# Patient Record
Sex: FEMALE | Race: WHITE | NOT HISPANIC OR LATINO | Employment: UNEMPLOYED | URBAN - METROPOLITAN AREA
[De-identification: names, ages, dates, MRNs, and addresses within clinical notes are randomized per-mention and may not be internally consistent; named-entity substitution may affect disease eponyms.]

---

## 2017-01-04 ENCOUNTER — HOSPITAL ENCOUNTER (OUTPATIENT)
Facility: MEDICAL CENTER | Age: 42
End: 2017-01-05
Attending: EMERGENCY MEDICINE | Admitting: EMERGENCY MEDICINE
Payer: MEDICAID

## 2017-01-04 ENCOUNTER — APPOINTMENT (OUTPATIENT)
Dept: RADIOLOGY | Facility: MEDICAL CENTER | Age: 42
End: 2017-01-04
Attending: INTERNAL MEDICINE
Payer: MEDICAID

## 2017-01-04 ENCOUNTER — RESOLUTE PROFESSIONAL BILLING HOSPITAL PROF FEE (OUTPATIENT)
Dept: HOSPITALIST | Facility: MEDICAL CENTER | Age: 42
End: 2017-01-04
Payer: MEDICAID

## 2017-01-04 ENCOUNTER — APPOINTMENT (OUTPATIENT)
Dept: RADIOLOGY | Facility: MEDICAL CENTER | Age: 42
End: 2017-01-04
Attending: EMERGENCY MEDICINE
Payer: MEDICAID

## 2017-01-04 DIAGNOSIS — R10.9 FLANK PAIN: ICD-10-CM

## 2017-01-04 PROBLEM — R11.10 EMESIS: Status: ACTIVE | Noted: 2017-01-04

## 2017-01-04 PROBLEM — R11.2 NAUSEA AND VOMITING: Status: ACTIVE | Noted: 2017-01-04

## 2017-01-04 PROBLEM — M54.9 BACK PAIN: Status: ACTIVE | Noted: 2017-01-04

## 2017-01-04 LAB
ALBUMIN SERPL BCP-MCNC: 5.1 G/DL (ref 3.2–4.9)
ALBUMIN/GLOB SERPL: 1.5 G/DL
ALP SERPL-CCNC: 79 U/L (ref 30–99)
ALT SERPL-CCNC: 11 U/L (ref 2–50)
ANION GAP SERPL CALC-SCNC: 15 MMOL/L (ref 0–11.9)
AST SERPL-CCNC: 17 U/L (ref 12–45)
BASOPHILS # BLD AUTO: 0.7 % (ref 0–1.8)
BASOPHILS # BLD: 0.06 K/UL (ref 0–0.12)
BILIRUB SERPL-MCNC: 1.9 MG/DL (ref 0.1–1.5)
BUN SERPL-MCNC: 16 MG/DL (ref 8–22)
CALCIUM SERPL-MCNC: 10.5 MG/DL (ref 8.5–10.5)
CHLORIDE SERPL-SCNC: 103 MMOL/L (ref 96–112)
CO2 SERPL-SCNC: 18 MMOL/L (ref 20–33)
CREAT SERPL-MCNC: 0.78 MG/DL (ref 0.5–1.4)
EOSINOPHIL # BLD AUTO: 0.03 K/UL (ref 0–0.51)
EOSINOPHIL NFR BLD: 0.3 % (ref 0–6.9)
ERYTHROCYTE [DISTWIDTH] IN BLOOD BY AUTOMATED COUNT: 49.3 FL (ref 35.9–50)
GFR SERPL CREATININE-BSD FRML MDRD: >60 ML/MIN/1.73 M 2
GLOBULIN SER CALC-MCNC: 3.5 G/DL (ref 1.9–3.5)
GLUCOSE SERPL-MCNC: 140 MG/DL (ref 65–99)
HCG SERPL QL: NEGATIVE
HCT VFR BLD AUTO: 46 % (ref 37–47)
HGB BLD-MCNC: 16 G/DL (ref 12–16)
IMM GRANULOCYTES # BLD AUTO: 0.05 K/UL (ref 0–0.11)
IMM GRANULOCYTES NFR BLD AUTO: 0.5 % (ref 0–0.9)
LIPASE SERPL-CCNC: 12 U/L (ref 11–82)
LYMPHOCYTES # BLD AUTO: 0.96 K/UL (ref 1–4.8)
LYMPHOCYTES NFR BLD: 10.5 % (ref 22–41)
MAGNESIUM SERPL-MCNC: 1.6 MG/DL (ref 1.5–2.5)
MCH RBC QN AUTO: 33.1 PG (ref 27–33)
MCHC RBC AUTO-ENTMCNC: 34.8 G/DL (ref 33.6–35)
MCV RBC AUTO: 95.2 FL (ref 81.4–97.8)
MONOCYTES # BLD AUTO: 0.53 K/UL (ref 0–0.85)
MONOCYTES NFR BLD AUTO: 5.8 % (ref 0–13.4)
NEUTROPHILS # BLD AUTO: 7.48 K/UL (ref 2–7.15)
NEUTROPHILS NFR BLD: 82.2 % (ref 44–72)
NRBC # BLD AUTO: 0 K/UL
NRBC BLD AUTO-RTO: 0 /100 WBC
PLATELET # BLD AUTO: 386 K/UL (ref 164–446)
PMV BLD AUTO: 9.9 FL (ref 9–12.9)
POTASSIUM SERPL-SCNC: 3.5 MMOL/L (ref 3.6–5.5)
PROT SERPL-MCNC: 8.6 G/DL (ref 6–8.2)
RBC # BLD AUTO: 4.83 M/UL (ref 4.2–5.4)
SODIUM SERPL-SCNC: 136 MMOL/L (ref 135–145)
WBC # BLD AUTO: 9.1 K/UL (ref 4.8–10.8)

## 2017-01-04 PROCEDURE — 96376 TX/PRO/DX INJ SAME DRUG ADON: CPT

## 2017-01-04 PROCEDURE — G0378 HOSPITAL OBSERVATION PER HR: HCPCS

## 2017-01-04 PROCEDURE — 80053 COMPREHEN METABOLIC PANEL: CPT

## 2017-01-04 PROCEDURE — A9270 NON-COVERED ITEM OR SERVICE: HCPCS | Performed by: HOSPITALIST

## 2017-01-04 PROCEDURE — 83735 ASSAY OF MAGNESIUM: CPT

## 2017-01-04 PROCEDURE — 302242 IV POLE: Performed by: HOSPITALIST

## 2017-01-04 PROCEDURE — 700111 HCHG RX REV CODE 636 W/ 250 OVERRIDE (IP): Performed by: INTERNAL MEDICINE

## 2017-01-04 PROCEDURE — 96375 TX/PRO/DX INJ NEW DRUG ADDON: CPT

## 2017-01-04 PROCEDURE — 700111 HCHG RX REV CODE 636 W/ 250 OVERRIDE (IP): Performed by: HOSPITALIST

## 2017-01-04 PROCEDURE — 700111 HCHG RX REV CODE 636 W/ 250 OVERRIDE (IP): Performed by: EMERGENCY MEDICINE

## 2017-01-04 PROCEDURE — 96374 THER/PROPH/DIAG INJ IV PUSH: CPT

## 2017-01-04 PROCEDURE — 99285 EMERGENCY DEPT VISIT HI MDM: CPT

## 2017-01-04 PROCEDURE — A9270 NON-COVERED ITEM OR SERVICE: HCPCS | Performed by: INTERNAL MEDICINE

## 2017-01-04 PROCEDURE — 96361 HYDRATE IV INFUSION ADD-ON: CPT

## 2017-01-04 PROCEDURE — 700102 HCHG RX REV CODE 250 W/ 637 OVERRIDE(OP): Performed by: HOSPITALIST

## 2017-01-04 PROCEDURE — 84703 CHORIONIC GONADOTROPIN ASSAY: CPT

## 2017-01-04 PROCEDURE — 99220 PR INITIAL OBSERVATION CARE,LEVL III: CPT | Mod: GC | Performed by: HOSPITALIST

## 2017-01-04 PROCEDURE — 700101 HCHG RX REV CODE 250: Performed by: HOSPITALIST

## 2017-01-04 PROCEDURE — 74176 CT ABD & PELVIS W/O CONTRAST: CPT

## 2017-01-04 PROCEDURE — 96372 THER/PROPH/DIAG INJ SC/IM: CPT | Mod: XU

## 2017-01-04 PROCEDURE — 700102 HCHG RX REV CODE 250 W/ 637 OVERRIDE(OP): Performed by: INTERNAL MEDICINE

## 2017-01-04 PROCEDURE — 85025 COMPLETE CBC W/AUTO DIFF WBC: CPT

## 2017-01-04 PROCEDURE — 302128 INFUSION PUMP: Performed by: HOSPITALIST

## 2017-01-04 PROCEDURE — 83690 ASSAY OF LIPASE: CPT

## 2017-01-04 RX ORDER — OXYCODONE HYDROCHLORIDE 5 MG/1
5 TABLET ORAL EVERY 6 HOURS PRN
Status: DISCONTINUED | OUTPATIENT
Start: 2017-01-04 | End: 2017-01-05 | Stop reason: HOSPADM

## 2017-01-04 RX ORDER — ACETAMINOPHEN 325 MG/1
650 TABLET ORAL EVERY 6 HOURS PRN
Status: DISCONTINUED | OUTPATIENT
Start: 2017-01-04 | End: 2017-01-05 | Stop reason: HOSPADM

## 2017-01-04 RX ORDER — ZOLPIDEM TARTRATE 5 MG/1
5 TABLET ORAL NIGHTLY PRN
Status: DISCONTINUED | OUTPATIENT
Start: 2017-01-04 | End: 2017-01-05 | Stop reason: HOSPADM

## 2017-01-04 RX ORDER — LORAZEPAM 2 MG/ML
1 INJECTION INTRAMUSCULAR ONCE
Status: COMPLETED | OUTPATIENT
Start: 2017-01-04 | End: 2017-01-04

## 2017-01-04 RX ORDER — MORPHINE SULFATE 4 MG/ML
2 INJECTION, SOLUTION INTRAMUSCULAR; INTRAVENOUS
Status: DISCONTINUED | OUTPATIENT
Start: 2017-01-04 | End: 2017-01-05

## 2017-01-04 RX ORDER — LIDOCAINE 50 MG/G
1 PATCH TOPICAL EVERY 24 HOURS
Status: DISCONTINUED | OUTPATIENT
Start: 2017-01-04 | End: 2017-01-05 | Stop reason: HOSPADM

## 2017-01-04 RX ORDER — AMOXICILLIN 250 MG
1 CAPSULE ORAL NIGHTLY
Status: DISCONTINUED | OUTPATIENT
Start: 2017-01-04 | End: 2017-01-05 | Stop reason: HOSPADM

## 2017-01-04 RX ORDER — LACTULOSE 20 G/30ML
30 SOLUTION ORAL
Status: DISCONTINUED | OUTPATIENT
Start: 2017-01-04 | End: 2017-01-05 | Stop reason: HOSPADM

## 2017-01-04 RX ORDER — MORPHINE SULFATE 4 MG/ML
4 INJECTION, SOLUTION INTRAMUSCULAR; INTRAVENOUS ONCE
Status: COMPLETED | OUTPATIENT
Start: 2017-01-04 | End: 2017-01-04

## 2017-01-04 RX ORDER — AMOXICILLIN 250 MG
1 CAPSULE ORAL
Status: DISCONTINUED | OUTPATIENT
Start: 2017-01-04 | End: 2017-01-05 | Stop reason: HOSPADM

## 2017-01-04 RX ORDER — DOCUSATE SODIUM 100 MG/1
100 CAPSULE, LIQUID FILLED ORAL EVERY MORNING
Status: DISCONTINUED | OUTPATIENT
Start: 2017-01-04 | End: 2017-01-05 | Stop reason: HOSPADM

## 2017-01-04 RX ORDER — BISACODYL 10 MG
10 SUPPOSITORY, RECTAL RECTAL
Status: DISCONTINUED | OUTPATIENT
Start: 2017-01-04 | End: 2017-01-05 | Stop reason: HOSPADM

## 2017-01-04 RX ORDER — ENEMA 19; 7 G/133ML; G/133ML
1 ENEMA RECTAL
Status: DISCONTINUED | OUTPATIENT
Start: 2017-01-04 | End: 2017-01-05 | Stop reason: HOSPADM

## 2017-01-04 RX ORDER — ONDANSETRON 4 MG/1
4 TABLET, ORALLY DISINTEGRATING ORAL EVERY 4 HOURS PRN
Status: DISCONTINUED | OUTPATIENT
Start: 2017-01-04 | End: 2017-01-05 | Stop reason: HOSPADM

## 2017-01-04 RX ORDER — LORAZEPAM 2 MG/ML
0.5 INJECTION INTRAMUSCULAR ONCE
Status: DISCONTINUED | OUTPATIENT
Start: 2017-01-04 | End: 2017-01-04

## 2017-01-04 RX ORDER — ONDANSETRON 2 MG/ML
4 INJECTION INTRAMUSCULAR; INTRAVENOUS ONCE
Status: COMPLETED | OUTPATIENT
Start: 2017-01-04 | End: 2017-01-04

## 2017-01-04 RX ORDER — SODIUM CHLORIDE AND POTASSIUM CHLORIDE 150; 900 MG/100ML; MG/100ML
INJECTION, SOLUTION INTRAVENOUS CONTINUOUS
Status: DISCONTINUED | OUTPATIENT
Start: 2017-01-04 | End: 2017-01-05 | Stop reason: HOSPADM

## 2017-01-04 RX ORDER — PROMETHAZINE HYDROCHLORIDE 25 MG/1
12.5-25 SUPPOSITORY RECTAL EVERY 4 HOURS PRN
Status: DISCONTINUED | OUTPATIENT
Start: 2017-01-04 | End: 2017-01-05 | Stop reason: HOSPADM

## 2017-01-04 RX ORDER — LORAZEPAM 1 MG/1
1 TABLET ORAL EVERY 8 HOURS PRN
Status: DISCONTINUED | OUTPATIENT
Start: 2017-01-04 | End: 2017-01-05 | Stop reason: HOSPADM

## 2017-01-04 RX ORDER — VITAMIN A ACETATE, BETA CAROTENE, ASCORBIC ACID, CHOLECALCIFEROL, .ALPHA.-TOCOPHEROL ACETATE, DL-, THIAMINE MONONITRATE, RIBOFLAVIN, NIACINAMIDE, PYRIDOXINE HYDROCHLORIDE, FOLIC ACID, CYANOCOBALAMIN, CALCIUM CARBONATE, FERROUS FUMARATE, ZINC OXIDE, CUPRIC OXIDE 3080; 12; 120; 400; 1; 1.84; 3; 20; 22; 920; 25; 200; 27; 10; 2 [IU]/1; UG/1; MG/1; [IU]/1; MG/1; MG/1; MG/1; MG/1; MG/1; [IU]/1; MG/1; MG/1; MG/1; MG/1; MG/1
1 TABLET, FILM COATED ORAL EVERY MORNING
Status: DISCONTINUED | OUTPATIENT
Start: 2017-01-05 | End: 2017-01-05 | Stop reason: HOSPADM

## 2017-01-04 RX ORDER — PROMETHAZINE HYDROCHLORIDE 25 MG/1
12.5-25 TABLET ORAL EVERY 4 HOURS PRN
Status: DISCONTINUED | OUTPATIENT
Start: 2017-01-04 | End: 2017-01-05 | Stop reason: HOSPADM

## 2017-01-04 RX ORDER — ONDANSETRON 2 MG/ML
4 INJECTION INTRAMUSCULAR; INTRAVENOUS EVERY 4 HOURS PRN
Status: DISCONTINUED | OUTPATIENT
Start: 2017-01-04 | End: 2017-01-05 | Stop reason: HOSPADM

## 2017-01-04 RX ADMIN — MORPHINE SULFATE 4 MG: 4 INJECTION INTRAVENOUS at 11:30

## 2017-01-04 RX ADMIN — ONDANSETRON 4 MG: 2 INJECTION, SOLUTION INTRAMUSCULAR; INTRAVENOUS at 10:45

## 2017-01-04 RX ADMIN — MORPHINE SULFATE 2 MG: 4 INJECTION INTRAVENOUS at 21:54

## 2017-01-04 RX ADMIN — MORPHINE SULFATE 4 MG: 4 INJECTION INTRAVENOUS at 10:45

## 2017-01-04 RX ADMIN — LIDOCAINE 1 PATCH: 50 PATCH CUTANEOUS at 21:50

## 2017-01-04 RX ADMIN — PROCHLORPERAZINE EDISYLATE 5 MG: 5 INJECTION INTRAMUSCULAR; INTRAVENOUS at 20:40

## 2017-01-04 RX ADMIN — LORAZEPAM 1 MG: 1 TABLET ORAL at 18:01

## 2017-01-04 RX ADMIN — LORAZEPAM 1 MG: 2 INJECTION INTRAMUSCULAR; INTRAVENOUS at 13:15

## 2017-01-04 RX ADMIN — POTASSIUM CHLORIDE AND SODIUM CHLORIDE 125 ML: 900; 150 INJECTION, SOLUTION INTRAVENOUS at 20:56

## 2017-01-04 RX ADMIN — ENOXAPARIN SODIUM 40 MG: 100 INJECTION SUBCUTANEOUS at 18:01

## 2017-01-04 RX ADMIN — LORAZEPAM 1 MG: 2 INJECTION INTRAMUSCULAR; INTRAVENOUS at 13:00

## 2017-01-04 RX ADMIN — ONDANSETRON 4 MG: 2 INJECTION, SOLUTION INTRAMUSCULAR; INTRAVENOUS at 13:15

## 2017-01-04 RX ADMIN — OXYCODONE HYDROCHLORIDE 5 MG: 5 TABLET ORAL at 18:01

## 2017-01-04 RX ADMIN — ONDANSETRON 4 MG: 4 TABLET, ORALLY DISINTEGRATING ORAL at 18:05

## 2017-01-04 RX ADMIN — ZOLPIDEM TARTRATE 5 MG: 5 TABLET, FILM COATED ORAL at 21:52

## 2017-01-04 ASSESSMENT — ENCOUNTER SYMPTOMS
DEPRESSION: 0
WHEEZING: 0
ORTHOPNEA: 0
FEVER: 0
DIZZINESS: 0
HEADACHES: 0
DIARRHEA: 0
COUGH: 0
WEAKNESS: 1
ABDOMINAL PAIN: 1
SENSORY CHANGE: 0
WEIGHT LOSS: 0
FOCAL WEAKNESS: 0
FALLS: 0
MYALGIAS: 1
CHILLS: 0
EYE PAIN: 0
HEARTBURN: 0
BLURRED VISION: 0
EYE REDNESS: 0
BACK PAIN: 1
VOMITING: 1
NAUSEA: 1
PALPITATIONS: 0
CONSTIPATION: 0
LOSS OF CONSCIOUSNESS: 0
NECK PAIN: 0
TINGLING: 0
FLANK PAIN: 1
SEIZURES: 0
NERVOUS/ANXIOUS: 1
SHORTNESS OF BREATH: 0

## 2017-01-04 ASSESSMENT — PAIN SCALES - GENERAL
PAINLEVEL_OUTOF10: 10
PAINLEVEL_OUTOF10: 9
PAINLEVEL_OUTOF10: 3
PAINLEVEL_OUTOF10: 5

## 2017-01-04 ASSESSMENT — LIFESTYLE VARIABLES
ALCOHOL_USE: NO
SUBSTANCE_ABUSE: 1
DO YOU DRINK ALCOHOL: NO
EVER_SMOKED: NEVER

## 2017-01-04 NOTE — ED NOTES
"PT BIB EMS, pt c/o chronic back pain, n/v, and anxiety.  PT reports that she has a hx of the same.  PT tearful in triage  Chief Complaint   Patient presents with   • Back Pain   • Panic Attack   • Vomiting     Blood pressure 123/77, pulse 81, temperature 36.9 °C (98.4 °F), resp. rate 16, height 1.651 m (5' 5\"), weight 56.7 kg (125 lb).    "

## 2017-01-04 NOTE — ED PROVIDER NOTES
ED Provider Note    CHIEF COMPLAINT  Chief Complaint   Patient presents with   • Back Pain   • Panic Attack   • Vomiting       HPI  Diana Hernandez is a 41 y.o. female who presents with right flank pain, for the last 24 hours. The pain severe, dull, radiates to her right lower quadrant. History of kidney stones with similar pain in the past. Nausea vomiting again 24 hours her last vomiting here. No hematuria and no dysuria and frequency no fever no chills.    REVIEW OF SYSTEMS  See HPI for further details. History of kidney stones, depression, anxiety, kidney stones, drug-seeking behavior, cyclic vomiting, superior mesenteric artery syndrome, Denies other G.I., G.U.. endrocine, cardiovascular, respriatory or neurological problems. All other systems are negative.     PAST MEDICAL HISTORY  Past Medical History   Diagnosis Date   • Snoring    • Dental disorder    • Pain      abd and back   • Tobacco abuse 6/20/2009   • Superior mesenteric artery syndrome (HCC) 7/12/2009   • Anxiety      Followed by [facility]   • CLOSTRIDIUM DIFFICILE INFECTION 4/14/2010   • Dyspepsia 7/12/2009   • Backpain    • Nephrolithiasis 6/20/2009     kidney stones,post lithotrypsy   • Anxiety disorder    • CVS disease    • Drug-seeking behavior    • Cyclic vomiting syndrome    • Superior mesenteric artery syndrome (HCC)        FAMILY HISTORY  Family History   Problem Relation Age of Onset   • Cancer Mother 50     Colon cancer   • Allergies Father    • Hypertension Mother    • Hypertension Father    • Heart Disease Maternal Grandmother        SOCIAL HISTORY  Social History     Social History   • Marital Status: Single     Spouse Name: N/A   • Number of Children: N/A   • Years of Education: N/A     Social History Main Topics   • Smoking status: Former Smoker -- 0.50 packs/day for 13 years     Types: Cigarettes     Quit date: 12/15/2012   • Smokeless tobacco: Not on file      Comment: 1/2ppd   • Alcohol Use: No   • Drug Use: Yes     Special:  "Inhaled, Marijuana      Comment: pot occ   • Sexual Activity: Not on file     Other Topics Concern   • Not on file     Social History Narrative       SURGICAL HISTORY  Past Surgical History   Procedure Laterality Date   • Gastroscopy-endo  7/8/2009     Performed by ROSANNA WRIGHT at SURGERY [facility]   • Lithotripsy     • Pyloroplasty  7/27/2009     Performed by MACIEL QUINTERO at SURGERY [facility]   • Gastroscopy with biopsy  3/9/2010     Performed by AMANDA MEJIA at ENDOSCOPY [facility] ORS   • Exploratory laparotomy  7/27/2009     Performed by MACIEL QUINTERO at SURGERY [facility] ORS   • Appendectomy  7/27/2009     Performed by MACIEL QUINTERO at SURGERY [facility] ORS   • Cholecystectomy  7/27/2009     Performed by MACIEL QUINTERO at [facility]   • Other       superior mesenteric artery correction    • Exploratory laparotomy  1/12/2016     Procedure: EXPLORATORY LAPAROTOMY;  Surgeon: Maciel Quintero M.D.;  Location: SURGERY [facility];  Service:    • Bowel resection  1/12/2016     Procedure: BOWEL RESECTION;  Surgeon: Maciel Quintero M.D.;  Location: SURGERY [facility];  Service:    • Gastroscopy-endo  4/28/2016     Procedure: GASTROSCOPY-ENDO;  Surgeon: Blayne Blackburn M.D.;  Location: ENDOSCOPY [facility];  Service:        CURRENT MEDICATIONS  Home Medications     **Home medications have not yet been reviewed for this encounter**          ALLERGIES  Allergies   Allergen Reactions   • Bactrim Ds    • Reglan [Kdc:Yellow Dye+Ci Pigment Blue 63+Metoclopramide]      Per doctor    • Seroquel [Quetiapine Fumarate] Unspecified     ? Syncope        PHYSICAL EXAM  VITAL SIGNS: /77 mmHg  Pulse 81  Temp(Src) 36.9 °C (98.4 °F)  Resp 16  Ht 1.651 m (5' 5\")  Wt 56.7 kg (125 lb)  BMI 20.80 kg/m2  Constitutional: Well developed, Well nourished, appears to be in acute pain  HENT: Normocephalic, Atraumatic, " Bilateral external ears normal, Oropharynx moist, No oral exudates, Nose normal.   Eyes: PERRL, EOMI, Conjunctiva normal, No discharge.   Neck: Normal range of motion, No tenderness, Supple, No stridor.   Lymphatic: No lymphadenopathy noted.   Cardiovascular: Normal heart rate, Normal rhythm, No murmurs, No rubs, No gallops.   Thorax & Lungs: Normal breath sounds, No respiratory distress, No wheezing, No chest tenderness.   Abdomen:  No tenderness, no guarding no rigidity and the abdomen is soft.  No masses, No pulsatile masses.  Skin: Warm, Dry, No erythema, No rash.   Back: No tenderness, No CVA tenderness.   Extremities: Intact distal pulses, No edema, No tenderness, No cyanosis, No clubbing.   Musculoskeletal: Good range of motion in all major joints. No tenderness to palpation or major deformities noted.   Neurologic: Alert & oriented x 3, Normal motor function, Normal sensory function, No focal deficits noted.   Psychiatric: Anxiety because of her pain      RADIOLOGY/PROCEDURES  CT-RENAL COLIC EVALUATION(A/P W/O)   Final Result         1.  Negative noncontrast CT scan of the abdomen and pelvis.      2.  No hydronephrosis or nephrolithiasis.      3.  Cholecystectomy.      4.  Postoperative changes involving the descending colon and small bowel as described.      5.  No free fluid.            COURSE & MEDICAL DECISION MAKING  Pertinent Labs & Imaging studies reviewed. (See chart for details) electrolytes, unremarkable, liver function tests normal left facial white count hematocrit platelets normal        Physical examination flank pain, radiating to her right lower quadrant, history of kidney stones in the past. He is given morphine, Zofran. She has been here for several hours. She has a normal white count however continues to have vomiting and back pain. No evidence of kidney stone. Urinalysis, still pending. I think she is probably having too much pain to go home. I will talk with her physician about further  disposition.     FINAL IMPRESSION  1.   1. Flank pain        2.   3.     Disposition  [facility].  Electronically signed by: Dinh Ferris, 1/4/2017 10:14 AM

## 2017-01-04 NOTE — ED NOTES
Erp notified pt remains in pain after first dose of morphine, verbal order given for additional dose of morphine 4mg IV and 1 mg Ativan IV

## 2017-01-04 NOTE — ED NOTES
Md Ferris  informed of patient condition, instructed to give additional 4mg zofran IV and 1mg Ativan IV

## 2017-01-04 NOTE — IP AVS SNAPSHOT
" After Visit Summary                                                                                                                  Name:Diana Hernandez  Medical Record Number:[id number]  CSN: [id number]    YOB: 1975   Age: 41 y.o.  Sex: female  HT:1.651 m (5' 5\") WT: 56.7 kg (125 lb)          Admit Date: 1/4/2017     Discharge Date:   Today's Date: 1/5/2017  Attending Doctor:  Bam Bolaños Calliste*                  Allergies:  Metoclopramide; Quetiapine; and Septra            Discharge Instructions       Discharge Instructions    Discharged to home by car with relative. Discharged via wheelchair, hospital escort: Yes.  Special equipment needed: Not Applicable    Be sure to schedule a follow-up appointment with your primary care doctor or any specialists as instructed.     Discharge Plan:   Diet Plan: Discussed  Activity Level: Discussed  Confirmed Follow up Appointment: Patient to Call and Schedule Appointment  Confirmed Symptoms Management: Discussed  Medication Reconciliation Updated: Yes  Influenza Vaccine Indication: Patient Refuses    I understand that a diet low in cholesterol, fat, and sodium is recommended for good health. Unless I have been given specific instructions below for another diet, I accept this instruction as my diet prescription.   Other diet: Full liquid -- Advance as tolerated.    Special Instructions: Follow up with PCP in 1 week.    · Is patient discharged on Warfarin / Coumadin?   No     · Is patient Post Blood Transfusion?  No    Depression / Suicide Risk    As you are discharged from this [facility] facility, it is important to learn how to keep safe from harming yourself.    Recognize the warning signs:  · Abrupt changes in personality, positive or negative- including increase in energy   · Giving away possessions  · Change in eating patterns- significant weight changes-  positive or negative  · Change in sleeping patterns- unable to sleep or sleeping all the " "time   · Unwillingness or inability to communicate  · Depression  · Unusual sadness, discouragement and loneliness  · Talk of wanting to die  · Neglect of personal appearance   · Rebelliousness- reckless behavior  · Withdrawal from people/activities they love  · Confusion- inability to concentrate     If you or a loved one observes any of these behaviors or has concerns about self-harm, here's what you can do:  · Talk about it- your feelings and reasons for harming yourself  · Remove any means that you might use to hurt yourself (examples: pills, rope, extension cords, firearm)  · Get professional help from the community (Mental Health, Substance Abuse, psychological counseling)  · Do not be alone:Call your Safe Contact- someone whom you trust who will be there for you.  · Call your local CRISIS HOTLINE 517-8880 or 824-848-3810  · Call your local Children's Mobile Crisis Response Team [place] (434) 308-6764 or www.[employer]  · Call the toll free National Suicide Prevention Hotlines   · National Suicide Prevention Lifeline 029-202-RFOG (4738)  [place] Hope Line Network 800-SUICIDE (753-6781)    Cannabis Use Disorder  Cannabis use disorder is a mental disorder. It is not one-time or occasional use of cannabis, more commonly known as marijuana. Cannabis use disorder is the continued, nonmedical use of cannabis that interferes with normal life activities or causes health problems. People with cannabis use disorder get a feeling of extreme pleasure and relaxation from cannabis use. This \"high\" is very rewarding and causes people to use over and over.   The mind-altering ingredient in cannabis is know as THC. THC can also interfere with motor coordination, memory, judgment, and accurate sense of space and time. These effects can last for a few days after using cannabis. Regular heavy cannabis use can cause long-lasting problems with thinking and learning. In young people, these problems may be permanent. " "Cannabis sometimes causes severe anxiety, paranoia, or visual hallucinations. Man-made (synthetic) cannabis-like drugs, such as \"spice\" and \"K2,\" cause the same effects as THC but are much stronger. Cannabis-like drugs can cause dangerously high blood pressure and heart rate.   Cannabis use disorder usually starts in the teenage years. It can trigger the development of schizophrenia. It is somewhat more common in men than women. People who have family members with the disorder or existing mental health issues such as depression and posttraumatic stress disorder are more likely to develop cannabis use disorder. People with cannabis use disorder are at higher risk for use of other drugs of abuse.   SIGNS AND SYMPTOMS  Signs and symptoms of cannabis use disorder include:   · Use of cannabis in larger amounts or over a longer period than intended.    · Unsuccessful attempts to cut down or control cannabis use.    · A lot of time spent obtaining, using, or recovering from the effects of cannabis.    · A strong desire or urge to use cannabis (cravings).    · Continued use of cannabis in spite of problems at work, school, or home because of use.    · Continued use of cannabis in spite of relationship problems because of use.  · Giving up or cutting down on important life activities because of cannabis use.  · Use of cannabis over and over even in situations when it is physically hazardous, such as when driving a car.    · Continued use of cannabis in spite of a physical problem that is likely related to use. Physical problems can include:  · Chronic cough.  · Bronchitis.  · Emphysema.  · Throat and lung cancer.  · Continued use of cannabis in spite of a mental problem that is likely related to use. Mental problems can include:  · Psychosis.  · Anxiety.  · Difficulty sleeping.  · Need to use more and more cannabis to get the same effect, or lessened effect over time with use of the same amount (tolerance).  · Having " withdrawal symptoms when cannabis use is stopped, or using cannabis to reduce or avoid withdrawal symptoms. Withdrawal symptoms include:  · Irritability or anger.  · Anxiety or restlessness.  · Difficulty sleeping.  · Loss of appetite or weight.  · Aches and pains.  · Shakiness.  · Sweating.  · Chills.  DIAGNOSIS  Cannabis use disorder is diagnosed by your health care provider. You may be asked questions about your cannabis use and how it affects your life. A physical exam may be done. A drug screen may be done. You may be referred to a mental health professional. The diagnosis of cannabis use disorder requires at least two symptoms within 12 months. The type of cannabis use disorder you have depends on the number of symptoms you have. The type may be:  · Mild. Two or three signs and symptoms.    · Moderate. Four or five signs and symptoms.    · Severe. Six or more signs and symptoms.    TREATMENT  Treatment is usually provided by mental health professionals with training in substance use disorders. The following options are available:  · Counseling or talk therapy. Talk therapy addresses the reasons you use cannabis. It also addresses ways to keep you from using again. The goals of talk therapy include:  · Identifying and avoiding triggers for use.  · Learning how to handle cravings.  · Replacing use with healthy activities.  · Support groups. Support groups provide emotional support, advice, and guidance.  · Medicine. Medicine is used to treat mental health issues that trigger cannabis use or that result from it.  HOME CARE INSTRUCTIONS  · Take medicines only as directed by your health care provider.  · Check with your health care provider before starting any new medicines.  · Keep all follow-up visits as directed by your health care provider.  SEEK MEDICAL CARE IF:  · You are not able to take your medicines as directed.  · Your symptoms get worse.  SEEK IMMEDIATE MEDICAL CARE IF:  You have serious thoughts about  hurting yourself or others.  FOR MORE INFORMATION  · National [place] on Drug Abuse: www.drugabuse.gov  · Substance Abuse and Mental Health Services Administration: www.samhsa.gov     This information is not intended to replace advice given to you by your health care provider. Make sure you discuss any questions you have with your health care provider.     Document Released: 12/15/2001 Document Revised: 01/08/2016 Document Reviewed: 12/31/2014  [employer] Interactive Patient Education ©2016 [employer] Inc.    Marijuana Abuse  Your exam shows you have used marijuana or pot. There are many health problems related to marijuana abuse. These include:  · Bronchitis.  · Chronic cough.  · Emphysema.  · Lung and upper airway cancer.  Abusers also experience impairment in:  · Memory.  · Judgment.  · Ability to learn.  · Coordination.  Students who smoke marijuana:  · Get lower grades.  · Are less likely to graduate than those who do not.  Adults who abuse marijuana:  · Have problems at work.  · May even lose their jobs due to:  · Poor work performance.  · Absenteeism.  Attention, memory, and learning skills have been shown to be diminished for up to 6 months after stopping regular use, and there is evidence that the effects can be cumulative over a lifetime.   Heavier use of marijuana also puts a strain on relationships with friends and loved ones and can lead to moodiness and loss of confidence. Acute intoxication can lead to:  · Increased anxiety.  · A panic episode.  It also increases the risk for having an automobile accident. This is especially true if the pot is combined with alcohol or other intoxicants. Treatment for acute intoxication is rarely needed. However, medicine to reduce anxiety may be helpful in some people.  Millions of people are considered to be dependent on marijuana. It is long-term regular use that leads to addiction and all of its complex problems. Information on the problem of addiction and the health  problems of long-term abuse is posted at the National [place] for Drug Abuse website, www.drugabuse.gov. Consult with your doctor or counselor if you want further information and support in handling this common problem.  Document Released: 2006 Document Revised: 2013 Document Reviewed: 2008  ExitCare® Patient Information © [employer].        Your appointments     Dash 10, 2017 10:15 AM   Established Patient with Lavell Monte M.D.   [facility] - Internal Medicine (--)    [address] [place] [id number]   [phone]           You will be receiving a confirmation call a few days before your appointment from our automated call confirmation system.                 Discharge Medication Instructions:    Below are the medications your physician expects you to take upon discharge:    Review all your home medications and newly ordered medications with your doctor and/or pharmacist. Follow medication instructions as directed by your doctor and/or pharmacist.    Please keep your medication list with you and share with your physician.               Medication List      START taking these medications        Instructions    alprazolam 0.5 MG Tabs   Commonly known as:  XANAX    Take 1 Tab by mouth every 8 hours as needed for Sleep.   Dose:  0.5 mg         CONTINUE taking these medications        Instructions    ondansetron 4 MG Tbdp   Last time this was given:  4 mg on 2017  6:05 PM   Commonly known as:  ZOFRAN ODT    Take 1 Tab by mouth every 6 hours as needed for Nausea/Vomiting (give PO if no IV route available).   Dose:  4 mg       PRE-LAYNE FORMULA Tabs    Take 1 Tab by mouth every day.   Dose:  1 Tab         STOP taking these medications     lorazepam 1 MG Tabs   Commonly known as:  ATIVAN               Instructions           Diet / Nutrition:    Follow any diet instructions given to you by your doctor or the dietician, including how much salt (sodium)  you are allowed each day.    If you are overweight, talk to your doctor about a weight reduction plan.    Activity:    Remain physically active following your doctor's instructions about exercise and activity.    Rest often.     Any time you become even a little tired or short of breath, SIT DOWN and rest.    Worsening Symptoms:    Report any of the following signs and symptoms to the doctor's office immediately:    *Pain of jaw, arm, or neck  *Chest pain not relieved by medication                               *Dizziness or loss of consciousness  *Difficulty breathing even when at rest   *More tired than usual                                       *Bleeding drainage or swelling of surgical site  *Swelling of feet, ankles, legs or stomach                 *Fever (>100ºF)  *Pink or blood tinged sputum  *Weight gain (3lbs/day or 5lbs /week)           *Shock from internal defibrillator (if applicable)  *Palpitations or irregular heartbeats                *Cool and/or numb extremities    Stroke Awareness    Common Risk Factors for Stroke include:    Age  Atrial Fibrillation  Carotid Artery Stenosis  Diabetes Mellitus  Excessive alcohol consumption  High blood pressure  Overweight   Physical inactivity  Smoking    Warning signs and symptoms of a stroke include:    *Sudden numbness or weakness of the face, arm or leg (especially on one side of the body).  *Sudden confusion, trouble speaking or understanding.  *Sudden trouble seeing in one or both eyes.  *Sudden trouble walking, dizziness, loss of balance or coordination.Sudden severe headache with no known cause.    It is very important to get treatment quickly when a stroke occurs. If you experience any of the above warning signs, call 911 immediately.                   Disclaimer         Quit Smoking / Tobacco Use:    I understand the use of any tobacco products increases my chance of suffering from future heart disease or stroke and could cause other illnesses which may  shorten my life. Quitting the use of tobacco products is the single most important thing I can do to improve my health. For further information on smoking / tobacco cessation call a Toll Free Quit Line at 1-787.750.1381 (*National Cancer [place]) or 1-166.452.3034 (American Lung Association) or you can access the web based program at www.lungusa.org.    [place] Tobacco Users Help Line:  (884) 439-5685       Toll Free: 1-749.745.6556  Quit Tobacco Program [facility] (672)234-7829    Crisis Hotline:    [place] Crisis Hotline:  5-504-ZRYUSGS or 1-876.636.2547    [place] Crisis Hotline:    1-586.154.7107 or 580-427-6070    Discharge Survey:   Thank you for choosing [facility]. We hope we did everything we could to make your hospital stay a pleasant one. You may be receiving a phone survey and we would appreciate your time and participation in answering the questions. Your input is very valuable to us in our efforts to improve our service to our patients and their families.        My signature on this form indicates that:    1. I have reviewed and understand the above information.  2. My questions regarding this information have been answered to my satisfaction.  3. I have formulated a plan with my discharge nurse to obtain my prescribed medications for home.                  Disclaimer         __________________________________                     __________       ________                       Patient Signature                                                 Date                    Time

## 2017-01-05 ENCOUNTER — APPOINTMENT (OUTPATIENT)
Dept: RADIOLOGY | Facility: MEDICAL CENTER | Age: 42
End: 2017-01-05
Attending: INTERNAL MEDICINE
Payer: MEDICAID

## 2017-01-05 VITALS
HEIGHT: 65 IN | DIASTOLIC BLOOD PRESSURE: 60 MMHG | SYSTOLIC BLOOD PRESSURE: 102 MMHG | TEMPERATURE: 99.7 F | OXYGEN SATURATION: 94 % | HEART RATE: 88 BPM | WEIGHT: 125 LBS | RESPIRATION RATE: 16 BRPM | BODY MASS INDEX: 20.83 KG/M2

## 2017-01-05 LAB
ALBUMIN SERPL BCP-MCNC: 4 G/DL (ref 3.2–4.9)
ALBUMIN/GLOB SERPL: 1.4 G/DL
ALP SERPL-CCNC: 54 U/L (ref 30–99)
ALT SERPL-CCNC: 9 U/L (ref 2–50)
ANION GAP SERPL CALC-SCNC: 8 MMOL/L (ref 0–11.9)
AST SERPL-CCNC: 10 U/L (ref 12–45)
BASOPHILS # BLD AUTO: 0.3 % (ref 0–1.8)
BASOPHILS # BLD: 0.04 K/UL (ref 0–0.12)
BILIRUB SERPL-MCNC: 1.3 MG/DL (ref 0.1–1.5)
BUN SERPL-MCNC: 12 MG/DL (ref 8–22)
CALCIUM SERPL-MCNC: 9 MG/DL (ref 8.5–10.5)
CHLORIDE SERPL-SCNC: 109 MMOL/L (ref 96–112)
CO2 SERPL-SCNC: 21 MMOL/L (ref 20–33)
CREAT SERPL-MCNC: 0.49 MG/DL (ref 0.5–1.4)
EOSINOPHIL # BLD AUTO: 0 K/UL (ref 0–0.51)
EOSINOPHIL NFR BLD: 0 % (ref 0–6.9)
ERYTHROCYTE [DISTWIDTH] IN BLOOD BY AUTOMATED COUNT: 52.5 FL (ref 35.9–50)
GFR SERPL CREATININE-BSD FRML MDRD: >60 ML/MIN/1.73 M 2
GLOBULIN SER CALC-MCNC: 2.8 G/DL (ref 1.9–3.5)
GLUCOSE SERPL-MCNC: 89 MG/DL (ref 65–99)
HCT VFR BLD AUTO: 39.7 % (ref 37–47)
HGB BLD-MCNC: 13.4 G/DL (ref 12–16)
IMM GRANULOCYTES # BLD AUTO: 0.05 K/UL (ref 0–0.11)
IMM GRANULOCYTES NFR BLD AUTO: 0.4 % (ref 0–0.9)
LYMPHOCYTES # BLD AUTO: 1.52 K/UL (ref 1–4.8)
LYMPHOCYTES NFR BLD: 12.4 % (ref 22–41)
MCH RBC QN AUTO: 33.7 PG (ref 27–33)
MCHC RBC AUTO-ENTMCNC: 33.8 G/DL (ref 33.6–35)
MCV RBC AUTO: 99.7 FL (ref 81.4–97.8)
MONOCYTES # BLD AUTO: 0.96 K/UL (ref 0–0.85)
MONOCYTES NFR BLD AUTO: 7.8 % (ref 0–13.4)
NEUTROPHILS # BLD AUTO: 9.72 K/UL (ref 2–7.15)
NEUTROPHILS NFR BLD: 79.1 % (ref 44–72)
NRBC # BLD AUTO: 0 K/UL
NRBC BLD AUTO-RTO: 0 /100 WBC
PLATELET # BLD AUTO: 297 K/UL (ref 164–446)
PMV BLD AUTO: 9.7 FL (ref 9–12.9)
POTASSIUM SERPL-SCNC: 3.6 MMOL/L (ref 3.6–5.5)
PROT SERPL-MCNC: 6.8 G/DL (ref 6–8.2)
RBC # BLD AUTO: 3.98 M/UL (ref 4.2–5.4)
SODIUM SERPL-SCNC: 138 MMOL/L (ref 135–145)
WBC # BLD AUTO: 12.3 K/UL (ref 4.8–10.8)

## 2017-01-05 PROCEDURE — 700111 HCHG RX REV CODE 636 W/ 250 OVERRIDE (IP): Performed by: INTERNAL MEDICINE

## 2017-01-05 PROCEDURE — 700111 HCHG RX REV CODE 636 W/ 250 OVERRIDE (IP): Performed by: HOSPITALIST

## 2017-01-05 PROCEDURE — 99217 PR OBSERVATION CARE DISCHARGE: CPT | Mod: GC | Performed by: HOSPITALIST

## 2017-01-05 PROCEDURE — A9270 NON-COVERED ITEM OR SERVICE: HCPCS | Performed by: HOSPITALIST

## 2017-01-05 PROCEDURE — G0378 HOSPITAL OBSERVATION PER HR: HCPCS

## 2017-01-05 PROCEDURE — A9270 NON-COVERED ITEM OR SERVICE: HCPCS

## 2017-01-05 PROCEDURE — 700102 HCHG RX REV CODE 250 W/ 637 OVERRIDE(OP): Performed by: HOSPITALIST

## 2017-01-05 PROCEDURE — 700112 HCHG RX REV CODE 229: Performed by: HOSPITALIST

## 2017-01-05 PROCEDURE — 700101 HCHG RX REV CODE 250: Performed by: HOSPITALIST

## 2017-01-05 PROCEDURE — 80053 COMPREHEN METABOLIC PANEL: CPT

## 2017-01-05 PROCEDURE — 85025 COMPLETE CBC W/AUTO DIFF WBC: CPT

## 2017-01-05 PROCEDURE — 96376 TX/PRO/DX INJ SAME DRUG ADON: CPT

## 2017-01-05 PROCEDURE — 700102 HCHG RX REV CODE 250 W/ 637 OVERRIDE(OP)

## 2017-01-05 PROCEDURE — 96361 HYDRATE IV INFUSION ADD-ON: CPT

## 2017-01-05 PROCEDURE — 76700 US EXAM ABDOM COMPLETE: CPT

## 2017-01-05 RX ORDER — ALPRAZOLAM 0.5 MG/1
0.5 TABLET ORAL EVERY 8 HOURS PRN
Qty: 12 TAB | Refills: 0 | Status: SHIPPED | OUTPATIENT
Start: 2017-01-05 | End: 2017-04-10

## 2017-01-05 RX ORDER — ONDANSETRON 4 MG/1
4 TABLET, ORALLY DISINTEGRATING ORAL EVERY 6 HOURS PRN
Qty: 24 TAB | Refills: 0 | Status: SHIPPED | OUTPATIENT
Start: 2017-01-05 | End: 2017-01-07

## 2017-01-05 RX ORDER — MORPHINE SULFATE 4 MG/ML
1 INJECTION, SOLUTION INTRAMUSCULAR; INTRAVENOUS EVERY 6 HOURS PRN
Status: DISCONTINUED | OUTPATIENT
Start: 2017-01-05 | End: 2017-01-05 | Stop reason: HOSPADM

## 2017-01-05 RX ADMIN — POTASSIUM CHLORIDE AND SODIUM CHLORIDE: 900; 150 INJECTION, SOLUTION INTRAVENOUS at 03:53

## 2017-01-05 RX ADMIN — PROCHLORPERAZINE EDISYLATE 10 MG: 5 INJECTION INTRAMUSCULAR; INTRAVENOUS at 11:12

## 2017-01-05 RX ADMIN — MORPHINE SULFATE 1 MG: 4 INJECTION INTRAVENOUS at 12:59

## 2017-01-05 RX ADMIN — ONDANSETRON 4 MG: 2 INJECTION, SOLUTION INTRAMUSCULAR; INTRAVENOUS at 04:05

## 2017-01-05 RX ADMIN — DOCUSATE SODIUM 100 MG: 100 CAPSULE ORAL at 08:31

## 2017-01-05 RX ADMIN — OXYCODONE HYDROCHLORIDE 5 MG: 5 TABLET ORAL at 08:31

## 2017-01-05 RX ADMIN — ONDANSETRON 4 MG: 2 INJECTION, SOLUTION INTRAMUSCULAR; INTRAVENOUS at 08:28

## 2017-01-05 RX ADMIN — ONDANSETRON 4 MG: 2 INJECTION, SOLUTION INTRAMUSCULAR; INTRAVENOUS at 13:38

## 2017-01-05 RX ADMIN — MORPHINE SULFATE 2 MG: 4 INJECTION INTRAVENOUS at 07:08

## 2017-01-05 RX ADMIN — LORAZEPAM 1 MG: 1 TABLET ORAL at 13:01

## 2017-01-05 RX ADMIN — PROCHLORPERAZINE EDISYLATE 5 MG: 5 INJECTION INTRAMUSCULAR; INTRAVENOUS at 05:13

## 2017-01-05 RX ADMIN — LORAZEPAM 1 MG: 1 TABLET ORAL at 05:31

## 2017-01-05 RX ADMIN — MORPHINE SULFATE 2 MG: 4 INJECTION INTRAVENOUS at 04:05

## 2017-01-05 RX ADMIN — POTASSIUM CHLORIDE AND SODIUM CHLORIDE: 900; 150 INJECTION, SOLUTION INTRAVENOUS at 11:55

## 2017-01-05 RX ADMIN — Medication 1 TABLET: at 08:32

## 2017-01-05 ASSESSMENT — LIFESTYLE VARIABLES: EVER_SMOKED: NEVER

## 2017-01-05 ASSESSMENT — PAIN SCALES - GENERAL
PAINLEVEL_OUTOF10: 8
PAINLEVEL_OUTOF10: 9
PAINLEVEL_OUTOF10: 7
PAINLEVEL_OUTOF10: 7

## 2017-01-05 NOTE — PROGRESS NOTES
Report received.  Assumed Care.  Pt in bed. AOx4, responds appropriately.Pt very restless and very anxious. Pt c/o upper back pain  Reporting no relief from oxycodone tab. Rating pain level 8/10.Pt also c/o nausea with 300cc of light greenish emesis . No iv access at this time. Plan of care discussed, pt verbalizes understanding.  Call light and belongings within reach, treaded slipper socks on,bed in lowest position.will monitor.

## 2017-01-05 NOTE — DISCHARGE PLANNING
SW advised RN that pt could be d/cd to the ER lobby where she can call [facility] to arrange transport. SW to remain available for assistance as needed.

## 2017-01-05 NOTE — ASSESSMENT & PLAN NOTE
- Likely cannabinoids induced.  - Admitted with Non-bilious, non bloody emesis  - Associated with Abdominal pain. Denies fever, diarrhea, constipation or Dysuria.  - On examination she looks dehydrated with Dry mucus membranes.  - H/O of vlad-en-y for SMAS, marijuana abuse  - Multiple admissions for similar complaints  - CT abdomen pelvis negative for any acute abnormality  PLAN  - IV fluids and Zofran  - Will get USG abdomen to r/o biliary colic  - PRN pain meds

## 2017-01-05 NOTE — PROGRESS NOTES
Discharge instructions, medications and follow-up reviewed with pt, pt verbalized understanding and denies questions. Discharge paperwork and prescriptions given to pt. PIV removed, armband removed, pt states she has no transportation home.  called. Will continue to monitor.

## 2017-01-05 NOTE — DISCHARGE SUMMARY
"        [facility] Internal Medicine Discharge Summary      Admit Date:  1/4/2017       Discharge Date:  1/5/2017    Service:   R Internal Medicine [place] Team  Attending Physician(s):  Fabiola      Senior Resident(s):  Crow  Amador Resident(s):  Scarlett    Primary Diagnosis:   Cyclical vomiting 2/2 cannabinoid hyperemesis syndrome    Secondary Diagnoses:                Active Hospital Problems    Diagnosis   • Anxiety [F41.9]   • Celiac disease/sprue [K90.0]   • Nausea and vomiting [R11.2]   • Back pain [M54.9]   • Dehydration [E86.0]   • Hypokalemia [E87.6]   • Drug-seeking behavior [Z76.5]       Hospital Summary (Brief Narrative):       Ms Hernandez is a 42yo F with PMH of SMA syndrome s/p vlad-en-Y duodenojejunostomy and bowel resection, nephrolithiasis, cyclic vomiting, marijuana abuse, anxiety and bipolar disorder presents to ER with nausea, vomiting, back pain and abdomial pain.  Pt had been vomiting clear yellow liquid every hour for since waking up 2 days ago. She has associated back and abdominal pain that started at the same time--she stated pain was bad enough to prevent her from sleeping or walking and specifically requested \"Dilaudid, Ativan, and Zofran at the same time,\" indicative of some drug seeking behavior.CT  Of abdomen was unremarkable. Pt has had multiple admissions in the past for similar complaints. Pt was admitted overnight and given Zofran, Phenergan, and Compazine for nausea, Ativan for anxiety, IV fluids for dehydration, and oxycodone and a lidocaine patch for pain. When seen this morning, pt stated pain was more manageable. . Pt stated nausea and vomiting had improved overnight with medication, also stated hot showers help prevent nausea when pt is at home. Pt improved with fluid replacement and did not appear dehydrated, with moist mucous membranes. Pt had hypokalemia upon admission at 3.4 but had resolved to 3.6 today.   Pt symptoms suggest of cannabinoid hyperemesis syndrome, advised to " taper off of marijuana .Patient has hx of marijuana use and accepts to using it and was previously admitted for the same cyclical vomiting.  Pt advised to follow up with PCP regarding management of anxiety.     Upon stable vital signs and  improvement of symptoms , pt is sent home on xanax 0.5mg q8hrs and zofran for nausea.    Patient /Hospital Summary (Details -- Problem Oriented) :          Nausea/Vomiting  - Likely cannabinoids induced (cannabinoid hyperemesis syndrome)  - Admitted with non-bilious, non bloody emesis; emesis remitted with IV zofran, compazine, phenergan  - Dehydrated with dry mucus membranes upon admission; symptoms resolved after IV fluids  - CT abdomen pelvis negative for any acute abnormality  PLAN  -Sent home on PO Zofran 4 mg q6hr PRN  -Advised to continue using hot showers therapeutically when nausea sets in  -Advised to taper off of marijuana use    Anxiety  - Hx of anxiety; possible Bipolar disorder per chart, pt denies  - On Ativan for anxiety.Not on any other psych meds  - Denies any SI/HI.  PLAN  - D/c Ativan. Start Xanax 0.5 q6  - F/u w/ PCP or psychiatry outpatient for adjustment of medication    Drug-seeking behavior  Assessment & Plan  -Pt admitted with back and abdominal pain; physical exam shows feigned tenderness upon palpation  -Pt specifically requests certain cocktails of medication, such as dilaudid, zofran, ativan  PLAN:  -Encourage pt to f/u with PCP for pain management    Hypokalemia  Assessment & Plan  - K was 3.4 on admission; likely due to emesis  -Repleted with 0.9% NaCl w/ KCl 20mEq infusion  PLAN  -resolved, with K of 3.6 today    Dehydration  Assessment & Plan  - Due to excessive emesis.  - On examination she has dry mucus membranes,.  - H/H 16/46.0, BUN/Cr >20:1  PLAN  -Resolved    Back pain  Assessment & Plan  - Patient complains of back pain radiating down spine and throughout lower back  - Likely muscular in nature; pain described as sharp and constant; no  history of trauma  - Pt given oxycodone 5 mg q6h PRN, lidocaine 5% patch, and Tylenol 650 mg; pt described pain as better this morning  PLAN    -D/c pain medication as there is worry about opiate abuse if pt sent home on medication  -F/u w/ PCP for further management    Celiac disease/sprue  Assessment & Plan  - Anti TG abs/ anti- gliadin abs positive in May 2016  - Never followed with GI; denied diarrhea or constipation  PLAN  - Advise pt to follow celiac diet  - Follow up with GI outpatient    Consultants:     None    Procedures:        None    Imaging/ Testing:      US-ABDOMEN COMPLETE SURVEY   Final Result      Status post cholecystectomy.      No biliary ductal dilatation.         CT-RENAL COLIC EVALUATION(A/P W/O)   Final Result         1.  Negative noncontrast CT scan of the abdomen and pelvis.      2.  No hydronephrosis or nephrolithiasis.      3.  Cholecystectomy.      4.  Postoperative changes involving the descending colon and small bowel as described.      5.  No free fluid.            Discharge Medications:         Medication Reconciliation:      Medication List      START taking these medications       Instructions    alprazolam 0.5 MG Tabs   Commonly known as:  XANAX    Take 1 Tab by mouth every 8 hours as needed for Sleep.   Dose:  0.5 mg         CONTINUE taking these medications       Instructions    ondansetron 4 MG Tbdp   Last time this was given:  4 mg on 2017  6:05 PM   Commonly known as:  ZOFRAN ODT    Take 1 Tab by mouth every 6 hours as needed for Nausea/Vomiting (give PO if no IV route available).   Dose:  4 mg       PRE-LAYNE FORMULA Tabs    Take 1 Tab by mouth every day.   Dose:  1 Tab         STOP taking these medications          lorazepam 1 MG Tabs   Commonly known as:  ATIVAN           Disposition:   Home    Diet:  Advice celiac diet (pt diagnosed with celiac sprue as outpatient)    Activity:   As tolerated    Instructions:      Follow up with PCP, Dr. Monte of [facility] on 1/10/17  F/u  with mental health regarding anxiety    The patient was instructed to return to the ER in the event of worsening symptoms. I have counseled the patient on the importance of compliance and the patient has agreed to proceed with all medical recommendations and follow up plan indicated above.   The patient understands that all medications come with benefits and risks. Risks may include permanent injury or death and these risks can be minimized with close reassessment and monitoring.        Primary Care Provider:  Lavell Monte MD  Discharge summary faxed to primary care provider: Deferred  Copy of discharge summary given to the patient: Completed    Follow up appointment details :      Follow up with PCP, Dr. Monte of [facility] on 1/10/17  F/u with mental health regarding anxiety    Pending Studies:        none    Time spent on discharge day patient visit, preparing discharge paperwork and arranging for patient follow up.    Discharge Time (Minutes) :    45    Condition on Discharge  Pt understands diagnosis, is willing to quit using marijuana, is aware of appointments with PCP and will follow.     Physical Exam:   HEENT: NC/AT. PERRL. Mucous membranes moist. Nares patent and intact.    Neck: Supple, normal range of motion.   Cardiovascular: Normal rate and rhythm, No murmurs, rubs, gallops.    Lungs: Respiratory effort is normal. CTA bilaterally.    Abdomen: Bowel sounds normal, soft; no tenderness to palpation;  no distention  Extremities: No clubbing, cyanosis, edema. Distal pulses intact  Skin: Warm, Dry, No erythema, No rash, no induration or crepitus.  Neurologic: AOx3, full sensory and motor fxn, no focal deficits      Filed Vitals:    01/04/17 1723 01/04/17 2346 01/05/17 0400 01/05/17 1223   BP: 133/66 112/68 122/80 102/60   Pulse: 93 63 90 88   Temp: 37.1 °C (98.7 °F) 36.9 °C (98.5 °F) 37.2 °C (98.9 °F) 37.6 °C (99.7 °F)   Resp: 24 20 18 16   Height:       Weight:       SpO2: 94% 96% 93% 94%

## 2017-01-05 NOTE — SENIOR ADMIT NOTE
HPI:  Pt is a 40yo F with PMH of SMA syndrome s/p vlad-en-Y duodenojejunostomy and bowel resection, nephrolithiasis, cyclic vomiting, marijuana abuse, anxiety and bipolar disorder presents to ER with nausea, vomiting, back pain and abdomial pain.  Denies any CP, SOB, diarrhea, constipation  Pt c/o persistent sharp  back pain since she woke up this morning associated with non bilious non bloody vomiting. Pt reports she has tried ativan at home and was given morphine in the ED with  no relief. Pt requesting dilaudid, ativan and zofran at the same time. Pt  has been admitted multiple times in the past for similar complaints.       Exam: dry mucous membranes    VS: stable    Labs: wbc 9.6, hemo concentrated H/H16/46, K 3.5,co2 18 , total bili 1.9  Imaging:CT abdomen pelvis negative for any acute abnormality    Assessment and plan:  Back pain   C/o generalized tenderness over back but mostly rt flank pain  Likely sprain vs pyelonephritis  no tenderness noted on exam  CT abd pelvis negative for stones or any acute abnormality  Plan  IV fluids  zofran  Oxycodone 5mg q6 hrs for pain  Ordered USG abd  Will f/u [facility]    Nausea and vomiting  Likely secondary to pain vs cyclical vomiting secondary to marijuana abuse   H/O of vlad-en-y for SMAS,  marijuana abuse  Multiple admissions for similar complaints  CT abdomen pelvis negative for any acute abnormality  Plan  IV fluids  zofran  Will get USG abdomen to r/o biliary colic  UDS pending  PRN pain meds    Hemoconcentration  Likely 2/2 to dehyrdation  Plan  IV fluids    Hypokalemia  3.5  Plan  repleted    Hx of celiac sprue  anti TG abs/ anti- gliadin abs positive in [place] 2016  Denies any diarrhea or constipation  plan  currently NPO will advance to gluten free diet  Follow up with GI out patient    Anxiety  Bipolar disorder.  Continued home ativan  Not on any psych meds  Needs to follow up psych    Marijuana abuse  UDS pending      For further information please refer to Dr. Pantoja's  H&P

## 2017-01-05 NOTE — H&P
[facility] INTERNAL MEDICINE Medical Student HISTORY/ PHYSICAL:    Eleanor Arboleda  Date & Time note created:    1/4/2017   6:02 PM     Visit Time:  4:30PM    Patient ID:   Name:             Diana Hernandez   YOB: 1975  Age:                 41 y.o.  female   MRN:               [id number]    Admitting Intern (R1):  Dr. Pantoja  Admitting Senior (R2/R3):  Dr. Maria  Admitting Attending/Team Color: Dr. Corbin  First Call - Day Team  Intern (R1) #[phone] :  Dr. Pantoja  Second Call - Day Team Senior Resident (R2/R3) #[phone] :  Dr. Maria  If after 5PM or no immediate response to pages: Crosscover #[phone]    ______________________________________________________________________    Chief Complaint:      Severe back pain and vomiting    History of Present Illness:    Ms Hernandez is a 40yo F with PMH of SMA syndrome s/p vlad-en-Y duodenojejunostomy and bowel resection, nephrolithiasis, cyclic vomiting, marijuana abuse, anxiety and bipolar disorder presents to ER with nausea, vomiting, back pain and abdomial pain.    Pt states that she had nausea and vomiting when she woke up yesterday morning. She has been vomiting every hour or so ever since--it is mostly clear yellow in color and has not contained blood. She states she is extremely dehydrated because of her vomiting.She has associated back pain that started at the same time and  moves down the spine and spreads throughout lower back. She describes the pain as constant and stabbing. She states the pain is so bad that she cannot lie down in bed. Her back pain and vomiting have kept her up at night. Her abdominal pain started at the same time as the nausea and is diffuse, across her entire abdomen. She states that she has tried everything for her pain, including NSAIDs, Haldol, and oxycodone, but the only things that alleviate her pain are Dilaudid and Morphine. The pt has a hx of anxiety, for which she takes Ativan. She also has hx of bipolar disorder.  She has been admitted multiple times in the past for vomiting. Anti TG ab and anti-gliadin ab positive in May.     Review of Systems:      Constitutional: Denies fevers, Denies weight changes  Eyes: Denies changes in vision, no eye pain  Ears/Nose/Throat/Mouth: Denies nasal congestion or sore throat   Cardiovascular: Denies palpitations. Positive for chest pain.   Respiratory: Positive for SOB. Negative for sputum production or wheezing.   Gastrointestinal/Hepatic: Refer to HPI. Denies constipation or diarrhea.   Genitourinary: Denies dysuria or frequency  Skin: Denies rash or itching.  MSK: Positive for back pain and abd pain.   Neurological: Denies headache, confusion, memory loss or focal weakness/parasthesias  Psychiatric: Pt is nervous/anxious. Positive for substance abuse.      All other systems were reviewed and are negative (AMA/CMS criteria)    Past Medical/ Family / Social history (PFSH):        Past Medical History:   Past Medical History   Diagnosis Date   • Snoring    • Dental disorder    • Pain      abd and back   • Tobacco abuse 2009   • Superior mesenteric artery syndrome (HCC) 2009   • Anxiety      Followed by [facility]   • CLOSTRIDIUM DIFFICILE INFECTION 2010   • Dyspepsia 2009   • Backpain    • Nephrolithiasis 2009     kidney stones,post lithotrypsy   • Anxiety disorder    • CVS disease    • Drug-seeking behavior    • Cyclic vomiting syndrome    • Superior mesenteric artery syndrome (HCC)      Current Outpatient Medications:  Home Medications     Reviewed by Anish Williamson (Pharmacy Tech) on 17 at 1618  Med List Status: Complete    Medication Last Dose Status    lorazepam (ATIVAN) 1 MG Tab 2017 Active    ondansetron (ZOFRAN ODT) 4 MG TABLET DISPERSIBLE 2017 Active    Prenatal Multivit-Min-Fe-FA (PRE-LAYNE FORMULA) Tab 2017 Active              Medication Allergy/Sensitivities:  Allergies   Allergen Reactions   • Metoclopramide Hives     Told by MD;  "pt suspects possible hives.   • Quetiapine Unspecified     MD told her she was allergic     • Septra [Sulfamethoxazole W-Trimethoprim] Unspecified     MD told her she was allergic         Past Surgical History:  Past Surgical History   Procedure Laterality Date   • Gastroscopy-endo  7/8/2009     Performed by ROSANNA WRIGHT at SURGERY [facility]   • Lithotripsy     • Pyloroplasty  7/27/2009     Performed by MACIEL QUINTERO at SURGERY [facility]   • Gastroscopy with biopsy  3/9/2010     Performed by AMANDA MEJIA at ENDOSCOPY [facility]   • Exploratory laparotomy  7/27/2009     Performed by MACIEL QUINTERO at SURGERY [facility]   • Appendectomy  7/27/2009     Performed by MACIEL QUINTERO at SURGERY [facility]   • Cholecystectomy  7/27/2009     Performed by MACIEL QUINTERO at SURGERY [facility]   • Other       superior mesenteric artery correction    • Exploratory laparotomy  1/12/2016     Procedure: EXPLORATORY LAPAROTOMY;  Surgeon: Maciel Quintero M.D.;  Location: SURGERY [facility];  Service:    • Bowel resection  1/12/2016     Procedure: BOWEL RESECTION;  Surgeon: Maciel Quintero M.D.;  Location: SURGERY [facility];  Service:    • Gastroscopy-endo  4/28/2016     Procedure: GASTROSCOPY-ENDO;  Surgeon: Blayne Blackburn M.D.;  Location: [facility];  Service:      Family History:  Family History   Problem Relation Age of Onset   • Cancer Mother 50     Colon cancer   • Allergies Father    • Hypertension Mother    • Hypertension Father    • Heart Disease Maternal Grandmother      Social History:  Smoking: denies   Alcohol: denies   Illicit drugs: Has used marijuana, quit 1 wk ago  Living situation: Lives with fiance at a house. Living situation is \"excellent\"  ___________________________________________________________________    Physical Exam:  Vitals/ General Appearance:   Weight/BMI: Body mass index is " "20.8 kg/(m^2).  /66 mmHg  Pulse 93  Temp(Src) 37.1 °C (98.7 °F)  Resp 24  Ht 1.651 m (5' 5\")  Wt 56.7 kg (125 lb)  BMI 20.80 kg/m2  SpO2 94%  LMP 12/26/2016  Breastfeeding? No  Filed Vitals:    01/04/17 0853 01/04/17 1117 01/04/17 1408 01/04/17 1723   BP: 123/77   133/66   Pulse: 81 68 45 93   Temp: 36.9 °C (98.4 °F)   37.1 °C (98.7 °F)   Resp: 16   24   Height:       Weight:       SpO2:  97% 89% 94%     Oxygen Therapy:  Pulse Oximetry: 94 %, O2 (LPM): 0, O2 Delivery: None (Room Air)    Physical Exam  Constitutional: Pt is in acute distress, intermittently wailing and crying and crawling into a ball. Pt not very cooperative to interview.  HEENT: NC/AT. PERRL. Mucous membranes moist. Nares patent and intact.   Neck: Supple, normal range of motion.   Cardiovascular: Normal rate and rhythm, No murmurs, rubs, gallops.   Lungs: Respiratory effort is normal. CTA bilaterally.   Abdomen: Bowel sounds normal, soft; tenderness to palpation noted--jumped and screamed in pain when touched; however, pt does not exhibit tenderness when stethoscope pressed deeply against abdomen; no distention  Extremities: No clubbing, cyanosis, edema. Distal pulses intact  Skin: Warm, Dry, No erythema, No rash, no induration or crepitus.  Neurologic: AOx3, full sensory and motor fxn, no focal deficits    ____________________________________________________________________    Lab Data Review:  Recent Labs      01/04/17   1051   WBC  9.1   RBC  4.83   HEMOGLOBIN  16.0   HEMATOCRIT  46.0   MCV  95.2   MCH  33.1*   RDW  49.3   PLATELETCT  386   MPV  9.9   NEUTSPOLYS  82.20*   LYMPHOCYTES  10.50*   MONOCYTES  5.80   EOSINOPHILS  0.30   BASOPHILS  0.70     Recent Labs      01/04/17   1051   SODIUM  136   POTASSIUM  3.5*   CHLORIDE  103   CO2  18*   GLUCOSE  140*   BUN  16      Recent Labs      01/04/17   1051   ASTSGOT  17   ALTSGPT  11   TBILIRUBIN  1.9*   ALKPHOSPHAT  79   GLOBULIN  3.5     B-hCG negative.     Imaging/Procedures " Review:    CT Abd shows  No renal or ureteral calcifications and no obstruction or inflammation in bowels. No mass or abnormalities noted in pelvis either.     MDM (Assessment and Plan):       1. Nausea/vomiting  Pt has exhibited 2 days of vomiting, mostly clear yellow color  Pt states she is dehydrated. Pt does not have hypotension, BP is 133/66, pulse ranges from 45-93  Pt started on IV fluids, 0.9% NaCl with KCl 20 mEq infusion, 125 mL/hr for dehydration  Cont outpt zofran 4 mg, add prochlorperazine 5-10 mg IV, promethazine 12.5-25mg rectal q 4 hrs  Keep pt NPO until vomiting resolves    2. Chronic lower back/abdominal pain  Pt complains of lower back and abd pain for the past two days, has kept her from sleeping or walking--needs to hunch over. Pt has drug seeking tendencies--specifically asks Dilaudid and morphine.  Prescribe oxycodone 5 mg q6 hrs PRN for pain. Add lidocaine 5% patch and tylenol 650 mg for pain management as well.     3. Hypokalemia  Mild hypokalemia, 3.5, has been previously low at 2.9 on previous admissions.   IV fluids, 0.9% NaCl with KCl 20 mEq infusion to help trend upward--continue to monitor    4. Anxiety/Bipolar Disorder  Pt has hx of anxiety and bipolar d/o. Pt appears anxious throughout interview.   Outpt medication includes ativan 1mg. Continue ativan, 1mg q8h PRN.   Encourage pt to f/u w/ outpt mental health.     5. Marijuana Abuse  Pt states she has not used for past week. Chronic use beforehand.   Advised to stop use in future.

## 2017-01-05 NOTE — ASSESSMENT & PLAN NOTE
"- Patient admitted with Back and abdominal pain aggravated with emesis!  - On exam there is no tenderness.  - Workup was negative for any pathology.  - Multiple admissions in the past for similar problems requiring narcotics for treatment.  - Patient asking for cocktail of Dilaudid, Ativan and Zofran for her pain\" The only compination effective for pain\"  PLAN  - Admitted to treat dehydration due to emesis.  - Will treat pain with low doses of oxycodone.  - Will reassess and .     "

## 2017-01-05 NOTE — H&P
[facility] Admitting History and Physical    Name Diana Hernandez 1975   Age/Sex 41 y.o. female   MRN [id number]   Code Status FULL     After 5PM or if no immediate response to page, please call for cross-coverage  Attending/Team: Dr Hicks/Jus Call [phone] to page   1st Call - Day Intern (R1):   Scarlett 2nd Call - Day Sr. Resident (R2/R3):   Crow       Chief Complaint:  Intractable Emesis, Abdominal pain and Back pain x 1day.    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 She states that she woke up yesterday with nausea and vomiting which was non-bilious and non bloody with hourly frequency, and ever since she got extremely dehydrated and became anxious. She tried ativan and Zofran but they didn't help.At the same time she developed severe abdominal and back pain which she relates to her nausea and emesis. Back pain is sharp, constant, mainly on right side but extends to the left lumbar region as well. She denies any trauma or lower limb weakness.   Abdominal pain started as the same time as well, which is diffuse, generalized, no specific character, constant, non-radiating, aggravated with emesis and no known relieving factors. She states that she has tried everything for her pain, including NSAIDs, Haldol, and oxycodone, but the only things that alleviate her pain is the combination of  Dilaudid, Zofran and Ativan.     Patient has history of multiple admissions with similar complains and exhibits drug seeking behavior.    Vitals At the ED:  Temp 98.4, NM 81, /77, RR 16.  Labs: H/H 16/46, WBCs 9.1, Na 136, K 3.5, BUN/CR 16/0.76, Glucose 140. UDS pos for Opiates and Cannabis.    Review of Systems   Constitutional: Positive for  malaise/fatigue. Negative for fever, chills and weight loss.   HENT: Negative for congestion and ear discharge.    Eyes: Negative for blurred vision, pain and redness.   Respiratory: Negative for cough, shortness of breath and wheezing.    Cardiovascular: Negative for chest pain, palpitations, orthopnea and leg swelling.   Gastrointestinal: Positive for nausea, vomiting and abdominal pain. Negative for heartburn, diarrhea, constipation and melena.   Genitourinary: Positive for flank pain. Negative for dysuria, urgency, frequency and hematuria.   Musculoskeletal: Positive for myalgias, back pain and joint pain. Negative for falls and neck pain.   Skin: Negative for rash.   Neurological: Positive for weakness. Negative for dizziness, tingling, sensory change, focal weakness, seizures, loss of consciousness and headaches.   Psychiatric/Behavioral: Positive for substance abuse. Negative for depression and suicidal ideas. The patient is nervous/anxious.              Past Medical History:   Past Medical History   Diagnosis Date   • Snoring    • Dental disorder    • Pain      abd and back   • Tobacco abuse 6/20/2009   • Superior mesenteric artery syndrome (HCC) 7/12/2009   • Anxiety      Followed by [facility]   • CLOSTRIDIUM DIFFICILE INFECTION 4/14/2010   • Dyspepsia 7/12/2009   • Backpain    • Nephrolithiasis 6/20/2009     kidney stones,post lithotrypsy   • Anxiety disorder    • CVS disease    • Drug-seeking behavior    • Cyclic vomiting syndrome    • Superior mesenteric artery syndrome (HCC)        Past Surgical History:  Past Surgical History   Procedure Laterality Date   • Gastroscopy-endo  7/8/2009     Performed by ROSANNA WRIGHT at SURGERY [facility]S   • Lithotripsy     • Pyloroplasty  7/27/2009     Performed by MACIEL QUINTERO at SURGERY [facility] ORS   • Gastroscopy with biopsy  3/9/2010     Performed by AMANDA MEJIA at ENDOSCOPY [facility] ORS   • Exploratory laparotomy  7/27/2009      Performed by MACIEL QUINTERO at SURGERY [facility]   • Appendectomy  2009     Performed by MACIEL QUINTERO at SURGERY [facility]   • Cholecystectomy  2009     Performed by MACIEL QUINTERO at [facility]   • Other       superior mesenteric artery correction    • Exploratory laparotomy  2016     Procedure: EXPLORATORY LAPAROTOMY;  Surgeon: Maciel Quintero M.D.;  Location: SURGERY [facility];  Service:    • Bowel resection  2016     Procedure: BOWEL RESECTION;  Surgeon: Maciel Quintero M.D.;  Location: SURGERY [facility];  Service:    • Gastroscopy-endo  2016     Procedure: GASTROSCOPY-ENDO;  Surgeon: Blayne Blackburn M.D.;  Location: ENDOSCOPY [facility];  Service:        Current Outpatient Medications:  Home Medications     Reviewed by Anish Williamson (Pharmacy Tech) on 17 at 1618  Med List Status: Complete    Medication Last Dose Status    lorazepam (ATIVAN) 1 MG Tab 2017 Active    ondansetron (ZOFRAN ODT) 4 MG TABLET DISPERSIBLE 2017 Active    Prenatal Multivit-Min-Fe-FA (PRE-LAYNE FORMULA) Tab 2017 Active                Medication Allergy/Sensitivities:  Allergies   Allergen Reactions   • Metoclopramide Hives     Told by MD; pt suspects possible hives.   • Quetiapine Unspecified     MD told her she was allergic     • Septra [Sulfamethoxazole W-Trimethoprim] Unspecified     MD told her she was allergic           Family History:  Family History   Problem Relation Age of Onset   • Cancer Mother 50     Colon cancer   • Allergies Father    • Hypertension Mother    • Hypertension Father    • Heart Disease Maternal Grandmother        Social History:  Social History     Social History   • Marital Status: Single     Spouse Name: N/A   • Number of Children: N/A   • Years of Education: N/A     Occupational History   • Not on file.     Social History Main Topics   • Smoking status: Former  "Smoker -- 0.50 packs/day for 13 years     Types: Cigarettes     Quit date: 12/15/2012   • Smokeless tobacco: Not on file      Comment: 1/2ppd   • Alcohol Use: No   • Drug Use: Yes     Special: Inhaled, Marijuana      Comment: pot occ   • Sexual Activity: Not on file     Other Topics Concern   • Not on file     Social History Narrative         Physical Exam     Filed Vitals:    01/04/17 0853 01/04/17 1117 01/04/17 1408 01/04/17 1723   BP: 123/77   133/66   Pulse: 81 68 45 93   Temp: 36.9 °C (98.4 °F)   37.1 °C (98.7 °F)   Resp: 16   24   Height:       Weight:       SpO2:  97% 89% 94%     Body mass index is 20.8 kg/(m^2).  /66 mmHg  Pulse 93  Temp(Src) 37.1 °C (98.7 °F)  Resp 24  Ht 1.651 m (5' 5\")  Wt 56.7 kg (125 lb)  BMI 20.80 kg/m2  SpO2 94%  LMP 12/26/2016  Breastfeeding? No  O2 therapy: Pulse Oximetry: 94 %, O2 (LPM): 0, O2 Delivery: None (Room Air)    Physical Exam   Constitutional: She is oriented to person, place, and time. She appears distressed.   HENT:   Head: Normocephalic and atraumatic.   Mouth/Throat: Mucous membranes are dry.   Eyes: Conjunctivae are normal. Pupils are equal, round, and reactive to light. No scleral icterus.   Neck: Neck supple. No JVD present. No thyromegaly present.   Cardiovascular: Normal rate, regular rhythm and normal heart sounds.  Exam reveals no gallop and no friction rub.    No murmur heard.  Pulmonary/Chest: Breath sounds normal. No respiratory distress. She has no wheezes. She exhibits no tenderness.   Abdominal: Soft. Bowel sounds are normal. She exhibits no distension and no mass. There is no tenderness.   Musculoskeletal: She exhibits no edema.   Lymphadenopathy:     She has no cervical adenopathy.   Neurological: She is alert and oriented to person, place, and time. GCS score is 15.   Skin: Skin is dry. She is not diaphoretic. No erythema. No pallor.   Psychiatric: Her mood appears anxious. Her affect is inappropriate. She does not exhibit a depressed " mood.       [unfilled]      Data Review       Old Records Request:   Completed  Current Records review and summary: Completed    Lab Data Review:  Recent Results (from the past 24 hour(s))   CBC WITH DIFFERENTIAL    Collection Time: 01/04/17 10:51 AM   Result Value Ref Range    WBC 9.1 4.8 - 10.8 K/uL    RBC 4.83 4.20 - 5.40 M/uL    Hemoglobin 16.0 12.0 - 16.0 g/dL    Hematocrit 46.0 37.0 - 47.0 %    MCV 95.2 81.4 - 97.8 fL    MCH 33.1 (H) 27.0 - 33.0 pg    MCHC 34.8 33.6 - 35.0 g/dL    RDW 49.3 35.9 - 50.0 fL    Platelet Count 386 164 - 446 K/uL    MPV 9.9 9.0 - 12.9 fL    Neutrophils-Polys 82.20 (H) 44.00 - 72.00 %    Lymphocytes 10.50 (L) 22.00 - 41.00 %    Monocytes 5.80 0.00 - 13.40 %    Eosinophils 0.30 0.00 - 6.90 %    Basophils 0.70 0.00 - 1.80 %    Immature Granulocytes 0.50 0.00 - 0.90 %    Nucleated RBC 0.00 /100 WBC    Neutrophils (Absolute) 7.48 (H) 2.00 - 7.15 K/uL    Lymphs (Absolute) 0.96 (L) 1.00 - 4.80 K/uL    Monos (Absolute) 0.53 0.00 - 0.85 K/uL    Eos (Absolute) 0.03 0.00 - 0.51 K/uL    Baso (Absolute) 0.06 0.00 - 0.12 K/uL    Immature Granulocytes (abs) 0.05 0.00 - 0.11 K/uL    NRBC (Absolute) 0.00 K/uL   COMP METABOLIC PANEL    Collection Time: 01/04/17 10:51 AM   Result Value Ref Range    Sodium 136 135 - 145 mmol/L    Potassium 3.5 (L) 3.6 - 5.5 mmol/L    Chloride 103 96 - 112 mmol/L    Co2 18 (L) 20 - 33 mmol/L    Anion Gap 15.0 (H) 0.0 - 11.9    Glucose 140 (H) 65 - 99 mg/dL    Bun 16 8 - 22 mg/dL    Creatinine 0.78 0.50 - 1.40 mg/dL    Calcium 10.5 8.5 - 10.5 mg/dL    AST(SGOT) 17 12 - 45 U/L    ALT(SGPT) 11 2 - 50 U/L    Alkaline Phosphatase 79 30 - 99 U/L    Total Bilirubin 1.9 (H) 0.1 - 1.5 mg/dL    Albumin 5.1 (H) 3.2 - 4.9 g/dL    Total Protein 8.6 (H) 6.0 - 8.2 g/dL    Globulin 3.5 1.9 - 3.5 g/dL    A-G Ratio 1.5 g/dL   LIPASE    Collection Time: 01/04/17 10:51 AM   Result Value Ref Range    Lipase 12 11 - 82 U/L   ESTIMATED GFR    Collection Time: 01/04/17 10:51 AM  "  Result Value Ref Range    GFR If African American >60 >60 mL/min/1.73 m 2    GFR If Non African American >60 >60 mL/min/1.73 m 2   HCG QUAL SERUM    Collection Time: 01/04/17 10:51 AM   Result Value Ref Range    Beta-Hcg Qualitative Serum Negative Negative   MAGNESIUM    Collection Time: 01/04/17 10:51 AM   Result Value Ref Range    Magnesium 1.6 1.5 - 2.5 mg/dL       Imaging/Procedures Review:    ndependant Imaging Review: Completed  CT-RENAL COLIC EVALUATION(A/P W/O)   Final Result         1.  Negative noncontrast CT scan of the abdomen and pelvis.      2.  No hydronephrosis or nephrolithiasis.      3.  Cholecystectomy.      4.  Postoperative changes involving the descending colon and small bowel as described.      5.  No free fluid.      US-ABDOMEN COMPLETE SURVEY    (Results Pending)            Assessment/Plan     Anxiety  Assessment & Plan  - Known Bipolar disorder.  - On Ativan for anxiety.Not on any other Psych meds.  - Denies any SI/HI.  PLAN  - Continue Ativan at home dose.  - Likely needs psych eval post discharge.    Drug-seeking behavior  Assessment & Plan  - Patient admitted with Back and abdominal pain aggravated with emesis!  - On exam there is no tenderness.  - Workup was negative for any pathology.  - Multiple admissions in the past for similar problems requiring narcotics for treatment.  - Patient asking for cocktail of Dilaudid, Ativan and Zofran for her pain\" The only compination effective for pain\"  PLAN  - Admitted to treat dehydration due to emesis.  - Will treat pain with low doses of oxycodone.  - Will reassess and .       Hypokalemia  Assessment & Plan  - K is 3.4 on admission.  - Likely due to emesis.  PLAN  - Will replete.  - Will CTM.    Dehydration  Assessment & Plan  - Likely due to excessive emesis.  - On examination she has dry mucus membranes,.  - H/H 16/46.0, BUN/Cr >20:1  PLAN  - IVF and Zofran.  - Will Reassess.    Nausea and vomiting  Assessment & Plan  - Likely " cannabinoids induced.  - Admitted with Non-bilious, non bloody emesis  - Associated with Abdominal pain. Denies fever, diarrhea, constipation or Dysuria.  - On examination she looks dehydrated with Dry mucus membranes.  - H/O of vlad-en-y for SMAS, marijuana abuse  - Multiple admissions for similar complaints  - CT abdomen pelvis negative for any acute abnormality  PLAN  - IV fluids and Zofran  - Will get USG abdomen to r/o biliary colic  - PRN pain meds      Back pain  Assessment & Plan  - Patient complains of generalized pain over back, mostly Right side.  - Likely Muscular sprain.  - Pain is sharp, constant.  - No history of trauma.  - Past Hx of kidney stones in the past, No hematuria, CT renal negative for stones.   - No evidence of spinal fractures on CT renal.  - No tenderness noted on exam.  - No dysuria, fever, or CVA tenderness.  PLAN   - Oxycodone 5mg q6 hrs for pain  - Lidocaine patch.    Celiac disease/sprue  Assessment & Plan  - Anti TG abs/ anti- gliadin abs positive in [place] 2016  - Never followed with GI.  - Denies any diarrhea or constipation  PLAN  - Currently NPO, will advance to gluten free diet  - Follow up with GI out patient      Anticipated Hospital stay: Observation admit        Quality Measures  EKG reviewed, Labs reviewed, Medications reviewed and Radiology images reviewed  Saldana catheter: No Saldana      DVT Prophylaxis: Enoxaparin (Lovenox)    Ulcer prophylaxis: Yes

## 2017-01-05 NOTE — ASSESSMENT & PLAN NOTE
- Anti TG abs/ anti- gliadin abs positive in [facility] 2016  - Never followed with GI.  - Denies any diarrhea or constipation  PLAN  - Currently NPO, will advance to gluten free diet  - Follow up with GI out patient

## 2017-01-05 NOTE — ASSESSMENT & PLAN NOTE
- Patient complains of generalized pain over back, mostly Right side.  - Likely Muscular sprain.  - Pain is sharp, constant.  - No history of trauma.  - Past Hx of kidney stones in the past, No hematuria, CT renal negative for stones.   - No evidence of spinal fractures on CT renal.  - No tenderness noted on exam.  - No dysuria, fever, or CVA tenderness.  PLAN   - Oxycodone 5mg q6 hrs for pain  - Lidocaine patch.

## 2017-01-05 NOTE — PROGRESS NOTES
IV access lost. 2 attempts to reestablish Iv access unsuccessful. Charge RN to attempt iv placement.

## 2017-01-05 NOTE — ED NOTES
The Medication Reconciliation has been completed per patient  Allergies have been reviewed  Antibiotic use in 30 days - NONE    Pharmacy:  [facility]

## 2017-01-05 NOTE — PROGRESS NOTES
Pt arrived to unit via gurney at 1715. Pt oriented to room, unit, and plan of care. Pt c/o nausea, pain and anxiety, will medicate per MAR. All questions answered at this time. Call light within reach; fall precautions in place.

## 2017-01-05 NOTE — DISCHARGE PLANNING
Care Transition Team Assessment    Information Source  Orientation : Oriented x 4  Who is responsible for making decisions for patient? : Patient  Source of Information: Patient  Primary Caregiver for Others?: No  Why do you believe you were admitted?: back pain and vomiting         Elopement Risk  Legal Hold: No  Ambulatory or Self Mobile in Wheelchair: No-Not an Elopement Risk  Elopement Risk: Not at Risk for Elopement    Interdisciplinary Discharge Planning  Primary Care Physician: Dr. Torre  Lives with - Patient's Self Care Capacity: Significant Other  Support Systems: Family Member(s), Friends / Neighbors  Housing / Facility: [address]  Mobility Issues: No  Prior Services: None    Discharge Preparedness  Difficulity with ADLs: None  Difficulity with IADLs: None  Pharmacy: [facility]  Prescription Coverage: Yes    Functional Assesment  Prior Functional Level: Ambulatory    Finances  Medical Insurance Coverage: Medicaid HPN              Advance Directive  Advance Directive?: DPOA for Health Care    Domestic Abuse  Have you ever been the victim of abuse or violence?: No              Anticipated Discharge Information  Anticipated discharge disposition: Home  Discharge Address: see facesheet  Discharge Contact Phone Number: see facesheet

## 2017-01-05 NOTE — ASSESSMENT & PLAN NOTE
- Known Bipolar disorder.  - On Ativan for anxiety.Not on any other Psych meds.  - Denies any SI/HI.  PLAN  - Continue Ativan at home dose.  - Likely needs psych eval post discharge.

## 2017-01-05 NOTE — PROGRESS NOTES
returned call; patient can sign up for free transportation at ER [facility]. Patient transported to ER [facility] with hospital escort via wheelchair.

## 2017-01-05 NOTE — PROGRESS NOTES
"Pt very anxious at this time, very restless, sitting up in bed shaking, pt stated\" I'm having panic attack  please  Give me ativan.Pt is strict NPO for procedure this am. On call MD paged, awaiting  For callback.Pt updated.  "

## 2017-01-05 NOTE — PROGRESS NOTES
Report received, pt care assumed. Pt assessment complete. Pt aaox4, no signs of distress noted at this time. POC discussed with pt and verbalizes no questions. Pt c/o of 7/10 pain in abdomen and lower back, PRN pain med oxycodone administered (see MAR). Pt denies any additional needs at this time. Bed in lowest position and locked. Pt educated on fall risk and verbalized understanding, call light and personal belongings within reach, will continue to monitor.

## 2017-01-05 NOTE — PROGRESS NOTES
Pt c/o nausea,reporting mild relief from Zofran IV, with 300cc of greenish yellowish emesis at this time.Pt stated , nausea came back when she tried to moisten her mouth . Will medicate.

## 2017-01-05 NOTE — ASSESSMENT & PLAN NOTE
- Likely due to excessive emesis.  - On examination she has dry mucus membranes,.  - H/H 16/46.0, BUN/Cr >20:1  PLAN  - IVF and Zofran.  - Will Reassess.

## 2017-01-06 ENCOUNTER — HOSPITAL ENCOUNTER (EMERGENCY)
Facility: MEDICAL CENTER | Age: 42
End: 2017-01-06
Attending: EMERGENCY MEDICINE
Payer: MEDICAID

## 2017-01-06 VITALS
DIASTOLIC BLOOD PRESSURE: 64 MMHG | WEIGHT: 125 LBS | BODY MASS INDEX: 20.8 KG/M2 | OXYGEN SATURATION: 94 % | SYSTOLIC BLOOD PRESSURE: 126 MMHG | HEART RATE: 79 BPM | TEMPERATURE: 98.5 F | RESPIRATION RATE: 18 BRPM

## 2017-01-06 DIAGNOSIS — R19.5 DARK STOOLS: ICD-10-CM

## 2017-01-06 DIAGNOSIS — R11.2 NON-INTRACTABLE VOMITING WITH NAUSEA, UNSPECIFIED VOMITING TYPE: ICD-10-CM

## 2017-01-06 DIAGNOSIS — G89.29 CHRONIC MIDLINE LOW BACK PAIN WITHOUT SCIATICA: ICD-10-CM

## 2017-01-06 DIAGNOSIS — M54.50 CHRONIC MIDLINE LOW BACK PAIN WITHOUT SCIATICA: ICD-10-CM

## 2017-01-06 DIAGNOSIS — R11.15 NON-INTRACTABLE CYCLICAL VOMITING WITH NAUSEA: ICD-10-CM

## 2017-01-06 DIAGNOSIS — M54.9 PAIN, UPPER BACK: ICD-10-CM

## 2017-01-06 LAB
ALBUMIN SERPL BCP-MCNC: 4.1 G/DL (ref 3.2–4.9)
ALBUMIN/GLOB SERPL: 1.5 G/DL
ALP SERPL-CCNC: 62 U/L (ref 30–99)
ALT SERPL-CCNC: 13 U/L (ref 2–50)
ANION GAP SERPL CALC-SCNC: 12 MMOL/L (ref 0–11.9)
APPEARANCE UR: ABNORMAL
AST SERPL-CCNC: 18 U/L (ref 12–45)
BASOPHILS # BLD AUTO: 0.4 % (ref 0–1.8)
BASOPHILS # BLD: 0.04 K/UL (ref 0–0.12)
BILIRUB SERPL-MCNC: 2 MG/DL (ref 0.1–1.5)
BILIRUB UR QL STRIP.AUTO: NEGATIVE
BUN SERPL-MCNC: 11 MG/DL (ref 8–22)
CALCIUM SERPL-MCNC: 9.2 MG/DL (ref 8.5–10.5)
CHLORIDE SERPL-SCNC: 104 MMOL/L (ref 96–112)
CO2 SERPL-SCNC: 17 MMOL/L (ref 20–33)
COLOR UR: YELLOW
CREAT SERPL-MCNC: 0.64 MG/DL (ref 0.5–1.4)
EOSINOPHIL # BLD AUTO: 0 K/UL (ref 0–0.51)
EOSINOPHIL NFR BLD: 0 % (ref 0–6.9)
EPI CELLS #/AREA URNS HPF: ABNORMAL /HPF
ERYTHROCYTE [DISTWIDTH] IN BLOOD BY AUTOMATED COUNT: 49.6 FL (ref 35.9–50)
GFR SERPL CREATININE-BSD FRML MDRD: >60 ML/MIN/1.73 M 2
GLOBULIN SER CALC-MCNC: 2.7 G/DL (ref 1.9–3.5)
GLUCOSE SERPL-MCNC: 88 MG/DL (ref 65–99)
GLUCOSE UR STRIP.AUTO-MCNC: NEGATIVE MG/DL
HCG UR QL: NEGATIVE
HCT VFR BLD AUTO: 37.1 % (ref 37–47)
HGB BLD-MCNC: 13.1 G/DL (ref 12–16)
IMM GRANULOCYTES # BLD AUTO: 0.06 K/UL (ref 0–0.11)
IMM GRANULOCYTES NFR BLD AUTO: 0.6 % (ref 0–0.9)
KETONES UR STRIP.AUTO-MCNC: >150 MG/DL
LEUKOCYTE ESTERASE UR QL STRIP.AUTO: NEGATIVE
LIPASE SERPL-CCNC: 13 U/L (ref 11–82)
LYMPHOCYTES # BLD AUTO: 0.99 K/UL (ref 1–4.8)
LYMPHOCYTES NFR BLD: 9.3 % (ref 22–41)
MCH RBC QN AUTO: 33.6 PG (ref 27–33)
MCHC RBC AUTO-ENTMCNC: 35.3 G/DL (ref 33.6–35)
MCV RBC AUTO: 95.1 FL (ref 81.4–97.8)
MICRO URNS: ABNORMAL
MONOCYTES # BLD AUTO: 0.68 K/UL (ref 0–0.85)
MONOCYTES NFR BLD AUTO: 6.4 % (ref 0–13.4)
MUCOUS THREADS #/AREA URNS HPF: ABNORMAL /HPF
NEUTROPHILS # BLD AUTO: 8.93 K/UL (ref 2–7.15)
NEUTROPHILS NFR BLD: 83.3 % (ref 44–72)
NITRITE UR QL STRIP.AUTO: NEGATIVE
NRBC # BLD AUTO: 0 K/UL
NRBC BLD AUTO-RTO: 0 /100 WBC
PH UR STRIP.AUTO: 5.5 [PH]
PLATELET # BLD AUTO: 236 K/UL (ref 164–446)
PMV BLD AUTO: 10.1 FL (ref 9–12.9)
POTASSIUM SERPL-SCNC: 3.1 MMOL/L (ref 3.6–5.5)
PROT SERPL-MCNC: 6.8 G/DL (ref 6–8.2)
PROT UR QL STRIP: NEGATIVE MG/DL
RBC # BLD AUTO: 3.9 M/UL (ref 4.2–5.4)
RBC # URNS HPF: ABNORMAL /HPF
RBC UR QL AUTO: NEGATIVE
SODIUM SERPL-SCNC: 133 MMOL/L (ref 135–145)
SP GR UR REFRACTOMETRY: 1.02
SP GR UR STRIP.AUTO: 1.02
WBC # BLD AUTO: 10.7 K/UL (ref 4.8–10.8)
WBC #/AREA URNS HPF: ABNORMAL /HPF

## 2017-01-06 PROCEDURE — 99284 EMERGENCY DEPT VISIT MOD MDM: CPT

## 2017-01-06 PROCEDURE — A9270 NON-COVERED ITEM OR SERVICE: HCPCS | Performed by: EMERGENCY MEDICINE

## 2017-01-06 PROCEDURE — 700102 HCHG RX REV CODE 250 W/ 637 OVERRIDE(OP): Performed by: EMERGENCY MEDICINE

## 2017-01-06 PROCEDURE — 82272 OCCULT BLD FECES 1-3 TESTS: CPT

## 2017-01-06 PROCEDURE — 83690 ASSAY OF LIPASE: CPT

## 2017-01-06 PROCEDURE — 85025 COMPLETE CBC W/AUTO DIFF WBC: CPT

## 2017-01-06 PROCEDURE — 81025 URINE PREGNANCY TEST: CPT

## 2017-01-06 PROCEDURE — 700111 HCHG RX REV CODE 636 W/ 250 OVERRIDE (IP): Performed by: EMERGENCY MEDICINE

## 2017-01-06 PROCEDURE — 81001 URINALYSIS AUTO W/SCOPE: CPT

## 2017-01-06 PROCEDURE — 80053 COMPREHEN METABOLIC PANEL: CPT

## 2017-01-06 PROCEDURE — 96372 THER/PROPH/DIAG INJ SC/IM: CPT

## 2017-01-06 RX ORDER — ONDANSETRON 2 MG/ML
4 INJECTION INTRAMUSCULAR; INTRAVENOUS ONCE
Status: COMPLETED | OUTPATIENT
Start: 2017-01-06 | End: 2017-01-06

## 2017-01-06 RX ORDER — HYDROMORPHONE HYDROCHLORIDE 2 MG/1
2 TABLET ORAL ONCE
Status: COMPLETED | OUTPATIENT
Start: 2017-01-06 | End: 2017-01-06

## 2017-01-06 RX ORDER — ONDANSETRON 4 MG/1
4 TABLET, ORALLY DISINTEGRATING ORAL ONCE
Status: COMPLETED | OUTPATIENT
Start: 2017-01-06 | End: 2017-01-06

## 2017-01-06 RX ORDER — LORAZEPAM 1 MG/1
1 TABLET ORAL ONCE
Status: COMPLETED | OUTPATIENT
Start: 2017-01-06 | End: 2017-01-06

## 2017-01-06 RX ORDER — ONDANSETRON 4 MG/1
4 TABLET, FILM COATED ORAL EVERY 8 HOURS PRN
Qty: 6 EACH | Refills: 2 | Status: ON HOLD | OUTPATIENT
Start: 2017-01-06 | End: 2017-01-08

## 2017-01-06 RX ADMIN — HYDROMORPHONE HYDROCHLORIDE 2 MG: 2 TABLET ORAL at 16:29

## 2017-01-06 RX ADMIN — HYDROMORPHONE HYDROCHLORIDE 1 MG: 1 INJECTION, SOLUTION INTRAMUSCULAR; INTRAVENOUS; SUBCUTANEOUS at 15:03

## 2017-01-06 RX ADMIN — LORAZEPAM 1 MG: 1 TABLET ORAL at 09:54

## 2017-01-06 RX ADMIN — ONDANSETRON 4 MG: 2 INJECTION, SOLUTION INTRAMUSCULAR; INTRAVENOUS at 15:03

## 2017-01-06 RX ADMIN — ONDANSETRON 4 MG: 4 TABLET, ORALLY DISINTEGRATING ORAL at 09:54

## 2017-01-06 ASSESSMENT — LIFESTYLE VARIABLES: DO YOU DRINK ALCOHOL: NO

## 2017-01-06 ASSESSMENT — PAIN SCALES - GENERAL
PAINLEVEL_OUTOF10: 10
PAINLEVEL_OUTOF10: 7
PAINLEVEL_OUTOF10: 10
PAINLEVEL_OUTOF10: 7

## 2017-01-06 NOTE — ED AVS SNAPSHOT
[employer] Access Code: [email or web address]  Expires: 1/28/2017 10:33 AM    Your email address is not on file at "[employer].  Email Addresses are required for you to sign up for [employer], please contact [phone] to verify your personal information and to provide your email address prior to attempting to register for [employer].    Diana Tovapreethi Hernandez  [address] [place] [address]    [employer]  A secure, online tool to manage your health information     "[employer]’s [employer]® is a secure, online tool that connects you to your personalized health information from the privacy of your home -- day or night - making it very easy for you to manage your healthcare. Once the activation process is completed, you can even access your medical information using the [employer] mirna, which is available for free in the Apple Mirna store or Google Play store.     To learn more about [employer], visit [email or web address]    There are two levels of access available (as shown below):   My Chart Features  [facility] Primary Care Doctor [facility]  Specialists [facility]  Urgent  Care Non-[facility] Primary Care Doctor   Email your healthcare team securely and privately 24/7 X X X    Manage appointments: schedule your next appointment; view details of past/upcoming appointments X      Request prescription refills. X      View recent personal medical records, including lab and immunizations X X X X   View health record, including health history, allergies, medications X X X X   Read reports about your outpatient visits, procedures, consult and ER notes X X X X   See your discharge summary, which is a recap of your hospital and/or ER visit that includes your diagnosis, lab results, and care plan X X  X     How to register for [employer]:  Once your e-mail address has been verified, follow the following steps to sign up for [employer].     1. Go to  [email or web address]  2. Click on the Sign Up Now box, which takes you to the New Member Sign Up page. You will  need to provide the following information:  a. Enter your [employer] Access Code exactly as it appears at the top of this page. (You will not need to use this code after you’ve completed the sign-up process. If you do not sign up before the expiration date, you must request a new code.)   b. Enter your date of birth.   c. Enter your home email address.   d. Click Submit, and follow the next screen’s instructions.  3. Create a [email or web address] ID. This will be your [employer] login ID and cannot be changed, so think of one that is secure and easy to remember.  4. Create a [employer] password. You can change your password at any time.  5. Enter your Password Reset Question and Answer. This can be used at a later time if you forget your password.   6. Enter your e-mail address. This allows you to receive e-mail notifications when new information is available in [employer].  7. Click Sign Up. You can now view your health information.    For assistance activating your [employer] account, call [phone]

## 2017-01-06 NOTE — ED PROVIDER NOTES
ED Provider Note    CHIEF COMPLAINT  Chief Complaint   Patient presents with   • Back Pain     upper and lower since yeserday   • Difficulty Urinating       HPI  Diana Hernandez is a 41 y.o. female who presents with upper back pain and lower back pain, for the last 24 hours, pain is moderate in onset no fever no chills no IV drug use. No stool incontinence however does have black stool, for the last month.. Evidently she was seen here recently admitted for cyclic vomiting, discharged yesterday now back in this morning. Nausea and vomiting this morning. 5:30 AM, ×3 no more vomiting. No diarrhea. Does have black stool as above.  Does admit to smoking large amounts of alcohol, taking frequent hot showers  REVIEW OF SYSTEMS  See HPI for further details. History of chronic back pain, back, superior mesentery. Artery syndrome and anxiety, C. difficile, kidney stones drug-seeking behavior frequent hot showers Denies other G.I., G.U.. endrocine, cardiovascular, respriatory or neurological problems. All other systems are negative.     PAST MEDICAL HISTORY  Past Medical History   Diagnosis Date   • Snoring    • Dental disorder    • Pain      abd and back   • Tobacco abuse 6/20/2009   • Superior mesenteric artery syndrome (HCC) 7/12/2009   • Anxiety      Followed by [facility]   • CLOSTRIDIUM DIFFICILE INFECTION 4/14/2010   • Dyspepsia 7/12/2009   • Backpain    • Nephrolithiasis 6/20/2009     kidney stones,post lithotrypsy   • Anxiety disorder    • CVS disease    • Drug-seeking behavior    • Cyclic vomiting syndrome    • Superior mesenteric artery syndrome (HCC)        FAMILY HISTORY  Family History   Problem Relation Age of Onset   • Cancer Mother 50     Colon cancer   • Allergies Father    • Hypertension Mother    • Hypertension Father    • Heart Disease Maternal Grandmother        SOCIAL HISTORY  Social History     Social History   • Marital Status: Single     Spouse Name: N/A   • Number of Children: N/A   • Years of  Education: N/A     Social History Main Topics   • Smoking status: Former Smoker -- 0.50 packs/day for 13 years     Types: Cigarettes     Quit date: 12/15/2012   • Smokeless tobacco: None      Comment: pd   • Alcohol Use: No   • Drug Use: Yes     Special: Inhaled, Marijuana      Comment: pot occ   • Sexual Activity: Not Asked     Other Topics Concern   • None     Social History Narrative       SURGICAL HISTORY  Past Surgical History   Procedure Laterality Date   • Gastroscopy-endo  2009     Performed by ROSANNA WRIGHT at SURGERY [facility]   • Lithotripsy     • Pyloroplasty  2009     Performed by MACIEL QUINTERO at SURGERY [facility]   • Gastroscopy with biopsy  3/9/2010     Performed by AMANDA MEJIA at ENDOSCOPY [facility] [place]   • Exploratory laparotomy  2009     Performed by MACIEL QUINTERO at SURGERY [facility]   • Appendectomy  2009     Performed by MACIEL QUINTERO at SURGERY [facility]   • Cholecystectomy  2009     Performed by MACIEL QUINTERO at SURGERY [facility]   • Other       superior mesenteric artery correction    • Exploratory laparotomy  2016     Procedure: EXPLORATORY LAPAROTOMY;  Surgeon: Maciel Quintero M.D.;  Location: SURGERY [facility];  Service:    • Bowel resection  2016     Procedure: BOWEL RESECTION;  Surgeon: Maciel Quintero M.D.;  Location: SURGERY [facility];  Service:    • Gastroscopy-endo  2016     Procedure: GASTROSCOPY-ENDO;  Surgeon: Blayne Blackburn M.D.;  Location: [facility];  Service:        CURRENT MEDICATIONS  Home Medications     Reviewed by Stella Posey R.N. (Registered Nurse) on 17 at 0820  Med List Status: Complete    Medication Last Dose Status    alprazolam (XANAX) 0.5 MG Tab 2017 Active    ondansetron (ZOFRAN ODT) 4 MG TABLET DISPERSIBLE 2017 Active    Prenatal Multivit-Min-Fe-FA (PRE-  FORMULA) Tab 1/4/2017 Active                ALLERGIES  Allergies   Allergen Reactions   • Metoclopramide Hives     Told by MD; pt suspects possible hives.   • Quetiapine Unspecified     MD told her she was allergic     • Septra [Sulfamethoxazole W-Trimethoprim] Unspecified     MD told her she was allergic         PHYSICAL EXAM  VITAL SIGNS: /64 mmHg  Pulse 58  Temp(Src) 36.9 °C (98.5 °F)  Resp 17  Wt 56.7 kg (125 lb)  LMP 12/26/2016  Constitutional: Well developed, Well nourished, No acute distress, Non-toxic appearance.   HENT: Normocephalic, Atraumatic, Bilateral external ears normal, Oropharynx moist, No oral exudates, Nose normal.   Eyes: PERRL, EOMI, Conjunctiva normal, No discharge.   Neck: Normal range of motion, No tenderness, Supple, No stridor.   Lymphatic: No lymphadenopathy noted.   Cardiovascular: Normal heart rate, Normal rhythm, No murmurs, No rubs, No gallops.   Thorax & Lungs: Normal breath sounds, No respiratory distress, No wheezing, No chest tenderness.   Abdomen:  No tenderness, no guarding no rigidity and the abdomen is soft.  No masses, No pulsatile masses.  Skin: Warm, Dry, No erythema, No rash.   Back: No tenderness, No CVA tenderness.   Extremities: Intact distal pulses, No edema, No tenderness, No cyanosis, No clubbing.   Musculoskeletal: Good range of motion in all major joints. No tenderness to palpation or major deformities noted.   Neurologic: Alert & oriented x 3, Normal motor function, Normal sensory function, No focal deficits noted.   Psychiatric: Affect normal, Judgment normal, Mood normal.   Rectal exam: Hemorrhoids are present, a few hemorrhoids are inflamed. Stool is brown, trace Hemoccult-positive    RADIOLOGY/PROCEDURES  Scan of her abdomen, 2 days ago with the following results:  Impression          1.  Negative noncontrast CT scan of the abdomen and pelvis.    2.  No hydronephrosis or nephrolithiasis.    3.  Cholecystectomy.    4.  Postoperative changes involving  the descending colon and small bowel as described.    5.  No free fluid.         Reading Provider Reading Date     Rey Chu M.D. Jan 4, 2017   To send the abdomen, done yesterday with the following results  Impression        Status post cholecystectomy.    No biliary ductal dilatation.           Reading Provider Reading Date     Sylvia Vallejo M.D. Jan 5, 2017               COURSE & MEDICAL DECISION MAKING  Pertinent Labs & Imaging studies reviewed. (See chart for details)    She was here 2 days ago with cyclic vomiting, taking hot showers, admits to marijuana use. Comes back in today with vomiting. Now admits to having dark stool for a few weeks, rectal exam is heme positive, brown stool  Is been observed here for much of the day. IV infiltrated.. I have talked to her for an extended amount of time about her marijuana use. I think this is most likely causing her cyclic vomiting, at 4 PM, I'm going to discharge her. Zofran to go home with.    FINAL IMPRESSION  1.   1. Pain, upper back    2. Chronic midline low back pain without sciatica    3. Dark stools        2. Acute vomiting, marijuana hyperemesis  3.     Disposition  Discharge instructions are understood. This patient is to return if fever vomiting or no better in 12 hours. Follow up with the [facility] clinic or private physician. Information sheets on cyclic vomiting, marijuana hyperemesis  Electronically signed by: Dinh Ferris, 1/6/2017 9:05 AM

## 2017-01-06 NOTE — ED NOTES
Attempted PIV x2 by me and second RN Reav  into attempt unsuccessful; lab unsuccessful in drawing venous labs, MD notified and arterial draw by RT [place]ford.

## 2017-01-06 NOTE — ED NOTES
BIB [facility] to rm [facility], pt is coming from home  Chief Complaint   Patient presents with   • Back Pain     upper and lower since yeserday   • Difficulty Urinating     Was seen here on 1/4 for same and stayed 1 night, sent home with Rx for xanax and zofran.  Pt states difficulty urinating and having a BM.  VSS, pt was given IM fentanyl and zofran PTA which brought her pain down from 8 to 6/10.  Chart up for ERP.

## 2017-01-06 NOTE — ED AVS SNAPSHOT
1/6/2017          Diana Hernandez  [address] [place] [address]    Dear Diana:    [facility] wants to ensure your discharge home is safe and you or your loved ones have had all your questions answered regarding your care after you leave the hospital.    You may receive a telephone call within two days of your discharge.  This call is to make certain you understand your discharge instructions as well as ensure we provided you with the best care possible during your stay with us.     The call will only last approximately 3-5 minutes and will be done by a nurse.    Once again, we want to ensure your discharge home is safe and that you have a clear understanding of any next steps in your care.  If you have any questions or concerns, please do not hesitate to contact us, we are here for you.  Thank you for choosing [facility] for your healthcare needs.    Sincerely,    Cesar Munoz    [facility]

## 2017-01-06 NOTE — ED NOTES
EJ's unsuccessful, MD aware. Pt on [facility] in room at this time reports nausea and pain ongoing

## 2017-01-06 NOTE — ED AVS SNAPSHOT
After Visit Summary                                                                                                                Diana Hernandez   MRN: [id number]    Department:  [facility], Emergency Dept   Date of Visit:  1/6/2017            [facility], Emergency Dept    [address] [place] [id number]    Phone:  [phone]      You were seen by     Dinh Ferris M.D.      Your Diagnosis Was     Pain, upper back     M54.9       These are the medications you received during your hospitalization from 01/06/2017 0808 to 01/06/2017 1635     Date/Time Order Dose Route Action    01/06/2017 0954 lorazepam (ATIVAN) tablet 1 mg 1 mg Oral Given    01/06/2017 0954 ondansetron (ZOFRAN ODT) dispertab 4 mg 4 mg Oral Given    01/06/2017 1503 HYDROmorphone (DILAUDID) injection 1 mg 1 mg Intramuscular Given    01/06/2017 1503 ondansetron (ZOFRAN) syringe/vial injection 4 mg 4 mg Intramuscular Given    01/06/2017 1629 HYDROmorphone (DILAUDID) tablet 2 mg 2 mg Oral Given      Follow-up Information     1. Follow up with Maryam Torre M.D.. Schedule an appointment as soon as possible for a visit today.    Specialty:  Internal Medicine    Contact information    [address] [place] [id number] [phone]        Medication Information     Review all of your home medications and newly ordered medications with your primary doctor and/or pharmacist as soon as possible. Follow medication instructions as directed by your doctor and/or pharmacist.     Please keep your complete medication list with you and share with your physician. Update the information when medications are discontinued, doses are changed, or new medications (including over-the-counter products) are added; and carry medication information at all times in the event of emergency situations.               Medication List      START taking these medications        Instructions    ondansetron 4 MG Tabs tablet      Commonly known as:  ZOFRAN    Take 1 Tab by mouth every 8 hours as needed (FOR NAUSEA OR VIMITING) for up to 6 doses.   Dose:  4 mg         ASK your doctor about these medications        Instructions    alprazolam 0.5 MG Tabs   Commonly known as:  XANAX    Take 1 Tab by mouth every 8 hours as needed for Sleep.   Dose:  0.5 mg       ondansetron 4 MG Tbdp   Commonly known as:  ZOFRAN ODT    Take 1 Tab by mouth every 6 hours as needed for Nausea/Vomiting (give PO if no IV route available).   Dose:  4 mg       PRE- FORMULA Tabs    Take 1 Tab by mouth every day.   Dose:  1 Tab               Procedures and tests performed during your visit     BETA-HCG QUALITATIVE URINE    CBC WITH DIFFERENTIAL    COMP METABOLIC PANEL    ESTIMATED GFR    KIT CENTRAL VEIN CATH II    LIPASE    NURSING COMMUNICATION    REFRACTOMETER SG    URINALYSIS (UA)    URINE MICROSCOPIC (W/UA)        Discharge Instructions           Back Injury Prevention  Back injuries can be very painful. They can also be difficult to heal. After having one back injury, you are more likely to injure your back again. It is important to learn how to avoid injuring or re-injuring your back. The following tips can help you to prevent a back injury.  WHAT SHOULD I KNOW ABOUT PHYSICAL FITNESS?  · Exercise for 30 minutes per day on most days of the week or as directed by your health care provider. Make sure to:  · Do aerobic exercises, such as walking, jogging, biking, or swimming.  · Do exercises that increase balance and strength, such as major chi and yoga. These can decrease your risk of falling and injuring your back.  · Do stretching exercises to help with flexibility.  · Try to develop strong abdominal muscles. Your abdominal muscles provide a lot of the support that is needed by your back.  · Maintain a healthy weight.  This helps to decrease your risk of a back injury.  WHAT SHOULD I KNOW ABOUT MY DIET?  · Talk with your health care provider about your overall  "diet. Take supplements and vitamins only as directed by your health care provider.  · Talk with your health care provider about how much calcium and vitamin D you need each day. These nutrients help to prevent weakening of the bones (osteoporosis). Osteoporosis can cause broken (fractured) bones, which lead to back pain.  · Include good sources of calcium in your diet, such as dairy products, green leafy vegetables, and products that have had calcium added to them (fortified).  · Include good sources of vitamin D in your diet, such as milk and foods that are fortified with vitamin D.  WHAT SHOULD I KNOW ABOUT MY POSTURE?  · Sit up straight and stand up straight. Avoid leaning forward when you sit or hunching over when you stand.  · Choose chairs that have good low-back (lumbar) support.  · If you work at a desk, sit close to it so you do not need to lean over. Keep your chin tucked in. Keep your neck drawn back, and keep your elbows bent at a right angle. Your arms should look like the letter \"L.\"  · Sit high and close to the steering wheel when you drive. Add a lumbar support to your car seat, if needed.  · Avoid sitting or standing in one position for very long. Take breaks to get up, stretch, and walk around at least one time every hour. Take breaks every hour if you are driving for long periods of time.  · Sleep on your side with your knees slightly bent, or sleep on your back with a pillow under your knees. Do not lie on the front of your body to sleep.  WHAT SHOULD I KNOW ABOUT LIFTING, TWISTING, AND REACHING?  Lifting and Heavy Lifting  · Avoid heavy lifting, especially repetitive heavy lifting. If you must do heavy lifting:  · Stretch before lifting.  · Work slowly.  · Rest between lifts.  · Use a tool such as a cart or a eva to move objects if one is available.  · Make several small trips instead of carrying one heavy load.  · Ask for help when you need it, especially when moving big objects.  · Follow " these steps when lifting:  · Stand with your feet shoulder-width apart.  · Get as close to the object as you can. Do not try to  a heavy object that is far from your body.  · Use handles or lifting straps if they are available.  · Bend at your knees. Squat down, but keep your heels off the floor.  · Keep your shoulders pulled back, your chin tucked in, and your back straight.  · Lift the object slowly while you tighten the muscles in your legs, abdomen, and buttocks. Keep the object as close to the center of your body as possible.  · Follow these steps when putting down a heavy load:  · Stand with your feet shoulder-width apart.  · Lower the object slowly while you tighten the muscles in your legs, abdomen, and buttocks. Keep the object as close to the center of your body as possible.  · Keep your shoulders pulled back, your chin tucked in, and your back straight.  · Bend at your knees. Squat down, but keep your heels off the floor.  · Use handles or lifting straps if they are available.  Twisting and Reaching  · Avoid lifting heavy objects above your waist.  · Do not twist at your waist while you are lifting or carrying a load. If you need to turn, move your feet.  · Do not bend over without bending at your knees.  · Avoid reaching over your head, across a table, or for an object on a high surface.  WHAT ARE SOME OTHER TIPS?  · Avoid wet floors and icy ground. Keep sidewalks clear of ice to prevent falls.  · Do not sleep on a mattress that is too soft or too hard.  · Keep items that are used frequently within easy reach.  · Put heavier objects on shelves at waist level, and put lighter objects on lower or higher shelves.  · Find ways to decrease your stress, such as exercise, massage, or relaxation techniques. Stress can build up in your muscles. Tense muscles are more vulnerable to injury.  · Talk with your health care provider if you feel anxious or depressed. These conditions can make back pain  worse.  · Wear flat heel shoes with cushioned soles.  · Avoid sudden movements.  · Use both shoulder straps when carrying a backpack.  · Do not use any tobacco products, including cigarettes, chewing tobacco, or electronic cigarettes. If you need help quitting, ask your health care provider.     This information is not intended to replace advice given to you by your health care provider. Make sure you discuss any questions you have with your health care provider.     Document Released: 01/25/2006 Document Revised: 05/03/2016 Document Reviewed: 12/22/2015  [employer] Interactive Patient Education ©2016 [employer]    Nausea and Vomiting  Nausea is a sick feeling that often comes before throwing up (vomiting). Vomiting is a reflex where stomach contents come out of your mouth. Vomiting can cause severe loss of body fluids (dehydration). Children and elderly adults can become dehydrated quickly, especially if they also have diarrhea. Nausea and vomiting are symptoms of a condition or disease. It is important to find the cause of your symptoms.  CAUSES   · Direct irritation of the stomach lining. This irritation can result from increased acid production (gastroesophageal reflux disease), infection, food poisoning, taking certain medicines (such as nonsteroidal anti-inflammatory drugs), alcohol use, or tobacco use.  · Signals from the brain. These signals could be caused by a headache, heat exposure, an inner ear disturbance, increased pressure in the brain from injury, infection, a tumor, or a concussion, pain, emotional stimulus, or metabolic problems.  · An obstruction in the gastrointestinal tract (bowel obstruction).  · Illnesses such as diabetes, hepatitis, gallbladder problems, appendicitis, kidney problems, cancer, sepsis, atypical symptoms of a heart attack, or eating disorders.  · Medical treatments such as chemotherapy and radiation.  · Receiving medicine that makes you sleep (general anesthetic) during  surgery.  DIAGNOSIS  Your caregiver may ask for tests to be done if the problems do not improve after a few days. Tests may also be done if symptoms are severe or if the reason for the nausea and vomiting is not clear. Tests may include:  · Urine tests.  · Blood tests.  · Stool tests.  · Cultures (to look for evidence of infection).  · X-rays or other imaging studies.  Test results can help your caregiver make decisions about treatment or the need for additional tests.  TREATMENT  You need to stay well hydrated. Drink frequently but in small amounts. You may wish to drink water, sports drinks, clear broth, or eat frozen ice pops or gelatin dessert to help stay hydrated. When you eat, eating slowly may help prevent nausea. There are also some antinausea medicines that may help prevent nausea.  HOME CARE INSTRUCTIONS   · Take all medicine as directed by your caregiver.  · If you do not have an appetite, do not force yourself to eat. However, you must continue to drink fluids.  · If you have an appetite, eat a normal diet unless your caregiver tells you differently.  ¨ Eat a variety of complex carbohydrates (rice, wheat, potatoes, bread), lean meats, yogurt, fruits, and vegetables.  ¨ Avoid high-fat foods because they are more difficult to digest.  · Drink enough water and fluids to keep your urine clear or pale yellow.  · If you are dehydrated, ask your caregiver for specific rehydration instructions. Signs of dehydration may include:  ¨ Severe thirst.  ¨ Dry lips and mouth.  ¨ Dizziness.  ¨ Dark urine.  ¨ Decreasing urine frequency and amount.  ¨ Confusion.  ¨ Rapid breathing or pulse.  SEEK IMMEDIATE MEDICAL CARE IF:   · You have blood or brown flecks (like coffee grounds) in your vomit.  · You have black or bloody stools.  · You have a severe headache or stiff neck.  · You are confused.  · You have severe abdominal pain.  · You have chest pain or trouble breathing.  · You do not urinate at least once every 8  hours.  · You develop cold or clammy skin.  · You continue to vomit for longer than 24 to 48 hours.  · You have a fever.  MAKE SURE YOU:   · Understand these instructions.  · Will watch your condition.  · Will get help right away if you are not doing well or get worse.     This information is not intended to replace advice given to you by your health care provider. Make sure you discuss any questions you have with your health care provider.     Document Released: 12/18/2006 Document Revised: 03/11/2013 Document Reviewed: 05/16/2012  [employer] Interactive Patient Education ©2016 [employer]  Return if fever, vomiting or if no better in 12 hours.          Patient Information     Patient Information    Following emergency treatment: all patient requiring follow-up care must return either to a private physician or a clinic if your condition worsens before you are able to obtain further medical attention, please return to the emergency room.     Billing Information    At [facility], we work to make the billing process streamlined for our patients.  Our Representatives are here to answer any questions you may have regarding your hospital bill.  If you have insurance coverage and have supplied your insurance information to us, we will submit a claim to your insurer on your behalf.  Should you have any questions regarding your bill, we can be reached online or by phone as follows:  Online: You are able pay your bills online or live chat with our representatives about any billing questions you may have. We are here to help Monday - Friday from 8:00am to 7:30pm and 9:00am - 12:00pm on Saturdays.  Please visit [email or web address]  for more information.   Phone:  [phone] or [phone]    Please note that your emergency physician, surgeon, pathologist, radiologist, anesthesiologist, and other specialists are not employed by [facility] and will therefore bill separately for their  services.  Please contact them directly for any questions concerning their bills at the numbers below:     Emergency Physician Services:  [phone]  [facility]:  [phone]  [facility]:  [phone]  [facility]:  [phone]    1. Your final bill may vary from the amount quoted upon discharge if all procedures are not complete at that time, or if your doctor has additional procedures of which we are not aware. You will receive an additional bill if you return to the Emergency Department at [facility] for suture removal regardless of the facility of which the sutures were placed.     2. Please arrange for settlement of this account at the emergency registration.    3. All self-pay accounts are due in full at the time of treatment.  If you are unable to meet this obligation then payment is expected within 4-5 days.     4. If you have had radiology studies (CT, X-ray, Ultrasound, MRI), you have received a preliminary result during your emergency department visit. Please contact the radiology department [phone] to receive a copy of your final result. Please discuss the Final result with your primary physician or with the follow up physician provided.     Crisis Hotline:  [place] Crisis Hotline:  1-366-QQULMGI or 1-514.815.5304  [place] Crisis Hotline:    1-923.719.1327 or 860-763-1780         ED Discharge Follow Up Questions    1. In order to provide you with very good care, we would like to follow up with a phone call in the next few days.  May we have your permission to contact you?     YES /  NO    2. What is the best phone number to call you? (       )_____-__________    3. What is the best time to call you?      Morning  /  Afternoon  /  Evening                   Patient Signature:  ____________________________________________________________    Date:  ____________________________________________________________      Your appointments     Dash 10,  2017 10:15 AM   Established Patient with Lavell Monte M.D.   [facility] - Internal Medicine (--)    [address] [place] [id number] [phone]           You will be receiving a confirmation call a few days before your appointment from our automated call confirmation system.

## 2017-01-06 NOTE — ED NOTES
Discoloration to LE improving with only mild purplish color to hand present, pt re-educated to keep arm on pillow, extended, and heat pad on. Bilateral radial pulses equal strong 2+ wnl

## 2017-01-07 ENCOUNTER — HOSPITAL ENCOUNTER (INPATIENT)
Facility: MEDICAL CENTER | Age: 42
LOS: 1 days | DRG: 897 | End: 2017-01-08
Attending: EMERGENCY MEDICINE | Admitting: HOSPITALIST
Payer: MEDICAID

## 2017-01-07 ENCOUNTER — APPOINTMENT (OUTPATIENT)
Dept: RADIOLOGY | Facility: MEDICAL CENTER | Age: 42
DRG: 897 | End: 2017-01-07
Attending: EMERGENCY MEDICINE
Payer: MEDICAID

## 2017-01-07 ENCOUNTER — RESOLUTE PROFESSIONAL BILLING HOSPITAL PROF FEE (OUTPATIENT)
Dept: HOSPITALIST | Facility: MEDICAL CENTER | Age: 42
End: 2017-01-07
Payer: MEDICAID

## 2017-01-07 DIAGNOSIS — F12.10 CANNABIS ABUSE: ICD-10-CM

## 2017-01-07 DIAGNOSIS — G43.A1 INTRACTABLE CYCLICAL VOMITING, PRESENCE OF NAUSEA NOT SPECIFIED: ICD-10-CM

## 2017-01-07 LAB
ALBUMIN SERPL BCP-MCNC: 4.6 G/DL (ref 3.2–4.9)
ALBUMIN/GLOB SERPL: 1.5 G/DL
ALP SERPL-CCNC: 61 U/L (ref 30–99)
ALT SERPL-CCNC: 19 U/L (ref 2–50)
AMORPH CRY #/AREA URNS HPF: PRESENT /HPF
ANION GAP SERPL CALC-SCNC: 13 MMOL/L (ref 0–11.9)
APPEARANCE UR: ABNORMAL
AST SERPL-CCNC: 17 U/L (ref 12–45)
BACTERIA #/AREA URNS HPF: ABNORMAL /HPF
BASOPHILS # BLD AUTO: 0.4 % (ref 0–1.8)
BASOPHILS # BLD: 0.04 K/UL (ref 0–0.12)
BILIRUB SERPL-MCNC: 2.1 MG/DL (ref 0.1–1.5)
BILIRUB UR QL STRIP.AUTO: NEGATIVE
BUN SERPL-MCNC: 9 MG/DL (ref 8–22)
CALCIUM SERPL-MCNC: 9.5 MG/DL (ref 8.5–10.5)
CAOX CRY #/AREA URNS HPF: ABNORMAL /HPF
CHLORIDE SERPL-SCNC: 101 MMOL/L (ref 96–112)
CO2 SERPL-SCNC: 21 MMOL/L (ref 20–33)
COLOR UR: ABNORMAL
CREAT SERPL-MCNC: 0.73 MG/DL (ref 0.5–1.4)
CULTURE IF INDICATED INDCX: YES UA CULTURE
EKG IMPRESSION: NORMAL
EOSINOPHIL # BLD AUTO: 0 K/UL (ref 0–0.51)
EOSINOPHIL NFR BLD: 0 % (ref 0–6.9)
EPI CELLS #/AREA URNS HPF: ABNORMAL /HPF
ERYTHROCYTE [DISTWIDTH] IN BLOOD BY AUTOMATED COUNT: 48.7 FL (ref 35.9–50)
GFR SERPL CREATININE-BSD FRML MDRD: >60 ML/MIN/1.73 M 2
GLOBULIN SER CALC-MCNC: 3 G/DL (ref 1.9–3.5)
GLUCOSE SERPL-MCNC: 102 MG/DL (ref 65–99)
GLUCOSE UR STRIP.AUTO-MCNC: NEGATIVE MG/DL
HCG SERPL QL: NEGATIVE
HCG UR QL: NEGATIVE
HCT VFR BLD AUTO: 39.2 % (ref 37–47)
HGB BLD-MCNC: 13.6 G/DL (ref 12–16)
IMM GRANULOCYTES # BLD AUTO: 0.06 K/UL (ref 0–0.11)
IMM GRANULOCYTES NFR BLD AUTO: 0.6 % (ref 0–0.9)
KETONES UR STRIP.AUTO-MCNC: 100 MG/DL
LEUKOCYTE ESTERASE UR QL STRIP.AUTO: NEGATIVE
LIPASE SERPL-CCNC: 14 U/L (ref 11–82)
LYMPHOCYTES # BLD AUTO: 0.9 K/UL (ref 1–4.8)
LYMPHOCYTES NFR BLD: 9.2 % (ref 22–41)
MCH RBC QN AUTO: 33.1 PG (ref 27–33)
MCHC RBC AUTO-ENTMCNC: 34.7 G/DL (ref 33.6–35)
MCV RBC AUTO: 95.4 FL (ref 81.4–97.8)
MICRO URNS: ABNORMAL
MONOCYTES # BLD AUTO: 0.98 K/UL (ref 0–0.85)
MONOCYTES NFR BLD AUTO: 10 % (ref 0–13.4)
MUCOUS THREADS #/AREA URNS HPF: ABNORMAL /HPF
NEUTROPHILS # BLD AUTO: 7.78 K/UL (ref 2–7.15)
NEUTROPHILS NFR BLD: 79.8 % (ref 44–72)
NITRITE UR QL STRIP.AUTO: POSITIVE
NRBC # BLD AUTO: 0 K/UL
NRBC BLD AUTO-RTO: 0 /100 WBC
PH UR STRIP.AUTO: 5.5 [PH]
PLATELET # BLD AUTO: 312 K/UL (ref 164–446)
PMV BLD AUTO: 9.9 FL (ref 9–12.9)
POTASSIUM SERPL-SCNC: 2.6 MMOL/L (ref 3.6–5.5)
PROT SERPL-MCNC: 7.6 G/DL (ref 6–8.2)
PROT UR QL STRIP: NEGATIVE MG/DL
RBC # BLD AUTO: 4.11 M/UL (ref 4.2–5.4)
RBC # URNS HPF: ABNORMAL /HPF
RBC UR QL AUTO: NEGATIVE
SODIUM SERPL-SCNC: 135 MMOL/L (ref 135–145)
SP GR UR REFRACTOMETRY: 1.01
SP GR UR STRIP.AUTO: 1.01
WBC # BLD AUTO: 9.8 K/UL (ref 4.8–10.8)
WBC #/AREA URNS HPF: ABNORMAL /HPF

## 2017-01-07 PROCEDURE — 700111 HCHG RX REV CODE 636 W/ 250 OVERRIDE (IP): Performed by: EMERGENCY MEDICINE

## 2017-01-07 PROCEDURE — 700105 HCHG RX REV CODE 258: Performed by: EMERGENCY MEDICINE

## 2017-01-07 PROCEDURE — 85025 COMPLETE CBC W/AUTO DIFF WBC: CPT

## 2017-01-07 PROCEDURE — 96361 HYDRATE IV INFUSION ADD-ON: CPT

## 2017-01-07 PROCEDURE — 93005 ELECTROCARDIOGRAM TRACING: CPT

## 2017-01-07 PROCEDURE — 87077 CULTURE AEROBIC IDENTIFY: CPT

## 2017-01-07 PROCEDURE — 71020 DX-CHEST-2 VIEWS: CPT

## 2017-01-07 PROCEDURE — 83690 ASSAY OF LIPASE: CPT

## 2017-01-07 PROCEDURE — 96365 THER/PROPH/DIAG IV INF INIT: CPT

## 2017-01-07 PROCEDURE — 81025 URINE PREGNANCY TEST: CPT

## 2017-01-07 PROCEDURE — 96375 TX/PRO/DX INJ NEW DRUG ADDON: CPT

## 2017-01-07 PROCEDURE — 96372 THER/PROPH/DIAG INJ SC/IM: CPT

## 2017-01-07 PROCEDURE — 99222 1ST HOSP IP/OBS MODERATE 55: CPT | Mod: GC | Performed by: HOSPITALIST

## 2017-01-07 PROCEDURE — 87186 SC STD MICRODIL/AGAR DIL: CPT

## 2017-01-07 PROCEDURE — 700102 HCHG RX REV CODE 250 W/ 637 OVERRIDE(OP): Performed by: EMERGENCY MEDICINE

## 2017-01-07 PROCEDURE — 87086 URINE CULTURE/COLONY COUNT: CPT

## 2017-01-07 PROCEDURE — 84703 CHORIONIC GONADOTROPIN ASSAY: CPT

## 2017-01-07 PROCEDURE — A9270 NON-COVERED ITEM OR SERVICE: HCPCS | Performed by: EMERGENCY MEDICINE

## 2017-01-07 PROCEDURE — 80053 COMPREHEN METABOLIC PANEL: CPT

## 2017-01-07 PROCEDURE — 81001 URINALYSIS AUTO W/SCOPE: CPT

## 2017-01-07 PROCEDURE — 99285 EMERGENCY DEPT VISIT HI MDM: CPT

## 2017-01-07 RX ORDER — POTASSIUM CHLORIDE 7.45 MG/ML
10 INJECTION INTRAVENOUS ONCE
Status: COMPLETED | OUTPATIENT
Start: 2017-01-07 | End: 2017-01-07

## 2017-01-07 RX ORDER — HALOPERIDOL 5 MG/ML
2 INJECTION INTRAMUSCULAR ONCE
Status: COMPLETED | OUTPATIENT
Start: 2017-01-07 | End: 2017-01-07

## 2017-01-07 RX ORDER — SODIUM CHLORIDE 9 MG/ML
1000 INJECTION, SOLUTION INTRAVENOUS ONCE
Status: COMPLETED | OUTPATIENT
Start: 2017-01-07 | End: 2017-01-07

## 2017-01-07 RX ORDER — DICYCLOMINE HYDROCHLORIDE 10 MG/ML
20 INJECTION INTRAMUSCULAR ONCE
Status: COMPLETED | OUTPATIENT
Start: 2017-01-07 | End: 2017-01-07

## 2017-01-07 RX ORDER — KETOROLAC TROMETHAMINE 30 MG/ML
30 INJECTION, SOLUTION INTRAMUSCULAR; INTRAVENOUS ONCE
Status: COMPLETED | OUTPATIENT
Start: 2017-01-07 | End: 2017-01-07

## 2017-01-07 RX ORDER — HYDROCODONE BITARTRATE AND ACETAMINOPHEN 5; 325 MG/1; MG/1
1 TABLET ORAL ONCE
Status: COMPLETED | OUTPATIENT
Start: 2017-01-07 | End: 2017-01-07

## 2017-01-07 RX ORDER — ALPRAZOLAM 0.25 MG/1
1 TABLET ORAL ONCE
Status: COMPLETED | OUTPATIENT
Start: 2017-01-07 | End: 2017-01-07

## 2017-01-07 RX ADMIN — KETOROLAC TROMETHAMINE 30 MG: 30 INJECTION, SOLUTION INTRAMUSCULAR; INTRAVENOUS at 19:42

## 2017-01-07 RX ADMIN — HALOPERIDOL LACTATE 2 MG: 5 INJECTION, SOLUTION INTRAMUSCULAR at 18:39

## 2017-01-07 RX ADMIN — ALPRAZOLAM 1 MG: 0.25 TABLET ORAL at 20:30

## 2017-01-07 RX ADMIN — HYDROCODONE BITARTRATE AND ACETAMINOPHEN 1 TABLET: 5; 325 TABLET ORAL at 23:30

## 2017-01-07 RX ADMIN — DICYCLOMINE HYDROCHLORIDE 20 MG: 20 INJECTION, SOLUTION INTRAMUSCULAR at 19:42

## 2017-01-07 RX ADMIN — SODIUM CHLORIDE 1000 ML: 9 INJECTION, SOLUTION INTRAVENOUS at 19:43

## 2017-01-07 RX ADMIN — SODIUM CHLORIDE 1000 ML: 9 INJECTION, SOLUTION INTRAVENOUS at 18:37

## 2017-01-07 RX ADMIN — POTASSIUM CHLORIDE 10 MEQ: 7.46 INJECTION, SOLUTION INTRAVENOUS at 19:43

## 2017-01-07 ASSESSMENT — PAIN SCALES - GENERAL
PAINLEVEL_OUTOF10: 8
PAINLEVEL_OUTOF10: 6
PAINLEVEL_OUTOF10: 8

## 2017-01-07 NOTE — IP AVS SNAPSHOT
" After Visit Summary                                                                                                                  Name:Diana Hernandez  Medical Record Number:[id number]  CSN: [id number]    YOB: 1975   Age: 41 y.o.  Sex: female  HT:1.651 m (5' 5\") WT: 56.7 kg (125 lb)          Admit Date: 1/7/2017     Discharge Date:   Today's Date: 1/8/2017  Attending Doctor:  Bam Bolaños Calliste*                  Allergies:  Metoclopramide; Quetiapine; and Septra            Discharge Instructions       Discharge Instructions    Discharged to home by car with self. Discharged via wheelchair, hospital escort: Yes.  Special equipment needed: Not Applicable    Be sure to schedule a follow-up appointment with your primary care doctor or any specialists as instructed.     Discharge Plan:   Diet Plan: Discussed  Activity Level: Discussed  Confirmed Follow up Appointment: Patient to Call and Schedule Appointment  Confirmed Symptoms Management: Discussed  Medication Reconciliation Updated: Yes  Influenza Vaccine Indication: Patient Refuses    I understand that a diet low in cholesterol, fat, and sodium is recommended for good health. Unless I have been given specific instructions below for another diet, I accept this instruction as my diet prescription.   Other diet: no restrictions    Special Instructions: None    · Is patient discharged on Warfarin / Coumadin?   No     · Is patient Post Blood Transfusion?  No    Depression / Suicide Risk    As you are discharged from this [facility] facility, it is important to learn how to keep safe from harming yourself.    Recognize the warning signs:  · Abrupt changes in personality, positive or negative- including increase in energy   · Giving away possessions  · Change in eating patterns- significant weight changes-  positive or negative  · Change in sleeping patterns- unable to sleep or sleeping all the time   · Unwillingness or inability to " communicate  · Depression  · Unusual sadness, discouragement and loneliness  · Talk of wanting to die  · Neglect of personal appearance   · Rebelliousness- reckless behavior  · Withdrawal from people/activities they love  · Confusion- inability to concentrate     If you or a loved one observes any of these behaviors or has concerns about self-harm, here's what you can do:  · Talk about it- your feelings and reasons for harming yourself  · Remove any means that you might use to hurt yourself (examples: pills, rope, extension cords, firearm)  · Get professional help from the community (Mental Health, Substance Abuse, psychological counseling)  · Do not be alone:Call your Safe Contact- someone whom you trust who will be there for you.  · Call your local CRISIS HOTLINE 467-0881 or 590-971-8587  · Call your local Children's Mobile Crisis Response Team [place] (259) 280-1663 or www.[employer]  · Call the toll free National Suicide Prevention Hotlines   · National Suicide Prevention Lifeline 132-188-VPZZ (9521)  · [employer] Line Network 800-SUICIDE (524-8743)    Hypokalemia  Hypokalemia means that the amount of potassium in the blood is lower than normal. Potassium is a chemical, called an electrolyte, that helps regulate the amount of fluid in the body. It also stimulates muscle contraction and helps nerves function properly. Most of the body's potassium is inside of cells, and only a very small amount is in the blood. Because the amount in the blood is so small, minor changes can be life-threatening.  CAUSES  · Antibiotics.  · Diarrhea or vomiting.  · Using laxatives too much, which can cause diarrhea.  · Chronic kidney disease.  · Water pills (diuretics).  · Eating disorders (bulimia).  · Low magnesium level.  · Sweating a lot.  SIGNS AND SYMPTOMS  · Weakness.  · Constipation.  · Fatigue.  · Muscle cramps.  · Mental confusion.  · Skipped heartbeats or irregular heartbeat (palpitations).  · Tingling or  numbness.  DIAGNOSIS   Your health care provider can diagnose hypokalemia with blood tests. In addition to checking your potassium level, your health care provider may also check other lab tests.  TREATMENT  Hypokalemia can be treated with potassium supplements taken by mouth or adjustments in your current medicines. If your potassium level is very low, you may need to get potassium through a vein (IV) and be monitored in the hospital. A diet high in potassium is also helpful. Foods high in potassium are:  · Nuts, such as peanuts and pistachios.  · Seeds, such as sunflower seeds and pumpkin seeds.  · Peas, lentils, and lima beans.  · Whole grain and bran cereals and breads.  · Fresh fruit and vegetables, such as apricots, avocado, bananas, cantaloupe, kiwi, oranges, tomatoes, asparagus, and potatoes.  · Orange and tomato juices.  · Red meats.  · Fruit yogurt.  HOME CARE INSTRUCTIONS  · Take all medicines as prescribed by your health care provider.  · Maintain a healthy diet by including nutritious food, such as fruits, vegetables, nuts, whole grains, and lean meats.  · If you are taking a laxative, be sure to follow the directions on the label.  SEEK MEDICAL CARE IF:  · Your weakness gets worse.  · You feel your heart pounding or racing.  · You are vomiting or having diarrhea.  · You are diabetic and having trouble keeping your blood glucose in the normal range.  SEEK IMMEDIATE MEDICAL CARE IF:  · You have chest pain, shortness of breath, or dizziness.  · You are vomiting or having diarrhea for more than 2 days.  · You faint.  MAKE SURE YOU:   · Understand these instructions.  · Will watch your condition.  · Will get help right away if you are not doing well or get worse.     This information is not intended to replace advice given to you by your health care provider. Make sure you discuss any questions you have with your health care provider.     Document Released: 12/18/2006 Document Revised: 01/08/2016 Document  Reviewed: 2014  Virtual Computer Interactive Patient Education © [employer]          Your appointments     Dash 10, 2017 10:15 AM   Established Patient with Lavell Monte M.D.   [facility] - Internal Medicine (--)    [address]  [address]  [place] [id number] [phone]           You will be receiving a confirmation call a few days before your appointment from our automated call confirmation system.              Follow-up Information     1. Follow up with Maryam Torre M.D. In 2 weeks.    Specialty:  Internal Medicine    Contact information    [address]  [address]  [place] [id number] [phone]           Discharge Medication Instructions:    Below are the medications your physician expects you to take upon discharge:    Review all your home medications and newly ordered medications with your doctor and/or pharmacist. Follow medication instructions as directed by your doctor and/or pharmacist.    Please keep your medication list with you and share with your physician.               Medication List      START taking these medications        Instructions    ciprofloxacin 500 MG Tabs   Commonly known as:  CIPRO    Take 1 Tab by mouth 2 times a day.   Dose:  500 mg         CHANGE how you take these medications        Instructions    ondansetron 4 MG Tabs tablet   What changed:  when to take this   Commonly known as:  ZOFRAN    Take 1 Tab by mouth every 6 hours as needed (FOR NAUSEA OR VIMITING) for up to 10 days.   Dose:  4 mg         CONTINUE taking these medications        Instructions    alprazolam 0.5 MG Tabs   Last time this was given:  0.5 mg on 2017  9:35 AM   Commonly known as:  XANAX    Take 1 Tab by mouth every 8 hours as needed for Sleep.   Dose:  0.5 mg       PRE-LAYNE FORMULA Tabs    Take 1 Tab by mouth every day.   Dose:  1 Tab               Instructions           Diet / Nutrition:    Follow any diet instructions given to you by your doctor or the dietician, including  how much salt (sodium) you are allowed each day.    If you are overweight, talk to your doctor about a weight reduction plan.    Activity:    Remain physically active following your doctor's instructions about exercise and activity.    Rest often.     Any time you become even a little tired or short of breath, SIT DOWN and rest.    Worsening Symptoms:    Report any of the following signs and symptoms to the doctor's office immediately:    *Pain of jaw, arm, or neck  *Chest pain not relieved by medication                               *Dizziness or loss of consciousness  *Difficulty breathing even when at rest   *More tired than usual                                       *Bleeding drainage or swelling of surgical site  *Swelling of feet, ankles, legs or stomach                 *Fever (>100ºF)  *Pink or blood tinged sputum  *Weight gain (3lbs/day or 5lbs /week)           *Shock from internal defibrillator (if applicable)  *Palpitations or irregular heartbeats                *Cool and/or numb extremities    Stroke Awareness    Common Risk Factors for Stroke include:    Age  Atrial Fibrillation  Carotid Artery Stenosis  Diabetes Mellitus  Excessive alcohol consumption  High blood pressure  Overweight   Physical inactivity  Smoking    Warning signs and symptoms of a stroke include:    *Sudden numbness or weakness of the face, arm or leg (especially on one side of the body).  *Sudden confusion, trouble speaking or understanding.  *Sudden trouble seeing in one or both eyes.  *Sudden trouble walking, dizziness, loss of balance or coordination.Sudden severe headache with no known cause.    It is very important to get treatment quickly when a stroke occurs. If you experience any of the above warning signs, call 911 immediately.                   Disclaimer         Quit Smoking / Tobacco Use:    I understand the use of any tobacco products increases my chance of suffering from future heart disease or stroke and could cause  other illnesses which may shorten my life. Quitting the use of tobacco products is the single most important thing I can do to improve my health. For further information on smoking / tobacco cessation call a Toll Free Quit Line at 1-411.978.6323 (*National Cancer [place]) or 1-204.314.6861 (American Lung Association) or you can access the web based program at www.lungusa.org.    [place] Tobacco Users Help Line:  (950) 640-9749       Toll Free: 1-610.268.8165  Quit Tobacco Program [facility] (122)153-3709    Crisis Hotline:    [place] Crisis Hotline:  7-221-JKCJTFX or 1-418.153.6250    [place] Crisis Hotline:    1-481.491.7121 or 225-159-3068    Discharge Survey:   Thank you for choosing [facility]. We hope we did everything we could to make your hospital stay a pleasant one. You may be receiving a phone survey and we would appreciate your time and participation in answering the questions. Your input is very valuable to us in our efforts to improve our service to our patients and their families.        My signature on this form indicates that:    1. I have reviewed and understand the above information.  2. My questions regarding this information have been answered to my satisfaction.  3. I have formulated a plan with my discharge nurse to obtain my prescribed medications for home.                  Disclaimer         __________________________________                     __________       ________                       Patient Signature                                                 Date                    Time

## 2017-01-07 NOTE — IP AVS SNAPSHOT
Illumagear Access Code: [email or web address]  Expires: 1/28/2017 10:33 AM    Your email address is not on file at [facility].  Email Addresses are required for you to sign up for Illumagear, please contact [phone] to verify your personal information and to provide your email address prior to attempting to register for Illumagear.    Diana Tovapreethi Hernandez  [address] [place] [address]    Illumagear  A secure, online tool to manage your health information     [facility]’s Illumagear® is a secure, online tool that connects you to your personalized health information from the privacy of your home -- day or night - making it very easy for you to manage your healthcare. Once the activation process is completed, you can even access your medical information using the Illumagear mirna, which is available for free in the Apple Mirna store or Google Play store.     To learn more about Illumagear, visit [email or web address]    There are two levels of access available (as shown below):   My Chart Features  [facility] Primary Care Doctor [facility]  Specialists [facility]  Urgent  Care Non-[facility] Primary Care Doctor   Email your healthcare team securely and privately 24/7 X X X    Manage appointments: schedule your next appointment; view details of past/upcoming appointments X      Request prescription refills. X      View recent personal medical records, including lab and immunizations X X X X   View health record, including health history, allergies, medications X X X X   Read reports about your outpatient visits, procedures, consult and ER notes X X X X   See your discharge summary, which is a recap of your hospital and/or ER visit that includes your diagnosis, lab results, and care plan X X  X     How to register for Illumagear:  Once your e-mail address has been verified, follow the following steps to sign up for Illumagear.     1. Go to  [email or web address]  2. Click on the Sign Up Now box, which takes you to the New Member Sign Up page. You will  need to provide the following information:  a. Enter your Fluid Access Code exactly as it appears at the top of this page. (You will not need to use this code after you’ve completed the sign-up process. If you do not sign up before the expiration date, you must request a new code.)   b. Enter your date of birth.   c. Enter your home email address.   d. Click Submit, and follow the next screen’s instructions.  3. Create a Wheelrightt ID. This will be your Fluid login ID and cannot be changed, so think of one that is secure and easy to remember.  4. Create a Fluid password. You can change your password at any time.  5. Enter your Password Reset Question and Answer. This can be used at a later time if you forget your password.   6. Enter your e-mail address. This allows you to receive e-mail notifications when new information is available in Fluid.  7. Click Sign Up. You can now view your health information.    For assistance activating your Fluid account, call [phone]

## 2017-01-07 NOTE — DISCHARGE INSTRUCTIONS
Back Injury Prevention  Back injuries can be very painful. They can also be difficult to heal. After having one back injury, you are more likely to injure your back again. It is important to learn how to avoid injuring or re-injuring your back. The following tips can help you to prevent a back injury.  WHAT SHOULD I KNOW ABOUT PHYSICAL FITNESS?  · Exercise for 30 minutes per day on most days of the week or as directed by your health care provider. Make sure to:  · Do aerobic exercises, such as walking, jogging, biking, or swimming.  · Do exercises that increase balance and strength, such as major chi and yoga. These can decrease your risk of falling and injuring your back.  · Do stretching exercises to help with flexibility.  · Try to develop strong abdominal muscles. Your abdominal muscles provide a lot of the support that is needed by your back.  · Maintain a healthy weight.  This helps to decrease your risk of a back injury.  WHAT SHOULD I KNOW ABOUT MY DIET?  · Talk with your health care provider about your overall diet. Take supplements and vitamins only as directed by your health care provider.  · Talk with your health care provider about how much calcium and vitamin D you need each day. These nutrients help to prevent weakening of the bones (osteoporosis). Osteoporosis can cause broken (fractured) bones, which lead to back pain.  · Include good sources of calcium in your diet, such as dairy products, green leafy vegetables, and products that have had calcium added to them (fortified).  · Include good sources of vitamin D in your diet, such as milk and foods that are fortified with vitamin D.  WHAT SHOULD I KNOW ABOUT MY POSTURE?  · Sit up straight and stand up straight. Avoid leaning forward when you sit or hunching over when you stand.  · Choose chairs that have good low-back (lumbar) support.  · If you work at a desk, sit close to it so you do not need to lean over. Keep your chin tucked in. Keep your  "neck drawn back, and keep your elbows bent at a right angle. Your arms should look like the letter \"L.\"  · Sit high and close to the steering wheel when you drive. Add a lumbar support to your car seat, if needed.  · Avoid sitting or standing in one position for very long. Take breaks to get up, stretch, and walk around at least one time every hour. Take breaks every hour if you are driving for long periods of time.  · Sleep on your side with your knees slightly bent, or sleep on your back with a pillow under your knees. Do not lie on the front of your body to sleep.  WHAT SHOULD I KNOW ABOUT LIFTING, TWISTING, AND REACHING?  Lifting and Heavy Lifting  · Avoid heavy lifting, especially repetitive heavy lifting. If you must do heavy lifting:  · Stretch before lifting.  · Work slowly.  · Rest between lifts.  · Use a tool such as a cart or a eva to move objects if one is available.  · Make several small trips instead of carrying one heavy load.  · Ask for help when you need it, especially when moving big objects.  · Follow these steps when lifting:  · Stand with your feet shoulder-width apart.  · Get as close to the object as you can. Do not try to  a heavy object that is far from your body.  · Use handles or lifting straps if they are available.  · Bend at your knees. Squat down, but keep your heels off the floor.  · Keep your shoulders pulled back, your chin tucked in, and your back straight.  · Lift the object slowly while you tighten the muscles in your legs, abdomen, and buttocks. Keep the object as close to the center of your body as possible.  · Follow these steps when putting down a heavy load:  · Stand with your feet shoulder-width apart.  · Lower the object slowly while you tighten the muscles in your legs, abdomen, and buttocks. Keep the object as close to the center of your body as possible.  · Keep your shoulders pulled back, your chin tucked in, and your back straight.  · Bend at your knees. " Squat down, but keep your heels off the floor.  · Use handles or lifting straps if they are available.  Twisting and Reaching  · Avoid lifting heavy objects above your waist.  · Do not twist at your waist while you are lifting or carrying a load. If you need to turn, move your feet.  · Do not bend over without bending at your knees.  · Avoid reaching over your head, across a table, or for an object on a high surface.  WHAT ARE SOME OTHER TIPS?  · Avoid wet floors and icy ground. Keep sidewalks clear of ice to prevent falls.  · Do not sleep on a mattress that is too soft or too hard.  · Keep items that are used frequently within easy reach.  · Put heavier objects on shelves at waist level, and put lighter objects on lower or higher shelves.  · Find ways to decrease your stress, such as exercise, massage, or relaxation techniques. Stress can build up in your muscles. Tense muscles are more vulnerable to injury.  · Talk with your health care provider if you feel anxious or depressed. These conditions can make back pain worse.  · Wear flat heel shoes with cushioned soles.  · Avoid sudden movements.  · Use both shoulder straps when carrying a backpack.  · Do not use any tobacco products, including cigarettes, chewing tobacco, or electronic cigarettes. If you need help quitting, ask your health care provider.     This information is not intended to replace advice given to you by your health care provider. Make sure you discuss any questions you have with your health care provider.     Document Released: 01/25/2006 Document Revised: 05/03/2016 Document Reviewed: 12/22/2015  [employer] Interactive Patient Education ©2016 [employer]    Nausea and Vomiting  Nausea is a sick feeling that often comes before throwing up (vomiting). Vomiting is a reflex where stomach contents come out of your mouth. Vomiting can cause severe loss of body fluids (dehydration). Children and elderly adults can become dehydrated quickly, especially  if they also have diarrhea. Nausea and vomiting are symptoms of a condition or disease. It is important to find the cause of your symptoms.  CAUSES   · Direct irritation of the stomach lining. This irritation can result from increased acid production (gastroesophageal reflux disease), infection, food poisoning, taking certain medicines (such as nonsteroidal anti-inflammatory drugs), alcohol use, or tobacco use.  · Signals from the brain. These signals could be caused by a headache, heat exposure, an inner ear disturbance, increased pressure in the brain from injury, infection, a tumor, or a concussion, pain, emotional stimulus, or metabolic problems.  · An obstruction in the gastrointestinal tract (bowel obstruction).  · Illnesses such as diabetes, hepatitis, gallbladder problems, appendicitis, kidney problems, cancer, sepsis, atypical symptoms of a heart attack, or eating disorders.  · Medical treatments such as chemotherapy and radiation.  · Receiving medicine that makes you sleep (general anesthetic) during surgery.  DIAGNOSIS  Your caregiver may ask for tests to be done if the problems do not improve after a few days. Tests may also be done if symptoms are severe or if the reason for the nausea and vomiting is not clear. Tests may include:  · Urine tests.  · Blood tests.  · Stool tests.  · Cultures (to look for evidence of infection).  · X-rays or other imaging studies.  Test results can help your caregiver make decisions about treatment or the need for additional tests.  TREATMENT  You need to stay well hydrated. Drink frequently but in small amounts. You may wish to drink water, sports drinks, clear broth, or eat frozen ice pops or gelatin dessert to help stay hydrated. When you eat, eating slowly may help prevent nausea. There are also some antinausea medicines that may help prevent nausea.  HOME CARE INSTRUCTIONS   · Take all medicine as directed by your caregiver.  · If you do not have an appetite, do not  force yourself to eat. However, you must continue to drink fluids.  · If you have an appetite, eat a normal diet unless your caregiver tells you differently.  ¨ Eat a variety of complex carbohydrates (rice, wheat, potatoes, bread), lean meats, yogurt, fruits, and vegetables.  ¨ Avoid high-fat foods because they are more difficult to digest.  · Drink enough water and fluids to keep your urine clear or pale yellow.  · If you are dehydrated, ask your caregiver for specific rehydration instructions. Signs of dehydration may include:  ¨ Severe thirst.  ¨ Dry lips and mouth.  ¨ Dizziness.  ¨ Dark urine.  ¨ Decreasing urine frequency and amount.  ¨ Confusion.  ¨ Rapid breathing or pulse.  SEEK IMMEDIATE MEDICAL CARE IF:   · You have blood or brown flecks (like coffee grounds) in your vomit.  · You have black or bloody stools.  · You have a severe headache or stiff neck.  · You are confused.  · You have severe abdominal pain.  · You have chest pain or trouble breathing.  · You do not urinate at least once every 8 hours.  · You develop cold or clammy skin.  · You continue to vomit for longer than 24 to 48 hours.  · You have a fever.  MAKE SURE YOU:   · Understand these instructions.  · Will watch your condition.  · Will get help right away if you are not doing well or get worse.     This information is not intended to replace advice given to you by your health care provider. Make sure you discuss any questions you have with your health care provider.     Document Released: 12/18/2006 Document Revised: 03/11/2013 Document Reviewed: 05/16/2012  [employer] Interactive Patient Education ©2016 [employer]  Return if fever, vomiting or if no better in 12 hours.

## 2017-01-07 NOTE — ED NOTES
Assist RN: Medicated per MAR for pain, prescription x 1 given to patient, updated on POC, instructed not to drive while taking narcotics, verbalized understanding.

## 2017-01-07 NOTE — IP AVS SNAPSHOT
" <p align=\"LEFT\"><IMG SRC=\"//[email or web address]$/[email or web address]\" alt=\"Image\" WIDTH=\"50%\" HEIGHT=\"200\" BORDER=\"\"></p>                   Name:Diana Hernandez  Medical Record Number:[id number]  CSN: [id number]    YOB: 1975   Age: 41 y.o.  Sex: female  HT:1.651 m (5' 5\") WT: 56.7 kg (125 lb)          Admit Date: 2017     Discharge Date:   Today's Date: 2017  Attending Doctor:  Bam Bolaños Callistpreethi*                  Allergies:  Metoclopramide; Quetiapine; and Septra          Your appointments     Dash 10, 2017 10:15 AM   Established Patient with Lavell Monte M.D.   [facility] - Internal Medicine (--)    [address] [place] [address]  [address]  [facility] [address] [phone]           You will be receiving a confirmation call a few days before your appointment from our automated call confirmation system.              Follow-up Information     1. Follow up with Maryam Torre M.D. In 2 weeks.    Specialty:  Internal Medicine    Contact information    [address]  [address]  [facility] [address] [phone]           Medication List      Take these Medications        Instructions    alprazolam 0.5 MG Tabs   Commonly known as:  XANAX    Take 1 Tab by mouth every 8 hours as needed for Sleep.   Dose:  0.5 mg       ciprofloxacin 500 MG Tabs   Commonly known as:  CIPRO    Take 1 Tab by mouth 2 times a day.   Dose:  500 mg       ondansetron 4 MG Tabs tablet   What changed:  when to take this   Commonly known as:  ZOFRAN    Take 1 Tab by mouth every 6 hours as needed (FOR NAUSEA OR VIMITING) for up to 10 days.   Dose:  4 mg       PRE-LAYNE FORMULA Tabs    Take 1 Tab by mouth every day.   Dose:  1 Tab         "

## 2017-01-07 NOTE — ED NOTES
Pt verbalizes understanding of dc and f/u instructions,  rx x 1 in hand, released amb to [facility], has ride coming.

## 2017-01-07 NOTE — IP AVS SNAPSHOT
1/8/2017          Diana Hernandez  [address] [place] [address]    Dear Diana:    [facility] wants to ensure your discharge home is safe and you or your loved ones have had all your questions answered regarding your care after you leave the hospital.    You may receive a telephone call within two days of your discharge.  This call is to make certain you understand your discharge instructions as well as ensure we provided you with the best care possible during your stay with us.     The call will only last approximately 3-5 minutes and will be done by a nurse.    Once again, we want to ensure your discharge home is safe and that you have a clear understanding of any next steps in your care.  If you have any questions or concerns, please do not hesitate to contact us, we are here for you.  Thank you for choosing [facility] for your healthcare needs.    Sincerely,    Cesar Munoz    [facility]

## 2017-01-08 VITALS
RESPIRATION RATE: 20 BRPM | HEIGHT: 65 IN | DIASTOLIC BLOOD PRESSURE: 79 MMHG | BODY MASS INDEX: 20.83 KG/M2 | SYSTOLIC BLOOD PRESSURE: 136 MMHG | TEMPERATURE: 99.6 F | OXYGEN SATURATION: 100 % | HEART RATE: 62 BPM | WEIGHT: 125 LBS

## 2017-01-08 PROBLEM — F41.9 ANXIETY: Status: ACTIVE | Noted: 2017-01-08

## 2017-01-08 PROBLEM — M54.9 BACK PAIN: Status: ACTIVE | Noted: 2017-01-08

## 2017-01-08 LAB
AMPHET UR QL SCN: NEGATIVE
ANION GAP SERPL CALC-SCNC: 11 MMOL/L (ref 0–11.9)
BARBITURATES UR QL SCN: NEGATIVE
BENZODIAZ UR QL SCN: POSITIVE
BUN SERPL-MCNC: 7 MG/DL (ref 8–22)
BZE UR QL SCN: NEGATIVE
CALCIUM SERPL-MCNC: 8.6 MG/DL (ref 8.5–10.5)
CANNABINOIDS UR QL SCN: POSITIVE
CHLORIDE SERPL-SCNC: 108 MMOL/L (ref 96–112)
CO2 SERPL-SCNC: 15 MMOL/L (ref 20–33)
CREAT SERPL-MCNC: 0.49 MG/DL (ref 0.5–1.4)
GFR SERPL CREATININE-BSD FRML MDRD: >60 ML/MIN/1.73 M 2
GLUCOSE SERPL-MCNC: 90 MG/DL (ref 65–99)
MAGNESIUM SERPL-MCNC: 1.6 MG/DL (ref 1.5–2.5)
MAGNESIUM SERPL-MCNC: 1.6 MG/DL (ref 1.5–2.5)
MDMA UR QL SCN: NEGATIVE
METHADONE UR QL SCN: NEGATIVE
OPIATES UR QL SCN: NEGATIVE
OXYCODONE UR QL SCN: NEGATIVE
PCP UR QL SCN: NEGATIVE
POTASSIUM SERPL-SCNC: 2.6 MMOL/L (ref 3.6–5.5)
POTASSIUM SERPL-SCNC: 2.7 MMOL/L (ref 3.6–5.5)
POTASSIUM SERPL-SCNC: 4.1 MMOL/L (ref 3.6–5.5)
PROPOXYPH UR QL SCN: NEGATIVE
SODIUM SERPL-SCNC: 134 MMOL/L (ref 135–145)
TSH SERPL DL<=0.005 MIU/L-ACNC: 0.82 UIU/ML (ref 0.3–3.7)

## 2017-01-08 PROCEDURE — A9270 NON-COVERED ITEM OR SERVICE: HCPCS | Performed by: INTERNAL MEDICINE

## 2017-01-08 PROCEDURE — 302151 K-PAD 14X20: Performed by: HOSPITALIST

## 2017-01-08 PROCEDURE — 700112 HCHG RX REV CODE 229: Performed by: INTERNAL MEDICINE

## 2017-01-08 PROCEDURE — 700111 HCHG RX REV CODE 636 W/ 250 OVERRIDE (IP): Performed by: INTERNAL MEDICINE

## 2017-01-08 PROCEDURE — 700102 HCHG RX REV CODE 250 W/ 637 OVERRIDE(OP): Performed by: INTERNAL MEDICINE

## 2017-01-08 PROCEDURE — 99238 HOSP IP/OBS DSCHRG MGMT 30/<: CPT | Mod: GC | Performed by: HOSPITALIST

## 2017-01-08 PROCEDURE — 84443 ASSAY THYROID STIM HORMONE: CPT

## 2017-01-08 PROCEDURE — 770020 HCHG ROOM/CARE - TELE (206)

## 2017-01-08 PROCEDURE — 84132 ASSAY OF SERUM POTASSIUM: CPT

## 2017-01-08 PROCEDURE — 302131 K PAD MOTOR: Performed by: PHYSICIAN ASSISTANT

## 2017-01-08 PROCEDURE — 700105 HCHG RX REV CODE 258: Performed by: INTERNAL MEDICINE

## 2017-01-08 PROCEDURE — 36415 COLL VENOUS BLD VENIPUNCTURE: CPT

## 2017-01-08 PROCEDURE — 80307 DRUG TEST PRSMV CHEM ANLYZR: CPT

## 2017-01-08 PROCEDURE — 96366 THER/PROPH/DIAG IV INF ADDON: CPT

## 2017-01-08 PROCEDURE — 80048 BASIC METABOLIC PNL TOTAL CA: CPT

## 2017-01-08 PROCEDURE — 83735 ASSAY OF MAGNESIUM: CPT

## 2017-01-08 RX ORDER — POTASSIUM CHLORIDE 20 MEQ/1
40 TABLET, EXTENDED RELEASE ORAL
Status: DISCONTINUED | OUTPATIENT
Start: 2017-01-08 | End: 2017-01-08

## 2017-01-08 RX ORDER — LABETALOL HYDROCHLORIDE 5 MG/ML
10 INJECTION, SOLUTION INTRAVENOUS EVERY 4 HOURS PRN
Status: DISCONTINUED | OUTPATIENT
Start: 2017-01-08 | End: 2017-01-08 | Stop reason: HOSPADM

## 2017-01-08 RX ORDER — AMOXICILLIN 250 MG
1 CAPSULE ORAL NIGHTLY
Status: DISCONTINUED | OUTPATIENT
Start: 2017-01-08 | End: 2017-01-08 | Stop reason: HOSPADM

## 2017-01-08 RX ORDER — DOCUSATE SODIUM 100 MG/1
100 CAPSULE, LIQUID FILLED ORAL EVERY MORNING
Status: DISCONTINUED | OUTPATIENT
Start: 2017-01-08 | End: 2017-01-08 | Stop reason: HOSPADM

## 2017-01-08 RX ORDER — IBUPROFEN 200 MG
200 TABLET ORAL EVERY 6 HOURS PRN
Status: DISCONTINUED | OUTPATIENT
Start: 2017-01-08 | End: 2017-01-08

## 2017-01-08 RX ORDER — ACETAMINOPHEN 325 MG/1
650 TABLET ORAL EVERY 6 HOURS PRN
Status: DISCONTINUED | OUTPATIENT
Start: 2017-01-08 | End: 2017-01-08 | Stop reason: HOSPADM

## 2017-01-08 RX ORDER — SODIUM CHLORIDE 9 MG/ML
1000 INJECTION, SOLUTION INTRAVENOUS CONTINUOUS
Status: DISPENSED | OUTPATIENT
Start: 2017-01-08 | End: 2017-01-08

## 2017-01-08 RX ORDER — POTASSIUM CHLORIDE 7.45 MG/ML
10 INJECTION INTRAVENOUS
Status: COMPLETED | OUTPATIENT
Start: 2017-01-08 | End: 2017-01-08

## 2017-01-08 RX ORDER — ENEMA 19; 7 G/133ML; G/133ML
1 ENEMA RECTAL
Status: DISCONTINUED | OUTPATIENT
Start: 2017-01-08 | End: 2017-01-08 | Stop reason: HOSPADM

## 2017-01-08 RX ORDER — POTASSIUM CHLORIDE 20 MEQ/1
40 TABLET, EXTENDED RELEASE ORAL
Status: COMPLETED | OUTPATIENT
Start: 2017-01-08 | End: 2017-01-08

## 2017-01-08 RX ORDER — POTASSIUM CHLORIDE 20 MEQ/1
60 TABLET, EXTENDED RELEASE ORAL 2 TIMES DAILY
Status: DISCONTINUED | OUTPATIENT
Start: 2017-01-08 | End: 2017-01-08

## 2017-01-08 RX ORDER — POTASSIUM CHLORIDE 20 MEQ/1
40 TABLET, EXTENDED RELEASE ORAL 2 TIMES DAILY
Status: DISCONTINUED | OUTPATIENT
Start: 2017-01-08 | End: 2017-01-08

## 2017-01-08 RX ORDER — LACTULOSE 20 G/30ML
30 SOLUTION ORAL
Status: DISCONTINUED | OUTPATIENT
Start: 2017-01-08 | End: 2017-01-08 | Stop reason: HOSPADM

## 2017-01-08 RX ORDER — POTASSIUM CHLORIDE 7.45 MG/ML
10 INJECTION INTRAVENOUS
Status: DISPENSED | OUTPATIENT
Start: 2017-01-08 | End: 2017-01-08

## 2017-01-08 RX ORDER — POTASSIUM CHLORIDE 7.45 MG/ML
10 INJECTION INTRAVENOUS
Status: DISCONTINUED | OUTPATIENT
Start: 2017-01-08 | End: 2017-01-08

## 2017-01-08 RX ORDER — MAGNESIUM SULFATE HEPTAHYDRATE 40 MG/ML
2 INJECTION, SOLUTION INTRAVENOUS ONCE
Status: COMPLETED | OUTPATIENT
Start: 2017-01-08 | End: 2017-01-08

## 2017-01-08 RX ORDER — VITAMIN A ACETATE, BETA CAROTENE, ASCORBIC ACID, CHOLECALCIFEROL, .ALPHA.-TOCOPHEROL ACETATE, DL-, THIAMINE MONONITRATE, RIBOFLAVIN, NIACINAMIDE, PYRIDOXINE HYDROCHLORIDE, FOLIC ACID, CYANOCOBALAMIN, CALCIUM CARBONATE, FERROUS FUMARATE, ZINC OXIDE, CUPRIC OXIDE 3080; 12; 120; 400; 1; 1.84; 3; 20; 22; 920; 25; 200; 27; 10; 2 [IU]/1; UG/1; MG/1; [IU]/1; MG/1; MG/1; MG/1; MG/1; MG/1; [IU]/1; MG/1; MG/1; MG/1; MG/1; MG/1
1 TABLET, FILM COATED ORAL DAILY
Status: DISCONTINUED | OUTPATIENT
Start: 2017-01-08 | End: 2017-01-08 | Stop reason: HOSPADM

## 2017-01-08 RX ORDER — ONDANSETRON 4 MG/1
4 TABLET, FILM COATED ORAL EVERY 6 HOURS PRN
Qty: 30 TAB | Refills: 0 | Status: SHIPPED | OUTPATIENT
Start: 2017-01-08 | End: 2017-01-18

## 2017-01-08 RX ORDER — AMOXICILLIN 250 MG
1 CAPSULE ORAL
Status: DISCONTINUED | OUTPATIENT
Start: 2017-01-08 | End: 2017-01-08 | Stop reason: HOSPADM

## 2017-01-08 RX ORDER — ONDANSETRON 2 MG/ML
4 INJECTION INTRAMUSCULAR; INTRAVENOUS EVERY 4 HOURS PRN
Status: DISCONTINUED | OUTPATIENT
Start: 2017-01-08 | End: 2017-01-08 | Stop reason: HOSPADM

## 2017-01-08 RX ORDER — CIPROFLOXACIN 500 MG/1
500 TABLET, FILM COATED ORAL 2 TIMES DAILY
Qty: 6 TAB | Refills: 0 | Status: SHIPPED | OUTPATIENT
Start: 2017-01-08 | End: 2017-04-07

## 2017-01-08 RX ORDER — POTASSIUM CHLORIDE 1.5 G/1.58G
20 POWDER, FOR SOLUTION ORAL
Status: COMPLETED | OUTPATIENT
Start: 2017-01-08 | End: 2017-01-08

## 2017-01-08 RX ORDER — POTASSIUM CHLORIDE 1.5 G/1.58G
20 POWDER, FOR SOLUTION ORAL
Status: DISCONTINUED | OUTPATIENT
Start: 2017-01-08 | End: 2017-01-08

## 2017-01-08 RX ORDER — ALPRAZOLAM 0.5 MG/1
0.5 TABLET ORAL EVERY 8 HOURS PRN
Status: DISCONTINUED | OUTPATIENT
Start: 2017-01-08 | End: 2017-01-08 | Stop reason: HOSPADM

## 2017-01-08 RX ORDER — BISACODYL 10 MG
10 SUPPOSITORY, RECTAL RECTAL
Status: DISCONTINUED | OUTPATIENT
Start: 2017-01-08 | End: 2017-01-08 | Stop reason: HOSPADM

## 2017-01-08 RX ORDER — SODIUM CHLORIDE 9 MG/ML
INJECTION, SOLUTION INTRAVENOUS CONTINUOUS
Status: DISCONTINUED | OUTPATIENT
Start: 2017-01-08 | End: 2017-01-08

## 2017-01-08 RX ADMIN — MAGNESIUM SULFATE IN WATER 2 G: 40 INJECTION, SOLUTION INTRAVENOUS at 15:12

## 2017-01-08 RX ADMIN — POTASSIUM CHLORIDE 40 MEQ: 1500 TABLET, EXTENDED RELEASE ORAL at 07:35

## 2017-01-08 RX ADMIN — POTASSIUM CHLORIDE 10 MEQ: 7.46 INJECTION, SOLUTION INTRAVENOUS at 09:35

## 2017-01-08 RX ADMIN — ONDANSETRON 4 MG: 2 INJECTION, SOLUTION INTRAMUSCULAR; INTRAVENOUS at 08:23

## 2017-01-08 RX ADMIN — SODIUM CHLORIDE 1000 ML: 9 INJECTION, SOLUTION INTRAVENOUS at 06:07

## 2017-01-08 RX ADMIN — POTASSIUM CHLORIDE 10 MEQ: 7.46 INJECTION, SOLUTION INTRAVENOUS at 11:06

## 2017-01-08 RX ADMIN — ALPRAZOLAM 0.5 MG: 0.5 TABLET ORAL at 09:35

## 2017-01-08 RX ADMIN — ALPRAZOLAM 0.5 MG: 0.5 TABLET ORAL at 17:46

## 2017-01-08 RX ADMIN — POTASSIUM CHLORIDE 10 MEQ: 7.46 INJECTION, SOLUTION INTRAVENOUS at 06:12

## 2017-01-08 RX ADMIN — ENOXAPARIN SODIUM 40 MG: 100 INJECTION SUBCUTANEOUS at 07:38

## 2017-01-08 RX ADMIN — SODIUM CHLORIDE 1000 ML: 9 INJECTION, SOLUTION INTRAVENOUS at 03:30

## 2017-01-08 RX ADMIN — POTASSIUM CHLORIDE 10 MEQ: 7.46 INJECTION, SOLUTION INTRAVENOUS at 13:43

## 2017-01-08 RX ADMIN — ONDANSETRON 4 MG: 2 INJECTION, SOLUTION INTRAMUSCULAR; INTRAVENOUS at 12:23

## 2017-01-08 RX ADMIN — ACETAMINOPHEN 650 MG: 325 TABLET, FILM COATED ORAL at 06:09

## 2017-01-08 RX ADMIN — POTASSIUM CHLORIDE 10 MEQ: 7.46 INJECTION, SOLUTION INTRAVENOUS at 08:24

## 2017-01-08 RX ADMIN — POTASSIUM CHLORIDE 10 MEQ: 7.46 INJECTION, SOLUTION INTRAVENOUS at 03:00

## 2017-01-08 RX ADMIN — POTASSIUM CHLORIDE 10 MEQ: 7.46 INJECTION, SOLUTION INTRAVENOUS at 01:55

## 2017-01-08 RX ADMIN — ONDANSETRON 4 MG: 2 INJECTION, SOLUTION INTRAMUSCULAR; INTRAVENOUS at 17:06

## 2017-01-08 ASSESSMENT — ENCOUNTER SYMPTOMS
HEMOPTYSIS: 0
TREMORS: 0
FOCAL WEAKNESS: 0
HEARTBURN: 0
PALPITATIONS: 0
VOMITING: 1
COUGH: 0
FALLS: 0
WEAKNESS: 1
BLOOD IN STOOL: 1
DEPRESSION: 0
SEIZURES: 0
DIARRHEA: 0
SORE THROAT: 0
DIZZINESS: 0
WEIGHT LOSS: 0
CHILLS: 0
BRUISES/BLEEDS EASILY: 0
BLURRED VISION: 0
FEVER: 0
HEADACHES: 0
NAUSEA: 1
SPUTUM PRODUCTION: 0
CONSTIPATION: 1
WHEEZING: 0
DIAPHORESIS: 0
ABDOMINAL PAIN: 1
ORTHOPNEA: 0
MYALGIAS: 0
POLYDIPSIA: 0
BACK PAIN: 1
SHORTNESS OF BREATH: 0

## 2017-01-08 ASSESSMENT — PAIN SCALES - GENERAL
PAINLEVEL_OUTOF10: 8
PAINLEVEL_OUTOF10: 6
PAINLEVEL_OUTOF10: 8
PAINLEVEL_OUTOF10: 6
PAINLEVEL_OUTOF10: 6

## 2017-01-08 ASSESSMENT — LIFESTYLE VARIABLES
EVER_SMOKED: YES
ALCOHOL_USE: NO

## 2017-01-08 NOTE — CARE PLAN
Problem: Safety  Goal: Will remain free from falls  Intervention: Implement fall precautions  Bed locked and low. Call light and belongings in reach. Hourly rounds in place.       Problem: Venous Thromboembolism (VTW)/Deep Vein Thrombosis (DVT) Prevention:  Goal: Patient will participate in Venous Thrombosis (VTE)/Deep Vein Thrombosis (DVT)Prevention Measures  Intervention: Encourage ambulation/mobilization at level directed by Physical Therapy in collaboration with Interdisciplinary Team  Lovenox in use for DVT prevention. Pt frequently ambulates hallways throughout day.

## 2017-01-08 NOTE — PROGRESS NOTES
[facility] Interval Note    Name Diana Hernandez     1975   Age/Sex 41 y.o. female   MRN [id number]   Code Status full     After 5PM or if no immediate response to page, please call for cross-coverage  Attending/Team:  Call [phone] to page   1st Call - Day Intern (R1):    2nd Call - Day Sr. Resident (R2/R3):        ID:3Ms David is a 41 year old female with history of SMA syndrome s/p vlad-en-Y duodenojejunostomy 7 years ago and bowel resection last January, nephrolithiasis, cyclic vomiting, marijuana abuse, anxiety and bipolar disorder, anxiety , Hemorrhoid p/w nausea and vomiting. Pt had multiple admissions in the past for cyclical vomiting.Pt  was discharged 2 days ago after hospitalized for Cannabinoid induced intractable nausea and vomiting.  She states her vomiting has   She also c/o bloody stool and back pain.  In ED, her K was 2.6. Pt admitted for electrolyte abnormalities.      Chief complaint/ reason for interval visit (Primary Diagnosis)   Nausea and Vomiting    Interval Problem Daily Status Update    Active Hospital Problems    Diagnosis   • Back pain [M54.9]   • Anxiety [F41.9]   • Hypokalemia [E87.6]   • Intractable nausea and vomiting [R11.2]     Patient seen and examined, no acute events overnight. Vitals stable, afebrile. Nausea nad vomiting improved, patient hungry asking for food,starrted diet with instructions to advance as tolerated.Low potassium being repleted. Pt asking for opiates, we have denied pain meds. Pt agreed. Urine cx shows lactose fermenting gram negative ebony, started on ceftriaxone. Will check K this afternoon and replete as required.      Nausea and Vomiting:  Improved, on zofran  UTI: on ceftriaxone  Pain: tylenol, avoid opiates    Review of Systems   Constitutional: Negative for fever, weight loss and malaise/fatigue.   Respiratory: Negative for cough.    Cardiovascular: Negative for chest  "pain.   Gastrointestinal: Positive for nausea and vomiting. Negative for heartburn.   Genitourinary: Negative for dysuria.   Musculoskeletal: Negative for myalgias.   Skin: Negative for rash.   Neurological: Negative for headaches.       Consultants/Specialty  none    Disposition  In patient    Quality Measures  Core Measures        Physical Exam   Physical Exam   Constitutional: She is oriented to person, place, and time and well-developed, well-nourished, and in no distress. No distress.   HENT:    Head: Normocephalic and atraumatic.   Eyes: EOM are normal. Pupils are equal, round, and reactive to light. Right eye exhibits no discharge. Left eye exhibits no discharge.   Neck: Normal range of motion. No JVD present. No thyromegaly present.   Cardiovascular: Normal rate.  Exam reveals no gallop and no friction rub.     No murmur heard.  Pulmonary/Chest: Effort normal. No stridor. No respiratory distress. She has no wheezes. She has no rales. She exhibits no tenderness.   Abdominal: Soft. Bowel sounds are normal. She exhibits no distension and no mass. There is no tenderness. There is no rebound and no guarding.   Musculoskeletal: She exhibits no edema.   Lymphadenopathy:     She has no cervical adenopathy.   Neurological: She is alert and oriented to person, place, and time. No cranial nerve deficit.   Skin: Skin is warm. She is not diaphoretic. No erythema    Filed Vitals:    01/08/17 0400 01/08/17 0500 01/08/17 0800 01/08/17 1200   BP: 108/58  142/76 150/89   Pulse: 61  65 61   Temp: 37.3 °C (99.2 °F)  37.4 °C (99.3 °F) 37.6 °C (99.6 °F)   Resp: 16  16 20   Height:  1.651 m (5' 5\")     Weight: 57.4 kg (126 lb 8.7 oz) 56.7 kg (125 lb)     SpO2: 100%  100% 100%     Body mass index is 20.8 kg/(m^2). Weight: 56.7 kg (125 lb)  Oxygen Therapy:  Pulse Oximetry: 100 %, O2 (LPM): 0, O2 Delivery: None (Room Air)    Physical Exam      Lab Data Review:      1/8/2017  3:00 PM    Recent Labs      01/06/17   1013  01/07/17   " 1820  01/08/17   0035  01/08/17   0558   SODIUM  133*  135   --    --    POTASSIUM  3.1*  2.6*  2.7*  2.6*   CHLORIDE  104  101   --    --    CO2  17*  21   --    --    BUN  11  9   --    --    CREATININE  0.64  0.73   --    --    MAGNESIUM   --    --    --   1.6   CALCIUM  9.2  9.5   --    --        Recent Labs      01/06/17   1013  01/07/17   1820   ALTSGPT  13  19   ASTSGOT  18  17   ALKPHOSPHAT  62  61   TBILIRUBIN  2.0*  2.1*   LIPASE  13  14   GLUCOSE  88  102*       Recent Labs      01/06/17   1012  01/07/17   1820   RBC  3.90*  4.11*   HEMOGLOBIN  13.1  13.6   HEMATOCRIT  37.1  39.2   PLATELETCT  236  312       Recent Labs      01/06/17   1012  01/06/17   1013  01/07/17   1820   WBC  10.7   --   9.8   NEUTSPOLYS  83.30*   --   79.80*   LYMPHOCYTES  9.30*   --   9.20*   MONOCYTES  6.40   --   10.00   EOSINOPHILS  0.00   --   0.00   BASOPHILS  0.40   --   0.40   ASTSGOT   --   18  17   ALTSGPT   --   13  19   ALKPHOSPHAT   --   62  61   TBILIRUBIN   --   2.0*  2.1*           Assessment/Plan     Intractable nausea and vomiting  Assessment & Plan  - H/O of vlda-en-y for SMAS, marijuana abuse last dose was 2 days ago, about 1 gram in a day.  - Possible cannabinoids ??. unlikely intestinal obstruction  Still pass gas and bowel movement, no distension on examination  - B HCG negative, not hyperemesis gravidarum   - normal kidney function  - Associated with Abdominal pain. Denies fever, diarrhea, or Dysuria.  - On examination she looks dehydrated with dry mucus membranes.  - CT abdomen pelvis negative for any acute abnormality    PLAN  - IV fluids and Zofran    Hypokalemia  Assessment & Plan  - no weakness, normal bowel sounds  - K was 2.7 on admission due to recurrent emesis.    PLAN  - Will replete.  - serum potassium follow up  - Will CTM.      Back pain  Assessment & Plan  - Patient complains of generalized back pain, abdominal pain, dull,  Most likely secondary muscular origin, Denies any trauma,   - she  reported a stone but  CT renal was negative for stone  - on examination No CVA tenderness, no tenderness at the paraspinal muscle area.     PLAN    - tylenol  PRN  - patient received Ketorolac 30 mg at ED  -will avoid opiates as pt has narcotic seeking behavior.

## 2017-01-08 NOTE — H&P
[facility] [place] [facility] Medicine Admitting History and Physical    Name Diana Hernandez 1975   Age/Sex 41 y.o. female   MRN [id number]   Code Status  full code      After 5PM or if no immediate response to page, please call for cross-coverage  Attending/Team: Dr. Hicks Call [phone] to page   1st Call - Day Intern (R1):   Dr. Pantoja 2nd Call - Day Sr. Resident (R2/R3):   Dr. Maria     Chief Complaint:  Intractable nausea and vomiting    HPI:  Ms Hernandez is a 41 year old female with history of SMA syndrome s/p vlad-en-Y duodenojejunostomy 7 years ago and bowel resection last January, nephrolithiasis, cyclic vomiting, marijuana abuse, anxiety and bipolar disorder, anxiety since her father  last March, discharged on xanax 2017,      she had recurrent nausea and vomiting since last November, last time she smoked weed was 2 days ago, she usually smokes about 1 g of weed every day, patient has multiple hospital admissions over the last 2 months, last discharge was on 2017 for the same nausea and vomiting symproms, yesterday was also discharged with the same symptoms. She also reported blood with the last three vomits, small amount, with mild epigastric pain,She's taken 4 tablets of Ativan 0.5 mg and Zofran, but it only temporarily alleviates her symptoms.  Patient denies any IV drug use or alcohol drink, in the ER patient was found with hypokalemia.    Patient also reported chronic constipation, with fecal impaction, she has a black tarry color stool, she also noticed blood covering the stool and in the wiping tissue from the hemorrhoid, patient was seen by a surgeon for her hemorrhoid, who recommended elective procedure, she denies any pain with defecation, itching. She had a family history of grandmother with colon cancer. She never has a colonoscopy or EGD.    Patient also reported anxiety since she has no job in the past 7 years, planning to get  and have a kids and was  concerned regarding her age.      Patient state that she has a back pain and requested using opiate, she denies any using illegal drugs   Vitals At the ED:  Temp 99.4, NY 70, /84, RR 19.  Labs: HB 13.7 WBCs 9.8, Na 136, K 2.6, BUN/CR 9/0.73, Glucose 102. UDS ?.        Review of Systems   Constitutional: Positive for malaise/fatigue. Negative for fever, chills and diaphoresis.   HENT: Negative for hearing loss and sore throat.    Eyes: Negative for blurred vision.   Respiratory: Negative for cough, hemoptysis, sputum production, shortness of breath and wheezing.    Cardiovascular: Negative for chest pain, palpitations and orthopnea.   Gastrointestinal: Positive for nausea, vomiting, abdominal pain, constipation, blood in stool and melena. Negative for diarrhea.   Genitourinary: Negative for dysuria, urgency and frequency.   Musculoskeletal: Positive for back pain and joint pain. Negative for myalgias and falls.   Skin: Negative for itching and rash.   Neurological: Positive for weakness. Negative for dizziness, tremors, focal weakness, seizures and headaches.   Endo/Heme/Allergies: Negative for polydipsia. Does not bruise/bleed easily.   Psychiatric/Behavioral: Negative for depression.       Past Medical History:   Past Medical History   Diagnosis Date   • Snoring    • Dental disorder    • Pain      abd and back   • Tobacco abuse 6/20/2009   • Superior mesenteric artery syndrome (HCC) 7/12/2009   • Anxiety      Followed by [facility]   • CLOSTRIDIUM DIFFICILE INFECTION 4/14/2010   • Dyspepsia 7/12/2009   • Backpain    • Nephrolithiasis 6/20/2009     kidney stones,post lithotrypsy   • Anxiety disorder    • CVS disease    • Drug-seeking behavior    • Cyclic vomiting syndrome    • Superior mesenteric artery syndrome (HCC)        Past Surgical History:  Past Surgical History   Procedure Laterality Date   • Gastroscopy-endo  7/8/2009     Performed by ROSANNA WRIGHT at [facility]   • Lithotripsy     •  Pyloroplasty  2009     Performed by MACIEL QUINTERO at SURGERY [facility]   • Gastroscopy with biopsy  3/9/2010     Performed by AMANDA MEJIA at ENDOSCOPY [facility]   • Exploratory laparotomy  2009     Performed by MACIEL QUINTERO at SURGERY [facility]   • Appendectomy  2009     Performed by MACIEL QUINTERO at SURGERY [facility]   • Cholecystectomy  2009     Performed by MACIEL QUINTERO at SURGERY [facility]   • Other       superior mesenteric artery correction    • Exploratory laparotomy  2016     Procedure: EXPLORATORY LAPAROTOMY;  Surgeon: Maciel Quintero M.D.;  Location: SURGERY [facility];  Service:    • Bowel resection  2016     Procedure: BOWEL RESECTION;  Surgeon: Maciel Quintero M.D.;  Location: SURGERY [facility];  Service:    • Gastroscopy-endo  2016     Procedure: GASTROSCOPY-ENDO;  Surgeon: Blayne Blackburn M.D.;  Location: ENDOSCOPY [facility];  Service:        Current Outpatient Medications:  Home Medications     Reviewed by Anish Katz (Pharmacy Tech) on 17 at 2148  Med List Status: Complete    Medication Last Dose Status    alprazolam (XANAX) 0.5 MG Tab 2017 Active    ondansetron (ZOFRAN) 4 MG Tab tablet 2017 Active    Prenatal Multivit-Min-Fe-FA (PRE- FORMULA) Tab 2017 Active                Medication Allergy/Sensitivities:  Allergies   Allergen Reactions   • Metoclopramide Hives     Told by MD; pt suspects possible hives.   • Quetiapine Unspecified     MD told her she was allergic     • Septra [Sulfamethoxazole W-Trimethoprim] Unspecified     MD told her she was allergic           Family History:  Family History   Problem Relation Age of Onset   • Cancer Mother 50     Colon cancer   • Allergies Father    • Hypertension Mother    • Hypertension Father    • Heart Disease Maternal Grandmother        Social History:  Social History  "    Social History   • Marital Status: Single     Spouse Name: N/A   • Number of Children: N/A   • Years of Education: N/A     Occupational History   • Not on file.     Social History Main Topics   • Smoking status: Former Smoker -- 0.50 packs/day for 13 years     Types: Cigarettes     Quit date: 12/15/2012   • Smokeless tobacco: Not on file      Comment: 1/2ppd   • Alcohol Use: No   • Drug Use: Yes     Special: Inhaled, Marijuana      Comment: pot occ   • Sexual Activity: Not on file     Other Topics Concern   • Not on file     Social History Narrative     Living situation: BLAKE  PCP : Maryam Torre M.D.      Physical Exam     Filed Vitals:    01/07/17 1800 01/07/17 1930 01/07/17 2101 01/07/17 2328   BP:   112/52 117/67   Pulse: 73 88 94 62   Temp:       Resp:  20 18 18   Height:       Weight:       SpO2: 96% 100%       Body mass index is 20.8 kg/(m^2).  /67 mmHg  Pulse 62  Temp(Src) 37.6 °C (99.7 °F)  Resp 18  Ht 1.651 m (5' 5\")  Wt 56.7 kg (125 lb)  BMI 20.80 kg/m2  SpO2 100%  LMP 12/26/2016  O2 therapy: Pulse Oximetry: 100 %, O2 Delivery: None (Room Air)    Physical Exam   Constitutional: She is oriented to person, place, and time and well-developed, well-nourished, and in no distress. No distress.   HENT:   Head: Normocephalic and atraumatic.   Eyes: EOM are normal. Pupils are equal, round, and reactive to light. Right eye exhibits no discharge. Left eye exhibits no discharge.   Neck: Normal range of motion. No JVD present. No thyromegaly present.   Cardiovascular: Normal rate.  Exam reveals no gallop and no friction rub.    No murmur heard.  Pulmonary/Chest: Effort normal. No stridor. No respiratory distress. She has no wheezes. She has no rales. She exhibits no tenderness.   Abdominal: Soft. Bowel sounds are normal. She exhibits no distension and no mass. There is no tenderness. There is no rebound and no guarding.   External hemorrhoid   No signs of bleeding  Digital rectal exam with no blood, " normal tone  Midline scar   Musculoskeletal: She exhibits no edema.   Lymphadenopathy:     She has no cervical adenopathy.   Neurological: She is alert and oriented to person, place, and time. No cranial nerve deficit.   Skin: Skin is warm. She is not diaphoretic. No erythema.       [unfilled]      Data Review       Old Records Request:   Completed  Current Records review and summary: Completed    Lab Data Review:  Recent Results (from the past 24 hour(s))   EKG (NOW)    Collection Time: 17  5:24 PM   Result Value Ref Range    Report       [facility] Emergency Dept.    Test Date:  2017  Pt Name:    ALIYA MURRAY                 Department: ER  MRN:        [id number]                      Room:  Gender:     F                            Technician: [id number]  :        1975                   Requested By:ER TRIAGE PROTOCOL  Order #:    [id number]                    Reading MD: DARION CONNER MD    Measurements  Intervals                                Axis  Rate:       75                           P:          0  WY:         150                          QRS:        59  QRSD:       96                           T:          -44  QT:         388  QTc:        434    Interpretive Statements  SINUS RHYTHM  NONSPECIFIC T ABNORMALITIES, INFERIOR LEADS  Compared to ECG 2016 01:36:22  T-wave abnormality now present  Atrial fibrillation no longer present    Electronically Signed On 2017 18:15:43 PST by DARION CONNER MD     CBC WITH DIFFERENTIAL    Collection Time: 17  6:20 PM   Result Value Ref Range    WBC 9.8 4.8 - 10.8 K/uL    RBC 4.11 (L) 4.20 - 5.40 M/uL    Hemoglobin 13.6 12.0 - 16.0 g/dL    Hematocrit 39.2 37.0 - 47.0 %    MCV 95.4 81.4 - 97.8 fL    MCH 33.1 (H) 27.0 - 33.0 pg    MCHC 34.7 33.6 - 35.0 g/dL    RDW 48.7 35.9 - 50.0 fL    Platelet Count 312 164 - 446 K/uL    MPV 9.9 9.0 - 12.9 fL    Neutrophils-Polys 79.80 (H) 44.00 - 72.00 %    Lymphocytes 9.20 (L)  22.00 - 41.00 %    Monocytes 10.00 0.00 - 13.40 %    Eosinophils 0.00 0.00 - 6.90 %    Basophils 0.40 0.00 - 1.80 %    Immature Granulocytes 0.60 0.00 - 0.90 %    Nucleated RBC 0.00 /100 WBC    Neutrophils (Absolute) 7.78 (H) 2.00 - 7.15 K/uL    Lymphs (Absolute) 0.90 (L) 1.00 - 4.80 K/uL    Monos (Absolute) 0.98 (H) 0.00 - 0.85 K/uL    Eos (Absolute) 0.00 0.00 - 0.51 K/uL    Baso (Absolute) 0.04 0.00 - 0.12 K/uL    Immature Granulocytes (abs) 0.06 0.00 - 0.11 K/uL    NRBC (Absolute) 0.00 K/uL   COMP METABOLIC PANEL    Collection Time: 01/07/17  6:20 PM   Result Value Ref Range    Sodium 135 135 - 145 mmol/L    Potassium 2.6 (LL) 3.6 - 5.5 mmol/L    Chloride 101 96 - 112 mmol/L    Co2 21 20 - 33 mmol/L    Anion Gap 13.0 (H) 0.0 - 11.9    Glucose 102 (H) 65 - 99 mg/dL    Bun 9 8 - 22 mg/dL    Creatinine 0.73 0.50 - 1.40 mg/dL    Calcium 9.5 8.5 - 10.5 mg/dL    AST(SGOT) 17 12 - 45 U/L    ALT(SGPT) 19 2 - 50 U/L    Alkaline Phosphatase 61 30 - 99 U/L    Total Bilirubin 2.1 (H) 0.1 - 1.5 mg/dL    Albumin 4.6 3.2 - 4.9 g/dL    Total Protein 7.6 6.0 - 8.2 g/dL    Globulin 3.0 1.9 - 3.5 g/dL    A-G Ratio 1.5 g/dL   LIPASE    Collection Time: 01/07/17  6:20 PM   Result Value Ref Range    Lipase 14 11 - 82 U/L   HCG QUAL SERUM    Collection Time: 01/07/17  6:20 PM   Result Value Ref Range    Beta-Hcg Qualitative Serum Negative Negative   ESTIMATED GFR    Collection Time: 01/07/17  6:20 PM   Result Value Ref Range    GFR If African American >60 >60 mL/min/1.73 m 2    GFR If Non African American >60 >60 mL/min/1.73 m 2   URINALYSIS CULTURE, IF INDICATED    Collection Time: 01/07/17  9:00 PM   Result Value Ref Range    Micro Urine Req Microscopic     Color Straw     Character Hazy (A)     Specific Gravity 1.009 <1.035    Ph 5.5 5.0-8.0    Glucose Negative Negative mg/dL    Ketones 100 (A) Negative mg/dL    Protein Negative Negative mg/dL    Bilirubin Negative Negative    Nitrite Positive (A) Negative    Leukocyte Esterase  Negative Negative    Occult Blood Negative Negative    Culture Indicated Yes UA Culture   HCG QUALITATIVE UR (Lab)    Collection Time: 01/07/17  9:00 PM   Result Value Ref Range    Beta-Hcg Urine Negative Negative   REFRACTOMETER SG    Collection Time: 01/07/17  9:00 PM   Result Value Ref Range    Specific Gravity 1.012    URINE MICROSCOPIC (W/UA)    Collection Time: 01/07/17  9:00 PM   Result Value Ref Range    WBC 2-5 /hpf    RBC 0-2 /hpf    Bacteria Many (A) None /hpf    Epithelial Cells Few /hpf    Mucous Threads Few /hpf    Amorphous Crystal Present /hpf    Ca Oxalate Crystal Few /hpf       Imaging/Procedures Review:    ndependant Imaging Review: Completed  DX-CHEST-2 VIEWS   Final Result      No radiographic evidence of acute cardiopulmonary process.             EKG:   EKG Independant Review: Completed  QTc: 434 HR: 75, Normal Sinus Rhythm, no ST/T changes NONSPECIFIC T ABNORMALITIES    (y) Records reviewed and summarized in current documentation           Assessment/Plan     # GI bleeding  - Hx of Celiac disease/sprue  - Anti TG abs/ anti- gliadin abs positive in [facility] 2016  - Patient constipation, tarry black color stool, hematemesis small amount secondary to repetitive vomiting.    PLAN  - FOBT was negative  - Rectal exam was negative for blood,   External hemorrhoid on examination  - morning team to consider EGD and colonscopy      # Back pain/ Drug-seeking behavior  - Patient complains of generalized back pain, abdominal pain, dull,  Most likely secondary muscular origin, Denies any trauma,   - she reported a stone but  CT renal was negative for stone  - on examination No CVA tenderness, no tenderness at the paraspinal muscle area.     PLAN    - tylenol and ibuprofen PRN  - patient received Ketorolac 30 mg at ED  - morning team to consider opiate?      # Hypokalemia  - no weakness, normal bowel sounds  - K was 2.7 on admission due to recurrent emesis.    PLAN  - potassium 40 meq total received  - Will  replete.  - serum potassium follow up  - Will CTM.      # Nausea and vomiting  - H/O of vlad-en-y for SMAS, marijuana abuse last dose was 2 days ago, about 1 gram in a day.  - Possible cannabinoids ??. unlikely intestinal obstruction  Still pass gas and bowel movement, no distension on examination  - B HCG negative, not hyperemesis gravidarum   - normal kidney function  - Associated with Abdominal pain. Denies fever, diarrhea, or Dysuria.  - On examination she looks dehydrated with dry mucus membranes.  - CT abdomen pelvis negative for any acute abnormality    PLAN  - IV fluids and Zofran      # Anxiety  - Bipolar disorder following with her psychiatrist.  - Father  in last april  - received xanax for anxiety in the [facility].   - Denies any SI/HI.  PLAN  - morning team to consider cont. Xanax if needed  - psych eval post discharge.      Anticipated Hospital stay: Observation admit      Anticipated Hospital stay:  >2 midnights        Quality Measures  EKG reviewed, Medications reviewed, Labs reviewed and Radiology images reviewed  Saldana catheter: No Saldana      DVT Prophylaxis: Enoxaparin (Lovenox)

## 2017-01-08 NOTE — ED NOTES
Pt ambulated to bathroom, urine sample obtained & sent to lab.  Dr. Wilburn to bedside for re-evaluation.

## 2017-01-08 NOTE — SENIOR ADMIT NOTE
Senior Admit Note    42 y/o F with a h/o Cyclical vomiting syndrome, Cannabinoid use, SMA syndrome, Anxiety, Bipolar disorder, Hemorrhoid p/w nausea and vomiting.  Mrs. Saenz was discharged 2 days ago after hospitalized for Cannabinoid induced intractable nausea and vomiting.  She states her vomiting has improved.  But started vomiting again today.  She also c/o bloody stool and back pain.  In ED, her K was 2.6.      Gen: NAD  CVS: RRR S1S2 wnl No mrg  Resp: CTAB no wrr  Abd: Soft NT ND  Back: Non tender. Full ROM  Rectal exam: Hemorrhoid.  Guaiac Negative    Recent Labs      01/06/17   1013  01/07/17   1820  01/08/17   0035   SODIUM  133*  135   --    POTASSIUM  3.1*  2.6*  2.7*   CHLORIDE  104  101   --    CO2  17*  21   --    GLUCOSE  88  102*   --    BUN  11  9   --      A/P:     Hypokalemia  -Patient refused PO potassium  -Repleting with IV KCl  -Day team to monitor K level    N,V  -NS bolus  -Zofran  -CTM    Bloody stool  -H/H stable  -Occult blood negative  -CTM    For a detailed history, please refer to intern HPI.

## 2017-01-08 NOTE — ASSESSMENT & PLAN NOTE
- no weakness, normal bowel sounds  - K was 2.7 on admission due to recurrent emesis.    PLAN  - Will replete.  - serum potassium follow up  - Will CTM.

## 2017-01-08 NOTE — PROGRESS NOTES
Alondra from Lab called with critical result of potassium 2.6 at 0645. Critical lab result read back to Alondra.   Dr. Maria notified of critical lab result at 0725.  Critical lab result read back by Dr. Maria.

## 2017-01-08 NOTE — ASSESSMENT & PLAN NOTE
- H/O of vlad-en-y for SMAS, marijuana abuse last dose was 2 days ago, about 1 gram in a day.  - Possible cannabinoids ??. unlikely intestinal obstruction  Still pass gas and bowel movement, no distension on examination  - B HCG negative, not hyperemesis gravidarum   - normal kidney function  - Associated with Abdominal pain. Denies fever, diarrhea, or Dysuria.  - On examination she looks dehydrated with dry mucus membranes.  - CT abdomen pelvis negative for any acute abnormality    PLAN  - IV fluids and Zofran

## 2017-01-08 NOTE — PROGRESS NOTES
Patient has xanax as home med. Medications taken down to pharmacy by RN. Three xanax in bottle when patient gave to RN.

## 2017-01-08 NOTE — ED NOTES
Pt bib ems. Pt seen here 1/4 and 1/6 for same and was admitted on 1/4. Pt c/o back pain and n/v. No emesis noted. Pt appears anxious.

## 2017-01-08 NOTE — ED NOTES
Pt continues to ask for pain meds, Dr. Wilburn notified as [facility] has not yet been in to see patient & orders received.

## 2017-01-08 NOTE — PROGRESS NOTES
Patient up from ER. Patient placed on monitor box. Monitor room can see patient. Patient assessed. Patient A and 0 X 4. Patient states that pain is a 6 out of 10, patient states pain in back.  Patient educated on plan of care for the morning including medications per MAR, monitoring vital signs, and rest. Patient educated to call for assistance. Patient verbalizes understanding.

## 2017-01-08 NOTE — PROGRESS NOTES
"Report received. Assessment complete. Pt A&O x 4. C/O back pain, MD paged for orders, no orders for pain meds received. Heat pack given. Pt c/o nausea & \"I almost threw up the pills you gave me, can you page the doctor?\" Pt medicated for nausea & for anxiety. Pt declined to take some of her a.m. pills but is agreeable to take anxiety med. POC discussed. Call light & belongings in reach. Bed locked and low & fall precautions in place.   "

## 2017-01-08 NOTE — ASSESSMENT & PLAN NOTE
- Patient complains of generalized back pain, abdominal pain, dull,  Most likely secondary muscular origin, Denies any trauma,   - she reported a stone but  CT renal was negative for stone  - on examination No CVA tenderness, no tenderness at the paraspinal muscle area.     PLAN    - tylenol and ibuprofen PRN  - patient received Ketorolac 30 mg at ED  - morning team to consider opiate?

## 2017-01-08 NOTE — ED NOTES
from Lab called with critical result of potassium 2.6 at 1852. Critical lab result read back to .   Dr. Wilburn notified of critical lab result at 1852.  Critical lab result read back by Dr. Wilburn.

## 2017-01-08 NOTE — PROGRESS NOTES
Pt has been unhooking IV when up to bathroom without RN present. K riders running at that time. Pt educated to call nursing staff.

## 2017-01-08 NOTE — ED PROVIDER NOTES
"ED Provider Note    Scribed for Lynda Wilburn M.D. by Kylie Adame. 1/7/2017, 5:56 PM.    Primary care provider: Maryam Torre M.D.  Means of arrival: Ambulance  History obtained from: Patient  History limited by: None    CHIEF COMPLAINT  Chief Complaint   Patient presents with   • Back Pain   • N/V       HPI  Diana Hernandez is a 41 y.o. female who was brought into the ED by ambulance for nausea and vomiting. The patient has been seen in the ED multiple times for nausea and vomiting and was found to be secondary to cyclic vomiting and cannabis hyperemesis. She stopped smoking marijuana recently. However, she's been feeling nauseated and since 0700 she's had several episodes of emesis. Her nausea and vomiting has been worsening and she's noticed blood in her vomit. She's taken 4 tablets of Ativan 0.5 mg and Zofran, but it only temporarily alleviates her symptoms. The patient says she has a history of hemorrhoids and has been following up with a specialist. She's also been constipated and attempts to digitally remove the stool from her rectum. This morning she noticed bright red blood in her stool, but she has not had any melena. She has a \"burning\" sensation around her umbilicus which radiates up to her throat. She experienced chest pain earlier this evening, but says it's improved. She believes it was secondary to her anxiety and \"hyperventilating.\" She complains of back pain, but notes it's been intermittent since she was 25 years old. She had an MRI performed sometimes last year.       REVIEW OF SYSTEMS  Pertinent positives include nausea, vomiting, hematemesis, constipation, hematochezia, abdominal pain, back pain, chest pain. Pertinent negatives include no diarrhea, melena.  All other systems reviewed and negative.      PAST MEDICAL HISTORY   has a past medical history of Snoring; Dental disorder; Pain; Tobacco abuse (6/20/2009); Superior mesenteric artery syndrome (HCC) (7/12/2009); Anxiety; CLOSTRIDIUM " DIFFICILE INFECTION (2010); Dyspepsia (2009); Backpain; Nephrolithiasis (2009); Anxiety disorder; CVS disease; Drug-seeking behavior; Cyclic vomiting syndrome; and Superior mesenteric artery syndrome (HCC).    SURGICAL HISTORY   has past surgical history that includes gastroscopy-endo (2009); lithotripsy; pyloroplasty (2009); gastroscopy with biopsy (3/9/2010); exploratory laparotomy (2009); appendectomy (2009); cholecystectomy (2009); other; exploratory laparotomy (2016); bowel resection (2016); and gastroscopy-endo (2016).    SOCIAL HISTORY  Social History   Substance Use Topics   • Smoking status: Former Smoker -- 0.50 packs/day for 13 years     Types: Cigarettes     Quit date: 12/15/2012   • Smokeless tobacco: None      Comment: pd   • Alcohol Use: No      History   Drug Use   • Yes   • Special: Inhaled, Marijuana     Comment: pot occ     FAMILY HISTORY  Family History   Problem Relation Age of Onset   • Cancer Mother 50     Colon cancer   • Allergies Father    • Hypertension Mother    • Hypertension Father    • Heart Disease Maternal Grandmother      CURRENT MEDICATIONS  No current facility-administered medications on file prior to encounter.     Current Outpatient Prescriptions on File Prior to Encounter   Medication Sig Dispense Refill   • ondansetron (ZOFRAN) 4 MG Tab tablet Take 1 Tab by mouth every 8 hours as needed (FOR NAUSEA OR VIMITING) for up to 6 doses. 6 Each 2   • alprazolam (XANAX) 0.5 MG Tab Take 1 Tab by mouth every 8 hours as needed for Sleep. 12 Tab 0   • Prenatal Multivit-Min-Fe-FA (PRE- FORMULA) Tab Take 1 Tab by mouth every day.       ALLERGIES  Allergies   Allergen Reactions   • Metoclopramide Hives     Told by MD; pt suspects possible hives.   • Quetiapine Unspecified     MD told her she was allergic     • Septra [Sulfamethoxazole W-Trimethoprim] Unspecified     MD told her she was allergic         PHYSICAL EXAM  Vital Signs:  "/84 mmHg  Pulse 70  Temp(Src) 37.6 °C (99.7 °F)  Resp 19  Ht 1.651 m (5' 5\")  Wt 56.7 kg (125 lb)  BMI 20.80 kg/m2  SpO2 100%  LMP 12/26/2016  Constitutional: Alert, no acute distress  HENT: Normocephalic, atraumatic, moist mucus membranes  Eyes: Pupils equal and reactive, normal conjunctiva, non-icteric  Neck: Supple, normal range of motion, no stridor  Cardiovascular: Regular rhythm, Normal peripheral perfusion, no cyanosis, Normal cardiac auscultation  Pulmonary: No respiratory distress, normal work of breathing, no accessory muscle usage, Clear to auscultation bilaterally  Abdomen: Soft, non tender, no epigastric tenderness to palpation, no peritoneal signs, nondistended, bowel sounds are present.   Skin: Warm, dry, no rashes or lesions  Back: No pain with active range of motion. No midline lumbar tenderness to palpation and no thoracic tenderness to palpation  Musculoskeletal: Normal range of motion in all extremities, no swelling or deformity noted  Neurologic: Alert, oriented, normal motor function, no speech deficits  Psychiatric: Mildly anxious affect    DIAGNOSTIC STUDIES/PROCEDURES:    LABS  Labs Reviewed   CBC WITH DIFFERENTIAL - Abnormal; Notable for the following:     RBC 4.11 (*)     MCH 33.1 (*)     Neutrophils-Polys 79.80 (*)     Lymphocytes 9.20 (*)     Neutrophils (Absolute) 7.78 (*)     Lymphs (Absolute) 0.90 (*)     Monos (Absolute) 0.98 (*)     All other components within normal limits   COMP METABOLIC PANEL - Abnormal; Notable for the following:     Potassium 2.6 (*)     Anion Gap 13.0 (*)     Glucose 102 (*)     Total Bilirubin 2.1 (*)     All other components within normal limits   URINALYSIS,CULTURE IF INDICATED - Abnormal; Notable for the following:     Character Hazy (*)     Ketones 100 (*)     Nitrite Positive (*)     All other components within normal limits   URINE MICROSCOPIC (W/UA) - Abnormal; Notable for the following:     Bacteria Many (*)     All other components " within normal limits   POTASSIUM SERUM (K) - Abnormal; Notable for the following:     Potassium 2.7 (*)     All other components within normal limits   LIPASE   HCG QUALITATIVE UR    Narrative:     Please run HCG on whatever specimen is obtained first. May  discontinue alternate order.   HCG QUAL SERUM   REFRACTOMETER SG    Narrative:     Please run HCG on whatever specimen is obtained first. May  discontinue alternate order.   ESTIMATED GFR   URINE CULTURE(NEW)   POTASSIUM SERUM (K)   TSH   MAGNESIUM   All labs reviewed by me.    EKG  12 Lead EKG interpreted by me to show:    Rate 82, sinus rhythm, no ST elevation or depression, as compared to previous EKG, no new T-wave changes, normal QTC at 463 no evidence of QT prolongation.    Radiology results revealed:   DX-CHEST-2 VIEWS   Final Result      No radiographic evidence of acute cardiopulmonary process.           COURSE & MEDICAL DECISION MAKING  Pertinent Labs & Imaging studies reviewed. (See chart for details)    Review of old medical records for continuity of care. Patient has been seen multiple times in this emergency department for similar complaint.  Emergency department note dated 1/6/17, patient seen and evaluated for low back pain, nausea and vomiting. Treated in the emergency department with Zofran and Xanax. Rectal exam demonstrated hemorrhoids present, occult positive brown stool. Discharged with cyclic vomiting diagnosis.  Emergency department note dated 1/4/17, patient presents with right flank pain nausea and vomiting. CT renal colic evaluation is negative, no hydronephrosis, no nephrolithiasis. Treated with morphine and Zofran. Admitted for intractable nausea and pain. Discharge summary reviewed 1/5/17, patient diagnosed with cyclic hyperemesis, ongoing anxiety and marijuana abuse. Drug-seeking behavior noted in hospital summary. Treated with Zofran, Phenergan and Compazine for nausea, Ativan for anxiety, IV fluids, oxycodone and lidocaine patch  for pain. Discharged with hyperemesis secondary to cannabis use. Abdominal ultrasound final result status post cholecystectomy, no biliary ductal dilatation. Discharged on Xanax and Zofran.     Differential diagnoses include but are not limited to: Cannabis-induced hyperemesis, electrolyte abnormality, intractable vomiting, dehydration, pregnancy    5:56 PM - Patient seen and examined at bedside. Patient will be treated with Haldol 2 mg IV and 1000 mL IV fluids. Ordered chest x-ray, CBC, CMP, lipase, urinalysis, HCG qualitative urine, refractometer, and EKG to evaluate her symptoms.     6:52 PM - I was advised by the laboratory of critical laboratory result finding at this time.  Patient is noted as having a critical lab of 2.6 at Potassium at this time.     7:22 PM - I reviewed the patient's further findings and am treating her with Potassium Chloride 10 mEq, Bentyl 20 mg IV, and 1000 mL IV fluids. Upon re-examination, she continues to experience pain so I will treat her with Toradol 30 mg IV.    8:23 PM - The nurse informed me the patient is feeling anxious and requesting medications. She will be given Xanax 1 mg PO.     8:58 PM - Upon re-examination, the patient is ambulating to her room from the restroom. She says all of her symptoms have resolved, except for her back pain, which is chronic. I explained to the patient her above findings and with her low Potassium, I recommend admitting the patient to the hospital to replace it. Patient understands and agrees.    9:04 PM - [facility] paged.    9:32 PM - I discussed the patient's case and the above findings with [facility] who will see and admit the patient. Care is transferred at this time.    Decision Making:  This is a 41 y.o. year old female who presents with hypokalemia likely secondary to continued cyclic vomiting syndrome. She did very recently discontinue all marijuana use, however I explained that marijuana is likely still present in  her system and it may take a while for her symptoms to resolve. She was treated with Haldol and fluids in the emergency department with resolution of vomiting. She continues to complain of chronic low back pain that is unchanged from her baseline back pain. No bony midline tenderness, no lower extremity weakness, no saddle anesthesia to suggest spinal cord/cauda equina pathology. This was treated with Norco. With regard to her hypokalemia, she is unable to tolerate by mouth potassium in the emergency department secondary to nausea. IV potassium initiated in the emergency department. Urinalysis pending at this time. Serum pregnancy test is negative.    At this time is for admission to hospitalist service for potassium replacement, continued antiemetics. On multiple occasions, she does have documented request for IV narcotics. She did request both morphine and Dilaudid in the emergency department. I explained that these are poor choices for chronic back pain. She is having an exacerbation of her chronic back pain, did treat with Norco in the emergency department. Case discussed with [facility] internal medicine service who kindly agree to admit the patient.    DISPOSITION:  Patient will be admitted to [facility] in guarded condition.    FINAL IMPRESSION  1. Intractable cyclical vomiting, presence of nausea not specified    2. Cannabis abuse          Kylie HUGHES (Scribe), am scribing for, and in the presence of, Lynda Wilburn M.D..    Electronically signed by: Kylie Adame (Scribe), 1/7/2017    Lynda HUGHES M.D. personally performed the services described in this documentation, as scribed by Kylie Adame in my presence, and it is both accurate and complete.    The note accurately reflects work and decisions made by me.  Lynda Wilburn  1/8/2017  2:11 AM

## 2017-01-08 NOTE — ED NOTES
Lab called with critical result of K+ 2.7 at 0115. Critical lab result read back to lab.   [facility] notified of critical lab result at 0116.  Critical lab result read back by [facility].

## 2017-01-08 NOTE — ED NOTES
Pt reports she has been having severe abdominal and mid to low back pain, N/V with some recent bright red blood in vomit and bright red blood in stool (pt reports hx of hemorrhoids).  PIV established, labs sent.  Pt medicated according to MAR.

## 2017-01-09 LAB
BACTERIA UR CULT: ABNORMAL
BACTERIA UR CULT: ABNORMAL
SIGNIFICANT IND 70042: ABNORMAL
SITE SITE: ABNORMAL
SOURCE SOURCE: ABNORMAL

## 2017-01-09 NOTE — DISCHARGE SUMMARY
[facility] [place] [facility] [place]      Admit Date:  1/7/2017       Discharge Date:   1/8/2017    Service:   [facility]  Attending Physician(s):     Senior Resident(s):     Amador Resident(s):       Primary Diagnosis:   Hypokalemia  Nausea and Vomiting  Secondary Diagnoses:                Active Hospital Problems    Diagnosis   • Back pain [M54.9]   • Anxiety [F41.9]   • Hypokalemia [E87.6]   • Intractable nausea and vomiting [R11.2]       Hospital Summary (Brief Narrative):       Ms Hernandez is a 40yo F with PMH of SMA syndrome s/p vlad-en-Y duodenojejunostomy and bowel resection, nephrolithiasis, cyclic vomiting, marijuana abuse, anxiety and bipolar disorder presents to ER with nausea, vomiting . Pt also c/o streaks of blood in vomiting and blood coated stools.   Pt was recently discharged on 1/5/2016 for similar complaints, Pt has multiple admissions in the past with c/o anxiety,abd pain with cyclical vomiting  secondary to marijuana use ,also states her emesis is relieved by hot showers when using or withdrawing from marijuana, signs and symptoms consistent with Cannabinoid hyperemesis syndrome    Labs on admission significant for hypokalemia with a potassium of 2.6, Magnesium of 1.6, hgb stable and stool occult was negative.UDS positive for benzos and cannabinoid  UCX showed lactose fermenting gram negative rods started on ciprofloxacin.  Pt was started on IV fluids, given Zofran for nausea and tylenol for pain. Pt's repeated request for opiates was denied secondary to narcotic seeking behavior.  Low potassium was repleted  with IV and oral potassium and the repeat potassium was 4.1. Magnesium replenished orally and through IV.  Pt's nausea improved, no further episodes of vomiting.    Upon stable condition and  resolution of symptoms pt is discharged home on XANAX and ZOFRAN and CIPRO for UTI . Pt counseled on marijuana abuse.   Pt has an  appointment with PCP next week. Pt would benefit from a psych referral, PCP to consider.              Patient /Hospital Summary (Details -- Problem Oriented) :          Intractable nausea and vomiting  Assessment & Plan  - H/O of vlad-en-y for SMAS, marijuana abuse last dose was 2 days ago, about 1 gram in a day.  -Still pass gas and bowel movement, no distension on examination  - B HCG negative, not hyperemesis gravidarum   -treated with IV fluids, Zofran     Hypokalemia  Assessment & Plan  - no weakness, normal bowel sounds  - K was 2.7 , repleted with IV and oral potassium  Repeat 4.1  PCP to follow up a    Back pain  Tylenol for pain  Pt requests for opiates denied      Consultants:     none    Procedures:        none  Imaging/ Testing:      DX-CHEST-2 VIEWS   Final Result      No radiographic evidence of acute cardiopulmonary process.            Discharge Medications:         Medication Reconciliation: Completed      Medication List      START taking these medications       Instructions    ciprofloxacin 500 MG Tabs   Commonly known as:  CIPRO    Take 1 Tab by mouth 2 times a day.   Dose:  500 mg         CHANGE how you take these medications       Instructions    ondansetron 4 MG Tabs tablet   What changed:  when to take this   Commonly known as:  ZOFRAN    Take 1 Tab by mouth every 6 hours as needed (FOR NAUSEA OR VIMITING) for up to 10 days.   Dose:  4 mg         CONTINUE taking these medications       Instructions    alprazolam 0.5 MG Tabs   Last time this was given:  0.5 mg on 2017  9:35 AM   Commonly known as:  XANAX    Take 1 Tab by mouth every 8 hours as needed for Sleep.   Dose:  0.5 mg       PRE-LAYNE FORMULA Tabs    Take 1 Tab by mouth every day.   Dose:  1 Tab             Run .DCMEDLIST  after completing discharge medicine reconciliation and prior to signing note (DCMEDLIST is more directed for the patient)  Can use .DISCHARGEMEDSLIST if going to another facility         Disposition: home  Diet:  "healthy    Activity:   As tolerated    Instructions:      Avoid  Marijuana, follow up with PCP  The patient was instructed to return to the ER in the event of worsening symptoms. I have counseled the patient on the importance of compliance and the patient has agreed to proceed with all medical recommendations and follow up plan indicated above.   The patient understands that all medications come with benefits and risks. Risks may include permanent injury or death and these risks can be minimized with close reassessment and monitoring.        Primary Care Provider:      Discharge summary faxed to primary care provider:  Completed  Copy of discharge summary given to the patient: Completed    Follow up appointment details :      PCP    Pending Studies:        none    Time spent on discharge day patient visit, preparing discharge paperwork and arranging for patient follow up.    Discharge Time (Minutes) : 45    Condition on Discharge    ______________________________________________________________________    Interval history/exam for day of discharge:     Pt seen and examine, no acute events overnight, vitals stable, nausea and vomiting improved. Pt hypokalemia resolved.  Pt ambulating well in the maldonado. Pt able to tolerate diet.    Filed Vitals:    01/08/17 0400 01/08/17 0500 01/08/17 0800 01/08/17 1200   BP: 108/58  142/76 150/89   Pulse: 61  65 61   Temp: 37.3 °C (99.2 °F)  37.4 °C (99.3 °F) 37.6 °C (99.6 °F)   Resp: 16  16 20   Height:  1.651 m (5' 5\")     Weight: 57.4 kg (126 lb 8.7 oz) 56.7 kg (125 lb)     SpO2: 100%  100% 100%     Weight/BMI: Body mass index is 20.8 kg/(m^2).  Pulse Oximetry: 100 %, O2 (LPM): 0, O2 Delivery: None (Room Air)    Gen: NAD  CVS: RRR S1S2 wnl No mrg  Resp: CTAB no wrr  Abd: Soft NT ND  Back: Non tender. Full ROM  Rectal exam: Hemorrhoid.  Guaiac Negative    Most Recent Labs:    Lab Results   Component Value Date/Time    WBC 9.8 01/07/2017 06:20 PM    RBC 4.11* 01/07/2017 " 06:20 PM    HEMOGLOBIN 13.6 01/07/2017 06:20 PM    HEMATOCRIT 39.2 01/07/2017 06:20 PM    MCV 95.4 01/07/2017 06:20 PM    MCH 33.1* 01/07/2017 06:20 PM    MCHC 34.7 01/07/2017 06:20 PM    MPV 9.9 01/07/2017 06:20 PM    NEUTROPHILS-POLYS 79.80* 01/07/2017 06:20 PM    LYMPHOCYTES 9.20* 01/07/2017 06:20 PM    MONOCYTES 10.00 01/07/2017 06:20 PM    EOSINOPHILS 0.00 01/07/2017 06:20 PM    BASOPHILS 0.40 01/07/2017 06:20 PM    HYPOCHROMIA 1+ 04/27/2016 12:49 PM    ANISOCYTOSIS 1+ 09/13/2016 12:29 AM      Lab Results   Component Value Date/Time    SODIUM 134* 01/08/2017 02:25 PM    POTASSIUM 4.1 01/08/2017 02:25 PM    CHLORIDE 108 01/08/2017 02:25 PM    CO2 15* 01/08/2017 02:25 PM    GLUCOSE 90 01/08/2017 02:25 PM    BUN 7* 01/08/2017 02:25 PM    CREATININE 0.49* 01/08/2017 02:25 PM      Lab Results   Component Value Date/Time    ALT(SGPT) 19 01/07/2017 06:20 PM    AST(SGOT) 17 01/07/2017 06:20 PM    ALKALINE PHOSPHATASE 61 01/07/2017 06:20 PM    TOTAL BILIRUBIN 2.1* 01/07/2017 06:20 PM    LIPASE 14 01/07/2017 06:20 PM    ALBUMIN 4.6 01/07/2017 06:20 PM    GLOBULIN 3.0 01/07/2017 06:20 PM    PRE-ALBUMIN 23.0 05/09/2016 12:00 AM    INR 1.16* 05/01/2016 12:30 AM    MACROCYTOSIS 1+ 09/13/2016 12:29 AM     Lab Results   Component Value Date/Time    PT 14.8* 05/01/2016 12:30 AM    INR 1.16* 05/01/2016 12:30 AM          RUN .2016ConditionOnDischarge to include the patients condition on discharge with the DC summary  This is basically an interval history and exam for the day of discharge and includes the most recent labs, can be used as your progress note for the day

## 2017-01-09 NOTE — PROGRESS NOTES
Pt being discharged. Pt educated on discharge instructions. New prescriptions given & pt verbalized understanding of all education. Follow up appt made with PCP. PIV removed. Monitor checked in, monitor room notified. All personal belongings with pt. Pt going home with fiance. Will monitor pt until off unit.

## 2017-01-09 NOTE — DISCHARGE INSTRUCTIONS
Discharge Instructions    Discharged to home by car with self. Discharged via wheelchair, hospital escort: Yes.  Special equipment needed: Not Applicable    Be sure to schedule a follow-up appointment with your primary care doctor or any specialists as instructed.     Discharge Plan:   Diet Plan: Discussed  Activity Level: Discussed  Confirmed Follow up Appointment: Patient to Call and Schedule Appointment  Confirmed Symptoms Management: Discussed  Medication Reconciliation Updated: Yes  Influenza Vaccine Indication: Patient Refuses    I understand that a diet low in cholesterol, fat, and sodium is recommended for good health. Unless I have been given specific instructions below for another diet, I accept this instruction as my diet prescription.   Other diet: no restrictions    Special Instructions: None    · Is patient discharged on Warfarin / Coumadin?   No     · Is patient Post Blood Transfusion?  No    Depression / Suicide Risk    As you are discharged from this [facility] facility, it is important to learn how to keep safe from harming yourself.    Recognize the warning signs:  · Abrupt changes in personality, positive or negative- including increase in energy   · Giving away possessions  · Change in eating patterns- significant weight changes-  positive or negative  · Change in sleeping patterns- unable to sleep or sleeping all the time   · Unwillingness or inability to communicate  · Depression  · Unusual sadness, discouragement and loneliness  · Talk of wanting to die  · Neglect of personal appearance   · Rebelliousness- reckless behavior  · Withdrawal from people/activities they love  · Confusion- inability to concentrate     If you or a loved one observes any of these behaviors or has concerns about self-harm, here's what you can do:  · Talk about it- your feelings and reasons for harming yourself  · Remove any means that you might use to hurt yourself (examples: pills, rope, extension cords,  firearm)  · Get professional help from the community (Mental Health, Substance Abuse, psychological counseling)  · Do not be alone:Call your Safe Contact- someone whom you trust who will be there for you.  · Call your local CRISIS HOTLINE 916-3095 or 179-652-6510  · Call your local Children's Mobile Crisis Response Team [place] (118) 473-1295 or www.Invaluable  · Call the toll free National Suicide Prevention Hotlines   · National Suicide Prevention Lifeline 771-453-FKQV (8862)  · [employer] Line Network 800-SUICIDE (992-2404)    Hypokalemia  Hypokalemia means that the amount of potassium in the blood is lower than normal. Potassium is a chemical, called an electrolyte, that helps regulate the amount of fluid in the body. It also stimulates muscle contraction and helps nerves function properly. Most of the body's potassium is inside of cells, and only a very small amount is in the blood. Because the amount in the blood is so small, minor changes can be life-threatening.  CAUSES  · Antibiotics.  · Diarrhea or vomiting.  · Using laxatives too much, which can cause diarrhea.  · Chronic kidney disease.  · Water pills (diuretics).  · Eating disorders (bulimia).  · Low magnesium level.  · Sweating a lot.  SIGNS AND SYMPTOMS  · Weakness.  · Constipation.  · Fatigue.  · Muscle cramps.  · Mental confusion.  · Skipped heartbeats or irregular heartbeat (palpitations).  · Tingling or numbness.  DIAGNOSIS   Your health care provider can diagnose hypokalemia with blood tests. In addition to checking your potassium level, your health care provider may also check other lab tests.  TREATMENT  Hypokalemia can be treated with potassium supplements taken by mouth or adjustments in your current medicines. If your potassium level is very low, you may need to get potassium through a vein (IV) and be monitored in the hospital. A diet high in potassium is also helpful. Foods high in potassium are:  · Nuts, such as peanuts and  pistachios.  · Seeds, such as sunflower seeds and pumpkin seeds.  · Peas, lentils, and lima beans.  · Whole grain and bran cereals and breads.  · Fresh fruit and vegetables, such as apricots, avocado, bananas, cantaloupe, kiwi, oranges, tomatoes, asparagus, and potatoes.  · Orange and tomato juices.  · Red meats.  · Fruit yogurt.  HOME CARE INSTRUCTIONS  · Take all medicines as prescribed by your health care provider.  · Maintain a healthy diet by including nutritious food, such as fruits, vegetables, nuts, whole grains, and lean meats.  · If you are taking a laxative, be sure to follow the directions on the label.  SEEK MEDICAL CARE IF:  · Your weakness gets worse.  · You feel your heart pounding or racing.  · You are vomiting or having diarrhea.  · You are diabetic and having trouble keeping your blood glucose in the normal range.  SEEK IMMEDIATE MEDICAL CARE IF:  · You have chest pain, shortness of breath, or dizziness.  · You are vomiting or having diarrhea for more than 2 days.  · You faint.  MAKE SURE YOU:   · Understand these instructions.  · Will watch your condition.  · Will get help right away if you are not doing well or get worse.     This information is not intended to replace advice given to you by your health care provider. Make sure you discuss any questions you have with your health care provider.     Document Released: 12/18/2006 Document Revised: 01/08/2016 Document Reviewed: 06/20/2014  appening Interactive Patient Education ©2016 [employer]

## 2017-01-10 ENCOUNTER — OFFICE VISIT (OUTPATIENT)
Dept: INTERNAL MEDICINE | Facility: MEDICAL CENTER | Age: 42
End: 2017-01-10
Payer: MEDICAID

## 2017-01-10 VITALS
TEMPERATURE: 97.6 F | BODY MASS INDEX: 20.76 KG/M2 | HEIGHT: 65 IN | OXYGEN SATURATION: 98 % | RESPIRATION RATE: 16 BRPM | WEIGHT: 124.6 LBS | DIASTOLIC BLOOD PRESSURE: 86 MMHG | SYSTOLIC BLOOD PRESSURE: 130 MMHG | HEART RATE: 78 BPM

## 2017-01-10 DIAGNOSIS — F33.1 MODERATE EPISODE OF RECURRENT MAJOR DEPRESSIVE DISORDER (HCC): ICD-10-CM

## 2017-01-10 DIAGNOSIS — K92.1 MELENA: ICD-10-CM

## 2017-01-10 DIAGNOSIS — F41.9 ANXIETY: ICD-10-CM

## 2017-01-10 DIAGNOSIS — G43.A0 CYCLICAL VOMITING WITH NAUSEA, INTRACTABILITY OF VOMITING NOT SPECIFIED: Chronic | ICD-10-CM

## 2017-01-10 PROCEDURE — 99214 OFFICE O/P EST MOD 30 MIN: CPT | Mod: GC | Performed by: INTERNAL MEDICINE

## 2017-01-10 ASSESSMENT — PATIENT HEALTH QUESTIONNAIRE - PHQ9
SUM OF ALL RESPONSES TO PHQ QUESTIONS 1-9: 14
5. POOR APPETITE OR OVEREATING: 2 - MORE THAN HALF THE DAYS
CLINICAL INTERPRETATION OF PHQ2 SCORE: 4

## 2017-01-10 NOTE — PROGRESS NOTES
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 Pt has multiple ED visits + hospital admission 2/2 Cannabinoid hyperemesis syndrome/ Cyclical Vomiting Syndrome, last admissoin was on 7/7/2017 for nausea/vomiting treated with zofran and UTI treated with cipro. Pt today reported of desire to be pregnant, has an appoitment with Dr. Chaka byrd on 1/23/2017 for pregnancy planning. Pt stated of smoking marijuan Q day 1 gm/day for 10 yrs, stated that quit 2 days ago and want help for anxiey and depressoin. For anxiety, describes as having panic attacks twice a week, associated with SOB, shaking of body, vomiting, and hypokalemia that ending up in ED. Pt also had PHQ-9 depression screening score of 14 today at clinic ( moderate depression). Pt stated that having not job might attribute to these anxiety/depression attacks as she overthinks and worreis of getting job (she was not failing drug screening when applies to job 2/2 marijuana smoking). Denied SI or HI   Pt also reported of having black tarry stool for a long time and st has episodes of bright red streak of blood from anal canal 2/2 long standing hemorrhoid. Denied abdominal pain, N/V, diarrhea or constipation, loss of weight or appetite.       Patient Active Problem List    Diagnosis Date Noted   • Cyclical vomiting 11/07/2016     Priority: High   • Metabolic acidosis, normal anion gap (NAG) 11/07/2016     Priority: Medium   • Back ache 11/07/2016     Priority: Medium   • Anxiety 12/21/2014     Priority: Medium   • Substance abuse 09/15/2011     Priority: Medium   • Celiac disease/sprue 11/07/2016     Priority: Low   •  Cyclic vomiting syndrome 2013     Priority: Low   • Melena 01/10/2017   • Back pain 2017   • Anxiety 2017   • Nausea and vomiting 2017   • Back pain 2017   • Metabolic acidosis, increased anion gap 2016   • Hypokalemia 2016   • Dehydration 2016   • Intractable nausea and vomiting 2016   • Menstrual abnormality 2016   • Plantar wart 2016   • First degree hemorrhoids 2016   • Amenorrhea 2016   • Dizziness of unknown cause 2016   • Fainting spell 2016   • Neuropathic pain of right foot 2016   • SMAS (superior mesenteric artery syndrome) (HCC) 2016   • Anemia 2016   • Depression 2015   • Drug-seeking behavior 2015   • Hyponatremia 2012       Current Outpatient Prescriptions   Medication Sig Dispense Refill   • ciprofloxacin (CIPRO) 500 MG Tab Take 1 Tab by mouth 2 times a day. 6 Tab 0   • ondansetron (ZOFRAN) 4 MG Tab tablet Take 1 Tab by mouth every 6 hours as needed (FOR NAUSEA OR VIMITING) for up to 10 days. 30 Tab 0   • alprazolam (XANAX) 0.5 MG Tab Take 1 Tab by mouth every 8 hours as needed for Sleep. 12 Tab 0   • Prenatal Multivit-Min-Fe-FA (PRE-LAYNE FORMULA) Tab Take 1 Tab by mouth every day.       No current facility-administered medications for this visit.       ROS: As per HPI. Additional pertinent symptoms as noted below.    Constitutional: Denies fevers  Eyes: Denies changes in vision, no eye pain  Ears/Nose/Throat/Mouth: Denies nasal congestion or sore throat   Cardiovascular: Denies chest pain or palpitations   Respiratory: Denies shortness of breath , Denies cough  Gastrointestinal/Hepatic: per HPI. Denies abdominal pain, nausea, vomiting, diarrhea, constipation  Genitourinary: Denies bladder dysfunction, dysuria or frequency  Musculoskeletal/Rheum: Denies  joint pain and swelling   Skin: Denies rash  Neurological: Denies headache, confusion, memory loss or focal  weakness/parasthesias  Psychiatric: per HPI    Endocrine: denies hx of diabetes or thyroid dysfunction        Past Medical History   Diagnosis Date   • Snoring    • Dental disorder    • Pain      abd and back   • Tobacco abuse 6/20/2009   • Superior mesenteric artery syndrome (HCC) 7/12/2009   • Anxiety      Follow[facility] by [facility]   • CLOSTRIDIUM DIFFICILE INFECTION 4/14/2010   • Dyspepsia 7/12/2009   • Backpain    • Nephrolithiasis 6/20/2009     kidney stones,post lithotrypsy   • Anxiety disorder    • CVS disease    • Drug-seeking behavior    • Cyclic vomiting syndrome    • Superior mesenteric artery syndrome (HCC)        Past Surgical History   Procedure Laterality Date   • Gastroscopy-endo  7/8/2009     Performed by ROSANNA WRIGHT at SURGERY [facility]   • Lithotripsy     • Pyloroplasty  7/27/2009     Performed by MACIEL QUINTERO at SURGERY [facility]   • Gastroscopy with biopsy  3/9/2010     Performed by AMANDA MEJIA at ENDOSCOPY [facility]   • Exploratory laparotomy  7/27/2009     Performed by MACIEL QUINTERO at SURGERY [facility]   • Appendectomy  7/27/2009     Performed by MACIEL QUINTERO at SURGERY [facility]   • Cholecystectomy  7/27/2009     Performed by MACIEL QUINTERO at SURGERY [facility]   • Other       superior mesenteric artery correction    • Exploratory laparotomy  1/12/2016     Procedure: EXPLORATORY LAPAROTOMY;  Surgeon: Maciel Quintero M.D.;  Location: SURGERY [facility];  Service:    • Bowel resection  1/12/2016     Procedure: BOWEL RESECTION;  Surgeon: Maciel Quintero M.D.;  Location: SURGERY [facility];  Service:    • Gastroscopy-endo  4/28/2016     Procedure: GASTROSCOPY-ENDO;  Surgeon: Blayne Blackburn M.D.;  Location: ENDOSCOPY [facility];  Service:      Family History   Problem Relation Age of Onset   • Cancer Mother 50     Colon cancer   • Allergies Father    • Hypertension Mother    •  "Hypertension Father    • Heart Disease Maternal Grandmother        Social History     Social History   • Marital Status: Single     Spouse Name: N/A   • Number of Children: N/A   • Years of Education: N/A     Occupational History   • Not on file.     Social History Main Topics   • Smoking status: Former Smoker -- 0.50 packs/day for 13 years     Types: Cigarettes     Quit date: 12/15/2012   • Smokeless tobacco: Never Used      Comment: 1/2ppd   • Alcohol Use: No   • Drug Use: Yes     Special: Inhaled, Marijuana      Comment: pot occ   • Sexual Activity: Not on file     Other Topics Concern   • Not on file     Social History Narrative         /86 mmHg  Pulse 78  Temp(Src) 36.4 °C (97.6 °F)  Resp 16  Ht 1.651 m (5' 5\")  Wt 56.518 kg (124 lb 9.6 oz)  BMI 20.73 kg/m2  SpO2 98%  LMP 12/29/2016  Breastfeeding? No    Physical Exam   Constitutional:   Well developed, Well nourished, No acute distress, Non-toxic appearance.   HENMT:  Normocephalic, Atraumatic, Bilateral external ears normal, Oropharynx moist mucous membranes, No oral exudates, Nose normal.  No thyromegaly.  Eyes:  PERRLA, EOMI, Conjunctiva normal, No discharge.  Neck:  Normal range of motion, No cervical tenderness, Supple, No stridor, no JVD.  Cardiovascular:  Normal heart rate, Normal rhythm, No murmurs, No rubs, No gallops.   Extremitites with intact distal pulses, no cyanosis, clubbing or edema.  Lungs:  Respiratory effort is normal. Normal breath sounds, breath sounds clear to auscultation bilaterally,  no rales, no rhonchi, no wheezing.   Abdomen: Bowel sounds normal, Soft, No tenderness, No guarding, No rebound, No masses, No hepatosplenomegaly.  Skin: Warm, Dry, No erythema, No rash, no induration or crepitus.  Neurologic: Alert & oriented x 3, Normal motor function, Normal sensory function, No focal deficits noted, cranial nerves II through XII are normal,  normal gait.  Psychiatric: Affect normal, Judgment normal, Mood " normal.      Assessment and Plan    Anxiety/Bipolar disorder  -Pt has a long hx of psychiatric issues including ESTELA, bipolar disorder, depression and frequent panic attacks.  -Pt has been self discontinuing her own psych medications including quetiapine for bipolar disorded  -Stop mirtazapine on her own (as she is trying to conceive)  - Denies suicidal/homicidal ideation recently.      -want to discontinue marijuana for now as she is planning to be pregnant   Plan:   -referred to behavioral therapy for better control of her marijuana dependence that might help improve her mood  -CTM       Hemorrhoids/ Melena?   -hx of dark brown stool, hx of constipation    -Intermittent bleeding per rectum and protrusion of rectal mucosa    -External hemorrhoids at 10 o'clock position, no inflammatory signs before   -Has been referred to GI before without any intervention. Stable for now, no changes in her bowel motions recently   -H/H: 13.6/39.2, stable with no dropping in H/H   -sr B12: 373, Folate: > 24 on 9/2016 (normal)  Plan:   -FOBT ordered  -if +ve, might consider to have GI referral for possible upper GI endoscopy.     Cannabinoid hyperemesis syndrome/ Cyclical Vomiting Syndrome:    -multiple ED visits + hospital admission 2/2 Cannabinoid hyperemesis syndrome/ Cyclical Vomiting Syndrome, last admissoin was on 7/7/2017 for nausea/vomiting with streaks of blood in vomiting and blood coated stool,  worse when anxious, better with hot shower  -smoking marijuan Q day 1 gm/day for 10 yrs, stated that quit 2 days ago as she is planning for pregnancy  -hx of superior mesenteric artery syndrome s/p vlad -en-Y duodenojejunostomy (2009) & small bowel resection (1/2016)  Plan:  - currently on Zofran prn ( category B for pregnancy- no evidence of risk in studies),   -educate about harm of marijuana and get support from marijuana addiction center and marijuana Anonymous   -referred to behavioral therapy for better control of her  marijuana dependence that might help improve her mood  -CTM       Substance abuse  -Drug seeking behavior  for percocet and other opioids  -Abuse marijuana  -Educated her regarding effects of drug abuse   -CTM     Preventive care  Flu - denied  DTaP -received the series ( 5 doses)  Tdap- reported will recieve with her obgyn   Pap - reported 6 months ago, result was normal by Dr. Chaka byrd         Signed by: Lavell Monte M.D.

## 2017-01-10 NOTE — PATIENT INSTRUCTIONS
Generalized Anxiety Disorder  Generalized anxiety disorder (ESTELA) is a mental disorder. It interferes with life functions, including relationships, work, and school.  ESTELA is different from normal anxiety, which everyone experiences at some point in their lives in response to specific life events and activities. Normal anxiety actually helps us prepare for and get through these life events and activities. Normal anxiety goes away after the event or activity is over.   ESTELA causes anxiety that is not necessarily related to specific events or activities. It also causes excess anxiety in proportion to specific events or activities. The anxiety associated with ESTELA is also difficult to control. ESTELA can vary from mild to severe. People with severe ESTELA can have intense waves of anxiety with physical symptoms (panic attacks).   SYMPTOMS  The anxiety and worry associated with ESTELA are difficult to control. This anxiety and worry are related to many life events and activities and also occur more days than not for 6 months or longer. People with ESTELA also have three or more of the following symptoms (one or more in children):  · Restlessness.    · Fatigue.  · Difficulty concentrating.    · Irritability.  · Muscle tension.  · Difficulty sleeping or unsatisfying sleep.  DIAGNOSIS  ESTELA is diagnosed through an assessment by your health care provider. Your health care provider will ask you questions about your mood, physical symptoms, and events in your life. Your health care provider may ask you about your medical history and use of alcohol or drugs, including prescription medicines. Your health care provider may also do a physical exam and blood tests. Certain medical conditions and the use of certain substances can cause symptoms similar to those associated with ESTELA. Your health care provider may refer you to a mental health specialist for further evaluation.  TREATMENT  The following therapies are usually used to treat ESTELA:    · Medication. Antidepressant medication usually is prescribed for long-term daily control. Antianxiety medicines may be added in severe cases, especially when panic attacks occur.    · Talk therapy (psychotherapy). Certain types of talk therapy can be helpful in treating ESTELA by providing support, education, and guidance. A form of talk therapy called cognitive behavioral therapy can teach you healthy ways to think about and react to daily life events and activities.  · Stress management techniques. These include yoga, meditation, and exercise and can be very helpful when they are practiced regularly.  A mental health specialist can help determine which treatment is best for you. Some people see improvement with one therapy. However, other people require a combination of therapies.     This information is not intended to replace advice given to you by your health care provider. Make sure you discuss any questions you have with your health care provider.     Document Released: 04/14/2014 Document Revised: 01/08/2016 Document Reviewed: 04/14/2014  [employer] Interactive Patient Education ©2016 [employer]

## 2017-01-10 NOTE — MR AVS SNAPSHOT
"        Diana Hernandez   1/10/2017 10:15 AM   Office Visit   MRN: [id number]    Department:  [facility]   Dept Phone:  [phone]    Description:  Female : 1975   Provider:  Lavell Monte M.D.           Reason for Visit     Anxiety [facility] follow up      Allergies as of 1/10/2017     Allergen Noted Reactions    Metoclopramide 2015   Hives    Told by MD; pt suspects possible hives.    Quetiapine 2017   Unspecified    MD told her she was allergic      Septra [Sulfamethoxazole W-Trimethoprim] 2017   Unspecified    MD told her she was allergic        You were diagnosed with     Cyclical vomiting with nausea, intractability of vomiting not specified   [7335962]       Melena   [740621]       Anxiety   [254018]       Moderate episode of recurrent major depressive disorder (HCC)   [1042292]         Vital Signs     Blood Pressure Pulse Temperature Respirations Height Weight    130/86 mmHg 78 36.4 °C (97.6 °F) 16 1.651 m (5' 5\") 56.518 kg (124 lb 9.6 oz)    Body Mass Index Oxygen Saturation Last Menstrual Period Breastfeeding? Smoking Status       20.73 kg/m2 98% 2016 No Former Smoker       Basic Information     Date Of Birth Sex Race Ethnicity Preferred Language    1975 Female White Non- English      Problem List              ICD-10-CM Priority Class Noted - Resolved    Substance abuse F19.10 Medium  9/15/2011 - Present    Hyponatremia E87.1   2012 - Present    Cyclic vomiting syndrome (Chronic) G43.A0 Low  2013 - Present    Anxiety (Chronic) F41.9 Medium  2014 - Present    Drug-seeking behavior Z76.5   3/19/2015 - Present    Depression F32.9   2015 - Present    Anemia D64.9   2016 - Present    SMAS (superior mesenteric artery syndrome) (HCC) (Chronic) K55.1   2016 - Present    First degree hemorrhoids K64.0   2016 - Present    Amenorrhea N91.2   2016 - Present    Dizziness of unknown cause R42   2016 - Present   " Fainting spell R55   7/6/2016 - Present    Neuropathic pain of right foot G57.91   7/6/2016 - Present    Menstrual abnormality N92.6   9/12/2016 - Present    Plantar wart B07.0   9/12/2016 - Present    Intractable nausea and vomiting R11.2   9/13/2016 - Present    Cyclical vomiting G43.A0 High  11/7/2016 - Present    Metabolic acidosis, increased anion gap E87.2   11/7/2016 - Present    Metabolic acidosis, normal anion gap (NAG) E87.2 Medium  11/7/2016 - Present    Hypokalemia E87.6   11/7/2016 - Present    Celiac disease/sprue K90.0 Low  11/7/2016 - Present    Dehydration E86.0   11/7/2016 - Present    Back ache M54.9 Medium  11/7/2016 - Present    Nausea and vomiting R11.2   1/4/2017 - Present    Back pain M54.9   1/4/2017 - Present    Back pain M54.9   1/8/2017 - Present    Anxiety F41.9   1/8/2017 - Present    Melena K92.1   1/10/2017 - Present      Health Maintenance        Date Due Completion Dates    IMM DTaP/Tdap/Td Vaccine (5 - Tdap) 10/19/1990 10/18/1990, 2/12/1979, 2/28/1978, 1975, 1975    PAP SMEAR 5/24/1998 ---    IMM INFLUENZA (1) 9/1/2016 9/28/2015, 12/20/2014, 9/17/2011            Current Immunizations     Dtap Vaccine 2/12/1979, 2/28/1978, 1977, 1975    Hepatitis B Vaccine Non-Recombivax (Ped/Adol) 4/14/1994, 3/8/1994    Influenza TIV (IM) 9/17/2011 11:45 AM    Influenza Vaccine 11/8/2008    Influenza Vaccine Quad Inj (Pf) 12/20/2014  3:43 PM    Influenza Vaccine Quad Inj (Preserved) 9/28/2015  2:45 PM    MMR Vaccine 10/18/1990, 10/10/1978    OPV - Historical Data 5/12/1982, 4/28/1979, 2/12/1979, 2/28/1978    Pneumococcal polysaccharide vaccine (PPSV-23) 12/20/2014  3:42 PM    TD Vaccine 10/18/1990      Below and/or attached are the medications your provider expects you to take. Review all of your home medications and newly ordered medications with your provider and/or pharmacist. Follow medication instructions as directed by your provider and/or pharmacist. Please keep your  medication list with you and share with your provider. Update the information when medications are discontinued, doses are changed, or new medications (including over-the-counter products) are added; and carry medication information at all times in the event of emergency situations     Allergies:  METOCLOPRAMIDE - Hives     QUETIAPINE - Unspecified     SEPTRA - Unspecified               Medications  Valid as of: January 10, 2017 - 11:34 AM    Generic Name Brand Name Tablet Size Instructions for use    ALPRAZolam (Tab) XANAX 0.5 MG Take 1 Tab by mouth every 8 hours as needed for Sleep.        Ciprofloxacin HCl (Tab) CIPRO 500 MG Take 1 Tab by mouth 2 times a day.        Ondansetron HCl (Tab) ZOFRAN 4 MG Take 1 Tab by mouth every 6 hours as needed (FOR NAUSEA OR VIMITING) for up to 10 days.        Prenatal Multivit-Min-Fe-FA (Tab) PRE-LAYNE FORMULA  Take 1 Tab by mouth every day.        .                 Medicines prescribed today were sent to:     [facility] [address] [place] [address] - [facility] [address] AT [facility] [address] [place] [address] [place] [address]    Phone: [phone] Fax: [phone]    Open 24 Hours?: No      Medication refill instructions:       If your prescription bottle indicates you have medication refills left, it is not necessary to call your provider’s office. Please contact your pharmacy and they will refill your medication.    If your prescription bottle indicates you do not have any refills left, you may request refills at any time through one of the following ways: The online what3words system (except Urgent Care), by calling your provider’s office, or by asking your pharmacy to contact your provider’s office with a refill request. Medication refills are processed only during regular business hours and may not be available until the next business day. Your provider may request additional information or to have a follow-up visit with you prior to refilling your medication.      *Please Note: Medication refills are assigned a new Rx number when refilled electronically. Your pharmacy may indicate that no refills were authorized even though a new prescription for the same medication is available at the pharmacy. Please request the medicine by name with the pharmacy before contacting your provider for a refill.        Your To Do List     Future Labs/Procedures Complete By Expires    OCCULT BLOOD STOOL  As directed 1/10/2018      Referral     A referral request has been sent to our patient care coordination department. Please allow 3-5 business days for us to process this request and contact you either by phone or mail. If you do not hear from us by the 5th business day, please call us at [phone].        Instructions    Generalized Anxiety Disorder  Generalized anxiety disorder (ESTELA) is a mental disorder. It interferes with life functions, including relationships, work, and school.  ESTELA is different from normal anxiety, which everyone experiences at some point in their lives in response to specific life events and activities. Normal anxiety actually helps us prepare for and get through these life events and activities. Normal anxiety goes away after the event or activity is over.   ESTELA causes anxiety that is not necessarily related to specific events or activities. It also causes excess anxiety in proportion to specific events or activities. The anxiety associated with ESTELA is also difficult to control. ESTELA can vary from mild to severe. People with severe ESTELA can have intense waves of anxiety with physical symptoms (panic attacks).   SYMPTOMS  The anxiety and worry associated with ESTELA are difficult to control. This anxiety and worry are related to many life events and activities and also occur more days than not for 6 months or longer. People with ESTELA also have three or more of the following symptoms (one or more in children):  · Restlessness.    · Fatigue.  · Difficulty concentrating.     · Irritability.  · Muscle tension.  · Difficulty sleeping or unsatisfying sleep.  DIAGNOSIS  ESTELA is diagnosed through an assessment by your health care provider. Your health care provider will ask you questions about your mood, physical symptoms, and events in your life. Your health care provider may ask you about your medical history and use of alcohol or drugs, including prescription medicines. Your health care provider may also do a physical exam and blood tests. Certain medical conditions and the use of certain substances can cause symptoms similar to those associated with ESTELA. Your health care provider may refer you to a mental health specialist for further evaluation.  TREATMENT  The following therapies are usually used to treat ESTELA:   · Medication. Antidepressant medication usually is prescribed for long-term daily control. Antianxiety medicines may be added in severe cases, especially when panic attacks occur.    · Talk therapy (psychotherapy). Certain types of talk therapy can be helpful in treating ESTELA by providing support, education, and guidance. A form of talk therapy called cognitive behavioral therapy can teach you healthy ways to think about and react to daily life events and activities.  · Stress management techniques. These include yoga, meditation, and exercise and can be very helpful when they are practiced regularly.  A mental health specialist can help determine which treatment is best for you. Some people see improvement with one therapy. However, other people require a combination of therapies.     This information is not intended to replace advice given to you by your health care provider. Make sure you discuss any questions you have with your health care provider.     Document Released: 04/14/2014 Document Revised: 01/08/2016 Document Reviewed: 04/14/2014  [employer] Interactive Patient Education ©2016 Elsevier Inc.            MyChart Access Code: [email or web address]  Expires: 1/28/2017  10:33 AM    Specle  A secure, online tool to manage your health information     [employer] ([employer]’s Specle® is a secure, online tool that connects you to your personalized health information from the privacy of your home -- day or night - making it very easy for you to manage your healthcare. Once the activation process is completed, you can even access your medical information using the Specle mirna, which is available for free in the Apple Mirna store or Google Play store.     Specle provides the following levels of access (as shown below):   My Chart Features   [facility] Primary Care Doctor [facility]  Specialists [facility]  Urgent  Care [facility]  Primary Care  Doctor   Email your healthcare team securely and privately 24/7 X X X    Manage appointments: schedule your next appointment; view details of past/upcoming appointments X      Request prescription refills. X      View recent personal medical records, including lab and immunizations X X X X   View health record, including health history, allergies, medications X X X X   Read reports about your outpatient visits, procedures, consult and ER notes X X X X   See your discharge summary, which is a recap of your hospital and/or ER visit that includes your diagnosis, lab results, and care plan. X X       How to register for Specle:  1. Go to  [email or web address].  2. Click on the Sign Up Now box, which takes you to the New Member Sign Up page. You will need to provide the following information:  a. Enter your Specle Access Code exactly as it appears at the top of this page. (You will not need to use this code after you’ve completed the sign-up process. If you do not sign up before the expiration date, you must request a new code.)   b. Enter your date of birth.   c. Enter your home email address.   d. Click Submit, and follow the next screen’s instructions.  3. Create a Specle ID. This will be your Specle login ID and cannot be changed, so think of one that is  secure and easy to remember.  4. Create a He[employer]ul[employer] password. You can change your password at any time.  5. Enter your Password Reset Question and Answer. This can be used at a later time if you forget your password.   6. Enter your e-mail address. This allows you to receive e-mail notifications when new information is available in Heidi Shaulis.  7. Click Sign Up. You can now view your health information.    For assistance activating your [employer] account, call [phone]

## 2017-04-07 ENCOUNTER — APPOINTMENT (OUTPATIENT)
Dept: RADIOLOGY | Facility: MEDICAL CENTER | Age: 42
End: 2017-04-07
Attending: EMERGENCY MEDICINE
Payer: MEDICAID

## 2017-04-07 ENCOUNTER — HOSPITAL ENCOUNTER (EMERGENCY)
Facility: MEDICAL CENTER | Age: 42
End: 2017-04-07
Attending: EMERGENCY MEDICINE
Payer: MEDICAID

## 2017-04-07 VITALS
HEIGHT: 65 IN | DIASTOLIC BLOOD PRESSURE: 61 MMHG | HEART RATE: 92 BPM | TEMPERATURE: 97.8 F | SYSTOLIC BLOOD PRESSURE: 107 MMHG | BODY MASS INDEX: 21.66 KG/M2 | OXYGEN SATURATION: 98 % | RESPIRATION RATE: 20 BRPM | WEIGHT: 130 LBS

## 2017-04-07 DIAGNOSIS — R11.15 NON-INTRACTABLE CYCLICAL VOMITING WITH NAUSEA: ICD-10-CM

## 2017-04-07 LAB
ALBUMIN SERPL BCP-MCNC: 4.5 G/DL (ref 3.2–4.9)
ALBUMIN/GLOB SERPL: 1.3 G/DL
ALP SERPL-CCNC: 62 U/L (ref 30–99)
ALT SERPL-CCNC: 11 U/L (ref 2–50)
ANION GAP SERPL CALC-SCNC: 16 MMOL/L (ref 0–11.9)
AST SERPL-CCNC: 18 U/L (ref 12–45)
BASOPHILS # BLD AUTO: 0.9 % (ref 0–1.8)
BASOPHILS # BLD: 0.08 K/UL (ref 0–0.12)
BILIRUB SERPL-MCNC: 1.6 MG/DL (ref 0.1–1.5)
BUN SERPL-MCNC: 12 MG/DL (ref 8–22)
CALCIUM SERPL-MCNC: 10 MG/DL (ref 8.5–10.5)
CHLORIDE SERPL-SCNC: 104 MMOL/L (ref 96–112)
CO2 SERPL-SCNC: 15 MMOL/L (ref 20–33)
CREAT SERPL-MCNC: 0.8 MG/DL (ref 0.5–1.4)
EOSINOPHIL # BLD AUTO: 0.02 K/UL (ref 0–0.51)
EOSINOPHIL NFR BLD: 0.2 % (ref 0–6.9)
ERYTHROCYTE [DISTWIDTH] IN BLOOD BY AUTOMATED COUNT: 47.4 FL (ref 35.9–50)
GFR SERPL CREATININE-BSD FRML MDRD: >60 ML/MIN/1.73 M 2
GLOBULIN SER CALC-MCNC: 3.6 G/DL (ref 1.9–3.5)
GLUCOSE SERPL-MCNC: 167 MG/DL (ref 65–99)
HCG SERPL QL: NEGATIVE
HCT VFR BLD AUTO: 42.5 % (ref 37–47)
HGB BLD-MCNC: 14.9 G/DL (ref 12–16)
IMM GRANULOCYTES # BLD AUTO: 0.04 K/UL (ref 0–0.11)
IMM GRANULOCYTES NFR BLD AUTO: 0.5 % (ref 0–0.9)
LYMPHOCYTES # BLD AUTO: 1.52 K/UL (ref 1–4.8)
LYMPHOCYTES NFR BLD: 17.9 % (ref 22–41)
MCH RBC QN AUTO: 33.9 PG (ref 27–33)
MCHC RBC AUTO-ENTMCNC: 35.1 G/DL (ref 33.6–35)
MCV RBC AUTO: 96.8 FL (ref 81.4–97.8)
MONOCYTES # BLD AUTO: 0.63 K/UL (ref 0–0.85)
MONOCYTES NFR BLD AUTO: 7.4 % (ref 0–13.4)
NEUTROPHILS # BLD AUTO: 6.18 K/UL (ref 2–7.15)
NEUTROPHILS NFR BLD: 73.1 % (ref 44–72)
NRBC # BLD AUTO: 0 K/UL
NRBC BLD AUTO-RTO: 0 /100 WBC
PLATELET # BLD AUTO: 301 K/UL (ref 164–446)
PMV BLD AUTO: 10.1 FL (ref 9–12.9)
POTASSIUM SERPL-SCNC: 3.1 MMOL/L (ref 3.6–5.5)
PROT SERPL-MCNC: 8.1 G/DL (ref 6–8.2)
RBC # BLD AUTO: 4.39 M/UL (ref 4.2–5.4)
SODIUM SERPL-SCNC: 135 MMOL/L (ref 135–145)
WBC # BLD AUTO: 8.5 K/UL (ref 4.8–10.8)

## 2017-04-07 PROCEDURE — 85025 COMPLETE CBC W/AUTO DIFF WBC: CPT

## 2017-04-07 PROCEDURE — 700105 HCHG RX REV CODE 258: Performed by: EMERGENCY MEDICINE

## 2017-04-07 PROCEDURE — 74176 CT ABD & PELVIS W/O CONTRAST: CPT

## 2017-04-07 PROCEDURE — 96374 THER/PROPH/DIAG INJ IV PUSH: CPT

## 2017-04-07 PROCEDURE — 36415 COLL VENOUS BLD VENIPUNCTURE: CPT

## 2017-04-07 PROCEDURE — 96361 HYDRATE IV INFUSION ADD-ON: CPT

## 2017-04-07 PROCEDURE — 84703 CHORIONIC GONADOTROPIN ASSAY: CPT

## 2017-04-07 PROCEDURE — 80053 COMPREHEN METABOLIC PANEL: CPT

## 2017-04-07 PROCEDURE — 700111 HCHG RX REV CODE 636 W/ 250 OVERRIDE (IP): Performed by: EMERGENCY MEDICINE

## 2017-04-07 PROCEDURE — 96375 TX/PRO/DX INJ NEW DRUG ADDON: CPT

## 2017-04-07 PROCEDURE — 99285 EMERGENCY DEPT VISIT HI MDM: CPT

## 2017-04-07 PROCEDURE — 96376 TX/PRO/DX INJ SAME DRUG ADON: CPT

## 2017-04-07 RX ORDER — KETOROLAC TROMETHAMINE 30 MG/ML
30 INJECTION, SOLUTION INTRAMUSCULAR; INTRAVENOUS ONCE
Status: COMPLETED | OUTPATIENT
Start: 2017-04-07 | End: 2017-04-07

## 2017-04-07 RX ORDER — ONDANSETRON 2 MG/ML
4 INJECTION INTRAMUSCULAR; INTRAVENOUS ONCE
Status: COMPLETED | OUTPATIENT
Start: 2017-04-07 | End: 2017-04-07

## 2017-04-07 RX ORDER — HALOPERIDOL 5 MG/ML
5 INJECTION INTRAMUSCULAR ONCE
Status: COMPLETED | OUTPATIENT
Start: 2017-04-07 | End: 2017-04-07

## 2017-04-07 RX ORDER — ACETAMINOPHEN 325 MG/1
1000 TABLET ORAL ONCE
Status: DISCONTINUED | OUTPATIENT
Start: 2017-04-07 | End: 2017-04-07 | Stop reason: HOSPADM

## 2017-04-07 RX ORDER — LORAZEPAM 2 MG/ML
1 INJECTION INTRAMUSCULAR ONCE
Status: COMPLETED | OUTPATIENT
Start: 2017-04-07 | End: 2017-04-07

## 2017-04-07 RX ORDER — IBUPROFEN 600 MG/1
600 TABLET ORAL ONCE
Status: DISCONTINUED | OUTPATIENT
Start: 2017-04-07 | End: 2017-04-07 | Stop reason: HOSPADM

## 2017-04-07 RX ORDER — SODIUM CHLORIDE 9 MG/ML
1000 INJECTION, SOLUTION INTRAVENOUS ONCE
Status: COMPLETED | OUTPATIENT
Start: 2017-04-07 | End: 2017-04-07

## 2017-04-07 RX ORDER — ONDANSETRON 2 MG/ML
4 INJECTION INTRAMUSCULAR; INTRAVENOUS ONCE
Status: DISCONTINUED | OUTPATIENT
Start: 2017-04-07 | End: 2017-04-07

## 2017-04-07 RX ADMIN — KETOROLAC TROMETHAMINE 30 MG: 30 INJECTION, SOLUTION INTRAMUSCULAR; INTRAVENOUS at 16:47

## 2017-04-07 RX ADMIN — SODIUM CHLORIDE 1000 ML: 9 INJECTION, SOLUTION INTRAVENOUS at 17:54

## 2017-04-07 RX ADMIN — SODIUM CHLORIDE 1000 ML: 9 INJECTION, SOLUTION INTRAVENOUS at 16:46

## 2017-04-07 RX ADMIN — LORAZEPAM 1 MG: 2 INJECTION INTRAMUSCULAR; INTRAVENOUS at 17:21

## 2017-04-07 RX ADMIN — ONDANSETRON 4 MG: 2 INJECTION, SOLUTION INTRAMUSCULAR; INTRAVENOUS at 19:42

## 2017-04-07 RX ADMIN — HALOPERIDOL LACTATE 5 MG: 5 INJECTION, SOLUTION INTRAMUSCULAR at 16:47

## 2017-04-07 RX ADMIN — ONDANSETRON 4 MG: 2 INJECTION, SOLUTION INTRAMUSCULAR; INTRAVENOUS at 16:54

## 2017-04-07 ASSESSMENT — PAIN SCALES - GENERAL: PAINLEVEL_OUTOF10: 10

## 2017-04-07 NOTE — ED AVS SNAPSHOT
Home Care Instructions                                                                                                                Diana Hernandez   MRN: [id number]    Department:  [facility], Emergency Dept   Date of Visit:  4/7/2017            [facility], Emergency Dept    [address] [address]    Phone:  [phone]      You were seen by     Neftali Acuña M.D.      Your Diagnosis Was     Non-intractable cyclical vomiting with nausea     G43.A0       These are the medications you received during your hospitalization from 04/07/2017 1604 to 04/07/2017 1954     Date/Time Order Dose Route Action    04/07/2017 1646 NS infusion 1,000 mL 1,000 mL Intravenous New Bag    04/07/2017 1647 haloperidol lactate (HALDOL) injection 5 mg 5 mg Intravenous Given    04/07/2017 1647 ketorolac (TORADOL) injection 30 mg 30 mg Intravenous Given    04/07/2017 1654 ondansetron (ZOFRAN) syringe/vial injection 4 mg 4 mg Intravenous Given    04/07/2017 1730 acetaminophen (TYLENOL) tablet 975 mg 975 mg Oral Refused    04/07/2017 1730 ibuprofen (MOTRIN) tablet 600 mg 600 mg Oral Refused    04/07/2017 1721 lorazepam (ATIVAN) injection 1 mg 1 mg Intravenous Given    04/07/2017 1754 NS infusion 1,000 mL 1,000 mL Intravenous New Bag    04/07/2017 1942 ondansetron (ZOFRAN) syringe/vial injection 4 mg 4 mg Intravenous Given      Follow-up Information     1. Schedule an appointment as soon as possible for a visit with Maryam Torre M.D..    Specialty:  Internal Medicine    Contact information    [address]  [address] [address] [phone]        Medication Information     Review all of your home medications and newly ordered medications with your primary doctor and/or pharmacist as soon as possible. Follow medication instructions as directed by your doctor and/or pharmacist.     Please keep your complete medication list with you and share with your physician. Update the  information when medications are discontinued, doses are changed, or new medications (including over-the-counter products) are added; and carry medication information at all times in the event of emergency situations.               Medication List      ASK your doctor about these medications        Instructions    Morning Afternoon Evening Bedtime    alprazolam 0.5 MG Tabs   Commonly known as:  XANAX        Take 1 Tab by mouth every 8 hours as needed for Sleep.   Dose:  0.5 mg                        PRE- FORMULA Tabs        Take 1 Tab by mouth every day.   Dose:  1 Tab                                Procedures and tests performed during your visit     BETA-HCG QUALITATIVE SERUM    CBC WITH DIFFERENTIAL    COMP METABOLIC PANEL    CT-ABDOMEN-PELVIS W/O    ESTIMATED GFR    INSERT IV        Discharge Instructions       Nausea and Vomiting  Nausea is a sick feeling that often comes before throwing up (vomiting). Vomiting is a reflex where stomach contents come out of your mouth. Vomiting can cause severe loss of body fluids (dehydration). Children and elderly adults can become dehydrated quickly, especially if they also have diarrhea. Nausea and vomiting are symptoms of a condition or disease. It is important to find the cause of your symptoms.  CAUSES   · Direct irritation of the stomach lining. This irritation can result from increased acid production (gastroesophageal reflux disease), infection, food poisoning, taking certain medicines (such as nonsteroidal anti-inflammatory drugs), alcohol use, or tobacco use.  · Signals from the brain. These signals could be caused by a headache, heat exposure, an inner ear disturbance, increased pressure in the brain from injury, infection, a tumor, or a concussion, pain, emotional stimulus, or metabolic problems.  · An obstruction in the gastrointestinal tract (bowel obstruction).  · Illnesses such as diabetes, hepatitis, gallbladder problems, appendicitis, kidney problems,  cancer, sepsis, atypical symptoms of a heart attack, or eating disorders.  · Medical treatments such as chemotherapy and radiation.  · Receiving medicine that makes you sleep (general anesthetic) during surgery.  DIAGNOSIS  Your caregiver may ask for tests to be done if the problems do not improve after a few days. Tests may also be done if symptoms are severe or if the reason for the nausea and vomiting is not clear. Tests may include:  · Urine tests.  · Blood tests.  · Stool tests.  · Cultures (to look for evidence of infection).  · X-rays or other imaging studies.  Test results can help your caregiver make decisions about treatment or the need for additional tests.  TREATMENT  You need to stay well hydrated. Drink frequently but in small amounts. You may wish to drink water, sports drinks, clear broth, or eat frozen ice pops or gelatin dessert to help stay hydrated. When you eat, eating slowly may help prevent nausea. There are also some antinausea medicines that may help prevent nausea.  HOME CARE INSTRUCTIONS   · Take all medicine as directed by your caregiver.  · If you do not have an appetite, do not force yourself to eat. However, you must continue to drink fluids.  · If you have an appetite, eat a normal diet unless your caregiver tells you differently.  ¨ Eat a variety of complex carbohydrates (rice, wheat, potatoes, bread), lean meats, yogurt, fruits, and vegetables.  ¨ Avoid high-fat foods because they are more difficult to digest.  · Drink enough water and fluids to keep your urine clear or pale yellow.  · If you are dehydrated, ask your caregiver for specific rehydration instructions. Signs of dehydration may include:  ¨ Severe thirst.  ¨ Dry lips and mouth.  ¨ Dizziness.  ¨ Dark urine.  ¨ Decreasing urine frequency and amount.  ¨ Confusion.  ¨ Rapid breathing or pulse.  SEEK IMMEDIATE MEDICAL CARE IF:   · You have blood or brown flecks (like coffee grounds) in your vomit.  · You have black or bloody  stools.  · You have a severe headache or stiff neck.  · You are confused.  · You have severe abdominal pain.  · You have chest pain or trouble breathing.  · You do not urinate at least once every 8 hours.  · You develop cold or clammy skin.  · You continue to vomit for longer than 24 to 48 hours.  · You have a fever.  MAKE SURE YOU:   · Understand these instructions.  · Will watch your condition.  · Will get help right away if you are not doing well or get worse.     This information is not intended to replace advice given to you by your health care provider. Make sure you discuss any questions you have with your health care provider.     Document Released: 12/18/2006 Document Revised: 03/11/2013 Document Reviewed: 05/16/2012  [employer] Interactive Patient Education ©2016 Elsevier Inc.            Patient Information     Patient Information    Following emergency treatment: all patient requiring follow-up care must return either to a private physician or a clinic if your condition worsens before you are able to obtain further medical attention, please return to the emergency room.     Billing Information    At [facility], we work to make the billing process streamlined for our patients.  Our Representatives are here to answer any questions you may have regarding your hospital bill.  If you have insurance coverage and have supplied your insurance information to us, we will submit a claim to your insurer on your behalf.  Should you have any questions regarding your bill, we can be reached online or by phone as follows:  Online: You are able pay your bills online or live chat with our representatives about any billing questions you may have. We are here to help Monday - Friday from 8:00am to 7:30pm and 9:00am - 12:00pm on Saturdays.  Please visit [email or web address]  for more information.   Phone:  [phone] or [phone]    Please note that your emergency physician, surgeon,  pathologist, radiologist, anesthesiologist, and other specialists are not employed by [facility] and will therefore bill separately for their services.  Please contact them directly for any questions concerning their bills at the numbers below:     Emergency Physician Services:  [phone]  [facility]:  [phone]  [facility]:  [phone]  [facility]:  [phone]    1. Your final bill may vary from the amount quoted upon discharge if all procedures are not complete at that time, or if your doctor has additional procedures of which we are not aware. You will receive an additional bill if you return to the Emergency Department at [facility] for suture removal regardless of the facility of which the sutures were placed.     2. Please arrange for settlement of this account at the emergency registration.    3. All self-pay accounts are due in full at the time of treatment.  If you are unable to meet this obligation then payment is expected within 4-5 days.     4. If you have had radiology studies (CT, X-ray, Ultrasound, MRI), you have received a preliminary result during your emergency department visit. Please contact the radiology department [phone] to receive a copy of your final result. Please discuss the Final result with your primary physician or with the follow up physician provided.     Crisis Hotline:  [place] Crisis Hotline:  1-726-GKZBKZL or 1-401.766.6036  [place] Crisis Hotline:    1-885.700.7447 or 049-890-5786         ED Discharge Follow Up Questions    1. In order to provide you with very good care, we would like to follow up with a phone call in the next few days.  May we have your permission to contact you?     YES /  NO    2. What is the best phone number to call you? (       )_____-__________    3. What is the best time to call you?      Morning  /  Afternoon  /  Evening                   Patient Signature:   ____________________________________________________________    Date:  ____________________________________________________________

## 2017-04-07 NOTE — ED AVS SNAPSHOT
4/7/2017          Diana Hernandez  [address] [place] [address]    Dear Diana:    [facility] wants to ensure your discharge home is safe and you or your loved ones have had all your questions answered regarding your care after you leave the hospital.    You may receive a telephone call within two days of your discharge.  This call is to make certain you understand your discharge instructions as well as ensure we provided you with the best care possible during your stay with us.     The call will only last approximately 3-5 minutes and will be done by a nurse.    Once again, we want to ensure your discharge home is safe and that you have a clear understanding of any next steps in your care.  If you have any questions or concerns, please do not hesitate to contact us, we are here for you.  Thank you for choosing [facility] for your healthcare needs.    Sincerely,    Cesar Munoz    [facility]

## 2017-04-07 NOTE — ED PROVIDER NOTES
ED Provider Note    CHIEF COMPLAINT  Chief Complaint   Patient presents with   • Flank Pain   • Abdominal Pain       HPI  Diana Hernandez is a 41 y.o. female who presents for evaluation of right-sided flank pain. The patient states that she has a history of nephrolithiasis, states that she began having some right-sided flank pain beginning this morning after waking from sleep. Patient describes her pain as sharp, located in the left flank, radiating to her abdomen diffusely, sharp in characteristics, severe in intensity, and without any exacerbating or alleviating factors. The patient states that she has had a kidney stone before, and feels that her current pain symptoms are similar to pain that she has had with kidney stones in the past. She denies fevers, hematuria, dysuria, urinary frequency, but does endorse nausea with associated bilious emesis.    REVIEW OF SYSTEMS   Patient denies fever, vision change, sore throat, chest pain, shortness of breath, endorses nausea, denies dysuria, focal muscle pain, rashes, or neurologic deficits     PAST MEDICAL HISTORY   has a past medical history of Snoring; Dental disorder; Pain; Tobacco abuse (6/20/2009); Superior mesenteric artery syndrome (CMS-HCC) (7/12/2009); Anxiety; CLOSTRIDIUM DIFFICILE INFECTION (4/14/2010); Dyspepsia (7/12/2009); Backpain; Nephrolithiasis (6/20/2009); Anxiety disorder; CVS disease; Drug-seeking behavior; Cyclic vomiting syndrome; and Superior mesenteric artery syndrome (CMS-HCC).    SOCIAL HISTORY  Social History     Social History Main Topics   • Smoking status: Former Smoker -- 0.50 packs/day for 13 years     Types: Cigarettes     Quit date: 12/15/2012   • Smokeless tobacco: Never Used      Comment: 1/2ppd   • Alcohol Use: No   • Drug Use: Yes     Special: Inhaled, Marijuana      Comment: 1 gm/day for 10 yr   • Sexual Activity: Not on file       SURGICAL HISTORY   has past surgical history that includes gastroscopy-endo (7/8/2009);  "lithotripsy; pyloroplasty (2009); gastroscopy with biopsy (3/9/2010); exploratory laparotomy (2009); appendectomy (2009); cholecystectomy (2009); other; exploratory laparotomy (2016); bowel resection (2016); and gastroscopy-endo (2016).    CURRENT MEDICATIONS  Home Medications     Reviewed by Yeimi Salinas R.N. (Registered Nurse) on 17 at 1617  Med List Status: Not Addressed    Medication Last Dose Status    alprazolam (XANAX) 0.5 MG Tab 2017 Active    Prenatal Multivit-Min-Fe-FA (PRE- FORMULA) Tab 2017 Active                ALLERGIES  Allergies   Allergen Reactions   • Metoclopramide Hives     Told by MD; pt suspects possible hives.   • Quetiapine Unspecified     MD told her she was allergic     • Septra [Sulfamethoxazole W-Trimethoprim] Unspecified     MD told her she was allergic         PHYSICAL EXAM  VITAL SIGNS: /61 mmHg  Pulse 69  Temp(Src) 36.6 °C (97.8 °F)  Resp 20  Ht 1.651 m (5' 5\")  Wt 58.968 kg (130 lb)  BMI 21.63 kg/m2   Pulse ox interpretation: I interpret this pulse ox as normal.  Constitutional: Alert in moderate distress.  HENT: Head normocephalic, atraumatic, Bilateral external ears normal, Nose normal, mucous membranes are dry.  Eyes: Pupils are equal, extraocular movements intact, Conjunctiva normal, Non-icteric.   Neck: Normal range of motion, Supple, No stridor.   Lymphatic: No lymphadenopathy noted.   Cardiovascular: Regular rate and rhythm   Thorax & Lungs: No acute respiratory distress, No wheezing, No increased work of breathing.   Abdomen: Soft, diffusely tender, no rebound, no guarding, nondistended  Skin: Warm, Dry, No erythema, No rash.   Back: No bony tenderness, right-sided CVA tenderness.   Extremities: Intact distal pulses, No edema, No tenderness, No cyanosis   Musculoskeletal: Good range of motion in all major joints. No tenderness to palpation or major deformities noted.   Neurologic: Alert , Normal motor " function, Normal sensory function, No focal deficits noted.   Psychiatric: Affect normal, Judgment normal, Mood dysphoric.       DIAGNOSTIC STUDIES / PROCEDURES    LABS  Labs Reviewed   CBC WITH DIFFERENTIAL - Abnormal; Notable for the following:     MCH 33.9 (*)     MCHC 35.1 (*)     Neutrophils-Polys 73.10 (*)     Lymphocytes 17.90 (*)     All other components within normal limits   COMP METABOLIC PANEL   URINALYSIS,CULTURE IF INDICATED       RADIOLOGY  CT-ABDOMEN-PELVIS W/O   Final Result         1.  No acute inflammatory or obstructive process.      2.  No hydronephrosis or nephrolithiasis.      3.  Cholecystectomy.          COURSE & MEDICAL DECISION MAKING  Pertinent Labs & Imaging studies reviewed. (See chart for details)    Patient presenting here for evaluation of acute onset right-sided flank pain with vomiting. Here initial considerations included pyelonephritis, nephrolithiasis, and cyclical vomiting syndrome. On review of the chart. The patient has a history of medication seeking behavior, as well as cannabis hyperemesis syndrome, and cyclical vomiting syndrome. Given this, the patient was initially given IV hydration given both dry mucous membranes, as well as an elevated anion gap and depressed bicarbonate, consistent with severe dehydration. The patient was given 2 L normal saline for this, and was also given 5 mg of Haldol for her nausea and vomiting as well. The patient also was given 1 mg of Ativan for anxiety, and the patient's symptoms improved significantly overall. Given the patient's CT scan showing no evidence of nephrolithiasis, or other significant intra-abdominal process that might be the cause of the patient's pain, I do have some concern that the patient either had drug-seeking behavior versus cyclical vomiting syndrome with some associated pain. Regardless, the patient had improvement of her symptoms overall wall here, and will be discharged with primary care follow-up.    The patient  will return for worsening symptoms and is stable at the time of discharge. The patient verbalizes understanding.    The patient is referred to a primary physician for blood pressure management, diabetic screening, and for all other preventative health concerns should they be present.     FINAL IMPRESSION  1. Right flank pain  2. Tobacco addiction  3. Cyclical vomiting syndrome  4. Dehydration      Electronically signed by: Neftali Acuña, 4/7/2017 4:39 PM    This record was made with a voice recognition software. I have tried to correct any grammar, spelling or context errors to the best of my ability, but errors may still remain. Interpretation of this chart should be taken in this context.

## 2017-04-07 NOTE — ED AVS SNAPSHOT
Colatris Access Code: [email or web address]  Expires: 5/7/2017  7:54 PM    Your email address is not on file at [facility].  Email Addresses are required for you to sign up for Colatris, please contact [phone] to verify your personal information and to provide your email address prior to attempting to register for Colatris.    Diana Tovapreethi Hernandez  [address] [place] [address]    Colatris  A secure, online tool to manage your health information     [facility]’s Colatris® is a secure, online tool that connects you to your personalized health information from the privacy of your home -- day or night - making it very easy for you to manage your healthcare. Once the activation process is completed, you can even access your medical information using the Colatris mirna, which is available for free in the Apple Mirna store or Google Play store.     To learn more about Colatris, visit [email or web address]    There are two levels of access available (as shown below):   My Chart Features  [facility] Primary Care Doctor [facility]  Specialists [facility]  Urgent  Care Non-[facility] Primary Care Doctor   Email your healthcare team securely and privately 24/7 X X X    Manage appointments: schedule your next appointment; view details of past/upcoming appointments X      Request prescription refills. X      View recent personal medical records, including lab and immunizations X X X X   View health record, including health history, allergies, medications X X X X   Read reports about your outpatient visits, procedures, consult and ER notes X X X X   See your discharge summary, which is a recap of your hospital and/or ER visit that includes your diagnosis, lab results, and care plan X X  X     How to register for Colatris:  Once your e-mail address has been verified, follow the following steps to sign up for Colatris.     1. Go to  [email or web address]  2. Click on the Sign Up Now box, which takes you to the New Member Sign Up page. You will  need to provide the following information:  a. Enter your [employer] Access Code exactly as it appears at the top of this page. (You will not need to use this code after you’ve completed the sign-up process. If you do not sign up before the expiration date, you must request a new code.)   b. Enter your date of birth.   c. Enter your home email address.   d. Click Submit, and follow the next screen’s instructions.  3. Create a [employer] ID. This will be your [employer] doc[employer] login ID and cannot be changed, so think of one that is secure and easy to remember.  4. Create a [employer]ites password. You can change your password at any time.  5. Enter your Password Reset Question and Answer. This can be used at a later time if you forget your password.   6. Enter your e-mail address. This allows you to receive e-mail notifications when new information is available in [employer] doc[employer]ites.  7. Click Sign Up. You can now view your health information.    For assistance activating your [employer] account, call [phone]

## 2017-04-07 NOTE — ED NOTES
Pt BIB [facility] c\o bilateral flank pain and abdominal pain, pt has hx of kidney stones.  Pt diaphoretic and pale, pt vomiting bile, pt given nasal 100 mcg fentanyl and IM 4mg zofran, pt states pain 10/10, [facility] unable to obtain IV.

## 2017-04-08 NOTE — ED NOTES
Report rec'd from ELMO Jalloh.  Pt calling requesting anxiety and nausea medication.  ERP informed.

## 2017-04-08 NOTE — ED NOTES
"Pt refused tylenol and motrin stating \"Oh I can't take that, I have to have something stronger in my IV, I can't swallow pills because I have a really sensitive gag reflex and those will just make me throw up, I thought he was going to order me something stronger\".  "

## 2017-04-08 NOTE — DISCHARGE INSTRUCTIONS
Nausea and Vomiting  Nausea is a sick feeling that often comes before throwing up (vomiting). Vomiting is a reflex where stomach contents come out of your mouth. Vomiting can cause severe loss of body fluids (dehydration). Children and elderly adults can become dehydrated quickly, especially if they also have diarrhea. Nausea and vomiting are symptoms of a condition or disease. It is important to find the cause of your symptoms.  CAUSES   · Direct irritation of the stomach lining. This irritation can result from increased acid production (gastroesophageal reflux disease), infection, food poisoning, taking certain medicines (such as nonsteroidal anti-inflammatory drugs), alcohol use, or tobacco use.  · Signals from the brain. These signals could be caused by a headache, heat exposure, an inner ear disturbance, increased pressure in the brain from injury, infection, a tumor, or a concussion, pain, emotional stimulus, or metabolic problems.  · An obstruction in the gastrointestinal tract (bowel obstruction).  · Illnesses such as diabetes, hepatitis, gallbladder problems, appendicitis, kidney problems, cancer, sepsis, atypical symptoms of a heart attack, or eating disorders.  · Medical treatments such as chemotherapy and radiation.  · Receiving medicine that makes you sleep (general anesthetic) during surgery.  DIAGNOSIS  Your caregiver may ask for tests to be done if the problems do not improve after a few days. Tests may also be done if symptoms are severe or if the reason for the nausea and vomiting is not clear. Tests may include:  · Urine tests.  · Blood tests.  · Stool tests.  · Cultures (to look for evidence of infection).  · X-rays or other imaging studies.  Test results can help your caregiver make decisions about treatment or the need for additional tests.  TREATMENT  You need to stay well hydrated. Drink frequently but in small amounts. You may wish to drink water, sports drinks, clear broth, or eat frozen  ice pops or gelatin dessert to help stay hydrated. When you eat, eating slowly may help prevent nausea. There are also some antinausea medicines that may help prevent nausea.  HOME CARE INSTRUCTIONS   · Take all medicine as directed by your caregiver.  · If you do not have an appetite, do not force yourself to eat. However, you must continue to drink fluids.  · If you have an appetite, eat a normal diet unless your caregiver tells you differently.  ¨ Eat a variety of complex carbohydrates (rice, wheat, potatoes, bread), lean meats, yogurt, fruits, and vegetables.  ¨ Avoid high-fat foods because they are more difficult to digest.  · Drink enough water and fluids to keep your urine clear or pale yellow.  · If you are dehydrated, ask your caregiver for specific rehydration instructions. Signs of dehydration may include:  ¨ Severe thirst.  ¨ Dry lips and mouth.  ¨ Dizziness.  ¨ Dark urine.  ¨ Decreasing urine frequency and amount.  ¨ Confusion.  ¨ Rapid breathing or pulse.  SEEK IMMEDIATE MEDICAL CARE IF:   · You have blood or brown flecks (like coffee grounds) in your vomit.  · You have black or bloody stools.  · You have a severe headache or stiff neck.  · You are confused.  · You have severe abdominal pain.  · You have chest pain or trouble breathing.  · You do not urinate at least once every 8 hours.  · You develop cold or clammy skin.  · You continue to vomit for longer than 24 to 48 hours.  · You have a fever.  MAKE SURE YOU:   · Understand these instructions.  · Will watch your condition.  · Will get help right away if you are not doing well or get worse.     This information is not intended to replace advice given to you by your health care provider. Make sure you discuss any questions you have with your health care provider.     Document Released: 12/18/2006 Document Revised: 03/11/2013 Document Reviewed: 05/16/2012  [employer] Interactive Patient Education ©2016 [employer]

## 2017-04-10 ENCOUNTER — HOSPITAL ENCOUNTER (EMERGENCY)
Facility: MEDICAL CENTER | Age: 42
End: 2017-04-10
Attending: EMERGENCY MEDICINE
Payer: MEDICAID

## 2017-04-10 ENCOUNTER — APPOINTMENT (OUTPATIENT)
Dept: RADIOLOGY | Facility: MEDICAL CENTER | Age: 42
End: 2017-04-10
Attending: EMERGENCY MEDICINE
Payer: MEDICAID

## 2017-04-10 VITALS
TEMPERATURE: 99.2 F | HEART RATE: 90 BPM | RESPIRATION RATE: 16 BRPM | OXYGEN SATURATION: 96 % | HEIGHT: 65 IN | SYSTOLIC BLOOD PRESSURE: 114 MMHG | DIASTOLIC BLOOD PRESSURE: 72 MMHG | WEIGHT: 128 LBS | BODY MASS INDEX: 21.33 KG/M2

## 2017-04-10 DIAGNOSIS — G43.A0 CYCLICAL VOMITING WITH NAUSEA, INTRACTABILITY OF VOMITING NOT SPECIFIED: ICD-10-CM

## 2017-04-10 LAB
ALBUMIN SERPL BCP-MCNC: 4.4 G/DL (ref 3.2–4.9)
ALBUMIN/GLOB SERPL: 1.3 G/DL
ALP SERPL-CCNC: 53 U/L (ref 30–99)
ALT SERPL-CCNC: 17 U/L (ref 2–50)
AMORPH CRY #/AREA URNS HPF: PRESENT /HPF
ANION GAP SERPL CALC-SCNC: 14 MMOL/L (ref 0–11.9)
APPEARANCE UR: ABNORMAL
AST SERPL-CCNC: 22 U/L (ref 12–45)
BACTERIA #/AREA URNS HPF: ABNORMAL /HPF
BASOPHILS # BLD AUTO: 0.3 % (ref 0–1.8)
BASOPHILS # BLD: 0.03 K/UL (ref 0–0.12)
BILIRUB SERPL-MCNC: 1.9 MG/DL (ref 0.1–1.5)
BILIRUB UR QL STRIP.AUTO: NEGATIVE
BUN SERPL-MCNC: 9 MG/DL (ref 8–22)
CALCIUM SERPL-MCNC: 9.9 MG/DL (ref 8.5–10.5)
CHLORIDE SERPL-SCNC: 103 MMOL/L (ref 96–112)
CO2 SERPL-SCNC: 17 MMOL/L (ref 20–33)
COLOR UR: YELLOW
CREAT SERPL-MCNC: 0.7 MG/DL (ref 0.5–1.4)
CULTURE IF INDICATED INDCX: NO UA CULTURE
EOSINOPHIL # BLD AUTO: 0 K/UL (ref 0–0.51)
EOSINOPHIL NFR BLD: 0 % (ref 0–6.9)
EPI CELLS #/AREA URNS HPF: ABNORMAL /HPF
ERYTHROCYTE [DISTWIDTH] IN BLOOD BY AUTOMATED COUNT: 45.5 FL (ref 35.9–50)
GFR SERPL CREATININE-BSD FRML MDRD: >60 ML/MIN/1.73 M 2
GLOBULIN SER CALC-MCNC: 3.4 G/DL (ref 1.9–3.5)
GLUCOSE SERPL-MCNC: 114 MG/DL (ref 65–99)
GLUCOSE UR STRIP.AUTO-MCNC: NEGATIVE MG/DL
HCT VFR BLD AUTO: 42.8 % (ref 37–47)
HGB BLD-MCNC: 15 G/DL (ref 12–16)
HYALINE CASTS #/AREA URNS LPF: ABNORMAL /LPF
IMM GRANULOCYTES # BLD AUTO: 0.06 K/UL (ref 0–0.11)
IMM GRANULOCYTES NFR BLD AUTO: 0.5 % (ref 0–0.9)
KETONES UR STRIP.AUTO-MCNC: >150 MG/DL
LEUKOCYTE ESTERASE UR QL STRIP.AUTO: NEGATIVE
LIPASE SERPL-CCNC: 11 U/L (ref 11–82)
LYMPHOCYTES # BLD AUTO: 1.14 K/UL (ref 1–4.8)
LYMPHOCYTES NFR BLD: 10.4 % (ref 22–41)
MCH RBC QN AUTO: 33.6 PG (ref 27–33)
MCHC RBC AUTO-ENTMCNC: 35 G/DL (ref 33.6–35)
MCV RBC AUTO: 95.7 FL (ref 81.4–97.8)
MICRO URNS: ABNORMAL
MONOCYTES # BLD AUTO: 0.93 K/UL (ref 0–0.85)
MONOCYTES NFR BLD AUTO: 8.4 % (ref 0–13.4)
MUCOUS THREADS #/AREA URNS HPF: ABNORMAL /HPF
NEUTROPHILS # BLD AUTO: 8.85 K/UL (ref 2–7.15)
NEUTROPHILS NFR BLD: 80.4 % (ref 44–72)
NITRITE UR QL STRIP.AUTO: NEGATIVE
NRBC # BLD AUTO: 0 K/UL
NRBC BLD AUTO-RTO: 0 /100 WBC
PH UR STRIP.AUTO: 6 [PH]
PLATELET # BLD AUTO: 261 K/UL (ref 164–446)
PMV BLD AUTO: 10.1 FL (ref 9–12.9)
POTASSIUM SERPL-SCNC: 3.1 MMOL/L (ref 3.6–5.5)
PROT SERPL-MCNC: 7.8 G/DL (ref 6–8.2)
PROT UR QL STRIP: 70 MG/DL
RBC # BLD AUTO: 4.47 M/UL (ref 4.2–5.4)
RBC UR QL AUTO: NEGATIVE
SODIUM SERPL-SCNC: 134 MMOL/L (ref 135–145)
SP GR UR STRIP.AUTO: 1.02
WBC # BLD AUTO: 11 K/UL (ref 4.8–10.8)
WBC #/AREA URNS HPF: ABNORMAL /HPF

## 2017-04-10 PROCEDURE — 96376 TX/PRO/DX INJ SAME DRUG ADON: CPT

## 2017-04-10 PROCEDURE — 83690 ASSAY OF LIPASE: CPT

## 2017-04-10 PROCEDURE — 85025 COMPLETE CBC W/AUTO DIFF WBC: CPT

## 2017-04-10 PROCEDURE — 700111 HCHG RX REV CODE 636 W/ 250 OVERRIDE (IP): Performed by: EMERGENCY MEDICINE

## 2017-04-10 PROCEDURE — 74000 DX-ABDOMEN-1 VIEW: CPT

## 2017-04-10 PROCEDURE — 80053 COMPREHEN METABOLIC PANEL: CPT

## 2017-04-10 PROCEDURE — 81001 URINALYSIS AUTO W/SCOPE: CPT

## 2017-04-10 PROCEDURE — 99285 EMERGENCY DEPT VISIT HI MDM: CPT

## 2017-04-10 PROCEDURE — 96375 TX/PRO/DX INJ NEW DRUG ADDON: CPT

## 2017-04-10 PROCEDURE — 96365 THER/PROPH/DIAG IV INF INIT: CPT

## 2017-04-10 PROCEDURE — 96361 HYDRATE IV INFUSION ADD-ON: CPT

## 2017-04-10 RX ORDER — DIPHENHYDRAMINE HYDROCHLORIDE 50 MG/ML
25 INJECTION INTRAMUSCULAR; INTRAVENOUS ONCE
Status: COMPLETED | OUTPATIENT
Start: 2017-04-10 | End: 2017-04-10

## 2017-04-10 RX ORDER — DEXTROSE AND SODIUM CHLORIDE 5; .9 G/100ML; G/100ML
1000 INJECTION, SOLUTION INTRAVENOUS ONCE
Status: COMPLETED | OUTPATIENT
Start: 2017-04-10 | End: 2017-04-10

## 2017-04-10 RX ORDER — LORAZEPAM 2 MG/ML
1 INJECTION INTRAMUSCULAR ONCE
Status: COMPLETED | OUTPATIENT
Start: 2017-04-10 | End: 2017-04-10

## 2017-04-10 RX ORDER — SODIUM CHLORIDE, SODIUM LACTATE, POTASSIUM CHLORIDE, CALCIUM CHLORIDE 600; 310; 30; 20 MG/100ML; MG/100ML; MG/100ML; MG/100ML
1000 INJECTION, SOLUTION INTRAVENOUS ONCE
Status: COMPLETED | OUTPATIENT
Start: 2017-04-10 | End: 2017-04-10

## 2017-04-10 RX ORDER — ONDANSETRON 4 MG/1
4 TABLET, ORALLY DISINTEGRATING ORAL EVERY 8 HOURS PRN
Qty: 10 TAB | Refills: 0 | Status: SHIPPED | OUTPATIENT
Start: 2017-04-10 | End: 2017-08-26

## 2017-04-10 RX ORDER — PROMETHAZINE HYDROCHLORIDE 25 MG/1
25 SUPPOSITORY RECTAL EVERY 6 HOURS PRN
Qty: 10 SUPPOSITORY | Refills: 0 | Status: SHIPPED | OUTPATIENT
Start: 2017-04-10 | End: 2017-08-26

## 2017-04-10 RX ORDER — POTASSIUM CHLORIDE 7.45 MG/ML
10 INJECTION INTRAVENOUS ONCE
Status: COMPLETED | OUTPATIENT
Start: 2017-04-10 | End: 2017-04-10

## 2017-04-10 RX ORDER — PROMETHAZINE HYDROCHLORIDE 25 MG/1
25 TABLET ORAL EVERY 6 HOURS PRN
Qty: 30 TAB | Refills: 0 | Status: SHIPPED | OUTPATIENT
Start: 2017-04-10 | End: 2017-04-10

## 2017-04-10 RX ORDER — PROMETHAZINE HYDROCHLORIDE 25 MG/1
25 TABLET ORAL EVERY 6 HOURS PRN
Qty: 30 TAB | Refills: 0 | Status: SHIPPED | OUTPATIENT
Start: 2017-04-10 | End: 2017-08-26

## 2017-04-10 RX ORDER — HALOPERIDOL 5 MG/ML
5 INJECTION INTRAMUSCULAR ONCE
Status: COMPLETED | OUTPATIENT
Start: 2017-04-10 | End: 2017-04-10

## 2017-04-10 RX ADMIN — POTASSIUM CHLORIDE 10 MEQ: 7.46 INJECTION, SOLUTION INTRAVENOUS at 05:40

## 2017-04-10 RX ADMIN — LORAZEPAM 1 MG: 2 INJECTION INTRAMUSCULAR at 07:31

## 2017-04-10 RX ADMIN — DEXTROSE AND SODIUM CHLORIDE 1000 ML: 5; 900 INJECTION, SOLUTION INTRAVENOUS at 07:31

## 2017-04-10 RX ADMIN — SODIUM CHLORIDE, POTASSIUM CHLORIDE, SODIUM LACTATE AND CALCIUM CHLORIDE 1000 ML: 600; 310; 30; 20 INJECTION, SOLUTION INTRAVENOUS at 05:40

## 2017-04-10 RX ADMIN — HALOPERIDOL LACTATE 5 MG: 5 INJECTION, SOLUTION INTRAMUSCULAR at 07:32

## 2017-04-10 RX ADMIN — DIPHENHYDRAMINE HYDROCHLORIDE 25 MG: 50 INJECTION, SOLUTION INTRAMUSCULAR; INTRAVENOUS at 07:31

## 2017-04-10 RX ADMIN — LORAZEPAM 1 MG: 2 INJECTION INTRAMUSCULAR at 05:40

## 2017-04-10 ASSESSMENT — LIFESTYLE VARIABLES: DO YOU DRINK ALCOHOL: NO

## 2017-04-10 ASSESSMENT — PAIN SCALES - GENERAL: PAINLEVEL_OUTOF10: 10

## 2017-04-10 NOTE — ED PROVIDER NOTES
ED Provider Note    CHIEF COMPLAINT  Chief Complaint   Patient presents with   • Back Pain     mid back, radiates down.    • Nausea/Vomiting/Diarrhea         HPI  Diana Hernandez is a 41 y.o. female who presents with nausea, vomiting and diarrhea. Patient has a history of cyclical vomiting syndrome. She reports she hasn't been feeling well over the last one week or so. Her last 4 or 5 days she's had nausea and vomiting. She's had liquid stool as well. Nonbloody nonbilious emesis. Watery stool without any blood. She is vague abdominal cramping and has mid back pain. She has not had trauma. No fevers or chills. Denies dysuria, but has had hesitancy. No vaginal bleeding or abnormal discharge. Denies significant pelvic pain. No known ill contacts.    REVIEW OF SYSTEMS  As per HPI  All other systems are negative.     PAST MEDICAL HISTORY  Past Medical History   Diagnosis Date   • Snoring    • Dental disorder    • Pain      abd and back   • Tobacco abuse 6/20/2009   • Superior mesenteric artery syndrome (CMS-HCC) 7/12/2009   • Anxiety      Followed by [facility]   • CLOSTRIDIUM DIFFICILE INFECTION 4/14/2010   • Dyspepsia 7/12/2009   • Backpain    • Nephrolithiasis 6/20/2009     kidney stones,post lithotrypsy   • Anxiety disorder    • CVS disease    • Drug-seeking behavior    • Cyclic vomiting syndrome    • Superior mesenteric artery syndrome (CMS-HCC)        FAMILY HISTORY  Family History   Problem Relation Age of Onset   • Cancer Mother 50     Colon cancer   • Allergies Father    • Hypertension Mother    • Hypertension Father    • Heart Disease Maternal Grandmother        SOCIAL HISTORY  Social History   Substance Use Topics   • Smoking status: Former Smoker -- 0.50 packs/day for 13 years     Types: Cigarettes     Quit date: 12/15/2012   • Smokeless tobacco: Never Used      Comment: 1/2ppd   • Alcohol Use: No       SURGICAL HISTORY  Past Surgical History   Procedure Laterality Date   • Gastroscopy-endo  7/8/2009      "Performed by ROSANNA WRIGHT at SURGERY [facility]   • Lithotripsy     • Pyloroplasty  7/27/2009     Performed by MACIEL QUINTERO at SURGERY [facility]   • Gastroscopy with biopsy  3/9/2010     Performed by AMANDA MEJIA at ENDOSCOPY [facility]   • Exploratory laparotomy  7/27/2009     Performed by MACIEL QUINTERO at SURGERY [facility]   • Appendectomy  7/27/2009     Performed by MACIEL QUINTERO at SURGERY [facility]   • Cholecystectomy  7/27/2009     Performed by MACIEL QUINTERO at SURGERY [facility]   • Other       superior mesenteric artery correction    • Exploratory laparotomy  1/12/2016     Procedure: EXPLORATORY LAPAROTOMY;  Surgeon: Maciel Quintero M.D.;  Location: SURGERY [facility];  Service:    • Bowel resection  1/12/2016     Procedure: BOWEL RESECTION;  Surgeon: Maciel Quintero M.D.;  Location: SURGERY [facility];  Service:    • Gastroscopy-endo  4/28/2016     Procedure: GASTROSCOPY-ENDO;  Surgeon: Blayne Blackburn M.D.;  Location: ENDOSCOPY [facility];  Service:        CURRENT MEDICATIONS  Home Medications     Reviewed by Edith Sheridan R.N. (Registered Nurse) on 04/10/17 at 0403  Med List Status: Complete    Medication Last Dose Status    multivitamin (THERAGRAN) Tab  Active                ALLERGIES  Allergies   Allergen Reactions   • Metoclopramide Hives     Told by MD; pt suspects possible hives.   • Quetiapine Unspecified     MD told her she was allergic     • Septra [Sulfamethoxazole W-Trimethoprim] Unspecified     MD told her she was allergic         PHYSICAL EXAM  VITAL SIGNS: /72 mmHg  Pulse 90  Temp(Src) 37.3 °C (99.2 °F)  Resp 16  Ht 1.651 m (5' 5\")  Wt 58.06 kg (128 lb)  BMI 21.30 kg/m2  SpO2 96%  LMP 03/15/2017  Constitutional: Awake and alert. Anxious  HENT: Normal inspection, dry mucous membranes  Eyes: Sclera white  Neck: Normal range of motion  Cardiovascular: " Tachycardic heart rate, Normal rhythm  Thorax & Lungs: Normal breath sounds, No respiratory distress, No wheezing, No chest tenderness.   Abdomen: Soft, nondistended, no rebound or peritonitis. Normal bowel sounds.  Skin: No rash.   Back: No tenderness, No CVA tenderness.   Extremities: Intact, symmetric distal pulses, no edema.  Neurologic: Grossly normal    RADIOLOGY/PROCEDURES  [id number] VIEW   Final Result      No evidence of bowel obstruction.                       Imaging is interpreted by radiologist    Labs:   Results for orders placed or performed during the hospital encounter of 04/10/17   CBC WITH DIFFERENTIAL   Result Value Ref Range    WBC 11.0 (H) 4.8 - 10.8 K/uL    RBC 4.47 4.20 - 5.40 M/uL    Hemoglobin 15.0 12.0 - 16.0 g/dL    Hematocrit 42.8 37.0 - 47.0 %    MCV 95.7 81.4 - 97.8 fL    MCH 33.6 (H) 27.0 - 33.0 pg    MCHC 35.0 33.6 - 35.0 g/dL    RDW 45.5 35.9 - 50.0 fL    Platelet Count 261 164 - 446 K/uL    MPV 10.1 9.0 - 12.9 fL    Neutrophils-Polys 80.40 (H) 44.00 - 72.00 %    Lymphocytes 10.40 (L) 22.00 - 41.00 %    Monocytes 8.40 0.00 - 13.40 %    Eosinophils 0.00 0.00 - 6.90 %    Basophils 0.30 0.00 - 1.80 %    Immature Granulocytes 0.50 0.00 - 0.90 %    Nucleated RBC 0.00 /100 WBC    Neutrophils (Absolute) 8.85 (H) 2.00 - 7.15 K/uL    Lymphs (Absolute) 1.14 1.00 - 4.80 K/uL    Monos (Absolute) 0.93 (H) 0.00 - 0.85 K/uL    Eos (Absolute) 0.00 0.00 - 0.51 K/uL    Baso (Absolute) 0.03 0.00 - 0.12 K/uL    Immature Granulocytes (abs) 0.06 0.00 - 0.11 K/uL    NRBC (Absolute) 0.00 K/uL   COMP METABOLIC PANEL   Result Value Ref Range    Sodium 134 (L) 135 - 145 mmol/L    Potassium 3.1 (L) 3.6 - 5.5 mmol/L    Chloride 103 96 - 112 mmol/L    Co2 17 (L) 20 - 33 mmol/L    Anion Gap 14.0 (H) 0.0 - 11.9    Glucose 114 (H) 65 - 99 mg/dL    Bun 9 8 - 22 mg/dL    Creatinine 0.70 0.50 - 1.40 mg/dL    Calcium 9.9 8.5 - 10.5 mg/dL    AST(SGOT) 22 12 - 45 U/L    ALT(SGPT) 17 2 - 50 U/L    Alkaline Phosphatase  53 30 - 99 U/L    Total Bilirubin 1.9 (H) 0.1 - 1.5 mg/dL    Albumin 4.4 3.2 - 4.9 g/dL    Total Protein 7.8 6.0 - 8.2 g/dL    Globulin 3.4 1.9 - 3.5 g/dL    A-G Ratio 1.3 g/dL   LIPASE   Result Value Ref Range    Lipase 11 11 - 82 U/L   URINALYSIS CULTURE, IF INDICATED   Result Value Ref Range    Micro Urine Req Microscopic     Color Yellow     Character Hazy (A)     Specific Gravity 1.017 <1.035    Ph 6.0 5.0-8.0    Glucose Negative Negative mg/dL    Ketones >150 (A) Negative mg/dL    Protein 70 (A) Negative mg/dL    Bilirubin Negative Negative    Nitrite Negative Negative    Leukocyte Esterase Negative Negative    Occult Blood Negative Negative    Culture Indicated No UA Culture   ESTIMATED GFR   Result Value Ref Range    GFR If African American >60 >60 mL/min/1.73 m 2    GFR If Non African American >60 >60 mL/min/1.73 m 2   URINE MICROSCOPIC (W/UA)   Result Value Ref Range    WBC 2-5 /hpf    Bacteria Few (A) None /hpf    Epithelial Cells Few /hpf    Mucous Threads Many /hpf    Amorphous Crystal Present /hpf    Hyaline Cast 0-2 /lpf         COURSE & MEDICAL DECISION MAKING  Patient presents with nausea, vomiting, diarrhea. She has a history of this. She was in the hospital 3 days ago with the same symptoms. She felt better at her time of discharge from that visit. She's been admitted to the hospital many times with cyclical vomiting. She is status post appendectomy as well as cholecystectomy. She does not have clinical exam consistent with obstruction. Further I obtained an x-ray that does not demonstrate obstruction. She is afebrile. She did appear mildly dehydrated. She was hydrated with 1 L of lactated Ringer's. She was given 10 mEq of KCl as well. She was given an additional 1 L of D5 normal saline. Laboratory data as noted above. This is consistent with mild dehydration and she has ketonuria. D5 should improve this. Treated in the ER with Ativan as she was extremely anxious on arrival. A later time she was  given Haldol and Benadryl intravenously. She was observed in the ER. She reported she was feeling much better. She was given 2 cups of apple juice which she drank without difficulty and reported she felt well. At this point she is appropriate for outpatient management. I've given her prescription for potassium replenishment. She is also prescribed Phenergan oral and suppositories. She is also given a prescription for Zofran. I've asked her to follow up with her doctor within the week. She needs to have repeat laboratory data to ensure that her potassium is improving and I discussed this with her. I've asked her to return to the ER if she has any change in her symptoms, fevers, severe abdominal pain or concern.    FINAL IMPRESSION  1. Cyclic vomiting with dehydration  2. Hypokalemia  3. Anxiety        This dictation was created using voice recognition software. The accuracy of the dictation is limited to the abilities of the software.  The nursing notes were reviewed and certain aspects of this information were incorporated into this note.    Electronically signed by: Mohan Del Real, 4/10/2017 12:03 PM

## 2017-04-10 NOTE — DISCHARGE INSTRUCTIONS
Nausea and Vomiting  Nausea is a sick feeling that often comes before throwing up (vomiting). Vomiting is a reflex where stomach contents come out of your mouth. Vomiting can cause severe loss of body fluids (dehydration). Children and elderly adults can become dehydrated quickly, especially if they also have diarrhea. Nausea and vomiting are symptoms of a condition or disease. It is important to find the cause of your symptoms.  CAUSES   · Direct irritation of the stomach lining. This irritation can result from increased acid production (gastroesophageal reflux disease), infection, food poisoning, taking certain medicines (such as nonsteroidal anti-inflammatory drugs), alcohol use, or tobacco use.  · Signals from the brain. These signals could be caused by a headache, heat exposure, an inner ear disturbance, increased pressure in the brain from injury, infection, a tumor, or a concussion, pain, emotional stimulus, or metabolic problems.  · An obstruction in the gastrointestinal tract (bowel obstruction).  · Illnesses such as diabetes, hepatitis, gallbladder problems, appendicitis, kidney problems, cancer, sepsis, atypical symptoms of a heart attack, or eating disorders.  · Medical treatments such as chemotherapy and radiation.  · Receiving medicine that makes you sleep (general anesthetic) during surgery.  DIAGNOSIS  Your caregiver may ask for tests to be done if the problems do not improve after a few days. Tests may also be done if symptoms are severe or if the reason for the nausea and vomiting is not clear. Tests may include:  · Urine tests.  · Blood tests.  · Stool tests.  · Cultures (to look for evidence of infection).  · X-rays or other imaging studies.  Test results can help your caregiver make decisions about treatment or the need for additional tests.  TREATMENT  You need to stay well hydrated. Drink frequently but in small amounts. You may wish to drink water, sports drinks, clear broth, or eat frozen  ice pops or gelatin dessert to help stay hydrated. When you eat, eating slowly may help prevent nausea. There are also some antinausea medicines that may help prevent nausea.  HOME CARE INSTRUCTIONS   · Take all medicine as directed by your caregiver.  · If you do not have an appetite, do not force yourself to eat. However, you must continue to drink fluids.  · If you have an appetite, eat a normal diet unless your caregiver tells you differently.  ¨ Eat a variety of complex carbohydrates (rice, wheat, potatoes, bread), lean meats, yogurt, fruits, and vegetables.  ¨ Avoid high-fat foods because they are more difficult to digest.  · Drink enough water and fluids to keep your urine clear or pale yellow.  · If you are dehydrated, ask your caregiver for specific rehydration instructions. Signs of dehydration may include:  ¨ Severe thirst.  ¨ Dry lips and mouth.  ¨ Dizziness.  ¨ Dark urine.  ¨ Decreasing urine frequency and amount.  ¨ Confusion.  ¨ Rapid breathing or pulse.  SEEK IMMEDIATE MEDICAL CARE IF:   · You have blood or brown flecks (like coffee grounds) in your vomit.  · You have black or bloody stools.  · You have a severe headache or stiff neck.  · You are confused.  · You have severe abdominal pain.  · You have chest pain or trouble breathing.  · You do not urinate at least once every 8 hours.  · You develop cold or clammy skin.  · You continue to vomit for longer than 24 to 48 hours.  · You have a fever.  MAKE SURE YOU:   · Understand these instructions.  · Will watch your condition.  · Will get help right away if you are not doing well or get worse.     This information is not intended to replace advice given to you by your health care provider. Make sure you discuss any questions you have with your health care provider.     Document Released: 12/18/2006 Document Revised: 03/11/2013 Document Reviewed: 05/16/2012  [employer] Interactive Patient Education ©2016 [employer].

## 2017-04-10 NOTE — ED NOTES
Pt discharge home. Pt given discharge instructions and prescription. Pt verbalized understanding, all questions answered ,vss upon d/c. Pt steady on feet upon discharge  Fiance will be driving pt home

## 2017-04-10 NOTE — ED AVS SNAPSHOT
Home Care Instructions                                                                                                                Diana Hernandez   MRN: [id number]    Department:  [facility], Emergency Dept   Date of Visit:  4/10/2017            [facility], Emergency Dept    [address]    [place] [address]    Phone:  [phone]      You were seen by     Mohan Del Real M.D.      Your Diagnosis Was     Cyclical vomiting with nausea, intractability of vomiting not specified     G43.A0       These are the medications you received during your hospitalization from 04/10/2017 0337 to 04/10/2017 1004     Date/Time Order Dose Route Action    04/10/2017 0540 lorazepam (ATIVAN) injection 1 mg 1 mg Intravenous Given    04/10/2017 0540 LR infusion (bolus) 1,000 mL Intravenous New Bag    04/10/2017 0540 potassium chloride in water (KCL) ivpb 10 mEq 10 mEq Intravenous New Bag    04/10/2017 0731 D5 NS infusion 1,000 mL 1,000 mL Intravenous New Bag    04/10/2017 0731 lorazepam (ATIVAN) injection 1 mg 1 mg Intravenous Given    04/10/2017 0732 haloperidol lactate (HALDOL) injection 5 mg 5 mg Intravenous Given    04/10/2017 0731 diphenhydrAMINE (BENADRYL) injection 25 mg 25 mg Intravenous Given      Medication Information     Review all of your home medications and newly ordered medications with your primary doctor and/or pharmacist as soon as possible. Follow medication instructions as directed by your doctor and/or pharmacist.     Please keep your complete medication list with you and share with your physician. Update the information when medications are discontinued, doses are changed, or new medications (including over-the-counter products) are added; and carry medication information at all times in the event of emergency situations.               Medication List      START taking these medications        Instructions    Morning Afternoon Evening Bedtime    ondansetron 4 MG  Tbdp   Commonly known as:  ZOFRAN ODT        Take 1 Tab by mouth every 8 hours as needed.   Dose:  4 mg                        potassium bicarbonate 25 MEQ tablet   Commonly known as:  KLYTE        Take 1 Tab by mouth 2 times a day.   Dose:  25 mEq                        * promethazine 25 MG Supp   Commonly known as:  PHENERGAN        Insert 1 Suppository in rectum every 6 hours as needed for Nausea/Vomiting.   Dose:  25 mg                        * promethazine 25 MG Tabs   Commonly known as:  PHENERGAN        Take 1 Tab by mouth every 6 hours as needed for Nausea/Vomiting.   Dose:  25 mg                        * Notice:  This list has 2 medication(s) that are the same as other medications prescribed for you. Read the directions carefully, and ask your doctor or other care provider to review them with you.      ASK your doctor about these medications        Instructions    Morning Afternoon Evening Bedtime    multivitamin Tabs        Take 1 Tab by mouth every day.   Dose:  1 Tab                             Where to Get Your Medications      These medications were sent to [id number] - [place], [place] - [address] AT [facility] [address] [place] [address] [place] [address]     Phone:  [phone]    - potassium bicarbonate 25 MEQ tablet  - promethazine 25 MG Tabs      You can get these medications from any pharmacy     Bring a paper prescription for each of these medications    - ondansetron 4 MG Tbdp  - promethazine 25 MG Supp            Procedures and tests performed during your visit     CBC WITH DIFFERENTIAL    COMP METABOLIC PANEL    [id number] VIEW    ESTIMATED GFR    INSERTION CATH MINI    IV Saline Lock    LIPASE    URINALYSIS CULTURE, IF INDICATED    URINE MICROSCOPIC (W/UA)        Discharge Instructions         Nausea and Vomiting  Nausea is a sick feeling that often comes before throwing up (vomiting). Vomiting is a reflex where stomach contents come out of your mouth. Vomiting can  cause severe loss of body fluids (dehydration). Children and elderly adults can become dehydrated quickly, especially if they also have diarrhea. Nausea and vomiting are symptoms of a condition or disease. It is important to find the cause of your symptoms.  CAUSES   · Direct irritation of the stomach lining. This irritation can result from increased acid production (gastroesophageal reflux disease), infection, food poisoning, taking certain medicines (such as nonsteroidal anti-inflammatory drugs), alcohol use, or tobacco use.  · Signals from the brain. These signals could be caused by a headache, heat exposure, an inner ear disturbance, increased pressure in the brain from injury, infection, a tumor, or a concussion, pain, emotional stimulus, or metabolic problems.  · An obstruction in the gastrointestinal tract (bowel obstruction).  · Illnesses such as diabetes, hepatitis, gallbladder problems, appendicitis, kidney problems, cancer, sepsis, atypical symptoms of a heart attack, or eating disorders.  · Medical treatments such as chemotherapy and radiation.  · Receiving medicine that makes you sleep (general anesthetic) during surgery.  DIAGNOSIS  Your caregiver may ask for tests to be done if the problems do not improve after a few days. Tests may also be done if symptoms are severe or if the reason for the nausea and vomiting is not clear. Tests may include:  · Urine tests.  · Blood tests.  · Stool tests.  · Cultures (to look for evidence of infection).  · X-rays or other imaging studies.  Test results can help your caregiver make decisions about treatment or the need for additional tests.  TREATMENT  You need to stay well hydrated. Drink frequently but in small amounts. You may wish to drink water, sports drinks, clear broth, or eat frozen ice pops or gelatin dessert to help stay hydrated. When you eat, eating slowly may help prevent nausea. There are also some antinausea medicines that may help prevent  nausea.  HOME CARE INSTRUCTIONS   · Take all medicine as directed by your caregiver.  · If you do not have an appetite, do not force yourself to eat. However, you must continue to drink fluids.  · If you have an appetite, eat a normal diet unless your caregiver tells you differently.  ¨ Eat a variety of complex carbohydrates (rice, wheat, potatoes, bread), lean meats, yogurt, fruits, and vegetables.  ¨ Avoid high-fat foods because they are more difficult to digest.  · Drink enough water and fluids to keep your urine clear or pale yellow.  · If you are dehydrated, ask your caregiver for specific rehydration instructions. Signs of dehydration may include:  ¨ Severe thirst.  ¨ Dry lips and mouth.  ¨ Dizziness.  ¨ Dark urine.  ¨ Decreasing urine frequency and amount.  ¨ Confusion.  ¨ Rapid breathing or pulse.  SEEK IMMEDIATE MEDICAL CARE IF:   · You have blood or brown flecks (like coffee grounds) in your vomit.  · You have black or bloody stools.  · You have a severe headache or stiff neck.  · You are confused.  · You have severe abdominal pain.  · You have chest pain or trouble breathing.  · You do not urinate at least once every 8 hours.  · You develop cold or clammy skin.  · You continue to vomit for longer than 24 to 48 hours.  · You have a fever.  MAKE SURE YOU:   · Understand these instructions.  · Will watch your condition.  · Will get help right away if you are not doing well or get worse.     This information is not intended to replace advice given to you by your health care provider. Make sure you discuss any questions you have with your health care provider.     Document Released: 12/18/2006 Document Revised: 03/11/2013 Document Reviewed: 05/16/2012  [employer] Interactive Patient Education ©2016 [employer].            Patient Information     Patient Information    Following emergency treatment: all patient requiring follow-up care must return either to a private physician or a clinic if your condition  worsens before you are able to obtain further medical attention, please return to the emergency room.     Billing Information    At [facility], we work to make the billing process streamlined for our patients.  Our Representatives are here to answer any questions you may have regarding your hospital bill.  If you have insurance coverage and have supplied your insurance information to us, we will submit a claim to your insurer on your behalf.  Should you have any questions regarding your bill, we can be reached online or by phone as follows:  Online: You are able pay your bills online or live chat with our representatives about any billing questions you may have. We are here to help Monday - Friday from 8:00am to 7:30pm and 9:00am - 12:00pm on Saturdays.  Please visit [email or web address]  for more information.   Phone:  [phone] or [phone]    Please note that your emergency physician, surgeon, pathologist, radiologist, anesthesiologist, and other specialists are not employed by [facility] and will therefore bill separately for their services.  Please contact them directly for any questions concerning their bills at the numbers below:     Emergency Physician Services:  [phone]  [facility]:  [phone]  [facility]:  [phone]  [facility]:  [phone]    1. Your final bill may vary from the amount quoted upon discharge if all procedures are not complete at that time, or if your doctor has additional procedures of which we are not aware. You will receive an additional bill if you return to the Emergency Department at [facility] for suture removal regardless of the facility of which the sutures were placed.     2. Please arrange for settlement of this account at the emergency registration.    3. All self-pay accounts are due in full at the time of treatment.  If you are unable to meet this obligation then payment  is expected within 4-5 days.     4. If you have had radiology studies (CT, X-ray, Ultrasound, MRI), you have received a preliminary result during your emergency department visit. Please contact the radiology department [phone] to receive a copy of your final result. Please discuss the Final result with your primary physician or with the follow up physician provided.     Crisis Hotline:  [place] Crisis Hotline:  4-928-NWQHXAG or 1-614.931.7114  [place] Crisis Hotline:    1-142.691.7100 or 117-412-6030         ED Discharge Follow Up Questions    1. In order to provide you with very good care, we would like to follow up with a phone call in the next few days.  May we have your permission to contact you?     YES /  NO    2. What is the best phone number to call you? (       )_____-__________    3. What is the best time to call you?      Morning  /  Afternoon  /  Evening                   Patient Signature:  ____________________________________________________________    Date:  ____________________________________________________________

## 2017-04-10 NOTE — ED NOTES
Patient has called out 4 times for pain and nausea medicine. I alerted to MD and he is aware. Patient also notified of that.

## 2017-04-10 NOTE — ED NOTES
Patient to ED triage via EMS from home for complaints of back pain. States she does have chronic pain in same area. Pain is 10/10 radiates to low back. Does not radiate to legs. No new numbness of tingling.   She has complaints of diarrhea and vomiting x1 day. Urinary hesitancy x2 days.Temp recheck 98.2.     Pt educated on ED process and asked to wait in lobby. Patient educated on importance of alerting staff to new or worsening symptoms or concerns.

## 2017-04-10 NOTE — ED AVS SNAPSHOT
Glide Access Code: [email or web address]  Expires: 5/7/2017  7:54 PM    Your email address is not on file at [facility].  Email Addresses are required for you to sign up for Glide, please contact [phone] to verify your personal information and to provide your email address prior to attempting to register for Glide.    Diana Tovapreethi Hernandez  [address] [place] [address]    Glide  A secure, online tool to manage your health information     [facility]’s Glide® is a secure, online tool that connects you to your personalized health information from the privacy of your home -- day or night - making it very easy for you to manage your healthcare. Once the activation process is completed, you can even access your medical information using the Glide mirna, which is available for free in the Apple Mirna store or Google Play store.     To learn more about Glide, visit [email or web address]    There are two levels of access available (as shown below):   My Chart Features  [facility] Primary Care Doctor [facility]  Specialists [facility]  Urgent  Care Non-[facility] Primary Care Doctor   Email your healthcare team securely and privately 24/7 X X X    Manage appointments: schedule your next appointment; view details of past/upcoming appointments X      Request prescription refills. X      View recent personal medical records, including lab and immunizations X X X X   View health record, including health history, allergies, medications X X X X   Read reports about your outpatient visits, procedures, consult and ER notes X X X X   See your discharge summary, which is a recap of your hospital and/or ER visit that includes your diagnosis, lab results, and care plan X X  X     How to register for Glide:  Once your e-mail address has been verified, follow the following steps to sign up for Glide.     1. Go to  [email or web address]  2. Click on the Sign Up Now box, which takes you to the New Member Sign Up page. You will  need to provide the following information:  a. Enter your [employer] Access Code exactly as it appears at the top of this page. (You will not need to use this code after you’ve completed the sign-up process. If you do not sign up before the expiration date, you must request a new code.)   b. Enter your date of birth.   c. Enter your home email address.   d. Click Submit, and follow the next screen’s instructions.  3. Create a Magiqt ID. This will be your AGRIMAPS login ID and cannot be changed, so think of one that is secure and easy to remember.  4. Create a [employer]APS password. You can change your password at any time.  5. Enter your Password Reset Question and Answer. This can be used at a later time if you forget your password.   6. Enter your e-mail address. This allows you to receive e-mail notifications when new information is available in AGRIMAPS.  7. Click Sign Up. You can now view your health information.    For assistance activating your [employer]GRIMAPS account, call [phone]

## 2017-04-10 NOTE — ED AVS SNAPSHOT
4/10/2017          Diana Hernandez  [address] [place] [address]    Dear Diana:    [facility] wants to ensure your discharge home is safe and you or your loved ones have had all your questions answered regarding your care after you leave the hospital.    You may receive a telephone call within two days of your discharge.  This call is to make certain you understand your discharge instructions as well as ensure we provided you with the best care possible during your stay with us.     The call will only last approximately 3-5 minutes and will be done by a nurse.    Once again, we want to ensure your discharge home is safe and that you have a clear understanding of any next steps in your care.  If you have any questions or concerns, please do not hesitate to contact us, we are here for you.  Thank you for choosing [facility] for your healthcare needs.    Sincerely,    Cesar Munoz    [facility]

## 2017-04-10 NOTE — ED NOTES
Patient straight cathed and very little urine was obtained and pressed on patient's bladder and had patient bear down like she had to urinate and still no urine. Primary RN notified.

## 2017-04-12 ENCOUNTER — HOSPITAL ENCOUNTER (OUTPATIENT)
Facility: MEDICAL CENTER | Age: 42
End: 2017-04-14
Attending: EMERGENCY MEDICINE
Payer: MEDICAID

## 2017-04-12 ENCOUNTER — RESOLUTE PROFESSIONAL BILLING HOSPITAL PROF FEE (OUTPATIENT)
Dept: HOSPITALIST | Facility: MEDICAL CENTER | Age: 42
End: 2017-04-12
Payer: MEDICAID

## 2017-04-12 ENCOUNTER — APPOINTMENT (OUTPATIENT)
Dept: RADIOLOGY | Facility: MEDICAL CENTER | Age: 42
End: 2017-04-12
Attending: EMERGENCY MEDICINE
Payer: MEDICAID

## 2017-04-12 DIAGNOSIS — E87.20 LACTIC ACIDOSIS: ICD-10-CM

## 2017-04-12 DIAGNOSIS — M54.6 ACUTE LEFT-SIDED THORACIC BACK PAIN: ICD-10-CM

## 2017-04-12 DIAGNOSIS — E87.6 HYPOKALEMIA: ICD-10-CM

## 2017-04-12 DIAGNOSIS — R11.15 INTRACTABLE CYCLICAL VOMITING WITH NAUSEA: ICD-10-CM

## 2017-04-12 PROBLEM — Z87.898 HISTORY OF SEIZURES: Status: ACTIVE | Noted: 2017-04-12

## 2017-04-12 LAB
ALBUMIN SERPL BCP-MCNC: 4.7 G/DL (ref 3.2–4.9)
ALBUMIN/GLOB SERPL: 1.6 G/DL
ALP SERPL-CCNC: 60 U/L (ref 30–99)
ALT SERPL-CCNC: 17 U/L (ref 2–50)
ANION GAP SERPL CALC-SCNC: 14 MMOL/L (ref 0–11.9)
APPEARANCE UR: CLEAR
AST SERPL-CCNC: 16 U/L (ref 12–45)
BACTERIA #/AREA URNS HPF: ABNORMAL /HPF
BASOPHILS # BLD AUTO: 0.4 % (ref 0–1.8)
BASOPHILS # BLD: 0.03 K/UL (ref 0–0.12)
BILIRUB SERPL-MCNC: 2 MG/DL (ref 0.1–1.5)
BILIRUB UR QL STRIP.AUTO: NEGATIVE
BUN SERPL-MCNC: 10 MG/DL (ref 8–22)
CALCIUM SERPL-MCNC: 9.6 MG/DL (ref 8.5–10.5)
CHLORIDE SERPL-SCNC: 101 MMOL/L (ref 96–112)
CO2 SERPL-SCNC: 20 MMOL/L (ref 20–33)
COLOR UR: ABNORMAL
CREAT SERPL-MCNC: 0.86 MG/DL (ref 0.5–1.4)
CULTURE IF INDICATED INDCX: NO UA CULTURE
EOSINOPHIL # BLD AUTO: 0 K/UL (ref 0–0.51)
EOSINOPHIL NFR BLD: 0 % (ref 0–6.9)
EPI CELLS #/AREA URNS HPF: ABNORMAL /HPF
ERYTHROCYTE [DISTWIDTH] IN BLOOD BY AUTOMATED COUNT: 43.8 FL (ref 35.9–50)
GFR SERPL CREATININE-BSD FRML MDRD: >60 ML/MIN/1.73 M 2
GLOBULIN SER CALC-MCNC: 2.9 G/DL (ref 1.9–3.5)
GLUCOSE SERPL-MCNC: 109 MG/DL (ref 65–99)
GLUCOSE UR STRIP.AUTO-MCNC: 300 MG/DL
HCG SERPL QL: NEGATIVE
HCT VFR BLD AUTO: 41 % (ref 37–47)
HGB BLD-MCNC: 14.8 G/DL (ref 12–16)
IMM GRANULOCYTES # BLD AUTO: 0.03 K/UL (ref 0–0.11)
IMM GRANULOCYTES NFR BLD AUTO: 0.4 % (ref 0–0.9)
KETONES UR STRIP.AUTO-MCNC: 20 MG/DL
LACTATE BLD-SCNC: 3.7 MMOL/L (ref 0.5–2)
LEUKOCYTE ESTERASE UR QL STRIP.AUTO: NEGATIVE
LIPASE SERPL-CCNC: 10 U/L (ref 11–82)
LYMPHOCYTES # BLD AUTO: 0.83 K/UL (ref 1–4.8)
LYMPHOCYTES NFR BLD: 10.9 % (ref 22–41)
MCH RBC QN AUTO: 33.7 PG (ref 27–33)
MCHC RBC AUTO-ENTMCNC: 36.1 G/DL (ref 33.6–35)
MCV RBC AUTO: 93.4 FL (ref 81.4–97.8)
MICRO URNS: ABNORMAL
MONOCYTES # BLD AUTO: 0.73 K/UL (ref 0–0.85)
MONOCYTES NFR BLD AUTO: 9.6 % (ref 0–13.4)
MUCOUS THREADS #/AREA URNS HPF: ABNORMAL /HPF
NEUTROPHILS # BLD AUTO: 6 K/UL (ref 2–7.15)
NEUTROPHILS NFR BLD: 78.7 % (ref 44–72)
NITRITE UR QL STRIP.AUTO: NEGATIVE
NRBC # BLD AUTO: 0 K/UL
NRBC BLD AUTO-RTO: 0 /100 WBC
PH UR STRIP.AUTO: 6 [PH]
PLATELET # BLD AUTO: 302 K/UL (ref 164–446)
PMV BLD AUTO: 9.8 FL (ref 9–12.9)
POTASSIUM SERPL-SCNC: 2.3 MMOL/L (ref 3.6–5.5)
PROT SERPL-MCNC: 7.6 G/DL (ref 6–8.2)
PROT UR QL STRIP: NEGATIVE MG/DL
RBC # BLD AUTO: 4.39 M/UL (ref 4.2–5.4)
RBC # URNS HPF: ABNORMAL /HPF
RBC UR QL AUTO: ABNORMAL
SODIUM SERPL-SCNC: 135 MMOL/L (ref 135–145)
SP GR UR STRIP.AUTO: 1.01
WBC # BLD AUTO: 7.6 K/UL (ref 4.8–10.8)
WBC #/AREA URNS HPF: ABNORMAL /HPF

## 2017-04-12 PROCEDURE — 81001 URINALYSIS AUTO W/SCOPE: CPT

## 2017-04-12 PROCEDURE — 700111 HCHG RX REV CODE 636 W/ 250 OVERRIDE (IP): Performed by: EMERGENCY MEDICINE

## 2017-04-12 PROCEDURE — 96365 THER/PROPH/DIAG IV INF INIT: CPT

## 2017-04-12 PROCEDURE — A9270 NON-COVERED ITEM OR SERVICE: HCPCS | Performed by: INTERNAL MEDICINE

## 2017-04-12 PROCEDURE — 96361 HYDRATE IV INFUSION ADD-ON: CPT

## 2017-04-12 PROCEDURE — 99220 PR INITIAL OBSERVATION CARE,LEVL III: CPT | Mod: GC | Performed by: INTERNAL MEDICINE

## 2017-04-12 PROCEDURE — 96375 TX/PRO/DX INJ NEW DRUG ADDON: CPT

## 2017-04-12 PROCEDURE — 700102 HCHG RX REV CODE 250 W/ 637 OVERRIDE(OP): Performed by: EMERGENCY MEDICINE

## 2017-04-12 PROCEDURE — 700102 HCHG RX REV CODE 250 W/ 637 OVERRIDE(OP): Performed by: INTERNAL MEDICINE

## 2017-04-12 PROCEDURE — 80053 COMPREHEN METABOLIC PANEL: CPT

## 2017-04-12 PROCEDURE — A9270 NON-COVERED ITEM OR SERVICE: HCPCS | Performed by: EMERGENCY MEDICINE

## 2017-04-12 PROCEDURE — 83605 ASSAY OF LACTIC ACID: CPT

## 2017-04-12 PROCEDURE — 83690 ASSAY OF LIPASE: CPT

## 2017-04-12 PROCEDURE — 85025 COMPLETE CBC W/AUTO DIFF WBC: CPT

## 2017-04-12 PROCEDURE — 99285 EMERGENCY DEPT VISIT HI MDM: CPT

## 2017-04-12 PROCEDURE — 84703 CHORIONIC GONADOTROPIN ASSAY: CPT

## 2017-04-12 PROCEDURE — 74000 DX-ABDOMEN-1 VIEW: CPT

## 2017-04-12 PROCEDURE — 700111 HCHG RX REV CODE 636 W/ 250 OVERRIDE (IP): Performed by: INTERNAL MEDICINE

## 2017-04-12 PROCEDURE — G0378 HOSPITAL OBSERVATION PER HR: HCPCS

## 2017-04-12 RX ORDER — POTASSIUM CHLORIDE 7.45 MG/ML
10 INJECTION INTRAVENOUS
Status: DISPENSED | OUTPATIENT
Start: 2017-04-12 | End: 2017-04-12

## 2017-04-12 RX ORDER — MORPHINE SULFATE 10 MG/ML
5 INJECTION, SOLUTION INTRAMUSCULAR; INTRAVENOUS
Status: COMPLETED | OUTPATIENT
Start: 2017-04-12 | End: 2017-04-12

## 2017-04-12 RX ORDER — BISACODYL 10 MG
10 SUPPOSITORY, RECTAL RECTAL
Status: DISCONTINUED | OUTPATIENT
Start: 2017-04-12 | End: 2017-04-14 | Stop reason: HOSPADM

## 2017-04-12 RX ORDER — ONDANSETRON 4 MG/1
4 TABLET, ORALLY DISINTEGRATING ORAL EVERY 8 HOURS
Status: DISCONTINUED | OUTPATIENT
Start: 2017-04-12 | End: 2017-04-14 | Stop reason: HOSPADM

## 2017-04-12 RX ORDER — DEXTROSE AND SODIUM CHLORIDE 5; .9 G/100ML; G/100ML
INJECTION, SOLUTION INTRAVENOUS CONTINUOUS
Status: DISCONTINUED | OUTPATIENT
Start: 2017-04-12 | End: 2017-04-12

## 2017-04-12 RX ORDER — DEXTROSE AND SODIUM CHLORIDE 5; .45 G/100ML; G/100ML
INJECTION, SOLUTION INTRAVENOUS CONTINUOUS
Status: DISCONTINUED | OUTPATIENT
Start: 2017-04-12 | End: 2017-04-13

## 2017-04-12 RX ORDER — ACETAMINOPHEN 325 MG/1
650 TABLET ORAL EVERY 4 HOURS PRN
Status: DISCONTINUED | OUTPATIENT
Start: 2017-04-12 | End: 2017-04-14 | Stop reason: HOSPADM

## 2017-04-12 RX ORDER — KETOROLAC TROMETHAMINE 30 MG/ML
15 INJECTION, SOLUTION INTRAMUSCULAR; INTRAVENOUS ONCE
Status: COMPLETED | OUTPATIENT
Start: 2017-04-12 | End: 2017-04-12

## 2017-04-12 RX ORDER — HYDROXYZINE 50 MG/1
50 TABLET, FILM COATED ORAL 3 TIMES DAILY PRN
Status: DISCONTINUED | OUTPATIENT
Start: 2017-04-12 | End: 2017-04-14 | Stop reason: HOSPADM

## 2017-04-12 RX ORDER — SODIUM CHLORIDE, SODIUM LACTATE, POTASSIUM CHLORIDE, CALCIUM CHLORIDE 600; 310; 30; 20 MG/100ML; MG/100ML; MG/100ML; MG/100ML
1000 INJECTION, SOLUTION INTRAVENOUS ONCE
Status: COMPLETED | OUTPATIENT
Start: 2017-04-12 | End: 2017-04-12

## 2017-04-12 RX ORDER — AMOXICILLIN 250 MG
2 CAPSULE ORAL 2 TIMES DAILY
Status: DISCONTINUED | OUTPATIENT
Start: 2017-04-12 | End: 2017-04-14 | Stop reason: HOSPADM

## 2017-04-12 RX ORDER — POLYETHYLENE GLYCOL 3350 17 G/17G
1 POWDER, FOR SOLUTION ORAL
Status: DISCONTINUED | OUTPATIENT
Start: 2017-04-12 | End: 2017-04-14 | Stop reason: HOSPADM

## 2017-04-12 RX ORDER — ONDANSETRON 4 MG/1
4 TABLET, ORALLY DISINTEGRATING ORAL EVERY 8 HOURS PRN
Status: DISCONTINUED | OUTPATIENT
Start: 2017-04-12 | End: 2017-04-12

## 2017-04-12 RX ORDER — ZOLPIDEM TARTRATE 5 MG/1
5 TABLET ORAL NIGHTLY PRN
Status: DISCONTINUED | OUTPATIENT
Start: 2017-04-12 | End: 2017-04-14 | Stop reason: HOSPADM

## 2017-04-12 RX ORDER — LORAZEPAM 2 MG/ML
1 INJECTION INTRAMUSCULAR ONCE
Status: COMPLETED | OUTPATIENT
Start: 2017-04-12 | End: 2017-04-12

## 2017-04-12 RX ORDER — ONDANSETRON 2 MG/ML
4 INJECTION INTRAMUSCULAR; INTRAVENOUS EVERY 4 HOURS PRN
Status: DISCONTINUED | OUTPATIENT
Start: 2017-04-12 | End: 2017-04-14 | Stop reason: HOSPADM

## 2017-04-12 RX ORDER — DIPHENHYDRAMINE HYDROCHLORIDE 50 MG/ML
25 INJECTION INTRAMUSCULAR; INTRAVENOUS ONCE
Status: COMPLETED | OUTPATIENT
Start: 2017-04-12 | End: 2017-04-12

## 2017-04-12 RX ORDER — POTASSIUM CHLORIDE 750 MG/1
40 TABLET, FILM COATED, EXTENDED RELEASE ORAL ONCE
Status: COMPLETED | OUTPATIENT
Start: 2017-04-12 | End: 2017-04-12

## 2017-04-12 RX ORDER — HALOPERIDOL 5 MG/ML
5 INJECTION INTRAMUSCULAR ONCE
Status: COMPLETED | OUTPATIENT
Start: 2017-04-12 | End: 2017-04-12

## 2017-04-12 RX ADMIN — DEXTROSE AND SODIUM CHLORIDE: 5; .9 INJECTION, SOLUTION INTRAVENOUS at 11:45

## 2017-04-12 RX ADMIN — HALOPERIDOL LACTATE 5 MG: 5 INJECTION, SOLUTION INTRAMUSCULAR at 11:58

## 2017-04-12 RX ADMIN — SODIUM CHLORIDE, POTASSIUM CHLORIDE, SODIUM LACTATE AND CALCIUM CHLORIDE 1000 ML: 600; 310; 30; 20 INJECTION, SOLUTION INTRAVENOUS at 11:55

## 2017-04-12 RX ADMIN — ZOLPIDEM TARTRATE 5 MG: 5 TABLET, FILM COATED ORAL at 21:23

## 2017-04-12 RX ADMIN — DIPHENHYDRAMINE HYDROCHLORIDE 50 MG: 50 INJECTION, SOLUTION INTRAMUSCULAR; INTRAVENOUS at 11:58

## 2017-04-12 RX ADMIN — ONDANSETRON 4 MG: 4 TABLET, ORALLY DISINTEGRATING ORAL at 21:23

## 2017-04-12 RX ADMIN — DEXTROSE AND SODIUM CHLORIDE: 5; .45 INJECTION, SOLUTION INTRAVENOUS at 18:10

## 2017-04-12 RX ADMIN — POTASSIUM CHLORIDE 10 MEQ: 7.46 INJECTION, SOLUTION INTRAVENOUS at 13:37

## 2017-04-12 RX ADMIN — POTASSIUM CHLORIDE 40 MEQ: 750 TABLET, FILM COATED, EXTENDED RELEASE ORAL at 13:37

## 2017-04-12 RX ADMIN — LORAZEPAM 2 MG: 2 INJECTION INTRAMUSCULAR; INTRAVENOUS at 11:59

## 2017-04-12 RX ADMIN — KETOROLAC TROMETHAMINE 30 MG: 30 INJECTION, SOLUTION INTRAMUSCULAR; INTRAVENOUS at 11:58

## 2017-04-12 RX ADMIN — MORPHINE SULFATE 5 MG: 10 INJECTION INTRAVENOUS at 18:10

## 2017-04-12 ASSESSMENT — ENCOUNTER SYMPTOMS
DIZZINESS: 0
SPUTUM PRODUCTION: 0
NECK PAIN: 0
NERVOUS/ANXIOUS: 1
FOCAL WEAKNESS: 0
BACK PAIN: 1
CHILLS: 0
BLURRED VISION: 0
NAUSEA: 1
VOMITING: 1
HEADACHES: 0
COUGH: 0
FEVER: 0
ABDOMINAL PAIN: 1
PALPITATIONS: 0

## 2017-04-12 ASSESSMENT — PAIN SCALES - GENERAL
PAINLEVEL_OUTOF10: 8
PAINLEVEL_OUTOF10: 10
PAINLEVEL_OUTOF10: 0

## 2017-04-12 ASSESSMENT — LIFESTYLE VARIABLES
DO YOU DRINK ALCOHOL: NO
ALCOHOL_USE: NO
SUBSTANCE_ABUSE: 1

## 2017-04-12 NOTE — IP AVS SNAPSHOT
" Home Care Instructions                                                                                                                  Name:Diana Hernandez  Medical Record Number:[id number]  CSN: [id number]    YOB: 1975   Age: 41 y.o.  Sex: female  HT:1.651 m (5' 5\") WT: 61.9 kg (136 lb 7.4 oz)          Admit Date: 4/12/2017     Discharge Date:   Today's Date: 4/14/2017  Attending Doctor:  BRANDON Pimentel*                  Allergies:  Metoclopramide; Quetiapine; and Septra            Discharge Instructions       Discharge Instructions    Discharged to home by car with relative. Discharged via wheelchair, hospital escort: Yes.  Special equipment needed: Not Applicable    Be sure to schedule a follow-up appointment with your primary care doctor or any specialists as instructed.     Discharge Plan:   Diet Plan: Discussed  Activity Level: Discussed  Confirmed Follow up Appointment: Patient to Call and Schedule Appointment  Confirmed Symptoms Management: Discussed  Medication Reconciliation Updated: Yes  Influenza Vaccine Indication: Patient Refuses    I understand that a diet low in cholesterol, fat, and sodium is recommended for good health. Unless I have been given specific instructions below for another diet, I accept this instruction as my diet prescription.   Other diet: Regular    Special Instructions: None    Food Choices to Help Relieve Diarrhea, Adult  When you have diarrhea, the foods you eat and your eating habits are very important. Choosing the right foods and drinks can help relieve diarrhea. Also, because diarrhea can last up to 7 days, you need to replace lost fluids and electrolytes (such as sodium, potassium, and chloride) in order to help prevent dehydration.   WHAT GENERAL GUIDELINES DO I NEED TO FOLLOW?  Slowly drink 1 cup (8 oz) of fluid for each episode of diarrhea. If you are getting enough fluid, your urine will be clear or pale yellow.  Eat starchy foods. Some good choices " include white rice, white toast, pasta, low-fiber cereal, baked potatoes (without the skin), saltine crackers, and bagels.  Avoid large servings of any cooked vegetables.  Limit fruit to two servings per day. A serving is ½ cup or 1 small piece.  Choose foods with less than 2 g of fiber per serving.  Limit fats to less than 8 tsp (38 g) per day.  Avoid fried foods.  Eat foods that have probiotics in them. Probiotics can be found in certain dairy products.  Avoid foods and beverages that may increase the speed at which food moves through the stomach and intestines (gastrointestinal tract). Things to avoid include:  High-fiber foods, such as dried fruit, raw fruits and vegetables, nuts, seeds, and whole grain foods.  Spicy foods and high-fat foods.  Foods and beverages sweetened with high-fructose corn syrup, honey, or sugar alcohols such as xylitol, sorbitol, and mannitol.  WHAT FOODS ARE RECOMMENDED?  Grains  White rice. White, [place]ish, or javier breads (fresh or toasted), including plain rolls, buns, or bagels. White pasta. Saltine, soda, or ale crackers. Pretzels. Low-fiber cereal. Cooked cereals made with water (such as cornmeal, farina, or cream cereals). Plain muffins. Matzo. Nakia toast. Zwieback.   Vegetables  Potatoes (without the skin). Strained tomato and vegetable juices. Most well-cooked and canned vegetables without seeds. Tender lettuce.  Fruits  Cooked or canned applesauce, apricots, cherries, fruit cocktail, grapefruit, peaches, pears, or plums. Fresh bananas, apples without skin, cherries, grapes, cantaloupe, grapefruit, peaches, oranges, or plums.   Meat and Other Protein Products  Baked or boiled chicken. Eggs. Tofu. Fish. Seafood. Smooth peanut butter. Ground or well-cooked tender beef, ham, veal, lamb, pork, or poultry.   Dairy  Plain yogurt, kefir, and unsweetened liquid yogurt. Lactose-free milk, buttermilk, or soy milk. Plain hard cheese.  Beverages  Sport drinks. Clear broths. Diluted fruit  juices (except prune). Regular, caffeine-free sodas such as ginger ale. Water. Decaffeinated teas. Oral rehydration solutions. Sugar-free beverages not sweetened with sugar alcohols.  Other  Bouillon, broth, or soups made from recommended foods.   The items listed above may not be a complete list of recommended foods or beverages. Contact your dietitian for more options.  WHAT FOODS ARE NOT RECOMMENDED?  Grains  Whole grain, whole wheat, bran, or rye breads, rolls, pastas, crackers, and cereals. Wild or brown rice. Cereals that contain more than 2 g of fiber per serving. Corn tortillas or taco shells. Cooked or dry oatmeal. Granola. Popcorn.  Vegetables  Raw vegetables. Cabbage, broccoli, [place] sprouts, artichokes, baked beans, beet greens, corn, kale, legumes, peas, sweet potatoes, and yams. Potato skins. Cooked spinach and cabbage.  Fruits  Dried fruit, including raisins and dates. Raw fruits. Stewed or dried prunes. Fresh apples with skin, apricots, mangoes, pears, raspberries, and strawberries.   Meat and Other Protein Products  [place] peanut butter. Nuts and seeds. Beans and lentils. [place].   Dairy  High-fat cheeses. Milk, chocolate milk, and beverages made with milk, such as milk shakes. Cream. Ice cream.  Sweets and Desserts  Sweet rolls, doughnuts, and sweet breads. Pancakes and waffles.  Fats and Oils  Butter. Cream sauces. Margarine. Salad oils. Plain salad dressings. Olives. Avocados.   Beverages  Caffeinated beverages (such as coffee, tea, soda, or energy drinks). Alcoholic beverages. Fruit juices with pulp. Prune juice. Soft drinks sweetened with high-fructose corn syrup or sugar alcohols.  Other  Coconut. Hot sauce. Chili powder. Mayonnaise. Gravy. Cream-based or milk-based soups.   The items listed above may not be a complete list of foods and beverages to avoid. Contact your dietitian for more information.  WHAT SHOULD I DO IF I BECOME DEHYDRATED?  Diarrhea can sometimes lead to dehydration. Signs  of dehydration include dark urine and dry mouth and skin. If you think you are dehydrated, you should rehydrate with an oral rehydration solution. These solutions can be purchased at pharmacies, retail stores, or online.   Drink ½-1 cup (120-240 mL) of oral rehydration solution each time you have an episode of diarrhea. If drinking this amount makes your diarrhea worse, try drinking smaller amounts more often. For example, drink 1-3 tsp (5-15 mL) every 5-10 minutes.   A general rule for staying hydrated is to drink 1½-2 L of fluid per day. Talk to your health care provider about the specific amount you should be drinking each day. Drink enough fluids to keep your urine clear or pale yellow.     This information is not intended to replace advice given to you by your health care provider. Make sure you discuss any questions you have with your health care provider.     Document Released: 03/09/2005 Document Revised: 01/08/2016 Document Reviewed: 11/10/2014  [employer] Interactive Patient Education ©2016 [employer]  Hypokalemia  Hypokalemia means that the amount of potassium in the blood is lower than normal. Potassium is a chemical, called an electrolyte, that helps regulate the amount of fluid in the body. It also stimulates muscle contraction and helps nerves function properly. Most of the body's potassium is inside of cells, and only a very small amount is in the blood. Because the amount in the blood is so small, minor changes can be life-threatening.  CAUSES  · Antibiotics.  · Diarrhea or vomiting.  · Using laxatives too much, which can cause diarrhea.  · Chronic kidney disease.  · Water pills (diuretics).  · Eating disorders (bulimia).  · Low magnesium level.  · Sweating a lot.  SIGNS AND SYMPTOMS  · Weakness.  · Constipation.  · Fatigue.  · Muscle cramps.  · Mental confusion.  · Skipped heartbeats or irregular heartbeat (palpitations).  · Tingling or numbness.  DIAGNOSIS   Your health care provider can diagnose  hypokalemia with blood tests. In addition to checking your potassium level, your health care provider may also check other lab tests.  TREATMENT  Hypokalemia can be treated with potassium supplements taken by mouth or adjustments in your current medicines. If your potassium level is very low, you may need to get potassium through a vein (IV) and be monitored in the hospital. A diet high in potassium is also helpful. Foods high in potassium are:  · Nuts, such as peanuts and pistachios.  · Seeds, such as sunflower seeds and pumpkin seeds.  · Peas, lentils, and lima beans.  · Whole grain and bran cereals and breads.  · Fresh fruit and vegetables, such as apricots, avocado, bananas, cantaloupe, kiwi, oranges, tomatoes, asparagus, and potatoes.  · Orange and tomato juices.  · Red meats.  · Fruit yogurt.  HOME CARE INSTRUCTIONS  · Take all medicines as prescribed by your health care provider.  · Maintain a healthy diet by including nutritious food, such as fruits, vegetables, nuts, whole grains, and lean meats.  · If you are taking a laxative, be sure to follow the directions on the label.  SEEK MEDICAL CARE IF:  · Your weakness gets worse.  · You feel your heart pounding or racing.  · You are vomiting or having diarrhea.  · You are diabetic and having trouble keeping your blood glucose in the normal range.  SEEK IMMEDIATE MEDICAL CARE IF:  · You have chest pain, shortness of breath, or dizziness.  · You are vomiting or having diarrhea for more than 2 days.  · You faint.  MAKE SURE YOU:   · Understand these instructions.  · Will watch your condition.  · Will get help right away if you are not doing well or get worse.     This information is not intended to replace advice given to you by your health care provider. Make sure you discuss any questions you have with your health care provider.     Document Released: 12/18/2006 Document Revised: 01/08/2016 Document Reviewed: 06/20/2014  [employer] Patient Education  ©2016 [employer]      · Is patient discharged on Warfarin / Coumadin?   No     · Is patient Post Blood Transfusion?  No    Depression / Suicide Risk    As you are discharged from this [facility] facility, it is important to learn how to keep safe from harming yourself.    Recognize the warning signs:  · Abrupt changes in personality, positive or negative- including increase in energy   · Giving away possessions  · Change in eating patterns- significant weight changes-  positive or negative  · Change in sleeping patterns- unable to sleep or sleeping all the time   · Unwillingness or inability to communicate  · Depression  · Unusual sadness, discouragement and loneliness  · Talk of wanting to die  · Neglect of personal appearance   · Rebelliousness- reckless behavior  · Withdrawal from people/activities they love  · Confusion- inability to concentrate     If you or a loved one observes any of these behaviors or has concerns about self-harm, here's what you can do:  · Talk about it- your feelings and reasons for harming yourself  · Remove any means that you might use to hurt yourself (examples: pills, rope, extension cords, firearm)  · Get professional help from the community (Mental Health, Substance Abuse, psychological counseling)  · Do not be alone:Call your Safe Contact- someone whom you trust who will be there for you.  · Call your local CRISIS HOTLINE 209-2531 or 854-692-5503  · Call your local Children's Mobile Crisis Response Team [place] (107) 777-6100 or www.Kids Note  · Call the toll free National Suicide Prevention Hotlines   · National Suicide Prevention Lifeline 920-586-JLWJ (5260)  · National Hope Line Network 800-SUICIDE (559-4887)        Follow-up Information     1. Follow up with Lavell Monte M.D..    Specialty:  Internal Medicine    Why:  As needed    Contact information    [address]  [address]  [facility] [address] [phone]           Discharge Medication  Instructions:    Below are the medications your physician expects you to take upon discharge:    Review all your home medications and newly ordered medications with your doctor and/or pharmacist. Follow medication instructions as directed by your doctor and/or pharmacist.    Please keep your medication list with you and share with your physician.               Medication List      CONTINUE taking these medications        Instructions    Morning Afternoon Evening Bedtime    multivitamin Tabs   Last time this was given:  1 Tab on 4/14/2017  9:04 AM   Next Dose Due:  4/15        Take 1 Tab by mouth every day.   Dose:  1 Tab                        ondansetron 4 MG Tbdp   Last time this was given:  4 mg on 4/14/2017  1:09 PM   Commonly known as:  ZOFRAN ODT   Next Dose Due:  As needed for nausea        Take 1 Tab by mouth every 8 hours as needed.   Dose:  4 mg                        potassium bicarbonate 25 MEQ tablet   Commonly known as:  KLYTE   Next Dose Due:  4/15         Take 1 Tab by mouth 2 times a day.   Dose:  25 mEq                        * promethazine 25 MG Supp   Commonly known as:  PHENERGAN   Next Dose Due:  As needed         Insert 1 Suppository in rectum every 6 hours as needed for Nausea/Vomiting.   Dose:  25 mg                        * promethazine 25 MG Tabs   Commonly known as:  PHENERGAN   Next Dose Due:  As needed         Take 1 Tab by mouth every 6 hours as needed for Nausea/Vomiting.   Dose:  25 mg                        * Notice:  This list has 2 medication(s) that are the same as other medications prescribed for you. Read the directions carefully, and ask your doctor or other care provider to review them with you.            Orders for after discharge     BASIC METABOLIC PANEL    Complete by:  As directed              Instructions           Diet / Nutrition:    Follow any diet instructions given to you by your doctor or the dietician, including how much salt (sodium) you are allowed each  day.    If you are overweight, talk to your doctor about a weight reduction plan.    Activity:    Remain physically active following your doctor's instructions about exercise and activity.    Rest often.     Any time you become even a little tired or short of breath, SIT DOWN and rest.    Worsening Symptoms:    Report any of the following signs and symptoms to the doctor's office immediately:    *Pain of jaw, arm, or neck  *Chest pain not relieved by medication                               *Dizziness or loss of consciousness  *Difficulty breathing even when at rest   *More tired than usual                                       *Bleeding drainage or swelling of surgical site  *Swelling of feet, ankles, legs or stomach                 *Fever (>100ºF)  *Pink or blood tinged sputum  *Weight gain (3lbs/day or 5lbs /week)           *Shock from internal defibrillator (if applicable)  *Palpitations or irregular heartbeats                *Cool and/or numb extremities    Stroke Awareness    Common Risk Factors for Stroke include:    Age  Atrial Fibrillation  Carotid Artery Stenosis  Diabetes Mellitus  Excessive alcohol consumption  High blood pressure  Overweight   Physical inactivity  Smoking    Warning signs and symptoms of a stroke include:    *Sudden numbness or weakness of the face, arm or leg (especially on one side of the body).  *Sudden confusion, trouble speaking or understanding.  *Sudden trouble seeing in one or both eyes.  *Sudden trouble walking, dizziness, loss of balance or coordination.Sudden severe headache with no known cause.    It is very important to get treatment quickly when a stroke occurs. If you experience any of the above warning signs, call 911 immediately.                   Disclaimer         Quit Smoking / Tobacco Use:    I understand the use of any tobacco products increases my chance of suffering from future heart disease or stroke and could cause other illnesses which may shorten my life.  Quitting the use of tobacco products is the single most important thing I can do to improve my health. For further information on smoking / tobacco cessation call a Toll Free Quit Line at 1-493.914.9847 (*[employer] [place]) or 1-416.786.4463 (American Lung Association) or you can access the web based program at www.lungusa.org.    [place] Tobacco Users Help Line:  (347) 216-5829       Toll Free: 1-379.942.9968  Quit Tobacco Program [facility] (441)521-5091    Crisis Hotline:    [place] Crisis Hotline:  9-360-ZCLGPVN or 1-864.832.1330    [place] Crisis Hotline:    1-220.222.4341 or 451-895-6218    Discharge Survey:   Thank you for choosing [facility]. We hope we did everything we could to make your hospital stay a pleasant one. You may be receiving a phone survey and we would appreciate your time and participation in answering the questions. Your input is very valuable to us in our efforts to improve our service to our patients and their families.        My signature on this form indicates that:    1. I have reviewed and understand the above information.  2. My questions regarding this information have been answered to my satisfaction.  3. I have formulated a plan with my discharge nurse to obtain my prescribed medications for home.                  Disclaimer         __________________________________                     __________       ________                       Patient Signature                                                 Date                    Time

## 2017-04-12 NOTE — ED NOTES
Pt amb to triage.  Chief Complaint   Patient presents with   • Back Pain     mid back since fri   • Difficulty Urinating     Pt sobbing in triage, cannot sit still, in obvious discomfort.

## 2017-04-12 NOTE — ED PROVIDER NOTES
ED Provider Note    Scribed for Kota Cha M.D. by Merayr Salazar. 4/12/2017  11:01 AM    Primary care provider: [facility]   Means of arrival: Walk-in  History obtained from: Patient  History limited by: None    CHIEF COMPLAINT  Chief Complaint   Patient presents with   • Back Pain     mid back since fri   • Difficulty Urinating       HPI  Diana Hernandez is a 41 y.o. female who presents to the Emergency Department for evaluation of severe persistent back pain, nausea and vomiting since five days ago Friday. The patient was evaluated here in the ED Friday and Sunday for the same and had labs and CT done. Her back pain radiates down worse on the left compared to right and around to her lower abdomen. She had continued vomiting since Sunday and has been taking Zofran and Phenergan suppository with no improvement. She tried to make an appointment with her primary care physician at [facility], but has not been seen in follow up since she was in the ED. She recently had an appointment with her gynecologist, and relates they saw a ovarian cyst on an ultrasound. The patient has seen Dr. Potts GI specialist in the past.    REVIEW OF SYSTEMS  Pertinent positives include back pain, nausea, vomiting. Pertinent negatives include no fever.  All other systems reviewed and negative.    PAST MEDICAL HISTORY   has a past medical history of Snoring; Dental disorder; Pain; Tobacco abuse (6/20/2009); Superior mesenteric artery syndrome (CMS-HCC) (7/12/2009); Anxiety; CLOSTRIDIUM DIFFICILE INFECTION (4/14/2010); Dyspepsia (7/12/2009); Backpain; Nephrolithiasis (6/20/2009); Anxiety disorder; CVS disease; Drug-seeking behavior; Cyclic vomiting syndrome; and Superior mesenteric artery syndrome (CMS-HCC).    SURGICAL HISTORY   has past surgical history that includes gastroscopy-endo (7/8/2009); lithotripsy; pyloroplasty (7/27/2009); gastroscopy with biopsy (3/9/2010); exploratory laparotomy  "(7/27/2009); appendectomy (7/27/2009); cholecystectomy (7/27/2009); other; exploratory laparotomy (1/12/2016); bowel resection (1/12/2016); and gastroscopy-endo (4/28/2016).    SOCIAL HISTORY  Social History   Substance Use Topics   • Smoking status: Former Smoker -- 0.50 packs/day for 13 years     Types: Cigarettes     Quit date: 12/15/2012   • Smokeless tobacco: Never Used      Comment: 1/2ppd   • Alcohol Use: No      History   Drug Use No     Comment: 1 gm/day for 10 yr - stopped       FAMILY HISTORY  Family History   Problem Relation Age of Onset   • Cancer Mother 50     Colon cancer   • Allergies Father    • Hypertension Mother    • Hypertension Father    • Heart Disease Maternal Grandmother        CURRENT MEDICATIONS  No current facility-administered medications on file prior to encounter.     Current Outpatient Prescriptions on File Prior to Encounter   Medication Sig Dispense Refill   • multivitamin (THERAGRAN) Tab Take 1 Tab by mouth every day.     • promethazine (PHENERGAN) 25 MG Suppos Insert 1 Suppository in rectum every 6 hours as needed for Nausea/Vomiting. 10 Suppository 0   • ondansetron (ZOFRAN ODT) 4 MG TABLET DISPERSIBLE Take 1 Tab by mouth every 8 hours as needed. 10 Tab 0   • promethazine (PHENERGAN) 25 MG Tab Take 1 Tab by mouth every 6 hours as needed for Nausea/Vomiting. 30 Tab 0   • potassium bicarbonate (KLYTE) 25 MEQ tablet Take 1 Tab by mouth 2 times a day. 60 Tab 0       ALLERGIES  Allergies   Allergen Reactions   • Metoclopramide Hives     Told by MD; pt suspects possible hives.   • Quetiapine Unspecified     MD told her she was allergic     • Septra [Sulfamethoxazole W-Trimethoprim] Unspecified     MD told her she was allergic         PHYSICAL EXAM  VITAL SIGNS: BP 99/72 mmHg  Pulse 71  Temp(Src) 36.9 °C (98.4 °F)  Resp 20  Ht 1.651 m (5' 5\")  Wt 56 kg (123 lb 7.3 oz)  BMI 20.54 kg/m2  SpO2 100%  LMP 03/15/2017    Constitutional: Well developed, Well nourished, Moderate " distress, Non-toxic appearance.   HENT: Normocephalic, Atraumatic, Bilateral external ears normal, Dry mucous membranes, No oral exudates.   Eyes: PERRLA, EOMI, Conjunctiva normal, No discharge.   Neck: No tenderness, Supple, No stridor.   Lymphatic: No lymphadenopathy noted.   Cardiovascular: Normal heart rate, Normal rhythm.   Thorax & Lungs: Clear to auscultation bilaterally, No respiratory distress, No wheezing, No crackles.   Abdomen: Soft, mild diffuse abdominal tenderness, No masses, No pulsatile masses.   Skin: Warm, Dry, No erythema, No rash.   Back:  tenderness throughout left sided paraspinal musculature throughout the spine  Extremities:, No edema No cyanosis.   Musculoskeletal: No tenderness to palpation or major deformities noted.  Intact distal pulses  Neurologic: Awake, alert. Moves all extremities spontaneously.  Psychiatric: Anxious, hyperventilating     LABS  Labs Reviewed   CBC WITH DIFFERENTIAL - Abnormal; Notable for the following:     MCH 33.7 (*)     MCHC 36.1 (*)     Neutrophils-Polys 78.70 (*)     Lymphocytes 10.90 (*)     Lymphs (Absolute) 0.83 (*)     All other components within normal limits   COMP METABOLIC PANEL - Abnormal; Notable for the following:     Potassium 2.3 (*)     Anion Gap 14.0 (*)     Glucose 109 (*)     Total Bilirubin 2.0 (*)     All other components within normal limits   LIPASE - Abnormal; Notable for the following:     Lipase 10 (*)     All other components within normal limits   LACTIC ACID - Abnormal; Notable for the following:     Lactic Acid 3.7 (*)     All other components within normal limits   HCG QUAL SERUM   ESTIMATED GFR   URINALYSIS,CULTURE IF INDICATED     All labs reviewed by me.    RADIOLOGY  PP-IRCETOF-4 VIEW   Final Result      No evidence of bowel obstruction.        The radiologist's interpretation of all radiological studies have been reviewed by me.    COURSE & MEDICAL DECISION MAKING  Pertinent Labs & Imaging studies reviewed. (See chart for  details)    I reviewed the patient's medical records which showed the patient was seen here in the ED for cyclic vomiting two days ago, and again three days before that. An x-ray abdomen was normal, and electrolytes showed mild dehydration. She was treated with Haldol, Ativan and Benadryl. She was seen on 4/7/17 for right sided flank pain, CT of abdomen-pelvis was negative. The patient has a history of SMA syndrome and multiple exploratory laparotomies.    11:01 AM - Patient seen and examined at bedside. Patient will be resuscitated with LR infusion followed by D5 NS infusion. She was treated with Toradol 30 mg IV, Haldol 5 mg, Benadryl 25 mg and Ativan 1 mg. Ordered abdomen x-ray, hCG qual, CBC with differential, CMP, lipase, lactic acid, urinalysis to evaluate her symptoms. The differential diagnoses include but are not limited to: cyclic vomiting syndrome, musculoskeletal back pain    ED results show low potassium 2.3 so she was treated with potassium chloride tablet 40 mEq and KCL ivpb 10 mEq bolus.    12:53 PM Discussed patient's presentation and findings with [facility] and patient admitted to their service for further evaluation and treatment. Patient without any acute distress at time of admission.     12:56 PM Patient was reevaluated at bedside. She is resting much more comfortably after medications. Discussed lab results with the patient and informed them that she would be admitted.     Decision Making:  Patient with history of cyclic vomiting, this is her 3rd visit in the last 4-5 days, this time the patient is profoundly hypokalemic, patient is improved with medicines is able to tolerate oral however the patient's hypokalemia and tendency to continue vomiting she should be admitted to the hospital. I did fluid resuscitate the patient due to dry mucous membranes and active vomiting with improvement of her symptoms. Discussed the case with internal medicine residents for admission  hospital.    DISPOSITION:  Patient will be admitted to [facility] in guarded condition.    FINAL IMPRESSION  1. Intractable cyclical vomiting with nausea    2. Hypokalemia    3. Lactic acidosis    4. Acute left-sided thoracic back pain          Merary HUGHES (Scribe), am scribing for, and in the presence of, Kota Cha M.D..    Electronically signed by: Merary Salazar (Scribe), 4/12/2017    IKota M.D. personally performed the services described in this documentation, as scribed by Merary Salazar in my presence, and it is both accurate and complete.    The note accurately reflects work and decisions made by me.  Kota Cha  4/12/2017  3:46 PM

## 2017-04-12 NOTE — IP AVS SNAPSHOT
4/14/2017    Diana Hernandez  [address] [place] [address]    Dear Diana:    [facility] wants to ensure your discharge home is safe and you or your loved ones have had all of your questions answered regarding your care after you leave the hospital.    Below is a list of resources and contact information should you have any questions regarding your hospital stay, follow-up instructions, or active medical symptoms.    Questions or Concerns Regarding… Contact   Medical Questions Related to Your Discharge  (7 days a week, 8am-5pm) Contact a Nurse Care Coordinator   [phone]   Medical Questions Not Related to Your Discharge  (24 hours a day / 7 days a week)  Contact the Nurse Health Line   [phone]    Medications or Discharge Instructions Refer to your discharge packet   or contact your [facility] Primary Care Provider   [phone]   Follow-up Appointment(s) Schedule your appointment via doo   or contact Scheduling [phone]   Billing Review your statement via doo  or contact Billing [phone]   Medical Records Review your records via doo   or contact Medical Records [phone]     You may receive a telephone call within two days of discharge. This call is to make certain you understand your discharge instructions and have the opportunity to have any questions answered. You can also easily access your medical information, test results and upcoming appointments via the doo free online health management tool. You can learn more and sign up at Basha/doo. For assistance setting up your doo account, please call [phone].    Once again, we want to ensure your discharge home is safe and that you have a clear understanding of any next steps in your care. If you have any questions or concerns, please do not hesitate to contact us, we are here for you. Thank you for choosing [facility] for your healthcare needs.    Sincerely,    Your [facility] Healthcare Team

## 2017-04-12 NOTE — ED NOTES
"Med rec complete per Pt at bedside  Pt states she took \"everything Monday morning\"  Allergies reviewed  No ABX in last 30 days    "

## 2017-04-12 NOTE — IP AVS SNAPSHOT
" <p align=\"LEFT\"><IMG SRC=\"//[email or web address]/blob$/Images/[email or web address]\" alt=\"Image\" WIDTH=\"50%\" HEIGHT=\"200\" BORDER=\"\"></p>                   Name:Diana Hernandez  Medical Record Number:[id number]  CSN: [id number]    YOB: 1975   Age: 41 y.o.  Sex: female  HT:1.651 m (5' 5\") WT: 61.9 kg (136 lb 7.4 oz)          Admit Date: 4/12/2017     Discharge Date:   Today's Date: 4/14/2017  Attending Doctor:  BRANDON Pimentel*                  Allergies:  Metoclopramide; Quetiapine; and Septra          Follow-up Information     1. Follow up with Lavell Monte M.D..    Specialty:  Internal Medicine    Why:  As needed    Contact information    [address] [address] [phone]           Medication List      Take these Medications        Instructions    multivitamin Tabs    Take 1 Tab by mouth every day.   Dose:  1 Tab       ondansetron 4 MG Tbdp   Commonly known as:  ZOFRAN ODT    Take 1 Tab by mouth every 8 hours as needed.   Dose:  4 mg       potassium bicarbonate 25 MEQ tablet   Commonly known as:  KLYTE    Take 1 Tab by mouth 2 times a day.   Dose:  25 mEq       * promethazine 25 MG Supp   Commonly known as:  PHENERGAN    Insert 1 Suppository in rectum every 6 hours as needed for Nausea/Vomiting.   Dose:  25 mg       * promethazine 25 MG Tabs   Commonly known as:  PHENERGAN    Take 1 Tab by mouth every 6 hours as needed for Nausea/Vomiting.   Dose:  25 mg       * Notice:  This list has 2 medication(s) that are the same as other medications prescribed for you. Read the directions carefully, and ask your doctor or other care provider to review them with you.      "

## 2017-04-12 NOTE — IP AVS SNAPSHOT
Roojoom Access Code: [email or web address]  Expires: 5/7/2017  7:54 PM    Your email address is not on file at [facility].  Email Addresses are required for you to sign up for Roojoom, please contact [phone] to verify your personal information and to provide your email address prior to attempting to register for Roojoom.    Diana Tovapreethi Hernandez  [address] [place] [address]    Roojoom  A secure, online tool to manage your health information     [facility]’s Roojoom® is a secure, online tool that connects you to your personalized health information from the privacy of your home -- day or night - making it very easy for you to manage your healthcare. Once the activation process is completed, you can even access your medical information using the Roojoom mirna, which is available for free in the Apple Mirna store or Google Play store.     To learn more about Roojoom, visit [email or web address]    There are two levels of access available (as shown below):   My Chart Features  [facility] Primary Care Doctor [facility]  Specialists [facility]  Urgent  Care Non-[facility] Primary Care Doctor   Email your healthcare team securely and privately 24/7 X X X    Manage appointments: schedule your next appointment; view details of past/upcoming appointments X      Request prescription refills. X      View recent personal medical records, including lab and immunizations X X X X   View health record, including health history, allergies, medications X X X X   Read reports about your outpatient visits, procedures, consult and ER notes X X X X   See your discharge summary, which is a recap of your hospital and/or ER visit that includes your diagnosis, lab results, and care plan X X  X     How to register for Roojoom:  Once your e-mail address has been verified, follow the following steps to sign up for Roojoom.     1. Go to  [email or web address]  2. Click on the Sign Up Now box, which takes you to the New Member Sign Up page. You will  need to provide the following information:  a. Enter your [employer] Access Code exactly as it appears at the top of this page. (You will not need to use this code after you’ve completed the sign-up process. If you do not sign up before the expiration date, you must request a new code.)   b. Enter your date of birth.   c. Enter your home email address.   d. Click Submit, and follow the next screen’s instructions.  3. Create a Emailaget ID. This will be your LotLinx login ID and cannot be changed, so think of one that is secure and easy to remember.  4. Create a LotLinx password. You can change your password at any time.  5. Enter your Password Reset Question and Answer. This can be used at a later time if you forget your password.   6. Enter your e-mail address. This allows you to receive e-mail notifications when new information is available in LotLinx.  7. Click Sign Up. You can now view your health information.    For assistance activating your LotLinx account, call [phone]

## 2017-04-12 NOTE — ED NOTES
Pt has HX of chronic midline back pain and cyclical vomiting syndrome and drug seeking behavior. Patient has been seen here 2 times in April for the same. Patient is currently wretching at this time. Patient walked back to room doubled over and very anxious.  PT moaning in pain and then stops moaning to answer questions. Patient extremely apologetic

## 2017-04-13 LAB
ANION GAP SERPL CALC-SCNC: 15 MMOL/L (ref 0–11.9)
ANION GAP SERPL CALC-SCNC: 6 MMOL/L (ref 0–11.9)
BASOPHILS # BLD AUTO: 0.6 % (ref 0–1.8)
BASOPHILS # BLD: 0.03 K/UL (ref 0–0.12)
BUN SERPL-MCNC: 4 MG/DL (ref 8–22)
BUN SERPL-MCNC: 7 MG/DL (ref 8–22)
CALCIUM SERPL-MCNC: 8.5 MG/DL (ref 8.5–10.5)
CALCIUM SERPL-MCNC: 8.9 MG/DL (ref 8.5–10.5)
CHLORIDE SERPL-SCNC: 109 MMOL/L (ref 96–112)
CHLORIDE SERPL-SCNC: 109 MMOL/L (ref 96–112)
CO2 SERPL-SCNC: 16 MMOL/L (ref 20–33)
CO2 SERPL-SCNC: 25 MMOL/L (ref 20–33)
CREAT SERPL-MCNC: 0.6 MG/DL (ref 0.5–1.4)
CREAT SERPL-MCNC: 0.61 MG/DL (ref 0.5–1.4)
EKG IMPRESSION: NORMAL
EOSINOPHIL # BLD AUTO: 0.12 K/UL (ref 0–0.51)
EOSINOPHIL NFR BLD: 2.2 % (ref 0–6.9)
ERYTHROCYTE [DISTWIDTH] IN BLOOD BY AUTOMATED COUNT: 45.3 FL (ref 35.9–50)
GFR SERPL CREATININE-BSD FRML MDRD: >60 ML/MIN/1.73 M 2
GFR SERPL CREATININE-BSD FRML MDRD: >60 ML/MIN/1.73 M 2
GLUCOSE SERPL-MCNC: 89 MG/DL (ref 65–99)
GLUCOSE SERPL-MCNC: 89 MG/DL (ref 65–99)
HCT VFR BLD AUTO: 35.4 % (ref 37–47)
HGB BLD-MCNC: 12.6 G/DL (ref 12–16)
IMM GRANULOCYTES # BLD AUTO: 0.05 K/UL (ref 0–0.11)
IMM GRANULOCYTES NFR BLD AUTO: 0.9 % (ref 0–0.9)
LACTATE BLD-SCNC: 1 MMOL/L (ref 0.5–2)
LYMPHOCYTES # BLD AUTO: 2.12 K/UL (ref 1–4.8)
LYMPHOCYTES NFR BLD: 39.3 % (ref 22–41)
MAGNESIUM SERPL-MCNC: 2 MG/DL (ref 1.5–2.5)
MCH RBC QN AUTO: 34.1 PG (ref 27–33)
MCHC RBC AUTO-ENTMCNC: 35.6 G/DL (ref 33.6–35)
MCV RBC AUTO: 95.9 FL (ref 81.4–97.8)
MONOCYTES # BLD AUTO: 0.6 K/UL (ref 0–0.85)
MONOCYTES NFR BLD AUTO: 11.1 % (ref 0–13.4)
NEUTROPHILS # BLD AUTO: 2.48 K/UL (ref 2–7.15)
NEUTROPHILS NFR BLD: 45.9 % (ref 44–72)
NRBC # BLD AUTO: 0 K/UL
NRBC BLD AUTO-RTO: 0 /100 WBC
PHOSPHATE SERPL-MCNC: 3.4 MG/DL (ref 2.5–4.5)
PLATELET # BLD AUTO: 187 K/UL (ref 164–446)
PMV BLD AUTO: 10 FL (ref 9–12.9)
POTASSIUM SERPL-SCNC: 2.7 MMOL/L (ref 3.6–5.5)
POTASSIUM SERPL-SCNC: 3 MMOL/L (ref 3.6–5.5)
RBC # BLD AUTO: 3.69 M/UL (ref 4.2–5.4)
SODIUM SERPL-SCNC: 140 MMOL/L (ref 135–145)
SODIUM SERPL-SCNC: 140 MMOL/L (ref 135–145)
WBC # BLD AUTO: 5.4 K/UL (ref 4.8–10.8)

## 2017-04-13 PROCEDURE — 83735 ASSAY OF MAGNESIUM: CPT

## 2017-04-13 PROCEDURE — A9270 NON-COVERED ITEM OR SERVICE: HCPCS | Performed by: INTERNAL MEDICINE

## 2017-04-13 PROCEDURE — 99226 PR SUBSEQUENT OBSERVATION CARE,LEVEL III: CPT | Mod: GC | Performed by: INTERNAL MEDICINE

## 2017-04-13 PROCEDURE — 96376 TX/PRO/DX INJ SAME DRUG ADON: CPT

## 2017-04-13 PROCEDURE — 700111 HCHG RX REV CODE 636 W/ 250 OVERRIDE (IP): Performed by: INTERNAL MEDICINE

## 2017-04-13 PROCEDURE — 93005 ELECTROCARDIOGRAM TRACING: CPT | Performed by: INTERNAL MEDICINE

## 2017-04-13 PROCEDURE — 700102 HCHG RX REV CODE 250 W/ 637 OVERRIDE(OP): Performed by: INTERNAL MEDICINE

## 2017-04-13 PROCEDURE — 93010 ELECTROCARDIOGRAM REPORT: CPT | Performed by: INTERNAL MEDICINE

## 2017-04-13 PROCEDURE — 84100 ASSAY OF PHOSPHORUS: CPT

## 2017-04-13 PROCEDURE — 85025 COMPLETE CBC W/AUTO DIFF WBC: CPT

## 2017-04-13 PROCEDURE — 83605 ASSAY OF LACTIC ACID: CPT

## 2017-04-13 PROCEDURE — 700101 HCHG RX REV CODE 250: Performed by: INTERNAL MEDICINE

## 2017-04-13 PROCEDURE — 36415 COLL VENOUS BLD VENIPUNCTURE: CPT

## 2017-04-13 PROCEDURE — G0378 HOSPITAL OBSERVATION PER HR: HCPCS

## 2017-04-13 PROCEDURE — 80048 BASIC METABOLIC PNL TOTAL CA: CPT

## 2017-04-13 RX ORDER — LORAZEPAM 0.5 MG/1
0.5 TABLET ORAL EVERY 4 HOURS PRN
Status: DISCONTINUED | OUTPATIENT
Start: 2017-04-13 | End: 2017-04-14 | Stop reason: HOSPADM

## 2017-04-13 RX ORDER — MORPHINE SULFATE 10 MG/ML
5 INJECTION, SOLUTION INTRAMUSCULAR; INTRAVENOUS
Status: COMPLETED | OUTPATIENT
Start: 2017-04-13 | End: 2017-04-13

## 2017-04-13 RX ORDER — SODIUM CHLORIDE AND POTASSIUM CHLORIDE 300; 900 MG/100ML; MG/100ML
INJECTION, SOLUTION INTRAVENOUS CONTINUOUS
Status: DISCONTINUED | OUTPATIENT
Start: 2017-04-13 | End: 2017-04-14 | Stop reason: HOSPADM

## 2017-04-13 RX ORDER — MORPHINE SULFATE 4 MG/ML
3 INJECTION, SOLUTION INTRAMUSCULAR; INTRAVENOUS EVERY 4 HOURS PRN
Status: DISCONTINUED | OUTPATIENT
Start: 2017-04-13 | End: 2017-04-14 | Stop reason: HOSPADM

## 2017-04-13 RX ORDER — POTASSIUM CHLORIDE 20 MEQ/1
40 TABLET, EXTENDED RELEASE ORAL 2 TIMES DAILY
Status: DISCONTINUED | OUTPATIENT
Start: 2017-04-13 | End: 2017-04-13

## 2017-04-13 RX ADMIN — POTASSIUM CHLORIDE AND SODIUM CHLORIDE: 900; 300 INJECTION, SOLUTION INTRAVENOUS at 16:11

## 2017-04-13 RX ADMIN — MORPHINE SULFATE 3 MG: 4 INJECTION INTRAVENOUS at 20:04

## 2017-04-13 RX ADMIN — ENOXAPARIN SODIUM 40 MG: 100 INJECTION SUBCUTANEOUS at 08:04

## 2017-04-13 RX ADMIN — MORPHINE SULFATE 3 MG: 4 INJECTION INTRAVENOUS at 12:09

## 2017-04-13 RX ADMIN — ZOLPIDEM TARTRATE 5 MG: 5 TABLET, FILM COATED ORAL at 22:34

## 2017-04-13 RX ADMIN — DEXTROSE AND SODIUM CHLORIDE: 5; .45 INJECTION, SOLUTION INTRAVENOUS at 07:09

## 2017-04-13 RX ADMIN — LORAZEPAM 0.5 MG: 0.5 TABLET ORAL at 20:04

## 2017-04-13 RX ADMIN — MORPHINE SULFATE 3 MG: 4 INJECTION INTRAVENOUS at 16:04

## 2017-04-13 RX ADMIN — MORPHINE SULFATE 5 MG: 10 INJECTION INTRAVENOUS at 08:00

## 2017-04-13 RX ADMIN — POTASSIUM CHLORIDE AND SODIUM CHLORIDE: 900; 300 INJECTION, SOLUTION INTRAVENOUS at 23:20

## 2017-04-13 RX ADMIN — LORAZEPAM 0.5 MG: 0.5 TABLET ORAL at 07:09

## 2017-04-13 RX ADMIN — LORAZEPAM 0.5 MG: 0.5 TABLET ORAL at 16:03

## 2017-04-13 RX ADMIN — ACETAMINOPHEN 650 MG: 325 TABLET, FILM COATED ORAL at 11:38

## 2017-04-13 RX ADMIN — LORAZEPAM 0.5 MG: 0.5 TABLET ORAL at 11:31

## 2017-04-13 RX ADMIN — POTASSIUM CHLORIDE AND SODIUM CHLORIDE: 900; 300 INJECTION, SOLUTION INTRAVENOUS at 08:06

## 2017-04-13 ASSESSMENT — ENCOUNTER SYMPTOMS
FEVER: 0
CHILLS: 0
NERVOUS/ANXIOUS: 1
BACK PAIN: 0
COUGH: 0
NAUSEA: 1
SHORTNESS OF BREATH: 0
ABDOMINAL PAIN: 1
PALPITATIONS: 0
DOUBLE VISION: 0
BLURRED VISION: 0
HEADACHES: 0
FOCAL WEAKNESS: 0
VOMITING: 1
DIZZINESS: 0

## 2017-04-13 ASSESSMENT — PAIN SCALES - GENERAL
PAINLEVEL_OUTOF10: 7
PAINLEVEL_OUTOF10: 4
PAINLEVEL_OUTOF10: 9
PAINLEVEL_OUTOF10: 7
PAINLEVEL_OUTOF10: 2
PAINLEVEL_OUTOF10: 6
PAINLEVEL_OUTOF10: 0
PAINLEVEL_OUTOF10: 0

## 2017-04-13 ASSESSMENT — LIFESTYLE VARIABLES
DO YOU DRINK ALCOHOL: NO
SUBSTANCE_ABUSE: 1

## 2017-04-13 NOTE — CARE PLAN
Problem: Safety  Goal: Will remain free from injury  Intervention: Provide assistance with mobility    04/12/17 2892   OTHER   Assistance / Tolerance Standby Assist;Tolerates Well     RN educated patient regarding the importance of gaining assistance for ambulation.  Patient verbalized understanding of education and denies any questions at this time.  Patient calls appropriately for assistance.        Problem: Pain Management  Goal: Pain level will decrease to patient’s comfort goal  Intervention: Follow pain managment plan developed in collaboration with patient and Interdisciplinary Team  RN educated patient regarding use of the numeric pain scale.  Patient verbalized understanding of education and denies any questions at this time.

## 2017-04-13 NOTE — ASSESSMENT & PLAN NOTE
Chart review indicates history of seizures likely from anoxic brain injury.  Not on any medication right now.  Monitor for now.

## 2017-04-13 NOTE — PROGRESS NOTES
Eric from Lab called with critical result of Potasium of 2.7.   at 042. Critical lab result read back to Eric.   Dr. Conti notified of critical lab result at 0.449.  Critical lab result read back by Dr. Conti.  New orders received.  .

## 2017-04-13 NOTE — PROGRESS NOTES
received pt from ER. Vs stable. States pain is 9/10, back pain. Skin intact tele applied sinus rhythm noted. Will monitor.

## 2017-04-13 NOTE — CARE PLAN
Problem: Nutritional:  Goal: Achieve adequate nutritional intake  Patient will consume 50% of meals and tolerate  Outcome: NOT MET

## 2017-04-13 NOTE — ASSESSMENT & PLAN NOTE
Patient has history of anxiety.  Currently not on medication as an outpatient.  Will need outpatient psychiatry referral.  Hydroxyzine 50mg TID PRN for anxiety in place.  Ativan 0.5mg Q4hrs PRN for anxiety.

## 2017-04-13 NOTE — PROGRESS NOTES
Bedside report completed.  Assumed pt care.  Pt AAO x4, resting in bed comfortably with no signs of labored breathing.  Pt tele monitor in place, cardiac rhythm being monitored.  Pt call light within reach, bed in low position, non skid socks in place.  Pt denies any pain or other distress at this time.  RN discussed plan of care with patient.  Patient denies any questions at this time.
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
## 2017-04-13 NOTE — DIETARY
Nutrition Services:  Per 2002 Nutritional Risk Screening guidelines, patient with score = 2.  Categorized as moderate level of impaired nutritional status, as evidenced by report of poor PO intake via consult to dietitian, Dx: hypokalemia, cyclical vomiting syndrome.  She is currently requesting to sleep; unavailable for dietary interview at this time.  RD noted uneaten breakfast tray at bedside.  Per resident MD, patient with GI motility disorder, chronic N/V, volume depleted, RASHMI, illicit drug use (possible THC?), possible celiac sprue.  GI following.    Labs: potassium 2.7, BUN 7  Meds: Theragran (refused today), scheduled Zofran, bowel care  Fluids: IVF with KCl at 125 ml/hr meeting fluid needs.  Wt: 130 lbs, BMI 21.6  +BM today  No pressure ulcers noted per chart review.    Plan/Recomend: change diet order to GI Soft x6 small meals/day to aid in GI tolerance.  RD to send patient high protein smoothie 8 oz to promote GI tolerance.

## 2017-04-13 NOTE — ASSESSMENT & PLAN NOTE
Patient has extensive history of cyclical vomiting syndrome.  Likely secondary to multiple adhesions from previous surgeries. No symptoms of obstruction.  Admits to smoking marijuana last on Wednesday.  Counseled strongly to quit smoking.  Improving clinically. Has associated back pain controlled with analgesia.  On scheduled antiemetics and PRN antiemetics.  Patient tolerating diet currently.

## 2017-04-13 NOTE — PROGRESS NOTES
Student RN received bedside report at 0700 and assumed care. Pt was anxious and asking for pain med; morphine received at 0815. Pt received 2 new hot packs; used one and other is on tray table. DC'd D5 running and replaced with NS w/ K at 125ml/hr. Provided dark, quiet environment. Tray table, call light w/in reach, bed lowest position, rails down. Pt requested door closed. Will continue to monitor.

## 2017-04-13 NOTE — H&P
[facility] Admitting History and Physical    Name Diana Hernandez 1975   Age/Sex 41 y.o. female   MRN [id number]   Code Status Full code     After 5PM or if no immediate response to page, please call for cross-coverage  Attending/Team: Jj Call [phone] to page   1st Call - Day Intern (R1):   Caitlin 2nd Call - Day Sr. Resident (R2/R3):   Chance       Chief Complaint:  Nausea, vomiting    HPI:  Ms Hernandez is a 41 year old female with history of SMA syndrome s/p vlad-en-Y duodenojejunostomy 7 years ago and bowel resection in 2016, nephrolithiasis, cyclic vomiting, marijuana abuse, anxiety and bipolar disorder, history of seizures presents to the ED with complaints of nausea and vomiting since  5 days.    Patient does note to have similar complaints in the past and was admitted for similar episodes in the past.    Since Friday last week, she reports nausea and vomiting, on an average of 3 vomitings per day. She does report more dry heavings but denies blood, describes the vomitus as greenish-brown in color. She denies fever or chills but reports loose stools 2-3/day. Patient denies blood in stools but reports mucus in stools.      Review of Systems   Constitutional: Negative for fever and chills.   Eyes: Negative for blurred vision.   Respiratory: Negative for cough and sputum production.    Cardiovascular: Negative for chest pain and palpitations.   Gastrointestinal: Positive for nausea, vomiting and abdominal pain.   Musculoskeletal: Positive for back pain. Negative for neck pain.   Skin: Negative for rash.   Neurological: Negative for dizziness, focal weakness and headaches.   Psychiatric/Behavioral: Positive for substance abuse. The patient is nervous/anxious.              Past Medical History:   Past Medical History   Diagnosis Date   • Snoring    • Dental disorder    • Pain      abd and back   • Tobacco abuse 2009   • Superior mesenteric artery syndrome  (CMS-HCC) 7/12/2009   • Anxiety      Followed by [facility]   • CLOSTRIDIUM DIFFICILE INFECTION 4/14/2010   • Dyspepsia 7/12/2009   • Backpain    • Nephrolithiasis 6/20/2009     kidney stones,post lithotrypsy   • Anxiety disorder    • CVS disease    • Drug-seeking behavior    • Cyclic vomiting syndrome    • Superior mesenteric artery syndrome (CMS-[facility])        Past Surgical History:  Past Surgical History   Procedure Laterality Date   • Gastroscopy-endo  7/8/2009     Performed by ROSANNA WRIGHT at SURGERY [facility]   • Lithotripsy     • Pyloroplasty  7/27/2009     Performed by MACIEL QUINTERO at SURGERY [facility]   • Gastroscopy with biopsy  3/9/2010     Performed by AMANDA MEJIA at ENDOSCOPY [facility]   • Exploratory laparotomy  7/27/2009     Performed by MACIEL QUINTERO at SURGERY [facility]   • Appendectomy  7/27/2009     Performed by MACIEL QUINTERO at SURGERY [facility]   • Cholecystectomy  7/27/2009     Performed by MACIEL QUINTERO at SURGERY [facility]   • Other       superior mesenteric artery correction    • Exploratory laparotomy  1/12/2016     Procedure: EXPLORATORY LAPAROTOMY;  Surgeon: Maciel Quintero M.D.;  Location: SURGERY [facility];  Service:    • Bowel resection  1/12/2016     Procedure: BOWEL RESECTION;  Surgeon: Maciel Quintero M.D.;  Location: SURGERY [facility];  Service:    • Gastroscopy-endo  4/28/2016     Procedure: GASTROSCOPY-ENDO;  Surgeon: Blayne Blackburn M.D.;  Location: [facility];  Service:        Current Outpatient Medications:  Home Medications     Reviewed by Anish Reyna (Pharmacy Tech) on 04/12/17 at 1330  Med List Status: Complete    Medication Last Dose Status    multivitamin (THERAGRAN) Tab 4/10/2017 Active    ondansetron (ZOFRAN ODT) 4 MG TABLET DISPERSIBLE 4/10/2017 Active    potassium bicarbonate (KLYTE) 25 MEQ tablet 4/10/2017 Active     "promethazine (PHENERGAN) 25 MG Suppos 4/10/2017 Active    promethazine (PHENERGAN) 25 MG Tab 4/10/2017 Active                Medication Allergy/Sensitivities:  Allergies   Allergen Reactions   • Metoclopramide Hives     Told by MD; pt suspects possible hives.   • Quetiapine Unspecified     MD told her she was allergic     • Septra [Sulfamethoxazole W-Trimethoprim] Unspecified     MD told her she was allergic           Family History:  Family History   Problem Relation Age of Onset   • Cancer Mother 50     Colon cancer   • Allergies Father    • Hypertension Mother    • Hypertension Father    • Heart Disease Maternal Grandmother        Social History:  Social History     Social History   • Marital Status: Single     Spouse Name: N/A   • Number of Children: N/A   • Years of Education: N/A     Occupational History   • Not on file.     Social History Main Topics   • Smoking status: Former Smoker -- 0.50 packs/day for 13 years     Types: Cigarettes     Quit date: 12/15/2012   • Smokeless tobacco: Never Used      Comment: 1/2ppd   • Alcohol Use: No   • Drug Use: No      Comment: 1 gm/day for 10 yr - stopped   • Sexual Activity: Not on file     Other Topics Concern   • Not on file     Social History Narrative     Living situation: Lives with starla  PCP : Lavell Monte M.D.      Physical Exam     Filed Vitals:    04/12/17 1500 04/12/17 1530 04/12/17 1755 04/12/17 1825   BP:    122/66   Pulse: 82 77  87   Temp:    37 °C (98.6 °F)   Resp: 9 20  21   Height:   1.651 m (5' 5\")    Weight:   59.2 kg (130 lb 8.2 oz)    SpO2: 96% 94%  98%     Body mass index is 21.72 kg/(m^2).  /66 mmHg  Pulse 87  Temp(Src) 37 °C (98.6 °F)  Resp 21  Ht 1.651 m (5' 5\")  Wt 59.2 kg (130 lb 8.2 oz)  BMI 21.72 kg/m2  SpO2 98%  LMP 03/15/2017  Breastfeeding? No  O2 therapy: Pulse Oximetry: 98 %, O2 (LPM): 0, O2 Delivery: None (Room Air)    Physical Exam   Constitutional: She is oriented to person, place, and time and well-developed, " well-nourished, and in no distress.   HENT:   Head: Normocephalic and atraumatic.   Eyes: EOM are normal. Pupils are equal, round, and reactive to light.   Cardiovascular: Normal rate, regular rhythm and normal heart sounds.    Pulmonary/Chest: Effort normal and breath sounds normal.   Abdominal: Soft. Bowel sounds are normal. She exhibits no distension. There is tenderness.   Musculoskeletal: She exhibits tenderness.   Neurological: She is alert and oriented to person, place, and time. GCS score is 15.             Data Review       Old Records Request:   Completed  Current Records review and summary: Completed    Lab Data Review:  Recent Results (from the past 24 hour(s))   CBC WITH DIFFERENTIAL    Collection Time: 04/12/17 11:53 AM   Result Value Ref Range    WBC 7.6 4.8 - 10.8 K/uL    RBC 4.39 4.20 - 5.40 M/uL    Hemoglobin 14.8 12.0 - 16.0 g/dL    Hematocrit 41.0 37.0 - 47.0 %    MCV 93.4 81.4 - 97.8 fL    MCH 33.7 (H) 27.0 - 33.0 pg    MCHC 36.1 (H) 33.6 - 35.0 g/dL    RDW 43.8 35.9 - 50.0 fL    Platelet Count 302 164 - 446 K/uL    MPV 9.8 9.0 - 12.9 fL    Neutrophils-Polys 78.70 (H) 44.00 - 72.00 %    Lymphocytes 10.90 (L) 22.00 - 41.00 %    Monocytes 9.60 0.00 - 13.40 %    Eosinophils 0.00 0.00 - 6.90 %    Basophils 0.40 0.00 - 1.80 %    Immature Granulocytes 0.40 0.00 - 0.90 %    Nucleated RBC 0.00 /100 WBC    Neutrophils (Absolute) 6.00 2.00 - 7.15 K/uL    Lymphs (Absolute) 0.83 (L) 1.00 - 4.80 K/uL    Monos (Absolute) 0.73 0.00 - 0.85 K/uL    Eos (Absolute) 0.00 0.00 - 0.51 K/uL    Baso (Absolute) 0.03 0.00 - 0.12 K/uL    Immature Granulocytes (abs) 0.03 0.00 - 0.11 K/uL    NRBC (Absolute) 0.00 K/uL   COMP METABOLIC PANEL    Collection Time: 04/12/17 11:53 AM   Result Value Ref Range    Sodium 135 135 - 145 mmol/L    Potassium 2.3 (LL) 3.6 - 5.5 mmol/L    Chloride 101 96 - 112 mmol/L    Co2 20 20 - 33 mmol/L    Anion Gap 14.0 (H) 0.0 - 11.9    Glucose 109 (H) 65 - 99 mg/dL    Bun 10 8 - 22 mg/dL     Creatinine 0.86 0.50 - 1.40 mg/dL    Calcium 9.6 8.5 - 10.5 mg/dL    AST(SGOT) 16 12 - 45 U/L    ALT(SGPT) 17 2 - 50 U/L    Alkaline Phosphatase 60 30 - 99 U/L    Total Bilirubin 2.0 (H) 0.1 - 1.5 mg/dL    Albumin 4.7 3.2 - 4.9 g/dL    Total Protein 7.6 6.0 - 8.2 g/dL    Globulin 2.9 1.9 - 3.5 g/dL    A-G Ratio 1.6 g/dL   LIPASE    Collection Time: 04/12/17 11:53 AM   Result Value Ref Range    Lipase 10 (L) 11 - 82 U/L   LACTIC ACID    Collection Time: 04/12/17 11:53 AM   Result Value Ref Range    Lactic Acid 3.7 (H) 0.5 - 2.0 mmol/L   HCG Qual Serum    Collection Time: 04/12/17 11:53 AM   Result Value Ref Range    Beta-Hcg Qualitative Serum Negative Negative   ESTIMATED GFR    Collection Time: 04/12/17 11:53 AM   Result Value Ref Range    GFR If African American >60 >60 mL/min/1.73 m 2    GFR If Non African American >60 >60 mL/min/1.73 m 2   URINALYSIS CULTURE, IF INDICATED    Collection Time: 04/12/17  4:00 PM   Result Value Ref Range    Micro Urine Req Microscopic     Color Lt. Yellow     Character Clear     Specific Gravity 1.006 <1.035    Ph 6.0 5.0-8.0    Glucose 300 (A) Negative mg/dL    Ketones 20 (A) Negative mg/dL    Protein Negative Negative mg/dL    Bilirubin Negative Negative    Nitrite Negative Negative    Leukocyte Esterase Negative Negative    Occult Blood Small (A) Negative    Culture Indicated No UA Culture   URINE MICROSCOPIC (W/UA)    Collection Time: 04/12/17  4:00 PM   Result Value Ref Range    WBC 0-2 /hpf    RBC 0-2 /hpf    Bacteria Few (A) None /hpf    Epithelial Cells Few /hpf    Mucous Threads Few /hpf       Imaging/Procedures Review:    ndependant Imaging Review: Completed  [id number] VIEW   Final Result      No evidence of bowel obstruction.               EKG:   EKG not done.     Records reviewed and summarized in current documentation             Assessment/Plan     Anxiety  Assessment & Plan  Patient has history of anxiety.  Currently not on medication as an outpatient.  Will need  outpatient psychiatry referral.  Hydroxyzine 50mg TID PRN for anxiety in place.    Hypokalemia  Assessment & Plan  Potassium severely low at 2.3.  Likely from the vomiting.  Repleted in the ED.  Will recheck again.    Cyclical vomiting syndrome  Assessment & Plan  Patient has extensive history of cyclical vomiting syndrome.  Admits to smoking marijuana last on Wednesday.  Counseled strongly to quit smoking.  Could also be secondary to SMA syndrome that patient has had.  She also reports back pain secondary to the vomiting.  On scheduled antiemetics and PRN antiemetics.    History of seizures  Assessment & Plan  Chart review indicates history of seizures likely from anoxic brain injury.  Not on any medication right now.  Monitor for now.        Anticipated Hospital stay: Observation admit        Quality Measures  Labs reviewed and Medications reviewed  Saldana catheter: No Saldana      DVT Prophylaxis: Enoxaparin (Lovenox)    Ulcer prophylaxis: No and Not indicated

## 2017-04-14 VITALS
HEIGHT: 65 IN | HEART RATE: 75 BPM | TEMPERATURE: 97.8 F | OXYGEN SATURATION: 92 % | RESPIRATION RATE: 16 BRPM | BODY MASS INDEX: 22.74 KG/M2 | WEIGHT: 136.47 LBS | DIASTOLIC BLOOD PRESSURE: 76 MMHG | SYSTOLIC BLOOD PRESSURE: 119 MMHG

## 2017-04-14 LAB
ANION GAP SERPL CALC-SCNC: 10 MMOL/L (ref 0–11.9)
BASOPHILS # BLD AUTO: 0.7 % (ref 0–1.8)
BASOPHILS # BLD: 0.05 K/UL (ref 0–0.12)
BUN SERPL-MCNC: 4 MG/DL (ref 8–22)
CALCIUM SERPL-MCNC: 8.8 MG/DL (ref 8.5–10.5)
CHLORIDE SERPL-SCNC: 109 MMOL/L (ref 96–112)
CO2 SERPL-SCNC: 20 MMOL/L (ref 20–33)
CREAT SERPL-MCNC: 0.64 MG/DL (ref 0.5–1.4)
EOSINOPHIL # BLD AUTO: 0.15 K/UL (ref 0–0.51)
EOSINOPHIL NFR BLD: 2 % (ref 0–6.9)
ERYTHROCYTE [DISTWIDTH] IN BLOOD BY AUTOMATED COUNT: 47.6 FL (ref 35.9–50)
GFR SERPL CREATININE-BSD FRML MDRD: >60 ML/MIN/1.73 M 2
GLUCOSE SERPL-MCNC: 109 MG/DL (ref 65–99)
HCT VFR BLD AUTO: 36.4 % (ref 37–47)
HGB BLD-MCNC: 13 G/DL (ref 12–16)
IMM GRANULOCYTES # BLD AUTO: 0.04 K/UL (ref 0–0.11)
IMM GRANULOCYTES NFR BLD AUTO: 0.5 % (ref 0–0.9)
LYMPHOCYTES # BLD AUTO: 1.68 K/UL (ref 1–4.8)
LYMPHOCYTES NFR BLD: 22.6 % (ref 22–41)
MCH RBC QN AUTO: 34.5 PG (ref 27–33)
MCHC RBC AUTO-ENTMCNC: 35.7 G/DL (ref 33.6–35)
MCV RBC AUTO: 96.6 FL (ref 81.4–97.8)
MONOCYTES # BLD AUTO: 0.69 K/UL (ref 0–0.85)
MONOCYTES NFR BLD AUTO: 9.3 % (ref 0–13.4)
NEUTROPHILS # BLD AUTO: 4.82 K/UL (ref 2–7.15)
NEUTROPHILS NFR BLD: 64.9 % (ref 44–72)
NRBC # BLD AUTO: 0 K/UL
NRBC BLD AUTO-RTO: 0 /100 WBC
PLATELET # BLD AUTO: 262 K/UL (ref 164–446)
PMV BLD AUTO: 10.3 FL (ref 9–12.9)
POTASSIUM SERPL-SCNC: 3.8 MMOL/L (ref 3.6–5.5)
RBC # BLD AUTO: 3.77 M/UL (ref 4.2–5.4)
SODIUM SERPL-SCNC: 139 MMOL/L (ref 135–145)
WBC # BLD AUTO: 7.4 K/UL (ref 4.8–10.8)

## 2017-04-14 PROCEDURE — 96376 TX/PRO/DX INJ SAME DRUG ADON: CPT

## 2017-04-14 PROCEDURE — 700102 HCHG RX REV CODE 250 W/ 637 OVERRIDE(OP): Performed by: STUDENT IN AN ORGANIZED HEALTH CARE EDUCATION/TRAINING PROGRAM

## 2017-04-14 PROCEDURE — 85025 COMPLETE CBC W/AUTO DIFF WBC: CPT

## 2017-04-14 PROCEDURE — 700101 HCHG RX REV CODE 250: Performed by: INTERNAL MEDICINE

## 2017-04-14 PROCEDURE — 700102 HCHG RX REV CODE 250 W/ 637 OVERRIDE(OP): Performed by: INTERNAL MEDICINE

## 2017-04-14 PROCEDURE — A9270 NON-COVERED ITEM OR SERVICE: HCPCS | Performed by: STUDENT IN AN ORGANIZED HEALTH CARE EDUCATION/TRAINING PROGRAM

## 2017-04-14 PROCEDURE — 700111 HCHG RX REV CODE 636 W/ 250 OVERRIDE (IP): Performed by: INTERNAL MEDICINE

## 2017-04-14 PROCEDURE — A9270 NON-COVERED ITEM OR SERVICE: HCPCS | Performed by: INTERNAL MEDICINE

## 2017-04-14 PROCEDURE — G0378 HOSPITAL OBSERVATION PER HR: HCPCS

## 2017-04-14 PROCEDURE — 99217 PR OBSERVATION CARE DISCHARGE: CPT | Mod: GC | Performed by: INTERNAL MEDICINE

## 2017-04-14 PROCEDURE — 36415 COLL VENOUS BLD VENIPUNCTURE: CPT

## 2017-04-14 PROCEDURE — 80048 BASIC METABOLIC PNL TOTAL CA: CPT

## 2017-04-14 RX ORDER — ZOLPIDEM TARTRATE 5 MG/1
5 TABLET ORAL ONCE
Status: COMPLETED | OUTPATIENT
Start: 2017-04-14 | End: 2017-04-14

## 2017-04-14 RX ORDER — POTASSIUM CHLORIDE 1.5 G/1.58G
20 POWDER, FOR SOLUTION ORAL ONCE
Status: DISCONTINUED | OUTPATIENT
Start: 2017-04-14 | End: 2017-04-14

## 2017-04-14 RX ORDER — POTASSIUM CHLORIDE 20 MEQ/1
20 TABLET, EXTENDED RELEASE ORAL ONCE
Status: COMPLETED | OUTPATIENT
Start: 2017-04-14 | End: 2017-04-14

## 2017-04-14 RX ADMIN — ZOLPIDEM TARTRATE 5 MG: 5 TABLET, FILM COATED ORAL at 00:49

## 2017-04-14 RX ADMIN — ENOXAPARIN SODIUM 40 MG: 100 INJECTION SUBCUTANEOUS at 09:07

## 2017-04-14 RX ADMIN — MORPHINE SULFATE 3 MG: 4 INJECTION INTRAVENOUS at 13:09

## 2017-04-14 RX ADMIN — LORAZEPAM 0.5 MG: 0.5 TABLET ORAL at 09:05

## 2017-04-14 RX ADMIN — ONDANSETRON 4 MG: 4 TABLET, ORALLY DISINTEGRATING ORAL at 13:09

## 2017-04-14 RX ADMIN — LORAZEPAM 0.5 MG: 0.5 TABLET ORAL at 00:04

## 2017-04-14 RX ADMIN — POTASSIUM CHLORIDE AND SODIUM CHLORIDE: 900; 300 INJECTION, SOLUTION INTRAVENOUS at 10:01

## 2017-04-14 RX ADMIN — POTASSIUM CHLORIDE 20 MEQ: 1500 TABLET, EXTENDED RELEASE ORAL at 10:01

## 2017-04-14 RX ADMIN — THERA TABS 1 TABLET: TAB at 09:04

## 2017-04-14 RX ADMIN — LORAZEPAM 0.5 MG: 0.5 TABLET ORAL at 04:50

## 2017-04-14 RX ADMIN — MORPHINE SULFATE 3 MG: 4 INJECTION INTRAVENOUS at 00:03

## 2017-04-14 RX ADMIN — HYDROXYZINE HYDROCHLORIDE 50 MG: 50 TABLET, FILM COATED ORAL at 11:11

## 2017-04-14 RX ADMIN — MORPHINE SULFATE 3 MG: 4 INJECTION INTRAVENOUS at 04:50

## 2017-04-14 RX ADMIN — MORPHINE SULFATE 3 MG: 4 INJECTION INTRAVENOUS at 09:00

## 2017-04-14 RX ADMIN — LORAZEPAM 0.5 MG: 0.5 TABLET ORAL at 13:09

## 2017-04-14 ASSESSMENT — LIFESTYLE VARIABLES
EVER_SMOKED: NEVER
EVER_SMOKED: YES

## 2017-04-14 ASSESSMENT — PAIN SCALES - GENERAL
PAINLEVEL_OUTOF10: 3
PAINLEVEL_OUTOF10: 8
PAINLEVEL_OUTOF10: 8
PAINLEVEL_OUTOF10: 3

## 2017-04-14 NOTE — DISCHARGE INSTRUCTIONS
Discharge Instructions    Discharged to home by car with relative. Discharged via wheelchair, hospital escort: Yes.  Special equipment needed: Not Applicable    Be sure to schedule a follow-up appointment with your primary care doctor or any specialists as instructed.     Discharge Plan:   Diet Plan: Discussed  Activity Level: Discussed  Confirmed Follow up Appointment: Patient to Call and Schedule Appointment  Confirmed Symptoms Management: Discussed  Medication Reconciliation Updated: Yes  Influenza Vaccine Indication: Patient Refuses    I understand that a diet low in cholesterol, fat, and sodium is recommended for good health. Unless I have been given specific instructions below for another diet, I accept this instruction as my diet prescription.   Other diet: Regular    Special Instructions: None    Food Choices to Help Relieve Diarrhea, Adult  When you have diarrhea, the foods you eat and your eating habits are very important. Choosing the right foods and drinks can help relieve diarrhea. Also, because diarrhea can last up to 7 days, you need to replace lost fluids and electrolytes (such as sodium, potassium, and chloride) in order to help prevent dehydration.   WHAT GENERAL GUIDELINES DO I NEED TO FOLLOW?  Slowly drink 1 cup (8 oz) of fluid for each episode of diarrhea. If you are getting enough fluid, your urine will be clear or pale yellow.  Eat starchy foods. Some good choices include white rice, white toast, pasta, low-fiber cereal, baked potatoes (without the skin), saltine crackers, and bagels.  Avoid large servings of any cooked vegetables.  Limit fruit to two servings per day. A serving is ½ cup or 1 small piece.  Choose foods with less than 2 g of fiber per serving.  Limit fats to less than 8 tsp (38 g) per day.  Avoid fried foods.  Eat foods that have probiotics in them. Probiotics can be found in certain dairy products.  Avoid foods and beverages that may increase the speed at which food moves  through the stomach and intestines (gastrointestinal tract). Things to avoid include:  High-fiber foods, such as dried fruit, raw fruits and vegetables, nuts, seeds, and whole grain foods.  Spicy foods and high-fat foods.  Foods and beverages sweetened with high-fructose corn syrup, honey, or sugar alcohols such as xylitol, sorbitol, and mannitol.  WHAT FOODS ARE RECOMMENDED?  Grains  White rice. White, French, or javier breads (fresh or toasted), including plain rolls, buns, or bagels. White pasta. Saltine, soda, or ale crackers. Pretzels. Low-fiber cereal. Cooked cereals made with water (such as cornmeal, farina, or cream cereals). Plain muffins. Matzo. [place] toast. Zwieback.   Vegetables  Potatoes (without the skin). Strained tomato and vegetable juices. Most well-cooked and canned vegetables without seeds. Tender lettuce.  Fruits  Cooked or canned applesauce, apricots, cherries, fruit cocktail, grapefruit, peaches, pears, or plums. Fresh bananas, apples without skin, cherries, grapes, cantaloupe, grapefruit, peaches, oranges, or plums.   Meat and Other Protein Products  Baked or boiled chicken. Eggs. Tofu. Fish. Seafood. Smooth peanut butter. Ground or well-cooked tender beef, ham, veal, lamb, pork, or poultry.   Dairy  Plain yogurt, kefir, and unsweetened liquid yogurt. Lactose-free milk, buttermilk, or soy milk. Plain hard cheese.  Beverages  Sport drinks. Clear broths. Diluted fruit juices (except prune). Regular, caffeine-free sodas such as ginger ale. Water. Decaffeinated teas. Oral rehydration solutions. Sugar-free beverages not sweetened with sugar alcohols.  Other  Bouillon, broth, or soups made from recommended foods.   The items listed above may not be a complete list of recommended foods or beverages. Contact your dietitian for more options.  WHAT FOODS ARE NOT RECOMMENDED?  Grains  Whole grain, whole wheat, bran, or rye breads, rolls, pastas, crackers, and cereals. Wild or brown rice. Cereals that  contain more than 2 g of fiber per serving. Corn tortillas or taco shells. Cooked or dry oatmeal. Granola. Popcorn.  Vegetables  Raw vegetables. Cabbage, broccoli, [place] sprouts, artichokes, baked beans, beet greens, corn, kale, legumes, peas, sweet potatoes, and yams. Potato skins. Cooked spinach and cabbage.  Fruits  Dried fruit, including raisins and dates. Raw fruits. Stewed or dried prunes. Fresh apples with skin, apricots, mangoes, pears, raspberries, and strawberries.   Meat and Other Protein Products  [place] peanut butter. Nuts and seeds. Beans and lentils. [place].   Dairy  High-fat cheeses. Milk, chocolate milk, and beverages made with milk, such as milk shakes. Cream. Ice cream.  Sweets and Desserts  Sweet rolls, doughnuts, and sweet breads. Pancakes and waffles.  Fats and Oils  Butter. Cream sauces. Margarine. Salad oils. Plain salad dressings. Olives. Avocados.   Beverages  Caffeinated beverages (such as coffee, tea, soda, or energy drinks). Alcoholic beverages. Fruit juices with pulp. Prune juice. Soft drinks sweetened with high-fructose corn syrup or sugar alcohols.  Other  Coconut. Hot sauce. Chili powder. Mayonnaise. Gravy. Cream-based or milk-based soups.   The items listed above may not be a complete list of foods and beverages to avoid. Contact your dietitian for more information.  WHAT SHOULD I DO IF I BECOME DEHYDRATED?  Diarrhea can sometimes lead to dehydration. Signs of dehydration include dark urine and dry mouth and skin. If you think you are dehydrated, you should rehydrate with an oral rehydration solution. These solutions can be purchased at pharmacies, retail stores, or online.   Drink ½-1 cup (120-240 mL) of oral rehydration solution each time you have an episode of diarrhea. If drinking this amount makes your diarrhea worse, try drinking smaller amounts more often. For example, drink 1-3 tsp (5-15 mL) every 5-10 minutes.   A general rule for staying hydrated is to drink 1½-2 L of  fluid per day. Talk to your health care provider about the specific amount you should be drinking each day. Drink enough fluids to keep your urine clear or pale yellow.     This information is not intended to replace advice given to you by your health care provider. Make sure you discuss any questions you have with your health care provider.     Document Released: 03/09/2005 Document Revised: 01/08/2016 Document Reviewed: 11/10/2014  [employer] Interactive Patient Education ©2016 [employer]  Hypokalemia  Hypokalemia means that the amount of potassium in the blood is lower than normal. Potassium is a chemical, called an electrolyte, that helps regulate the amount of fluid in the body. It also stimulates muscle contraction and helps nerves function properly. Most of the body's potassium is inside of cells, and only a very small amount is in the blood. Because the amount in the blood is so small, minor changes can be life-threatening.  CAUSES  · Antibiotics.  · Diarrhea or vomiting.  · Using laxatives too much, which can cause diarrhea.  · Chronic kidney disease.  · Water pills (diuretics).  · Eating disorders (bulimia).  · Low magnesium level.  · Sweating a lot.  SIGNS AND SYMPTOMS  · Weakness.  · Constipation.  · Fatigue.  · Muscle cramps.  · Mental confusion.  · Skipped heartbeats or irregular heartbeat (palpitations).  · Tingling or numbness.  DIAGNOSIS   Your health care provider can diagnose hypokalemia with blood tests. In addition to checking your potassium level, your health care provider may also check other lab tests.  TREATMENT  Hypokalemia can be treated with potassium supplements taken by mouth or adjustments in your current medicines. If your potassium level is very low, you may need to get potassium through a vein (IV) and be monitored in the hospital. A diet high in potassium is also helpful. Foods high in potassium are:  · Nuts, such as peanuts and pistachios.  · Seeds, such as sunflower seeds and  pumpkin seeds.  · Peas, lentils, and lima beans.  · Whole grain and bran cereals and breads.  · Fresh fruit and vegetables, such as apricots, avocado, bananas, cantaloupe, kiwi, oranges, tomatoes, asparagus, and potatoes.  · Orange and tomato juices.  · Red meats.  · Fruit yogurt.  HOME CARE INSTRUCTIONS  · Take all medicines as prescribed by your health care provider.  · Maintain a healthy diet by including nutritious food, such as fruits, vegetables, nuts, whole grains, and lean meats.  · If you are taking a laxative, be sure to follow the directions on the label.  SEEK MEDICAL CARE IF:  · Your weakness gets worse.  · You feel your heart pounding or racing.  · You are vomiting or having diarrhea.  · You are diabetic and having trouble keeping your blood glucose in the normal range.  SEEK IMMEDIATE MEDICAL CARE IF:  · You have chest pain, shortness of breath, or dizziness.  · You are vomiting or having diarrhea for more than 2 days.  · You faint.  MAKE SURE YOU:   · Understand these instructions.  · Will watch your condition.  · Will get help right away if you are not doing well or get worse.     This information is not intended to replace advice given to you by your health care provider. Make sure you discuss any questions you have with your health care provider.     Document Released: 12/18/2006 Document Revised: 01/08/2016 Document Reviewed: 06/20/2014  [employer] Interactive Patient Education ©2016 [employer]      · Is patient discharged on Warfarin / Coumadin?   No     · Is patient Post Blood Transfusion?  No    Depression / Suicide Risk    As you are discharged from this [facility] facility, it is important to learn how to keep safe from harming yourself.    Recognize the warning signs:  · Abrupt changes in personality, positive or negative- including increase in energy   · Giving away possessions  · Change in eating patterns- significant weight changes-  positive or negative  · Change in sleeping  patterns- unable to sleep or sleeping all the time   · Unwillingness or inability to communicate  · Depression  · Unusual sadness, discouragement and loneliness  · Talk of wanting to die  · Neglect of personal appearance   · Rebelliousness- reckless behavior  · Withdrawal from people/activities they love  · Confusion- inability to concentrate     If you or a loved one observes any of these behaviors or has concerns about self-harm, here's what you can do:  · Talk about it- your feelings and reasons for harming yourself  · Remove any means that you might use to hurt yourself (examples: pills, rope, extension cords, firearm)  · Get professional help from the community (Mental Health, Substance Abuse, psychological counseling)  · Do not be alone:Call your Safe Contact- someone whom you trust who will be there for you.  · Call your local CRISIS HOTLINE 171-5360 or 165-291-5566  · Call your local Children's Mobile Crisis Response Team [place] (062) 307-5946 or www.[employer]  · Call the toll free National Suicide Prevention Hotlines   · National Suicide Prevention Lifeline 388-440-BDDY (3754)  · National Hope Line Network 800-SUICIDE (431-9425)

## 2017-04-14 NOTE — PROGRESS NOTES
[facility] [place]    Name Diana Hernandez 1975   Age/Sex 41 y.o. female   MRN [id number]   Code Status Full code     After 5PM or if no immediate response to page, please call for cross-coverage  Attending/Team: Demetris/Vinh Call [phone] to page   1st Call - Day Intern (R1):   Caitlin 2nd Call - Day Sr. Resident (R2/R3):   Chance         Chief complaint/ reason for interval visit (Primary Diagnosis)   Intractable nausea and vomiting; Anxiety    Interval Problem Daily Status Update    Patient reports to be improving and reports that most relief is obtained by pain control. She does note that she experienced vomiting last at 5:00AM and reports being able to tolerate diet since then. She continues to complain of abdominal pain and back pain which is likely related to the abdominal pain. Patient is improving but will likely need one more day of hospital stay.      Active Hospital Problems    Diagnosis   • Anxiety [F41.9]   • Cyclical vomiting syndrome [G43.A0]   • History of seizures [Z87.898]   • Hypokalemia [E87.6]       Review of Systems   Constitutional: Negative for fever and chills.   Eyes: Negative for blurred vision and double vision.   Respiratory: Negative for cough and shortness of breath.    Cardiovascular: Negative for chest pain and palpitations.   Gastrointestinal: Positive for nausea, vomiting and abdominal pain.   Genitourinary: Negative for dysuria and frequency.   Musculoskeletal: Negative for back pain.   Skin: Negative for rash.   Neurological: Negative for dizziness, focal weakness and headaches.   Psychiatric/Behavioral: Positive for substance abuse. The patient is nervous/anxious.        Consultants/Specialty  None    Disposition  Possible discharge tomorrow if clinically improves    Quality Measures  EKG reviewed, Labs reviewed, Medications reviewed and Radiology images reviewed  Saldana catheter: No Saldana      DVT Prophylaxis:  Enoxaparin (Lovenox)                  Physical Exam       Filed Vitals:    04/13/17 0400 04/13/17 0800 04/13/17 1200 04/13/17 1600   BP: 116/81 126/78 121/80 126/78   Pulse: 62 78 91 81   Temp: 37 °C (98.6 °F) 35.8 °C (96.4 °F) 37.5 °C (99.5 °F) 37.2 °C (99 °F)   Resp: 18 14 18 20   Height:       Weight:       SpO2: 97% 100% 96% 100%     Body mass index is 21.68 kg/(m^2). Weight: 59.1 kg (130 lb 4.7 oz)  Oxygen Therapy:  Pulse Oximetry: 100 %, O2 (LPM): 0, O2 Delivery: None (Room Air)    Physical Exam   Constitutional: She is oriented to person, place, and time and well-developed, well-nourished, and in no distress.   HENT:   Head: Normocephalic and atraumatic.   Eyes: EOM are normal.   Cardiovascular: Normal rate, regular rhythm and normal heart sounds.    Pulmonary/Chest: Effort normal and breath sounds normal.   Abdominal: Soft. Bowel sounds are normal.   Neurological: She is alert and oriented to person, place, and time. GCS score is 15.         Lab Data Review:      4/13/2017  5:29 PM    Recent Labs      04/12/17 1153 04/13/17 0347 04/13/17   1558   SODIUM  135  140  140   POTASSIUM  2.3*  2.7*  3.0*   CHLORIDE  101  109  109   CO2  20  25   --    BUN  10  7*   --    CREATININE  0.86  0.61   --    MAGNESIUM   --   2.0   --    PHOSPHORUS   --   3.4   --    CALCIUM  9.6  8.5   --        Recent Labs      04/12/17 1153 04/13/17 0347   ALTSGPT  17   --    ASTSGOT  16   --    ALKPHOSPHAT  60   --    TBILIRUBIN  2.0*   --    LIPASE  10*   --    GLUCOSE  109*  89       Recent Labs      04/12/17 1153 04/13/17 0347   RBC  4.39  3.69*   HEMOGLOBIN  14.8  12.6   HEMATOCRIT  41.0  35.4*   PLATELETCT  302  187       Recent Labs      04/12/17 1153 04/13/17 0347   WBC  7.6  5.4   NEUTSPOLYS  78.70*  45.90   LYMPHOCYTES  10.90*  39.30   MONOCYTES  9.60  11.10   EOSINOPHILS  0.00  2.20   BASOPHILS  0.40  0.60   ASTSGOT  16   --    ALTSGPT  17   --    ALKPHOSPHAT  60   --    TBILIRUBIN  2.0*   --             Assessment/Plan     Anxiety  Assessment & Plan  Patient has history of anxiety.  Currently not on medication as an outpatient.  Will need outpatient psychiatry referral.  Hydroxyzine 50mg TID PRN for anxiety in place.  Ativan 0.5mg Q4hrs PRN for anxiety.    Hypokalemia  Assessment & Plan  Potassium severely low at 2.7.  Likely from the vomiting.  Repleted with K-rider.  Will recheck tomorrow.    Cyclical vomiting syndrome  Assessment & Plan  Patient has extensive history of cyclical vomiting syndrome.  Likely secondary to multiple adhesions from previous surgeries. No symptoms of obstruction.  Admits to smoking marijuana last on Wednesday.  Counseled strongly to quit smoking.  Improving clinically. Has associated back pain controlled with analgesia.  On scheduled antiemetics and PRN antiemetics.  Patient tolerating diet currently.    History of seizures  Assessment & Plan  Chart review indicates history of seizures likely from anoxic brain injury.  Not on any medication right now.  Monitor for now.

## 2017-04-14 NOTE — PROGRESS NOTES
Discharge instructions reviewed with patient, states understanding, IV discontinued, heart monitor removed.  Patient ready for discharge, waiting on ride.

## 2017-04-14 NOTE — PROGRESS NOTES
Patient refusing any lab draws at this time, will consider letting them later once she has woken up. Lab notified that RN to call when patient agrees.

## 2017-04-14 NOTE — CARE PLAN
Problem: Knowledge Deficit  Goal: Knowledge of disease process/condition, treatment plan, diagnostic tests, and medications will improve  Intervention: Assess knowledge level of disease process/condition, treatment plan, diagnostic tests, and medications  Knowledge assessed regarding plan of care, patient verbalized understanding.      Problem: Pain Management  Goal: Pain level will decrease to patient’s comfort goal  Intervention: Follow pain managment plan developed in collaboration with patient and Interdisciplinary Team  Pain medication administered per MAR.

## 2017-04-14 NOTE — PROGRESS NOTES
"Patient currently crying after her IV pump started beeping because infusion was complete. Patient stated, \"I just fell asleep and this thing is beeping in my ear, now I will never fall asleep again without more ambien! Do I have to have this pump on during the night?\"  Patient educated on needs for the fluids as her potassium in low.  Patient now ambulating hallway, will administer ativan per MAR when due at midnight.  "

## 2017-04-14 NOTE — PROGRESS NOTES
2 lab techs attempted to draw patient's am labs and were unsuccessful. Patient got extremely agitated and asked them to leave and that no one return at this time to try again. Patient crying, anxiety medication administered per MAR.

## 2017-04-14 NOTE — DISCHARGE SUMMARY
[facility] [place]      Admit Date:  4/12/2017       Discharge Date:   04/14/2017    Service:   R Internal Medicine Gray Team  Attending Physician(s):   Demetris       Senior Resident(s):   Chance  Amador Resident(s):   Caitlin      Primary Diagnosis:   Intractable nausea and vomiting    Secondary Diagnoses:                Active Hospital Problems    Diagnosis   • Anxiety [F41.9]   • Cyclical vomiting syndrome [G43.A0]   • History of seizures [Z87.898]   • Hypokalemia [E87.6]       Hospital Summary (Brief Narrative):       This patient is a very pleasant 41 year old female who presented to the ED on 4/12/2017 with complaints of intractable vomiting and abdominal pain after problem did not improve from PCP visit in the morning. Initial labwork on admission showed patient had significant findings for lactic acidosis (3.7) and hypokalemia (2.3) and CO2 of 15. She was admitted to telemetry due to electrolyte disturbance and was started on IV fluids with resolving lactic acidosis as well as zofran and promethazine for nausea and IV pain control. Potassium was repleted along with IV fluids and normalized on hospital day 2. The patient reported feeling much better and was highly motivated for discharge home. She was discharged in alert, ambulatory, stable condition.    Patient /Hospital Summary (Details -- Problem Oriented) :          Anxiety  Patient has history of anxiety  Stable during hospitalization  Outpatient psychiatry referral is pending    Hypokalemia  Potassium severely low at 2.3 on admission likely from the vomiting.  Repleted with K-riders  Now normalized 3.8    Cyclical vomiting syndrome  Patient has extensive history of cyclical vomiting syndrome.  Likely secondary to multiple adhesions from previous surgeries. No symptoms of obstruction.  Continues smoking marijuana   Counseled strongly to quit smoking.  Improved clinically  Patient tolerating diet    History of seizures  Chart  review indicates history of seizures likely from anoxic brain injury.  No seizures during hospitalization        Consultants:     None    Procedures:        None    Imaging/ Testing:      [id number] [id number]   Final Result      No evidence of bowel obstruction.          Discharge Medications:         Medication Reconciliation: Completed     Medication List      CONTINUE taking these medications       Instructions    multivitamin Tabs   Last time this was given:  1 Tab on 4/14/2017  9:04 AM    Take 1 Tab by mouth every day.   Dose:  1 Tab       ondansetron 4 MG Tbdp   Last time this was given:  4 mg on 4/14/2017  1:09 PM   Commonly known as:  ZOFRAN ODT    Take 1 Tab by mouth every 8 hours as needed.   Dose:  4 mg       potassium bicarbonate 25 MEQ tablet   Commonly known as:  KLYTE    Take 1 Tab by mouth 2 times a day.   Dose:  25 mEq       * promethazine 25 MG Supp   Commonly known as:  PHENERGAN    Insert 1 Suppository in rectum every 6 hours as needed for Nausea/Vomiting.   Dose:  25 mg       * promethazine 25 MG Tabs   Commonly known as:  PHENERGAN    Take 1 Tab by mouth every 6 hours as needed for Nausea/Vomiting.   Dose:  25 mg       * Notice:  This list has 2 medication(s) that are the same as other medications prescribed for you. Read the directions carefully, and ask your doctor or other care provider to review them with you.               Disposition:   Discharge home    Diet:   Healthy as tolerated    Activity:   As tolerated    Instructions:         The patient was instructed to return to the ER in the event of worsening symptoms. I have counseled the patient on the importance of compliance and the patient has agreed to proceed with all medical recommendations and follow up plan indicated above.   The patient understands that all medications come with benefits and risks. Risks may include permanent injury or death and these risks can be minimized with close reassessment and monitoring.        Primary Care  Provider:    Lavell Monte M.D.  Discharge summary faxed to primary care provider:  Completed  Copy of discharge summary given to the patient: Completed    Follow up appointment details :      Follow up with primary care in 2-4 weeks with labs    Pending Studies:        None    Time spent on discharge day patient visit, preparing discharge paperwork and arranging for patient follow up.    Summary of follow up issues:   None    Discharge Time (Minutes) :    40      Condition on Discharge    ______________________________________________________________________    Interval history/exam for day of discharge:    Patient sitting comfortably at bedside in no acute distress. Is highly motivated for discharge home.    Filed Vitals:    04/14/17 0000 04/14/17 0400 04/14/17 0800 04/14/17 1200   BP: 127/76 156/89 137/94 119/76   Pulse: 78 60 85 75   Temp: 36.6 °C (97.8 °F) 36.7 °C (98.1 °F) 36.8 °C (98.3 °F) 36.6 °C (97.8 °F)   Resp: 20 18 17 16   Height:       Weight:       SpO2: 98% 100% 99% 92%     Weight/BMI: Body mass index is 22.71 kg/(m^2).  Pulse Oximetry: 92 %, O2 (LPM): 0, O2 Delivery: None (Room Air)    General: Awake and alert, no acute distress  CVS: RRR, no m/g/r  PULM: CTAB  ABD: Soft, non tender, no distension, no guarding, no rigidity    Most Recent Labs:    Lab Results   Component Value Date/Time    WBC 7.4 04/14/2017 10:25 AM    RBC 3.77* 04/14/2017 10:25 AM    HEMOGLOBIN 13.0 04/14/2017 10:25 AM    HEMATOCRIT 36.4* 04/14/2017 10:25 AM    MCV 96.6 04/14/2017 10:25 AM    MCH 34.5* 04/14/2017 10:25 AM    MCHC 35.7* 04/14/2017 10:25 AM    MPV 10.3 04/14/2017 10:25 AM    NEUTROPHILS-POLYS 64.90 04/14/2017 10:25 AM    LYMPHOCYTES 22.60 04/14/2017 10:25 AM    MONOCYTES 9.30 04/14/2017 10:25 AM    EOSINOPHILS 2.00 04/14/2017 10:25 AM    BASOPHILS 0.70 04/14/2017 10:25 AM    HYPOCHROMIA 1+ 04/27/2016 12:49 PM    ANISOCYTOSIS 1+ 09/13/2016 12:29 AM      Lab Results   Component Value Date/Time    SODIUM 139  04/14/2017 10:25 AM    POTASSIUM 3.8 04/14/2017 10:25 AM    CHLORIDE 109 04/14/2017 10:25 AM    CO2 20 04/14/2017 10:25 AM    GLUCOSE 109* 04/14/2017 10:25 AM    BUN 4* 04/14/2017 10:25 AM    CREATININE 0.64 04/14/2017 10:25 AM      Lab Results   Component Value Date/Time    ALT(SGPT) 17 04/12/2017 11:53 AM    AST(SGOT) 16 04/12/2017 11:53 AM    ALKALINE PHOSPHATASE 60 04/12/2017 11:53 AM    TOTAL BILIRUBIN 2.0* 04/12/2017 11:53 AM    LIPASE 10* 04/12/2017 11:53 AM    ALBUMIN 4.7 04/12/2017 11:53 AM    GLOBULIN 2.9 04/12/2017 11:53 AM    PRE-ALBUMIN 23.0 05/09/2016 12:00 AM    INR 1.16* 05/01/2016 12:30 AM    MACROCYTOSIS 1+ 09/13/2016 12:29 AM     Lab Results   Component Value Date/Time    PT 14.8* 05/01/2016 12:30 AM    INR 1.16* 05/01/2016 12:30 AM

## 2017-04-14 NOTE — PROGRESS NOTES
Spoke with patient, she agrees to have phlebotomist come and re-attempt labs this morning; lab called, will come draw now.

## 2017-04-14 NOTE — PROGRESS NOTES
Bedside report received, assumed care of patient. Assessment performed. Discussed plan of care with pt.  Call light within reach, pt educated to call for assistance.

## 2017-04-14 NOTE — PROGRESS NOTES
Patient trying to refuse IV fluids, but after education on why needed, patient agreed to keep them if the rate could be turned down so she could get more rest without toileting as frequently.  Rate decreased to 75 ml/hr.

## 2017-04-17 ENCOUNTER — HOSPITAL ENCOUNTER (OUTPATIENT)
Facility: MEDICAL CENTER | Age: 42
End: 2017-04-19
Attending: EMERGENCY MEDICINE
Payer: MEDICAID

## 2017-04-17 ENCOUNTER — RESOLUTE PROFESSIONAL BILLING HOSPITAL PROF FEE (OUTPATIENT)
Dept: HOSPITALIST | Facility: MEDICAL CENTER | Age: 42
End: 2017-04-17
Payer: MEDICAID

## 2017-04-17 DIAGNOSIS — E87.20 LACTIC ACIDOSIS: ICD-10-CM

## 2017-04-17 DIAGNOSIS — R11.15 INTRACTABLE CYCLICAL VOMITING WITH NAUSEA: ICD-10-CM

## 2017-04-17 LAB
ALBUMIN SERPL BCP-MCNC: 4.7 G/DL (ref 3.2–4.9)
ALBUMIN/GLOB SERPL: 1.3 G/DL
ALP SERPL-CCNC: 71 U/L (ref 30–99)
ALT SERPL-CCNC: 15 U/L (ref 2–50)
AMPHET UR QL SCN: NEGATIVE
ANION GAP SERPL CALC-SCNC: 16 MMOL/L (ref 0–11.9)
AST SERPL-CCNC: 17 U/L (ref 12–45)
BARBITURATES UR QL SCN: NEGATIVE
BASOPHILS # BLD AUTO: 0.6 % (ref 0–1.8)
BASOPHILS # BLD: 0.06 K/UL (ref 0–0.12)
BENZODIAZ UR QL SCN: NEGATIVE
BILIRUB SERPL-MCNC: 1 MG/DL (ref 0.1–1.5)
BUN SERPL-MCNC: 14 MG/DL (ref 8–22)
BZE UR QL SCN: NEGATIVE
CALCIUM SERPL-MCNC: 10.4 MG/DL (ref 8.5–10.5)
CANNABINOIDS UR QL SCN: POSITIVE
CHLORIDE SERPL-SCNC: 102 MMOL/L (ref 96–112)
CO2 SERPL-SCNC: 16 MMOL/L (ref 20–33)
CREAT SERPL-MCNC: 1.02 MG/DL (ref 0.5–1.4)
EOSINOPHIL # BLD AUTO: 0.05 K/UL (ref 0–0.51)
EOSINOPHIL NFR BLD: 0.5 % (ref 0–6.9)
ERYTHROCYTE [DISTWIDTH] IN BLOOD BY AUTOMATED COUNT: 48.7 FL (ref 35.9–50)
ETHANOL BLD-MCNC: 0.01 G/DL
GFR SERPL CREATININE-BSD FRML MDRD: >60 ML/MIN/1.73 M 2
GLOBULIN SER CALC-MCNC: 3.6 G/DL (ref 1.9–3.5)
GLUCOSE SERPL-MCNC: 129 MG/DL (ref 65–99)
HCT VFR BLD AUTO: 45.6 % (ref 37–47)
HGB BLD-MCNC: 16.1 G/DL (ref 12–16)
IMM GRANULOCYTES # BLD AUTO: 0.08 K/UL (ref 0–0.11)
IMM GRANULOCYTES NFR BLD AUTO: 0.8 % (ref 0–0.9)
LACTATE BLD-SCNC: 1.2 MMOL/L (ref 0.5–2)
LACTATE BLD-SCNC: 2.4 MMOL/L (ref 0.5–2)
LACTATE BLD-SCNC: 2.8 MMOL/L (ref 0.5–2)
LACTATE BLD-SCNC: 5.5 MMOL/L (ref 0.5–2)
LIPASE SERPL-CCNC: 23 U/L (ref 11–82)
LYMPHOCYTES # BLD AUTO: 1.04 K/UL (ref 1–4.8)
LYMPHOCYTES NFR BLD: 10.2 % (ref 22–41)
MAGNESIUM SERPL-MCNC: 1.8 MG/DL (ref 1.5–2.5)
MCH RBC QN AUTO: 34.3 PG (ref 27–33)
MCHC RBC AUTO-ENTMCNC: 35.3 G/DL (ref 33.6–35)
MCV RBC AUTO: 97 FL (ref 81.4–97.8)
MDMA UR QL SCN: NEGATIVE
METHADONE UR QL SCN: NEGATIVE
MONOCYTES # BLD AUTO: 0.68 K/UL (ref 0–0.85)
MONOCYTES NFR BLD AUTO: 6.7 % (ref 0–13.4)
NEUTROPHILS # BLD AUTO: 8.26 K/UL (ref 2–7.15)
NEUTROPHILS NFR BLD: 81.2 % (ref 44–72)
NRBC # BLD AUTO: 0 K/UL
NRBC BLD AUTO-RTO: 0 /100 WBC
OPIATES UR QL SCN: POSITIVE
OXYCODONE UR QL SCN: NEGATIVE
PCP UR QL SCN: NEGATIVE
PLATELET # BLD AUTO: 378 K/UL (ref 164–446)
PMV BLD AUTO: 9.7 FL (ref 9–12.9)
POTASSIUM SERPL-SCNC: 3.7 MMOL/L (ref 3.6–5.5)
PROPOXYPH UR QL SCN: NEGATIVE
PROT SERPL-MCNC: 8.3 G/DL (ref 6–8.2)
RBC # BLD AUTO: 4.7 M/UL (ref 4.2–5.4)
SODIUM SERPL-SCNC: 134 MMOL/L (ref 135–145)
WBC # BLD AUTO: 10.2 K/UL (ref 4.8–10.8)

## 2017-04-17 PROCEDURE — 99220 PR INITIAL OBSERVATION CARE,LEVL III: CPT | Mod: GC | Performed by: INTERNAL MEDICINE

## 2017-04-17 PROCEDURE — 85025 COMPLETE CBC W/AUTO DIFF WBC: CPT

## 2017-04-17 PROCEDURE — 700102 HCHG RX REV CODE 250 W/ 637 OVERRIDE(OP): Performed by: INTERNAL MEDICINE

## 2017-04-17 PROCEDURE — 96374 THER/PROPH/DIAG INJ IV PUSH: CPT

## 2017-04-17 PROCEDURE — 80053 COMPREHEN METABOLIC PANEL: CPT

## 2017-04-17 PROCEDURE — 36415 COLL VENOUS BLD VENIPUNCTURE: CPT

## 2017-04-17 PROCEDURE — 700105 HCHG RX REV CODE 258: Performed by: EMERGENCY MEDICINE

## 2017-04-17 PROCEDURE — A9270 NON-COVERED ITEM OR SERVICE: HCPCS | Performed by: STUDENT IN AN ORGANIZED HEALTH CARE EDUCATION/TRAINING PROGRAM

## 2017-04-17 PROCEDURE — A9270 NON-COVERED ITEM OR SERVICE: HCPCS | Performed by: INTERNAL MEDICINE

## 2017-04-17 PROCEDURE — 700101 HCHG RX REV CODE 250: Performed by: INTERNAL MEDICINE

## 2017-04-17 PROCEDURE — G0378 HOSPITAL OBSERVATION PER HR: HCPCS

## 2017-04-17 PROCEDURE — 83605 ASSAY OF LACTIC ACID: CPT

## 2017-04-17 PROCEDURE — 96376 TX/PRO/DX INJ SAME DRUG ADON: CPT

## 2017-04-17 PROCEDURE — A9270 NON-COVERED ITEM OR SERVICE: HCPCS | Performed by: HOSPITALIST

## 2017-04-17 PROCEDURE — 96375 TX/PRO/DX INJ NEW DRUG ADDON: CPT

## 2017-04-17 PROCEDURE — 700105 HCHG RX REV CODE 258: Performed by: INTERNAL MEDICINE

## 2017-04-17 PROCEDURE — 96361 HYDRATE IV INFUSION ADD-ON: CPT

## 2017-04-17 PROCEDURE — 80307 DRUG TEST PRSMV CHEM ANLYZR: CPT

## 2017-04-17 PROCEDURE — 99285 EMERGENCY DEPT VISIT HI MDM: CPT

## 2017-04-17 PROCEDURE — 83690 ASSAY OF LIPASE: CPT

## 2017-04-17 PROCEDURE — 700111 HCHG RX REV CODE 636 W/ 250 OVERRIDE (IP): Performed by: INTERNAL MEDICINE

## 2017-04-17 PROCEDURE — 83735 ASSAY OF MAGNESIUM: CPT

## 2017-04-17 PROCEDURE — 700111 HCHG RX REV CODE 636 W/ 250 OVERRIDE (IP): Performed by: EMERGENCY MEDICINE

## 2017-04-17 PROCEDURE — 700102 HCHG RX REV CODE 250 W/ 637 OVERRIDE(OP): Performed by: HOSPITALIST

## 2017-04-17 PROCEDURE — 700102 HCHG RX REV CODE 250 W/ 637 OVERRIDE(OP): Performed by: STUDENT IN AN ORGANIZED HEALTH CARE EDUCATION/TRAINING PROGRAM

## 2017-04-17 PROCEDURE — 304561 HCHG STAT O2

## 2017-04-17 PROCEDURE — 700111 HCHG RX REV CODE 636 W/ 250 OVERRIDE (IP): Performed by: HOSPITALIST

## 2017-04-17 RX ORDER — KETOROLAC TROMETHAMINE 30 MG/ML
30 INJECTION, SOLUTION INTRAMUSCULAR; INTRAVENOUS ONCE
Status: COMPLETED | OUTPATIENT
Start: 2017-04-17 | End: 2017-04-17

## 2017-04-17 RX ORDER — BISACODYL 10 MG
10 SUPPOSITORY, RECTAL RECTAL
Status: DISCONTINUED | OUTPATIENT
Start: 2017-04-17 | End: 2017-04-19 | Stop reason: HOSPADM

## 2017-04-17 RX ORDER — PROMETHAZINE HYDROCHLORIDE 25 MG/1
12.5-25 SUPPOSITORY RECTAL EVERY 4 HOURS PRN
Status: DISCONTINUED | OUTPATIENT
Start: 2017-04-17 | End: 2017-04-17

## 2017-04-17 RX ORDER — ONDANSETRON 2 MG/ML
4 INJECTION INTRAMUSCULAR; INTRAVENOUS EVERY 4 HOURS PRN
Status: DISCONTINUED | OUTPATIENT
Start: 2017-04-17 | End: 2017-04-19 | Stop reason: HOSPADM

## 2017-04-17 RX ORDER — ONDANSETRON 4 MG/1
4 TABLET, ORALLY DISINTEGRATING ORAL EVERY 8 HOURS PRN
Status: DISCONTINUED | OUTPATIENT
Start: 2017-04-17 | End: 2017-04-17

## 2017-04-17 RX ORDER — POLYETHYLENE GLYCOL 3350 17 G/17G
1 POWDER, FOR SOLUTION ORAL
Status: DISCONTINUED | OUTPATIENT
Start: 2017-04-17 | End: 2017-04-19 | Stop reason: HOSPADM

## 2017-04-17 RX ORDER — LORAZEPAM 1 MG/1
0.5 TABLET ORAL ONCE
Status: COMPLETED | OUTPATIENT
Start: 2017-04-17 | End: 2017-04-17

## 2017-04-17 RX ORDER — SODIUM CHLORIDE 9 MG/ML
1000 INJECTION, SOLUTION INTRAVENOUS ONCE
Status: COMPLETED | OUTPATIENT
Start: 2017-04-17 | End: 2017-04-17

## 2017-04-17 RX ORDER — SODIUM CHLORIDE 9 MG/ML
INJECTION, SOLUTION INTRAVENOUS CONTINUOUS
Status: DISCONTINUED | OUTPATIENT
Start: 2017-04-17 | End: 2017-04-17

## 2017-04-17 RX ORDER — SODIUM CHLORIDE 9 MG/ML
30 INJECTION, SOLUTION INTRAVENOUS ONCE
Status: COMPLETED | OUTPATIENT
Start: 2017-04-17 | End: 2017-04-17

## 2017-04-17 RX ORDER — FLUOXETINE HYDROCHLORIDE 20 MG/1
20 CAPSULE ORAL DAILY
Status: DISCONTINUED | OUTPATIENT
Start: 2017-04-18 | End: 2017-04-19 | Stop reason: HOSPADM

## 2017-04-17 RX ORDER — HYDROCODONE BITARTRATE AND ACETAMINOPHEN 5; 325 MG/1; MG/1
1-2 TABLET ORAL EVERY 6 HOURS PRN
Status: DISCONTINUED | OUTPATIENT
Start: 2017-04-17 | End: 2017-04-19 | Stop reason: HOSPADM

## 2017-04-17 RX ORDER — SODIUM CHLORIDE 9 MG/ML
INJECTION, SOLUTION INTRAVENOUS CONTINUOUS
Status: DISCONTINUED | OUTPATIENT
Start: 2017-04-17 | End: 2017-04-19 | Stop reason: HOSPADM

## 2017-04-17 RX ORDER — PROMETHAZINE HYDROCHLORIDE 25 MG/1
12.5-25 TABLET ORAL EVERY 4 HOURS PRN
Status: DISCONTINUED | OUTPATIENT
Start: 2017-04-17 | End: 2017-04-19 | Stop reason: HOSPADM

## 2017-04-17 RX ORDER — ONDANSETRON 2 MG/ML
4 INJECTION INTRAMUSCULAR; INTRAVENOUS ONCE
Status: COMPLETED | OUTPATIENT
Start: 2017-04-17 | End: 2017-04-17

## 2017-04-17 RX ORDER — DEXTROSE MONOHYDRATE 25 G/50ML
25 INJECTION, SOLUTION INTRAVENOUS
Status: DISCONTINUED | OUTPATIENT
Start: 2017-04-17 | End: 2017-04-19 | Stop reason: HOSPADM

## 2017-04-17 RX ORDER — AMOXICILLIN 250 MG
2 CAPSULE ORAL 2 TIMES DAILY
Status: DISCONTINUED | OUTPATIENT
Start: 2017-04-17 | End: 2017-04-19 | Stop reason: HOSPADM

## 2017-04-17 RX ORDER — LIDOCAINE 50 MG/G
1 PATCH TOPICAL EVERY 24 HOURS
Status: DISCONTINUED | OUTPATIENT
Start: 2017-04-17 | End: 2017-04-19 | Stop reason: HOSPADM

## 2017-04-17 RX ORDER — FLUOXETINE HYDROCHLORIDE 20 MG/1
20 CAPSULE ORAL DAILY
COMMUNITY
Start: 2017-04-13 | End: 2017-08-26

## 2017-04-17 RX ORDER — LORAZEPAM 2 MG/ML
1 INJECTION INTRAMUSCULAR ONCE
Status: COMPLETED | OUTPATIENT
Start: 2017-04-17 | End: 2017-04-17

## 2017-04-17 RX ORDER — ZOLPIDEM TARTRATE 5 MG/1
5 TABLET ORAL NIGHTLY PRN
Status: DISCONTINUED | OUTPATIENT
Start: 2017-04-17 | End: 2017-04-19 | Stop reason: HOSPADM

## 2017-04-17 RX ORDER — ONDANSETRON 2 MG/ML
4 INJECTION INTRAMUSCULAR; INTRAVENOUS EVERY 4 HOURS PRN
Status: DISCONTINUED | OUTPATIENT
Start: 2017-04-17 | End: 2017-04-17

## 2017-04-17 RX ORDER — PROMETHAZINE HYDROCHLORIDE 25 MG/1
25 SUPPOSITORY RECTAL EVERY 6 HOURS PRN
Status: DISCONTINUED | OUTPATIENT
Start: 2017-04-17 | End: 2017-04-19 | Stop reason: HOSPADM

## 2017-04-17 RX ORDER — ONDANSETRON 2 MG/ML
8 INJECTION INTRAMUSCULAR; INTRAVENOUS EVERY 8 HOURS
Status: DISCONTINUED | OUTPATIENT
Start: 2017-04-17 | End: 2017-04-19 | Stop reason: HOSPADM

## 2017-04-17 RX ADMIN — HYDROCODONE BITARTRATE AND ACETAMINOPHEN 2 TABLET: 5; 325 TABLET ORAL at 21:11

## 2017-04-17 RX ADMIN — SODIUM CHLORIDE 1770 ML: 9 INJECTION, SOLUTION INTRAVENOUS at 10:39

## 2017-04-17 RX ADMIN — HYDROMORPHONE HYDROCHLORIDE 1 MG: 1 INJECTION, SOLUTION INTRAMUSCULAR; INTRAVENOUS; SUBCUTANEOUS at 11:05

## 2017-04-17 RX ADMIN — PROCHLORPERAZINE EDISYLATE 10 MG: 5 INJECTION INTRAMUSCULAR; INTRAVENOUS at 15:06

## 2017-04-17 RX ADMIN — ONDANSETRON 8 MG: 2 INJECTION INTRAMUSCULAR; INTRAVENOUS at 14:31

## 2017-04-17 RX ADMIN — SODIUM CHLORIDE 1000 ML: 9 INJECTION, SOLUTION INTRAVENOUS at 10:05

## 2017-04-17 RX ADMIN — LIDOCAINE HYDROCHLORIDE 30 ML: 20 SOLUTION OROPHARYNGEAL at 16:53

## 2017-04-17 RX ADMIN — LORAZEPAM 0.5 MG: 1 TABLET ORAL at 16:53

## 2017-04-17 RX ADMIN — HYDROCODONE BITARTRATE AND ACETAMINOPHEN 2 TABLET: 5; 325 TABLET ORAL at 14:31

## 2017-04-17 RX ADMIN — PROCHLORPERAZINE EDISYLATE 10 MG: 5 INJECTION INTRAMUSCULAR; INTRAVENOUS at 19:43

## 2017-04-17 RX ADMIN — LIDOCAINE 1 PATCH: 50 PATCH CUTANEOUS at 21:51

## 2017-04-17 RX ADMIN — SODIUM CHLORIDE: 9 INJECTION, SOLUTION INTRAVENOUS at 12:10

## 2017-04-17 RX ADMIN — KETOROLAC TROMETHAMINE 30 MG: 30 INJECTION, SOLUTION INTRAMUSCULAR; INTRAVENOUS at 19:43

## 2017-04-17 RX ADMIN — KETOROLAC TROMETHAMINE 30 MG: 30 INJECTION, SOLUTION INTRAMUSCULAR at 10:29

## 2017-04-17 RX ADMIN — ONDANSETRON 4 MG: 2 INJECTION INTRAMUSCULAR; INTRAVENOUS at 10:05

## 2017-04-17 RX ADMIN — HYDROMORPHONE HYDROCHLORIDE 1 MG: 1 INJECTION, SOLUTION INTRAMUSCULAR; INTRAVENOUS; SUBCUTANEOUS at 10:05

## 2017-04-17 RX ADMIN — LORAZEPAM 1 MG: 2 INJECTION INTRAMUSCULAR; INTRAVENOUS at 10:38

## 2017-04-17 RX ADMIN — SODIUM CHLORIDE: 9 INJECTION, SOLUTION INTRAVENOUS at 14:31

## 2017-04-17 RX ADMIN — ONDANSETRON 4 MG: 2 INJECTION INTRAMUSCULAR; INTRAVENOUS at 12:10

## 2017-04-17 RX ADMIN — ZOLPIDEM TARTRATE 5 MG: 5 TABLET, FILM COATED ORAL at 21:11

## 2017-04-17 ASSESSMENT — ENCOUNTER SYMPTOMS
CONSTIPATION: 0
SHORTNESS OF BREATH: 0
SORE THROAT: 0
PALPITATIONS: 0
WHEEZING: 0
HEADACHES: 0
BLURRED VISION: 0
CHILLS: 0
SPUTUM PRODUCTION: 0
VOMITING: 1
DIAPHORESIS: 0
FEVER: 0
WEAKNESS: 1
NAUSEA: 1
DIARRHEA: 0
BLOOD IN STOOL: 0
HEMOPTYSIS: 0
SEIZURES: 0
DEPRESSION: 0
CARDIOVASCULAR NEGATIVE: 1
NERVOUS/ANXIOUS: 1
TINGLING: 0
DEPRESSION: 1
BACK PAIN: 1
RESPIRATORY NEGATIVE: 1
ABDOMINAL PAIN: 1
DIZZINESS: 0
EYES NEGATIVE: 1
COUGH: 0

## 2017-04-17 ASSESSMENT — LIFESTYLE VARIABLES
EVER_SMOKED: YES
ALCOHOL_USE: NO

## 2017-04-17 ASSESSMENT — PAIN SCALES - GENERAL
PAINLEVEL_OUTOF10: 7
PAINLEVEL_OUTOF10: 10
PAINLEVEL_OUTOF10: 10
PAINLEVEL_OUTOF10: 9
PAINLEVEL_OUTOF10: 9

## 2017-04-17 ASSESSMENT — COPD QUESTIONNAIRES
COPD SCREENING SCORE: 3
DO YOU EVER COUGH UP ANY MUCUS OR PHLEGM?: YES, A FEW DAYS A WEEK OR MONTH
HAVE YOU SMOKED AT LEAST 100 CIGARETTES IN YOUR ENTIRE LIFE: YES
DURING THE PAST 4 WEEKS HOW MUCH DID YOU FEEL SHORT OF BREATH: NONE/LITTLE OF THE TIME

## 2017-04-17 NOTE — ED NOTES
"Chief Complaint   Patient presents with   • N/V     Pt to er via EMS, reports n/v since yesterday, multiple epsiodes of emesis. Dry heaving upon arrival-no emesis. 4mg zofran IM given enroute.    • Flank Pain     Reports right flank pain starting enroute to hospital   • Anxiety     Pt was found by EMS hyperventilating sitting in the bathtub stating \"I'm having an anxiety attack!\" Pt moaning very loudly during triage, stating \"help me! Help me!\"        "

## 2017-04-17 NOTE — ASSESSMENT & PLAN NOTE
Patient has extensive history of cyclical vomiting syndrome.  Likely secondary to multiple adhesions from previous surgeries. No symptoms of obstruction.  Patient reported to smoking marijauna after discharge.  Counseled strongly to quit smoking.  Improving clinically. Has associated back pain controlled with analgesia.  On scheduled antiemetics and PRN antiemetics.  Patient tolerating diet currently.

## 2017-04-17 NOTE — IP AVS SNAPSHOT
" Home Care Instructions                                                                                                                  Name:Diana Hernandez  Medical Record Number:[id number]  CSN: [id number]    YOB: 1975   Age: 41 y.o.  Sex: female  HT:1.651 m (5' 5\") WT: 60 kg (132 lb 4.4 oz)          Admit Date: 4/17/2017     Discharge Date:   Today's Date: 4/19/2017  Attending Doctor:  BRANDON Pimentel*                  Allergies:  Metoclopramide; Quetiapine; and Septra            Discharge Instructions       Subjective:    Nausea and Vomiting  Nausea means you feel sick to your stomach. Throwing up (vomiting) is a reflex where stomach contents come out of your mouth.  HOME CARE   · Take medicine as told by your doctor.  · Do not force yourself to eat. However, you do need to drink fluids.  · If you feel like eating, eat a normal diet as told by your doctor.  ¨ Eat rice, wheat, potatoes, bread, lean meats, yogurt, fruits, and vegetables.  ¨ Avoid high-fat foods.  · Drink enough fluids to keep your pee (urine) clear or pale yellow.  · Ask your doctor how to replace body fluid losses (rehydrate). Signs of body fluid loss (dehydration) include:  ¨ Feeling very thirsty.  ¨ Dry lips and mouth.  ¨ Feeling dizzy.  ¨ Dark pee.  ¨ Peeing less than normal.  ¨ Feeling confused.  ¨ Fast breathing or heart rate.  GET HELP RIGHT AWAY IF:   1. You have blood in your throw up.  2. You have black or bloody poop (stool).  3. You have a bad headache or stiff neck.  4. You feel confused.  5. You have bad belly (abdominal) pain.  6. You have chest pain or trouble breathing.  7. You do not pee at least once every 8 hours.  8. You have cold, clammy skin.  9. You keep throwing up after 24 to 48 hours.  10. You have a fever.  MAKE SURE YOU:   1. Understand these instructions.  2. Will watch your condition.  3. Will get help right away if you are not doing well or get worse.     This information is not intended to " replace advice given to you by your health care provider. Make sure you discuss any questions you have with your health care provider.     Document Released: 06/05/2009 Document Revised: 03/11/2013 Document Reviewed: 05/18/2012  [employer] Interactive Patient Education ©2016 [employer]    Discharge Instructions    Discharged to home by car with friend. Discharged via walking, hospital escort: Yes.  Special equipment needed: Not Applicable    Be sure to schedule a follow-up appointment with your primary care doctor or any specialists as instructed.     Discharge Plan:   Diet Plan: Discussed  Activity Level: Discussed  Confirmed Follow up Appointment: Patient to Call and Schedule Appointment  Confirmed Symptoms Management: Discussed  Medication Reconciliation Updated: Yes  Influenza Vaccine Indication: Not indicated: Previously immunized this influenza season and > 8 years of age    I understand that a diet low in cholesterol, fat, and sodium is recommended for good health. Unless I have been given specific instructions below for another diet, I accept this instruction as my diet prescription.   Other diet: Regular    Special Instructions: None    · Is patient discharged on Warfarin / Coumadin?   No     · Is patient Post Blood Transfusion?  No    Depression / Suicide Risk    As you are discharged from this [facility] facility, it is important to learn how to keep safe from harming yourself.    Recognize the warning signs:  · Abrupt changes in personality, positive or negative- including increase in energy   · Giving away possessions  · Change in eating patterns- significant weight changes-  positive or negative  · Change in sleeping patterns- unable to sleep or sleeping all the time   · Unwillingness or inability to communicate  · Depression  · Unusual sadness, discouragement and loneliness  · Talk of wanting to die  · Neglect of personal appearance   · Rebelliousness- reckless behavior  · Withdrawal from  people/activities they love  · Confusion- inability to concentrate     If you or a loved one observes any of these behaviors or has concerns about self-harm, here's what you can do:  · Talk about it- your feelings and reasons for harming yourself  · Remove any means that you might use to hurt yourself (examples: pills, rope, extension cords, firearm)  · Get professional help from the community (Mental Health, Substance Abuse, psychological counseling)  · Do not be alone:Call your Safe Contact- someone whom you trust who will be there for you.  · Call your local CRISIS HOTLINE 822-9957 or 398-436-7120  · Call your local Children's Mobile Crisis Response Team [place] (420) 184-2503 or www.[employer]  · Call the toll free National Suicide Prevention Hotlines   · National Suicide Prevention Lifeline 228-677-ILEX (9075)  · National Hope Line Network 800-SUICIDE (584-4298)           Discharge Medication Instructions:    Below are the medications your physician expects you to take upon discharge:    Review all your home medications and newly ordered medications with your doctor and/or pharmacist. Follow medication instructions as directed by your doctor and/or pharmacist.    Please keep your medication list with you and share with your physician.               Medication List      START taking these medications        Instructions    Morning Afternoon Evening Bedtime    zolpidem 5 MG Tabs   Last time this was given:  5 mg on 4/18/2017  9:58 PM   Commonly known as:  AMBIEN        Take 1 Tab by mouth at bedtime as needed for Sleep.   Dose:  5 mg                          CONTINUE taking these medications        Instructions    Morning Afternoon Evening Bedtime    multivitamin Tabs   Last time this was given:  1 Tab on 4/19/2017  9:28 AM        Take 1 Tab by mouth every day.   Dose:  1 Tab                        ondansetron 4 MG Tbdp   Commonly known as:  ZOFRAN ODT        Take 1 Tab by mouth every 8 hours as  needed.   Dose:  4 mg                        * promethazine 25 MG Supp   Commonly known as:  PHENERGAN        Insert 1 Suppository in rectum every 6 hours as needed for Nausea/Vomiting.   Dose:  25 mg                        * promethazine 25 MG Tabs   Commonly known as:  PHENERGAN        Take 1 Tab by mouth every 6 hours as needed for Nausea/Vomiting.   Dose:  25 mg                        PROZAC 20 MG Caps   Last time this was given:  20 mg on 4/19/2017  9:28 AM   Generic drug:  fluoxetine        Take 20 mg by mouth every day.   Dose:  20 mg                        * Notice:  This list has 2 medication(s) that are the same as other medications prescribed for you. Read the directions carefully, and ask your doctor or other care provider to review them with you.      STOP taking these medications     potassium bicarbonate 25 MEQ tablet   Commonly known as:  KLYTE                    Where to Get Your Medications      Information about where to get these medications is not yet available     ! Ask your nurse or doctor about these medications    - zolpidem 5 MG Tabs            Instructions           Diet / Nutrition:    Follow any diet instructions given to you by your doctor or the dietician, including how much salt (sodium) you are allowed each day.    If you are overweight, talk to your doctor about a weight reduction plan.    Activity:    Remain physically active following your doctor's instructions about exercise and activity.    Rest often.     Any time you become even a little tired or short of breath, SIT DOWN and rest.    Worsening Symptoms:    Report any of the following signs and symptoms to the doctor's office immediately:    *Pain of jaw, arm, or neck  *Chest pain not relieved by medication                               *Dizziness or loss of consciousness  *Difficulty breathing even when at rest   *More tired than usual                                       *Bleeding drainage or swelling of surgical  site  *Swelling of feet, ankles, legs or stomach                 *Fever (>100ºF)  *Pink or blood tinged sputum  *Weight gain (3lbs/day or 5lbs /week)           *Shock from internal defibrillator (if applicable)  *Palpitations or irregular heartbeats                *Cool and/or numb extremities    Stroke Awareness    Common Risk Factors for Stroke include:    Age  Atrial Fibrillation  Carotid Artery Stenosis  Diabetes Mellitus  Excessive alcohol consumption  High blood pressure  Overweight   Physical inactivity  Smoking    Warning signs and symptoms of a stroke include:    *Sudden numbness or weakness of the face, arm or leg (especially on one side of the body).  *Sudden confusion, trouble speaking or understanding.  *Sudden trouble seeing in one or both eyes.  *Sudden trouble walking, dizziness, loss of balance or coordination.Sudden severe headache with no known cause.    It is very important to get treatment quickly when a stroke occurs. If you experience any of the above warning signs, call 911 immediately.                   Disclaimer         Quit Smoking / Tobacco Use:    I understand the use of any tobacco products increases my chance of suffering from future heart disease or stroke and could cause other illnesses which may shorten my life. Quitting the use of tobacco products is the single most important thing I can do to improve my health. For further information on smoking / tobacco cessation call a Toll Free Quit Line at 1-694.844.1554 (*[facility]) or 1-805.814.2217 (American Lung Association) or you can access the web based program at www.lungusa.org.    [place] Tobacco Users Help Line:  (916) 713-4774       Toll Free: 1-360.932.2459  Quit Tobacco Program [facility] ([phone]    Crisis Hotline:    [place] Crisis Hotline:  6-028-EKMWUUE or 1-576.282.7825    [place] Crisis Hotline:    1-791.109.8448 or 788-788-1163    Discharge Survey:   Thank you for choosing  [facility]. We hope we did everything we could to make your hospital stay a pleasant one. You may be receiving a phone survey and we would appreciate your time and participation in answering the questions. Your input is very valuable to us in our efforts to improve our service to our patients and their families.        My signature on this form indicates that:    1. I have reviewed and understand the above information.  2. My questions regarding this information have been answered to my satisfaction.  3. I have formulated a plan with my discharge nurse to obtain my prescribed medications for home.                  Disclaimer         __________________________________                     __________       ________                       Patient Signature                                                 Date                    Time

## 2017-04-17 NOTE — H&P
Internal Medicine  Admitting History and Physical    Name Diana Hernandez 1975   Age/Sex 41 y.o. female   MRN [id number]   Code Status Full     After 5PM or if no immediate response to page, please call for cross-coverage  Attending/Team: Meek Yeung Call [phone] to page   1st Call - Day Intern (R1):   Melissa 2nd Call - Day Sr. Resident (R2/R3):   Eddie       Chief Complaint:  Nausea/vomiting  Back pain    HPI:    ID: Mrs. Hernandez is a 42 yo female with a PMHx significant for anxiety, depression, SMA syndrome, cyclical vomiting syndrome, and nephrolithiasis who was discharged 17 and readmitted under observation 17 for severe back pain.    The patient reported that she started having dry heaves on  and started vomiting this morning. Her pain started a week ago while she was still admitted. The patient reported that the pain got worse this morning. Last night the patient took 'two shots' of marijuana. She reported that the pain is located paraspinally in the mid portion of her back. She reported that her back pain is stabbing. She is also having pain in the upper quadrants of her abdomen and this pain is burning in nature. The patient has a prior history of kidney stones; however, imaging from 10 days ago did not reveal and renal calculi. The patient also has a history of drug seeking behavior and anxiety.    Review of Systems   Constitutional: Negative for fever, chills and diaphoresis.   HENT: Negative for hearing loss.    Eyes: Negative for blurred vision.   Respiratory: Negative for cough, hemoptysis, sputum production, shortness of breath and wheezing.    Cardiovascular: Negative for chest pain, palpitations and leg swelling.   Gastrointestinal: Positive for nausea, vomiting and abdominal pain. Negative for diarrhea, constipation and blood in stool.   Genitourinary: Negative for dysuria and hematuria.   Musculoskeletal: Positive for back pain.   Skin: Negative for rash.    Neurological: Negative for dizziness, seizures and headaches.   Psychiatric/Behavioral: Positive for depression. The patient is nervous/anxious.              Past Medical History:   Past Medical History   Diagnosis Date   • Snoring    • Dental disorder    • Pain      abd and back   • Tobacco abuse 6/20/2009   • Superior mesenteric artery syndrome (CMS-HCC) 7/12/2009   • Anxiety      Follow[facility] by [facility]   • CLOSTRIDIUM DIFFICILE INFECTION 4/14/2010   • Dyspepsia 7/12/2009   • Backpain    • Nephrolithiasis 6/20/2009     kidney stones,post lithotrypsy   • Anxiety disorder    • CVS disease    • Drug-seeking behavior    • Cyclic vomiting syndrome    • Superior mesenteric artery syndrome (CMS-HCC)        Past Surgical History:  Past Surgical History   Procedure Laterality Date   • Gastroscopy-endo  7/8/2009     Performed by ROSANNA WRIGHT at [facility]   • Lithotripsy     • Pyloroplasty  7/27/2009     Performed by MACIEL QUINTERO at SURGERY [facility]   • Gastroscopy with biopsy  3/9/2010     Performed by AMANDA MEJIA at ENDOSCOPY [facility]   • Exploratory laparotomy  7/27/2009     Performed by MACIEL QUINTERO at SURGERY [facility]   • Appendectomy  7/27/2009     Performed by MACIEL QUINTERO at [facility]   • Cholecystectomy  7/27/2009     Performed by MACIEL QUINTERO at [facility]   • Other       superior mesenteric artery correction    • Exploratory laparotomy  1/12/2016     Procedure: EXPLORATORY LAPAROTOMY;  Surgeon: Maciel Quintero M.D.;  Location: SURGERY [facility];  Service:    • Bowel resection  1/12/2016     Procedure: BOWEL RESECTION;  Surgeon: Maciel Quintero M.D.;  Location: SURGERY [facility];  Service:    • Gastroscopy-endo  4/28/2016     Procedure: GASTROSCOPY-ENDO;  Surgeon: Blayne Blackburn M.D.;  Location: [facility];  Service:        Current Outpatient  "Medications:  Home Medications     Reviewed by Anish Chand (Pharmacy Tech) on 04/17/17 at 1225  Med List Status: Complete    Medication Last Dose Status    fluoxetine (PROZAC) 20 MG Cap 4/17/2017 Active    multivitamin (THERAGRAN) Tab 4/10/2017 Active    ondansetron (ZOFRAN ODT) 4 MG TABLET DISPERSIBLE 4/16/2017 Active    potassium bicarbonate (KLYTE) 25 MEQ tablet 4/10/2017 Active    promethazine (PHENERGAN) 25 MG Suppos 4/10/2017 Active    promethazine (PHENERGAN) 25 MG Tab 4/10/2017 Active                Medication Allergy/Sensitivities:  Allergies   Allergen Reactions   • Metoclopramide Hives     Told by MD; pt suspects possible hives.   • Quetiapine Unspecified     MD told her she was allergic     • Septra [Sulfamethoxazole W-Trimethoprim] Unspecified     MD told her she was allergic           Family History:  No family history of DM, CAD  Family History   Problem Relation Age of Onset   • Cancer Mother 50     Colon cancer   • Allergies Father    • Hypertension Mother    • Hypertension Father    • Heart Disease Maternal Grandmother        Social History:  Social History     Social History   • Marital Status: Single     Spouse Name: N/A   • Number of Children: N/A   • Years of Education: N/A     Occupational History   • Not on file.     Social History Main Topics   • Smoking status: Former Smoker -- 0.50 packs/day for 13 years     Types: Cigarettes     Quit date: 12/15/2012   • Smokeless tobacco: Never Used      Comment: 1/2ppd   • Alcohol Use: No   • Drug Use: No      Comment: 1 gm/day for 10 yr - stopped   • Sexual Activity: Not on file     Other Topics Concern   • Not on file     Social History Narrative   Drug: marijuana    Living situation: Lives in [place], not currently employed.             Physical Exam   Filed Vitals:    04/17/17 1046 04/17/17 1230 04/17/17 1330 04/17/17 1453   BP: 117/55   125/80   Pulse: 68 74 89 89   Temp:    37 °C (98.6 °F)   Resp: 16   24   Height:    1.651 m (5' 5\") " "  Weight:    60 kg (132 lb 4.4 oz)   SpO2: 100% 100% 100% 100%     Body mass index is 22.01 kg/(m^2).  /80 mmHg  Pulse 89  Temp(Src) 37 °C (98.6 °F)  Resp 24  Ht 1.651 m (5' 5\")  Wt 60 kg (132 lb 4.4 oz)  BMI 22.01 kg/m2  SpO2 100%  LMP 03/15/2017  O2 therapy: Pulse Oximetry: 100 %, O2 (LPM): 2, O2 Delivery: Nasal Cannula    Physical Exam   Constitutional: She is oriented to person, place, and time.   Pt was rocking back and forth holding stomach in pain, appeared anxious, non toxic appearing   HENT:   Head: Normocephalic and atraumatic.   Mouth/Throat: Oropharynx is clear and moist.   Eyes: Pupils are equal, round, and reactive to light.   Neck: Normal range of motion. Neck supple.   Cardiovascular: Normal rate, regular rhythm, normal heart sounds and intact distal pulses.    No murmur heard.  Pulmonary/Chest: Effort normal and breath sounds normal. No respiratory distress. She has no wheezes. She has no rales. She exhibits no tenderness.   Abdominal: Soft. Bowel sounds are normal. She exhibits no distension. There is tenderness. There is no rebound.   Tenderness to palpation, diffusely   Musculoskeletal: Normal range of motion. She exhibits no edema or tenderness.   Thoracic spine paraspinal tenderness to palpation, no step offs or deformities   Lymphadenopathy:     She has no cervical adenopathy.   Neurological: She is alert and oriented to person, place, and time. GCS score is 15.   Skin: Skin is warm and dry. No rash noted. No erythema. No pallor.       I examined the patient 4/17/2017 3:28 PM  Vital Signs:/80 mmHg  Pulse 89  Temp(Src) 37 °C (98.6 °F)  Resp 24  Ht 1.651 m (5' 5\")  Wt 60 kg (132 lb 4.4 oz)  BMI 22.01 kg/m2  SpO2 100%  LMP 03/15/2017         Data Review       Lab Data Review:  Results for orders placed or performed during the hospital encounter of 04/17/17   CBC WITH DIFFERENTIAL   Result Value Ref Range    WBC 10.2 4.8 - 10.8 K/uL    RBC 4.70 4.20 - 5.40 M/uL    " Hemoglobin 16.1 (H) 12.0 - 16.0 g/dL    Hematocrit 45.6 37.0 - 47.0 %    MCV 97.0 81.4 - 97.8 fL    MCH 34.3 (H) 27.0 - 33.0 pg    MCHC 35.3 (H) 33.6 - 35.0 g/dL    RDW 48.7 35.9 - 50.0 fL    Platelet Count 378 164 - 446 K/uL    MPV 9.7 9.0 - 12.9 fL    Neutrophils-Polys 81.20 (H) 44.00 - 72.00 %    Lymphocytes 10.20 (L) 22.00 - 41.00 %    Monocytes 6.70 0.00 - 13.40 %    Eosinophils 0.50 0.00 - 6.90 %    Basophils 0.60 0.00 - 1.80 %    Immature Granulocytes 0.80 0.00 - 0.90 %    Nucleated RBC 0.00 /100 WBC    Neutrophils (Absolute) 8.26 (H) 2.00 - 7.15 K/uL    Lymphs (Absolute) 1.04 1.00 - 4.80 K/uL    Monos (Absolute) 0.68 0.00 - 0.85 K/uL    Eos (Absolute) 0.05 0.00 - 0.51 K/uL    Baso (Absolute) 0.06 0.00 - 0.12 K/uL    Immature Granulocytes (abs) 0.08 0.00 - 0.11 K/uL    NRBC (Absolute) 0.00 K/uL   COMP METABOLIC PANEL   Result Value Ref Range    Sodium 134 (L) 135 - 145 mmol/L    Potassium 3.7 3.6 - 5.5 mmol/L    Chloride 102 96 - 112 mmol/L    Co2 16 (L) 20 - 33 mmol/L    Anion Gap 16.0 (H) 0.0 - 11.9    Glucose 129 (H) 65 - 99 mg/dL    Bun 14 8 - 22 mg/dL    Creatinine 1.02 0.50 - 1.40 mg/dL    Calcium 10.4 8.5 - 10.5 mg/dL    AST(SGOT) 17 12 - 45 U/L    ALT(SGPT) 15 2 - 50 U/L    Alkaline Phosphatase 71 30 - 99 U/L    Total Bilirubin 1.0 0.1 - 1.5 mg/dL    Albumin 4.7 3.2 - 4.9 g/dL    Total Protein 8.3 (H) 6.0 - 8.2 g/dL    Globulin 3.6 (H) 1.9 - 3.5 g/dL    A-G Ratio 1.3 g/dL   LIPASE   Result Value Ref Range    Lipase 23 11 - 82 U/L   LACTIC ACID   Result Value Ref Range    Lactic Acid 5.5 (HH) 0.5 - 2.0 mmol/L   LACTIC ACID   Result Value Ref Range    Lactic Acid 2.4 (H) 0.5 - 2.0 mmol/L   ESTIMATED GFR   Result Value Ref Range    GFR If African American >60 >60 mL/min/1.73 m 2    GFR If Non African American >60 >60 mL/min/1.73 m 2   MAGNESIUM   Result Value Ref Range    Magnesium 1.8 1.5 - 2.5 mg/dL     Imaging/Procedures Review:      Imaging reviewed.           Assessment/Plan   Active Hospital  Problems    Diagnosis   • Lactic acidosis [E87.2]   • Intractable nausea and vomiting [R11.2]        ID: Mrs. Hernandez is a 40 yo female with a PMHx significant for anxiety, depression, SMA syndrome, cyclical vomiting syndrome, and nephrolithiasis who was discharged 4/14/17 and readmitted under observation 4/17/17 for severe back pain.    #Back pain  #Musculoskeletal vs anxiety related vs pancreatitis vs nephrolithiasis  -physical examination elicited minimal non specific pain without evidence of CVA tenderness, the patient has a WBC of 10.2, which could be due to infectious etiology, or could be reactive 2/2 vomiting  -musculoskeletal pain could be likely 2/2 retching, the patient's vomiting could be causing her upper abdominal pain and her posturing to vomit could be causing soreness paraspinally; the patient could also be experiencing psychosomatic pain 2/2 anxiety -like attack  -pancreatitis is possible, pt had a BAL of .01 on admission; however, she is s/p cholecystectomy an lipase wnl and pt has remained afebrile with stable vitals  -nephrolithiasis is possible; however, recent imaging showed no signs of stones; the patient does have a h/o nephrolithiasis, but she had no CVA tenderness on exam and pt denied dysuria and hematuria  Plan:  -admit to medicine for observation overnight  -pain management 5/325mg q6h prn  -IV fluid hydration NS 150mL/hr  -f/u UA, u tox, B Hcg  -monitor CBC, CMP  -spinal x ray    #Nausea/ vomiting  #Cyclical vomiting syndrome  -pt started heaving yesterday and began vomiting this morning; pt denied hematemesis  -monitor lytes, CMP  -IV hydration as above  -full liquid diet, advance as tolerated  -zofran 4mg iv q4h, compazine 5-10mg iv q6h  -phenergan 25,g rectal q6h prn, 12.5-25mg po q4h prn    #Lactic acidosis  -pt LA trending down from 5.5 on admission to 2.4 2 hours later  -pt has gap metabolic acidosis  -could be 2/2 alcohol use  -f/u U tox  -trend LA    #H/o anxiety,  depression  -monitor for symptoms of anxiety attack  -continue home medications: Prozac 20mg qd      Anticipated Hospital stay: Observation        Quality Measures  Core Measures

## 2017-04-17 NOTE — PROGRESS NOTES
Report received, pt care assumed, tele box on. Pt assessment complete. Pt aaox4, no signs of distress noted at this time. POC discussed with pt and verbalizes no questions. Pt vomiting. Pt c/o of 10/10 pain in abdomen and back, MD paged. Pt denies any additional needs at this time. Bed in lowest position and locked. Pt educated on fall risk and verbalized understanding. Call light and personal belongings within reach, will continue to monitor.

## 2017-04-17 NOTE — SENIOR ADMIT NOTE
CC: Intractable nausea and vomiting    HPI:  This is a 42 yo female with history of multiple admissions related to cyclic vomiting syndrome secondary to marijuana use. She was recently discharged from [facility] about 3 days ago after her symptoms were controlled and she was counseled not to use marijuana.   She reported to ED today with a complain of intractable nausea, vomiting, epigastric pain and inability to keep fluids down. She also complained about pain on the rt side of her back. No focal neurologic deficit. No diarrhea or constipation. No fever or chills.  She was very anxious and tearful most of the encounter. Demanding dialudid in the ED. Given 2 doses.  She was initially slightly hypotensive in ED with BP of 90s/50s along with lactic acidosis of 5.5. She was given IVFs (30ml/kg) and antiemetics after which her BP improved and lactic acid dropped to 2.5.  She was recommended to be re-admitted for intractable nausea and vomiting, lacitic acidosis and correction of dehydration.    On Exam:  Very anxious and tearful. Hyperventilating.  HEENT: Normal  Chest: CTA bilaterally  Heart: RRR, No M/R/G  Abdomen: Mild epigastric tenderness. No rebound or guarding  Back: rt paraspinal tenderness    Labs:  Lab Results   Component Value Date/Time    SODIUM 134* 04/17/2017 10:05 AM    POTASSIUM 3.7 04/17/2017 10:05 AM    CHLORIDE 102 04/17/2017 10:05 AM    CO2 16* 04/17/2017 10:05 AM    GLUCOSE 129* 04/17/2017 10:05 AM    BUN 14 04/17/2017 10:05 AM    CREATININE 1.02 04/17/2017 10:05 AM      Lab Results   Component Value Date/Time    WBC 10.2 04/17/2017 10:05 AM    RBC 4.70 04/17/2017 10:05 AM    HEMOGLOBIN 16.1* 04/17/2017 10:05 AM    HEMATOCRIT 45.6 04/17/2017 10:05 AM    MCV 97.0 04/17/2017 10:05 AM    MCH 34.3* 04/17/2017 10:05 AM    MCHC 35.3* 04/17/2017 10:05 AM    MPV 9.7 04/17/2017 10:05 AM    NEUTROPHILS-POLYS 81.20* 04/17/2017 10:05 AM    LYMPHOCYTES 10.20* 04/17/2017 10:05 AM    MONOCYTES 6.70 04/17/2017 10:05 AM     EOSINOPHILS 0.50 04/17/2017 10:05 AM    BASOPHILS 0.60 04/17/2017 10:05 AM    HYPOCHROMIA 1+ 04/27/2016 12:49 PM    ANISOCYTOSIS 1+ 09/13/2016 12:29 AM    Lactic 5.5 - 2.5  Lipase and liver enzymes normal.      Impression:  - Intractable nausea and vomiting due to cyclic vomiting syndrome secondary to continued marijuana use  - Lactic acidosis secondary to dehydration and fluid loss. Responded to fluids  - No picture of sepsis secondary to infection  - Underlying depression and anxiety disorder  - MSK back pain. No red flags  - Narcotics seeking behavior    Plan:  - Admit to telemetry for observation  - Continue IVFs and trend lactic acid  - Scheduled IV zofran for nausea (8 mg q 8hrs), may use compazine or phenergan in between RPN  - PRN norco for back pain. Will try to hold off IV pain medications unless necessary  - Ativan if extreme anxiety and agitation devoloped  - Check urine tox, alcohol level, pregnancy test  - Continue home prozac  - May discharge tomorrow after fluid and electrolytes repletion    Core Measures:  Full code  Clear liquid  Outpatient Observation

## 2017-04-17 NOTE — PROGRESS NOTES
[facility] [place] [facility] Medicine Admitting History and Physical    Name Diana Hernandez 1975   Age/Sex 41 y.o. female   MRN [id number]   Code Status Full code     After 5PM or if no immediate response to page, please call for cross-coverage  Attending/Team: Dr. Willson/Vinh Call [phone] to page   1st Call - Day Intern (R1):   Dr. Tucker 2nd Call - Day Sr. Resident (R2/R3):   Dr. Fletcher       Chief Complaint:  Back Pain   Nausea and Vomiting     HPI:  Patient is a 41 y/o female with the past medical history significant for SMA syndrome s/p vlad Y duodenojejunostomy, anxiety disorder, Bipolar disorder, substance abuse, Cyclical vomiting syndrome, who was recently discharged from the hospital after being treated for intractable vomiting resulting in lactic acidosis and electrolyte disturbance came back to the ED with similar complaints. Patient was d/c on 17 from the hospital. According to patient she was fine until  when she started having dry heaves and on Monday morning she woke up due to bad back pain and had multiple episodes of vomiting. Vomiting was mostly bilious, non bloody. She reports having more than 5 episodes before coming to the ED. She also reports having back pain in the mid back that has been going on for more than a week on and off and it is at the time of interview around 8/10 in intensity. Patient appears anxious, agitated and restless during the interview and unable to still of lie still.   She denies cp, sob, but she is hyperventilating, denies abdominal pain but has burning type sensation in the abdomen.    Patient reports having used Marijuana twice since she has been discharged from the hospital recently.    In the ED patient received  Dilaudid, Zofran and Ativan. Also received fluid resuscitation @30ml/kg.   Review of Systems   Constitutional: Negative for fever and chills.   HENT: Negative for congestion and sore throat.    Eyes: Negative.    Respiratory: Negative.     Cardiovascular: Negative.    Gastrointestinal: Positive for abdominal pain. Negative for diarrhea and constipation.   Genitourinary: Negative.    Musculoskeletal: Positive for back pain.   Skin: Negative.    Neurological: Positive for weakness. Negative for dizziness, tingling and headaches.   Psychiatric/Behavioral: Negative for depression.             Past Medical History:   Past Medical History   Diagnosis Date   • Snoring    • Dental disorder    • Pain      abd and back   • Tobacco abuse 6/20/2009   • Superior mesenteric artery syndrome (CMS-HCC) 7/12/2009   • Anxiety      Followed by [facility]   • CLOSTRIDIUM DIFFICILE INFECTION 4/14/2010   • Dyspepsia 7/12/2009   • Backpain    • Nephrolithiasis 6/20/2009     kidney stones,post lithotrypsy   • Anxiety disorder    • CVS disease    • Drug-seeking behavior    • Cyclic vomiting syndrome    • Superior mesenteric artery syndrome (CMS-HCC)        Past Surgical History:  Past Surgical History   Procedure Laterality Date   • Gastroscopy-endo  7/8/2009     Performed by ROSANNA WRIGHT at SURGERY [facility]   • Lithotripsy     • Pyloroplasty  7/27/2009     Performed by MACIEL QUINTERO at SURGERY [facility]   • Gastroscopy with biopsy  3/9/2010     Performed by AMANDA MEJIA at ENDOSCOPY [facility]   • Exploratory laparotomy  7/27/2009     Performed by MACIEL QUINTERO at SURGERY [facility]   • Appendectomy  7/27/2009     Performed by MACIEL QUINTERO at [facility]   • Cholecystectomy  7/27/2009     Performed by MACIEL QUINTERO at [facility]   • Other       superior mesenteric artery correction    • Exploratory laparotomy  1/12/2016     Procedure: EXPLORATORY LAPAROTOMY;  Surgeon: Maciel Quintero M.D.;  Location: [facility];  Service:    • Bowel resection  1/12/2016     Procedure: BOWEL RESECTION;  Surgeon: Maciel Quintero M.D.;  Location: [facility]  ORS;  Service:    • Gastroscopy-endo  4/28/2016     Procedure: GASTROSCOPY-ENDO;  Surgeon: Blayne Blackburn M.D.;  Location: ENDOSCOPY [facility] ORS;  Service:        Current Outpatient Medications:  Home Medications     Reviewed by Anish Chand (Pharmacy Tech) on 04/17/17 at 1225  Med List Status: Complete    Medication Last Dose Status    fluoxetine (PROZAC) 20 MG Cap 4/17/2017 Active    multivitamin (THERAGRAN) Tab 4/10/2017 Active    ondansetron (ZOFRAN ODT) 4 MG TABLET DISPERSIBLE 4/16/2017 Active    potassium bicarbonate (KLYTE) 25 MEQ tablet 4/10/2017 Active    promethazine (PHENERGAN) 25 MG Suppos 4/10/2017 Active    promethazine (PHENERGAN) 25 MG Tab 4/10/2017 Active                Medication Allergy/Sensitivities:  Allergies   Allergen Reactions   • Metoclopramide Hives     Told by MD; pt suspects possible hives.   • Quetiapine Unspecified     MD told her she was allergic     • Septra [Sulfamethoxazole W-Trimethoprim] Unspecified     MD told her she was allergic           Family History:  Family History   Problem Relation Age of Onset   • Cancer Mother 50     Colon cancer   • Allergies Father    • Hypertension Mother    • Hypertension Father    • Heart Disease Maternal Grandmother        Social History:  Social History     Social History   • Marital Status: Single     Spouse Name: N/A   • Number of Children: N/A   • Years of Education: N/A     Occupational History   • Not on file.     Social History Main Topics   • Smoking status: Former Smoker -- 0.50 packs/day for 13 years     Types: Cigarettes     Quit date: 12/15/2012   • Smokeless tobacco: Never Used      Comment: 1/2ppd   • Alcohol Use: No   • Drug Use: No      Comment: 1 gm/day for 10 yr - stopped   • Sexual Activity: Not on file     Other Topics Concern   • Not on file     Social History Narrative     Living situation: Lives with fiance  PCP : Dr. Lavell MARTINEZ      Physical Exam     Filed Vitals:    04/17/17 1046 04/17/17 1230  "04/17/17 1330 04/17/17 1453   BP: 117/55   125/80   Pulse: 68 74 89 89   Temp:    37 °C (98.6 °F)   Resp: 16   24   Height:    1.651 m (5' 5\")   Weight:    60 kg (132 lb 4.4 oz)   SpO2: 100% 100% 100% 100%     Body mass index is 22.01 kg/(m^2).  /80 mmHg  Pulse 89  Temp(Src) 37 °C (98.6 °F)  Resp 24  Ht 1.651 m (5' 5\")  Wt 60 kg (132 lb 4.4 oz)  BMI 22.01 kg/m2  SpO2 100%  LMP 03/15/2017  O2 therapy: Pulse Oximetry: 100 %, O2 (LPM): 2, O2 Delivery: Nasal Cannula    Physical Exam   Constitutional: She is oriented to person, place, and time and well-developed, well-nourished, and in no distress.   Appears anxious and agigated. Restless and shaking.    HENT:   Head: Normocephalic and atraumatic.   Mouth/Throat: No oropharyngeal exudate.   Eyes: Conjunctivae are normal. Pupils are equal, round, and reactive to light.   Neck: Normal range of motion. Neck supple.   Cardiovascular: Normal rate, regular rhythm and normal heart sounds.    Pulmonary/Chest: Effort normal and breath sounds normal. No respiratory distress. She has no wheezes.   Abdominal: Soft. Bowel sounds are normal. She exhibits no distension. There is tenderness. There is no rebound and no guarding.   Musculoskeletal:        Thoracic back: She exhibits bony tenderness.        Back:    Neurological: She is alert and oriented to person, place, and time.   Skin: Skin is warm and dry.   Psychiatric:   Anxious  Agitated  Restless and shaking             Data Review       Old Records Request:   Completed  Current Records review and summary: Completed    Lab Data Review:  Recent Results (from the past 24 hour(s))   CBC WITH DIFFERENTIAL    Collection Time: 04/17/17 10:05 AM   Result Value Ref Range    WBC 10.2 4.8 - 10.8 K/uL    RBC 4.70 4.20 - 5.40 M/uL    Hemoglobin 16.1 (H) 12.0 - 16.0 g/dL    Hematocrit 45.6 37.0 - 47.0 %    MCV 97.0 81.4 - 97.8 fL    MCH 34.3 (H) 27.0 - 33.0 pg    MCHC 35.3 (H) 33.6 - 35.0 g/dL    RDW 48.7 35.9 - 50.0 fL    " Platelet Count 378 164 - 446 K/uL    MPV 9.7 9.0 - 12.9 fL    Neutrophils-Polys 81.20 (H) 44.00 - 72.00 %    Lymphocytes 10.20 (L) 22.00 - 41.00 %    Monocytes 6.70 0.00 - 13.40 %    Eosinophils 0.50 0.00 - 6.90 %    Basophils 0.60 0.00 - 1.80 %    Immature Granulocytes 0.80 0.00 - 0.90 %    Nucleated RBC 0.00 /100 WBC    Neutrophils (Absolute) 8.26 (H) 2.00 - 7.15 K/uL    Lymphs (Absolute) 1.04 1.00 - 4.80 K/uL    Monos (Absolute) 0.68 0.00 - 0.85 K/uL    Eos (Absolute) 0.05 0.00 - 0.51 K/uL    Baso (Absolute) 0.06 0.00 - 0.12 K/uL    Immature Granulocytes (abs) 0.08 0.00 - 0.11 K/uL    NRBC (Absolute) 0.00 K/uL   COMP METABOLIC PANEL    Collection Time: 04/17/17 10:05 AM   Result Value Ref Range    Sodium 134 (L) 135 - 145 mmol/L    Potassium 3.7 3.6 - 5.5 mmol/L    Chloride 102 96 - 112 mmol/L    Co2 16 (L) 20 - 33 mmol/L    Anion Gap 16.0 (H) 0.0 - 11.9    Glucose 129 (H) 65 - 99 mg/dL    Bun 14 8 - 22 mg/dL    Creatinine 1.02 0.50 - 1.40 mg/dL    Calcium 10.4 8.5 - 10.5 mg/dL    AST(SGOT) 17 12 - 45 U/L    ALT(SGPT) 15 2 - 50 U/L    Alkaline Phosphatase 71 30 - 99 U/L    Total Bilirubin 1.0 0.1 - 1.5 mg/dL    Albumin 4.7 3.2 - 4.9 g/dL    Total Protein 8.3 (H) 6.0 - 8.2 g/dL    Globulin 3.6 (H) 1.9 - 3.5 g/dL    A-G Ratio 1.3 g/dL   LIPASE    Collection Time: 04/17/17 10:05 AM   Result Value Ref Range    Lipase 23 11 - 82 U/L   LACTIC ACID    Collection Time: 04/17/17 10:05 AM   Result Value Ref Range    Lactic Acid 5.5 (HH) 0.5 - 2.0 mmol/L   ESTIMATED GFR    Collection Time: 04/17/17 10:05 AM   Result Value Ref Range    GFR If African American >60 >60 mL/min/1.73 m 2    GFR If Non African American >60 >60 mL/min/1.73 m 2   LACTIC ACID    Collection Time: 04/17/17 11:55 AM   Result Value Ref Range    Lactic Acid 2.4 (H) 0.5 - 2.0 mmol/L   MAGNESIUM    Collection Time: 04/17/17 11:55 AM   Result Value Ref Range    Magnesium 1.8 1.5 - 2.5 mg/dL       Imaging/Procedures Review:    ndependant Imaging Review:  Completed  No orders to display            EKG:   EKG Independant Review: Completed   No EKG done.     () Records reviewed and summarized in current documentation             Assessment/Plan     Lactic acidosis  - Lactic acid: 5.5 on arrival to the ED  - post fluid resuscitation 2.4  - multiple episodes of vomiting before presenting to the ED   - A, Bicarb 16, patient is hyperventilating, Abl: 4.7, patient is dehydrated from vomiting.     Plan:  - continue IVF  - continue scheduled anti-emetics for vomiting  - trend lactate.     Intractable nausea and vomiting  - Multiple admission in the past for similar complaints  - likely cyclical vomiting syndrome form Marijuana use  - Pregnancy test neg on   - also has hx of multiple abdominal surgeries   - no guarding, distension of abdomen, + bs, mild tenderness in the epigastric area    Plan:   - IV fluids  - compazine q4h scheduled, per primary team, change to prn when mor stable.   - GI cocktail once  - monitor and replete electrolytes.   - advance diet as tolerated.     Anxiety  Hx of ESTELA, bipolar disorder   - appears anxious, restless, hyperventilating  - on Prozac outpatient, has not established with psychiatry    Plan:  - will give one time ativan  - monitor.   - will benefit from psych evaluation     Back pain  Complain of back pain in the mid back and reports some paraspinal tenderness  - per chart review has been complaining of back pain in the past admissions, also had imaging studies done in the past which has been normal  - vitals stable at time of interview, pulses equal on both arms, unlikely PE or dissesction  - also per chart review drug seeking behavior for similar pain    Plan:  - will hold off on any imaging studies for now, given reproducible pain, likely musculoskeletal, will order prn Norco for now.   - if not controlled, will consider topical Diclofenac cream or lidocaine patch.   - will defer IV narcotics  - monitor.   - monitor    Substance  abuse  - hx of marijuana use, reports using it twice since d/c from hospital on 4/14  - also BAL 10mg/dl, however, patient reported not drinking alcohol    Plan:  - will order UDS,   - monitor.               Anticipated Hospital stay: Observation admit        Quality Measures  Labs reviewed, Medications reviewed and Radiology images reviewed  Saldana catheter: No Saldana      DVT Prophylaxis: Enoxaparin (Lovenox)

## 2017-04-17 NOTE — ASSESSMENT & PLAN NOTE
Lactic acid was initially 5.5 on admission.  Resolved with IV fluids.  Continue to monitor patient.

## 2017-04-17 NOTE — ASSESSMENT & PLAN NOTE
Patient has history of generalized anxiety disorder and bipolar disorder.  Currently not on medication as an outpatient.  Will need outpatient psychiatry referral.  Continue prozac which was prescribed on previous discharge.  Ativan 0.5mg Q4hrs PRN for anxiety.

## 2017-04-17 NOTE — ED PROVIDER NOTES
"ED Provider Note    CHIEF COMPLAINT  Chief Complaint   Patient presents with   • N/V     Pt to er via EMS, reports n/v since yesterday, multiple epsiodes of emesis. Dry heaving upon arrival-no emesis. 4mg zofran IM given enroute.    • Flank Pain     Reports right flank pain starting enroute to hospital   • Anxiety     Pt was found by EMS hyperventilating sitting in the bathtub stating \"I'm having an anxiety attack!\" Pt moaning very loudly during triage, stating \"help me! Help me!\"        HPI  Diana Hernandez is a 41 y.o. female who presents for evaluation of back pain and flank pain, vomiting, and anxiety. The patient was discharged from the hospital 3 days ago after an admission for cyclical vomiting. She now states that she started vomiting again yesterday and is complaining of right flank pain. She doesn't think she's had any fevers. She has a history of recurrent cyclical vomiting. She does have a history of kidney stones. She had a CT scan of her abdomen and pelvis 10 days ago that showed no evidence of kidney stones therefore making unlikely at that's what the etiology is today.    REVIEW OF SYSTEMS  See HPI for further details. All other systems are negative.     PAST MEDICAL HISTORY  Past Medical History   Diagnosis Date   • Snoring    • Dental disorder    • Pain      abd and back   • Tobacco abuse 6/20/2009   • Superior mesenteric artery syndrome (CMS-HCC) 7/12/2009   • Anxiety      Followed by [facility]   • CLOSTRIDIUM DIFFICILE INFECTION 4/14/2010   • Dyspepsia 7/12/2009   • Backpain    • Nephrolithiasis 6/20/2009     kidney stones,post lithotrypsy   • Anxiety disorder    • CVS disease    • Drug-seeking behavior    • Cyclic vomiting syndrome    • Superior mesenteric artery syndrome (CMS-HCC)        FAMILY HISTORY  Family History   Problem Relation Age of Onset   • Cancer Mother 50     Colon cancer   • Allergies Father    • Hypertension Mother    • Hypertension Father    • Heart Disease Maternal " Grandmother        SOCIAL HISTORY  Social History     Social History   • Marital Status: Single     Spouse Name: N/A   • Number of Children: N/A   • Years of Education: N/A     Social History Main Topics   • Smoking status: Former Smoker -- 0.50 packs/day for 13 years     Types: Cigarettes     Quit date: 12/15/2012   • Smokeless tobacco: Never Used      Comment: 1/2ppd   • Alcohol Use: No   • Drug Use: No      Comment: 1 gm/day for 10 yr - stopped   • Sexual Activity: Not Asked     Other Topics Concern   • None     Social History Narrative       SURGICAL HISTORY  Past Surgical History   Procedure Laterality Date   • Gastroscopy-endo  7/8/2009     Performed by ROSANNA WRIGHT at SURGERY [facility]   • Lithotripsy     • Pyloroplasty  7/27/2009     Performed by MACIEL QUINTERO at SURGERY [facility]   • Gastroscopy with biopsy  3/9/2010     Performed by AMANDA MEJIA at ENDOSCOPY [facility]   • Exploratory laparotomy  7/27/2009     Performed by MACIEL QUINTERO at SURGERY [facility]   • Appendectomy  7/27/2009     Performed by MACIEL QUINTERO at SURGERY [facility]   • Cholecystectomy  7/27/2009     Performed by MACIEL QUINTERO at SURGERY [facility]   • Other       superior mesenteric artery correction    • Exploratory laparotomy  1/12/2016     Procedure: EXPLORATORY LAPAROTOMY;  Surgeon: Maciel Quintero M.D.;  Location: SURGERY [facility];  Service:    • Bowel resection  1/12/2016     Procedure: BOWEL RESECTION;  Surgeon: Maciel Quintero M.D.;  Location: SURGERY [facility];  Service:    • Gastroscopy-endo  4/28/2016     Procedure: GASTROSCOPY-ENDO;  Surgeon: Blayne Blackburn M.D.;  Location: ENDOSCOPY [facility];  Service:        CURRENT MEDICATIONS  Home Medications     Reviewed by Estelita Seaman R.N. (Registered Nurse) on 04/17/17 at 0921  Med List Status: Partial    Medication Last Dose Status    multivitamin  "(THERAGRAN) Tab 4/10/2017 Active    ondansetron (ZOFRAN ODT) 4 MG TABLET DISPERSIBLE 4/10/2017 Active    potassium bicarbonate (KLYTE) 25 MEQ tablet 4/10/2017 Active    promethazine (PHENERGAN) 25 MG Suppos 4/10/2017 Active    promethazine (PHENERGAN) 25 MG Tab 4/10/2017 Active                ALLERGIES  Allergies   Allergen Reactions   • Metoclopramide Hives     Told by MD; pt suspects possible hives.   • Quetiapine Unspecified     MD told her she was allergic     • Septra [Sulfamethoxazole W-Trimethoprim] Unspecified     MD told her she was allergic         PHYSICAL EXAM  VITAL SIGNS: /83 mmHg  Pulse 90  Temp(Src) 36.3 °C (97.3 °F)  Resp 17  Ht 1.651 m (5' 5\")  Wt 58.968 kg (130 lb)  BMI 21.63 kg/m2  SpO2 100%  LMP 03/15/2017    Constitutional: Well developed, Well nourished, moderate distress rocking on the gurney and crying.  HENT: Normocephalic, Atraumatic.   Eyes:  EOMI, Conjunctiva normal, No discharge.   Cardiovascular: Normal heart rate, Normal rhythm, No murmurs, No rubs, No gallops.   Thorax & Lungs: Lungs clear to auscultation bilaterally without wheezes, rales or rhonchi. No respiratory distress.    Abdomen: Soft, minimal right upper quadrant tenderness without guarding or rebound.  Skin: Warm, Dry.   Musculoskeletal: Good range of motion in all major joints.  Neurologic: Awake alert, No focal deficits noted.         COURSE & MEDICAL DECISION MAKING  Pertinent Labs & Imaging studies reviewed. (See chart for details)  This 41-year-old here for evaluation of flank pain, vomiting, and anxiety. The patient has a history of cyclical vomiting. She was just discharged from the hospital 3 days ago. On arrival she is moaning in pain sitting on the gurney. She is afebrile. She is not tachycardic. An IV is established. She is treated with Dilaudid, Zofran, and Ativan. Laboratories were obtained to include chemistries which included glucose of 129. Bicarb is low at 16. Liver function studies and lipase " are normal. CBC shows a normal white count with a differential of 81 polys and 10 lymphocytes. I received a phone call from the laboratory. They reported a lactic acid of 5.5. They state that they had run the test twice and this is an accurate number. The patient is treated with 30 ML's per kilogram of normal saline. At this point the patient will require hospitalization for her cyclical vomiting and lactic acidosis. I discussed the case with the hospitalist and Dr. Julio will be the primary admitting physician.    FINAL IMPRESSION  1. Flank pain  2. Cyclical vomiting syndrome  3. Lactic acidosis         Electronically signed by: Blaze Flores, 4/17/2017 9:33 AM

## 2017-04-17 NOTE — PROGRESS NOTES
Talk to patient, patient in her baby position on the bed as she is relieved. Complain that she is vomiting and wont able to swallow the Pills. Verbalized she vomit her Norco and only thing that work is a shot. Will call MD for update.

## 2017-04-17 NOTE — ED NOTES
"Med rec updated and complete  Allergies reviewed  Pt states \"No antibiotics in the last 30 days, that she had to take at home\".  Pt states \"I received the Prozac 20MG from my OB doctor\".  Called [facility] @ [phone] to verify when pt picked up her PROZAC 20MG, pt picked it up on 4/13/2017.    "

## 2017-04-18 LAB
ALBUMIN SERPL BCP-MCNC: 3.7 G/DL (ref 3.2–4.9)
ALBUMIN/GLOB SERPL: 1.3 G/DL
ALP SERPL-CCNC: 55 U/L (ref 30–99)
ALT SERPL-CCNC: 9 U/L (ref 2–50)
ANION GAP SERPL CALC-SCNC: 13 MMOL/L (ref 0–11.9)
AST SERPL-CCNC: 16 U/L (ref 12–45)
BASOPHILS # BLD AUTO: 0.8 % (ref 0–1.8)
BASOPHILS # BLD: 0.08 K/UL (ref 0–0.12)
BILIRUB SERPL-MCNC: 1.4 MG/DL (ref 0.1–1.5)
BUN SERPL-MCNC: 7 MG/DL (ref 8–22)
CALCIUM SERPL-MCNC: 8.9 MG/DL (ref 8.5–10.5)
CHLORIDE SERPL-SCNC: 108 MMOL/L (ref 96–112)
CO2 SERPL-SCNC: 18 MMOL/L (ref 20–33)
CREAT SERPL-MCNC: 0.77 MG/DL (ref 0.5–1.4)
EOSINOPHIL # BLD AUTO: 0.06 K/UL (ref 0–0.51)
EOSINOPHIL NFR BLD: 0.6 % (ref 0–6.9)
ERYTHROCYTE [DISTWIDTH] IN BLOOD BY AUTOMATED COUNT: 53.6 FL (ref 35.9–50)
FERRITIN SERPL-MCNC: 18.4 NG/ML (ref 10–291)
GFR SERPL CREATININE-BSD FRML MDRD: >60 ML/MIN/1.73 M 2
GLOBULIN SER CALC-MCNC: 2.8 G/DL (ref 1.9–3.5)
GLUCOSE SERPL-MCNC: 131 MG/DL (ref 65–99)
HCT VFR BLD AUTO: 40.2 % (ref 37–47)
HGB BLD-MCNC: 13.2 G/DL (ref 12–16)
IMM GRANULOCYTES # BLD AUTO: 0.05 K/UL (ref 0–0.11)
IMM GRANULOCYTES NFR BLD AUTO: 0.5 % (ref 0–0.9)
LYMPHOCYTES # BLD AUTO: 2.92 K/UL (ref 1–4.8)
LYMPHOCYTES NFR BLD: 29.7 % (ref 22–41)
MAGNESIUM SERPL-MCNC: 2.1 MG/DL (ref 1.5–2.5)
MCH RBC QN AUTO: 33.7 PG (ref 27–33)
MCHC RBC AUTO-ENTMCNC: 32.8 G/DL (ref 33.6–35)
MCV RBC AUTO: 102.6 FL (ref 81.4–97.8)
MONOCYTES # BLD AUTO: 0.84 K/UL (ref 0–0.85)
MONOCYTES NFR BLD AUTO: 8.6 % (ref 0–13.4)
NEUTROPHILS # BLD AUTO: 5.87 K/UL (ref 2–7.15)
NEUTROPHILS NFR BLD: 59.8 % (ref 44–72)
NRBC # BLD AUTO: 0 K/UL
NRBC BLD AUTO-RTO: 0 /100 WBC
PLATELET # BLD AUTO: 314 K/UL (ref 164–446)
PMV BLD AUTO: 9.7 FL (ref 9–12.9)
POTASSIUM SERPL-SCNC: 3.5 MMOL/L (ref 3.6–5.5)
PROT SERPL-MCNC: 6.5 G/DL (ref 6–8.2)
RBC # BLD AUTO: 3.92 M/UL (ref 4.2–5.4)
SODIUM SERPL-SCNC: 139 MMOL/L (ref 135–145)
WBC # BLD AUTO: 9.8 K/UL (ref 4.8–10.8)

## 2017-04-18 PROCEDURE — 700111 HCHG RX REV CODE 636 W/ 250 OVERRIDE (IP): Performed by: HOSPITALIST

## 2017-04-18 PROCEDURE — 700102 HCHG RX REV CODE 250 W/ 637 OVERRIDE(OP): Performed by: INTERNAL MEDICINE

## 2017-04-18 PROCEDURE — 96366 THER/PROPH/DIAG IV INF ADDON: CPT

## 2017-04-18 PROCEDURE — 96376 TX/PRO/DX INJ SAME DRUG ADON: CPT

## 2017-04-18 PROCEDURE — A9270 NON-COVERED ITEM OR SERVICE: HCPCS | Performed by: INTERNAL MEDICINE

## 2017-04-18 PROCEDURE — 700111 HCHG RX REV CODE 636 W/ 250 OVERRIDE (IP): Performed by: STUDENT IN AN ORGANIZED HEALTH CARE EDUCATION/TRAINING PROGRAM

## 2017-04-18 PROCEDURE — G0378 HOSPITAL OBSERVATION PER HR: HCPCS

## 2017-04-18 PROCEDURE — 96375 TX/PRO/DX INJ NEW DRUG ADDON: CPT

## 2017-04-18 PROCEDURE — 80053 COMPREHEN METABOLIC PANEL: CPT

## 2017-04-18 PROCEDURE — 700111 HCHG RX REV CODE 636 W/ 250 OVERRIDE (IP): Performed by: INTERNAL MEDICINE

## 2017-04-18 PROCEDURE — 96365 THER/PROPH/DIAG IV INF INIT: CPT

## 2017-04-18 PROCEDURE — 700102 HCHG RX REV CODE 250 W/ 637 OVERRIDE(OP): Performed by: STUDENT IN AN ORGANIZED HEALTH CARE EDUCATION/TRAINING PROGRAM

## 2017-04-18 PROCEDURE — 36415 COLL VENOUS BLD VENIPUNCTURE: CPT

## 2017-04-18 PROCEDURE — 99225 PR SUBSEQUENT OBSERVATION CARE,LEVEL II: CPT | Mod: GC | Performed by: INTERNAL MEDICINE

## 2017-04-18 PROCEDURE — 700101 HCHG RX REV CODE 250: Performed by: INTERNAL MEDICINE

## 2017-04-18 PROCEDURE — 83735 ASSAY OF MAGNESIUM: CPT

## 2017-04-18 PROCEDURE — 85025 COMPLETE CBC W/AUTO DIFF WBC: CPT

## 2017-04-18 PROCEDURE — 82728 ASSAY OF FERRITIN: CPT

## 2017-04-18 PROCEDURE — A9270 NON-COVERED ITEM OR SERVICE: HCPCS | Performed by: HOSPITALIST

## 2017-04-18 PROCEDURE — 700102 HCHG RX REV CODE 250 W/ 637 OVERRIDE(OP): Performed by: HOSPITALIST

## 2017-04-18 PROCEDURE — A9270 NON-COVERED ITEM OR SERVICE: HCPCS | Performed by: STUDENT IN AN ORGANIZED HEALTH CARE EDUCATION/TRAINING PROGRAM

## 2017-04-18 RX ORDER — LORAZEPAM 1 MG/1
0.5 TABLET ORAL EVERY 4 HOURS PRN
Status: DISCONTINUED | OUTPATIENT
Start: 2017-04-18 | End: 2017-04-19

## 2017-04-18 RX ORDER — MORPHINE SULFATE 4 MG/ML
2 INJECTION, SOLUTION INTRAMUSCULAR; INTRAVENOUS EVERY 4 HOURS PRN
Status: DISCONTINUED | OUTPATIENT
Start: 2017-04-18 | End: 2017-04-19 | Stop reason: HOSPADM

## 2017-04-18 RX ORDER — SODIUM CHLORIDE AND POTASSIUM CHLORIDE 300; 900 MG/100ML; MG/100ML
INJECTION, SOLUTION INTRAVENOUS ONCE
Status: COMPLETED | OUTPATIENT
Start: 2017-04-18 | End: 2017-04-18

## 2017-04-18 RX ORDER — LORAZEPAM 2 MG/ML
0.5 INJECTION INTRAMUSCULAR ONCE
Status: COMPLETED | OUTPATIENT
Start: 2017-04-18 | End: 2017-04-18

## 2017-04-18 RX ADMIN — PROCHLORPERAZINE EDISYLATE 10 MG: 5 INJECTION INTRAMUSCULAR; INTRAVENOUS at 20:34

## 2017-04-18 RX ADMIN — ONDANSETRON 4 MG: 2 INJECTION, SOLUTION INTRAMUSCULAR; INTRAVENOUS at 03:49

## 2017-04-18 RX ADMIN — FLUOXETINE HYDROCHLORIDE 20 MG: 20 CAPSULE ORAL at 07:41

## 2017-04-18 RX ADMIN — LORAZEPAM 0.5 MG: 1 TABLET ORAL at 11:19

## 2017-04-18 RX ADMIN — HYDROCODONE BITARTRATE AND ACETAMINOPHEN 2 TABLET: 5; 325 TABLET ORAL at 11:19

## 2017-04-18 RX ADMIN — MORPHINE SULFATE 2 MG: 4 INJECTION INTRAVENOUS at 16:25

## 2017-04-18 RX ADMIN — THERA TABS 1 TABLET: TAB at 07:41

## 2017-04-18 RX ADMIN — LORAZEPAM 0.5 MG: 2 INJECTION INTRAMUSCULAR; INTRAVENOUS at 03:49

## 2017-04-18 RX ADMIN — HYDROCODONE BITARTRATE AND ACETAMINOPHEN 2 TABLET: 5; 325 TABLET ORAL at 03:57

## 2017-04-18 RX ADMIN — POTASSIUM CHLORIDE AND SODIUM CHLORIDE: 900; 300 INJECTION, SOLUTION INTRAVENOUS at 07:18

## 2017-04-18 RX ADMIN — ZOLPIDEM TARTRATE 5 MG: 5 TABLET, FILM COATED ORAL at 21:58

## 2017-04-18 RX ADMIN — PROCHLORPERAZINE EDISYLATE 10 MG: 5 INJECTION INTRAMUSCULAR; INTRAVENOUS at 11:20

## 2017-04-18 RX ADMIN — PROCHLORPERAZINE EDISYLATE 10 MG: 5 INJECTION INTRAMUSCULAR; INTRAVENOUS at 16:24

## 2017-04-18 RX ADMIN — LIDOCAINE 1 PATCH: 50 PATCH CUTANEOUS at 21:58

## 2017-04-18 RX ADMIN — PROCHLORPERAZINE EDISYLATE 10 MG: 5 INJECTION INTRAMUSCULAR; INTRAVENOUS at 07:41

## 2017-04-18 RX ADMIN — MORPHINE SULFATE 2 MG: 4 INJECTION INTRAVENOUS at 20:35

## 2017-04-18 RX ADMIN — MORPHINE SULFATE 2 MG: 4 INJECTION INTRAVENOUS at 07:42

## 2017-04-18 RX ADMIN — LORAZEPAM 0.5 MG: 1 TABLET ORAL at 18:54

## 2017-04-18 ASSESSMENT — ENCOUNTER SYMPTOMS
FEVER: 0
NERVOUS/ANXIOUS: 1
VOMITING: 1
COUGH: 0
CHILLS: 0
BLURRED VISION: 0
DOUBLE VISION: 0
HEADACHES: 0
FOCAL WEAKNESS: 0
BACK PAIN: 1
SHORTNESS OF BREATH: 0
ABDOMINAL PAIN: 1
DIZZINESS: 0
NAUSEA: 1

## 2017-04-18 ASSESSMENT — PAIN SCALES - GENERAL
PAINLEVEL_OUTOF10: 7
PAINLEVEL_OUTOF10: 8
PAINLEVEL_OUTOF10: 6
PAINLEVEL_OUTOF10: 7
PAINLEVEL_OUTOF10: 5
PAINLEVEL_OUTOF10: 5

## 2017-04-18 NOTE — DISCHARGE PLANNING
Information Source  Orientation : Oriented x 4  Who is responsible for making decisions for patient? : Patient  Source of Information: Patient  Primary Caregiver for Others?: No  Why do you believe you were admitted?: back pain and vomiting         Elopement Risk  Legal Hold: No  Ambulatory or Self Mobile in Wheelchair: No-Not an Elopement Risk  Elopement Risk: Not at Risk for Elopement    Interdisciplinary Discharge Planning  Primary Care Physician: Dr. Torre  Lives with - Patient's Self Care Capacity: Significant Other  Support Systems: Family Member(s), Friends / Neighbors  Housing / Facility: 1 Story House  Mobility Issues: No  Prior Services: None    Discharge Preparedness  Difficulity with ADLs: None  Difficulity with IADLs: None  Pharmacy: [facility]  Prescription Coverage: Yes    Functional Assesment  Prior Functional Level: Ambulatory    Finances  Medical Insurance Coverage: Medicaid HPN               Advance Directive  Advance Directive?: DPOA for Health Care    Domestic Abuse  Have you ever been the victim of abuse or violence?: No              Anticipated Discharge Information  Anticipated discharge disposition: Home  Discharge Address: see facesheet  Discharge Contact Phone Number: see facesheet                            1      Service: (

## 2017-04-18 NOTE — PROGRESS NOTES
Report received from Amarilys BORREGO. Tele monitoring in place. Assessed pt. A&O x4. C/o nausea, and pain, meds administered . POC discussed with pt. Hourly rounding in place. Bed in low position, call light within reach.

## 2017-04-18 NOTE — PROGRESS NOTES
[facility] [place] Internal Medicine Interval Note    Name Diana Hernandez 1975   Age/Sex 41 y.o. female   MRN [id number]   Code Status Full code     After 5PM or if no immediate response to page, please call for cross-coverage  Attending/Team: Jj Call [phone] to page   1st Call - Day Intern (R1):   Caitlin 2nd Call - Day Sr. Resident (R2/R3):   Chance         Chief complaint/ reason for interval visit (Primary Diagnosis)   Nausea and vomiting    Interval Problem Daily Status Update    Patient reports to feeling slightly better since admission. She currently reports nausea and intermittent back pain but was able to walk around last night without incident.    Patient was discharged previously on  without incident and she was readmitted again for similar complaints overnight. She did note to have had marijuana with a friend in the interim. Advised her to stop using marijuana. She will need outpatient gastroenterology, surgery and psychiatry followup.    Active Hospital Problems    Diagnosis   • Intractable nausea and vomiting [R11.2]   • Anxiety [F41.9]   • Substance abuse [F19.10]   • Lactic acidosis [E87.2]   • Back pain [M54.9]       Review of Systems   Constitutional: Negative for fever and chills.   Eyes: Negative for blurred vision and double vision.   Respiratory: Negative for cough and shortness of breath.    Cardiovascular: Negative for chest pain.   Gastrointestinal: Positive for nausea, vomiting and abdominal pain.   Genitourinary: Negative for dysuria and urgency.   Musculoskeletal: Positive for back pain. Negative for joint pain.   Skin: Negative for rash.   Neurological: Negative for dizziness, focal weakness and headaches.   Psychiatric/Behavioral: The patient is nervous/anxious.        Consultants/Specialty  None    Disposition  Remain inpatient until nausea/vomiting resolves.    Quality Measures  EKG reviewed, Labs reviewed, Medications reviewed and  Radiology images reviewed  Saldana catheter: No Saldana        DVT prophylaxis - mechanical: SCDs  Ulcer prophylaxis: No and Not indicated              Physical Exam       Filed Vitals:    04/18/17 0350 04/18/17 0737 04/18/17 1122 04/18/17 1621   BP: 144/77 126/79 120/90 125/69   Pulse: 84 82 90 88   Temp: 36.5 °C (97.7 °F) 36.6 °C (97.9 °F) 36.6 °C (97.8 °F) 37.2 °C (99 °F)   Resp: 16 18 18 18   Height:       Weight:       SpO2: 100% 99% 99% 95%     Body mass index is 22.01 kg/(m^2).    Oxygen Therapy:  Pulse Oximetry: 95 %, O2 (LPM): 0, O2 Delivery: None (Room Air)    Physical Exam   Constitutional: She is oriented to person, place, and time and well-developed, well-nourished, and in no distress.   HENT:   Head: Normocephalic and atraumatic.   Eyes: EOM are normal. Pupils are equal, round, and reactive to light.   Cardiovascular: Normal rate, regular rhythm and normal heart sounds.    Pulmonary/Chest: Effort normal and breath sounds normal.   Abdominal: Soft. Bowel sounds are normal. There is tenderness.   Neurological: She is alert and oriented to person, place, and time. GCS score is 15.         Lab Data Review:      4/18/2017  4:55 PM    Recent Labs      04/17/17   1005  04/17/17   1155  04/18/17   0350   SODIUM  134*   --   139   POTASSIUM  3.7   --   3.5*   CHLORIDE  102   --   108   CO2  16*   --   18*   BUN  14   --   7*   CREATININE  1.02   --   0.77   MAGNESIUM   --   1.8  2.1   CALCIUM  10.4   --   8.9       Recent Labs      04/17/17   1005  04/18/17   0350   ALTSGPT  15  9   ASTSGOT  17  16   ALKPHOSPHAT  71  55   TBILIRUBIN  1.0  1.4   LIPASE  23   --    GLUCOSE  129*  131*       Recent Labs      04/17/17   1005  04/18/17   0350   RBC  4.70  3.92*   HEMOGLOBIN  16.1*  13.2   HEMATOCRIT  45.6  40.2   PLATELETCT  378  314   FERRITIN   --   18.4       Recent Labs      04/17/17   1005  04/18/17   0350   WBC  10.2  9.8   NEUTSPOLYS  81.20*  59.80   LYMPHOCYTES  10.20*  29.70   MONOCYTES  6.70  8.60    EOSINOPHILS  0.50  0.60   BASOPHILS  0.60  0.80   ASTSGOT  17  16   ALTSGPT  15  9   ALKPHOSPHAT  71  55   TBILIRUBIN  1.0  1.4           Assessment/Plan     Intractable nausea and vomiting  Assessment & Plan  Patient has extensive history of cyclical vomiting syndrome.  Likely secondary to multiple adhesions from previous surgeries. No symptoms of obstruction.  Patient reported to smoking marijauna after discharge.  Counseled strongly to quit smoking.  Improving clinically. Has associated back pain controlled with analgesia.  On scheduled antiemetics and PRN antiemetics.  Patient tolerating diet currently.    Substance abuse  Assessment & Plan  Counseled patient to stop smoking marijuana.    Anxiety  Assessment & Plan  Patient has history of generalized anxiety disorder and bipolar disorder.  Currently not on medication as an outpatient.  Will need outpatient psychiatry referral.  Continue prozac which was prescribed on previous discharge.  Ativan 0.5mg Q4hrs PRN for anxiety.    Back pain  Assessment & Plan  Complain of back pain in the mid back and reports some paraspinal tenderness  Work up negative for acute findings.  Will monitor for acute changes.  But appears to be stable and likely musculoskeletal from repeated vomiting's.  PRN analgesia in place.    Lactic acidosis  Assessment & Plan  Lactic acid was initially 5.5 on admission.  Resolved with IV fluids.  Continue to monitor patient.

## 2017-04-18 NOTE — ASSESSMENT & PLAN NOTE
Complain of back pain in the mid back and reports some paraspinal tenderness  Work up negative for acute findings.  Will monitor for acute changes.  But appears to be stable and likely musculoskeletal from repeated vomiting's.  PRN analgesia in place.

## 2017-04-18 NOTE — PROGRESS NOTES
Pt requesting something for anxiety, states walking is not relieving it. Dr. Conti notified, new order for PRN ativan IV 0.5mg once, administered per MAR. Pt wants something for pain as well, meds per MAR.

## 2017-04-18 NOTE — PROGRESS NOTES
Pt tolerated liquid diet, per pt request would like to try regular food. No c/o nausea. C/o back pain medicated per MAR and given heat packs for discomfort.

## 2017-04-18 NOTE — PROGRESS NOTES
Pt awake, states having anxiety and wants to take a walk, pt shown around unit, walking around unit by self, steady.

## 2017-04-18 NOTE — PROGRESS NOTES
Patient tolerates Ativan and GI cocktail. Called MD for other pain medication as patient request. No call back yet. Held IV line as patient keep pulling IV line and risk for fall and pulling the line.

## 2017-04-18 NOTE — PROGRESS NOTES
Bedside report received [id number]. POC discussed with pt; Pt c/o 8/10 back pain, medicated per MAR; all questions answered at this time.

## 2017-04-19 ENCOUNTER — APPOINTMENT (OUTPATIENT)
Dept: INTERNAL MEDICINE | Facility: MEDICAL CENTER | Age: 42
End: 2017-04-19
Payer: MEDICAID

## 2017-04-19 VITALS
WEIGHT: 132.28 LBS | HEART RATE: 97 BPM | RESPIRATION RATE: 17 BRPM | HEIGHT: 65 IN | OXYGEN SATURATION: 94 % | DIASTOLIC BLOOD PRESSURE: 74 MMHG | TEMPERATURE: 98.3 F | BODY MASS INDEX: 22.04 KG/M2 | SYSTOLIC BLOOD PRESSURE: 129 MMHG

## 2017-04-19 LAB
ANION GAP SERPL CALC-SCNC: 9 MMOL/L (ref 0–11.9)
BASOPHILS # BLD AUTO: 0.5 % (ref 0–1.8)
BASOPHILS # BLD: 0.05 K/UL (ref 0–0.12)
BUN SERPL-MCNC: 5 MG/DL (ref 8–22)
CALCIUM SERPL-MCNC: 9.3 MG/DL (ref 8.5–10.5)
CHLORIDE SERPL-SCNC: 105 MMOL/L (ref 96–112)
CO2 SERPL-SCNC: 23 MMOL/L (ref 20–33)
CREAT SERPL-MCNC: 0.78 MG/DL (ref 0.5–1.4)
EOSINOPHIL # BLD AUTO: 0.03 K/UL (ref 0–0.51)
EOSINOPHIL NFR BLD: 0.3 % (ref 0–6.9)
ERYTHROCYTE [DISTWIDTH] IN BLOOD BY AUTOMATED COUNT: 52.5 FL (ref 35.9–50)
GFR SERPL CREATININE-BSD FRML MDRD: >60 ML/MIN/1.73 M 2
GLUCOSE SERPL-MCNC: 150 MG/DL (ref 65–99)
HCT VFR BLD AUTO: 39 % (ref 37–47)
HGB BLD-MCNC: 13 G/DL (ref 12–16)
IMM GRANULOCYTES # BLD AUTO: 0.05 K/UL (ref 0–0.11)
IMM GRANULOCYTES NFR BLD AUTO: 0.5 % (ref 0–0.9)
LYMPHOCYTES # BLD AUTO: 1.33 K/UL (ref 1–4.8)
LYMPHOCYTES NFR BLD: 12.6 % (ref 22–41)
MAGNESIUM SERPL-MCNC: 1.9 MG/DL (ref 1.5–2.5)
MCH RBC QN AUTO: 34 PG (ref 27–33)
MCHC RBC AUTO-ENTMCNC: 33.3 G/DL (ref 33.6–35)
MCV RBC AUTO: 102.1 FL (ref 81.4–97.8)
MONOCYTES # BLD AUTO: 0.98 K/UL (ref 0–0.85)
MONOCYTES NFR BLD AUTO: 9.3 % (ref 0–13.4)
NEUTROPHILS # BLD AUTO: 8.15 K/UL (ref 2–7.15)
NEUTROPHILS NFR BLD: 76.8 % (ref 44–72)
NRBC # BLD AUTO: 0 K/UL
NRBC BLD AUTO-RTO: 0 /100 WBC
PHOSPHATE SERPL-MCNC: 2.4 MG/DL (ref 2.5–4.5)
PLATELET # BLD AUTO: 277 K/UL (ref 164–446)
PMV BLD AUTO: 9.4 FL (ref 9–12.9)
POTASSIUM SERPL-SCNC: 3.5 MMOL/L (ref 3.6–5.5)
RBC # BLD AUTO: 3.82 M/UL (ref 4.2–5.4)
SODIUM SERPL-SCNC: 137 MMOL/L (ref 135–145)
WBC # BLD AUTO: 10.6 K/UL (ref 4.8–10.8)

## 2017-04-19 PROCEDURE — 700111 HCHG RX REV CODE 636 W/ 250 OVERRIDE (IP): Performed by: HOSPITALIST

## 2017-04-19 PROCEDURE — 99217 PR OBSERVATION CARE DISCHARGE: CPT | Mod: GC | Performed by: INTERNAL MEDICINE

## 2017-04-19 PROCEDURE — 700111 HCHG RX REV CODE 636 W/ 250 OVERRIDE (IP): Performed by: INTERNAL MEDICINE

## 2017-04-19 PROCEDURE — G0378 HOSPITAL OBSERVATION PER HR: HCPCS

## 2017-04-19 PROCEDURE — 83735 ASSAY OF MAGNESIUM: CPT

## 2017-04-19 PROCEDURE — 96376 TX/PRO/DX INJ SAME DRUG ADON: CPT

## 2017-04-19 PROCEDURE — 700102 HCHG RX REV CODE 250 W/ 637 OVERRIDE(OP): Performed by: INTERNAL MEDICINE

## 2017-04-19 PROCEDURE — A9270 NON-COVERED ITEM OR SERVICE: HCPCS | Performed by: INTERNAL MEDICINE

## 2017-04-19 PROCEDURE — 700102 HCHG RX REV CODE 250 W/ 637 OVERRIDE(OP): Performed by: HOSPITALIST

## 2017-04-19 PROCEDURE — 84100 ASSAY OF PHOSPHORUS: CPT

## 2017-04-19 PROCEDURE — 80048 BASIC METABOLIC PNL TOTAL CA: CPT

## 2017-04-19 PROCEDURE — A9270 NON-COVERED ITEM OR SERVICE: HCPCS | Performed by: HOSPITALIST

## 2017-04-19 PROCEDURE — 85025 COMPLETE CBC W/AUTO DIFF WBC: CPT

## 2017-04-19 RX ORDER — ZOLPIDEM TARTRATE 5 MG/1
5 TABLET ORAL NIGHTLY PRN
Qty: 10 TAB | Refills: 0 | Status: SHIPPED | OUTPATIENT
Start: 2017-04-19 | End: 2017-05-30

## 2017-04-19 RX ORDER — LORAZEPAM 1 MG/1
1 TABLET ORAL EVERY 4 HOURS PRN
Status: DISCONTINUED | OUTPATIENT
Start: 2017-04-19 | End: 2017-04-19 | Stop reason: HOSPADM

## 2017-04-19 RX ADMIN — DIBASIC SODIUM PHOSPHATE, MONOBASIC POTASSIUM PHOSPHATE AND MONOBASIC SODIUM PHOSPHATE 1 TABLET: 852; 155; 130 TABLET ORAL at 09:28

## 2017-04-19 RX ADMIN — DIBASIC SODIUM PHOSPHATE, MONOBASIC POTASSIUM PHOSPHATE AND MONOBASIC SODIUM PHOSPHATE 1 TABLET: 852; 155; 130 TABLET ORAL at 16:12

## 2017-04-19 RX ADMIN — MORPHINE SULFATE 2 MG: 4 INJECTION INTRAVENOUS at 02:55

## 2017-04-19 RX ADMIN — LORAZEPAM 1 MG: 1 TABLET ORAL at 16:12

## 2017-04-19 RX ADMIN — HYDROCODONE BITARTRATE AND ACETAMINOPHEN 2 TABLET: 5; 325 TABLET ORAL at 13:35

## 2017-04-19 RX ADMIN — THERA TABS 1 TABLET: TAB at 09:28

## 2017-04-19 RX ADMIN — LORAZEPAM 1 MG: 1 TABLET ORAL at 11:22

## 2017-04-19 RX ADMIN — ONDANSETRON 4 MG: 2 INJECTION, SOLUTION INTRAMUSCULAR; INTRAVENOUS at 02:55

## 2017-04-19 RX ADMIN — FLUOXETINE HYDROCHLORIDE 20 MG: 20 CAPSULE ORAL at 09:28

## 2017-04-19 RX ADMIN — ONDANSETRON 8 MG: 2 INJECTION INTRAMUSCULAR; INTRAVENOUS at 05:08

## 2017-04-19 RX ADMIN — MORPHINE SULFATE 2 MG: 4 INJECTION INTRAVENOUS at 07:45

## 2017-04-19 RX ADMIN — HYDROCODONE BITARTRATE AND ACETAMINOPHEN 2 TABLET: 5; 325 TABLET ORAL at 05:08

## 2017-04-19 RX ADMIN — PROCHLORPERAZINE EDISYLATE 10 MG: 5 INJECTION INTRAMUSCULAR; INTRAVENOUS at 09:29

## 2017-04-19 RX ADMIN — LORAZEPAM 0.5 MG: 1 TABLET ORAL at 05:17

## 2017-04-19 ASSESSMENT — ENCOUNTER SYMPTOMS
NERVOUS/ANXIOUS: 1
DOUBLE VISION: 0
DIZZINESS: 0
VOMITING: 1
COUGH: 0
FOCAL WEAKNESS: 0
HEADACHES: 0
NAUSEA: 1
ABDOMINAL PAIN: 1
SHORTNESS OF BREATH: 0
BLURRED VISION: 0
CHILLS: 0
FEVER: 0
BACK PAIN: 1

## 2017-04-19 ASSESSMENT — PAIN SCALES - GENERAL
PAINLEVEL_OUTOF10: 8
PAINLEVEL_OUTOF10: 7
PAINLEVEL_OUTOF10: 7

## 2017-04-19 NOTE — PROGRESS NOTES
Pt walking around unit; up to RN station for more Sprite.  Pt stated she hasn't been vomiting and feels much better; thinks she is ready to go home.  Waiting for [facility] gray to see Pt for DC assessment.

## 2017-04-19 NOTE — PROGRESS NOTES
Assumed care of Pt at 0700 after report from ELMO Nguyen, assessment complete.  Pt A & O X 4, ambulated to RN station requesting IV pain meds and Sprite for 8/10 nausea; VSS, Pt denies pain, discomfort at this time.  Bed in low position, call light within reach - will continue to monitor.

## 2017-04-19 NOTE — PROGRESS NOTES
Pt ambulated to RN station multiple times for Sprite, juice.  Pt has no c/o nausea, vomiting at this time.

## 2017-04-19 NOTE — PROGRESS NOTES
Assessed pt. A&O x4. C/o mild nausea, and pain, meds per MAR. POC discussed with pt. Hourly rounding in place.

## 2017-04-19 NOTE — PROGRESS NOTES
Report received from Janeen BORREGO. Tele monitoring in place. Bed in low position, call light within reach.

## 2017-04-20 NOTE — PROGRESS NOTES
[facility] [place]    Name Diana Hernandez 1975   Age/Sex 41 y.o. female   MRN [id number]   Code Status Full code     After 5PM or if no immediate response to page, please call for cross-coverage  Attending/Team: Jj Call [phone] to page   1st Call - Day Intern (R1):   Caitlin 2nd Call - Day Sr. Resident (R2/R3):   Chance         Chief complaint/ reason for interval visit (Primary Diagnosis)   Nausea and vomiting    Interval Problem Daily Status Update    Patient reports to feeling better since admission. She currently reports nausea and intermittent back pain but was able to walk around last night without incident. She has had one episode of vomiting in the early morning. Will update patient more later today.    Will need outpatient gastroenterology and psychiatry follow up.    Active Hospital Problems    Diagnosis   • Intractable nausea and vomiting [R11.2]   • Anxiety [F41.9]   • Substance abuse [F19.10]   • Lactic acidosis [E87.2]   • Back pain [M54.9]       Review of Systems   Constitutional: Negative for fever and chills.   Eyes: Negative for blurred vision and double vision.   Respiratory: Negative for cough and shortness of breath.    Cardiovascular: Negative for chest pain.   Gastrointestinal: Positive for nausea, vomiting and abdominal pain.   Genitourinary: Negative for dysuria and urgency.   Musculoskeletal: Positive for back pain. Negative for joint pain.   Skin: Negative for rash.   Neurological: Negative for dizziness, focal weakness and headaches.   Psychiatric/Behavioral: The patient is nervous/anxious.        Consultants/Specialty  None    Disposition  Remain inpatient until nausea/vomiting resolves.    Quality Measures  EKG reviewed, Labs reviewed, Medications reviewed and Radiology images reviewed  Saldana catheter: No Saldana        DVT prophylaxis - mechanical: SCDs  Ulcer prophylaxis: No and Not  indicated              Physical Exam       Filed Vitals:    04/19/17 0332 04/19/17 0811 04/19/17 1145 04/19/17 1623   BP: 104/66 101/58 140/75 129/74   Pulse: 73 73 99 97   Temp: 36.6 °C (97.8 °F) 37.1 °C (98.7 °F) 37.8 °C (100 °F) 36.8 °C (98.3 °F)   Resp: 16 16 16 17   Height:       Weight:       SpO2: 98% 97%  94%     Body mass index is 22.01 kg/(m^2).    Oxygen Therapy:  Pulse Oximetry: 94 %, O2 (LPM): 0, O2 Delivery: None (Room Air)    Physical Exam   Constitutional: She is oriented to person, place, and time and well-developed, well-nourished, and in no distress.   HENT:   Head: Normocephalic and atraumatic.   Eyes: EOM are normal. Pupils are equal, round, and reactive to light.   Cardiovascular: Normal rate, regular rhythm and normal heart sounds.    Pulmonary/Chest: Effort normal and breath sounds normal.   Abdominal: Soft. Bowel sounds are normal. There is tenderness.   Neurological: She is alert and oriented to person, place, and time. GCS score is 15.         Lab Data Review:      4/18/2017  4:55 PM    Recent Labs      04/17/17   1005  04/17/17   1155  04/18/17   0350  04/19/17   0544   SODIUM  134*   --   139  137   POTASSIUM  3.7   --   3.5*  3.5*   CHLORIDE  102   --   108  105   CO2  16*   --   18*  23   BUN  14   --   7*  5*   CREATININE  1.02   --   0.77  0.78   MAGNESIUM   --   1.8  2.1  1.9   PHOSPHORUS   --    --    --   2.4*   CALCIUM  10.4   --   8.9  9.3       Recent Labs      04/17/17   1005  04/18/17   0350  04/19/17   0544   ALTSGPT  15  9   --    ASTSGOT  17  16   --    ALKPHOSPHAT  71  55   --    TBILIRUBIN  1.0  1.4   --    LIPASE  23   --    --    GLUCOSE  129*  131*  150*       Recent Labs      04/17/17   1005  04/18/17   0350  04/19/17   0544   RBC  4.70  3.92*  3.82*   HEMOGLOBIN  16.1*  13.2  13.0   HEMATOCRIT  45.6  40.2  39.0   PLATELETCT  378  314  277   FERRITIN   --   18.4   --        Recent Labs      04/17/17   1005  04/18/17   0350  04/19/17   0544   WBC  10.2  9.8  10.6    NEUTSPOLYS  81.20*  59.80  76.80*   LYMPHOCYTES  10.20*  29.70  12.60*   MONOCYTES  6.70  8.60  9.30   EOSINOPHILS  0.50  0.60  0.30   BASOPHILS  0.60  0.80  0.50   ASTSGOT  17  16   --    ALTSGPT  15  9   --    ALKPHOSPHAT  71  55   --    TBILIRUBIN  1.0  1.4   --            Assessment/Plan     Intractable nausea and vomiting  Assessment & Plan  Patient has extensive history of cyclical vomiting syndrome.  Likely secondary to multiple adhesions from previous surgeries. No symptoms of obstruction.  Patient reported to smoking marijauna after discharge.  Counseled strongly to quit smoking.  Improving clinically. Has associated back pain controlled with analgesia.  On scheduled antiemetics and PRN antiemetics.  Patient tolerating diet currently.    Substance abuse  Assessment & Plan  Counseled patient to stop smoking marijuana.    Anxiety  Assessment & Plan  Patient has history of generalized anxiety disorder and bipolar disorder.  Currently not on medication as an outpatient.  Will need outpatient psychiatry referral.  Continue prozac which was prescribed on previous discharge.  Ativan 0.5mg Q4hrs PRN for anxiety.    Back pain  Assessment & Plan  Complain of back pain in the mid back and reports some paraspinal tenderness  Work up negative for acute findings.  Will monitor for acute changes.  But appears to be stable and likely musculoskeletal from repeated vomiting's.  PRN analgesia in place.    Lactic acidosis  Assessment & Plan  Lactic acid was initially 5.5 on admission.  Resolved with IV fluids.  Continue to monitor patient.

## 2017-04-20 NOTE — DISCHARGE SUMMARY
[facility] [place] Internal Medicine Discharge Summary      Admit Date:  4/17/2017       Discharge Date:   4/19/2017    Service:   [facility] Internal Medicine [facility] Team  Attending Physician(s):   Dr. Willson       Senior Resident(s):   Dr. Fletcher  Amador Resident(s):   Dr. Tucker      Primary Diagnosis:   Cyclic vomiting disorder; Generalized anxiety disorder    Secondary Diagnoses:                Active Hospital Problems    Diagnosis   • Intractable nausea and vomiting [R11.2]   • Anxiety [F41.9]   • Substance abuse [F19.10]   • Lactic acidosis [E87.2]   • Back pain [M54.9]       Hospital Summary (Brief Narrative):       Ms Hernandez is a very pleasant 41 year old female who presented to the ED on 4/17/2017 with complaints of intractable vomiting and abdominal pain. She was discharged a few days previously after having been admitted for similar complaints.     Initial labwork on admission showed patient had significant findings for lactic acidosis and hypokalemia and acidosis. She was admitted to telemetry due to electrolyte disturbance and was started on IV fluids with resolving lactic acidosis as well as zofran and promethazine for nausea and IV pain control. Potassium was repleted along with IV fluids and normalized on hospital day 2.     At time of discharge, patient reported feeling much better and stated that she wanted to go home and sleep in her own bed. At time of discharge, patient was asymptomatic and denied nausea, vomiting, abdominal pain and was ambulatory.    Patient /Hospital Summary (Details -- Problem Oriented) :          Intractable nausea and vomiting  Patient has extensive history of cyclical vomiting syndrome.  Likely secondary to multiple adhesions from previous surgeries. No symptoms of obstruction.  Patient reported to smoking marijauna after discharge.  Counseled strongly to quit smoking.  Improving clinically. Has associated back pain controlled with analgesia.  On scheduled antiemetics and PRN  antiemetics.  Patient tolerating diet currently.    Substance abuse  Counseled patient to stop smoking marijuana.    Anxiety  Patient has history of generalized anxiety disorder and bipolar disorder.  Currently not on medication as an outpatient.  Will need outpatient psychiatry referral.  Continue prozac which was prescribed on previous discharge.  Ativan 0.5mg Q4hrs PRN for anxiety.    Back pain  Complain of back pain in the mid back and reports some paraspinal tenderness  Work up negative for acute findings.  Will monitor for acute changes.  But appears to be stable and likely musculoskeletal from repeated vomiting's.  PRN analgesia in place.    Lactic acidosis  Lactic acid was initially 5.5 on admission.  Resolved with IV fluids.    Insomnia  Discharge with a short prescription of Ambien. Patient will need to follow with PCP for other medications.      Consultants:     None    Procedures:        None    Imaging/ Testing:      No orders to display         Discharge Medications:         Medication Reconciliation: Completed     Medication List      START taking these medications       Instructions    zolpidem 5 MG Tabs   Last time this was given:  5 mg on 4/18/2017  9:58 PM   Commonly known as:  AMBIEN    Take 1 Tab by mouth at bedtime as needed for Sleep.   Dose:  5 mg         CONTINUE taking these medications       Instructions    multivitamin Tabs   Last time this was given:  1 Tab on 4/19/2017  9:28 AM    Take 1 Tab by mouth every day.   Dose:  1 Tab       ondansetron 4 MG Tbdp   Commonly known as:  ZOFRAN ODT    Take 1 Tab by mouth every 8 hours as needed.   Dose:  4 mg       * promethazine 25 MG Supp   Commonly known as:  PHENERGAN    Insert 1 Suppository in rectum every 6 hours as needed for Nausea/Vomiting.   Dose:  25 mg       * promethazine 25 MG Tabs   Commonly known as:  PHENERGAN    Take 1 Tab by mouth every 6 hours as needed for Nausea/Vomiting.   Dose:  25 mg       PROZAC 20 MG Caps   Last time this  was given:  20 mg on 4/19/2017  9:28 AM   Generic drug:  fluoxetine    Take 20 mg by mouth every day.   Dose:  20 mg       * Notice:  This list has 2 medication(s) that are the same as other medications prescribed for you. Read the directions carefully, and ask your doctor or other care provider to review them with you.      STOP taking these medications          potassium bicarbonate 25 MEQ tablet   Commonly known as:  KLYTE               Disposition:   Discharged home    Diet:   GI soft diet. Avoid irritants.    Activity:   As tolerated    Instructions:      1) Stay well hydrated  2) Follow up with PCP  3) Advised to avoid GI irritants  4) Avoid marijuana, alcohol   The patient was instructed to return to the ER in the event of worsening symptoms. I have counseled the patient on the importance of compliance and the patient has agreed to proceed with all medical recommendations and follow up plan indicated above.   The patient understands that all medications come with benefits and risks. Risks may include permanent injury or death and these risks can be minimized with close reassessment and monitoring.        Primary Care Provider:    Lavell Monte M.D.    Discharge summary faxed to primary care provider:  Completed  Copy of discharge summary given to the patient: Deferred    Follow up appointment details :      Follow up PCP as soon as possible    Pending Studies:        None    Time spent on discharge day patient visit, preparing discharge paperwork and arranging for patient follow up.    Summary of follow up issues:   Follow up on basic metabolic panel in a week with PCP    Discharge Time (Minutes) :    31 minutes      Condition on Discharge    ______________________________________________________________________    Interval history/exam for day of discharge:    Patient reports to be feeling better and wants to go home. Denies nausea, vomiting    Filed Vitals:    04/19/17 0332 04/19/17 0811 04/19/17 1145  04/19/17 1623   BP: 104/66 101/58 140/75 129/74   Pulse: 73 73 99 97   Temp: 36.6 °C (97.8 °F) 37.1 °C (98.7 °F) 37.8 °C (100 °F) 36.8 °C (98.3 °F)   Resp: 16 16 16 17   Height:       Weight:       SpO2: 98% 97%  94%     Weight/BMI: Body mass index is 22.01 kg/(m^2).  Pulse Oximetry: 94 %, O2 (LPM): 0, O2 Delivery: None (Room Air)    General: Appears comfortable  CVS: S1 and S2 physiologic, no murmurs, rubs or gallops  PULM: Clear to ausculatation  GI: Soft, non-tender, no organomegaly    Most Recent Labs:    Lab Results   Component Value Date/Time    WBC 10.6 04/19/2017 05:44 AM    RBC 3.82* 04/19/2017 05:44 AM    HEMOGLOBIN 13.0 04/19/2017 05:44 AM    HEMATOCRIT 39.0 04/19/2017 05:44 AM    .1* 04/19/2017 05:44 AM    MCH 34.0* 04/19/2017 05:44 AM    MCHC 33.3* 04/19/2017 05:44 AM    MPV 9.4 04/19/2017 05:44 AM    NEUTROPHILS-POLYS 76.80* 04/19/2017 05:44 AM    LYMPHOCYTES 12.60* 04/19/2017 05:44 AM    MONOCYTES 9.30 04/19/2017 05:44 AM    EOSINOPHILS 0.30 04/19/2017 05:44 AM    BASOPHILS 0.50 04/19/2017 05:44 AM    HYPOCHROMIA 1+ 04/27/2016 12:49 PM    ANISOCYTOSIS 1+ 09/13/2016 12:29 AM      Lab Results   Component Value Date/Time    SODIUM 137 04/19/2017 05:44 AM    POTASSIUM 3.5* 04/19/2017 05:44 AM    CHLORIDE 105 04/19/2017 05:44 AM    CO2 23 04/19/2017 05:44 AM    GLUCOSE 150* 04/19/2017 05:44 AM    BUN 5* 04/19/2017 05:44 AM    CREATININE 0.78 04/19/2017 05:44 AM      Lab Results   Component Value Date/Time    ALT(SGPT) 9 04/18/2017 03:50 AM    AST(SGOT) 16 04/18/2017 03:50 AM    ALKALINE PHOSPHATASE 55 04/18/2017 03:50 AM    TOTAL BILIRUBIN 1.4 04/18/2017 03:50 AM    LIPASE 23 04/17/2017 10:05 AM    ALBUMIN 3.7 04/18/2017 03:50 AM    GLOBULIN 2.8 04/18/2017 03:50 AM    PRE-ALBUMIN 23.0 05/09/2016 12:00 AM    INR 1.16* 05/01/2016 12:30 AM    MACROCYTOSIS 1+ 09/13/2016 12:29 AM     Lab Results   Component Value Date/Time    PT 14.8* 05/01/2016 12:30 AM    INR 1.16* 05/01/2016 12:30 AM

## 2017-04-20 NOTE — PROGRESS NOTES
Received report from ELMO Baez. Pt resting waiting for her ride. All discharge paperwork completed by day shift RN. PIV d/c'ed by day shift RN.

## 2017-04-20 NOTE — PROGRESS NOTES
Call from MILLIE Justice - Pt is not to be DCd tonight; PIV removed per Pt request.  Pt updated and does not want another PIV - will take PO meds.

## 2017-04-20 NOTE — DISCHARGE INSTRUCTIONS
Subjective:    Nausea and Vomiting  Nausea means you feel sick to your stomach. Throwing up (vomiting) is a reflex where stomach contents come out of your mouth.  HOME CARE   · Take medicine as told by your doctor.  · Do not force yourself to eat. However, you do need to drink fluids.  · If you feel like eating, eat a normal diet as told by your doctor.  ¨ Eat rice, wheat, potatoes, bread, lean meats, yogurt, fruits, and vegetables.  ¨ Avoid high-fat foods.  · Drink enough fluids to keep your pee (urine) clear or pale yellow.  · Ask your doctor how to replace body fluid losses (rehydrate). Signs of body fluid loss (dehydration) include:  ¨ Feeling very thirsty.  ¨ Dry lips and mouth.  ¨ Feeling dizzy.  ¨ Dark pee.  ¨ Peeing less than normal.  ¨ Feeling confused.  ¨ Fast breathing or heart rate.  GET HELP RIGHT AWAY IF:   1. You have blood in your throw up.  2. You have black or bloody poop (stool).  3. You have a bad headache or stiff neck.  4. You feel confused.  5. You have bad belly (abdominal) pain.  6. You have chest pain or trouble breathing.  7. You do not pee at least once every 8 hours.  8. You have cold, clammy skin.  9. You keep throwing up after 24 to 48 hours.  10. You have a fever.  MAKE SURE YOU:   1. Understand these instructions.  2. Will watch your condition.  3. Will get help right away if you are not doing well or get worse.     This information is not intended to replace advice given to you by your health care provider. Make sure you discuss any questions you have with your health care provider.     Document Released: 06/05/2009 Document Revised: 03/11/2013 Document Reviewed: 05/18/2012  [employer] Interactive Patient Education ©2016 [employer]    Discharge Instructions    Discharged to home by car with friend. Discharged via walking, hospital escort: Yes.  Special equipment needed: Not Applicable    Be sure to schedule a follow-up appointment with your primary care doctor or any specialists as  instructed.     Discharge Plan:   Diet Plan: Discussed  Activity Level: Discussed  Confirmed Follow up Appointment: Patient to Call and Schedule Appointment  Confirmed Symptoms Management: Discussed  Medication Reconciliation Updated: Yes  Influenza Vaccine Indication: Not indicated: Previously immunized this influenza season and > 8 years of age    I understand that a diet low in cholesterol, fat, and sodium is recommended for good health. Unless I have been given specific instructions below for another diet, I accept this instruction as my diet prescription.   Other diet: Regular    Special Instructions: None    · Is patient discharged on Warfarin / Coumadin?   No     · Is patient Post Blood Transfusion?  No    Depression / Suicide Risk    As you are discharged from this [facility] facility, it is important to learn how to keep safe from harming yourself.    Recognize the warning signs:  · Abrupt changes in personality, positive or negative- including increase in energy   · Giving away possessions  · Change in eating patterns- significant weight changes-  positive or negative  · Change in sleeping patterns- unable to sleep or sleeping all the time   · Unwillingness or inability to communicate  · Depression  · Unusual sadness, discouragement and loneliness  · Talk of wanting to die  · Neglect of personal appearance   · Rebelliousness- reckless behavior  · Withdrawal from people/activities they love  · Confusion- inability to concentrate     If you or a loved one observes any of these behaviors or has concerns about self-harm, here's what you can do:  · Talk about it- your feelings and reasons for harming yourself  · Remove any means that you might use to hurt yourself (examples: pills, rope, extension cords, firearm)  · Get professional help from the community (Mental Health, Substance Abuse, psychological counseling)  · Do not be alone:Call your Safe Contact- someone whom you trust who will be there for  you.  · Call your local CRISIS HOTLINE 013-9703 or 508-804-3965  · Call your local Children's Mobile Crisis Response Team [place] (004) 930-9098 or www."[employer]"  · Call the toll free National Suicide Prevention Hotlines   · National Suicide Prevention Lifeline 175-573-WBXT (8870)  · National [employer] Line Network 800-SUICIDE (831-4755)

## 2017-04-20 NOTE — PROGRESS NOTES
Pt given and understands discharge instructions, (1) Rx; PIV removed, covered with 4x4 gauze, wrapped with coban; ID band removed.  Pt waiting for ride home with family.

## 2017-04-22 ENCOUNTER — HOSPITAL ENCOUNTER (EMERGENCY)
Facility: MEDICAL CENTER | Age: 42
End: 2017-04-22
Attending: EMERGENCY MEDICINE
Payer: MEDICAID

## 2017-04-22 VITALS
HEIGHT: 65 IN | TEMPERATURE: 97.9 F | HEART RATE: 103 BPM | DIASTOLIC BLOOD PRESSURE: 67 MMHG | SYSTOLIC BLOOD PRESSURE: 125 MMHG | WEIGHT: 130 LBS | OXYGEN SATURATION: 97 % | BODY MASS INDEX: 21.66 KG/M2 | RESPIRATION RATE: 20 BRPM

## 2017-04-22 DIAGNOSIS — R11.15 NON-INTRACTABLE CYCLICAL VOMITING WITHOUT NAUSEA: ICD-10-CM

## 2017-04-22 DIAGNOSIS — F12.10 MARIJUANA ABUSE: ICD-10-CM

## 2017-04-22 LAB
ALBUMIN SERPL BCP-MCNC: 4.7 G/DL (ref 3.2–4.9)
ALBUMIN/GLOB SERPL: 1.3 G/DL
ALP SERPL-CCNC: 78 U/L (ref 30–99)
ALT SERPL-CCNC: 8 U/L (ref 2–50)
ANION GAP SERPL CALC-SCNC: 16 MMOL/L (ref 0–11.9)
AST SERPL-CCNC: 14 U/L (ref 12–45)
BASOPHILS # BLD AUTO: 0.5 % (ref 0–1.8)
BASOPHILS # BLD: 0.04 K/UL (ref 0–0.12)
BILIRUB SERPL-MCNC: 1.8 MG/DL (ref 0.1–1.5)
BUN SERPL-MCNC: 12 MG/DL (ref 8–22)
CALCIUM SERPL-MCNC: 10.4 MG/DL (ref 8.5–10.5)
CHLORIDE SERPL-SCNC: 103 MMOL/L (ref 96–112)
CO2 SERPL-SCNC: 18 MMOL/L (ref 20–33)
CREAT SERPL-MCNC: 0.84 MG/DL (ref 0.5–1.4)
EOSINOPHIL # BLD AUTO: 0.03 K/UL (ref 0–0.51)
EOSINOPHIL NFR BLD: 0.4 % (ref 0–6.9)
ERYTHROCYTE [DISTWIDTH] IN BLOOD BY AUTOMATED COUNT: 47.3 FL (ref 35.9–50)
GFR SERPL CREATININE-BSD FRML MDRD: >60 ML/MIN/1.73 M 2
GLOBULIN SER CALC-MCNC: 3.6 G/DL (ref 1.9–3.5)
GLUCOSE SERPL-MCNC: 114 MG/DL (ref 65–99)
HCT VFR BLD AUTO: 44.3 % (ref 37–47)
HGB BLD-MCNC: 15.4 G/DL (ref 12–16)
IMM GRANULOCYTES # BLD AUTO: 0.02 K/UL (ref 0–0.11)
IMM GRANULOCYTES NFR BLD AUTO: 0.3 % (ref 0–0.9)
LIPASE SERPL-CCNC: 10 U/L (ref 11–82)
LYMPHOCYTES # BLD AUTO: 1.24 K/UL (ref 1–4.8)
LYMPHOCYTES NFR BLD: 15.9 % (ref 22–41)
MCH RBC QN AUTO: 33.9 PG (ref 27–33)
MCHC RBC AUTO-ENTMCNC: 34.8 G/DL (ref 33.6–35)
MCV RBC AUTO: 97.6 FL (ref 81.4–97.8)
MONOCYTES # BLD AUTO: 0.7 K/UL (ref 0–0.85)
MONOCYTES NFR BLD AUTO: 9 % (ref 0–13.4)
NEUTROPHILS # BLD AUTO: 5.78 K/UL (ref 2–7.15)
NEUTROPHILS NFR BLD: 73.9 % (ref 44–72)
NRBC # BLD AUTO: 0 K/UL
NRBC BLD AUTO-RTO: 0 /100 WBC
PLATELET # BLD AUTO: 365 K/UL (ref 164–446)
PMV BLD AUTO: 9.6 FL (ref 9–12.9)
POTASSIUM SERPL-SCNC: 3.3 MMOL/L (ref 3.6–5.5)
PROT SERPL-MCNC: 8.3 G/DL (ref 6–8.2)
RBC # BLD AUTO: 4.54 M/UL (ref 4.2–5.4)
SODIUM SERPL-SCNC: 137 MMOL/L (ref 135–145)
WBC # BLD AUTO: 7.8 K/UL (ref 4.8–10.8)

## 2017-04-22 PROCEDURE — 700105 HCHG RX REV CODE 258: Performed by: EMERGENCY MEDICINE

## 2017-04-22 PROCEDURE — 96374 THER/PROPH/DIAG INJ IV PUSH: CPT

## 2017-04-22 PROCEDURE — 83690 ASSAY OF LIPASE: CPT

## 2017-04-22 PROCEDURE — 700111 HCHG RX REV CODE 636 W/ 250 OVERRIDE (IP): Performed by: EMERGENCY MEDICINE

## 2017-04-22 PROCEDURE — 96361 HYDRATE IV INFUSION ADD-ON: CPT

## 2017-04-22 PROCEDURE — 99285 EMERGENCY DEPT VISIT HI MDM: CPT

## 2017-04-22 PROCEDURE — 96372 THER/PROPH/DIAG INJ SC/IM: CPT

## 2017-04-22 PROCEDURE — 36415 COLL VENOUS BLD VENIPUNCTURE: CPT

## 2017-04-22 PROCEDURE — 96376 TX/PRO/DX INJ SAME DRUG ADON: CPT

## 2017-04-22 PROCEDURE — 96375 TX/PRO/DX INJ NEW DRUG ADDON: CPT

## 2017-04-22 PROCEDURE — 85025 COMPLETE CBC W/AUTO DIFF WBC: CPT

## 2017-04-22 PROCEDURE — 80053 COMPREHEN METABOLIC PANEL: CPT

## 2017-04-22 RX ORDER — ONDANSETRON 2 MG/ML
4 INJECTION INTRAMUSCULAR; INTRAVENOUS ONCE
Status: COMPLETED | OUTPATIENT
Start: 2017-04-22 | End: 2017-04-22

## 2017-04-22 RX ORDER — DIPHENHYDRAMINE HYDROCHLORIDE 50 MG/ML
25 INJECTION INTRAMUSCULAR; INTRAVENOUS ONCE
Status: COMPLETED | OUTPATIENT
Start: 2017-04-22 | End: 2017-04-22

## 2017-04-22 RX ORDER — LORAZEPAM 2 MG/ML
1 INJECTION INTRAMUSCULAR ONCE
Status: COMPLETED | OUTPATIENT
Start: 2017-04-22 | End: 2017-04-22

## 2017-04-22 RX ORDER — SODIUM CHLORIDE 9 MG/ML
1000 INJECTION, SOLUTION INTRAVENOUS ONCE
Status: COMPLETED | OUTPATIENT
Start: 2017-04-22 | End: 2017-04-22

## 2017-04-22 RX ORDER — HALOPERIDOL 5 MG/ML
5 INJECTION INTRAMUSCULAR ONCE
Status: COMPLETED | OUTPATIENT
Start: 2017-04-22 | End: 2017-04-22

## 2017-04-22 RX ORDER — SODIUM CHLORIDE 9 MG/ML
1000 INJECTION, SOLUTION INTRAVENOUS ONCE
Status: DISCONTINUED | OUTPATIENT
Start: 2017-04-22 | End: 2017-04-22 | Stop reason: HOSPADM

## 2017-04-22 RX ORDER — HALOPERIDOL 5 MG/ML
2-5 INJECTION INTRAMUSCULAR ONCE
Status: COMPLETED | OUTPATIENT
Start: 2017-04-22 | End: 2017-04-22

## 2017-04-22 RX ADMIN — HALOPERIDOL LACTATE 5 MG: 5 INJECTION, SOLUTION INTRAMUSCULAR at 09:09

## 2017-04-22 RX ADMIN — HALOPERIDOL LACTATE 2.5 MG: 5 INJECTION, SOLUTION INTRAMUSCULAR at 13:07

## 2017-04-22 RX ADMIN — ONDANSETRON 4 MG: 2 INJECTION INTRAMUSCULAR; INTRAVENOUS at 13:03

## 2017-04-22 RX ADMIN — ONDANSETRON 4 MG: 2 INJECTION INTRAMUSCULAR; INTRAVENOUS at 09:00

## 2017-04-22 RX ADMIN — LORAZEPAM 1 MG: 2 INJECTION INTRAMUSCULAR; INTRAVENOUS at 11:09

## 2017-04-22 RX ADMIN — DIPHENHYDRAMINE HYDROCHLORIDE 25 MG: 50 INJECTION, SOLUTION INTRAMUSCULAR; INTRAVENOUS at 13:05

## 2017-04-22 RX ADMIN — DIPHENHYDRAMINE HYDROCHLORIDE 25 MG: 50 INJECTION, SOLUTION INTRAMUSCULAR; INTRAVENOUS at 09:11

## 2017-04-22 RX ADMIN — SODIUM CHLORIDE 1000 ML: 9 INJECTION, SOLUTION INTRAVENOUS at 08:30

## 2017-04-22 ASSESSMENT — LIFESTYLE VARIABLES: DO YOU DRINK ALCOHOL: NO

## 2017-04-22 ASSESSMENT — PAIN SCALES - GENERAL: PAINLEVEL_OUTOF10: 9

## 2017-04-22 NOTE — ED AVS SNAPSHOT
[employer] Access Code: [email or web address]  Expires: 5/7/2017  7:54 PM    [employer]  A secure, online tool to manage your health information     [employer]® is a secure, online tool that connects you to your personalized health information from the privacy of your home -- day or night - making it very easy for you to manage your healthcare. Once the activation process is completed, you can even access your medical information using the [employer] mirna, which is available for free in the [employer] Mirna store or Google Play store.     [employer] provides the following levels of access (as shown below):   My Chart Features   [facility] Primary Care Doctor [facility]  Specialists [facility]  Urgent  Care Non-[facility]  Primary Care  Doctor   Email your healthcare team securely and privately 24/7 X X X X   Manage appointments: schedule your next appointment; view details of past/upcoming appointments X      Request prescription refills. X      View recent personal medical records, including lab and immunizations X X X X   View health record, including health history, allergies, medications X X X X   Read reports about your outpatient visits, procedures, consult and ER notes X X X X   See your discharge summary, which is a recap of your hospital and/or ER visit that includes your diagnosis, lab results, and care plan. X X       How to register for [employer]:  1. Go to  [email or web address].  2. Click on the Sign Up Now box, which takes you to the New Member Sign Up page. You will need to provide the following information:  a. Enter your [employer] Access Code exactly as it appears at the top of this page. (You will not need to use this code after you’ve completed the sign-up process. If you do not sign up before the expiration date, you must request a new code.)   b. Enter your date of birth.   c. Enter your home email address.   d. Click Submit, and follow the next screen’s instructions.  3. Create a [employer] ID. This will be your [employer]  login ID and cannot be changed, so think of one that is secure and easy to remember.  4. Create a [employer] password. You can change your password at any time.  5. Enter your Password Reset Question and Answer. This can be used at a later time if you forget your password.   6. Enter your e-mail address. This allows you to receive e-mail notifications when new information is available in [employer].  7. Click Sign Up. You can now view your health information.    For assistance activating your [employer] account, call [phone]

## 2017-04-22 NOTE — ED NOTES
Pt discharged home as ordered by erp. Pt instructed to follow up with her PCP and return here as needed. Pt verbalized understanding. Pt left ambulating independently and called a cab for a ride home.

## 2017-04-22 NOTE — ED AVS SNAPSHOT
4/22/2017    Diana Hernandez  [address] [place] [address]    Dear Diana:    [facility] wants to ensure your discharge home is safe and you or your loved ones have had all of your questions answered regarding your care after you leave the hospital.    Below is a list of resources and contact information should you have any questions regarding your hospital stay, follow-up instructions, or active medical symptoms.    Questions or Concerns Regarding… Contact   Medical Questions Related to Your Discharge  (7 days a week, 8am-5pm) Contact a Nurse Care Coordinator   [phone]   Medical Questions Not Related to Your Discharge  (24 hours a day / 7 days a week)  Contact the Nurse Health Line   [phone]    Medications or Discharge Instructions Refer to your discharge packet   or contact your [facility] Primary Care Provider   [phone]   Follow-up Appointment(s) Schedule your appointment via [employer]   or contact Scheduling [phone]   Billing Review your statement via [employer]  or contact Billing [phone]   Medical Records Review your records via [employer]   or contact Medical Records [phone]     You may receive a telephone call within two days of discharge. This call is to make certain you understand your discharge instructions and have the opportunity to have any questions answered. You can also easily access your medical information, test results and upcoming appointments via the [employer] free online health management tool. You can learn more and sign up at [employer]. For assistance setting up your [employer] account, please call [phone].    Once again, we want to ensure your discharge home is safe and that you have a clear understanding of any next steps in your care. If you have any questions or concerns, please do not hesitate to contact us, we are here for you. Thank you for choosing [facility] for your healthcare needs.    Sincerely,    Your [facility] Healthcare Team

## 2017-04-22 NOTE — ED NOTES
"Pt reports vomiting X 2 in bathroom. Pt returns to [address], kneeling next to [employer]. Pt states  \" my mid back hurts.\"  "

## 2017-04-22 NOTE — ED AVS SNAPSHOT
Home Care Instructions                                                                                                                Diana Hernandez   MRN: [id number]    Department:  [facility], Emergency Dept   Date of Visit:  4/22/2017            [facility], Emergency Dept    [address] [place] [address]    Phone:  [phone]      You were seen by     1. Marlo Madden M.D.    2. Guy G Gansert, M.D.      Your Diagnosis Was     Non-intractable cyclical vomiting without nausea     G43.A0       These are the medications you received during your hospitalization from 04/22/2017 0756 to 04/22/2017 1424     Date/Time Order Dose Route Action    04/22/2017 0830 NS infusion 1,000 mL 1,000 mL Intravenous New Bag    04/22/2017 0900 ondansetron (ZOFRAN) syringe/vial injection 4 mg 4 mg Intravenous Given    04/22/2017 0909 haloperidol lactate (HALDOL) injection 5 mg 5 mg Intramuscular Given    04/22/2017 0911 diphenhydrAMINE (BENADRYL) injection 25 mg 25 mg Intravenous Given    04/22/2017 1109 lorazepam (ATIVAN) injection 1 mg 1 mg Intravenous Given    04/22/2017 1303 ondansetron (ZOFRAN) syringe/vial injection 4 mg 4 mg Intravenous Given    04/22/2017 1305 diphenhydrAMINE (BENADRYL) injection 25 mg 25 mg Intravenous Given    04/22/2017 1307 haloperidol lactate (HALDOL) injection 2-5 mg 2.5 mg Intramuscular Given      Follow-up Information     1. Follow up with [facility], Emergency Dept.    Specialty:  Emergency Medicine    Why:  If symptoms worsen    Contact information    [address] [place] [id number] [phone]        2. Schedule an appointment as soon as possible for a visit with Lavell Monte M.D..    Specialty:  Internal Medicine    Contact information    [address]  [address] [address] [phone]        Medication Information     Review all of your home medications and newly ordered medications with your primary  doctor and/or pharmacist as soon as possible. Follow medication instructions as directed by your doctor and/or pharmacist.     Please keep your complete medication list with you and share with your physician. Update the information when medications are discontinued, doses are changed, or new medications (including over-the-counter products) are added; and carry medication information at all times in the event of emergency situations.               Medication List      ASK your doctor about these medications        Instructions    Morning Afternoon Evening Bedtime    multivitamin Tabs        Take 1 Tab by mouth every day.   Dose:  1 Tab                        ondansetron 4 MG Tbdp   Commonly known as:  ZOFRAN ODT        Take 1 Tab by mouth every 8 hours as needed.   Dose:  4 mg                        * promethazine 25 MG Supp   Commonly known as:  PHENERGAN        Insert 1 Suppository in rectum every 6 hours as needed for Nausea/Vomiting.   Dose:  25 mg                        * promethazine 25 MG Tabs   Commonly known as:  PHENERGAN        Take 1 Tab by mouth every 6 hours as needed for Nausea/Vomiting.   Dose:  25 mg                        PROZAC 20 MG Caps   Generic drug:  fluoxetine        Take 20 mg by mouth every day.   Dose:  20 mg                        zolpidem 5 MG Tabs   Commonly known as:  AMBIEN        Take 1 Tab by mouth at bedtime as needed for Sleep.   Dose:  5 mg                        * Notice:  This list has 2 medication(s) that are the same as other medications prescribed for you. Read the directions carefully, and ask your doctor or other care provider to review them with you.            Procedures and tests performed during your visit     CBC WITH DIFFERENTIAL    COMP METABOLIC PANEL    ESTIMATED GFR    LIPASE        Discharge Instructions       Cannabis Use Disorder  Cannabis use disorder is a mental disorder. It is not one-time or occasional use of cannabis, more commonly known as marijuana.  "Cannabis use disorder is the continued, nonmedical use of cannabis that interferes with normal life activities or causes health problems. People with cannabis use disorder get a feeling of extreme pleasure and relaxation from cannabis use. This \"high\" is very rewarding and causes people to use over and over.   The mind-altering ingredient in cannabis is know as THC. THC can also interfere with motor coordination, memory, judgment, and accurate sense of space and time. These effects can last for a few days after using cannabis. Regular heavy cannabis use can cause long-lasting problems with thinking and learning. In young people, these problems may be permanent. Cannabis sometimes causes severe anxiety, paranoia, or visual hallucinations. Man-made (synthetic) cannabis-like drugs, such as \"spice\" and \"K2,\" cause the same effects as THC but are much stronger. Cannabis-like drugs can cause dangerously high blood pressure and heart rate.   Cannabis use disorder usually starts in the teenage years. It can trigger the development of schizophrenia. It is somewhat more common in men than women. People who have family members with the disorder or existing mental health issues such as depression and posttraumatic stress disorder are more likely to develop cannabis use disorder. People with cannabis use disorder are at higher risk for use of other drugs of abuse.   SIGNS AND SYMPTOMS  Signs and symptoms of cannabis use disorder include:   · Use of cannabis in larger amounts or over a longer period than intended.    · Unsuccessful attempts to cut down or control cannabis use.    · A lot of time spent obtaining, using, or recovering from the effects of cannabis.    · A strong desire or urge to use cannabis (cravings).    · Continued use of cannabis in spite of problems at work, school, or home because of use.    · Continued use of cannabis in spite of relationship problems because of use.  · Giving up or cutting down on important " life activities because of cannabis use.  · Use of cannabis over and over even in situations when it is physically hazardous, such as when driving a car.    · Continued use of cannabis in spite of a physical problem that is likely related to use. Physical problems can include:  · Chronic cough.  · Bronchitis.  · Emphysema.  · Throat and lung cancer.  · Continued use of cannabis in spite of a mental problem that is likely related to use. Mental problems can include:  · Psychosis.  · Anxiety.  · Difficulty sleeping.  · Need to use more and more cannabis to get the same effect, or lessened effect over time with use of the same amount (tolerance).  · Having withdrawal symptoms when cannabis use is stopped, or using cannabis to reduce or avoid withdrawal symptoms. Withdrawal symptoms include:  · Irritability or anger.  · Anxiety or restlessness.  · Difficulty sleeping.  · Loss of appetite or weight.  · Aches and pains.  · Shakiness.  · Sweating.  · Chills.  DIAGNOSIS  Cannabis use disorder is diagnosed by your health care provider. You may be asked questions about your cannabis use and how it affects your life. A physical exam may be done. A drug screen may be done. You may be referred to a mental health professional. The diagnosis of cannabis use disorder requires at least two symptoms within 12 months. The type of cannabis use disorder you have depends on the number of symptoms you have. The type may be:  · Mild. Two or three signs and symptoms.    · Moderate. Four or five signs and symptoms.    · Severe. Six or more signs and symptoms.    TREATMENT  Treatment is usually provided by mental health professionals with training in substance use disorders. The following options are available:  · Counseling or talk therapy. Talk therapy addresses the reasons you use cannabis. It also addresses ways to keep you from using again. The goals of talk therapy include:  ¨ Identifying and avoiding triggers for use.  ¨ Learning how  to handle cravings.  ¨ Replacing use with healthy activities.  · Support groups. Support groups provide emotional support, advice, and guidance.  · Medicine. Medicine is used to treat mental health issues that trigger cannabis use or that result from it.  HOME CARE INSTRUCTIONS  · Take medicines only as directed by your health care provider.  · Check with your health care provider before starting any new medicines.  · Keep all follow-up visits as directed by your health care provider.  SEEK MEDICAL CARE IF:  · You are not able to take your medicines as directed.  · Your symptoms get worse.  SEEK IMMEDIATE MEDICAL CARE IF:  You have serious thoughts about hurting yourself or others.  FOR MORE INFORMATION  · National [place] on Drug Abuse: www.drugabuse.gov  · Substance Abuse and Mental Health Services Administration: www.samhsa.gov     This information is not intended to replace advice given to you by your health care provider. Make sure you discuss any questions you have with your health care provider.     Document Released: 12/15/2001 Document Revised: 01/08/2016 Document Reviewed: 12/31/2014  [employer] Interactive Patient Education ©2016 [employer].    Nausea and Vomiting  Nausea is a sick feeling that often comes before throwing up (vomiting). Vomiting is a reflex where stomach contents come out of your mouth. Vomiting can cause severe loss of body fluids (dehydration). Children and elderly adults can become dehydrated quickly, especially if they also have diarrhea. Nausea and vomiting are symptoms of a condition or disease. It is important to find the cause of your symptoms.  CAUSES   · Direct irritation of the stomach lining. This irritation can result from increased acid production (gastroesophageal reflux disease), infection, food poisoning, taking certain medicines (such as nonsteroidal anti-inflammatory drugs), alcohol use, or tobacco use.  · Signals from the brain. These signals could be caused by a  headache, heat exposure, an inner ear disturbance, increased pressure in the brain from injury, infection, a tumor, or a concussion, pain, emotional stimulus, or metabolic problems.  · An obstruction in the gastrointestinal tract (bowel obstruction).  · Illnesses such as diabetes, hepatitis, gallbladder problems, appendicitis, kidney problems, cancer, sepsis, atypical symptoms of a heart attack, or eating disorders.  · Medical treatments such as chemotherapy and radiation.  · Receiving medicine that makes you sleep (general anesthetic) during surgery.  DIAGNOSIS  Your caregiver may ask for tests to be done if the problems do not improve after a few days. Tests may also be done if symptoms are severe or if the reason for the nausea and vomiting is not clear. Tests may include:  · Urine tests.  · Blood tests.  · Stool tests.  · Cultures (to look for evidence of infection).  · X-rays or other imaging studies.  Test results can help your caregiver make decisions about treatment or the need for additional tests.  TREATMENT  You need to stay well hydrated. Drink frequently but in small amounts. You may wish to drink water, sports drinks, clear broth, or eat frozen ice pops or gelatin dessert to help stay hydrated. When you eat, eating slowly may help prevent nausea. There are also some antinausea medicines that may help prevent nausea.  HOME CARE INSTRUCTIONS   · Take all medicine as directed by your caregiver.  · If you do not have an appetite, do not force yourself to eat. However, you must continue to drink fluids.  · If you have an appetite, eat a normal diet unless your caregiver tells you differently.  ¨ Eat a variety of complex carbohydrates (rice, wheat, potatoes, bread), lean meats, yogurt, fruits, and vegetables.  ¨ Avoid high-fat foods because they are more difficult to digest.  · Drink enough water and fluids to keep your urine clear or pale yellow.  · If you are dehydrated, ask your caregiver for specific  rehydration instructions. Signs of dehydration may include:  ¨ Severe thirst.  ¨ Dry lips and mouth.  ¨ Dizziness.  ¨ Dark urine.  ¨ Decreasing urine frequency and amount.  ¨ Confusion.  ¨ Rapid breathing or pulse.  SEEK IMMEDIATE MEDICAL CARE IF:   · You have blood or brown flecks (like coffee grounds) in your vomit.  · You have black or bloody stools.  · You have a severe headache or stiff neck.  · You are confused.  · You have severe abdominal pain.  · You have chest pain or trouble breathing.  · You do not urinate at least once every 8 hours.  · You develop cold or clammy skin.  · You continue to vomit for longer than 24 to 48 hours.  · You have a fever.  MAKE SURE YOU:   · Understand these instructions.  · Will watch your condition.  · Will get help right away if you are not doing well or get worse.     This information is not intended to replace advice given to you by your health care provider. Make sure you discuss any questions you have with your health care provider.     Document Released: 12/18/2006 Document Revised: 03/11/2013 Document Reviewed: 05/16/2012  [employer] Interactive Patient Education ©2016 [employer]            Patient Information     Patient Information    Following emergency treatment: all patient requiring follow-up care must return either to a private physician or a clinic if your condition worsens before you are able to obtain further medical attention, please return to the emergency room.     Billing Information    At [facility], we work to make the billing process streamlined for our patients.  Our Representatives are here to answer any questions you may have regarding your hospital bill.  If you have insurance coverage and have supplied your insurance information to us, we will submit a claim to your insurer on your behalf.  Should you have any questions regarding your bill, we can be reached online or by phone as follows:  Online: You are able pay your bills online or live chat  with our representatives about any billing questions you may have. We are here to help Monday - Friday from 8:00am to 7:30pm and 9:00am - 12:00pm on Saturdays.  Please visit [email or web address]  for more information.   Phone:  [phone] or [phone]    Please note that your emergency physician, surgeon, pathologist, radiologist, anesthesiologist, and other specialists are not employed by [facility] and will therefore bill separately for their services.  Please contact them directly for any questions concerning their bills at the numbers below:     Emergency Physician Services:  [phone]  [facility]:  [phone]  [facility]:  [phone]  [facility]:  [phone]    1. Your final bill may vary from the amount quoted upon discharge if all procedures are not complete at that time, or if your doctor has additional procedures of which we are not aware. You will receive an additional bill if you return to the Emergency Department at [facility] for suture removal regardless of the facility of which the sutures were placed.     2. Please arrange for settlement of this account at the emergency registration.    3. All self-pay accounts are due in full at the time of treatment.  If you are unable to meet this obligation then payment is expected within 4-5 days.     4. If you have had radiology studies (CT, X-ray, Ultrasound, MRI), you have received a preliminary result during your emergency department visit. Please contact the radiology department [phone] to receive a copy of your final result. Please discuss the Final result with your primary physician or with the follow up physician provided.     Crisis Hotline:  [place] Crisis Hotline:  7-431-EGCKLUU or 1-562.154.3639  [place] Crisis Hotline:    1-930.150.1650 or 461-022-0059         ED Discharge Follow Up Questions    1. In order to provide you with very good care,  we would like to follow up with a phone call in the next few days.  May we have your permission to contact you?     YES /  NO    2. What is the best phone number to call you? (       )_____-__________    3. What is the best time to call you?      Morning  /  Afternoon  /  Evening                   Patient Signature:  ____________________________________________________________    Date:  ____________________________________________________________

## 2017-04-22 NOTE — ED PROVIDER NOTES
ED Provider Note    Scribed for Marlo Madden M.D. by Ania Perdomo. 4/22/2017  8:55 AM    Primary care provider: Lavell Monte M.D.  Means of arrival: Ambulance   History obtained from: Patient   History limited by: None     CHIEF COMPLAINT  Chief Complaint   Patient presents with   • N/V   • RLQ Pain   • Back Pain     Between shoulder blades       HPI  Diana Hernandez is a 41 y.o. female who presents to the Emergency Department for back pain located between her shoulder blades onset yesterday. She reports associated nausea and vomiting onset this morning. Per patient, she last smoked marijuana 2 days ago. Patient denies fevers, chills, diarrhea.      REVIEW OF SYSTEMS  Pertinent positives include back pain, nausea, vomiting, drug use.   Pertinent negatives include no fevers, chills, diarrhea.      E    PAST MEDICAL HISTORY   has a past medical history of Snoring; Dental disorder; Pain; Tobacco abuse (6/20/2009); Superior mesenteric artery syndrome (CMS-HCC) (7/12/2009); Anxiety; CLOSTRIDIUM DIFFICILE INFECTION (4/14/2010); Dyspepsia (7/12/2009); Backpain; Nephrolithiasis (6/20/2009); Anxiety disorder; CVS disease; Drug-seeking behavior; Cyclic vomiting syndrome; and Superior mesenteric artery syndrome (CMS-HCC).    SURGICAL HISTORY   has past surgical history that includes gastroscopy-endo (7/8/2009); lithotripsy; pyloroplasty (7/27/2009); gastroscopy with biopsy (3/9/2010); exploratory laparotomy (7/27/2009); appendectomy (7/27/2009); cholecystectomy (7/27/2009); other; exploratory laparotomy (1/12/2016); bowel resection (1/12/2016); and gastroscopy-endo (4/28/2016).    SOCIAL HISTORY  Social History   Substance Use Topics   • Smoking status: Former Smoker -- 0.50 packs/day for 13 years     Types: Cigarettes     Quit date: 12/15/2012   • Smokeless tobacco: Never Used      Comment: 1/2ppd   • Alcohol Use: No      History   Drug Use No     Comment: 1 gm/day for 10 yr - stopped       FAMILY  "HISTORY  Family History   Problem Relation Age of Onset   • Cancer Mother 50     Colon cancer   • Allergies Father    • Hypertension Mother    • Hypertension Father    • Heart Disease Maternal Grandmother        CURRENT MEDICATIONS  Home Medications     Reviewed by Blayne Herron R.N. (Registered Nurse) on 04/22/17 at 0805  Med List Status: Partial    Medication Last Dose Status    fluoxetine (PROZAC) 20 MG Cap 4/21/2017 Active    multivitamin (THERAGRAN) Tab 4/10/2017 Active    ondansetron (ZOFRAN ODT) 4 MG TABLET DISPERSIBLE 4/16/2017 Active    promethazine (PHENERGAN) 25 MG Suppos 4/10/2017 Active    promethazine (PHENERGAN) 25 MG Tab 4/10/2017 Active    zolpidem (AMBIEN) 5 MG Tab  Active                ALLERGIES  Allergies   Allergen Reactions   • Metoclopramide Hives     Told by MD; pt suspects possible hives.   • Quetiapine Unspecified     MD told her she was allergic     • Septra [Sulfamethoxazole W-Trimethoprim] Unspecified     MD told her she was allergic         PHYSICAL EXAM  VITAL SIGNS: /78 mmHg  Pulse 83  Temp(Src) 36.6 °C (97.9 °F)  Resp 24  Ht 1.651 m (5' 5\")  Wt 58.968 kg (130 lb)  BMI 21.63 kg/m2  LMP 04/07/2017 (Approximate)    Nursing note and vitals reviewed.  Constitutional: Moaning, wailing, in severe distress. Diaphoretic.   HENT: Head is atraumatic. Oropharynx is moist.   Eyes: Conjunctivae are normal. Pupils are equal, round, and reactive to light.   Cardiovascular: Normal peripheral perfusion  Respiratory: No respiratory distress.   Abdomen: Diffuse abdominal tenderness.   Musculoskeletal: Normal range of motion.   Neurological: Alert. No focal deficits noted.    Skin: No rash.   Psych: Appropriate for clinical situation.    LABS  Results for orders placed or performed during the hospital encounter of 04/22/17   CBC WITH DIFFERENTIAL   Result Value Ref Range    WBC 7.8 4.8 - 10.8 K/uL    RBC 4.54 4.20 - 5.40 M/uL    Hemoglobin 15.4 12.0 - 16.0 g/dL    Hematocrit 44.3 37.0 - " 47.0 %    MCV 97.6 81.4 - 97.8 fL    MCH 33.9 (H) 27.0 - 33.0 pg    MCHC 34.8 33.6 - 35.0 g/dL    RDW 47.3 35.9 - 50.0 fL    Platelet Count 365 164 - 446 K/uL    MPV 9.6 9.0 - 12.9 fL    Neutrophils-Polys 73.90 (H) 44.00 - 72.00 %    Lymphocytes 15.90 (L) 22.00 - 41.00 %    Monocytes 9.00 0.00 - 13.40 %    Eosinophils 0.40 0.00 - 6.90 %    Basophils 0.50 0.00 - 1.80 %    Immature Granulocytes 0.30 0.00 - 0.90 %    Nucleated RBC 0.00 /100 WBC    Neutrophils (Absolute) 5.78 2.00 - 7.15 K/uL    Lymphs (Absolute) 1.24 1.00 - 4.80 K/uL    Monos (Absolute) 0.70 0.00 - 0.85 K/uL    Eos (Absolute) 0.03 0.00 - 0.51 K/uL    Baso (Absolute) 0.04 0.00 - 0.12 K/uL    Immature Granulocytes (abs) 0.02 0.00 - 0.11 K/uL    NRBC (Absolute) 0.00 K/uL   COMP METABOLIC PANEL   Result Value Ref Range    Sodium 137 135 - 145 mmol/L    Potassium 3.3 (L) 3.6 - 5.5 mmol/L    Chloride 103 96 - 112 mmol/L    Co2 18 (L) 20 - 33 mmol/L    Anion Gap 16.0 (H) 0.0 - 11.9    Glucose 114 (H) 65 - 99 mg/dL    Bun 12 8 - 22 mg/dL    Creatinine 0.84 0.50 - 1.40 mg/dL    Calcium 10.4 8.5 - 10.5 mg/dL    AST(SGOT) 14 12 - 45 U/L    ALT(SGPT) 8 2 - 50 U/L    Alkaline Phosphatase 78 30 - 99 U/L    Total Bilirubin 1.8 (H) 0.1 - 1.5 mg/dL    Albumin 4.7 3.2 - 4.9 g/dL    Total Protein 8.3 (H) 6.0 - 8.2 g/dL    Globulin 3.6 (H) 1.9 - 3.5 g/dL    A-G Ratio 1.3 g/dL   LIPASE   Result Value Ref Range    Lipase 10 (L) 11 - 82 U/L   ESTIMATED GFR   Result Value Ref Range    GFR If African American >60 >60 mL/min/1.73 m 2    GFR If Non African American >60 >60 mL/min/1.73 m 2   All labs reviewed by me.     COURSE & MEDICAL DECISION MAKING  Nursing notes, VS, PMSFHx reviewed in chart.     8:55 AM - Patient seen and examined at bedside. Patient will be treated with 5 mg Haldol IM, 25 mg Benadryl IV, 4 mg Zofran IV, and 1L NS IV. Ordered labs to evaluate her symptoms. I discussed that the patient has cannabinoid hyperemesis and she needs to quit smoking marijuana. The  plan of care was discussed with the patient and I answered all of her questions at this time. The patient understands and is agreeable with this plan of care.      10:31 AM Patient reevaluated at bedside. She denies any improvement in her symptoms but is not laying quietly on gurney rather than pacing around the room and moaning. The patient will be treated with 1 mg Ativan IV.     DISPOSITION:   discharge    FINAL IMPRESSION  1. Non-intractable cyclical vomiting without nausea    2. Marijuana abuse        Ania HUGHES (Gaye), am scribing for, and in the presence of, Marlo Madden M.D..    Electronically signed by: Ania Perdomo (Gaye), 4/22/2017    IMarlo M.D. personally performed the services described in this documentation, as scribed by Ania Perdomo in my presence, and it is both accurate and complete.    The note accurately reflects work and decisions made by me.  Marlo Madden  4/22/2017  12:04 PM

## 2017-04-22 NOTE — DISCHARGE INSTRUCTIONS
"Cannabis Use Disorder  Cannabis use disorder is a mental disorder. It is not one-time or occasional use of cannabis, more commonly known as marijuana. Cannabis use disorder is the continued, nonmedical use of cannabis that interferes with normal life activities or causes health problems. People with cannabis use disorder get a feeling of extreme pleasure and relaxation from cannabis use. This \"high\" is very rewarding and causes people to use over and over.   The mind-altering ingredient in cannabis is know as THC. THC can also interfere with motor coordination, memory, judgment, and accurate sense of space and time. These effects can last for a few days after using cannabis. Regular heavy cannabis use can cause long-lasting problems with thinking and learning. In young people, these problems may be permanent. Cannabis sometimes causes severe anxiety, paranoia, or visual hallucinations. Man-made (synthetic) cannabis-like drugs, such as \"spice\" and \"K2,\" cause the same effects as THC but are much stronger. Cannabis-like drugs can cause dangerously high blood pressure and heart rate.   Cannabis use disorder usually starts in the teenage years. It can trigger the development of schizophrenia. It is somewhat more common in men than women. People who have family members with the disorder or existing mental health issues such as depression and posttraumatic stress disorder are more likely to develop cannabis use disorder. People with cannabis use disorder are at higher risk for use of other drugs of abuse.   SIGNS AND SYMPTOMS  Signs and symptoms of cannabis use disorder include:   · Use of cannabis in larger amounts or over a longer period than intended.    · Unsuccessful attempts to cut down or control cannabis use.    · A lot of time spent obtaining, using, or recovering from the effects of cannabis.    · A strong desire or urge to use cannabis (cravings).    · Continued use of cannabis in spite of problems at work, " school, or home because of use.    · Continued use of cannabis in spite of relationship problems because of use.  · Giving up or cutting down on important life activities because of cannabis use.  · Use of cannabis over and over even in situations when it is physically hazardous, such as when driving a car.    · Continued use of cannabis in spite of a physical problem that is likely related to use. Physical problems can include:  · Chronic cough.  · Bronchitis.  · Emphysema.  · Throat and lung cancer.  · Continued use of cannabis in spite of a mental problem that is likely related to use. Mental problems can include:  · Psychosis.  · Anxiety.  · Difficulty sleeping.  · Need to use more and more cannabis to get the same effect, or lessened effect over time with use of the same amount (tolerance).  · Having withdrawal symptoms when cannabis use is stopped, or using cannabis to reduce or avoid withdrawal symptoms. Withdrawal symptoms include:  · Irritability or anger.  · Anxiety or restlessness.  · Difficulty sleeping.  · Loss of appetite or weight.  · Aches and pains.  · Shakiness.  · Sweating.  · Chills.  DIAGNOSIS  Cannabis use disorder is diagnosed by your health care provider. You may be asked questions about your cannabis use and how it affects your life. A physical exam may be done. A drug screen may be done. You may be referred to a mental health professional. The diagnosis of cannabis use disorder requires at least two symptoms within 12 months. The type of cannabis use disorder you have depends on the number of symptoms you have. The type may be:  · Mild. Two or three signs and symptoms.    · Moderate. Four or five signs and symptoms.    · Severe. Six or more signs and symptoms.    TREATMENT  Treatment is usually provided by mental health professionals with training in substance use disorders. The following options are available:  · Counseling or talk therapy. Talk therapy addresses the reasons you use  cannabis. It also addresses ways to keep you from using again. The goals of talk therapy include:  ¨ Identifying and avoiding triggers for use.  ¨ Learning how to handle cravings.  ¨ Replacing use with healthy activities.  · Support groups. Support groups provide emotional support, advice, and guidance.  · Medicine. Medicine is used to treat mental health issues that trigger cannabis use or that result from it.  HOME CARE INSTRUCTIONS  · Take medicines only as directed by your health care provider.  · Check with your health care provider before starting any new medicines.  · Keep all follow-up visits as directed by your health care provider.  SEEK MEDICAL CARE IF:  · You are not able to take your medicines as directed.  · Your symptoms get worse.  SEEK IMMEDIATE MEDICAL CARE IF:  You have serious thoughts about hurting yourself or others.  FOR MORE INFORMATION  · National [place] on Drug Abuse: www.drugabuse.gov  · Substance Abuse and Mental Health Services Administration: www.samhsa.gov     This information is not intended to replace advice given to you by your health care provider. Make sure you discuss any questions you have with your health care provider.     Document Released: 12/15/2001 Document Revised: 01/08/2016 Document Reviewed: 12/31/2014  [employer] Interactive Patient Education ©2016 [employer]    Nausea and Vomiting  Nausea is a sick feeling that often comes before throwing up (vomiting). Vomiting is a reflex where stomach contents come out of your mouth. Vomiting can cause severe loss of body fluids (dehydration). Children and elderly adults can become dehydrated quickly, especially if they also have diarrhea. Nausea and vomiting are symptoms of a condition or disease. It is important to find the cause of your symptoms.  CAUSES   · Direct irritation of the stomach lining. This irritation can result from increased acid production (gastroesophageal reflux disease), infection, food poisoning, taking  certain medicines (such as nonsteroidal anti-inflammatory drugs), alcohol use, or tobacco use.  · Signals from the brain. These signals could be caused by a headache, heat exposure, an inner ear disturbance, increased pressure in the brain from injury, infection, a tumor, or a concussion, pain, emotional stimulus, or metabolic problems.  · An obstruction in the gastrointestinal tract (bowel obstruction).  · Illnesses such as diabetes, hepatitis, gallbladder problems, appendicitis, kidney problems, cancer, sepsis, atypical symptoms of a heart attack, or eating disorders.  · Medical treatments such as chemotherapy and radiation.  · Receiving medicine that makes you sleep (general anesthetic) during surgery.  DIAGNOSIS  Your caregiver may ask for tests to be done if the problems do not improve after a few days. Tests may also be done if symptoms are severe or if the reason for the nausea and vomiting is not clear. Tests may include:  · Urine tests.  · Blood tests.  · Stool tests.  · Cultures (to look for evidence of infection).  · X-rays or other imaging studies.  Test results can help your caregiver make decisions about treatment or the need for additional tests.  TREATMENT  You need to stay well hydrated. Drink frequently but in small amounts. You may wish to drink water, sports drinks, clear broth, or eat frozen ice pops or gelatin dessert to help stay hydrated. When you eat, eating slowly may help prevent nausea. There are also some antinausea medicines that may help prevent nausea.  HOME CARE INSTRUCTIONS   · Take all medicine as directed by your caregiver.  · If you do not have an appetite, do not force yourself to eat. However, you must continue to drink fluids.  · If you have an appetite, eat a normal diet unless your caregiver tells you differently.  ¨ Eat a variety of complex carbohydrates (rice, wheat, potatoes, bread), lean meats, yogurt, fruits, and vegetables.  ¨ Avoid high-fat foods because they are more  difficult to digest.  · Drink enough water and fluids to keep your urine clear or pale yellow.  · If you are dehydrated, ask your caregiver for specific rehydration instructions. Signs of dehydration may include:  ¨ Severe thirst.  ¨ Dry lips and mouth.  ¨ Dizziness.  ¨ Dark urine.  ¨ Decreasing urine frequency and amount.  ¨ Confusion.  ¨ Rapid breathing or pulse.  SEEK IMMEDIATE MEDICAL CARE IF:   · You have blood or brown flecks (like coffee grounds) in your vomit.  · You have black or bloody stools.  · You have a severe headache or stiff neck.  · You are confused.  · You have severe abdominal pain.  · You have chest pain or trouble breathing.  · You do not urinate at least once every 8 hours.  · You develop cold or clammy skin.  · You continue to vomit for longer than 24 to 48 hours.  · You have a fever.  MAKE SURE YOU:   · Understand these instructions.  · Will watch your condition.  · Will get help right away if you are not doing well or get worse.     This information is not intended to replace advice given to you by your health care provider. Make sure you discuss any questions you have with your health care provider.     Document Released: 12/18/2006 Document Revised: 03/11/2013 Document Reviewed: 05/16/2012  [email or web address] Interactive Patient Education ©2016 [email or web address]

## 2017-04-22 NOTE — ED PROVIDER NOTES
ED Provider Note    Addendum: Please see the initial history and physical for details.  In summary, the patient has a known history of cyclic vomiting syndrome.  The patient was treated with IV fluid therapy along with the various assortment of medications  for her symptoms.  Patient was going to be discharged but continued to have some vomiting and additional doses of medications were administered.  The patient was observed.  Repeat examination revealed a benign abdominal exam with no signs of significant dehydration.  At this time, the patient is criteria for discharge.  She has been given appropriate follow-up.

## 2017-04-24 ENCOUNTER — HOSPITAL ENCOUNTER (EMERGENCY)
Facility: MEDICAL CENTER | Age: 42
End: 2017-04-24
Attending: EMERGENCY MEDICINE
Payer: MEDICAID

## 2017-04-24 ENCOUNTER — APPOINTMENT (OUTPATIENT)
Dept: INTERNAL MEDICINE | Facility: MEDICAL CENTER | Age: 42
End: 2017-04-24
Payer: MEDICAID

## 2017-04-24 VITALS
HEIGHT: 65 IN | OXYGEN SATURATION: 99 % | WEIGHT: 124.78 LBS | BODY MASS INDEX: 20.79 KG/M2 | DIASTOLIC BLOOD PRESSURE: 99 MMHG | TEMPERATURE: 98.8 F | HEART RATE: 67 BPM | RESPIRATION RATE: 16 BRPM | SYSTOLIC BLOOD PRESSURE: 126 MMHG

## 2017-04-24 DIAGNOSIS — F31.31 BIPOLAR AFFECTIVE DISORDER, CURRENTLY DEPRESSED, MILD (HCC): ICD-10-CM

## 2017-04-24 DIAGNOSIS — R11.15 NON-INTRACTABLE CYCLICAL VOMITING WITH NAUSEA: ICD-10-CM

## 2017-04-24 DIAGNOSIS — F41.9 ANXIETY: ICD-10-CM

## 2017-04-24 LAB
ALBUMIN SERPL BCP-MCNC: 4.5 G/DL (ref 3.2–4.9)
ALBUMIN/GLOB SERPL: 1.3 G/DL
ALP SERPL-CCNC: 71 U/L (ref 30–99)
ALT SERPL-CCNC: 13 U/L (ref 2–50)
AMPHET UR QL SCN: NEGATIVE
ANION GAP SERPL CALC-SCNC: 11 MMOL/L (ref 0–11.9)
APPEARANCE UR: CLEAR
AST SERPL-CCNC: 24 U/L (ref 12–45)
BACTERIA #/AREA URNS HPF: ABNORMAL /HPF
BARBITURATES UR QL SCN: NEGATIVE
BASOPHILS # BLD AUTO: 0.6 % (ref 0–1.8)
BASOPHILS # BLD: 0.04 K/UL (ref 0–0.12)
BENZODIAZ UR QL SCN: NEGATIVE
BILIRUB SERPL-MCNC: 1.8 MG/DL (ref 0.1–1.5)
BILIRUB UR QL STRIP.AUTO: NEGATIVE
BUN SERPL-MCNC: 10 MG/DL (ref 8–22)
BZE UR QL SCN: NEGATIVE
CALCIUM SERPL-MCNC: 9.9 MG/DL (ref 8.5–10.5)
CANNABINOIDS UR QL SCN: POSITIVE
CAOX CRY #/AREA URNS HPF: ABNORMAL /HPF
CHLORIDE SERPL-SCNC: 103 MMOL/L (ref 96–112)
CO2 SERPL-SCNC: 19 MMOL/L (ref 20–33)
COLOR UR: YELLOW
CREAT SERPL-MCNC: 0.63 MG/DL (ref 0.5–1.4)
EOSINOPHIL # BLD AUTO: 0 K/UL (ref 0–0.51)
EOSINOPHIL NFR BLD: 0 % (ref 0–6.9)
EPI CELLS #/AREA URNS HPF: ABNORMAL /HPF
ERYTHROCYTE [DISTWIDTH] IN BLOOD BY AUTOMATED COUNT: 45.7 FL (ref 35.9–50)
GFR SERPL CREATININE-BSD FRML MDRD: >60 ML/MIN/1.73 M 2
GLOBULIN SER CALC-MCNC: 3.5 G/DL (ref 1.9–3.5)
GLUCOSE SERPL-MCNC: 105 MG/DL (ref 65–99)
GLUCOSE UR STRIP.AUTO-MCNC: NEGATIVE MG/DL
HCG UR QL: NEGATIVE
HCT VFR BLD AUTO: 42.3 % (ref 37–47)
HGB BLD-MCNC: 15.1 G/DL (ref 12–16)
HYALINE CASTS #/AREA URNS LPF: ABNORMAL /LPF
IMM GRANULOCYTES # BLD AUTO: 0.02 K/UL (ref 0–0.11)
IMM GRANULOCYTES NFR BLD AUTO: 0.3 % (ref 0–0.9)
INR PPP: 0.98 (ref 0.87–1.13)
KETONES UR STRIP.AUTO-MCNC: 80 MG/DL
LACTATE BLD-SCNC: 1.7 MMOL/L (ref 0.5–2)
LEUKOCYTE ESTERASE UR QL STRIP.AUTO: NEGATIVE
LYMPHOCYTES # BLD AUTO: 0.98 K/UL (ref 1–4.8)
LYMPHOCYTES NFR BLD: 13.9 % (ref 22–41)
MAGNESIUM SERPL-MCNC: 2.1 MG/DL (ref 1.5–2.5)
MCH RBC QN AUTO: 34.5 PG (ref 27–33)
MCHC RBC AUTO-ENTMCNC: 35.7 G/DL (ref 33.6–35)
MCV RBC AUTO: 96.6 FL (ref 81.4–97.8)
MDMA UR QL SCN: NEGATIVE
METHADONE UR QL SCN: NEGATIVE
MICRO URNS: ABNORMAL
MONOCYTES # BLD AUTO: 0.61 K/UL (ref 0–0.85)
MONOCYTES NFR BLD AUTO: 8.7 % (ref 0–13.4)
MUCOUS THREADS #/AREA URNS HPF: ABNORMAL /HPF
NEUTROPHILS # BLD AUTO: 5.4 K/UL (ref 2–7.15)
NEUTROPHILS NFR BLD: 76.5 % (ref 44–72)
NITRITE UR QL STRIP.AUTO: NEGATIVE
NRBC # BLD AUTO: 0 K/UL
NRBC BLD AUTO-RTO: 0 /100 WBC
OPIATES UR QL SCN: NEGATIVE
OXYCODONE UR QL SCN: NEGATIVE
PCP UR QL SCN: NEGATIVE
PH UR STRIP.AUTO: 5.5 [PH]
PLATELET # BLD AUTO: 303 K/UL (ref 164–446)
PMV BLD AUTO: 9.5 FL (ref 9–12.9)
POTASSIUM SERPL-SCNC: 3.6 MMOL/L (ref 3.6–5.5)
PROPOXYPH UR QL SCN: NEGATIVE
PROT SERPL-MCNC: 8 G/DL (ref 6–8.2)
PROT UR QL STRIP: 30 MG/DL
PROTHROMBIN TIME: 13.3 SEC (ref 12–14.6)
RBC # BLD AUTO: 4.38 M/UL (ref 4.2–5.4)
RBC # URNS HPF: ABNORMAL /HPF
RBC UR QL AUTO: NEGATIVE
SODIUM SERPL-SCNC: 133 MMOL/L (ref 135–145)
SP GR UR STRIP.AUTO: 1.02
WBC # BLD AUTO: 7.1 K/UL (ref 4.8–10.8)
WBC #/AREA URNS HPF: ABNORMAL /HPF

## 2017-04-24 PROCEDURE — 80053 COMPREHEN METABOLIC PANEL: CPT

## 2017-04-24 PROCEDURE — 81025 URINE PREGNANCY TEST: CPT

## 2017-04-24 PROCEDURE — 700102 HCHG RX REV CODE 250 W/ 637 OVERRIDE(OP): Performed by: EMERGENCY MEDICINE

## 2017-04-24 PROCEDURE — 80307 DRUG TEST PRSMV CHEM ANLYZR: CPT

## 2017-04-24 PROCEDURE — 83605 ASSAY OF LACTIC ACID: CPT

## 2017-04-24 PROCEDURE — 99284 EMERGENCY DEPT VISIT MOD MDM: CPT

## 2017-04-24 PROCEDURE — A9270 NON-COVERED ITEM OR SERVICE: HCPCS | Performed by: EMERGENCY MEDICINE

## 2017-04-24 PROCEDURE — 96372 THER/PROPH/DIAG INJ SC/IM: CPT

## 2017-04-24 PROCEDURE — 83735 ASSAY OF MAGNESIUM: CPT

## 2017-04-24 PROCEDURE — 81001 URINALYSIS AUTO W/SCOPE: CPT | Mod: 59

## 2017-04-24 PROCEDURE — 85610 PROTHROMBIN TIME: CPT

## 2017-04-24 PROCEDURE — 700111 HCHG RX REV CODE 636 W/ 250 OVERRIDE (IP): Performed by: EMERGENCY MEDICINE

## 2017-04-24 PROCEDURE — 85025 COMPLETE CBC W/AUTO DIFF WBC: CPT

## 2017-04-24 RX ORDER — PROMETHAZINE HYDROCHLORIDE 25 MG/1
25 TABLET ORAL EVERY 6 HOURS PRN
Qty: 30 TAB | Refills: 0 | Status: SHIPPED | OUTPATIENT
Start: 2017-04-24 | End: 2017-05-01

## 2017-04-24 RX ORDER — ONDANSETRON 2 MG/ML
4 INJECTION INTRAMUSCULAR; INTRAVENOUS ONCE
Status: DISCONTINUED | OUTPATIENT
Start: 2017-04-24 | End: 2017-04-24 | Stop reason: HOSPADM

## 2017-04-24 RX ORDER — LORAZEPAM 1 MG/1
1 TABLET ORAL ONCE
Status: COMPLETED | OUTPATIENT
Start: 2017-04-24 | End: 2017-04-24

## 2017-04-24 RX ORDER — LORAZEPAM 2 MG/ML
1 INJECTION INTRAMUSCULAR ONCE
Status: COMPLETED | OUTPATIENT
Start: 2017-04-24 | End: 2017-04-24

## 2017-04-24 RX ORDER — SODIUM CHLORIDE 9 MG/ML
1000 INJECTION, SOLUTION INTRAVENOUS CONTINUOUS
Status: DISPENSED | OUTPATIENT
Start: 2017-04-24 | End: 2017-04-24

## 2017-04-24 RX ORDER — ONDANSETRON 4 MG/1
4 TABLET, ORALLY DISINTEGRATING ORAL ONCE
Status: COMPLETED | OUTPATIENT
Start: 2017-04-24 | End: 2017-04-24

## 2017-04-24 RX ORDER — HYDROXYZINE 50 MG/1
50 TABLET, FILM COATED ORAL 3 TIMES DAILY PRN
Qty: 30 TAB | Refills: 0 | Status: SHIPPED | OUTPATIENT
Start: 2017-04-24 | End: 2017-05-30

## 2017-04-24 RX ORDER — ONDANSETRON 2 MG/ML
4 INJECTION INTRAMUSCULAR; INTRAVENOUS ONCE
Status: COMPLETED | OUTPATIENT
Start: 2017-04-24 | End: 2017-04-24

## 2017-04-24 RX ORDER — LORAZEPAM 2 MG/ML
1 INJECTION INTRAMUSCULAR ONCE
Status: DISCONTINUED | OUTPATIENT
Start: 2017-04-24 | End: 2017-04-24 | Stop reason: HOSPADM

## 2017-04-24 RX ORDER — KETOROLAC TROMETHAMINE 30 MG/ML
30 INJECTION, SOLUTION INTRAMUSCULAR; INTRAVENOUS ONCE
Status: DISCONTINUED | OUTPATIENT
Start: 2017-04-24 | End: 2017-04-24

## 2017-04-24 RX ORDER — KETOROLAC TROMETHAMINE 30 MG/ML
60 INJECTION, SOLUTION INTRAMUSCULAR; INTRAVENOUS ONCE
Status: COMPLETED | OUTPATIENT
Start: 2017-04-24 | End: 2017-04-24

## 2017-04-24 RX ORDER — KETOROLAC TROMETHAMINE 30 MG/ML
60 INJECTION, SOLUTION INTRAMUSCULAR; INTRAVENOUS ONCE
Status: DISCONTINUED | OUTPATIENT
Start: 2017-04-24 | End: 2017-04-24

## 2017-04-24 RX ADMIN — ONDANSETRON 4 MG: 4 TABLET, ORALLY DISINTEGRATING ORAL at 11:10

## 2017-04-24 RX ADMIN — KETOROLAC TROMETHAMINE 60 MG: 30 INJECTION, SOLUTION INTRAMUSCULAR at 09:33

## 2017-04-24 RX ADMIN — ONDANSETRON 4 MG: 2 INJECTION INTRAMUSCULAR; INTRAVENOUS at 09:30

## 2017-04-24 RX ADMIN — LORAZEPAM 1 MG: 1 TABLET ORAL at 11:23

## 2017-04-24 RX ADMIN — LORAZEPAM 1 MG: 2 INJECTION INTRAMUSCULAR; INTRAVENOUS at 09:27

## 2017-04-24 ASSESSMENT — PAIN SCALES - GENERAL: PAINLEVEL_OUTOF10: 9

## 2017-04-24 NOTE — ED NOTES
Pt requesting pain medications, ERP aware.  Pt again informed that we are waiting for a US machine for IV access.

## 2017-04-24 NOTE — ED NOTES
"Warm blanket provided.  Pt states \"I'm starting to have an anxiety attack.  I'm hyperventilating.\"  Pt is anxious but resp even and unlabored at this time.  Pt c/o n/v since 0300.  Pt c/o neck pain & back pain x 1 wk.  Pt in gown, on monitor, chart up for ERP.   "

## 2017-04-24 NOTE — ED AVS SNAPSHOT
4/24/2017    Diana Hernandez  [address] [place] [address]    Dear Diana:    [facility] wants to ensure your discharge home is safe and you or your loved ones have had all of your questions answered regarding your care after you leave the hospital.    Below is a list of resources and contact information should you have any questions regarding your hospital stay, follow-up instructions, or active medical symptoms.    Questions or Concerns Regarding… Contact   Medical Questions Related to Your Discharge  (7 days a week, 8am-5pm) Contact a Nurse Care Coordinator   [phone]   Medical Questions Not Related to Your Discharge  (24 hours a day / 7 days a week)  Contact the Nurse Health Line   [phone]    Medications or Discharge Instructions Refer to your discharge packet   or contact your [facility] Primary Care Provider   [phone]   Follow-up Appointment(s) Schedule your appointment via [employer]   or contact Scheduling [phone]   Billing Review your statement via [employer]  or contact Billing [phone]   Medical Records Review your records via [employer]   or contact Medical Records [phone]     You may receive a telephone call within two days of discharge. This call is to make certain you understand your discharge instructions and have the opportunity to have any questions answered. You can also easily access your medical information, test results and upcoming appointments via the [employer] free online health management tool. You can learn more and sign up at [employer]In[employer]or. For assistance setting up your [employer] account, please call [phone].    Once again, we want to ensure your discharge home is safe and that you have a clear understanding of any next steps in your care. If you have any questions or concerns, please do not hesitate to contact us, we are here for you. Thank you for choosing [facility] for your healthcare needs.    Sincerely,    Your [facility] Healthcare Team

## 2017-04-24 NOTE — DISCHARGE INSTRUCTIONS
Gastritis, Adult  Gastritis is soreness and puffiness (inflammation) of the lining of the stomach. If you do not get help, gastritis can cause bleeding and sores (ulcers) in the stomach.  HOME CARE   · Only take medicine as told by your doctor.  · If you were given antibiotic medicines, take them as told. Finish the medicines even if you start to feel better.  · Drink enough fluids to keep your pee (urine) clear or pale yellow.  · Avoid foods and drinks that make your problems worse. Foods you may want to avoid include:  ¨ Caffeine or alcohol.  ¨ Chocolate.  ¨ Mint.  ¨ Garlic and onions.  ¨ Spicy foods.  ¨ Citrus fruits, including oranges, taisha, or limes.  ¨ Food containing tomatoes, including sauce, chili, salsa, and pizza.  ¨ Fried and fatty foods.  · Eat small meals throughout the day instead of large meals.  GET HELP RIGHT AWAY IF:   · You have black or dark red poop (stools).  · You throw up (vomit) blood. It may look like coffee grounds.  · You cannot keep fluids down.  · Your belly (abdominal) pain gets worse.  · You have a fever.  · You do not feel better after 1 week.  · You have any other questions or concerns.  MAKE SURE YOU:   · Understand these instructions.  · Will watch your condition.  · Will get help right away if you are not doing well or get worse.     This information is not intended to replace advice given to you by your health care provider. Make sure you discuss any questions you have with your health care provider.     Document Released: 06/05/2009 Document Revised: 03/11/2013 Document Reviewed: 01/30/2013  [employer] Interactive Patient Education ©2016 [employer]

## 2017-04-24 NOTE — ED NOTES
Pt reports that she has limited access and requires a US guided IV.  Awaiting US machine, all in use at this time.  Pt updated.

## 2017-04-24 NOTE — ED NOTES
Diana Hernandez  female  41 y.o.  Chief Complaint   Patient presents with   • Back Pain   • Neck Pain     Present to triage c/o upper back and neck pain x 1 week. Today pain worse. + N/V.

## 2017-04-24 NOTE — ED NOTES
Pt medicated w/ oral meds for nausea & anxiety.  Pt states she is ready for discharge.  Instructions & px provided.  Pt verbalized understanding.  Leaves ER via WC, sig other driving her home.

## 2017-04-25 NOTE — ED PROVIDER NOTES
ED Provider Note    CHIEF COMPLAINT  Chief Complaint   Patient presents with   • Back Pain   • Neck Pain       HPI  Diana Hernandez is a 41 y.o. female who presents to emergency room today with complaints of nausea, vomiting and anxiety. Patient has a history of cyclic vomiting history of marijuana use which she states she last used 3 days ago she verbalizes understanding that this can cause her to have vomiting. She's had this for over a year been seen by GI at multiple endoscopies performed. Patient complains of neck and back pain secondary to her vomiting. Denies chest pain, shortness of breath, fever chills no changes to bowel/bladder. She states that this makes her very anxious. She has been seen multiple times here in the emergency room this is her 6th visit this month. She states she canceled her appointment with her primary care physician last week because of being here in the emergency room for evaluation. Symptoms occurred home the context of her normal activities.    REVIEW OF SYSTEMS  See HPI for further details. All other systems are negative.     PAST MEDICAL HISTORY  Past Medical History   Diagnosis Date   • Snoring    • Dental disorder    • Pain      abd and back   • Tobacco abuse 6/20/2009   • Superior mesenteric artery syndrome (CMS-HCC) 7/12/2009   • Anxiety      Followed by [facility]   • CLOSTRIDIUM DIFFICILE INFECTION 4/14/2010   • Dyspepsia 7/12/2009   • Backpain    • Nephrolithiasis 6/20/2009     kidney stones,post lithotrypsy   • Anxiety disorder    • CVS disease    • Drug-seeking behavior    • Cyclic vomiting syndrome    • Superior mesenteric artery syndrome (CMS-HCC)        FAMILY HISTORY  [unfilled]    SOCIAL HISTORY  Social History     Social History   • Marital Status: Single     Spouse Name: N/A   • Number of Children: N/A   • Years of Education: N/A     Social History Main Topics   • Smoking status: Former Smoker -- 0.50 packs/day for 13 years     Types: Cigarettes     Quit date:  12/15/2012   • Smokeless tobacco: Never Used      Comment: 1/2ppd   • Alcohol Use: No   • Drug Use: No      Comment: 1 gm/day for 10 yr - stopped   • Sexual Activity: Not on file     Other Topics Concern   • Not on file     Social History Narrative       SURGICAL HISTORY  Past Surgical History   Procedure Laterality Date   • Gastroscopy-endo  7/8/2009     Performed by ROSANNA WRIGHT at SURGERY [facility] ER   • Lithotripsy     • Pyloroplasty  7/27/2009     Performed by MACIEL QUINTERO at SURGERY [facility] ORS   • Gastroscopy with biopsy  3/9/2010     Performed by AMANDA MEJIA at ENDOSCOPY [facility] ORS   • Exploratory laparotomy  7/27/2009     Performed by MACIEL QUINTERO at SURGERY [facility] ORS   • Appendectomy  7/27/2009     Performed by MACIEL QUINTERO at SURGERY [facility] ORS   • Cholecystectomy  7/27/2009     Performed by MACIEL QUINTERO at SURGERY [facility] ORS   • Other       superior mesenteric artery correction    • Exploratory laparotomy  1/12/2016     Procedure: EXPLORATORY LAPAROTOMY;  Surgeon: Maciel Quintero M.D.;  Location: SURGERY [facility];  Service:    • Bowel resection  1/12/2016     Procedure: BOWEL RESECTION;  Surgeon: Maciel Quintero M.D.;  Location: SURGERY [facility];  Service:    • Gastroscopy-endo  4/28/2016     Procedure: GASTROSCOPY-ENDO;  Surgeon: Blayne Blackburn M.D.;  Location: ENDOSCOPY [facility];  Service:        CURRENT MEDICATIONS  Home Medications     **Home medications have not yet been reviewed for this encounter**          ALLERGIES  Allergies   Allergen Reactions   • Metoclopramide Hives     Told by MD; pt suspects possible hives.   • Quetiapine Unspecified     MD told her she was allergic     • Septra [Sulfamethoxazole W-Trimethoprim] Unspecified     MD told her she was allergic         PHYSICAL EXAM  VITAL SIGNS: /99 mmHg  Pulse 67  Temp(Src) 37.1 °C (98.8 °F)  Resp 16  Ht  "1.651 m (5' 5\")  Wt 56.6 kg (124 lb 12.5 oz)  BMI 20.76 kg/m2  SpO2 99%  LMP 04/07/2017 (Approximate) Room air O2: 100    Constitutional: Well developed, Well nourished, mild distress, Non-toxic appearance.   HENT: Normocephalic, Atraumatic, Bilateral external ears normal, Oropharynx moist, No oral exudates, Nose normal.   Eyes: PERRLA, EOMI, Conjunctiva normal, No discharge.   Neck: Normal range of motion, No tenderness, Supple, No stridor.   Lymphatic: No lymphadenopathy noted.   Cardiovascular: Normal heart rate, Normal rhythm, No murmurs, No rubs, No gallops.   Thorax & Lungs: Normal breath sounds, No respiratory distress, No wheezing, No chest tenderness.   Abdomen: Bowel sounds normal, Soft, No tenderness, No masses, No pulsatile masses.   Skin: Warm, Dry, No erythema, No rash.   Back: No tenderness, No CVA tenderness.   Extremities: Intact distal pulses, No edema, No tenderness, No cyanosis, No clubbing.   Musculoskeletal: Good range of motion in all major joints. No tenderness to palpation or major deformities noted.   Neurologic: Alert & oriented x 3, Normal motor function, Normal sensory function, No focal deficits noted.   Psychiatric: Affect normal, Judgment normal, Mood anxious      COURSE & MEDICAL DECISION MAKING  Pertinent Labs & Imaging studies reviewed. (See chart for details)  Patient is afebrile, normal white blood cell count I do not feel that this is acute abdominal complaint. Most likely this represents her typical cyclic vomiting I discussed cessation of marijuana which patient states she will do. She also has an anxiety component she was given Ativan here in the emergency room. Placed on Atarax for home and Phenergan for vomiting. I will follow up with her primary care physician which she states made appointment on Wednesday also will follow up with GI given referral back to her physician and has seen her in the past Dr. Blackburn. She verbalizes understanding instructions and need for " follow-up. Discharged in stable improved condition at home she is able take in fluids here without difficulty.    FINAL IMPRESSION  1. Acute cyclic vomiting  2. Anxiety disorder  3. Bipolar disorder         Electronically signed by: Rey Grimes, 4/24/2017 5:00 PM

## 2017-05-01 ENCOUNTER — HOSPITAL ENCOUNTER (EMERGENCY)
Facility: MEDICAL CENTER | Age: 42
End: 2017-05-01
Attending: EMERGENCY MEDICINE
Payer: MEDICAID

## 2017-05-01 ENCOUNTER — APPOINTMENT (OUTPATIENT)
Dept: RADIOLOGY | Facility: MEDICAL CENTER | Age: 42
End: 2017-05-01
Attending: EMERGENCY MEDICINE
Payer: MEDICAID

## 2017-05-01 VITALS
WEIGHT: 125 LBS | HEART RATE: 78 BPM | TEMPERATURE: 98.2 F | OXYGEN SATURATION: 92 % | BODY MASS INDEX: 20.83 KG/M2 | RESPIRATION RATE: 20 BRPM | SYSTOLIC BLOOD PRESSURE: 127 MMHG | DIASTOLIC BLOOD PRESSURE: 83 MMHG | HEIGHT: 65 IN

## 2017-05-01 DIAGNOSIS — E87.6 HYPOKALEMIA: ICD-10-CM

## 2017-05-01 DIAGNOSIS — R10.9 FLANK PAIN: ICD-10-CM

## 2017-05-01 DIAGNOSIS — E86.0 MILD DEHYDRATION: ICD-10-CM

## 2017-05-01 LAB
AMORPH CRY #/AREA URNS HPF: PRESENT /HPF
ANION GAP SERPL CALC-SCNC: 18 MMOL/L (ref 0–11.9)
APPEARANCE UR: ABNORMAL
BASOPHILS # BLD AUTO: 1 % (ref 0–1.8)
BASOPHILS # BLD: 0.08 K/UL (ref 0–0.12)
BILIRUB UR QL CFM: POSITIVE
BILIRUB UR QL STRIP.AUTO: ABNORMAL
BUN SERPL-MCNC: 14 MG/DL (ref 8–22)
CALCIUM SERPL-MCNC: 10.4 MG/DL (ref 8.5–10.5)
CHLORIDE SERPL-SCNC: 103 MMOL/L (ref 96–112)
CO2 SERPL-SCNC: 16 MMOL/L (ref 20–33)
COLOR UR: ABNORMAL
CREAT SERPL-MCNC: 0.96 MG/DL (ref 0.5–1.4)
CULTURE IF INDICATED INDCX: NO UA CULTURE
EOSINOPHIL # BLD AUTO: 0.03 K/UL (ref 0–0.51)
EOSINOPHIL NFR BLD: 0.4 % (ref 0–6.9)
EPI CELLS #/AREA URNS HPF: ABNORMAL /HPF
ERYTHROCYTE [DISTWIDTH] IN BLOOD BY AUTOMATED COUNT: 44.9 FL (ref 35.9–50)
GFR SERPL CREATININE-BSD FRML MDRD: >60 ML/MIN/1.73 M 2
GLUCOSE SERPL-MCNC: 122 MG/DL (ref 65–99)
GLUCOSE UR STRIP.AUTO-MCNC: ABNORMAL MG/DL
HCG SERPL QL: NEGATIVE
HCT VFR BLD AUTO: 47.4 % (ref 37–47)
HGB BLD-MCNC: 17.2 G/DL (ref 12–16)
HYALINE CASTS #/AREA URNS LPF: >10 /LPF
IMM GRANULOCYTES # BLD AUTO: 0.02 K/UL (ref 0–0.11)
IMM GRANULOCYTES NFR BLD AUTO: 0.2 % (ref 0–0.9)
KETONES UR STRIP.AUTO-MCNC: 100 MG/DL
LEUKOCYTE ESTERASE UR QL STRIP.AUTO: NEGATIVE
LYMPHOCYTES # BLD AUTO: 1.83 K/UL (ref 1–4.8)
LYMPHOCYTES NFR BLD: 22.7 % (ref 22–41)
MCH RBC QN AUTO: 34.2 PG (ref 27–33)
MCHC RBC AUTO-ENTMCNC: 36.3 G/DL (ref 33.6–35)
MCV RBC AUTO: 94.2 FL (ref 81.4–97.8)
MICRO URNS: ABNORMAL
MONOCYTES # BLD AUTO: 0.62 K/UL (ref 0–0.85)
MONOCYTES NFR BLD AUTO: 7.7 % (ref 0–13.4)
MUCOUS THREADS #/AREA URNS HPF: ABNORMAL /HPF
NEUTROPHILS # BLD AUTO: 5.48 K/UL (ref 2–7.15)
NEUTROPHILS NFR BLD: 68 % (ref 44–72)
NITRITE UR QL STRIP.AUTO: NEGATIVE
NRBC # BLD AUTO: 0 K/UL
NRBC BLD AUTO-RTO: 0 /100 WBC
PH UR STRIP.AUTO: 6 [PH]
PLATELET # BLD AUTO: 418 K/UL (ref 164–446)
PMV BLD AUTO: 9 FL (ref 9–12.9)
POTASSIUM SERPL-SCNC: 2.8 MMOL/L (ref 3.6–5.5)
PROT UR QL STRIP: 300 MG/DL
RBC # BLD AUTO: 5.03 M/UL (ref 4.2–5.4)
RBC # URNS HPF: ABNORMAL /HPF
RBC UR QL AUTO: NEGATIVE
SODIUM SERPL-SCNC: 137 MMOL/L (ref 135–145)
SP GR UR STRIP.AUTO: 1.03
WBC # BLD AUTO: 8.1 K/UL (ref 4.8–10.8)
WBC #/AREA URNS HPF: ABNORMAL /HPF

## 2017-05-01 PROCEDURE — 96361 HYDRATE IV INFUSION ADD-ON: CPT

## 2017-05-01 PROCEDURE — 96375 TX/PRO/DX INJ NEW DRUG ADDON: CPT

## 2017-05-01 PROCEDURE — 74176 CT ABD & PELVIS W/O CONTRAST: CPT

## 2017-05-01 PROCEDURE — 84703 CHORIONIC GONADOTROPIN ASSAY: CPT

## 2017-05-01 PROCEDURE — 80048 BASIC METABOLIC PNL TOTAL CA: CPT

## 2017-05-01 PROCEDURE — 85025 COMPLETE CBC W/AUTO DIFF WBC: CPT

## 2017-05-01 PROCEDURE — 700105 HCHG RX REV CODE 258: Performed by: EMERGENCY MEDICINE

## 2017-05-01 PROCEDURE — 99285 EMERGENCY DEPT VISIT HI MDM: CPT

## 2017-05-01 PROCEDURE — 96376 TX/PRO/DX INJ SAME DRUG ADON: CPT

## 2017-05-01 PROCEDURE — 96366 THER/PROPH/DIAG IV INF ADDON: CPT

## 2017-05-01 PROCEDURE — 81001 URINALYSIS AUTO W/SCOPE: CPT

## 2017-05-01 PROCEDURE — 700111 HCHG RX REV CODE 636 W/ 250 OVERRIDE (IP): Performed by: EMERGENCY MEDICINE

## 2017-05-01 PROCEDURE — 96365 THER/PROPH/DIAG IV INF INIT: CPT

## 2017-05-01 RX ORDER — SODIUM CHLORIDE 9 MG/ML
1000 INJECTION, SOLUTION INTRAVENOUS ONCE
Status: COMPLETED | OUTPATIENT
Start: 2017-05-01 | End: 2017-05-01

## 2017-05-01 RX ORDER — KETOROLAC TROMETHAMINE 30 MG/ML
30 INJECTION, SOLUTION INTRAMUSCULAR; INTRAVENOUS ONCE
Status: COMPLETED | OUTPATIENT
Start: 2017-05-01 | End: 2017-05-01

## 2017-05-01 RX ORDER — ONDANSETRON 4 MG/1
4 TABLET, ORALLY DISINTEGRATING ORAL EVERY 8 HOURS PRN
Qty: 10 TAB | Refills: 0 | Status: SHIPPED | OUTPATIENT
Start: 2017-05-01 | End: 2017-08-26

## 2017-05-01 RX ORDER — ONDANSETRON 2 MG/ML
4 INJECTION INTRAMUSCULAR; INTRAVENOUS ONCE
Status: COMPLETED | OUTPATIENT
Start: 2017-05-01 | End: 2017-05-01

## 2017-05-01 RX ORDER — POTASSIUM CHLORIDE 750 MG/1
10 TABLET, FILM COATED, EXTENDED RELEASE ORAL 2 TIMES DAILY
Qty: 10 TAB | Refills: 0 | Status: SHIPPED | OUTPATIENT
Start: 2017-05-01 | End: 2017-05-06

## 2017-05-01 RX ORDER — ONDANSETRON 2 MG/ML
8 INJECTION INTRAMUSCULAR; INTRAVENOUS ONCE
Status: COMPLETED | OUTPATIENT
Start: 2017-05-01 | End: 2017-05-01

## 2017-05-01 RX ORDER — POTASSIUM CHLORIDE 7.45 MG/ML
10 INJECTION INTRAVENOUS ONCE
Status: DISCONTINUED | OUTPATIENT
Start: 2017-05-01 | End: 2017-05-01

## 2017-05-01 RX ORDER — POTASSIUM CHLORIDE 7.45 MG/ML
10 INJECTION INTRAVENOUS
Status: COMPLETED | OUTPATIENT
Start: 2017-05-01 | End: 2017-05-01

## 2017-05-01 RX ADMIN — HYDROMORPHONE HYDROCHLORIDE 0.5 MG: 1 INJECTION, SOLUTION INTRAMUSCULAR; INTRAVENOUS; SUBCUTANEOUS at 01:30

## 2017-05-01 RX ADMIN — SODIUM CHLORIDE 1000 ML: 9 INJECTION, SOLUTION INTRAVENOUS at 03:58

## 2017-05-01 RX ADMIN — ONDANSETRON 8 MG: 2 INJECTION INTRAMUSCULAR; INTRAVENOUS at 01:30

## 2017-05-01 RX ADMIN — KETOROLAC TROMETHAMINE 30 MG: 30 INJECTION, SOLUTION INTRAMUSCULAR at 01:30

## 2017-05-01 RX ADMIN — POTASSIUM CHLORIDE 10 MEQ: 7.46 INJECTION, SOLUTION INTRAVENOUS at 05:00

## 2017-05-01 RX ADMIN — POTASSIUM CHLORIDE 10 MEQ: 7.46 INJECTION, SOLUTION INTRAVENOUS at 03:58

## 2017-05-01 RX ADMIN — HYDROMORPHONE HYDROCHLORIDE 1 MG: 1 INJECTION, SOLUTION INTRAMUSCULAR; INTRAVENOUS; SUBCUTANEOUS at 02:32

## 2017-05-01 RX ADMIN — ONDANSETRON 4 MG: 2 INJECTION INTRAMUSCULAR; INTRAVENOUS at 02:32

## 2017-05-01 RX ADMIN — SODIUM CHLORIDE 1000 ML: 9 INJECTION, SOLUTION INTRAVENOUS at 01:30

## 2017-05-01 ASSESSMENT — PAIN SCALES - GENERAL
PAINLEVEL_OUTOF10: 10
PAINLEVEL_OUTOF10: 8

## 2017-05-01 NOTE — ED AVS SNAPSHOT
5/1/2017    Diana Hernandez  [address] [place] [address]    Dear Diana:    [facility] wants to ensure your discharge home is safe and you or your loved ones have had all of your questions answered regarding your care after you leave the hospital.    Below is a list of resources and contact information should you have any questions regarding your hospital stay, follow-up instructions, or active medical symptoms.    Questions or Concerns Regarding… Contact   Medical Questions Related to Your Discharge  (7 days a week, 8am-5pm) Contact a Nurse Care Coordinator   [phone]   Medical Questions Not Related to Your Discharge  (24 hours a day / 7 days a week)  Contact the Nurse Health Line   [phone]    Medications or Discharge Instructions Refer to your discharge packet   or contact your [facility] Primary Care Provider   [phone]   Follow-up Appointment(s) Schedule your appointment via Note   or contact Scheduling [phone]   Billing Review your statement via Note  or contact Billing [phone]   Medical Records Review your records via Note   or contact Medical Records [phone]     You may receive a telephone call within two days of discharge. This call is to make certain you understand your discharge instructions and have the opportunity to have any questions answered. You can also easily access your medical information, test results and upcoming appointments via the Note free online health management tool. You can learn more and sign up at [employer]/Note. For assistance setting up your Note account, please call [phone].    Once again, we want to ensure your discharge home is safe and that you have a clear understanding of any next steps in your care. If you have any questions or concerns, please do not hesitate to contact us, we are here for you. Thank you for choosing [facility] for your healthcare needs.    Sincerely,    Your [facility] Healthcare Team

## 2017-05-01 NOTE — ED AVS SNAPSHOT
Home Care Instructions                                                                                                                Diana Hernandez   MRN: [id number]    Department:  [facility], Emergency Dept   Date of Visit:  5/1/2017            [facility], Emergency Dept    [address] [facility] [address]    Phone:  [phone]      You were seen by     Marybeth Henning M.D.      Your Diagnosis Was     Flank pain     R10.9       These are the medications you received during your hospitalization from 05/01/2017 0048 to 05/01/2017 0601     Date/Time Order Dose Route Action    05/01/2017 0130 ondansetron (ZOFRAN) syringe/vial injection 8 mg 8 mg Intravenous Given    05/01/2017 0130 ketorolac (TORADOL) injection 30 mg Intravenous Given    05/01/2017 0130 HYDROmorphone (DILAUDID) injection 0.5 mg 0.5 mg Intravenous Given    05/01/2017 0130 NS infusion 1,000 mL 1,000 mL Intravenous New Bag    05/01/2017 0232 HYDROmorphone (DILAUDID) injection 1 mg 1 mg Intravenous Given    05/01/2017 0232 ondansetron (ZOFRAN) syringe/vial injection 4 mg 4 mg Intravenous Given    05/01/2017 0358 NS infusion 1,000 mL 1,000 mL Intravenous New Bag    05/01/2017 0500 potassium chloride in water (KCL) ivpb 10 mEq 10 mEq Intravenous New Bag    05/01/2017 0358 potassium chloride in water (KCL) ivpb 10 mEq 10 mEq Intravenous New Bag      Follow-up Information     1. Schedule an appointment as soon as possible for a visit with Lavell Monte M.D..    Specialty:  Internal Medicine    Contact information    [address]  [address] [address] [phone]          2. Follow up with [facility], Emergency Dept.    Specialty:  Emergency Medicine    Why:  If symptoms worsen - blood in emesis - uncontrolled vomiting or diarrhea    Contact information    [address] [facility] [address] [phone]      Medication Information     Review all of your home medications  and newly ordered medications with your primary doctor and/or pharmacist as soon as possible. Follow medication instructions as directed by your doctor and/or pharmacist.     Please keep your complete medication list with you and share with your physician. Update the information when medications are discontinued, doses are changed, or new medications (including over-the-counter products) are added; and carry medication information at all times in the event of emergency situations.               Medication List      START taking these medications        Instructions    Morning Afternoon Evening Bedtime    potassium chloride ER 10 MEQ tablet   Commonly known as:  KLOR-CON        Take 1 Tab by mouth 2 times a day for 5 days.   Dose:  10 mEq                          ASK your doctor about these medications        Instructions    Morning Afternoon Evening Bedtime    hydrOXYzine 50 MG Tabs   Commonly known as:  ATARAX        Take 1 Tab by mouth 3 times a day as needed for Anxiety.   Dose:  50 mg                        multivitamin Tabs        Take 1 Tab by mouth every day.   Dose:  1 Tab                        * ondansetron 4 MG Tbdp   What changed:  Another medication with the same name was added. Make sure you understand how and when to take each.   Commonly known as:  ZOFRAN ODT   Ask about: Which instructions should I use?        Take 1 Tab by mouth every 8 hours as needed.   Dose:  4 mg                        * ondansetron 4 MG Tbdp   What changed:  You were already taking a medication with the same name, and this prescription was added. Make sure you understand how and when to take each.   Commonly known as:  ZOFRAN ODT   Ask about: Which instructions should I use?        Take 1 Tab by mouth every 8 hours as needed for Nausea/Vomiting.   Dose:  4 mg                        * promethazine 25 MG Supp   Commonly known as:  PHENERGAN        Insert 1 Suppository in rectum every 6 hours as needed for Nausea/Vomiting.      Dose:  25 mg                        * promethazine 25 MG Tabs   Commonly known as:  PHENERGAN        Take 1 Tab by mouth every 6 hours as needed for Nausea/Vomiting.   Dose:  25 mg                        PROZAC 20 MG Caps   Generic drug:  fluoxetine        Take 20 mg by mouth every day.   Dose:  20 mg                        zolpidem 5 MG Tabs   Commonly known as:  AMBIEN        Take 1 Tab by mouth at bedtime as needed for Sleep.   Dose:  5 mg                        * Notice:  This list has 4 medication(s) that are the same as other medications prescribed for you. Read the directions carefully, and ask your doctor or other care provider to review them with you.         Where to Get Your Medications      These medications were sent to [id number] [place] - [address] [facility] [employer]  [address] [address] [address]     Phone:  [phone]    - ondansetron 4 MG Tbdp  - potassium chloride ER 10 MEQ tablet            Procedures and tests performed during your visit     BASIC METABOLIC PANEL    BETA-HCG QUALITATIVE SERUM    CARDIAC MONITORING    CBC WITH DIFFERENTIAL    CT-RENAL COLIC EVALUATION(A/P W/O)    ESTIMATED GFR    IV SALINE LOCK    UR BILI ICTOTEST    URINALYSIS CULTURE, IF INDICATED    URINE MICROSCOPIC (W/UA)        Discharge Instructions       Dehydration, Adult  Dehydration is when you lose more fluids from the body than you take in. Vital organs like the kidneys, brain, and heart cannot function without a proper amount of fluids and salt. Any loss of fluids from the body can cause dehydration.   CAUSES   · Vomiting.  · Diarrhea.  · Excessive sweating.  · Excessive urine output.  · Fever.  SYMPTOMS   Mild dehydration  · Thirst.  · Dry lips.  · Slightly dry mouth.  Moderate dehydration  · Very dry mouth.  · Sunken eyes.  · Skin does not bounce back quickly when lightly pinched and released.  · Dark urine and decreased urine production.  · Decreased tear  production.  · Headache.  Severe dehydration  · Very dry mouth.  · Extreme thirst.  · Rapid, weak pulse (more than 100 beats per minute at rest).  · Cold hands and feet.  · Not able to sweat in spite of heat and temperature.  · Rapid breathing.  · Blue lips.  · Confusion and lethargy.  · Difficulty being awakened.  · Minimal urine production.  · No tears.  DIAGNOSIS   Your caregiver will diagnose dehydration based on your symptoms and your exam. Blood and urine tests will help confirm the diagnosis. The diagnostic evaluation should also identify the cause of dehydration.  TREATMENT   Treatment of mild or moderate dehydration can often be done at home by increasing the amount of fluids that you drink. It is best to drink small amounts of fluid more often. Drinking too much at one time can make vomiting worse. Refer to the home care instructions below.  Severe dehydration needs to be treated at the hospital where you will probably be given intravenous (IV) fluids that contain water and electrolytes.  HOME CARE INSTRUCTIONS   · Ask your caregiver about specific rehydration instructions.  · Drink enough fluids to keep your urine clear or pale yellow.  · Drink small amounts frequently if you have nausea and vomiting.  · Eat as you normally do.  · Avoid:  · Foods or drinks high in sugar.  · Carbonated drinks.  · Juice.  · Extremely hot or cold fluids.  · Drinks with caffeine.  · Fatty, greasy foods.  · Alcohol.  · Tobacco.  · Overeating.  · Gelatin desserts.  · Wash your hands well to avoid spreading bacteria and viruses.  · Only take over-the-counter or prescription medicines for pain, discomfort, or fever as directed by your caregiver.  · Ask your caregiver if you should continue all prescribed and over-the-counter medicines.  · Keep all follow-up appointments with your caregiver.  SEEK MEDICAL CARE IF:  · You have abdominal pain and it increases or stays in one area (localizes).  · You have a rash, stiff neck, or  severe headache.  · You are irritable, sleepy, or difficult to awaken.  · You are weak, dizzy, or extremely thirsty.  SEEK IMMEDIATE MEDICAL CARE IF:   · You are unable to keep fluids down or you get worse despite treatment.  · You have frequent episodes of vomiting or diarrhea.  · You have blood or green matter (bile) in your vomit.  · You have blood in your stool or your stool looks black and tarry.  · You have not urinated in 6 to 8 hours, or you have only urinated a small amount of very dark urine.  · You have a fever.  · You faint.  MAKE SURE YOU:   · Understand these instructions.  · Will watch your condition.  · Will get help right away if you are not doing well or get worse.     This information is not intended to replace advice given to you by your health care provider. Make sure you discuss any questions you have with your health care provider.     Document Released: 12/18/2006 Document Revised: 03/11/2013 Document Reviewed: 08/06/2012  [employer] Interactive Patient Education ©2016 [employer]  Nausea and Vomiting  Nausea is a sick feeling that often comes before throwing up (vomiting). Vomiting is a reflex where stomach contents come out of your mouth. Vomiting can cause severe loss of body fluids (dehydration). Children and elderly adults can become dehydrated quickly, especially if they also have diarrhea. Nausea and vomiting are symptoms of a condition or disease. It is important to find the cause of your symptoms.  CAUSES   · Direct irritation of the stomach lining. This irritation can result from increased acid production (gastroesophageal reflux disease), infection, food poisoning, taking certain medicines (such as nonsteroidal anti-inflammatory drugs), alcohol use, or tobacco use.  · Signals from the brain. These signals could be caused by a headache, heat exposure, an inner ear disturbance, increased pressure in the brain from injury, infection, a tumor, or a concussion, pain, emotional stimulus,  or metabolic problems.  · An obstruction in the gastrointestinal tract (bowel obstruction).  · Illnesses such as diabetes, hepatitis, gallbladder problems, appendicitis, kidney problems, cancer, sepsis, atypical symptoms of a heart attack, or eating disorders.  · Medical treatments such as chemotherapy and radiation.  · Receiving medicine that makes you sleep (general anesthetic) during surgery.  DIAGNOSIS  Your caregiver may ask for tests to be done if the problems do not improve after a few days. Tests may also be done if symptoms are severe or if the reason for the nausea and vomiting is not clear. Tests may include:  · Urine tests.  · Blood tests.  · Stool tests.  · Cultures (to look for evidence of infection).  · X-rays or other imaging studies.  Test results can help your caregiver make decisions about treatment or the need for additional tests.  TREATMENT  You need to stay well hydrated. Drink frequently but in small amounts. You may wish to drink water, sports drinks, clear broth, or eat frozen ice pops or gelatin dessert to help stay hydrated. When you eat, eating slowly may help prevent nausea. There are also some antinausea medicines that may help prevent nausea.  HOME CARE INSTRUCTIONS   · Take all medicine as directed by your caregiver.  · If you do not have an appetite, do not force yourself to eat. However, you must continue to drink fluids.  · If you have an appetite, eat a normal diet unless your caregiver tells you differently.  ¨ Eat a variety of complex carbohydrates (rice, wheat, potatoes, bread), lean meats, yogurt, fruits, and vegetables.  ¨ Avoid high-fat foods because they are more difficult to digest.  · Drink enough water and fluids to keep your urine clear or pale yellow.  · If you are dehydrated, ask your caregiver for specific rehydration instructions. Signs of dehydration may include:  ¨ Severe thirst.  ¨ Dry lips and mouth.  ¨ Dizziness.  ¨ Dark urine.  ¨ Decreasing urine frequency  and amount.  ¨ Confusion.  ¨ Rapid breathing or pulse.  SEEK IMMEDIATE MEDICAL CARE IF:   · You have blood or brown flecks (like coffee grounds) in your vomit.  · You have black or bloody stools.  · You have a severe headache or stiff neck.  · You are confused.  · You have severe abdominal pain.  · You have chest pain or trouble breathing.  · You do not urinate at least once every 8 hours.  · You develop cold or clammy skin.  · You continue to vomit for longer than 24 to 48 hours.  · You have a fever.  MAKE SURE YOU:   · Understand these instructions.  · Will watch your condition.  · Will get help right away if you are not doing well or get worse.     This information is not intended to replace advice given to you by your health care provider. Make sure you discuss any questions you have with your health care provider.     Document Released: 12/18/2006 Document Revised: 03/11/2013 Document Reviewed: 05/16/2012  [employer] Interactive Patient Education ©2016 [employer]            Patient Information     Patient Information    Following emergency treatment: all patient requiring follow-up care must return either to a private physician or a clinic if your condition worsens before you are able to obtain further medical attention, please return to the emergency room.     Billing Information    At [facility], we work to make the billing process streamlined for our patients.  Our Representatives are here to answer any questions you may have regarding your hospital bill.  If you have insurance coverage and have supplied your insurance information to us, we will submit a claim to your insurer on your behalf.  Should you have any questions regarding your bill, we can be reached online or by phone as follows:  Online: You are able pay your bills online or live chat with our representatives about any billing questions you may have. We are here to help Monday - Friday from 8:00am to 7:30pm and 9:00am - 12:00pm on Saturdays.   Please visit [email or web address]  for more information.   Phone:  [phone] or [phone]    Please note that your emergency physician, surgeon, pathologist, radiologist, anesthesiologist, and other specialists are not employed by [facility] and will therefore bill separately for their services.  Please contact them directly for any questions concerning their bills at the numbers below:     Emergency Physician Services:  [phone]  [facility]:  [phone]  [facility]:  [phone]  [facility]:  [phone]    1. Your final bill may vary from the amount quoted upon discharge if all procedures are not complete at that time, or if your doctor has additional procedures of which we are not aware. You will receive an additional bill if you return to the Emergency Department at [facility] for suture removal regardless of the facility of which the sutures were placed.     2. Please arrange for settlement of this account at the emergency registration.    3. All self-pay accounts are due in full at the time of treatment.  If you are unable to meet this obligation then payment is expected within 4-5 days.     4. If you have had radiology studies (CT, X-ray, Ultrasound, MRI), you have received a preliminary result during your emergency department visit. Please contact the radiology department [phone] to receive a copy of your final result. Please discuss the Final result with your primary physician or with the follow up physician provided.     Crisis Hotline:  [place] Crisis Hotline:  7-552-EWUMLNC or 1-276.159.7107  [place] Crisis Hotline:    1-935.777.3239 or 670-728-6549         ED Discharge Follow Up Questions    1. In order to provide you with very good care, we would like to follow up with a phone call in the next few days.  May we have your permission to contact you?     YES /  NO    2. What is the best phone number  to call you? (       )_____-__________    3. What is the best time to call you?      Morning  /  Afternoon  /  Evening                   Patient Signature:  ____________________________________________________________    Date:  ____________________________________________________________      Your appointments     May 30, 2017 11:00 AM   [facility] - Internal Medicine (--)    [address] [place] [address]   [phone]           You will be receiving a confirmation call a few days before your appointment from our automated call confirmation system.

## 2017-05-01 NOTE — ED NOTES
Pt to triage via w/c with family. Pt c/o bilat flank pain, R>L since 0700 yesterday. + n/v, decreased urine output per pt. Hx of kidney stones. Pt in obvious discomfort. Charge RN informed, pt to room 21.

## 2017-05-01 NOTE — ED PROVIDER NOTES
ED Provider Note    Scribed for Marybeth Henning M.D. by Chaparrita Young. 5/1/2017, 1:18 AM.    Primary care provider: Lavell Monte M.D.  Means of arrival: walk in   History obtained from: Patient  History limited by: none    CHIEF COMPLAINT  Chief Complaint   Patient presents with   • Flank Pain     R>L   • N/V     HPI  Diana Hernandez is a 41 y.o. female who presents to the Emergency Department for flank pain, nausea and vomiting onset this morning at 7am.  The patient has a history of kidney stones but has not had one in several years.  The patient believes the right side is worse than the left but she states that her flank pain is very sharp, stabbing and severe. She notes that she has been experiencing chills but denies fevers.  She denies any dysuria or hematuria.  The patient notes that it is very difficult to urinate and she has tenderness throughout her abdomen.  The patient has not seen a Urologist in 4-5 years.  She denies any chance of pregnancy.  The patient states that she has been using a hot pack on her back at home but denies any relief.     REVIEW OF SYSTEMS  See HPI for further details. All other systems are negative. C    PAST MEDICAL HISTORY   has a past medical history of Snoring; Dental disorder; Pain; Tobacco abuse (6/20/2009); Superior mesenteric artery syndrome (CMS-HCC) (7/12/2009); Anxiety; CLOSTRIDIUM DIFFICILE INFECTION (4/14/2010); Dyspepsia (7/12/2009); Backpain; Nephrolithiasis (6/20/2009); Anxiety disorder; CVS disease; Drug-seeking behavior; Cyclic vomiting syndrome; and Superior mesenteric artery syndrome (CMS-HCC).    SURGICAL HISTORY   has past surgical history that includes gastroscopy-endo (7/8/2009); lithotripsy; pyloroplasty (7/27/2009); gastroscopy with biopsy (3/9/2010); exploratory laparotomy (7/27/2009); appendectomy (7/27/2009); cholecystectomy (7/27/2009); other; exploratory laparotomy (1/12/2016); bowel resection (1/12/2016); and gastroscopy-endo  (4/28/2016).    SOCIAL HISTORY  Social History   Substance Use Topics   • Smoking status: Former Smoker -- 0.50 packs/day for 13 years     Types: Cigarettes     Quit date: 12/15/2012   • Smokeless tobacco: Never Used      Comment: 1/2ppd   • Alcohol Use: No      History   Drug Use No     Comment: 1 gm/day for 10 yr - stopped     FAMILY HISTORY  Family History   Problem Relation Age of Onset   • Cancer Mother 50     Colon cancer   • Allergies Father    • Hypertension Mother    • Hypertension Father    • Heart Disease Maternal Grandmother      CURRENT MEDICATIONS  No current facility-administered medications on file prior to encounter.     Current Outpatient Prescriptions on File Prior to Encounter   Medication Sig Dispense Refill   • promethazine (PHENERGAN) 25 MG Tab Take 1 Tab by mouth every 6 hours as needed for Nausea/Vomiting. 30 Tab 0   • hydrOXYzine (ATARAX) 50 MG Tab Take 1 Tab by mouth 3 times a day as needed for Anxiety. 30 Tab 0   • promethazine (PHENERGAN) 25 MG Tab Take 1 Tab by mouth every 6 hours as needed for Nausea/Vomiting. 30 Tab 0   • zolpidem (AMBIEN) 5 MG Tab Take 1 Tab by mouth at bedtime as needed for Sleep. 10 Tab 0   • fluoxetine (PROZAC) 20 MG Cap Take 20 mg by mouth every day.     • multivitamin (THERAGRAN) Tab Take 1 Tab by mouth every day.     • promethazine (PHENERGAN) 25 MG Suppos Insert 1 Suppository in rectum every 6 hours as needed for Nausea/Vomiting. 10 Suppository 0   • ondansetron (ZOFRAN ODT) 4 MG TABLET DISPERSIBLE Take 1 Tab by mouth every 8 hours as needed. 10 Tab 0   • promethazine (PHENERGAN) 25 MG Tab Take 1 Tab by mouth every 6 hours as needed for Nausea/Vomiting. 30 Tab 0   Reviewed. See Encounter Summary.     ALLERGIES  Allergies   Allergen Reactions   • Metoclopramide Hives     Told by MD; pt suspects possible hives.   • Quetiapine Unspecified     MD told her she was allergic     • Septra [Sulfamethoxazole W-Trimethoprim] Unspecified     MD told her she was allergic    "    PHYSICAL EXAM  VITAL SIGNS: /91 mmHg  Pulse 94  Temp(Src) 36.8 °C (98.2 °F)  Resp 20  Ht 1.651 m (5' 5\")  Wt 56.7 kg (125 lb)  BMI 20.80 kg/m2  SpO2 100%  LMP 04/07/2017 (Approximate)   Pulse ox interpretation: I interpret this pulse ox as normal.  Constitutional: Alert in mild apparent distress.  HENT: No signs of trauma, Bilateral external ears normal, Nose normal.   Eyes: Pupils are equal and reactive, Conjunctiva normal, Non-icteric.   Neck: Normal range of motion, No tenderness, Supple, No stridor.   Lymphatic: No lymphadenopathy noted.   Cardiovascular: Regular rate and rhythm, no murmurs.   Thorax & Lungs: Normal breath sounds, No respiratory distress, No wheezing, No chest tenderness.   Abdomen: Bowel sounds normal, Soft, generalized tenderness, voluntary guarding, No masses, No pulsatile masses. No peritoneal signs.  Skin: Warm, Dry, No erythema, No rash.   Back: No bony tenderness, Right greater than left CVA tenderness.   Extremities: Intact distal pulses, No edema, No tenderness, No cyanosis,  Negative Reuben's sign.   Musculoskeletal: Good range of motion in all major joints. No tenderness to palpation or major deformities noted.   Neurologic: Alert , Normal motor function, Normal sensory function, No focal deficits noted.   Psychiatric: Affect normal, Judgment normal, Mood normal.     DIFFERENTIAL DIAGNOSIS AND WORK UP PLAN    1:18 AM Patient seen and examined at bedside. The patient presents with flank pain, nausea and vomiting and the differential diagnosis includes but is not limited to UTI, pyelonephritis, nephrolithiasis, obstruction, dehydration, acute kidney injury, electrolyte abnormality, colitis. Ordered for CT renal colic evaluation, estimated GFR, urinalysis and culture if indicated, CBC with differential, BMP, beta-HCG qualitative serum to evaluate. Patient will be treated with Zofran 8mg IV, Toradol injection, Dilaudid 0.5mg injection, NS infusion 1000mL  for her " "symptoms.     DIAGNOSTIC STUDIES / PROCEDURES     LABS    CBC and BMP with evidence of dehydration and hypokalemia with potassium of 2.8 urinalysis without evidence of infection no acute kidney injury  All labs were reviewed by me.    RADIOLOGY  CT-RENAL COLIC EVALUATION(A/P W/O)   Final Result      1.  No evidence of abdominal or pelvic mass, adenopathy, or inflammatory change.  The study is however limited due to nonuse of intravenous contrast.   2.  Prior cholecystectomy   3.  Large and small bowel anastomoses         The radiologist's interpretation of all radiological studies have been reviewed by me.    COURSE & MEDICAL DECISION MAKING  Pertinent Labs & Imaging studies reviewed. (See chart for details)    2:26AM  Patient is experiencing continued nausea and pain.  Patient will be treated with Dilaudid 1mg IV and Zofran 4mg IV.     3:46 AM Patient will be treated with Potassium Chloride in water IVPB 10mEq and NS infusion 1000mL    4:49 AM   - Re-examined; The patient is resting in bed improved. I discussed her above findings and plans for discharge with a prescription for Zofran and potassium oral. She was given a referral to follow up with Urology and her primary care provider and instructed to return to the ED if her symptoms worsen. Patient understands and agrees. Her vitals prior to discharge are: /83 mmHg  Pulse 75  Temp(Src) 36.8 °C (98.2 °F)  Resp 20  Ht 1.651 m (5' 5\")  Wt 56.7 kg (125 lb)  BMI 20.80 kg/m2  SpO2 100%  LMP 04/07/2017 (Approximate)    This is a 41 y.o. year old female who presents with flank pain nausea vomiting no evidence of renal stones and evidence of urinary tract infection. The patient was given 20 IV milliequivalents of potassium prior to discharge at 2 L IV fluids. She is feeling much better and feels comfortable going home I discussed a liquid diet for the next 24-48 hours as well as oral potassium replacement and antiemetics. She feels comfortable going home and " following with her primary physician      The patient is referred to a primary physician for blood pressure management, diabetic screening, and for all other preventative health concerns.    DISPOSITION:  Patient will be discharged home in stable condition.    FOLLOW UP:  Lavell Monte M.D.  [address]  [address]  [place] [address]  [phone]    Schedule an appointment as soon as possible for a visit      [facility], Emergency Dept  [address] [facility] [address]  [phone]    If symptoms worsen - blood in emesis - uncontrolled vomiting or diarrhea      OUTPATIENT MEDICATIONS:  New Prescriptions    ONDANSETRON (ZOFRAN ODT) 4 MG TABLET DISPERSIBLE    Take 1 Tab by mouth every 8 hours as needed for Nausea/Vomiting.    POTASSIUM CHLORIDE ER (KLOR-CON) 10 MEQ TABLET    Take 1 Tab by mouth 2 times a day for 5 days.     FINAL IMPRESSION  1. Flank pain    2. Mild dehydration    3. Hypokalemia          IChaparrita (Scribe), am scribing for, and in the presence of, Marybeth Henning M.D..    Electronically signed by: Chaparrita Young (Scribe), 5/1/2017    IMarybeth M.D. personally performed the services described in this documentation, as scribed by Chaparrita Young in my presence, and it is both accurate and complete.    The note accurately reflects work and decisions made by me.  Marybeth Henning  5/1/2017  4:52 AM    This dictation has been created using voice recognition software and/or scribes. The accuracy of the dictation is limited by the abilities of the software and the expertise of the scribes. I expect there may be some errors of grammar and possibly content. I made every attempt to manually correct the errors within my dictation. However, errors related to voice recognition software and/or scribes may still exist and should be interpreted within the appropriate context.

## 2017-05-01 NOTE — ED AVS SNAPSHOT
Art [employer]ft Access Code: [email or web address]  Expires: 5/7/2017  7:54 PM    Art Loft  A secure, online tool to manage your health information     [employer]’s Art Loft® is a secure, online tool that connects you to your personalized health information from the privacy of your home -- day or night - making it very easy for you to manage your healthcare. Once the activation process is completed, you can even access your medical information using the Art Loft mirna, which is available for free in the Apple Miran store or Google Play store.     Art Loft provides the following levels of access (as shown below):   My Chart Features   [facility] Primary Care Doctor [facility]  Specialists [facility]  Urgent  Care Non-[facility]  Primary Care  Doctor   Email your healthcare team securely and privately 24/7 X X X X   Manage appointments: schedule your next appointment; view details of past/upcoming appointments X      Request prescription refills. X      View recent personal medical records, including lab and immunizations X X X X   View health record, including health history, allergies, medications X X X X   Read reports about your outpatient visits, procedures, consult and ER notes X X X X   See your discharge summary, which is a recap of your hospital and/or ER visit that includes your diagnosis, lab results, and care plan. X X       How to register for Art Loft:  1. Go to  [email or web address].  2. Click on the Sign Up Now box, which takes you to the New Member Sign Up page. You will need to provide the following information:  a. Enter your Art Loft Access Code exactly as it appears at the top of this page. (You will not need to use this code after you’ve completed the sign-up process. If you do not sign up before the expiration date, you must request a new code.)   b. Enter your date of birth.   c. Enter your home email address.   d. Click Submit, and follow the next screen’s instructions.  3. Create a Art Loft ID. This will be your Art Loft  login ID and cannot be changed, so think of one that is secure and easy to remember.  4. Create a [employer] password. You can change your password at any time.  5. Enter your Password Reset Question and Answer. This can be used at a later time if you forget your password.   6. Enter your e-mail address. This allows you to receive e-mail notifications when new information is available in [employer].  7. Click Sign Up. You can now view your health information.    For assistance activating your [employer] account, call [phone]

## 2017-05-01 NOTE — DISCHARGE INSTRUCTIONS
Dehydration, Adult  Dehydration is when you lose more fluids from the body than you take in. Vital organs like the kidneys, brain, and heart cannot function without a proper amount of fluids and salt. Any loss of fluids from the body can cause dehydration.   CAUSES   · Vomiting.  · Diarrhea.  · Excessive sweating.  · Excessive urine output.  · Fever.  SYMPTOMS   Mild dehydration  · Thirst.  · Dry lips.  · Slightly dry mouth.  Moderate dehydration  · Very dry mouth.  · Sunken eyes.  · Skin does not bounce back quickly when lightly pinched and released.  · Dark urine and decreased urine production.  · Decreased tear production.  · Headache.  Severe dehydration  · Very dry mouth.  · Extreme thirst.  · Rapid, weak pulse (more than 100 beats per minute at rest).  · Cold hands and feet.  · Not able to sweat in spite of heat and temperature.  · Rapid breathing.  · Blue lips.  · Confusion and lethargy.  · Difficulty being awakened.  · Minimal urine production.  · No tears.  DIAGNOSIS   Your caregiver will diagnose dehydration based on your symptoms and your exam. Blood and urine tests will help confirm the diagnosis. The diagnostic evaluation should also identify the cause of dehydration.  TREATMENT   Treatment of mild or moderate dehydration can often be done at home by increasing the amount of fluids that you drink. It is best to drink small amounts of fluid more often. Drinking too much at one time can make vomiting worse. Refer to the home care instructions below.  Severe dehydration needs to be treated at the hospital where you will probably be given intravenous (IV) fluids that contain water and electrolytes.  HOME CARE INSTRUCTIONS   · Ask your caregiver about specific rehydration instructions.  · Drink enough fluids to keep your urine clear or pale yellow.  · Drink small amounts frequently if you have nausea and vomiting.  · Eat as you normally do.  · Avoid:  · Foods or drinks high in sugar.  · Carbonated  drinks.  · Juice.  · Extremely hot or cold fluids.  · Drinks with caffeine.  · Fatty, greasy foods.  · Alcohol.  · Tobacco.  · Overeating.  · Gelatin desserts.  · Wash your hands well to avoid spreading bacteria and viruses.  · Only take over-the-counter or prescription medicines for pain, discomfort, or fever as directed by your caregiver.  · Ask your caregiver if you should continue all prescribed and over-the-counter medicines.  · Keep all follow-up appointments with your caregiver.  SEEK MEDICAL CARE IF:  · You have abdominal pain and it increases or stays in one area (localizes).  · You have a rash, stiff neck, or severe headache.  · You are irritable, sleepy, or difficult to awaken.  · You are weak, dizzy, or extremely thirsty.  SEEK IMMEDIATE MEDICAL CARE IF:   · You are unable to keep fluids down or you get worse despite treatment.  · You have frequent episodes of vomiting or diarrhea.  · You have blood or green matter (bile) in your vomit.  · You have blood in your stool or your stool looks black and tarry.  · You have not urinated in 6 to 8 hours, or you have only urinated a small amount of very dark urine.  · You have a fever.  · You faint.  MAKE SURE YOU:   · Understand these instructions.  · Will watch your condition.  · Will get help right away if you are not doing well or get worse.     This information is not intended to replace advice given to you by your health care provider. Make sure you discuss any questions you have with your health care provider.     Document Released: 12/18/2006 Document Revised: 03/11/2013 Document Reviewed: 08/06/2012  [employer] Interactive Patient Education ©2016 [employer]  Nausea and Vomiting  Nausea is a sick feeling that often comes before throwing up (vomiting). Vomiting is a reflex where stomach contents come out of your mouth. Vomiting can cause severe loss of body fluids (dehydration). Children and elderly adults can become dehydrated quickly, especially if they  also have diarrhea. Nausea and vomiting are symptoms of a condition or disease. It is important to find the cause of your symptoms.  CAUSES   · Direct irritation of the stomach lining. This irritation can result from increased acid production (gastroesophageal reflux disease), infection, food poisoning, taking certain medicines (such as nonsteroidal anti-inflammatory drugs), alcohol use, or tobacco use.  · Signals from the brain. These signals could be caused by a headache, heat exposure, an inner ear disturbance, increased pressure in the brain from injury, infection, a tumor, or a concussion, pain, emotional stimulus, or metabolic problems.  · An obstruction in the gastrointestinal tract (bowel obstruction).  · Illnesses such as diabetes, hepatitis, gallbladder problems, appendicitis, kidney problems, cancer, sepsis, atypical symptoms of a heart attack, or eating disorders.  · Medical treatments such as chemotherapy and radiation.  · Receiving medicine that makes you sleep (general anesthetic) during surgery.  DIAGNOSIS  Your caregiver may ask for tests to be done if the problems do not improve after a few days. Tests may also be done if symptoms are severe or if the reason for the nausea and vomiting is not clear. Tests may include:  · Urine tests.  · Blood tests.  · Stool tests.  · Cultures (to look for evidence of infection).  · X-rays or other imaging studies.  Test results can help your caregiver make decisions about treatment or the need for additional tests.  TREATMENT  You need to stay well hydrated. Drink frequently but in small amounts. You may wish to drink water, sports drinks, clear broth, or eat frozen ice pops or gelatin dessert to help stay hydrated. When you eat, eating slowly may help prevent nausea. There are also some antinausea medicines that may help prevent nausea.  HOME CARE INSTRUCTIONS   · Take all medicine as directed by your caregiver.  · If you do not have an appetite, do not force  yourself to eat. However, you must continue to drink fluids.  · If you have an appetite, eat a normal diet unless your caregiver tells you differently.  ¨ Eat a variety of complex carbohydrates (rice, wheat, potatoes, bread), lean meats, yogurt, fruits, and vegetables.  ¨ Avoid high-fat foods because they are more difficult to digest.  · Drink enough water and fluids to keep your urine clear or pale yellow.  · If you are dehydrated, ask your caregiver for specific rehydration instructions. Signs of dehydration may include:  ¨ Severe thirst.  ¨ Dry lips and mouth.  ¨ Dizziness.  ¨ Dark urine.  ¨ Decreasing urine frequency and amount.  ¨ Confusion.  ¨ Rapid breathing or pulse.  SEEK IMMEDIATE MEDICAL CARE IF:   · You have blood or brown flecks (like coffee grounds) in your vomit.  · You have black or bloody stools.  · You have a severe headache or stiff neck.  · You are confused.  · You have severe abdominal pain.  · You have chest pain or trouble breathing.  · You do not urinate at least once every 8 hours.  · You develop cold or clammy skin.  · You continue to vomit for longer than 24 to 48 hours.  · You have a fever.  MAKE SURE YOU:   · Understand these instructions.  · Will watch your condition.  · Will get help right away if you are not doing well or get worse.     This information is not intended to replace advice given to you by your health care provider. Make sure you discuss any questions you have with your health care provider.     Document Released: 12/18/2006 Document Revised: 03/11/2013 Document Reviewed: 05/16/2012  [employer] Interactive Patient Education ©2016 [employer]

## 2017-05-30 ENCOUNTER — OFFICE VISIT (OUTPATIENT)
Dept: INTERNAL MEDICINE | Facility: MEDICAL CENTER | Age: 42
End: 2017-05-30
Payer: MEDICAID

## 2017-05-30 VITALS
HEART RATE: 92 BPM | OXYGEN SATURATION: 97 % | RESPIRATION RATE: 19 BRPM | BODY MASS INDEX: 19.89 KG/M2 | TEMPERATURE: 97.5 F | DIASTOLIC BLOOD PRESSURE: 72 MMHG | HEIGHT: 65 IN | WEIGHT: 119.4 LBS | SYSTOLIC BLOOD PRESSURE: 106 MMHG

## 2017-05-30 DIAGNOSIS — F33.1 MODERATE EPISODE OF RECURRENT MAJOR DEPRESSIVE DISORDER (HCC): ICD-10-CM

## 2017-05-30 DIAGNOSIS — F41.9 ANXIETY: Chronic | ICD-10-CM

## 2017-05-30 DIAGNOSIS — R73.9 HYPERGLYCEMIA: ICD-10-CM

## 2017-05-30 DIAGNOSIS — Z34.90 PLANNED PREGNANCY: ICD-10-CM

## 2017-05-30 PROCEDURE — 99213 OFFICE O/P EST LOW 20 MIN: CPT | Mod: GE | Performed by: INTERNAL MEDICINE

## 2017-05-30 ASSESSMENT — PATIENT HEALTH QUESTIONNAIRE - PHQ9: CLINICAL INTERPRETATION OF PHQ2 SCORE: 0

## 2017-05-30 ASSESSMENT — PAIN SCALES - GENERAL: PAINLEVEL: NO PAIN

## 2017-05-30 NOTE — PATIENT INSTRUCTIONS
Depression, Adult  Depression refers to feeling sad, low, down in the dumps, blue, gloomy, or empty. In general, there are two kinds of depression:  1. Normal sadness or normal grief. This kind of depression is one that we all feel from time to time after upsetting life experiences, such as the loss of a job or the ending of a relationship. This kind of depression is considered normal, is short lived, and resolves within a few days to 2 weeks. Depression experienced after the loss of a loved one (bereavement) often lasts longer than 2 weeks but normally gets better with time.  2. Clinical depression. This kind of depression lasts longer than normal sadness or normal grief or interferes with your ability to function at home, at work, and in school. It also interferes with your personal relationships. It affects almost every aspect of your life. Clinical depression is an illness.  Symptoms of depression can also be caused by conditions other than those mentioned above, such as:  · Physical illness. Some physical illnesses, including underactive thyroid gland (hypothyroidism), severe anemia, specific types of cancer, diabetes, uncontrolled seizures, heart and lung problems, strokes, and chronic pain are commonly associated with symptoms of depression.  · Side effects of some prescription medicine. In some people, certain types of medicine can cause symptoms of depression.  · Substance abuse. Abuse of alcohol and illicit drugs can cause symptoms of depression.  SYMPTOMS  Symptoms of normal sadness and normal grief include the following:  · Feeling sad or crying for short periods of time.  · Not caring about anything (apathy).  · Difficulty sleeping or sleeping too much.  · No longer able to enjoy the things you used to enjoy.  · Desire to be by oneself all the time (social isolation).  · Lack of energy or motivation.  · Difficulty concentrating or remembering.  · Change in appetite or weight.  · Restlessness or  agitation.  Symptoms of clinical depression include the same symptoms of normal sadness or normal grief and also the following symptoms:  · Feeling sad or crying all the time.  · Feelings of guilt or worthlessness.  · Feelings of hopelessness or helplessness.  · Thoughts of suicide or the desire to harm yourself (suicidal ideation).  · Loss of touch with reality (psychotic symptoms). Seeing or hearing things that are not real (hallucinations) or having false beliefs about your life or the people around you (delusions and paranoia).  DIAGNOSIS   The diagnosis of clinical depression is usually based on how bad the symptoms are and how long they have lasted. Your health care provider will also ask you questions about your medical history and substance use to find out if physical illness, use of prescription medicine, or substance abuse is causing your depression. Your health care provider may also order blood tests.  TREATMENT   Often, normal sadness and normal grief do not require treatment. However, sometimes antidepressant medicine is given for bereavement to ease the depressive symptoms until they resolve.  The treatment for clinical depression depends on how bad the symptoms are but often includes antidepressant medicine, counseling with a mental health professional, or both. Your health care provider will help to determine what treatment is best for you.  Depression caused by physical illness usually goes away with appropriate medical treatment of the illness. If prescription medicine is causing depression, talk with your health care provider about stopping the medicine, decreasing the dose, or changing to another medicine.  Depression caused by the abuse of alcohol or illicit drugs goes away when you stop using these substances. Some adults need professional help in order to stop drinking or using drugs.  SEEK IMMEDIATE MEDICAL CARE IF:  · You have thoughts about hurting yourself or others.  · You lose touch  with reality (have psychotic symptoms).  · You are taking medicine for depression and have a serious side effect.  FOR MORE INFORMATION  · National [place] on Mental Illness: www.maricruz.org   · National [place] of Mental Health: www.nimh.nih.gov      This information is not intended to replace advice given to you by your health care provider. Make sure you discuss any questions you have with your health care provider.     Document Released: 12/15/2001 Document Revised: 01/08/2016 Document Reviewed: 03/18/2013  Elsevier Interactive Patient Education ©2016 [employer]

## 2017-05-30 NOTE — PROGRESS NOTES
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 Pt has multiple ED visits + hospital admission 2/2 Cannabinoid hyperemesis syndrome/ Cyclical Vomiting Syndrome, last admissoin was on 4/17/2017.  Pt's still trying to become pregnant for one yr, she is f/u with Dr. Chaka byrd for pregnancy planning, she is on clomiphene for ovulation induction, reported that she tried for 3 months. Pt is on fluoxetine for depression/anxiety, reported that might has side effect on pregnancy if she becomes pregnant. Pt reported of not having fluoxetine for almost 2 months and does not feel depressed currently, no SI/HI.   Also reported of not having marijuana for the last 6 weeks.         Patient Active Problem List    Diagnosis Date Noted   • Cyclical vomiting 11/07/2016     Priority: High   • Intractable nausea and vomiting 09/13/2016     Priority: High   • Metabolic acidosis, normal anion gap (NAG) 11/07/2016     Priority: Medium   • Back ache 11/07/2016     Priority: Medium   • Anxiety 12/21/2014     Priority: Medium   • Substance abuse 09/15/2011     Priority: Medium   • Celiac disease/sprue 11/07/2016     Priority: Low   • Cyclic vomiting syndrome 02/22/2013     Priority: Low   • Planned pregnancy 05/30/2017   • Hyperglycemia 05/30/2017   • Lactic acidosis 04/17/2017   • Cyclical vomiting syndrome 04/12/2017   • History of seizures 04/12/2017   • Melena 01/10/2017   • Back pain 01/08/2017   • Anxiety 01/08/2017   • Nausea and vomiting 01/04/2017   • Back pain 01/04/2017   • Metabolic acidosis, increased anion gap 11/07/2016   • Hypokalemia 11/07/2016   • Dehydration 11/07/2016   • Menstrual abnormality 09/12/2016   • Plantar wart  09/12/2016   • First degree hemorrhoids 07/06/2016   • Amenorrhea 07/06/2016   • Dizziness of unknown cause 07/06/2016   • Fainting spell 07/06/2016   • Neuropathic pain of right foot 07/06/2016   • SMAS (superior mesenteric artery syndrome) (CMS-HCC) 06/20/2016   • Anemia 05/14/2016   • Depression 09/27/2015   • Drug-seeking behavior 03/19/2015   • Hyponatremia 12/09/2012       Current Outpatient Prescriptions   Medication Sig Dispense Refill   • fluoxetine (PROZAC) 20 MG Cap Take 20 mg by mouth every day.     • multivitamin (THERAGRAN) Tab Take 1 Tab by mouth every day.     • clomiPHENE (CLOMID) 50 MG tablet Take 50 mg by mouth.  1   • ondansetron (ZOFRAN ODT) 4 MG TABLET DISPERSIBLE Take 1 Tab by mouth every 8 hours as needed for Nausea/Vomiting. 10 Tab 0   • promethazine (PHENERGAN) 25 MG Suppos Insert 1 Suppository in rectum every 6 hours as needed for Nausea/Vomiting. 10 Suppository 0   • ondansetron (ZOFRAN ODT) 4 MG TABLET DISPERSIBLE Take 1 Tab by mouth every 8 hours as needed. 10 Tab 0   • promethazine (PHENERGAN) 25 MG Tab Take 1 Tab by mouth every 6 hours as needed for Nausea/Vomiting. 30 Tab 0     No current facility-administered medications for this visit.       ROS: As per HPI. Additional pertinent symptoms as noted below.  Constitutional: Denies fevers  Eyes: Denies changes in vision, no eye pain  Ears/Nose/Throat/Mouth: Denies nasal congestion or sore throat    Cardiovascular: Denies chest pain or palpitations    Respiratory: Denies shortness of breath , Denies cough  Gastrointestinal/Hepatic: Denies abdominal pain, nausea, vomiting, diarrhea, constipation  Genitourinary: Denies bladder dysfunction, dysuria or frequency  Musculoskeletal/Rheum: Denies  joint pain and swelling    Skin: Denies rash  Neurological: Denies headache, confusion, memory loss or focal weakness/parasthesias  Psychiatric: per HPI     Endocrine: denies hx of diabetes or thyroid dysfunction    Past Medical History   Diagnosis  Date   • Snoring    • Dental disorder    • Pain      abd and back   • Tobacco abuse 6/20/2009   • Superior mesenteric artery syndrome (CMS-HCC) 7/12/2009   • Anxiety      Follow[facility]   • CLOSTRIDIUM DIFFICILE INFECTION 4/14/2010   • Dyspepsia 7/12/2009   • Backpain    • Nephrolithiasis 6/20/2009     kidney stones,post lithotrypsy   • Anxiety disorder    • CVS disease    • Drug-seeking behavior    • Cyclic vomiting syndrome    • Superior mesenteric artery syndrome (CMS-HCC)      Past Surgical History   Procedure Laterality Date   • Gastroscopy-endo  7/8/2009     Performed by ROSANNA WRIGHT at SURGERY [facility]   • Lithotripsy     • Pyloroplasty  7/27/2009     Performed by MACIEL QUINTERO at SURGERY [facility]   • Gastroscopy with biopsy  3/9/2010     Performed by AMANDA MEJIA at ENDOSCOPY [facility]   • Exploratory laparotomy  7/27/2009     Performed by MACIEL QUINTERO at SURGERY [facility]   • Appendectomy  7/27/2009     Performed by MACIEL QUINTERO at SURGERY [facility]   • Cholecystectomy  7/27/2009     Performed by MACIEL QUINTERO at SURGERY [facility]   • Other       superior mesenteric artery correction    • Exploratory laparotomy  1/12/2016     Procedure: EXPLORATORY LAPAROTOMY;  Surgeon: Maciel Quintero M.D.;  Location: SURGERY [facility];  Service:    • Bowel resection  1/12/2016     Procedure: BOWEL RESECTION;  Surgeon: Maciel Quintero M.D.;  Location: SURGERY [facility];  Service:    • Gastroscopy-endo  4/28/2016     Procedure: GASTROSCOPY-ENDO;  Surgeon: Blayne Blackburn M.D.;  Location: ENDOSCOPY [facility];  Service:      Family History   Problem Relation Age of Onset   • Cancer Mother 50     Colon cancer   • Allergies Father    • Hypertension Mother    • Hypertension Father    • Heart Disease Maternal Grandmother      Social History     Social History   • Marital Status: Single     Spouse  "Name: N/A   • Number of Children: N/A   • Years of Education: N/A     Occupational History   • Not on file.     Social History Main Topics   • Smoking status: Former Smoker -- 0.50 packs/day for 13 years     Types: Cigarettes     Quit date: 12/15/2012   • Smokeless tobacco: Never Used      Comment: 1/2ppd   • Alcohol Use: No   • Drug Use: No      Comment: 1 gm/day for 10 yr - stopped   • Sexual Activity: Not on file     Other Topics Concern   • Not on file     Social History Narrative       /72 mmHg  Pulse 92  Temp(Src) 36.4 °C (97.5 °F)  Resp 19  Ht 1.651 m (5' 5\")  Wt 54.159 kg (119 lb 6.4 oz)  BMI 19.87 kg/m2  SpO2 97%  Breastfeeding? No    Physical Exam   Constitutional:   Well developed, Well nourished, No acute distress, Non-toxic appearance.   HENMT:  Normocephalic, Atraumatic, Bilateral external ears normal, Oropharynx moist mucous membranes, No oral exudates, Nose normal.  No thyromegaly.  Eyes:  PERRLA, EOMI, Conjunctiva normal, No discharge.  Neck:  Normal range of motion, No cervical tenderness, Supple, No stridor, no JVD.  Cardiovascular:  Normal heart rate, Normal rhythm, No murmurs, No rubs, No gallops.   Extremitites with intact distal pulses, no cyanosis, clubbing or edema.  Lungs:  Respiratory effort is normal. Normal breath sounds, breath sounds clear to auscultation bilaterally,  no rales, no rhonchi, no wheezing.   Abdomen: Bowel sounds normal, Soft, No tenderness, No guarding, No rebound, No masses, No hepatosplenomegaly.  Skin: Warm, Dry, No erythema, No rash, no induration or crepitus.  Neurologic: Alert & oriented x 3, Normal motor function, Normal sensory function, No focal deficits noted, cranial nerves II through XII are normal,  normal gait.  Psychiatric: Affect normal, Judgment normal, Mood normal.      Assessment and Plan    #Anxiety/Bipolar disorder  -Pt has a long hx of psychiatric issues including ESTELA, bipolar disorder, depression and frequent panic attacks.  -Pt has " been self discontinuing her own psych medications including quetiapine for bipolar disorded  -Stop mirtazapine on her own (as she is trying to conceive)  - Denies suicidal/homicidal ideation recently.      -want to discontinue marijuana for now as she is planning to be pregnant    -fluoxetine is category C for pregnancy   -reported of not having fluoxetine for almost 2 months and does not feel depressed currently, no SI/HI.   -TSH: 0.82 1/8/2017  -sr B12: 373, Folate: > 24 on 9/2016 (normal)    Plan:   -referred to behavioral therapy for better control of her marijuana dependence that might help improve her mood as well as preparing her for slowly weaning from fluoxetine as she is preparing to become pregnant.  -ordered B12, folate, vit D level    -CTM       #Planned pregnancy  -Pt's still trying to become pregnant for one yr, she is f/u with Dr. Chaka byrd for pregnancy planning, she is on clomiphene for ovulation induction, reported that she tried for 3 months.   -continue to f/u with Gyn      #Hyperglycemia  -CMP showed BS was high  -A1c 9/2016: 5.6  -pt denies symptoms  -ordered A1c, UA, microalbumin/CR ration  -ctm    ----------------------------------------------  Other chronic problems:  ----------------------------------------------    #Cannabinoid hyperemesis syndrome/ Cyclical Vomiting Syndrome:    -multiple ED visits + hospital admission 2/2 Cannabinoid hyperemesis syndrome/ Cyclical Vomiting Syndrome, worse when anxious, better with hot shower, last admissoin was on 4/17/2017.    -smoking marijuan Q day 1 gm/day for 10 yrs, stated that quit 6 weeks ago as she is planning for pregnancy  -hx of superior mesenteric artery syndrome s/p vlad -en-Y duodenojejunostomy (2009) & small bowel resection (1/2016)  Plan:  - currently on Zofran prn ( category B for pregnancy- no evidence of risk in studies),    -educate about harm of marijuana and get support from marijuana addiction center and marijuana Anonymous     -referred to behavioral therapy for better control of her marijuana dependence that might help improve her mood  -CTM     #hx of abdominal surgery   -on 5/11/2016: tissue transgultaminase IgA: 12 high, Gliadin Ab: 111 high , Elevated Celiac sprue antibodies, however, negative Biopsy for sprue ( 1/12/2016), no response to gluten free diet however  -Pt has bowel adhesions (multiple abdominal procedures) , hx SMA syndrome/small bowel ischemia requring small bowel resection (vlad Y duodenojejunostomy), risk for small bowel overgrowth  -asymptomatic, stable currently     Hemorrhoids/ Melena?   -hx of dark brown stool, hx of constipation    -Intermittent bleeding per rectum and protrusion of rectal mucosa    -External hemorrhoids at 10 o'clock position, no inflammatory signs before  -Has been referred to GI before without any intervention. Stable for now, no changes in her bowel motions recently    -last H/H: 17.2/47.4   -ctm     Substance abuse  -Drug seeking behavior  for percocet and other opioids  -Abuse marijuana  -Educated her regarding effects of drug abuse    -CTM     Preventive care  Flu - denied  DTaP -received the series ( 5 doses)  Tdap- reported will recieve with her obgyn when she become pregnant   Pap - reported 10 months ago, result was normal by Dr. Chaka byrd   Mammogram: will discuss next visit    Signed by: Lavell Monte M.D.

## 2017-05-30 NOTE — MR AVS SNAPSHOT
"        Diana Hernandez   2017 11:00 AM   Office Visit   MRN: [id number]    Department:  [facility] - Internal Med   Dept Phone:  [phone]    Description:  Female : 1975   Provider:  Lavell Monte M.D.           Reason for Visit     Hospital Follow-up [facility] vomiting      Allergies as of 2017     Allergen Noted Reactions    Metoclopramide 2015   Hives    Told by MD; pt suspects possible hives.    Quetiapine 2017   Unspecified    MD told her she was allergic      Septra [Sulfamethoxazole W-Trimethoprim] 2017   Unspecified    MD told her she was allergic        You were diagnosed with     Anxiety   [059918]       Moderate episode of recurrent major depressive disorder (CMS-HCC)   [6218835]       Planned pregnancy   [197801]       Hyperglycemia   [579889]         Vital Signs     Blood Pressure Pulse Temperature Respirations Height Weight    106/72 mmHg 92 36.4 °C (97.5 °F) 19 1.651 m (5' 5\") 54.159 kg (119 lb 6.4 oz)    Body Mass Index Oxygen Saturation Breastfeeding? Smoking Status          19.87 kg/m2 97% No Former Smoker        Basic Information     Date Of Birth Sex Race Ethnicity Preferred Language    1975 Female White Non- English      Your appointments     2017  9:00 AM   Established Patient with Lavell Monte M.D.   [facility] - Internal Medicine (--)    [address] [place] [id number] [phone]           You will be receiving a confirmation call a few days before your appointment from our automated call confirmation system.              Problem List              ICD-10-CM Priority Class Noted - Resolved    Substance abuse F19.10 Medium  9/15/2011 - Present    Hyponatremia E87.1   2012 - Present    Cyclic vomiting syndrome (Chronic) G43.A0 Low  2013 - Present    Anxiety (Chronic) F41.9 Medium  2014 - Present    Drug-seeking behavior Z76.5   3/19/2015 - Present    Depression F32.9   2015 " - Present    Anemia D64.9   5/14/2016 - Present    SMAS (superior mesenteric artery syndrome) (CMS-HCC) (Chronic) K55.1   6/20/2016 - Present    First degree hemorrhoids K64.0   7/6/2016 - Present    Amenorrhea N91.2   7/6/2016 - Present    Dizziness of unknown cause R42   7/6/2016 - Present    Fainting spell R55   7/6/2016 - Present    Neuropathic pain of right foot G57.91   7/6/2016 - Present    Menstrual abnormality N92.6   9/12/2016 - Present    Plantar wart B07.0   9/12/2016 - Present    Intractable nausea and vomiting R11.2 High  9/13/2016 - Present    Cyclical vomiting G43.A0 High  11/7/2016 - Present    Metabolic acidosis, increased anion gap E87.2   11/7/2016 - Present    Metabolic acidosis, normal anion gap (NAG) E87.2 Medium  11/7/2016 - Present    Hypokalemia E87.6   11/7/2016 - Present    Celiac disease/sprue K90.0 Low  11/7/2016 - Present    Dehydration E86.0   11/7/2016 - Present    Back ache M54.9 Medium  11/7/2016 - Present    Nausea and vomiting R11.2   1/4/2017 - Present    Back pain M54.9   1/4/2017 - Present    Back pain M54.9   1/8/2017 - Present    Anxiety F41.9   1/8/2017 - Present    Melena K92.1   1/10/2017 - Present    Cyclical vomiting syndrome G43.A0   4/12/2017 - Present    History of seizures Z87.898   4/12/2017 - Present    Lactic acidosis E87.2   4/17/2017 - Present    Planned pregnancy Z33.1   5/30/2017 - Present    Hyperglycemia R73.9   5/30/2017 - Present      Health Maintenance        Date Due Completion Dates    IMM DTaP/Tdap/Td Vaccine (5 - Tdap) 10/19/1990 10/18/1990, 2/12/1979, 2/28/1978, 1975, 1975    PAP SMEAR 5/24/1998 ---    MAMMOGRAM 5/24/2017 ---            Current Immunizations     Dtap Vaccine 2/12/1979, 2/28/1978, 1975, 1975    Hepatitis B Vaccine Non-Recombivax (Ped/Adol) 4/14/1994, 3/8/1994    Influenza TIV (IM) 9/17/2011 11:45 AM    Influenza Vaccine 11/8/2008    Influenza Vaccine Quad Inj (Pf) 12/20/2014  3:43 PM    Influenza Vaccine Quad Inj  (Preserved) 9/28/2015  2:45 PM    MMR Vaccine 10/18/1990, 10/10/1978    OPV - Historical Data 5/12/1982, 4/28/1979, 2/12/1979, 2/28/1978    Pneumococcal polysaccharide vaccine (PPSV-23) 12/20/2014  3:42 PM    TD Vaccine 10/18/1990      Below and/or attached are the medications your provider expects you to take. Review all of your home medications and newly ordered medications with your provider and/or pharmacist. Follow medication instructions as directed by your provider and/or pharmacist. Please keep your medication list with you and share with your provider. Update the information when medications are discontinued, doses are changed, or new medications (including over-the-counter products) are added; and carry medication information at all times in the event of emergency situations     Allergies:  METOCLOPRAMIDE - Hives     QUETIAPINE - Unspecified     SEPTRA - Unspecified               Medications  Valid as of: May 30, 2017 - 12:20 PM    Generic Name Brand Name Tablet Size Instructions for use    ClomiPHENE Citrate (Tab) CLOMID 50 MG Take 50 mg by mouth.        FLUoxetine HCl (Cap) PROZAC 20 MG Take 20 mg by mouth every day.        Multiple Vitamin (Tab) THERAGRAN  Take 1 Tab by mouth every day.        Ondansetron (TABLET DISPERSIBLE) ZOFRAN ODT 4 MG Take 1 Tab by mouth every 8 hours as needed.        Ondansetron (TABLET DISPERSIBLE) ZOFRAN ODT 4 MG Take 1 Tab by mouth every 8 hours as needed for Nausea/Vomiting.        Promethazine HCl (Suppos) PHENERGAN 25 MG Insert 1 Suppository in rectum every 6 hours as needed for Nausea/Vomiting.        Promethazine HCl (Tab) PHENERGAN 25 MG Take 1 Tab by mouth every 6 hours as needed for Nausea/Vomiting.        .                 Medicines prescribed today were sent to:     [id number] - [place] LOZANO - [address] AT [facility] [address]    [address] [address]    Phone: [phone] Fax: [phone]    Open 24 Hours?: No         Medication refill instructions:       If your prescription bottle indicates you have medication refills left, it is not necessary to call your provider’s office. Please contact your pharmacy and they will refill your medication.    If your prescription bottle indicates you do not have any refills left, you may request refills at any time through one of the following ways: The online [facility] system (except Urgent Care), by calling your provider’s office, or by asking your pharmacy to contact your provider’s office with a refill request. Medication refills are processed only during regular business hours and may not be available until the next business day. Your provider may request additional information or to have a follow-up visit with you prior to refilling your medication.   *Please Note: Medication refills are assigned a new Rx number when refilled electronically. Your pharmacy may indicate that no refills were authorized even though a new prescription for the same medication is available at the pharmacy. Please request the medicine by name with the pharmacy before contacting your provider for a refill.        Your To Do List     Future Labs/Procedures Complete By Expires    FOLATE  As directed 5/30/2018    HEMOGLOBIN A1C  As directed 5/30/2018    MICROALBUMIN CREAT RATIO URINE  As directed 5/30/2018    URINALYSIS  As directed 5/30/2018    VITAMIN B12  As directed 5/30/2018    VITAMIN D,25 HYDROXY  As directed 5/30/2018      Referral     A referral request has been sent to our patient care coordination department. Please allow 3-5 business days for us to process this request and contact you either by phone or mail. If you do not hear from us by the 5th business day, please call us at [phone].        Instructions    Depression, Adult  Depression refers to feeling sad, low, down in the dumps, blue, gloomy, or empty. In general, there are two kinds of depression:  1. Normal sadness or normal grief. This  kind of depression is one that we all feel from time to time after upsetting life experiences, such as the loss of a job or the ending of a relationship. This kind of depression is considered normal, is short lived, and resolves within a few days to 2 weeks. Depression experienced after the loss of a loved one (bereavement) often lasts longer than 2 weeks but normally gets better with time.  2. Clinical depression. This kind of depression lasts longer than normal sadness or normal grief or interferes with your ability to function at home, at work, and in school. It also interferes with your personal relationships. It affects almost every aspect of your life. Clinical depression is an illness.  Symptoms of depression can also be caused by conditions other than those mentioned above, such as:  · Physical illness. Some physical illnesses, including underactive thyroid gland (hypothyroidism), severe anemia, specific types of cancer, diabetes, uncontrolled seizures, heart and lung problems, strokes, and chronic pain are commonly associated with symptoms of depression.  · Side effects of some prescription medicine. In some people, certain types of medicine can cause symptoms of depression.  · Substance abuse. Abuse of alcohol and illicit drugs can cause symptoms of depression.  SYMPTOMS  Symptoms of normal sadness and normal grief include the following:  · Feeling sad or crying for short periods of time.  · Not caring about anything (apathy).  · Difficulty sleeping or sleeping too much.  · No longer able to enjoy the things you used to enjoy.  · Desire to be by oneself all the time (social isolation).  · Lack of energy or motivation.  · Difficulty concentrating or remembering.  · Change in appetite or weight.  · Restlessness or agitation.  Symptoms of clinical depression include the same symptoms of normal sadness or normal grief and also the following symptoms:  · Feeling sad or crying all the time.  · Feelings of guilt  or worthlessness.  · Feelings of hopelessness or helplessness.  · Thoughts of suicide or the desire to harm yourself (suicidal ideation).  · Loss of touch with reality (psychotic symptoms). Seeing or hearing things that are not real (hallucinations) or having false beliefs about your life or the people around you (delusions and paranoia).  DIAGNOSIS   The diagnosis of clinical depression is usually based on how bad the symptoms are and how long they have lasted. Your health care provider will also ask you questions about your medical history and substance use to find out if physical illness, use of prescription medicine, or substance abuse is causing your depression. Your health care provider may also order blood tests.  TREATMENT   Often, normal sadness and normal grief do not require treatment. However, sometimes antidepressant medicine is given for bereavement to ease the depressive symptoms until they resolve.  The treatment for clinical depression depends on how bad the symptoms are but often includes antidepressant medicine, counseling with a mental health professional, or both. Your health care provider will help to determine what treatment is best for you.  Depression caused by physical illness usually goes away with appropriate medical treatment of the illness. If prescription medicine is causing depression, talk with your health care provider about stopping the medicine, decreasing the dose, or changing to another medicine.  Depression caused by the abuse of alcohol or illicit drugs goes away when you stop using these substances. Some adults need professional help in order to stop drinking or using drugs.  SEEK IMMEDIATE MEDICAL CARE IF:  · You have thoughts about hurting yourself or others.  · You lose touch with reality (have psychotic symptoms).  · You are taking medicine for depression and have a serious side effect.  FOR MORE INFORMATION  · National [place] on Mental Illness:  www.maricruz.org   · National [place] of Mental Health: www.nimh.nih.gov      This information is not intended to replace advice given to you by your health care provider. Make sure you discuss any questions you have with your health care provider.     Document Released: 12/15/2001 Document Revised: 01/08/2016 Document Reviewed: 03/18/2013  [employer] Interactive Patient Education ©2016 [employer] Inc.            LAN-Power Access Code: [email or web address]  Expires: 6/29/2017 10:45 AM    LAN-Power  A secure, online tool to manage your health information     [employer]® is a secure, online tool that connects you to your personalized health information from the privacy of your home -- day or night - making it very easy for you to manage your healthcare. Once the activation process is completed, you can even access your medical information using the LAN-Power mirna, which is available for free in the Apple Mirna store or Google Play store.     LAN-Power provides the following levels of access (as shown below):   My Chart Features   [facility]  Specialists [facility]  Urgent  Care Non-[facility]  Primary Care  Doctor   Email your healthcare team securely and privately 24/7 X X X    Manage appointments: schedule your next appointment; view details of past/upcoming appointments X      Request prescription refills. X      View recent personal medical records, including lab and immunizations X X X X   View health record, including health history, allergies, medications X X X X   Read reports about your outpatient visits, procedures, consult and ER notes X X X X   See your discharge summary, which is a recap of your hospital and/or ER visit that includes your diagnosis, lab results, and care plan. X X       How to register for LAN-Power:  1. Go to  [email or web address].  2. Click on the Sign Up Now box, which takes you to the New Member Sign Up page. You will need to provide the following information:  a. Enter  your [employer] Access Code exactly as it appears at the top of this page. (You will not need to use this code after you’ve completed the sign-up process. If you do not sign up before the expiration date, you must request a new code.)   b. Enter your date of birth.   c. Enter your home email address.   d. Click Submit, and follow the next screen’s instructions.  3. Create a [employer] ID. This will be your [employer] login ID and cannot be changed, so think of one that is secure and easy to remember.  4. Create a [employer] password. You can change your password at any time.  5. Enter your Password Reset Question and Answer. This can be used at a later time if you forget your password.   6. Enter your e-mail address. This allows you to receive e-mail notifications when new information is available in [employer].  7. Click Sign Up. You can now view your health information.    For assistance activating your [employer] account, call [phone]

## 2017-06-07 NOTE — ADDENDUM NOTE
Encounter addended by: Ciara Quiroga R.N. on: 6/6/2017  5:53 PM<BR>     Documentation filed: Utilization Review, Inpatient Document Flowsheet

## 2017-06-22 NOTE — ADDENDUM NOTE
Encounter addended by: iCara Quiroga R.N. on: 6/21/2017  6:08 PM<BR>     Documentation filed: Inpatient Document Flowsheet

## 2017-08-03 ENCOUNTER — HOSPITAL ENCOUNTER (OUTPATIENT)
Dept: RADIOLOGY | Facility: MEDICAL CENTER | Age: 42
End: 2017-08-03
Attending: OBSTETRICS & GYNECOLOGY
Payer: MEDICAID

## 2017-08-03 DIAGNOSIS — R10.2 PELVIC PAIN: ICD-10-CM

## 2017-08-03 DIAGNOSIS — D25.9 UTERINE LEIOMYOMA, UNSPECIFIED LOCATION: ICD-10-CM

## 2017-08-03 DIAGNOSIS — N92.6 IRREGULAR PERIODS: ICD-10-CM

## 2017-08-03 PROCEDURE — 76830 TRANSVAGINAL US NON-OB: CPT

## 2017-08-09 ENCOUNTER — HOSPITAL ENCOUNTER (OUTPATIENT)
Dept: RADIOLOGY | Facility: MEDICAL CENTER | Age: 42
End: 2017-08-09
Attending: OBSTETRICS & GYNECOLOGY
Payer: MEDICAID

## 2017-08-26 ENCOUNTER — APPOINTMENT (OUTPATIENT)
Dept: RADIOLOGY | Facility: MEDICAL CENTER | Age: 42
End: 2017-08-26
Attending: STUDENT IN AN ORGANIZED HEALTH CARE EDUCATION/TRAINING PROGRAM
Payer: MEDICAID

## 2017-08-26 ENCOUNTER — APPOINTMENT (OUTPATIENT)
Dept: RADIOLOGY | Facility: MEDICAL CENTER | Age: 42
End: 2017-08-26
Attending: EMERGENCY MEDICINE
Payer: MEDICAID

## 2017-08-26 ENCOUNTER — RESOLUTE PROFESSIONAL BILLING HOSPITAL PROF FEE (OUTPATIENT)
Dept: HOSPITALIST | Facility: MEDICAL CENTER | Age: 42
End: 2017-08-26
Payer: MEDICAID

## 2017-08-26 ENCOUNTER — HOSPITAL ENCOUNTER (OUTPATIENT)
Facility: MEDICAL CENTER | Age: 42
End: 2017-08-27
Attending: EMERGENCY MEDICINE | Admitting: HOSPITALIST
Payer: MEDICAID

## 2017-08-26 DIAGNOSIS — R11.15 INTRACTABLE CYCLICAL VOMITING WITHOUT NAUSEA: ICD-10-CM

## 2017-08-26 PROBLEM — R10.9 FLANK PAIN, ACUTE: Status: ACTIVE | Noted: 2017-08-26

## 2017-08-26 LAB
ALBUMIN SERPL BCP-MCNC: 4.9 G/DL (ref 3.2–4.9)
ALBUMIN/GLOB SERPL: 1.2 G/DL
ALP SERPL-CCNC: 90 U/L (ref 30–99)
ALT SERPL-CCNC: 7 U/L (ref 2–50)
AMORPH CRY #/AREA URNS HPF: PRESENT /HPF
AMPHET UR QL SCN: NEGATIVE
ANION GAP SERPL CALC-SCNC: 16 MMOL/L (ref 0–11.9)
APPEARANCE UR: ABNORMAL
AST SERPL-CCNC: 13 U/L (ref 12–45)
B-HCG SERPL-ACNC: 1.1 MIU/ML (ref 0–5)
BACTERIA #/AREA URNS HPF: ABNORMAL /HPF
BARBITURATES UR QL SCN: NEGATIVE
BASOPHILS # BLD AUTO: 0.3 % (ref 0–1.8)
BASOPHILS # BLD: 0.05 K/UL (ref 0–0.12)
BENZODIAZ UR QL SCN: NEGATIVE
BILIRUB SERPL-MCNC: 1.6 MG/DL (ref 0.1–1.5)
BILIRUB UR QL STRIP.AUTO: NEGATIVE
BUN SERPL-MCNC: 21 MG/DL (ref 8–22)
BZE UR QL SCN: NEGATIVE
CALCIUM SERPL-MCNC: 10.4 MG/DL (ref 8.5–10.5)
CANNABINOIDS UR QL SCN: POSITIVE
CHLORIDE SERPL-SCNC: 103 MMOL/L (ref 96–112)
CO2 SERPL-SCNC: 15 MMOL/L (ref 20–33)
COLOR UR: YELLOW
CORTIS SERPL-MCNC: 43.5 UG/DL (ref 0–23)
CREAT SERPL-MCNC: 1.07 MG/DL (ref 0.5–1.4)
CULTURE IF INDICATED INDCX: YES UA CULTURE
EKG IMPRESSION: NORMAL
EOSINOPHIL # BLD AUTO: 0 K/UL (ref 0–0.51)
EOSINOPHIL NFR BLD: 0 % (ref 0–6.9)
EPI CELLS #/AREA URNS HPF: ABNORMAL /HPF
ERYTHROCYTE [DISTWIDTH] IN BLOOD BY AUTOMATED COUNT: 45.9 FL (ref 35.9–50)
EST. AVERAGE GLUCOSE BLD GHB EST-MCNC: 114 MG/DL
GFR SERPL CREATININE-BSD FRML MDRD: 57 ML/MIN/1.73 M 2
GLOBULIN SER CALC-MCNC: 4.2 G/DL (ref 1.9–3.5)
GLUCOSE BLD-MCNC: 104 MG/DL (ref 65–99)
GLUCOSE BLD-MCNC: 90 MG/DL (ref 65–99)
GLUCOSE SERPL-MCNC: 251 MG/DL (ref 65–99)
GLUCOSE UR STRIP.AUTO-MCNC: 150 MG/DL
HBA1C MFR BLD: 5.6 % (ref 0–5.6)
HCT VFR BLD AUTO: 43.4 % (ref 37–47)
HGB BLD-MCNC: 15.4 G/DL (ref 12–16)
IMM GRANULOCYTES # BLD AUTO: 0.09 K/UL (ref 0–0.11)
IMM GRANULOCYTES NFR BLD AUTO: 0.6 % (ref 0–0.9)
KETONES UR STRIP.AUTO-MCNC: 100 MG/DL
LACTATE BLD-SCNC: 1.7 MMOL/L (ref 0.5–2)
LEUKOCYTE ESTERASE UR QL STRIP.AUTO: NEGATIVE
LIPASE SERPL-CCNC: 4 U/L (ref 11–82)
LIPASE SERPL-CCNC: 5 U/L (ref 11–82)
LYMPHOCYTES # BLD AUTO: 0.67 K/UL (ref 1–4.8)
LYMPHOCYTES NFR BLD: 4.6 % (ref 22–41)
MAGNESIUM SERPL-MCNC: 1.8 MG/DL (ref 1.5–2.5)
MCH RBC QN AUTO: 33.8 PG (ref 27–33)
MCHC RBC AUTO-ENTMCNC: 35.5 G/DL (ref 33.6–35)
MCV RBC AUTO: 95.4 FL (ref 81.4–97.8)
METHADONE UR QL SCN: NEGATIVE
MICRO URNS: ABNORMAL
MONOCYTES # BLD AUTO: 0.43 K/UL (ref 0–0.85)
MONOCYTES NFR BLD AUTO: 2.9 % (ref 0–13.4)
MUCOUS THREADS #/AREA URNS HPF: ABNORMAL /HPF
NEUTROPHILS # BLD AUTO: 13.38 K/UL (ref 2–7.15)
NEUTROPHILS NFR BLD: 91.6 % (ref 44–72)
NITRITE UR QL STRIP.AUTO: NEGATIVE
NRBC # BLD AUTO: 0 K/UL
NRBC BLD AUTO-RTO: 0 /100 WBC
OPIATES UR QL SCN: NEGATIVE
OXYCODONE UR QL SCN: NEGATIVE
PCP UR QL SCN: NEGATIVE
PH UR STRIP.AUTO: 5 [PH]
PHOSPHATE SERPL-MCNC: 1.8 MG/DL (ref 2.5–4.5)
PLATELET # BLD AUTO: 344 K/UL (ref 164–446)
PMV BLD AUTO: 10.3 FL (ref 9–12.9)
POTASSIUM SERPL-SCNC: 3.2 MMOL/L (ref 3.6–5.5)
PROPOXYPH UR QL SCN: NEGATIVE
PROT SERPL-MCNC: 9.1 G/DL (ref 6–8.2)
PROT UR QL STRIP: 30 MG/DL
RBC # BLD AUTO: 4.55 M/UL (ref 4.2–5.4)
RBC # URNS HPF: ABNORMAL /HPF
RBC UR QL AUTO: ABNORMAL
SODIUM SERPL-SCNC: 134 MMOL/L (ref 135–145)
SP GR UR STRIP.AUTO: 1.02
TSH SERPL DL<=0.005 MIU/L-ACNC: 2.04 UIU/ML (ref 0.3–3.7)
UROBILINOGEN UR STRIP.AUTO-MCNC: NORMAL MG/DL
WBC # BLD AUTO: 14.6 K/UL (ref 4.8–10.8)
WBC #/AREA URNS HPF: ABNORMAL /HPF

## 2017-08-26 PROCEDURE — A9270 NON-COVERED ITEM OR SERVICE: HCPCS | Performed by: STUDENT IN AN ORGANIZED HEALTH CARE EDUCATION/TRAINING PROGRAM

## 2017-08-26 PROCEDURE — G0378 HOSPITAL OBSERVATION PER HR: HCPCS

## 2017-08-26 PROCEDURE — 700102 HCHG RX REV CODE 250 W/ 637 OVERRIDE(OP): Performed by: STUDENT IN AN ORGANIZED HEALTH CARE EDUCATION/TRAINING PROGRAM

## 2017-08-26 PROCEDURE — 84443 ASSAY THYROID STIM HORMONE: CPT

## 2017-08-26 PROCEDURE — 82962 GLUCOSE BLOOD TEST: CPT

## 2017-08-26 PROCEDURE — 700105 HCHG RX REV CODE 258: Performed by: STUDENT IN AN ORGANIZED HEALTH CARE EDUCATION/TRAINING PROGRAM

## 2017-08-26 PROCEDURE — 71010 DX-CHEST-PORTABLE (1 VIEW): CPT

## 2017-08-26 PROCEDURE — 83605 ASSAY OF LACTIC ACID: CPT

## 2017-08-26 PROCEDURE — 83036 HEMOGLOBIN GLYCOSYLATED A1C: CPT

## 2017-08-26 PROCEDURE — 85025 COMPLETE CBC W/AUTO DIFF WBC: CPT

## 2017-08-26 PROCEDURE — 81001 URINALYSIS AUTO W/SCOPE: CPT | Mod: 59

## 2017-08-26 PROCEDURE — 84702 CHORIONIC GONADOTROPIN TEST: CPT

## 2017-08-26 PROCEDURE — 99285 EMERGENCY DEPT VISIT HI MDM: CPT

## 2017-08-26 PROCEDURE — 96365 THER/PROPH/DIAG IV INF INIT: CPT

## 2017-08-26 PROCEDURE — 87086 URINE CULTURE/COLONY COUNT: CPT

## 2017-08-26 PROCEDURE — 82533 TOTAL CORTISOL: CPT

## 2017-08-26 PROCEDURE — 700101 HCHG RX REV CODE 250: Performed by: STUDENT IN AN ORGANIZED HEALTH CARE EDUCATION/TRAINING PROGRAM

## 2017-08-26 PROCEDURE — 74150 CT ABDOMEN W/O CONTRAST: CPT

## 2017-08-26 PROCEDURE — 84100 ASSAY OF PHOSPHORUS: CPT

## 2017-08-26 PROCEDURE — 96376 TX/PRO/DX INJ SAME DRUG ADON: CPT

## 2017-08-26 PROCEDURE — 93005 ELECTROCARDIOGRAM TRACING: CPT | Performed by: STUDENT IN AN ORGANIZED HEALTH CARE EDUCATION/TRAINING PROGRAM

## 2017-08-26 PROCEDURE — 96361 HYDRATE IV INFUSION ADD-ON: CPT

## 2017-08-26 PROCEDURE — 80307 DRUG TEST PRSMV CHEM ANLYZR: CPT

## 2017-08-26 PROCEDURE — 700111 HCHG RX REV CODE 636 W/ 250 OVERRIDE (IP)

## 2017-08-26 PROCEDURE — 80053 COMPREHEN METABOLIC PANEL: CPT

## 2017-08-26 PROCEDURE — 93010 ELECTROCARDIOGRAM REPORT: CPT | Performed by: INTERNAL MEDICINE

## 2017-08-26 PROCEDURE — 700111 HCHG RX REV CODE 636 W/ 250 OVERRIDE (IP): Performed by: STUDENT IN AN ORGANIZED HEALTH CARE EDUCATION/TRAINING PROGRAM

## 2017-08-26 PROCEDURE — 700105 HCHG RX REV CODE 258: Performed by: EMERGENCY MEDICINE

## 2017-08-26 PROCEDURE — 83735 ASSAY OF MAGNESIUM: CPT

## 2017-08-26 PROCEDURE — 36415 COLL VENOUS BLD VENIPUNCTURE: CPT

## 2017-08-26 PROCEDURE — 83690 ASSAY OF LIPASE: CPT

## 2017-08-26 PROCEDURE — 76775 US EXAM ABDO BACK WALL LIM: CPT

## 2017-08-26 PROCEDURE — 96375 TX/PRO/DX INJ NEW DRUG ADDON: CPT

## 2017-08-26 PROCEDURE — 99220 PR INITIAL OBSERVATION CARE,LEVL III: CPT | Performed by: HOSPITALIST

## 2017-08-26 PROCEDURE — 700111 HCHG RX REV CODE 636 W/ 250 OVERRIDE (IP): Performed by: EMERGENCY MEDICINE

## 2017-08-26 PROCEDURE — 94760 N-INVAS EAR/PLS OXIMETRY 1: CPT

## 2017-08-26 RX ORDER — HEPARIN SODIUM 5000 [USP'U]/ML
5000 INJECTION, SOLUTION INTRAVENOUS; SUBCUTANEOUS EVERY 8 HOURS
Status: DISCONTINUED | OUTPATIENT
Start: 2017-08-26 | End: 2017-08-27 | Stop reason: HOSPADM

## 2017-08-26 RX ORDER — DEXTROSE MONOHYDRATE 25 G/50ML
25 INJECTION, SOLUTION INTRAVENOUS
Status: DISCONTINUED | OUTPATIENT
Start: 2017-08-26 | End: 2017-08-27 | Stop reason: HOSPADM

## 2017-08-26 RX ORDER — SODIUM CHLORIDE 9 MG/ML
1000 INJECTION, SOLUTION INTRAVENOUS ONCE
Status: COMPLETED | OUTPATIENT
Start: 2017-08-26 | End: 2017-08-26

## 2017-08-26 RX ORDER — LORAZEPAM 0.5 MG/1
0.5 TABLET ORAL EVERY 6 HOURS PRN
Status: DISCONTINUED | OUTPATIENT
Start: 2017-08-26 | End: 2017-08-27

## 2017-08-26 RX ORDER — HALOPERIDOL 5 MG/ML
5 INJECTION INTRAMUSCULAR ONCE
Status: COMPLETED | OUTPATIENT
Start: 2017-08-26 | End: 2017-08-26

## 2017-08-26 RX ORDER — BISACODYL 10 MG
10 SUPPOSITORY, RECTAL RECTAL
Status: DISCONTINUED | OUTPATIENT
Start: 2017-08-26 | End: 2017-08-27 | Stop reason: HOSPADM

## 2017-08-26 RX ORDER — MAGNESIUM SULFATE HEPTAHYDRATE 40 MG/ML
2 INJECTION, SOLUTION INTRAVENOUS ONCE
Status: COMPLETED | OUTPATIENT
Start: 2017-08-26 | End: 2017-08-26

## 2017-08-26 RX ORDER — LORAZEPAM 2 MG/ML
INJECTION INTRAMUSCULAR
Status: COMPLETED
Start: 2017-08-26 | End: 2017-08-26

## 2017-08-26 RX ORDER — ONDANSETRON 2 MG/ML
4 INJECTION INTRAMUSCULAR; INTRAVENOUS EVERY 4 HOURS PRN
Status: DISCONTINUED | OUTPATIENT
Start: 2017-08-26 | End: 2017-08-27 | Stop reason: HOSPADM

## 2017-08-26 RX ORDER — ACETAMINOPHEN 500 MG
500 TABLET ORAL EVERY 6 HOURS PRN
Status: DISCONTINUED | OUTPATIENT
Start: 2017-08-26 | End: 2017-08-27 | Stop reason: HOSPADM

## 2017-08-26 RX ORDER — FLUOXETINE HYDROCHLORIDE 20 MG/1
20 CAPSULE ORAL DAILY
Status: DISCONTINUED | OUTPATIENT
Start: 2017-08-27 | End: 2017-08-27 | Stop reason: HOSPADM

## 2017-08-26 RX ORDER — THIAMINE MONONITRATE (VIT B1) 100 MG
100 TABLET ORAL DAILY
Status: DISCONTINUED | OUTPATIENT
Start: 2017-08-26 | End: 2017-08-27 | Stop reason: HOSPADM

## 2017-08-26 RX ORDER — POLYETHYLENE GLYCOL 3350 17 G/17G
1 POWDER, FOR SOLUTION ORAL
Status: DISCONTINUED | OUTPATIENT
Start: 2017-08-26 | End: 2017-08-27 | Stop reason: HOSPADM

## 2017-08-26 RX ORDER — SODIUM CHLORIDE 9 MG/ML
INJECTION, SOLUTION INTRAVENOUS CONTINUOUS
Status: DISCONTINUED | OUTPATIENT
Start: 2017-08-26 | End: 2017-08-27

## 2017-08-26 RX ORDER — LORAZEPAM 0.5 MG/1
0.5 TABLET ORAL EVERY 4 HOURS PRN
Status: DISCONTINUED | OUTPATIENT
Start: 2017-08-26 | End: 2017-08-26

## 2017-08-26 RX ORDER — ZOLPIDEM TARTRATE 5 MG/1
5 TABLET ORAL ONCE
Status: COMPLETED | OUTPATIENT
Start: 2017-08-26 | End: 2017-08-26

## 2017-08-26 RX ORDER — FOLIC ACID 1 MG/1
1 TABLET ORAL DAILY
Status: DISCONTINUED | OUTPATIENT
Start: 2017-08-26 | End: 2017-08-27 | Stop reason: HOSPADM

## 2017-08-26 RX ORDER — LORAZEPAM 2 MG/ML
1 INJECTION INTRAMUSCULAR ONCE
Status: COMPLETED | OUTPATIENT
Start: 2017-08-26 | End: 2017-08-26

## 2017-08-26 RX ORDER — POTASSIUM CHLORIDE 20 MEQ/1
20 TABLET, EXTENDED RELEASE ORAL DAILY
Status: DISCONTINUED | OUTPATIENT
Start: 2017-08-26 | End: 2017-08-27 | Stop reason: HOSPADM

## 2017-08-26 RX ORDER — HALOPERIDOL 5 MG/ML
INJECTION INTRAMUSCULAR
Status: COMPLETED
Start: 2017-08-26 | End: 2017-08-26

## 2017-08-26 RX ORDER — PROMETHAZINE HYDROCHLORIDE 25 MG/1
25 TABLET ORAL EVERY 8 HOURS PRN
Status: DISCONTINUED | OUTPATIENT
Start: 2017-08-26 | End: 2017-08-27 | Stop reason: HOSPADM

## 2017-08-26 RX ORDER — AMOXICILLIN 250 MG
2 CAPSULE ORAL 2 TIMES DAILY
Status: DISCONTINUED | OUTPATIENT
Start: 2017-08-26 | End: 2017-08-27 | Stop reason: HOSPADM

## 2017-08-26 RX ORDER — LORAZEPAM 1 MG/1
1 TABLET ORAL ONCE
Status: COMPLETED | OUTPATIENT
Start: 2017-08-26 | End: 2017-08-26

## 2017-08-26 RX ORDER — DIPHENHYDRAMINE HYDROCHLORIDE 50 MG/ML
25 INJECTION INTRAMUSCULAR; INTRAVENOUS ONCE
Status: COMPLETED | OUTPATIENT
Start: 2017-08-26 | End: 2017-08-26

## 2017-08-26 RX ORDER — POTASSIUM CHLORIDE 7.45 MG/ML
10 INJECTION INTRAVENOUS ONCE
Status: COMPLETED | OUTPATIENT
Start: 2017-08-26 | End: 2017-08-26

## 2017-08-26 RX ORDER — DIPHENHYDRAMINE HYDROCHLORIDE 50 MG/ML
INJECTION INTRAMUSCULAR; INTRAVENOUS
Status: COMPLETED
Start: 2017-08-26 | End: 2017-08-26

## 2017-08-26 RX ORDER — ONDANSETRON 2 MG/ML
4 INJECTION INTRAMUSCULAR; INTRAVENOUS EVERY 4 HOURS PRN
Status: DISCONTINUED | OUTPATIENT
Start: 2017-08-26 | End: 2017-08-26

## 2017-08-26 RX ADMIN — HALOPERIDOL LACTATE 5 MG: 5 INJECTION, SOLUTION INTRAMUSCULAR at 06:51

## 2017-08-26 RX ADMIN — FOLIC ACID 1 MG: 1 TABLET ORAL at 15:17

## 2017-08-26 RX ADMIN — SODIUM CHLORIDE: 9 INJECTION, SOLUTION INTRAVENOUS at 13:40

## 2017-08-26 RX ADMIN — LORAZEPAM 1 MG: 2 INJECTION INTRAMUSCULAR; INTRAVENOUS at 10:56

## 2017-08-26 RX ADMIN — THERA TABS 1 TABLET: TAB at 15:17

## 2017-08-26 RX ADMIN — POTASSIUM PHOSPHATE, MONOBASIC AND POTASSIUM PHOSPHATE, DIBASIC 30 MMOL: 224; 236 INJECTION, SOLUTION INTRAVENOUS at 15:18

## 2017-08-26 RX ADMIN — HALOPERIDOL 5 MG: 5 INJECTION INTRAMUSCULAR at 06:51

## 2017-08-26 RX ADMIN — POTASSIUM CHLORIDE 10 MEQ: 7.46 INJECTION, SOLUTION INTRAVENOUS at 09:00

## 2017-08-26 RX ADMIN — DIPHENHYDRAMINE HYDROCHLORIDE 25 MG: 50 INJECTION, SOLUTION INTRAMUSCULAR; INTRAVENOUS at 06:54

## 2017-08-26 RX ADMIN — HEPARIN SODIUM 5000 UNITS: 5000 INJECTION, SOLUTION INTRAVENOUS; SUBCUTANEOUS at 22:00

## 2017-08-26 RX ADMIN — LORAZEPAM 1 MG: 1 TABLET ORAL at 20:26

## 2017-08-26 RX ADMIN — LORAZEPAM 1 MG: 2 INJECTION INTRAMUSCULAR at 06:49

## 2017-08-26 RX ADMIN — LORAZEPAM 0.5 MG: 0.5 TABLET ORAL at 17:01

## 2017-08-26 RX ADMIN — LORAZEPAM 1 MG: 2 INJECTION INTRAMUSCULAR; INTRAVENOUS at 06:49

## 2017-08-26 RX ADMIN — ACETAMINOPHEN 500 MG: 500 TABLET ORAL at 17:01

## 2017-08-26 RX ADMIN — ONDANSETRON 4 MG: 2 INJECTION INTRAMUSCULAR; INTRAVENOUS at 18:45

## 2017-08-26 RX ADMIN — ZOLPIDEM TARTRATE 5 MG: 5 TABLET ORAL at 22:31

## 2017-08-26 RX ADMIN — HEPARIN SODIUM 5000 UNITS: 5000 INJECTION, SOLUTION INTRAVENOUS; SUBCUTANEOUS at 13:41

## 2017-08-26 RX ADMIN — DIPHENHYDRAMINE HYDROCHLORIDE 25 MG: 50 INJECTION INTRAMUSCULAR; INTRAVENOUS at 06:54

## 2017-08-26 RX ADMIN — SODIUM CHLORIDE 1000 ML: 9 INJECTION, SOLUTION INTRAVENOUS at 08:10

## 2017-08-26 RX ADMIN — SODIUM CHLORIDE 1000 ML: 9 INJECTION, SOLUTION INTRAVENOUS at 06:47

## 2017-08-26 RX ADMIN — Medication 100 MG: at 15:17

## 2017-08-26 RX ADMIN — MAGNESIUM SULFATE IN WATER 2 G: 40 INJECTION, SOLUTION INTRAVENOUS at 17:50

## 2017-08-26 RX ADMIN — POTASSIUM CHLORIDE 20 MEQ: 1500 TABLET, EXTENDED RELEASE ORAL at 13:40

## 2017-08-26 RX ADMIN — ONDANSETRON 4 MG: 2 INJECTION INTRAMUSCULAR; INTRAVENOUS at 13:52

## 2017-08-26 ASSESSMENT — PAIN SCALES - GENERAL
PAINLEVEL_OUTOF10: 6
PAINLEVEL_OUTOF10: 0
PAINLEVEL_OUTOF10: 0
PAINLEVEL_OUTOF10: 9

## 2017-08-26 ASSESSMENT — ENCOUNTER SYMPTOMS
BACK PAIN: 1
SORE THROAT: 0
SPUTUM PRODUCTION: 1
NERVOUS/ANXIOUS: 1
BLURRED VISION: 0
SORE THROAT: 1
COUGH: 0
PALPITATIONS: 0
DOUBLE VISION: 0
CONSTIPATION: 0
FLANK PAIN: 1
BLOOD IN STOOL: 0
VOMITING: 1
MYALGIAS: 0
DIZZINESS: 0
COUGH: 1
NAUSEA: 1
ABDOMINAL PAIN: 1
HEMOPTYSIS: 0
BRUISES/BLEEDS EASILY: 0
FEVER: 1
CHILLS: 1
HEADACHES: 1
DIARRHEA: 0
FALLS: 0

## 2017-08-26 ASSESSMENT — LIFESTYLE VARIABLES
EVER_SMOKED: YES
DO YOU DRINK ALCOHOL: NO

## 2017-08-26 ASSESSMENT — PATIENT HEALTH QUESTIONNAIRE - PHQ9
1. LITTLE INTEREST OR PLEASURE IN DOING THINGS: NOT AT ALL
SUM OF ALL RESPONSES TO PHQ9 QUESTIONS 1 AND 2: 0
SUM OF ALL RESPONSES TO PHQ QUESTIONS 1-9: 0
2. FEELING DOWN, DEPRESSED, IRRITABLE, OR HOPELESS: NOT AT ALL

## 2017-08-26 ASSESSMENT — COPD QUESTIONNAIRES
HAVE YOU SMOKED AT LEAST 100 CIGARETTES IN YOUR ENTIRE LIFE: YES
DO YOU EVER COUGH UP ANY MUCUS OR PHLEGM?: YES, A FEW DAYS A WEEK OR MONTH
COPD SCREENING SCORE: 3
DURING THE PAST 4 WEEKS HOW MUCH DID YOU FEEL SHORT OF BREATH: NONE/LITTLE OF THE TIME

## 2017-08-26 NOTE — ASSESSMENT & PLAN NOTE
Chronic condition. Patient was very anxious in ED, describes panic attack, received lorazepam to good effect.  - Lorazepam PRN

## 2017-08-26 NOTE — H&P
"      Internal Medicine Admitting History and Physical    Name Diana Hernandez     1975   Age/Sex 42 y.o. female   MRN [id number]   Code Status FULL CODE     After 5PM or if no immediate response to page, please call for cross-coverage  Attending/Team: Chapito/Guillermo See Patient List for primary contact information  Call [phone] to page    1st Call - Day Intern (R1):   Darrel Bowling 2nd Call - Day Sr. Resident (R2/R3):   Kenton Norwood       Chief Complaint:  Right flank and upper abdominal pain    HPI:  Ms. Hernandez is a 42 year old female with a history of nephrolithiasis, vlad-en-Y, appendectomy, cholecystectomy, dyspepsia, C. diff., drug seeking behavior, anxiety, bipolar, and marijuana, who is a poor historian after not sleeping all night in the ED and receiving lorazepam immediately before interview, presents to the ED with 1 day of sharp \"12/10\" bilateral flank pain and upper abdominal pain. She states the flank pain started at 13:00 yesterday, and that she was also having upper abdominal pain. She endorses 3 episodes of non-bloody emesis since yesterday, and stated \"I haven't peed much in the last 2 or 3 days.\" Denies dysuria, increased urinary frequency, hematuria, groin pain. LMP 1 week ago, noted to be heavier than normal. Denies diarrhea, constipation, bloody stool, melena. She does endorse fever and chills. Denies trauma, sick contacts, recent travel, unusual food.    Review of Systems   Constitutional: Positive for chills and fever.   HENT: Negative for sore throat.    Eyes: Negative for blurred vision and double vision.   Respiratory: Negative for cough.    Cardiovascular: Negative for chest pain.   Gastrointestinal: Positive for abdominal pain, nausea and vomiting. Negative for constipation and diarrhea.   Genitourinary: Positive for flank pain. Negative for dysuria, frequency, hematuria and urgency.   Musculoskeletal: Negative for myalgias.   Skin: Negative for rash.   Neurological: Positive " for headaches. Negative for dizziness.   Endo/Heme/Allergies: Does not bruise/bleed easily.   Psychiatric/Behavioral: The patient is nervous/anxious.              Past Medical History:   Past Medical History:   Diagnosis Date   • Anxiety     Followed by [facility]   • Anxiety disorder    • Backpain    • CLOSTRIDIUM DIFFICILE INFECTION 4/14/2010   • CVS disease    • Cyclic vomiting syndrome    • Dental disorder    • Drug-seeking behavior    • Dyspepsia 7/12/2009   • Nephrolithiasis 6/20/2009    kidney stones,post lithotrypsy   • Pain     abd and back   • Snoring    • Superior mesenteric artery syndrome (CMS-HCC) 7/12/2009   • Superior mesenteric artery syndrome (CMS-HCC)    • Tobacco abuse 6/20/2009       Past Surgical History:  Past Surgical History:   Procedure Laterality Date   • APPENDECTOMY  7/27/2009    Performed by MACIEL QUINTERO at [facility]   • BOWEL RESECTION  1/12/2016    Procedure: BOWEL RESECTION;  Surgeon: Maciel Quintero M.D.;  Location: SURGERY [facility];  Service:    • CHOLECYSTECTOMY  7/27/2009    Performed by MACIEL QUINTERO at [facility]   • EXPLORATORY LAPAROTOMY  7/27/2009    Performed by MACIEL QUINTERO at [facility]   • EXPLORATORY LAPAROTOMY  1/12/2016    Procedure: EXPLORATORY LAPAROTOMY;  Surgeon: Maciel Quintero M.D.;  Location: SURGERY [facility];  Service:    • GASTROSCOPY WITH BIOPSY  3/9/2010    Performed by AMANDA MEJIA at ENDOSCOPY [facility]   • GASTROSCOPY-ENDO  7/8/2009    Performed by ROSANNA WRIGHT at SURGERY [facility]   • GASTROSCOPY-ENDO  4/28/2016    Procedure: GASTROSCOPY-ENDO;  Surgeon: Blayne Blackburn M.D.;  Location: ENDOSCOPY [facility];  Service:    • LITHOTRIPSY     • OTHER      superior mesenteric artery correction    • PYLOROPLASTY  7/27/2009    Performed by MACIEL QUINTERO at [facility]       Current Outpatient  "Medications:  Home Medications     Reviewed by Shireen Purvis PhT (Pharmacy Tech) on 08/26/17 at 1050  Med List Status: Complete   Medication Last Dose Status        Patient Hayden Taking any Medications                       Medication Allergy/Sensitivities:  Allergies   Allergen Reactions   • Metoclopramide Hives     Told by MD; pt suspects possible hives.   • Quetiapine Unspecified     MD told her she was allergic     • Septra [Sulfamethoxazole W-Trimethoprim] Unspecified     MD told her she was allergic           Family History:  Family History   Problem Relation Age of Onset   • Cancer Mother 50     Colon cancer   • Hypertension Mother    • Allergies Father    • Hypertension Father    • Heart Disease Maternal Grandmother        Social History:  Social History     Social History   • Marital status: Single     Spouse name: N/A   • Number of children: N/A   • Years of education: N/A     Occupational History   • Not on file.     Social History Main Topics   • Smoking status: Former Smoker     Packs/day: 0.50     Years: 13.00     Types: Cigarettes     Quit date: 12/15/2012   • Smokeless tobacco: Never Used      Comment: 1/2ppd   • Alcohol use No   • Drug use:      Types: Marijuana, Inhaled      Comment: marijuana   • Sexual activity: Not on file     Other Topics Concern   • Not on file     Social History Narrative   • No narrative on file     Living situation: Lives with nichole  PCP : Lavell Monte      Physical Exam     Vitals:    08/26/17 0850 08/26/17 0911 08/26/17 1000 08/26/17 1045   BP:       Pulse: (!) 59 67 71 73   Resp: 13 16 20 20   Temp:       SpO2: 100% 100% 98% 99%   Weight:       Height:         Body mass index is 20.8 kg/m².  /73   Pulse 73   Temp 36 °C (96.8 °F)   Resp 20   Ht 1.651 m (5' 5\")   Wt 56.7 kg (125 lb)   LMP 08/19/2017 (Exact Date)   SpO2 99%   BMI 20.80 kg/m²   O2 therapy: Pulse Oximetry: 99 %, O2 Delivery: None (Room Air)    Physical Exam   Constitutional: She is " oriented to person, place, and time. No distress.   HENT:   Head: Normocephalic and atraumatic.   Eyes: EOM are normal. Pupils are equal, round, and reactive to light.   Neck: Normal range of motion. Neck supple.   Cardiovascular: Normal rate, regular rhythm and normal heart sounds.  Exam reveals no gallop and no friction rub.    No murmur heard.  Pulmonary/Chest: Effort normal and breath sounds normal. No respiratory distress. She has no wheezes. She has no rales. She exhibits no tenderness.   Abdominal: Soft. She exhibits no distension. Bowel sounds are hypoactive. There is no hepatosplenomegaly. There is tenderness in the right upper quadrant and left upper quadrant. There is CVA tenderness. There is no rigidity, no rebound and no guarding.   Musculoskeletal: She exhibits no edema.   Neurological: She is oriented to person, place, and time.   Asleep but arousable s/p lorazepam, able to answer questions.   Skin: Skin is warm and dry. She is not diaphoretic.   Psychiatric: Memory normal.             Data Review       Old Records Request:   Completed  Current Records review and summary: Completed    Lab Data Review:  Recent Results (from the past 24 hour(s))   CBC WITH DIFFERENTIAL    Collection Time: 08/26/17  6:21 AM   Result Value Ref Range    WBC 14.6 (H) 4.8 - 10.8 K/uL    RBC 4.55 4.20 - 5.40 M/uL    Hemoglobin 15.4 12.0 - 16.0 g/dL    Hematocrit 43.4 37.0 - 47.0 %    MCV 95.4 81.4 - 97.8 fL    MCH 33.8 (H) 27.0 - 33.0 pg    MCHC 35.5 (H) 33.6 - 35.0 g/dL    RDW 45.9 35.9 - 50.0 fL    Platelet Count 344 164 - 446 K/uL    MPV 10.3 9.0 - 12.9 fL    Neutrophils-Polys 91.60 (H) 44.00 - 72.00 %    Lymphocytes 4.60 (L) 22.00 - 41.00 %    Monocytes 2.90 0.00 - 13.40 %    Eosinophils 0.00 0.00 - 6.90 %    Basophils 0.30 0.00 - 1.80 %    Immature Granulocytes 0.60 0.00 - 0.90 %    Nucleated RBC 0.00 /100 WBC    Neutrophils (Absolute) 13.38 (H) 2.00 - 7.15 K/uL    Lymphs (Absolute) 0.67 (L) 1.00 - 4.80 K/uL    Monos  (Absolute) 0.43 0.00 - 0.85 K/uL    Eos (Absolute) 0.00 0.00 - 0.51 K/uL    Baso (Absolute) 0.05 0.00 - 0.12 K/uL    Immature Granulocytes (abs) 0.09 0.00 - 0.11 K/uL    NRBC (Absolute) 0.00 K/uL   COMP METABOLIC PANEL    Collection Time: 08/26/17  6:21 AM   Result Value Ref Range    Sodium 134 (L) 135 - 145 mmol/L    Potassium 3.2 (L) 3.6 - 5.5 mmol/L    Chloride 103 96 - 112 mmol/L    Co2 15 (L) 20 - 33 mmol/L    Anion Gap 16.0 (H) 0.0 - 11.9    Glucose 251 (H) 65 - 99 mg/dL    Bun 21 8 - 22 mg/dL    Creatinine 1.07 0.50 - 1.40 mg/dL    Calcium 10.4 8.5 - 10.5 mg/dL    AST(SGOT) 13 12 - 45 U/L    ALT(SGPT) 7 2 - 50 U/L    Alkaline Phosphatase 90 30 - 99 U/L    Total Bilirubin 1.6 (H) 0.1 - 1.5 mg/dL    Albumin 4.9 3.2 - 4.9 g/dL    Total Protein 9.1 (H) 6.0 - 8.2 g/dL    Globulin 4.2 (H) 1.9 - 3.5 g/dL    A-G Ratio 1.2 g/dL   LIPASE    Collection Time: 08/26/17  6:21 AM   Result Value Ref Range    Lipase 5 (L) 11 - 82 U/L   ESTIMATED GFR    Collection Time: 08/26/17  6:21 AM   Result Value Ref Range    GFR If African American >60 >60 mL/min/1.73 m 2    GFR If Non  57 (A) >60 mL/min/1.73 m 2   MAGNESIUM    Collection Time: 08/26/17  6:21 AM   Result Value Ref Range    Magnesium 1.8 1.5 - 2.5 mg/dL       Imaging/Procedures Review:    ndependant Imaging Review: Completed  US-RENAL   Final Result         1. No renal calculus or hydronephrosis.   2. Mildly echogenic right kidney could relate to medical renal disease.                   Assessment/Plan     * Flank pain, acute   Assessment & Plan    Etiology unclear at this time, patient not able to give good history. Primary complaint is bilateral flank, notes pain goes around to upper abdomen as well. Musculoskeletal/non-specific pain likely in the setting of significant psychiatric history, atypical pain presentation, and additional vomiting/dehydration. Rule out intra-abdominal pathology with history of multiple abdominal surgeries including vlad-en-Y,  "had BM yesterday. LUQ and RUQ tenderness on exam but no peritoneal signs. Despite CVA tenderness, pyelonephritis unlikely as no fever, no UTI symptoms, no infection on UA, only history of nephrolithiasis and current leukocytosis. No stones on renal US, no hematuria on UA. No acute ischemic changes on EKG. Negative pegnency test. Lipase normal. \"Drug seeking behavior\" listed in record. VItals stable s/p IVF, patient non-toxic.  - Follow up CT abdomen  - Acetaminophen PRN        Nausea and vomiting- (present on admission)   Assessment & Plan    Has history of admission for cannabinoid hyperemesis syndrome. Denies EtOH. Non-bloody emesis per history, no emesis since presentation.  - Ondansetron PRN        Dehydration   Assessment & Plan    Patient notes at least 3 episodes of emesis since yesterday. SBP 80s-90s in ED, tongue cobblestoning on exam. Received two 1L NS boluses in ED.  - Maintenance IVF, encourage PO intake        Metabolic acidosis, increased anion gap- (present on admission)   Assessment & Plan    HCO3 15, gap 16. Lactate 1.7. May be component of ketoacidosis with elevated ketones on UA in the setting of decreased PO 2/2 nausea/vomiting, elevated blood glucose (but no DM and A1c 5.6), and polysubstance abuse (but denies alcohol).  - Trend BMP        Substance abuse- (present on admission)   Assessment & Plan    Endorses marijuana, last used the day before admission at 13:00. Denies other drugs, IV drugs, EtOH.  - Tylenol for pain, NSAIDs second line, avoid opiates        Hyperglycemia- (present on admission)   Assessment & Plan    Today random blood glucose 251, A1c 5.6. No history of DM, insulin, or oral antihyperglycemic.  - ISS, hypoglycemia protocol        Hypokalemia- (present on admission)   Assessment & Plan    3.2 on admission, KCL given in ED, 20 mEq PO also given.  - Trend BMP, replete PRN with goal of K 4 or greater        Hypomagnesemia- (present on admission)   Assessment & Plan    1.8 on " admission, 2gm IV given.  - Trend Mg level, replete PRN with goal of Mg 2 or greater        Anxiety- (present on admission)   Assessment & Plan    Chronic condition. Patient was very anxious in ED, describes panic attack, received lorazepam to good effect.  - Lorazepam PRN            Anticipated Hospital stay:  >2 midnights        Quality Measures  EKG reviewed, Radiology images reviewed, Labs reviewed and Medications reviewed  Saldana catheter: No Saldana        DVT prophylaxis - mechanical: SCDs        The patient was seen by me face to face. I personally had a discussion with the patient. The case was discussed with our resident team, I examined the patient and confirmed the essential components of the history, physical examination, diagnosis and treatment plan as needed. I agree with the patient care as documented by the resident and edited as above by me. See resident's note above for complete details of service. The overall treatment regimen will be carried out as described above.     Thank you:  Blanco Uriarte MD, FACP  [facility]  Pager: Use Tiger Text (use resident info under treatment team for day to day floor issues)  Office: [phone] Ext. [phone]  Fax: [phone] (non PHI only)

## 2017-08-26 NOTE — ASSESSMENT & PLAN NOTE
Etiology unclear at this time, patient not able to give good history. LUQ and RUQ tenderness on exam but no peritoneal signs. Rule out intra-abdominal pathology with history of multiple abdominal surgeries including vlad-en-Y, had BM yesterday. Pyelonephritis less likely with no fever and no UTI symptoms, only history of nephrolithiasis and current leukocytosis. No acute ischemic changes on EKG. Musculoskeletal/non-specific pain possible in the setting of vomiting/dehydration and significant psychiatric history. VItals stable, patient non-toxic.  - Follow up CT abdomen

## 2017-08-26 NOTE — ASSESSMENT & PLAN NOTE
Endorses marijuana, last used the day before admission at 13:00. Denies other drugs, IV drugs, EtOH.  - Tylenol for pain, NSAIDs second line, avoid opiates

## 2017-08-26 NOTE — NON-PROVIDER
Internal Medicine Medical Student Admitting History and Physical    Name Diana Hernandez 1975   Age/Sex 42 y.o. female   MRN [id number]   Code Status Full Code     After 5PM or if no immediate response to page, please call for cross-coverage  Attending/Team: Guillermo Chun See Patient List for primary contact information  Call [phone] to page after hours   1st Call - Day Intern (R1):   Dr. Bowling 2nd Call - Day Sr. Resident (R2/R3):   Dr. Norwood       Chief Complaint:  Flank Pain and Vomiting    HPI:  Ms. Saenz is a 43 yo female with a pmh of nephrolithiasis, cyclic vomiting syndrome, anxiety, and SMA syndrome presenting with sharp flank pain that began at 1400 on 17. History was difficult to obtain due to sedation. She reports that the pain is in her upper back and radiates downward. She rates the pain as a 12/10 and the worst pain of her life. She used marijuana at 1300 yesterday, about 1 hour before the pain began. It is stabbing and has no agrivating factors, but has felt better after receiving pain meds in the ED. She reports a history of nephrolithiasis and states that this pain is similar to that. She has not had a kidney stone in over 5 years. She vomited a clear liquid 3 times since the pain began. She has central abdominal pain and nausea. She reports fevers, chills, headache and cough with clear sputum. She states that it is difficult to produce urine, but not painful upon urination. She reports a cough productive of clear sputum for the past 3 days. She reports increased anxiety the past 3 days that she attributes to planning her wedding and attempting to conceive. She denies groin pain. She denies any hematuria, melena or hematochezia. She denies diarrhea and constipation. She denies trauma or straining her back. She arrived at the ED at 0100 on 17 and is very tired. LMP began on 17 and was heavy due to clomiphine.       Review of Systems   Constitutional:  Positive for chills and fever.   HENT: Positive for sore throat. Negative for hearing loss.    Eyes: Negative for blurred vision and double vision.   Respiratory: Positive for cough and sputum production. Negative for hemoptysis.    Cardiovascular: Negative for chest pain and palpitations.   Gastrointestinal: Positive for abdominal pain, nausea and vomiting. Negative for blood in stool, constipation and diarrhea.   Genitourinary: Positive for flank pain. Negative for dysuria, frequency, hematuria and urgency.   Musculoskeletal: Positive for back pain. Negative for falls and joint pain.   Skin: Negative for itching and rash.   Neurological: Positive for headaches. Negative for dizziness.   Psychiatric/Behavioral: The patient is nervous/anxious.         Reports anxiety for the past 3 days.     Past Medical History  And current medications (link outpatient medications  with diagnosis)      Past Medical History:   Diagnosis Date   • Anxiety     Followed by [facility]   • Anxiety disorder    • Backpain    • CLOSTRIDIUM DIFFICILE INFECTION 4/14/2010   • CVS disease    • Cyclic vomiting syndrome    • Dental disorder    • Drug-seeking behavior    • Dyspepsia 7/12/2009   • Nephrolithiasis 6/20/2009    kidney stones,post lithotrypsy   • Pain     abd and back   • Snoring    • Superior mesenteric artery syndrome (CMS-HCC) 7/12/2009   • Superior mesenteric artery syndrome (CMS-HCC)    • Tobacco abuse 6/20/2009       Current Outpatient Prescriptions   Medication Sig Dispense Refill   • fluoxetine (PROZAC) 20 MG Cap Take 20 mg by mouth every day.       • multivitamin (THERAGRAN) Tab Take 1 Tab by mouth every day.       • clomiPHENE (CLOMID) 50 MG tablet Take 50 mg by mouth.   1   • ondansetron (ZOFRAN ODT) 4 MG TABLET DISPERSIBLE Take 1 Tab by mouth every 8 hours as needed for Nausea/Vomiting. 10 Tab 0   • promethazine (PHENERGAN) 25 MG Suppos Insert 1 Suppository in rectum every 6 hours as needed for Nausea/Vomiting. 10 Suppository 0    • ondansetron (ZOFRAN ODT) 4 MG TABLET DISPERSIBLE Take 1 Tab by mouth every 8 hours as needed. 10 Tab 0   • promethazine (PHENERGAN) 25 MG Tab Take 1 Tab by mouth every 6 hours as needed for Nausea/Vomiting. 30 Tab 0         Past Surgical History:  Past Surgical History:   Procedure Laterality Date   • APPENDECTOMY  7/27/2009    Performed by MACIEL QUINTERO at [facility]   • BOWEL RESECTION  1/12/2016    Procedure: BOWEL RESECTION;  Surgeon: Maciel Quintero M.D.;  Location: [facility]ERY [facility];  Service:    • CHOLECYSTECTOMY  7/27/2009    Performed by MACIEL QUINTERO at [facility]   • EXPLORATORY LAPAROTOMY  7/27/2009    Performed by MACIEL QUINTERO at [facility]   • EXPLORATORY LAPAROTOMY  1/12/2016    Procedure: EXPLORATORY LAPAROTOMY;  Surgeon: Maciel Quintero M.D.;  Location: SUR[facility]ERY [facility];  Service:    • GASTROSCOPY WITH BIOPSY  3/9/2010    Performed by AMANDA MEJIA at ENDOSCOPY [facility]   • GASTROSCOPY-ENDO  7/8/2009    Performed by ROSANNA WRIGHT at SURGERY [facility]   • GASTROSCOPY-ENDO  4/28/2016    Procedure: GASTROSCOPY-ENDO;  Surgeon: Blayne Blackburn M.D.;  Location: [facility];  Service:    • LITHOTRIPSY     • OTHER      superior mesenteric artery correction    • PYLOROPLASTY  7/27/2009    Performed by MACIEL QUINTERO at [facility]     Medication Allergy/Sensitivities:  Allergies   Allergen Reactions   • Metoclopramide Hives     Told by MD; pt suspects possible hives.   • Quetiapine Unspecified     MD told her she was allergic     • Septra [Sulfamethoxazole W-Trimethoprim] Unspecified     MD told her she was allergic       Family History:    Family History   Problem Relation Age of Onset   • Cancer Mother 50     Colon cancer   • Hypertension Mother    • Allergies Father    • Hypertension Father    • Heart Disease Maternal Grandmother   "      Social History:  Smoking: quit 10 years ago  Alcohol: Denies  Illictis: Smoked marijuana at 1300 yesterday  Other: Many stressors: planning a wedding, attempting to get pregnant  Living situation: lives with fijumana      Physical Exam     Vitals:    08/26/17 1000 08/26/17 1045 08/26/17 1100 08/26/17 1121   BP:       Pulse: 71 73 71 78   Resp: 20 20 (!) 36 (!) 36   Temp:       SpO2: 98% 99% 98% 97%   Weight:       Height:         Body mass index is 20.8 kg/m².  /73   Pulse 78   Temp 36 °C (96.8 °F)   Resp (!) 36   Ht 1.651 m (5' 5\")   Wt 56.7 kg (125 lb)   LMP 08/19/2017 (Exact Date)   SpO2 97%   BMI 20.80 kg/m²   O2 therapy: Pulse Oximetry: 97 %, O2 Delivery: None (Room Air)    Physical Exam   Constitutional: She is oriented to person, place, and time and well-developed, well-nourished, and in no distress.   HENT:   Head: Normocephalic and atraumatic.   Eyes: Conjunctivae and EOM are normal. Pupils are equal, round, and reactive to light.   Neck: Normal range of motion.   Cardiovascular: Normal rate and regular rhythm.    Pulmonary/Chest: Effort normal and breath sounds normal. No respiratory distress. She has no wheezes.   Abdominal: Soft. Bowel sounds are normal. There is tenderness in the right upper quadrant and left upper quadrant. There is CVA tenderness.   Musculoskeletal: Normal range of motion.   Neurological: She is alert and oriented to person, place, and time.   Skin: Skin is warm and dry.   Psychiatric: Affect normal.   Vitals reviewed.    Lab Results   Component Value Date/Time    WBC 14.6 (H) 08/26/2017 06:21 AM    RBC 4.55 08/26/2017 06:21 AM    HEMOGLOBIN 15.4 08/26/2017 06:21 AM    HEMATOCRIT 43.4 08/26/2017 06:21 AM    MCV 95.4 08/26/2017 06:21 AM    MCH 33.8 (H) 08/26/2017 06:21 AM    MCHC 35.5 (H) 08/26/2017 06:21 AM    MPV 10.3 08/26/2017 06:21 AM    NEUTSPOLYS 91.60 (H) 08/26/2017 06:21 AM    LYMPHOCYTES 4.60 (L) 08/26/2017 06:21 AM    MONOCYTES 2.90 08/26/2017 06:21 AM    " EOSINOPHILS 0.00 2017 06:21 AM    BASOPHILS 0.30 2017 06:21 AM    HYPOCHROMIA 1+ 2016 12:49 PM    ANISOCYTOSIS 1+ 2016 12:29 AM      Lab Results   Component Value Date/Time    WBC 14.6 (H) 2017 06:21 AM    RBC 4.55 2017 06:21 AM    HEMOGLOBIN 15.4 2017 06:21 AM    HEMATOCRIT 43.4 2017 06:21 AM    MCV 95.4 2017 06:21 AM    MCH 33.8 (H) 2017 06:21 AM    MCHC 35.5 (H) 2017 06:21 AM    MPV 10.3 2017 06:21 AM    NEUTSPOLYS 91.60 (H) 2017 06:21 AM    LYMPHOCYTES 4.60 (L) 2017 06:21 AM    MONOCYTES 2.90 2017 06:21 AM    EOSINOPHILS 0.00 2017 06:21 AM    BASOPHILS 0.30 2017 06:21 AM    HYPOCHROMIA 1+ 2016 12:49 PM    ANISOCYTOSIS 1+ 2016 12:29 AM      Urine drug screen: + cannabinoids  Bhc.1   TSH: 2.040    Renal US: No renal calculus or hydronephrosis. Mildly echogenic right kidney could relate to medical renal disease.    Assessment/Plan   Ms. Saenz is a 43 yo female with a pmh of nephrolithiasis, cyclic vomiting syndrome, anxiety, and SMA syndrome presenting with sharp flank pain that began at 1400 on 17. She reports nausea, vomiting, headache, fever and chills. The pain could be caused by dehydration, musculoskeletal pain, small intestinal bacterial overgrowth, nephrolithiasis, pyelonephritis, or ectopic pregancy. Dehydration is the most likely cause of her symptoms due to her difficult time urinating, pain throughout her back and other muscles and due to her behavioral pmh. Musculoskeletal pain could also be the cause of her symptoms, but this is less likely due to her lack of recent trauma or muscle strains. She did not report pain when switching from laying to sitting. Small intestinal bacterial overgrowth is a possible cause of her symptoms due to her significant intestinal surgical history, but is less likely due to the lack of bloating, flatulence and diarrhea. Nephrolithiasis is possible  due to her flank pain and hx of kidney stones, but is less likely due to pain present bilaterally and no renal calculus found on US. Pyelonephritis is a possible cause of her symptoms, CVA tenderness, and elevated WBC, but is less likely due to lack of fever and urinary symptoms. Pregnancy, including ectopic pregnancy, should be considered, but is less likely due to Bhcg of 1.1 and LMP of 8/19/17. Patient will be admitted to monitor hydration status and vital signs.  Plan:   -CT of abdomen/pelvis  -NS infusion  -ondansetron PRN  -CXR  -promethazine PRN    Acidosis:  Acidosis is most likely due to vomiting. CO2 is 15. Will continue to monitor.  Plan:  -measure Lactic acid level  -continue to monitor CMP  -NS infusion  -encourage oral fluid intake if tolerated    Hypokalemia:  Hypokalemia is most likely due to vomiting. Potassium level is 3.2. Will continue to monitor:  Plan:  -Potassium chloride  -Magnesium sulfate   -CMP  -Continue to monitor    Anxiety:  Patient states she has been very anxious for the past 3 days. She reports being very stressed due to planning her wedding and planning to become pregnant. States she is less anxious after lorazepam. She takes fluoxetine. Will continue home meds.  Plan:  -continue home fluoxitine

## 2017-08-26 NOTE — PROGRESS NOTES
Report received by ER RN. Assumed care of pt. Assessment complete. Personal items at bedside. Pt A&O x 4. Pt c/o nausea-medicated per MAR, no c/o pain, and c/o anxiety-paged [facility] to inquire about antianxiety medications. Pt in no apparent signs of distress. Plan of care discussed. Call light in reach,  bed in lowest position, and pt has no further questions at this time.

## 2017-08-26 NOTE — ASSESSMENT & PLAN NOTE
Primary complaint is bilateral flank, notes pain goes around to upper abdomen as well. Musculoskeletal/non-specific pain likely in the setting of significant psychiatric history, atypical pain presentation, and additional vomiting/dehydration. Renal US and CT abdomen showing no acute changes.  - Acetaminophen PRN

## 2017-08-26 NOTE — PROGRESS NOTES
Chief complaint: Bilateral flank pain and vomiting     Diagnosis    Dehydration  Leukocytosis without any source of infection, afebrile  Anion gap metabolic acidosis  Hypomagnesemia   hypokalemia  Elevated bilirubin    Other chronic medical problems  GI motility disorder/chronic nausea vomiting/cyclical vomiting syndrome/  Bipolar/MDD/frequent panic attacks  Cyclical vomiting disorder/marijuana abuse  History of nephrolithiasis  History of SMA syndrome/small bowel ischemia requiring small bowel resection one year ago [id number] duodeno jejunostomy 7 years ago: Celiac serology positive: Risk of bacterial overgrowth  Iron deficiency anemia    42-year-old female with recurrent hospitalization due to same complaints of nausea or vomiting, was admitted with 2 days' history of nausea and vomiting. She denied any fever or chills. She denied any black color stool. She had bilateral flank pain which was more like musculoskeletal stretching. She endorses some urinary discomfort for one week but no dysuria or increased frequency or suprapubic discomfort. There is no costovertebral angle tenderness. There is no Birch sign. She looks extremely dehydrated and lethargic but she is well oriented. There is some tenderness on deep palpation of left lower abdominal. She had her cycle one week ago. She is not pregnant. She does not drink alcohol. She used marijuana 2 days ago but she is trying to cut back. She denies any fever or sick contacts or unusual food.    On arrival pulse 87, blood pressure 93/51, 99% on room air, respiratory 20    Abdomen soft, bowel sounds audible, left lower abdomen mild tenderness on deep palpation, no guarding or rigidity T  Clear to auscultation bilaterally     Sodium 134, potassium 3.2, CO2 15, anion gap 16, GFR 57, total bilirubin 1.6, magnesium 1.8  Urine drug screen, lactic acid, phosphorous, lipase, pregnancy test pending  Renal ultrasound negative for calculus or hydronephrosis     Assessment  and plan  Give intravenous fluids for dehydration   Monitor on telemetry on observation status because of hypotension and electrolyte abnormalities  Check lactic acid, pregnancy test, phosphorous, lipase  Will get CT abdomen given tenderness in left lower abdomen to rule out diverticulitis/colitis--If evidence of colitis then we will start antibiotic otherwise will watch  Will get EKG  Will get urine cultures  Replete electrolytes  Will watch for bleeding and if panic and will give ativan

## 2017-08-26 NOTE — ASSESSMENT & PLAN NOTE
Patient notes at least 3 episodes of emesis since yesterday. SBP 80s-90s in ED, tongue cobblestoning on exam. Received two 1L NS boluses in ED.  - Maintenance IVF, encourage PO intake

## 2017-08-26 NOTE — CARE PLAN
Problem: Knowledge Deficit  Goal: Knowledge of disease process/condition, treatment plan, diagnostic tests, and medications will improve    Intervention: Explain information regarding disease process/condition, treatment plan, diagnostic tests, and medications and document in education  Pt educated on POC, all questions answered in regards to disease process, treatment and diet. Pt  verbalize understanding and voice no further questions at this time.         Problem: Psychosocial Needs:  Goal: Level of anxiety will decrease    Intervention: Collaborate with Interdisciplinary Team including Psychologist/Behavioral Health Team  Paged Dr to obtain order for ativan b/c pt is anxious

## 2017-08-26 NOTE — ED PROVIDER NOTES
ED Provider Note    CHIEF COMPLAINT  Chief Complaint   Patient presents with   • Flank Pain         HPI  Diana Hernandez is a 42 y.o. female who presents to the emergency room with abdominal pain and vomiting. Patient has a history of cyclic vomiting syndrome as well as SMA syndrome. She describes fairly typical discomfort bilateral flanks going to the abdomen. Diffuse crampy nonradiating severe. Associated with anxiety which she reports is generally the case. No black or bloody stool. Still having bowel movements. Positive passing gas. Multiple episodes of vomiting and dry heaving. Despite cyclic vomiting syndrome and recurrent hospitalizations for the same the patient continues to smoke marijuana. No abnormal foods or known ill exposures.    REVIEW OF SYSTEMS  As above  All other systems are negative.     PAST MEDICAL HISTORY  Past Medical History:   Diagnosis Date   • Anxiety     Followed by [facility]   • Anxiety disorder    • Backpain    • CLOSTRIDIUM DIFFICILE INFECTION 4/14/2010   • CVS disease    • Cyclic vomiting syndrome    • Dental disorder    • Drug-seeking behavior    • Dyspepsia 7/12/2009   • Nephrolithiasis 6/20/2009    kidney stones,post lithotrypsy   • Pain     abd and back   • Snoring    • Superior mesenteric artery syndrome (CMS-HCC) 7/12/2009   • Superior mesenteric artery syndrome (CMS-HCC)    • Tobacco abuse 6/20/2009       FAMILY HISTORY  Family History   Problem Relation Age of Onset   • Cancer Mother 50     Colon cancer   • Hypertension Mother    • Allergies Father    • Hypertension Father    • Heart Disease Maternal Grandmother        SOCIAL HISTORY  Social History   Substance Use Topics   • Smoking status: Former Smoker     Packs/day: 0.50     Years: 13.00     Types: Cigarettes     Quit date: 12/15/2012   • Smokeless tobacco: Never Used      Comment: 1/2ppd   • Alcohol use No       SURGICAL HISTORY  Past Surgical History:   Procedure Laterality Date   • APPENDECTOMY  7/27/2009    Performed  "by MACIEL QUINTERO at SURGERY [facility]   • BOWEL RESECTION  1/12/2016    Procedure: BOWEL RESECTION;  Surgeon: Maciel Quintero M.D.;  Location: SURGERY [facility];  Service:    • CHOLECYSTECTOMY  7/27/2009    Performed by MACIEL QUINTERO at SURGERY [facility]   • EXPLORATORY LAPAROTOMY  7/27/2009    Performed by MACIEL QUINTERO at SURGERY [facility]   • EXPLORATORY LAPAROTOMY  1/12/2016    Procedure: EXPLORATORY LAPAROTOMY;  Surgeon: Maciel Quintero M.D.;  Location: SURGERY [facility];  Service:    • GASTROSCOPY WITH BIOPSY  3/9/2010    Performed by AMANDA MEJIA at ENDOSCOPY [facility]   • GASTROSCOPY-ENDO  7/8/2009    Performed by ROSANNA WRIGHT at SURGERY [facility]   • GASTROSCOPY-ENDO  4/28/2016    Procedure: GASTROSCOPY-ENDO;  Surgeon: Blayne Blackburn M.D.;  Location: ENDOSCOPY [facility];  Service:    • LITHOTRIPSY     • OTHER      superior mesenteric artery correction    • PYLOROPLASTY  7/27/2009    Performed by MACIEL QUINTERO at SURGERY [facility]       CURRENT MEDICATIONS  Home Medications     Reviewed by Shireen Purvis PhT (Pharmacy Tech) on 08/26/17 at 1050  Med List Status: Complete   Medication Last Dose Status        Patient Hayden Taking any Medications                       ALLERGIES  Allergies   Allergen Reactions   • Metoclopramide Hives     Told by MD; pt suspects possible hives.   • Quetiapine Unspecified     MD told her she was allergic     • Septra [Sulfamethoxazole W-Trimethoprim] Unspecified     MD told her she was allergic         PHYSICAL EXAM  VITAL SIGNS: /73   Pulse 86   Temp 36 °C (96.8 °F)   Resp 20   Ht 1.651 m (5' 5\")   Wt 56.7 kg (125 lb)   LMP 08/19/2017 (Exact Date)   SpO2 95%   BMI 20.80 kg/m²   Constitutional: Awake and alert, Anxious female  HENT: Normal inspection  Eyes: Sclera white  Neck: Normal range of motion  Cardiovascular: Normal heart rate, " Normal rhythm  Thorax & Lungs: Normal breath sounds, No respiratory distress, No wheezing, No chest tenderness.   Abdomen: Soft, nondistended, mild diffuse tenderness. No peritonitis  Skin: No rash.   Back: No tenderness, No CVA tenderness.   Extremities: Intact, symmetric distal pulses, no edema.  Neurologic: Grossly normal    Labs:   Results for orders placed or performed during the hospital encounter of 08/26/17   CBC WITH DIFFERENTIAL   Result Value Ref Range    WBC 14.6 (H) 4.8 - 10.8 K/uL    RBC 4.55 4.20 - 5.40 M/uL    Hemoglobin 15.4 12.0 - 16.0 g/dL    Hematocrit 43.4 37.0 - 47.0 %    MCV 95.4 81.4 - 97.8 fL    MCH 33.8 (H) 27.0 - 33.0 pg    MCHC 35.5 (H) 33.6 - 35.0 g/dL    RDW 45.9 35.9 - 50.0 fL    Platelet Count 344 164 - 446 K/uL    MPV 10.3 9.0 - 12.9 fL    Neutrophils-Polys 91.60 (H) 44.00 - 72.00 %    Lymphocytes 4.60 (L) 22.00 - 41.00 %    Monocytes 2.90 0.00 - 13.40 %    Eosinophils 0.00 0.00 - 6.90 %    Basophils 0.30 0.00 - 1.80 %    Immature Granulocytes 0.60 0.00 - 0.90 %    Nucleated RBC 0.00 /100 WBC    Neutrophils (Absolute) 13.38 (H) 2.00 - 7.15 K/uL    Lymphs (Absolute) 0.67 (L) 1.00 - 4.80 K/uL    Monos (Absolute) 0.43 0.00 - 0.85 K/uL    Eos (Absolute) 0.00 0.00 - 0.51 K/uL    Baso (Absolute) 0.05 0.00 - 0.12 K/uL    Immature Granulocytes (abs) 0.09 0.00 - 0.11 K/uL    NRBC (Absolute) 0.00 K/uL   COMP METABOLIC PANEL   Result Value Ref Range    Sodium 134 (L) 135 - 145 mmol/L    Potassium 3.2 (L) 3.6 - 5.5 mmol/L    Chloride 103 96 - 112 mmol/L    Co2 15 (L) 20 - 33 mmol/L    Anion Gap 16.0 (H) 0.0 - 11.9    Glucose 251 (H) 65 - 99 mg/dL    Bun 21 8 - 22 mg/dL    Creatinine 1.07 0.50 - 1.40 mg/dL    Calcium 10.4 8.5 - 10.5 mg/dL    AST(SGOT) 13 12 - 45 U/L    ALT(SGPT) 7 2 - 50 U/L    Alkaline Phosphatase 90 30 - 99 U/L    Total Bilirubin 1.6 (H) 0.1 - 1.5 mg/dL    Albumin 4.9 3.2 - 4.9 g/dL    Total Protein 9.1 (H) 6.0 - 8.2 g/dL    Globulin 4.2 (H) 1.9 - 3.5 g/dL    A-G Ratio 1.2 g/dL    LIPASE   Result Value Ref Range    Lipase 5 (L) 11 - 82 U/L   ESTIMATED GFR   Result Value Ref Range    GFR If African American >60 >60 mL/min/1.73 m 2    GFR If Non  57 (A) >60 mL/min/1.73 m 2   URINALYSIS,CULTURE IF INDICATED   Result Value Ref Range    Micro Urine Req Microscopic     Color Yellow     Character Cloudy (A)     Specific Gravity 1.025 <1.035    Ph 5.0 5.0 - 8.0    Glucose 150 (A) Negative mg/dL    Ketones 100 (A) Negative mg/dL    Protein 30 (A) Negative mg/dL    Bilirubin Negative Negative    Urobilinogen, Urine Normal Negative    Nitrite Negative Negative    Leukocyte Esterase Negative Negative    Occult Blood Moderate (A) Negative    Culture Indicated Yes UA Culture   MAGNESIUM   Result Value Ref Range    Magnesium 1.8 1.5 - 2.5 mg/dL   TSH (Thyroid Stimulating Hormone)   Result Value Ref Range    TSH 2.040 0.300 - 3.700 uIU/mL   Hemoglobin A1c   Result Value Ref Range    Glycohemoglobin 5.6 0.0 - 5.6 %    Est Avg Glucose 114 mg/dL   HCG QUANTITATIVE   Result Value Ref Range    Bhcg 1.1 0.0 - 5.0 mIU/mL   URINE DRUG SCREEN   Result Value Ref Range    Amphetamines Urine Negative Negative    Barbiturates Negative Negative    Benzodiazepines Negative Negative    Cocaine Metabolite Negative Negative    Methadone Negative Negative    Opiates Negative Negative    Oxycodone Negative Negative    Phencyclidine -Pcp Negative Negative    Propoxyphene Negative Negative    Cannabinoid Metab Positive (A) Negative   PHOSPHORUS   Result Value Ref Range    Phosphorus 1.8 (L) 2.5 - 4.5 mg/dL   LIPASE   Result Value Ref Range    Lipase 4 (L) 11 - 82 U/L   URINE MICROSCOPIC (W/UA)   Result Value Ref Range    WBC 0-2 /hpf    RBC 0-2 /hpf    Bacteria Moderate (A) None /hpf    Epithelial Cells Moderate (A) /hpf    Mucous Threads Moderate /hpf    Amorphous Crystal Present /hpf         COURSE & MEDICAL DECISION MAKING  Patient presents to ER with recurrent vomiting. She has an acutely nonsurgical  abdominal exam. Her vital signs are stable. She is treated with Benadryl and Haldol. She was given Ativan intravenously. She was hydrated with normal saline. Laboratory data was obtained showing hyperglycemia with anion gap metabolic acidosis as well as electrolyte disturbances. Patient remained persistently symptomatic. No source of infection identified on exam. Given these findings she will be admitted to the hospital for further treatment and evaluation. I consulted the [place] for admission.    FINAL IMPRESSION  1. Recurrent intractable nausea and vomiting  2. Anion gap metabolic acidosis  3. Electrolyte disturbances  4. Hyperglycemia        This dictation was created using voice recognition software. The accuracy of the dictation is limited to the abilities of the software.  The nursing notes were reviewed and certain aspects of this information were incorporated into this note.    Electronically signed by: Mohan Del Real, 8/26/2017 1:00 PM

## 2017-08-26 NOTE — ASSESSMENT & PLAN NOTE
Today random blood glucose 251, A1c 5.6. No history of DM, insulin, or oral antihyperglycemic. Normal blood glucose today, no insulin needed.  - Hypoglycemia protocol

## 2017-08-26 NOTE — ASSESSMENT & PLAN NOTE
3.2 on admission, KCL given in ED, 20 mEq PO also given.  - Trend BMP, replete PRN with goal of K 4 or greater

## 2017-08-26 NOTE — ASSESSMENT & PLAN NOTE
Has history of admission for cannabinoid hyperemesis syndrome. Denies EtOH. Non-bloody emesis per history, no emesis since presentation.  - Ondansetron PRN

## 2017-08-27 VITALS
SYSTOLIC BLOOD PRESSURE: 119 MMHG | DIASTOLIC BLOOD PRESSURE: 105 MMHG | BODY MASS INDEX: 20.46 KG/M2 | WEIGHT: 122.8 LBS | RESPIRATION RATE: 16 BRPM | HEART RATE: 59 BPM | TEMPERATURE: 98.6 F | HEIGHT: 65 IN | OXYGEN SATURATION: 100 %

## 2017-08-27 LAB
ALBUMIN SERPL BCP-MCNC: 3.5 G/DL (ref 3.2–4.9)
ALBUMIN/GLOB SERPL: 1.3 G/DL
ALP SERPL-CCNC: 65 U/L (ref 30–99)
ALT SERPL-CCNC: 11 U/L (ref 2–50)
ANION GAP SERPL CALC-SCNC: 9 MMOL/L (ref 0–11.9)
AST SERPL-CCNC: 14 U/L (ref 12–45)
BACTERIA UR CULT: NORMAL
BASOPHILS # BLD AUTO: 0.4 % (ref 0–1.8)
BASOPHILS # BLD: 0.04 K/UL (ref 0–0.12)
BILIRUB SERPL-MCNC: 0.6 MG/DL (ref 0.1–1.5)
BUN SERPL-MCNC: 6 MG/DL (ref 8–22)
CALCIUM SERPL-MCNC: 8.8 MG/DL (ref 8.5–10.5)
CHLORIDE SERPL-SCNC: 111 MMOL/L (ref 96–112)
CO2 SERPL-SCNC: 20 MMOL/L (ref 20–33)
CREAT SERPL-MCNC: 0.72 MG/DL (ref 0.5–1.4)
EOSINOPHIL # BLD AUTO: 0.02 K/UL (ref 0–0.51)
EOSINOPHIL NFR BLD: 0.2 % (ref 0–6.9)
ERYTHROCYTE [DISTWIDTH] IN BLOOD BY AUTOMATED COUNT: 48 FL (ref 35.9–50)
GFR SERPL CREATININE-BSD FRML MDRD: >60 ML/MIN/1.73 M 2
GLOBULIN SER CALC-MCNC: 2.8 G/DL (ref 1.9–3.5)
GLUCOSE SERPL-MCNC: 105 MG/DL (ref 65–99)
HCT VFR BLD AUTO: 33.4 % (ref 37–47)
HGB BLD-MCNC: 11.5 G/DL (ref 12–16)
IMM GRANULOCYTES # BLD AUTO: 0.05 K/UL (ref 0–0.11)
IMM GRANULOCYTES NFR BLD AUTO: 0.5 % (ref 0–0.9)
LYMPHOCYTES # BLD AUTO: 1.33 K/UL (ref 1–4.8)
LYMPHOCYTES NFR BLD: 12 % (ref 22–41)
MAGNESIUM SERPL-MCNC: 2.2 MG/DL (ref 1.5–2.5)
MCH RBC QN AUTO: 32.9 PG (ref 27–33)
MCHC RBC AUTO-ENTMCNC: 34.2 G/DL (ref 33.6–35)
MCV RBC AUTO: 96.2 FL (ref 81.4–97.8)
MONOCYTES # BLD AUTO: 0.94 K/UL (ref 0–0.85)
MONOCYTES NFR BLD AUTO: 8.5 % (ref 0–13.4)
NEUTROPHILS # BLD AUTO: 8.66 K/UL (ref 2–7.15)
NEUTROPHILS NFR BLD: 78.4 % (ref 44–72)
NRBC # BLD AUTO: 0 K/UL
NRBC BLD AUTO-RTO: 0 /100 WBC
PLATELET # BLD AUTO: 247 K/UL (ref 164–446)
PMV BLD AUTO: 10.3 FL (ref 9–12.9)
POTASSIUM SERPL-SCNC: 3.4 MMOL/L (ref 3.6–5.5)
PROT SERPL-MCNC: 6.3 G/DL (ref 6–8.2)
RBC # BLD AUTO: 3.46 M/UL (ref 4.2–5.4)
SIGNIFICANT IND 70042: NORMAL
SITE SITE: NORMAL
SODIUM SERPL-SCNC: 140 MMOL/L (ref 135–145)
SOURCE SOURCE: NORMAL
WBC # BLD AUTO: 11 K/UL (ref 4.8–10.8)

## 2017-08-27 PROCEDURE — 85025 COMPLETE CBC W/AUTO DIFF WBC: CPT

## 2017-08-27 PROCEDURE — G0378 HOSPITAL OBSERVATION PER HR: HCPCS

## 2017-08-27 PROCEDURE — 80053 COMPREHEN METABOLIC PANEL: CPT

## 2017-08-27 PROCEDURE — A9270 NON-COVERED ITEM OR SERVICE: HCPCS | Performed by: HOSPITALIST

## 2017-08-27 PROCEDURE — 700102 HCHG RX REV CODE 250 W/ 637 OVERRIDE(OP): Performed by: HOSPITALIST

## 2017-08-27 PROCEDURE — A9270 NON-COVERED ITEM OR SERVICE: HCPCS | Performed by: STUDENT IN AN ORGANIZED HEALTH CARE EDUCATION/TRAINING PROGRAM

## 2017-08-27 PROCEDURE — 700111 HCHG RX REV CODE 636 W/ 250 OVERRIDE (IP): Performed by: STUDENT IN AN ORGANIZED HEALTH CARE EDUCATION/TRAINING PROGRAM

## 2017-08-27 PROCEDURE — 83735 ASSAY OF MAGNESIUM: CPT

## 2017-08-27 PROCEDURE — 700102 HCHG RX REV CODE 250 W/ 637 OVERRIDE(OP): Performed by: STUDENT IN AN ORGANIZED HEALTH CARE EDUCATION/TRAINING PROGRAM

## 2017-08-27 PROCEDURE — 99217 PR OBSERVATION CARE DISCHARGE: CPT | Performed by: HOSPITALIST

## 2017-08-27 PROCEDURE — 36415 COLL VENOUS BLD VENIPUNCTURE: CPT

## 2017-08-27 RX ORDER — LANOLIN ALCOHOL/MO/W.PET/CERES
100 CREAM (GRAM) TOPICAL DAILY
Qty: 30 TAB | Refills: 1 | Status: SHIPPED | OUTPATIENT
Start: 2017-08-27 | End: 2017-08-29

## 2017-08-27 RX ORDER — LORAZEPAM 0.5 MG/1
.5-1 TABLET ORAL EVERY 6 HOURS PRN
Status: DISCONTINUED | OUTPATIENT
Start: 2017-08-27 | End: 2017-08-27

## 2017-08-27 RX ORDER — PROMETHAZINE HYDROCHLORIDE 25 MG/1
12.5 TABLET ORAL EVERY 8 HOURS PRN
Qty: 30 TAB | Refills: 0 | Status: SHIPPED | OUTPATIENT
Start: 2017-08-27 | End: 2017-08-29

## 2017-08-27 RX ORDER — ACETAMINOPHEN 500 MG
500 TABLET ORAL EVERY 6 HOURS PRN
Qty: 30 TAB | Refills: 0 | Status: SHIPPED | OUTPATIENT
Start: 2017-08-27 | End: 2017-08-29

## 2017-08-27 RX ORDER — LORAZEPAM 1 MG/1
1 TABLET ORAL EVERY 6 HOURS PRN
Status: DISCONTINUED | OUTPATIENT
Start: 2017-08-27 | End: 2017-08-27 | Stop reason: HOSPADM

## 2017-08-27 RX ORDER — LORAZEPAM 0.5 MG/1
0.5 TABLET ORAL EVERY 6 HOURS PRN
Status: DISCONTINUED | OUTPATIENT
Start: 2017-08-27 | End: 2017-08-27 | Stop reason: HOSPADM

## 2017-08-27 RX ORDER — FOLIC ACID 1 MG/1
1 TABLET ORAL DAILY
Qty: 30 TAB | Refills: 1 | Status: SHIPPED | OUTPATIENT
Start: 2017-08-27 | End: 2017-08-29

## 2017-08-27 RX ORDER — POTASSIUM CHLORIDE 20 MEQ/1
10 TABLET, EXTENDED RELEASE ORAL DAILY
Qty: 3 TAB | Refills: 0 | Status: SHIPPED | OUTPATIENT
Start: 2017-08-27 | End: 2017-08-29

## 2017-08-27 RX ORDER — LORAZEPAM 0.5 MG/1
0.5 TABLET ORAL ONCE
Status: COMPLETED | OUTPATIENT
Start: 2017-08-27 | End: 2017-08-27

## 2017-08-27 RX ADMIN — THERA TABS 1 TABLET: TAB at 07:58

## 2017-08-27 RX ADMIN — HEPARIN SODIUM 5000 UNITS: 5000 INJECTION, SOLUTION INTRAVENOUS; SUBCUTANEOUS at 05:13

## 2017-08-27 RX ADMIN — FLUOXETINE HYDROCHLORIDE 20 MG: 20 CAPSULE ORAL at 07:58

## 2017-08-27 RX ADMIN — LORAZEPAM 0.5 MG: 0.5 TABLET ORAL at 07:54

## 2017-08-27 RX ADMIN — FOLIC ACID 1 MG: 1 TABLET ORAL at 07:58

## 2017-08-27 RX ADMIN — POTASSIUM CHLORIDE 20 MEQ: 1500 TABLET, EXTENDED RELEASE ORAL at 07:54

## 2017-08-27 RX ADMIN — Medication 100 MG: at 09:00

## 2017-08-27 RX ADMIN — LORAZEPAM 0.5 MG: 0.5 TABLET ORAL at 09:05

## 2017-08-27 RX ADMIN — STANDARDIZED SENNA CONCENTRATE AND DOCUSATE SODIUM 2 TABLET: 8.6; 5 TABLET, FILM COATED ORAL at 07:57

## 2017-08-27 ASSESSMENT — ENCOUNTER SYMPTOMS
HEMOPTYSIS: 0
PALPITATIONS: 0
DOUBLE VISION: 0
NAUSEA: 1
CHILLS: 1
MYALGIAS: 0
SORE THROAT: 0
ABDOMINAL PAIN: 1
DIARRHEA: 0
BLOOD IN STOOL: 0
SPUTUM PRODUCTION: 1
HEADACHES: 1
BACK PAIN: 1
BLURRED VISION: 0
CHILLS: 0
TINGLING: 0
NERVOUS/ANXIOUS: 1
SENSORY CHANGE: 0
CONSTIPATION: 0
COUGH: 1
TREMORS: 0
FEVER: 0
VOMITING: 0
SHORTNESS OF BREATH: 0

## 2017-08-27 ASSESSMENT — PAIN SCALES - GENERAL
PAINLEVEL_OUTOF10: 0
PAINLEVEL_OUTOF10: 8
PAINLEVEL_OUTOF10: 0

## 2017-08-27 ASSESSMENT — LIFESTYLE VARIABLES: DO YOU DRINK ALCOHOL: NO

## 2017-08-27 NOTE — PROGRESS NOTES
"Pt refusing bed alarm. Verified with second RN Renetta.  Full skin assessment complete.  Midline scar from \"abdominal surgery\". All other skin intact.   "

## 2017-08-27 NOTE — DISCHARGE INSTRUCTIONS
Discharge Instructions    Discharged to home by car with relative. Discharged via walking, hospital escort: Refused.  Special equipment needed: Not Applicable    Be sure to schedule a follow-up appointment with your primary care doctor or any specialists as instructed.     Discharge Plan:        I understand that a diet low in cholesterol, fat, and sodium is recommended for good health. Unless I have been given specific instructions below for another diet, I accept this instruction as my diet prescription.   Other diet: regular    Special Instructions: None    · Is patient discharged on Warfarin / Coumadin?   No     · Is patient Post Blood Transfusion?  No    Depression / Suicide Risk    As you are discharged from this [facility] facility, it is important to learn how to keep safe from harming yourself.    Recognize the warning signs:  · Abrupt changes in personality, positive or negative- including increase in energy   · Giving away possessions  · Change in eating patterns- significant weight changes-  positive or negative  · Change in sleeping patterns- unable to sleep or sleeping all the time   · Unwillingness or inability to communicate  · Depression  · Unusual sadness, discouragement and loneliness  · Talk of wanting to die  · Neglect of personal appearance   · Rebelliousness- reckless behavior  · Withdrawal from people/activities they love  · Confusion- inability to concentrate     If you or a loved one observes any of these behaviors or has concerns about self-harm, here's what you can do:  · Talk about it- your feelings and reasons for harming yourself  · Remove any means that you might use to hurt yourself (examples: pills, rope, extension cords, firearm)  · Get professional help from the community (Mental Health, Substance Abuse, psychological counseling)  · Do not be alone:Call your Safe Contact- someone whom you trust who will be there for you.  · Call your local CRISIS HOTLINE 508-0085 or  762.539.4890  · Call your local Children's Mobile Crisis Response Team [place] (149) 997-5269 or www.[employer]  · Call the toll free National Suicide Prevention Hotlines   · National Suicide Prevention Lifeline 026-884-NYDF (5315)  · [employer] [facility] 800-SUICIDE (515-0787)    Foot Contusion  A foot contusion is a deep bruise to the foot. Contusions are the result of an injury that caused bleeding under the skin. The contusion may turn blue, purple, or yellow. Minor injuries will give you a painless contusion, but more severe contusions may stay painful and swollen for a few weeks.  CAUSES   A foot contusion comes from a direct blow to that area, such as a heavy object falling on the foot.  SYMPTOMS   · Swelling of the foot.  · Discoloration of the foot.  · Tenderness or soreness of the foot.  DIAGNOSIS   You will have a physical exam and will be asked about your history. You may need an X-ray of your foot to look for a broken bone (fracture).   TREATMENT   An elastic wrap may be recommended to support your foot. Resting, elevating, and applying cold compresses to your foot are often the best treatments for a foot contusion. Over-the-counter medicines may also be recommended for pain control.  HOME CARE INSTRUCTIONS   · Put ice on the injured area.  ¨ Put ice in a plastic bag.  ¨ Place a towel between your skin and the bag.  ¨ Leave the ice on for 15-20 minutes, 03-04 times a day.  · Only take over-the-counter or prescription medicines for pain, discomfort, or fever as directed by your caregiver.  · If told, use an elastic wrap as directed. This can help reduce swelling. You may remove the wrap for sleeping, showering, and bathing. If your toes become numb, cold, or blue, take the wrap off and reapply it more loosely.  · Elevate your foot with pillows to reduce swelling.  · Try to avoid standing or walking while the foot is painful. Do not resume use until instructed by your caregiver. Then,  begin use gradually. If pain develops, decrease use. Gradually increase activities that do not cause discomfort until you have normal use of your foot.  · See your caregiver as directed. It is very important to keep all follow-up appointments in order to avoid any lasting problems with your foot, including long-term (chronic) pain.  SEEK IMMEDIATE MEDICAL CARE IF:   · You have increased redness, swelling, or pain in your foot.  · Your swelling or pain is not relieved with medicines.  · You have loss of feeling in your foot or are unable to move your toes.  · Your foot turns cold or blue.  · You have pain when you move your toes.  · Your foot becomes warm to the touch.  · Your contusion does not improve in 2 days.  MAKE SURE YOU:   · Understand these instructions.  · Will watch your condition.  · Will get help right away if you are not doing well or get worse.     This information is not intended to replace advice given to you by your health care provider. Make sure you discuss any questions you have with your health care provider.     Document Released: 10/09/2007 Document Revised: 06/18/2013 Document Reviewed: 11/20/2012  [employer] Patient Education ©2016 [employer]

## 2017-08-27 NOTE — NON-PROVIDER
Internal Medicine Medical Student Note    Name Diana Hernandez     1975   Age/Sex 42 y.o. female   MRN [id number]   Code Status Full     After 5PM or if no immediate response to page, please call for cross-coverage  Attending/Team: Dr. Uriarte Red/Yellow See Patient List for primary contact information  Call [phone] to page after hours   1st Call - Day Intern (R1):   Dr. Bowling 2nd Call - Day Sr. Resident (R2/R3):   Dr. Norwood         Reason for interval visit  (Principal Problem)   Flank pain, acute    Interval Problem Daily Status Update  (24 hours)   Patient reports being in less pain than yesterday. Pain is controlled with acetaminophen. She is very anxious, but responding well to PRN lorazepam 0.5-1.0 mg. She no longer has IV access and is taking fluids PO. Her glucose is normal. She is more alert today and a better history was obtained. WBC trending down and afebrile. Nausea and abdominal pain have decreased in severity. Will be discharged today (17).    Review of Systems   Constitutional: Positive for chills. Negative for fever.   HENT: Negative for hearing loss.    Eyes: Negative for blurred vision.   Respiratory: Positive for cough and sputum production. Negative for hemoptysis.    Cardiovascular: Negative for chest pain.   Gastrointestinal: Positive for abdominal pain and nausea. Negative for constipation, diarrhea and vomiting.   Genitourinary: Negative for dysuria and urgency.   Musculoskeletal: Positive for back pain.   Skin: Negative for itching.   Neurological: Positive for headaches.   Psychiatric/Behavioral: The patient is nervous/anxious.      Physical Exam       Vitals:    17 2000 17 0000 17 0400 17 0800   BP: (!) 99/53 102/60 107/75 119/105   Pulse: 68 79 65 (!) 59   Resp: 18 18 18 16   Temp: 37.4 °C (99.3 °F) 36.9 °C (98.5 °F) 37.7 °C (99.8 °F) 37 °C (98.6 °F)   SpO2: 96% 95% 95% 100%   Weight: 55.7 kg (122 lb 12.7 oz)      Height:         Body  mass index is 20.43 kg/m². Weight: 55.7 kg (122 lb 12.7 oz)  Oxygen Therapy:  Pulse Oximetry: 100 %, O2 (LPM): 0, O2 Delivery: None (Room Air)    Physical Exam   Constitutional: She is oriented to person, place, and time and well-developed, well-nourished, and in no distress.   HENT:   Head: Normocephalic and atraumatic.   Eyes: Conjunctivae and EOM are normal. Pupils are equal, round, and reactive to light.   Cardiovascular: Normal rate and regular rhythm.    Pulmonary/Chest: Effort normal and breath sounds normal. No respiratory distress. She has no wheezes.   Abdominal: Soft. Bowel sounds are normal. There is tenderness. There is CVA tenderness.   Neurological: She is alert and oriented to person, place, and time.   Skin: Skin is warm and dry.     Lab Results   Component Value Date/Time    WBC 11.0 (H) 08/27/2017 01:37 AM    RBC 3.46 (L) 08/27/2017 01:37 AM    HEMOGLOBIN 11.5 (L) 08/27/2017 01:37 AM    HEMATOCRIT 33.4 (L) 08/27/2017 01:37 AM    MCV 96.2 08/27/2017 01:37 AM    MCH 32.9 08/27/2017 01:37 AM    MCHC 34.2 08/27/2017 01:37 AM    MPV 10.3 08/27/2017 01:37 AM    NEUTSPOLYS 78.40 (H) 08/27/2017 01:37 AM    LYMPHOCYTES 12.00 (L) 08/27/2017 01:37 AM    MONOCYTES 8.50 08/27/2017 01:37 AM    EOSINOPHILS 0.20 08/27/2017 01:37 AM    BASOPHILS 0.40 08/27/2017 01:37 AM    HYPOCHROMIA 1+ 04/27/2016 12:49 PM    ANISOCYTOSIS 1+ 09/13/2016 12:29 AM      Lab Results   Component Value Date/Time    TRIGLYCERIDE 110 05/09/2016 12:00 AM       Lab Results   Component Value Date/Time    SODIUM 140 08/27/2017 01:37 AM    POTASSIUM 3.4 (L) 08/27/2017 01:37 AM    CHLORIDE 111 08/27/2017 01:37 AM    CO2 20 08/27/2017 01:37 AM    GLUCOSE 105 (H) 08/27/2017 01:37 AM    BUN 6 (L) 08/27/2017 01:37 AM    CREATININE 0.72 08/27/2017 01:37 AM    CREATININE 0.9 05/18/2009 08:53 AM     Lab Results   Component Value Date/Time    ALKPHOSPHAT 65 08/27/2017 01:37 AM    ASTSGOT 14 08/27/2017 01:37 AM    ALTSGPT 11 08/27/2017 01:37 AM     TBILIRUBIN 0.6 2017 01:37 AM      Urine drug screen: Positive for cannabinoid metabolites  Bhc.1  TSH: 2.040  CT Abdomen/Pelvis: No acute abnormality. Hepatomegaly.    Assessment/Plan   Ms. Saenz is a 43 yo female with a pmh of nephrolithiasis, cyclic vomiting syndrome, anxiety, and SMA syndrome presenting with sharp flank pain that began at 1400 on 17.     Flank pain:  Flank pain most likely due to dehydration and musculoskeletal pain. Improving with IV fluids and acetaminophen.  Plan:  -acetaminophen PRN    Nausea/Vomitting:  Patient has no emesis since presentation. She is still feeling nauseous. She was dehydrated due to emesis on admission. No bloody emesis reported  Plan:  -Zofran PRN  -continue encouraging oral fluids    Anxiety:  Patient is very anxious and states that she has a lot going on at home. She is trying to conceive and is getting  next month. She does not take any anxiety medication or antidepressants at home. She responded well to lorazepam 0.5-1.0 mg PRN  Plan:  -lorazepam PRN  -consider following up with a behavioral health specialist    Dehydration:  Patient was dehydrated on admission. Received 2 1L NS bolus in ED. Currently po fluids. Blood pressure 99/53 at presentation. Currently stable at 119/105  -continue encouraging PO fluids    Hypokalemia:  Patient's potassium was 3.2 on admission most likely due to emesis. Currently 3.4  Plan:  -potassium chloride daily  -continue to monitor

## 2017-08-27 NOTE — CARE PLAN
Problem: Safety  Goal: Will remain free from injury    Intervention: Provide assistance with mobility  Patient is up self.  Refuses bed alarm.  Call light in reach at all times.  Hourly rounding in place.      Problem: Venous Thromboembolism (VTW)/Deep Vein Thrombosis (DVT) Prevention:  Goal: Patient will participate in Venous Thrombosis (VTE)/Deep Vein Thrombosis (DVT)Prevention Measures  Heparin for VTE prophylaxis

## 2017-08-27 NOTE — PROGRESS NOTES
Internal Medicine Interval Note    Name Diana Hernandez     1975   Age/Sex 42 y.o. female   MRN [id number]   Code Status FULL CODE     After 5PM or if no immediate response to page, please call for cross-coverage  Attending/Team: Chapito/Guillermo See Patient List for primary contact information  Call [phone] to page    1st Call - Day Intern (R1):   Darrel Bowling 2nd Call - Day Sr. Resident (R2/R3):   Kenton Norwood         Reason for interval visit  (Principal Problem)   Flank pain, acute  No findings on CT abdomen, no signs of infection, stable overall. Pain improved today, controlled on acetaminophen currently.    Interval Problem Daily Status Update  (24 hours)   Principal Problem:    Flank pain, acute POA: Unknown  Active Problems:    Dehydration  Received IVF, now taking PO, blood pressure stable/normal, euvolemic    Nausea and vomiting   Improved, on PRN ondansetron    Substance abuse  Avoiding opiates.    Metabolic acidosis, increased anion gap  Resolved, gap 9    Anxiety (Chronic)  Good response to 0.5-1mg PRN lorazepam q6h    Hypomagnesemia    Hypokalemia  S/p repletion today    Hyperglycemia no hx/o DM  Normal glucose today, no ISS needed      Review of Systems   Constitutional: Negative for chills and fever.   HENT: Negative for ear pain, hearing loss and sore throat.    Eyes: Negative for double vision.   Respiratory: Negative for hemoptysis and shortness of breath.    Cardiovascular: Negative for chest pain and palpitations.   Gastrointestinal: Negative for blood in stool, melena and vomiting.   Genitourinary: Negative for dysuria.   Musculoskeletal: Negative for myalgias.   Skin: Negative for rash.   Neurological: Negative for tingling, tremors and sensory change.   Endo/Heme/Allergies: Negative for environmental allergies.   Psychiatric/Behavioral: Negative for suicidal ideas.       Consultants/Specialty  None    Disposition  Ready for discharge    Quality Measures  Quality-Core  Measures  No [place]  SCDs  Ulcer prophylaxis not indicated      Physical Exam       Vitals:    08/26/17 2000 08/27/17 0000 08/27/17 0400 08/27/17 0800   BP: (!) 99/53 102/60 107/75 119/105   Pulse: 68 79 65 (!) 59   Resp: 18 18 18 16   Temp: 37.4 °C (99.3 °F) 36.9 °C (98.5 °F) 37.7 °C (99.8 °F) 37 °C (98.6 °F)   SpO2: 96% 95% 95% 100%   Weight: 55.7 kg (122 lb 12.7 oz)      Height:         Body mass index is 20.43 kg/m². Weight: 55.7 kg (122 lb 12.7 oz)  Oxygen Therapy:  Pulse Oximetry: 100 %, O2 (LPM): 0, O2 Delivery: None (Room Air)    Physical Exam   Constitutional: She is oriented to person, place, and time. No distress.   HENT:   Head: Normocephalic and atraumatic.   Eyes: EOM are normal. Pupils are equal, round, and reactive to light.   Neck: Normal range of motion. Neck supple.   Cardiovascular: Normal rate and regular rhythm.  Exam reveals no gallop and no friction rub.    No murmur heard.  Pulmonary/Chest: Effort normal and breath sounds normal. No respiratory distress. She has no wheezes. She has no rales.   Abdominal: Soft. Bowel sounds are normal. She exhibits no distension. There is no tenderness. There is no rebound and no guarding.   Musculoskeletal: Normal range of motion.   Neurological: She is alert and oriented to person, place, and time.   Skin: Skin is warm and dry. She is not diaphoretic.   Psychiatric: Memory and affect normal.         Lab Data Review:         8/27/2017  10:38 AM    Recent Labs      08/26/17   0621  08/27/17   0137   SODIUM  134*  140   POTASSIUM  3.2*  3.4*   CHLORIDE  103  111   CO2  15*  20   BUN  21  6*   CREATININE  1.07  0.72   MAGNESIUM  1.8  2.2   PHOSPHORUS  1.8*   --    CALCIUM  10.4  8.8       Recent Labs      08/26/17   0621  08/27/17   0137   ALTSGPT  7  11   ASTSGOT  13  14   ALKPHOSPHAT  90  65   TBILIRUBIN  1.6*  0.6   LIPASE  4*  5*   --    GLUCOSE  251*  105*       Recent Labs      08/26/17   0621  08/27/17   0137   RBC  4.55  3.46*   HEMOGLOBIN  15.4  11.5*    HEMATOCRIT  43.4  33.4*   PLATELETCT  344  247       Recent Labs      08/26/17   0621  08/27/17   0137   WBC  14.6*  11.0*   NEUTSPOLYS  91.60*  78.40*   LYMPHOCYTES  4.60*  12.00*   MONOCYTES  2.90  8.50   EOSINOPHILS  0.00  0.20   BASOPHILS  0.30  0.40   ASTSGOT  13  14   ALTSGPT  7  11   ALKPHOSPHAT  90  65   TBILIRUBIN  1.6*  0.6           Assessment/Plan     * Flank pain, acute   Assessment & Plan    Primary complaint is bilateral flank, notes pain goes around to upper abdomen as well. Musculoskeletal/non-specific pain likely in the setting of significant psychiatric history, atypical pain presentation, and additional vomiting/dehydration. Renal US and CT abdomen showing no acute changes.  - Acetaminophen PRN        Nausea and vomiting- (present on admission)   Assessment & Plan    Has history of admission for cannabinoid hyperemesis syndrome. Denies EtOH. Non-bloody emesis per history, no emesis since presentation.  - Ondansetron PRN        Dehydration   Assessment & Plan    Patient notes at least 3 episodes of emesis since yesterday. SBP 80s-90s in ED, tongue cobblestoning on exam. Received two 1L NS boluses in ED.  - Maintenance IVF, encourage PO intake        Metabolic acidosis, increased anion gap- (present on admission)   Assessment & Plan    On admission HCO3 15, gap 16, lactate 1.7. Now resolved with gap closed.  - Trend BMP        Substance abuse- (present on admission)   Assessment & Plan    Endorses marijuana, last used the day before admission at 13:00. Denies other drugs, IV drugs, EtOH.  - Tylenol for pain, NSAIDs second line, avoid opiates        Hyperglycemia- (present on admission)   Assessment & Plan    Today random blood glucose 251, A1c 5.6. No history of DM, insulin, or oral antihyperglycemic. Normal blood glucose today, no insulin needed.  - Hypoglycemia protocol        Hypokalemia- (present on admission)   Assessment & Plan    3.2 on admission, KCL given in ED, 20 mEq PO also given.  -  Trend BMP, replete PRN with goal of K 4 or greater        Hypomagnesemia- (present on admission)   Assessment & Plan    1.8 on admission, 2gm IV given.  - Trend Mg level, replete PRN with goal of Mg 2 or greater        Anxiety- (present on admission)   Assessment & Plan    Chronic condition. Patient was very anxious in ED, describes panic attack, received lorazepam to good effect.  - Lorazepam PRN        The patient was seen by me face to face. I personally had a discussion with the patient. The case was discussed with our resident team, I examined the patient and confirmed the essential components of the history, physical examination, diagnosis and treatment plan as needed. I agree with the patient care as documented by the resident and edited as above by me. See resident's note above for complete details of service. The overall treatment regimen will be carried out as described above.     Thank you:  Blanco Uriarte MD, FACP  [facility] Internal Medicine  Pager: Use [employer] Text (use resident info under treatment team for day to day floor issues)  Office: [phone] Ext. [phone]  Fax: [phone] (non PHI only)

## 2017-08-27 NOTE — PROGRESS NOTES
Assumed care of patient.  Patient complaing of severe anxiety.  Asked to have ativan increased and ambien for sleep.  Will notify MD.  No other distress noted.  Plan of care discussed.  Bed in low/locked position.  Patient refuses bed alarm.  Call light in reach.  Will monitor per protocol.

## 2017-08-27 NOTE — PROGRESS NOTES
IV access attempted again by 2 RN's without success.  Called for ultrasound RN who stated only vein seen was the one that blew.  Called ICU RN to try and start.  RN will come when able.  MD Mosqueda notified of IV difficulties and attempts to restart.

## 2017-08-27 NOTE — PROGRESS NOTES
Patient has 2 IV's.  Both infiltrated and were removed.  IV access attempted x3.  Unable to establish.  Will attempt to find RN certified for IV placement via ultrasound.

## 2017-08-27 NOTE — PROGRESS NOTES
Patient's US guided IV infiltrated.  At this time, patient is not agreeable to another IV stick.  Patient educated on necessity of IV fluids and continues to refuse.  MD paged.

## 2017-08-27 NOTE — PROGRESS NOTES
Pt will be discharged home this am. Discussed with patient the importance of taking Prozac, she has been instructed to discuss with her PCP regarding her desire to become pregnant in the near future. MDs are aware, she will not be sent home with this new prescription. She states she will follow-up with her doctor.

## 2017-08-28 ENCOUNTER — HOSPITAL ENCOUNTER (OUTPATIENT)
Dept: RADIOLOGY | Facility: MEDICAL CENTER | Age: 42
End: 2017-08-28
Attending: OBSTETRICS & GYNECOLOGY
Payer: MEDICAID

## 2017-08-28 DIAGNOSIS — R10.2 PELVIC PAIN: ICD-10-CM

## 2017-08-28 DIAGNOSIS — D25.9 UTERINE LEIOMYOMA, UNSPECIFIED LOCATION: ICD-10-CM

## 2017-08-28 DIAGNOSIS — N92.6 IRREGULAR PERIODS: ICD-10-CM

## 2017-08-29 ENCOUNTER — HOSPITAL ENCOUNTER (EMERGENCY)
Facility: MEDICAL CENTER | Age: 42
End: 2017-08-29
Payer: MEDICAID

## 2017-08-29 ENCOUNTER — RESOLUTE PROFESSIONAL BILLING HOSPITAL PROF FEE (OUTPATIENT)
Dept: HOSPITALIST | Facility: MEDICAL CENTER | Age: 42
End: 2017-08-29
Payer: MEDICAID

## 2017-08-29 ENCOUNTER — HOSPITAL ENCOUNTER (INPATIENT)
Facility: MEDICAL CENTER | Age: 42
LOS: 3 days | DRG: 103 | End: 2017-09-01
Attending: EMERGENCY MEDICINE | Admitting: INTERNAL MEDICINE
Payer: MEDICAID

## 2017-08-29 VITALS
OXYGEN SATURATION: 99 % | HEART RATE: 70 BPM | DIASTOLIC BLOOD PRESSURE: 72 MMHG | TEMPERATURE: 99.8 F | RESPIRATION RATE: 20 BRPM | SYSTOLIC BLOOD PRESSURE: 129 MMHG

## 2017-08-29 DIAGNOSIS — F12.90 MARIJUANA USE: ICD-10-CM

## 2017-08-29 DIAGNOSIS — E86.0 DEHYDRATION: ICD-10-CM

## 2017-08-29 DIAGNOSIS — R11.15 INTRACTABLE CYCLICAL VOMITING WITH NAUSEA: ICD-10-CM

## 2017-08-29 DIAGNOSIS — E87.6 HYPOKALEMIA: ICD-10-CM

## 2017-08-29 DIAGNOSIS — E87.1 HYPONATREMIA: ICD-10-CM

## 2017-08-29 LAB
ALBUMIN SERPL BCP-MCNC: 4.4 G/DL (ref 3.2–4.9)
ALBUMIN/GLOB SERPL: 1.2 G/DL
ALP SERPL-CCNC: 81 U/L (ref 30–99)
ALT SERPL-CCNC: 23 U/L (ref 2–50)
AMPHETAMINES UR QL: NEGATIVE
ANION GAP SERPL CALC-SCNC: 16 MMOL/L (ref 0–11.9)
APPEARANCE UR: ABNORMAL
AST SERPL-CCNC: 29 U/L (ref 12–45)
BACTERIA #/AREA URNS HPF: ABNORMAL /HPF
BARBITURATES UR QL SCN: NEGATIVE
BASOPHILS # BLD AUTO: 0.5 % (ref 0–1.8)
BASOPHILS # BLD: 0.05 K/UL (ref 0–0.12)
BENZODIAZ UR QL SCN: NEGATIVE
BILIRUB SERPL-MCNC: 2.2 MG/DL (ref 0.1–1.5)
BILIRUB UR QL STRIP.AUTO: ABNORMAL
BUN SERPL-MCNC: 9 MG/DL (ref 8–22)
BZE UR QL SCN: NEGATIVE
CALCIUM SERPL-MCNC: 9.4 MG/DL (ref 8.4–10.2)
CHLORIDE SERPL-SCNC: 97 MMOL/L (ref 96–112)
CO2 SERPL-SCNC: 18 MMOL/L (ref 20–33)
COLOR UR: YELLOW
CREAT SERPL-MCNC: 1.06 MG/DL (ref 0.5–1.4)
EOSINOPHIL # BLD AUTO: 0.01 K/UL (ref 0–0.51)
EOSINOPHIL NFR BLD: 0.1 % (ref 0–6.9)
EPI CELLS #/AREA URNS HPF: ABNORMAL /HPF
ERYTHROCYTE [DISTWIDTH] IN BLOOD BY AUTOMATED COUNT: 44.3 FL (ref 35.9–50)
GFR SERPL CREATININE-BSD FRML MDRD: 57 ML/MIN/1.73 M 2
GLOBULIN SER CALC-MCNC: 3.8 G/DL (ref 1.9–3.5)
GLUCOSE SERPL-MCNC: 120 MG/DL (ref 65–99)
GLUCOSE UR STRIP.AUTO-MCNC: 100 MG/DL
HCT VFR BLD AUTO: 40.7 % (ref 37–47)
HGB BLD-MCNC: 14.8 G/DL (ref 12–16)
IMM GRANULOCYTES # BLD AUTO: 0.07 K/UL (ref 0–0.11)
IMM GRANULOCYTES NFR BLD AUTO: 0.7 % (ref 0–0.9)
KETONES UR STRIP.AUTO-MCNC: 40 MG/DL
LACTATE BLD-SCNC: 0.97 MMOL/L (ref 0.5–2)
LACTATE BLD-SCNC: 3.84 MMOL/L (ref 0.5–2)
LEUKOCYTE ESTERASE UR QL STRIP.AUTO: NEGATIVE
LIPASE SERPL-CCNC: 19 U/L (ref 7–58)
LYMPHOCYTES # BLD AUTO: 1.25 K/UL (ref 1–4.8)
LYMPHOCYTES NFR BLD: 12.6 % (ref 22–41)
MCH RBC QN AUTO: 33.6 PG (ref 27–33)
MCHC RBC AUTO-ENTMCNC: 36.4 G/DL (ref 33.6–35)
MCV RBC AUTO: 92.5 FL (ref 81.4–97.8)
MICRO URNS: ABNORMAL
MONOCYTES # BLD AUTO: 1.12 K/UL (ref 0–0.85)
MONOCYTES NFR BLD AUTO: 11.3 % (ref 0–13.4)
MUCOUS THREADS #/AREA URNS HPF: ABNORMAL /HPF
NEUTROPHILS # BLD AUTO: 7.44 K/UL (ref 2–7.15)
NEUTROPHILS NFR BLD: 74.8 % (ref 44–72)
NITRITE UR QL STRIP.AUTO: POSITIVE
NRBC # BLD AUTO: 0 K/UL
NRBC BLD AUTO-RTO: 0 /100 WBC
PCP UR QL SCN: NEGATIVE
PH UR STRIP.AUTO: 6.5 [PH]
PLATELET # BLD AUTO: 328 K/UL (ref 164–446)
PMV BLD AUTO: 9.7 FL (ref 9–12.9)
POTASSIUM SERPL-SCNC: 2.4 MMOL/L (ref 3.6–5.5)
POTASSIUM SERPL-SCNC: 3.2 MMOL/L (ref 3.6–5.5)
PROT SERPL-MCNC: 8.2 G/DL (ref 6–8.2)
PROT UR QL STRIP: 100 MG/DL
RBC # BLD AUTO: 4.4 M/UL (ref 4.2–5.4)
RBC # URNS HPF: ABNORMAL /HPF
RBC UR QL AUTO: ABNORMAL
SODIUM SERPL-SCNC: 131 MMOL/L (ref 135–145)
SP GR UR REFRACTOMETRY: 1.03
SPECIMEN DRAWN FROM PATIENT: ABNORMAL
SPECIMEN DRAWN FROM PATIENT: NORMAL
UR OPIATES 2659: NEGATIVE
UR THC 2511T: POSITIVE
UR TRICYCLIC 2660: NEGATIVE
WBC # BLD AUTO: 9.9 K/UL (ref 4.8–10.8)
WBC #/AREA URNS HPF: ABNORMAL /HPF

## 2017-08-29 PROCEDURE — 84132 ASSAY OF SERUM POTASSIUM: CPT

## 2017-08-29 PROCEDURE — 85025 COMPLETE CBC W/AUTO DIFF WBC: CPT

## 2017-08-29 PROCEDURE — 770022 HCHG ROOM/CARE - ICU (200)

## 2017-08-29 PROCEDURE — 700102 HCHG RX REV CODE 250 W/ 637 OVERRIDE(OP): Performed by: HOSPITALIST

## 2017-08-29 PROCEDURE — 99223 1ST HOSP IP/OBS HIGH 75: CPT | Performed by: INTERNAL MEDICINE

## 2017-08-29 PROCEDURE — 83605 ASSAY OF LACTIC ACID: CPT

## 2017-08-29 PROCEDURE — 83690 ASSAY OF LIPASE: CPT

## 2017-08-29 PROCEDURE — A9270 NON-COVERED ITEM OR SERVICE: HCPCS | Performed by: HOSPITALIST

## 2017-08-29 PROCEDURE — 700111 HCHG RX REV CODE 636 W/ 250 OVERRIDE (IP): Performed by: EMERGENCY MEDICINE

## 2017-08-29 PROCEDURE — 99291 CRITICAL CARE FIRST HOUR: CPT

## 2017-08-29 PROCEDURE — 80053 COMPREHEN METABOLIC PANEL: CPT

## 2017-08-29 PROCEDURE — 36415 COLL VENOUS BLD VENIPUNCTURE: CPT

## 2017-08-29 PROCEDURE — 96375 TX/PRO/DX INJ NEW DRUG ADDON: CPT

## 2017-08-29 PROCEDURE — 700105 HCHG RX REV CODE 258: Performed by: EMERGENCY MEDICINE

## 2017-08-29 PROCEDURE — 80305 DRUG TEST PRSMV DIR OPT OBS: CPT

## 2017-08-29 PROCEDURE — 700101 HCHG RX REV CODE 250: Performed by: EMERGENCY MEDICINE

## 2017-08-29 PROCEDURE — 700111 HCHG RX REV CODE 636 W/ 250 OVERRIDE (IP): Performed by: INTERNAL MEDICINE

## 2017-08-29 PROCEDURE — 81001 URINALYSIS AUTO W/SCOPE: CPT

## 2017-08-29 PROCEDURE — 700102 HCHG RX REV CODE 250 W/ 637 OVERRIDE(OP): Performed by: INTERNAL MEDICINE

## 2017-08-29 PROCEDURE — 94760 N-INVAS EAR/PLS OXIMETRY 1: CPT

## 2017-08-29 PROCEDURE — 96361 HYDRATE IV INFUSION ADD-ON: CPT

## 2017-08-29 PROCEDURE — 302449 STATCHG TRIAGE ONLY (STATISTIC)

## 2017-08-29 PROCEDURE — A9270 NON-COVERED ITEM OR SERVICE: HCPCS | Performed by: INTERNAL MEDICINE

## 2017-08-29 PROCEDURE — 96374 THER/PROPH/DIAG INJ IV PUSH: CPT

## 2017-08-29 PROCEDURE — 700101 HCHG RX REV CODE 250: Performed by: INTERNAL MEDICINE

## 2017-08-29 RX ORDER — POLYETHYLENE GLYCOL 3350 17 G/17G
1 POWDER, FOR SOLUTION ORAL
Status: DISCONTINUED | OUTPATIENT
Start: 2017-08-29 | End: 2017-09-01 | Stop reason: HOSPADM

## 2017-08-29 RX ORDER — HALOPERIDOL 5 MG/ML
5 INJECTION INTRAMUSCULAR ONCE
Status: COMPLETED | OUTPATIENT
Start: 2017-08-29 | End: 2017-08-29

## 2017-08-29 RX ORDER — PROMETHAZINE HYDROCHLORIDE 25 MG/1
12.5-25 SUPPOSITORY RECTAL EVERY 4 HOURS PRN
Status: DISCONTINUED | OUTPATIENT
Start: 2017-08-29 | End: 2017-09-01 | Stop reason: HOSPADM

## 2017-08-29 RX ORDER — ONDANSETRON 2 MG/ML
4 INJECTION INTRAMUSCULAR; INTRAVENOUS EVERY 4 HOURS PRN
Status: DISCONTINUED | OUTPATIENT
Start: 2017-08-29 | End: 2017-09-01 | Stop reason: HOSPADM

## 2017-08-29 RX ORDER — AMOXICILLIN 250 MG
2 CAPSULE ORAL 2 TIMES DAILY
Status: DISCONTINUED | OUTPATIENT
Start: 2017-08-29 | End: 2017-09-01 | Stop reason: HOSPADM

## 2017-08-29 RX ORDER — SODIUM CHLORIDE 9 MG/ML
1000 INJECTION, SOLUTION INTRAVENOUS ONCE
Status: COMPLETED | OUTPATIENT
Start: 2017-08-29 | End: 2017-08-30

## 2017-08-29 RX ORDER — ONDANSETRON 2 MG/ML
4 INJECTION INTRAMUSCULAR; INTRAVENOUS ONCE
Status: COMPLETED | OUTPATIENT
Start: 2017-08-29 | End: 2017-08-29

## 2017-08-29 RX ORDER — ONDANSETRON 4 MG/1
4 TABLET, ORALLY DISINTEGRATING ORAL EVERY 4 HOURS PRN
Status: DISCONTINUED | OUTPATIENT
Start: 2017-08-29 | End: 2017-09-01 | Stop reason: HOSPADM

## 2017-08-29 RX ORDER — SODIUM CHLORIDE AND POTASSIUM CHLORIDE 300; 900 MG/100ML; MG/100ML
INJECTION, SOLUTION INTRAVENOUS ONCE
Status: COMPLETED | OUTPATIENT
Start: 2017-08-29 | End: 2017-08-29

## 2017-08-29 RX ORDER — LORAZEPAM 2 MG/ML
1 INJECTION INTRAMUSCULAR ONCE
Status: COMPLETED | OUTPATIENT
Start: 2017-08-29 | End: 2017-08-29

## 2017-08-29 RX ORDER — BISACODYL 10 MG
10 SUPPOSITORY, RECTAL RECTAL
Status: DISCONTINUED | OUTPATIENT
Start: 2017-08-29 | End: 2017-09-01 | Stop reason: HOSPADM

## 2017-08-29 RX ORDER — SODIUM CHLORIDE AND POTASSIUM CHLORIDE 150; 900 MG/100ML; MG/100ML
INJECTION, SOLUTION INTRAVENOUS CONTINUOUS
Status: DISCONTINUED | OUTPATIENT
Start: 2017-08-29 | End: 2017-08-30

## 2017-08-29 RX ORDER — DIPHENHYDRAMINE HYDROCHLORIDE 50 MG/ML
25 INJECTION INTRAMUSCULAR; INTRAVENOUS ONCE
Status: COMPLETED | OUTPATIENT
Start: 2017-08-29 | End: 2017-08-29

## 2017-08-29 RX ORDER — ONDANSETRON 4 MG/1
4 TABLET, ORALLY DISINTEGRATING ORAL EVERY 8 HOURS PRN
Status: ON HOLD | COMMUNITY
End: 2017-09-01

## 2017-08-29 RX ORDER — ZOLPIDEM TARTRATE 5 MG/1
10 TABLET ORAL NIGHTLY PRN
Status: DISCONTINUED | OUTPATIENT
Start: 2017-08-29 | End: 2017-09-01 | Stop reason: HOSPADM

## 2017-08-29 RX ORDER — LORAZEPAM 0.5 MG/1
0.5 TABLET ORAL EVERY 6 HOURS PRN
Status: DISCONTINUED | OUTPATIENT
Start: 2017-08-29 | End: 2017-08-30

## 2017-08-29 RX ORDER — LORAZEPAM 2 MG/ML
0.5 INJECTION INTRAMUSCULAR EVERY 6 HOURS PRN
Status: DISCONTINUED | OUTPATIENT
Start: 2017-08-29 | End: 2017-08-30

## 2017-08-29 RX ORDER — POTASSIUM CHLORIDE 20 MEQ/1
40 TABLET, EXTENDED RELEASE ORAL 3 TIMES DAILY
Status: COMPLETED | OUTPATIENT
Start: 2017-08-29 | End: 2017-08-30

## 2017-08-29 RX ORDER — PROMETHAZINE HYDROCHLORIDE 25 MG/1
12.5-25 TABLET ORAL EVERY 4 HOURS PRN
Status: DISCONTINUED | OUTPATIENT
Start: 2017-08-29 | End: 2017-09-01 | Stop reason: HOSPADM

## 2017-08-29 RX ORDER — ACETAMINOPHEN 325 MG/1
650 TABLET ORAL EVERY 6 HOURS PRN
Status: DISCONTINUED | OUTPATIENT
Start: 2017-08-29 | End: 2017-09-01 | Stop reason: HOSPADM

## 2017-08-29 RX ADMIN — POTASSIUM CHLORIDE 40 MEQ: 1500 TABLET, EXTENDED RELEASE ORAL at 10:16

## 2017-08-29 RX ADMIN — POTASSIUM CHLORIDE 40 MEQ: 1500 TABLET, EXTENDED RELEASE ORAL at 21:03

## 2017-08-29 RX ADMIN — POTASSIUM CHLORIDE AND SODIUM CHLORIDE: 900; 150 INJECTION, SOLUTION INTRAVENOUS at 17:55

## 2017-08-29 RX ADMIN — ENOXAPARIN SODIUM 40 MG: 100 INJECTION SUBCUTANEOUS at 10:12

## 2017-08-29 RX ADMIN — DIPHENHYDRAMINE HYDROCHLORIDE 25 MG: 50 INJECTION, SOLUTION INTRAMUSCULAR; INTRAVENOUS at 06:48

## 2017-08-29 RX ADMIN — POTASSIUM CHLORIDE AND SODIUM CHLORIDE: 900; 150 INJECTION, SOLUTION INTRAVENOUS at 10:03

## 2017-08-29 RX ADMIN — ACETAMINOPHEN 650 MG: 325 TABLET, FILM COATED ORAL at 10:12

## 2017-08-29 RX ADMIN — ZOLPIDEM TARTRATE 10 MG: 5 TABLET ORAL at 21:04

## 2017-08-29 RX ADMIN — HALOPERIDOL LACTATE 5 MG: 5 INJECTION, SOLUTION INTRAMUSCULAR at 06:48

## 2017-08-29 RX ADMIN — LORAZEPAM 1 MG: 2 INJECTION INTRAMUSCULAR; INTRAVENOUS at 06:30

## 2017-08-29 RX ADMIN — ONDANSETRON 4 MG: 2 INJECTION INTRAMUSCULAR; INTRAVENOUS at 06:30

## 2017-08-29 RX ADMIN — SODIUM CHLORIDE 1000 ML: 9 INJECTION, SOLUTION INTRAVENOUS at 06:34

## 2017-08-29 RX ADMIN — LORAZEPAM 0.5 MG: 2 INJECTION INTRAMUSCULAR; INTRAVENOUS at 19:51

## 2017-08-29 RX ADMIN — ONDANSETRON 4 MG: 2 INJECTION INTRAMUSCULAR; INTRAVENOUS at 10:13

## 2017-08-29 RX ADMIN — LORAZEPAM 1 MG: 2 INJECTION INTRAMUSCULAR; INTRAVENOUS at 07:46

## 2017-08-29 RX ADMIN — LORAZEPAM 0.5 MG: 2 INJECTION INTRAMUSCULAR; INTRAVENOUS at 13:48

## 2017-08-29 RX ADMIN — ONDANSETRON 4 MG: 2 INJECTION INTRAMUSCULAR; INTRAVENOUS at 18:15

## 2017-08-29 RX ADMIN — POTASSIUM CHLORIDE 40 MEQ: 1500 TABLET, EXTENDED RELEASE ORAL at 15:05

## 2017-08-29 RX ADMIN — POTASSIUM CHLORIDE AND SODIUM CHLORIDE: 900; 300 INJECTION, SOLUTION INTRAVENOUS at 09:24

## 2017-08-29 ASSESSMENT — ENCOUNTER SYMPTOMS
MYALGIAS: 0
PALPITATIONS: 0
ABDOMINAL PAIN: 1
BRUISES/BLEEDS EASILY: 0
SHORTNESS OF BREATH: 0
COUGH: 0
DIZZINESS: 0
BLOOD IN STOOL: 0
TREMORS: 0
NAUSEA: 1
CONSTIPATION: 0
DIARRHEA: 0
HEADACHES: 0
DIAPHORESIS: 0
VOMITING: 1
FEVER: 0
CHILLS: 0

## 2017-08-29 ASSESSMENT — PAIN SCALES - GENERAL
PAINLEVEL_OUTOF10: 7
PAINLEVEL_OUTOF10: 0
PAINLEVEL_OUTOF10: 7
PAINLEVEL_OUTOF10: 0

## 2017-08-29 ASSESSMENT — LIFESTYLE VARIABLES
ALCOHOL_USE: NO
EVER_SMOKED: YES

## 2017-08-29 NOTE — CARE PLAN
Problem: Safety  Goal: Will remain free from falls    Intervention: Implement fall precautions  Treaded slipper socks placed on Pt, call light reviewed, standby assist while OOB for BSC, Bed alarm placed on, personal belongings within reach.      Problem: Psychosocial Needs:  Goal: Level of anxiety will decrease    Intervention: Identify and develop with patient strategies to cope with anxiety triggers  Pt's questions answered, reassurance given, routines explained, warm blankets for comfort, medications reviewed.

## 2017-08-29 NOTE — ED PROVIDER NOTES
ED Provider Note    CHIEF COMPLAINT  Chief Complaint   Patient presents with   • Flank Pain       HPI  Diana Hernandez is a 42 y.o. female who presents nausea and vomiting. Patient states yesterday about 4:00 she started getting nausea and vomiting. Identical to her multiple ×4. No blood in the vomit. Nothing makes it better or worse. Recently discharged from the hospital with vomiting possibly induced by marijuana. Came in by ambulance today. In moderate distress from her epigastric pain and nausea and vomiting. Initially she thought this was like her kidney stone pain but I explained to her that she had a CAT scan 2 days ago that showed no kidney stones.    I reviewed her old records she's had 11 CAT scans and one half years of her abdomen pelvis. Multiple admissions and emergency room visits for cyclical vomiting. She's had superior mesenteric artery abnormality with surgery in the past as well.    REVIEW OF SYSTEMS  CONSTITUTIONAL:  Denies fever, chills, weight gain or weight loss. Positive generalized weakness  EYES:  Denies photophobia or discharge   ENT:  Denies sore throat, nose or ear pain  CARDIOVASCULAR:  Denies chest pain, palpitations or swelling  RESPIRATORY:  Denies cough or shortness of breath or difficulty breathing  GI: Positive nausea and vomiting but no diarrhea. Identical to her previous episodes.  MUSCULOSKELETAL:  Denies weakness joint swelling or back pain  SKIN:  No rash  ALLERGIC: No itchy rashes.  NEUROLOGIC:  Denies headache, focal weakness or numbness  PSYCHIATRIC:  Denies depression    PAST MEDICAL HISTORY  Past Medical History:   Diagnosis Date   • CLOSTRIDIUM DIFFICILE INFECTION 4/14/2010   • Superior mesenteric artery syndrome (CMS-HCC) 7/12/2009   • Dyspepsia 7/12/2009   • Tobacco abuse 6/20/2009   • Nephrolithiasis 6/20/2009    kidney stones,post lithotrypsy   • Anxiety     Followed by [facility]   • Anxiety disorder    • Backpain    • CVS disease    • Cyclic vomiting syndrome    •  Dental disorder    • Drug-seeking behavior    • Pain     abd and back   • Snoring    • Superior mesenteric artery syndrome (CMS-HCC)        FAMILY HISTORY  Family History   Problem Relation Age of Onset   • Cancer Mother 50     Colon cancer   • Hypertension Mother    • Allergies Father    • Hypertension Father    • Heart Disease Maternal Grandmother        SOCIAL HISTORY   reports that she quit smoking about 4 years ago. Her smoking use included Cigarettes. She has a 6.50 pack-year smoking history. She has never used smokeless tobacco. She reports that she uses drugs, including Marijuana and Inhaled. She reports that she does not drink alcohol.    SURGICAL HISTORY  Past Surgical History:   Procedure Laterality Date   • GASTROSCOPY-ENDO  4/28/2016    Procedure: GASTROSCOPY-ENDO;  Surgeon: Blayne Blackburn M.D.;  Location: ENDOSCOPY [facility];  Service:    • EXPLORATORY LAPAROTOMY  1/12/2016    Procedure: EXPLORATORY LAPAROTOMY;  Surgeon: Maciel Quintero M.D.;  Location: SURGERY [facility];  Service:    • BOWEL RESECTION  1/12/2016    Procedure: BOWEL RESECTION;  Surgeon: Maciel Quintero M.D.;  Location: SURGERY [facility];  Service:    • GASTROSCOPY WITH BIOPSY  3/9/2010    Performed by AMANDA MEJIA at ENDOSCOPY [facility]   • PYLOROPLASTY  7/27/2009    Performed by MACIEL QUINTERO at SURGERY [facility]   • EXPLORATORY LAPAROTOMY  7/27/2009    Performed by MACIEL QUINTERO at [facility]   • APPENDECTOMY  7/27/2009    Performed by MACIEL QUINTERO at [facility]   • CHOLECYSTECTOMY  7/27/2009    Performed by MACIEL QUINTERO at SURGERY [facility]   • GASTROSCOPY-ENDO  7/8/2009    Performed by ROSANNA WRIGHT at SURGERY [facility]   • LITHOTRIPSY     • OTHER      superior mesenteric artery correction        CURRENT MEDICATIONS    Current Facility-Administered Medications:   •  NS infusion 1,000 mL,  "1,000 mL, Intravenous, Once, Jeevan Miller M.D.  •  ondansetron (ZOFRAN) syringe/vial injection 4 mg, 4 mg, Intravenous, Once, Jeevan Miller M.D.  •  lorazepam (ATIVAN) injection 1 mg, 1 mg, Intravenous, Once, Jeevan Miller M.D.    Current Outpatient Prescriptions:   •  acetaminophen (TYLENOL) 500 MG Tab, Take 1 Tab by mouth every 6 hours as needed for Mild Pain., Disp: 30 Tab, Rfl: 0  •  promethazine (PHENERGAN) 25 MG Tab, Take 0.5 Tabs by mouth every 8 hours as needed for Nausea/Vomiting., Disp: 30 Tab, Rfl: 0  •  folic acid (FOLVITE) 1 MG Tab, Take 1 Tab by mouth every day., Disp: 30 Tab, Rfl: 1  •  potassium chloride SA (KDUR) 20 MEQ Tab CR, Take 0.5 Tabs by mouth every day for 7 days., Disp: 3 Tab, Rfl: 0  •  multivitamin (THERAGRAN) Tab, Take 1 Tab by mouth every day., Disp: 30 Tab, Rfl: 1  •  thiamine (THIAMINE) 100 MG tablet, Take 1 Tab by mouth every day., Disp: 30 Tab, Rfl: 1      ALLERGIES  Allergies   Allergen Reactions   • Metoclopramide Hives     Told by MD; pt suspects possible hives.   • Quetiapine Unspecified     MD told her she was allergic     • Septra [Sulfamethoxazole W-Trimethoprim] Unspecified     MD told her she was allergic         PHYSICAL EXAM  VITAL SIGNS: /59   Pulse (!) 59   Temp 37.5 °C (99.5 °F)   Resp 16   Ht 1.651 m (5' 5\")   Wt 53.5 kg (117 lb 15.1 oz)   LMP 08/19/2017 (Exact Date)   SpO2 100%   BMI 19.63 kg/m²      Constitutional: Patient is awake alert person place and time. No acute respiratory distress Very thin person complaining of nausea and vomiting and epigastric pain   HENT: Normocephalic, Atraumatic,  Bilateral external ears normal, Oropharynx pink dry with no exudates, Nose patent.   Eyes: PERRLA, EOMI, Sclera and conjunctiva clear, No discharge.   Neck:  Supple no nuchal rigidity, no thyromegaly or mass. Non-tender  Lymphatic: No supraclavicular, axillary or inguinal lymph nodes.   Cardiovascular: Heart is regular rate and rhythm no murmur, rub or " thrill.   Thorax & Lungs: Chest is symmetrical, with good breath sounds. No wheezing or crackles. No respiratory distress, No chest tenderness.   Abdomen:  Soft, positive tenderness epigastric area. Well-healed scars to abdomen. Bowel sounds present. T   Skin: Warm, Dry, no petechia, purpura or rash.   Back: Non tender with palpation, No CVA tenderness.   Extremities: No  edema.  Non tender.   Musculoskeletal: Good range of motion to her upper and lower extremities. Since that light touch arms and legs.   Neurologic: Alert & oriented   Strength is 5 over 5 , bicep triceps, quadriceps, plantar flexion and extension. Sensory is intact to light touch face, arms and legs. DTRs are symmetrical biceps brachioradialis, patella and Achilles.   Psychiatric:  Very anxious.      LABS:  Lab Results   Component Value Date    WBC 9.9 08/29/2017    HEMOGLOBIN 14.8 08/29/2017    HEMATOCRIT 40.7 08/29/2017    PLATELETCT 328 08/29/2017    SODIUM 131 (L) 08/29/2017    POTASSIUM 2.4 (LL) 08/29/2017    CHLORIDE 97 08/29/2017    CO2 18 (L) 08/29/2017    BUN 9 08/29/2017    GLUCOSE 120 (H) 08/29/2017    ALTSGPT 23 08/29/2017    ASTSGOT 29 08/29/2017    INR 0.98 04/24/2017    HBA1C 5.6 08/26/2017     Urine positive for trace nitrates. Very few WBC          COURSE & MEDICAL DECISION MAKING  Pertinent Labs & Imaging studies reviewed. (See chart for details)  Differential diagnosis includes but not limited to ulcers, intussusception, volvulus, perforation, appendicitis, gallbladder disease or retained stones, colitis, drug or alcohol-induced nausea and vomiting, electrolyte imbalances, marijuana-induced cyclical vomiting.    6:45 AM. Patient states she still feels nauseated. I've given her Ativan already. I will not give her Haldol and Benadryl.  Come back and reviewed her old records. Her last 5 urine drug screens all are positive for marijuana.    7:20 AM.The potassium is 2.4. We will replenish this intravenously..    7:40 AM. Patient  states she is feeling better. I've explained to her about her potassium level. She is now laying back resting much more Dennis. I'll long discussion with her about marijuana and cyclical vomiting. She appears to understand this but is continuing to smoke marijuana but trying to cut back.    810. Discussed case with Dr. Linda Cotto and she will consult on the patient.    Medical decision making    Patient with a long history of cyclical vomiting. Here in the emergency department we gave her Ativan, Zofran and Haldol. Her nausea and vomiting seemed to get better along with her abdominal pain. Her labs have shown that she is dehydrated and electrolyte abnormalities including hypo-healing anemia hyponatremia. Also markedly elevated lactic acid probably due to dehydration and vomiting. She is also mildly hyperglycemic as well. Her CO2 is 18. We have given her 2 L of normal saline open. We'll now start her on normal saline with 40 mg potassium IV at 125 mL per hour. Unfortunately because of her hypokalemia feel that she probably needs to be admitted to the hospital for continued electrolyte replenishment. I had a long discussion with her about marijuana use and cyclical vomiting. She seems to already understand this and states she is trying to slow down. I explained to her she needs to completely quit smoking marijuana and certainly this may help her symptoms quite a bit. I also gave her 2 different articles on cyclical vomiting and marijuana use and she graciously accepted these.. She is also not anxious like she was when she 1st came into the hospital.  She'll be admitted to the [facility] for continued electrolyte replenishment and rehydration.       She is admitted in guarded condition    FINAL IMPRESSION  1. Cyclical vomiting  2. Hypokalemia  3. Hyponatremia  4. Abdominal pain  5. Marijuana use  6. Dehydration  7. Anxiety     PLAN  1. Admission [facility]  2. Continue hydration and electrolyte  replenishment    Electronically signed by: Jeevan Miller, 8/29/2017 6:20 AM

## 2017-08-29 NOTE — ED NOTES
Patient not available for triage as she is on the public phone. This is the second attempt to triage patient.

## 2017-08-29 NOTE — H&P
Hospital Medicine History and Physical    Date of Service  8/29/2017    Chief Complaint  Chief Complaint   Patient presents with   • Flank Pain       History of Presenting Illness  42 y.o. female who presented 8/29/2017 with recurrent nausea and vomiting related to marijuana use. She has had problems with this in the past and has been told that it is related to her marijuana use. She has been smoking marijuana for the last two weeks regularly. She has not been able to keep down even fluids so she comes for evaluation. She denies any diarrhea, fever, headache or chest pain. She also denies any blood in her vomit. She did have abdominal pain and soreness from all her vomiting that is improving in the emergency department. A 10 point review of systems is reviewed and otherwise negative.    Primary Care Physician  Lavell Monte M.D.    Code Status  Full    Review of Systems  Review of Systems   Constitutional: Positive for malaise/fatigue. Negative for chills, diaphoresis and fever.   Respiratory: Negative for cough and shortness of breath.    Cardiovascular: Negative for chest pain, palpitations and leg swelling.   Gastrointestinal: Positive for abdominal pain, nausea and vomiting. Negative for blood in stool, constipation and diarrhea.   Genitourinary: Negative for dysuria and urgency.   Musculoskeletal: Negative for myalgias.   Skin: Negative for rash.   Neurological: Negative for dizziness, tremors and headaches.   Endo/Heme/Allergies: Does not bruise/bleed easily.          Past Medical History  Past Medical History:   Diagnosis Date   • CLOSTRIDIUM DIFFICILE INFECTION 4/14/2010   • Superior mesenteric artery syndrome (CMS-HCC) 7/12/2009   • Dyspepsia 7/12/2009   • Tobacco abuse 6/20/2009   • Nephrolithiasis 6/20/2009    kidney stones,post lithotrypsy   • Anxiety     Followed by [facility]   • Anxiety disorder    • Backpain    • CVS disease    • Cyclic vomiting syndrome    • Dental disorder    • Drug-seeking  behavior    • Pain     abd and back   • Snoring    • Superior mesenteric artery syndrome (CMS-HCC)        Surgical History  Past Surgical History:   Procedure Laterality Date   • GASTROSCOPY-ENDO  4/28/2016    Procedure: GASTROSCOPY-ENDO;  Surgeon: Blayne Blackburn M.D.;  Location: ENDOSCOPY [facility];  Service:    • EXPLORATORY LAPAROTOMY  1/12/2016    Procedure: EXPLORATORY LAPAROTOMY;  Surgeon: Maciel Quintero M.D.;  Location: SURGERY [facility];  Service:    • BOWEL RESECTION  1/12/2016    Procedure: BOWEL RESECTION;  Surgeon: Maciel Quintero M.D.;  Location: SURGERY [facility];  Service:    • GASTROSCOPY WITH BIOPSY  3/9/2010    Performed by AMANDA MEJIA at ENDOSCOPY [facility]   • PYLOROPLASTY  7/27/2009    Performed by MACIEL QUINTERO at SURGERY [facility]   • EXPLORATORY LAPAROTOMY  7/27/2009    Performed by MACIEL QUINTERO at SURGERY [facility]   • APPENDECTOMY  7/27/2009    Performed by MACIEL QUINTERO at SURGERY [facility]   • CHOLECYSTECTOMY  7/27/2009    Performed by MACIEL QUINTERO at SURGERY [facility]   • GASTROSCOPY-ENDO  7/8/2009    Performed by ROSANNA WRIGHT at SURGERY [facility]   • LITHOTRIPSY     • OTHER      superior mesenteric artery correction        Medications    Current Facility-Administered Medications:   •  0.9 % NaCl with KCl 40 mEq 1,000 mL, , Intravenous, Once, Jeevan Miller M.D.  •  senna-docusate (PERICOLACE or SENOKOT S) 8.6-50 MG per tablet 2 Tab, 2 Tab, Oral, BID **AND** polyethylene glycol/lytes (MIRALAX) PACKET 1 Packet, 1 Packet, Oral, QDAY PRN **AND** magnesium hydroxide (MILK OF MAGNESIA) suspension 30 mL, 30 mL, Oral, QDAY PRN **AND** bisacodyl (DULCOLAX) suppository 10 mg, 10 mg, Rectal, QDAY PRN, Linda Cotto M.D.  •  0.9 % NaCl with KCl 20 mEq infusion, , Intravenous, Continuous, Linda P Kirti, M.D.  •  enoxaparin (LOVENOX) inj 40 mg, 40 mg, Subcutaneous,  DAILY, Linda Cotto M.D.  •  ondansetron (ZOFRAN) syringe/vial injection 4 mg, 4 mg, Intravenous, Q4HRS PRN, Linda Cotto M.D.  •  ondansetron (ZOFRAN ODT) dispertab 4 mg, 4 mg, Oral, Q4HRS PRN, Linda Cotto M.D.  •  promethazine (PHENERGAN) tablet 12.5-25 mg, 12.5-25 mg, Oral, Q4HRS PRN, Linda Cotto M.D.  •  promethazine (PHENERGAN) suppository 12.5-25 mg, 12.5-25 mg, Rectal, Q4HRS PRN, Linda Cotto M.D.  •  prochlorperazine (COMPAZINE) injection 5-10 mg, 5-10 mg, Intravenous, Q4HRS PRN, Linda Cotto M.D.  •  lorazepam (ATIVAN) tablet 0.5 mg, 0.5 mg, Oral, Q6HRS PRN **OR** lorazepam (ATIVAN) injection 0.5 mg, 0.5 mg, Intravenous, Q6HRS PRN, Linda Cotto M.D.  •  acetaminophen (TYLENOL) tablet 650 mg, 650 mg, Oral, Q6HRS PRN, Linda Cotto M.D.  •  potassium chloride SA (Kdur) tablet 40 mEq, 40 mEq, Oral, TID, Linda Cotto M.D.    Current Outpatient Prescriptions:   •  ondansetron (ZOFRAN ODT) 4 MG TABLET DISPERSIBLE, Take 4 mg by mouth every 8 hours as needed for Nausea/Vomiting., Disp: , Rfl:   •  Prenatal Vit-Fe Fumarate-FA (PRENATAL PO), Take 1 Tab by mouth every day at 6 PM., Disp: , Rfl:     Family History  Family History   Problem Relation Age of Onset   • Cancer Mother 50     Colon cancer   • Hypertension Mother    • Allergies Father    • Hypertension Father    • Heart Disease Maternal Grandmother        Social History  Social History   Substance Use Topics   • Smoking status: Former Smoker     Packs/day: 0.50     Years: 13.00     Types: Cigarettes     Quit date: 12/15/2012   • Smokeless tobacco: Never Used      Comment: 1/2ppd   • Alcohol use No   daily marijuana use, denies other drug use    Allergies  Allergies   Allergen Reactions   • Metoclopramide Hives     Told by MD; pt suspects possible hives.   • Quetiapine Unspecified     MD told her she was allergic     • Septra [Sulfamethoxazole W-Trimethoprim] Unspecified     MD told her she was allergic          Physical Exam   Laboratory   Hemodynamics  Temp (24hrs), Av.5 °C (99.5 °F), Min:37.4 °C (99.4 °F), Max:37.5 °C (99.5 °F)   Temperature: 37.4 °C (99.4 °F)  Pulse  Av.5  Min: 59  Max: 68    Blood Pressure: 130/59, NIBP: 103/66      Respiratory      Respiration: 16, Pulse Oximetry: 99 %             Physical Exam   Constitutional: She is oriented to person, place, and time. No distress.   HENT:   Mouth/Throat: Oropharynx is clear and moist.   Eyes: Conjunctivae are normal. No scleral icterus.   Neck: Neck supple. No JVD present.   Cardiovascular: Normal rate, regular rhythm and intact distal pulses.    No murmur heard.  Abdominal: Soft. Bowel sounds are normal. She exhibits no distension. There is no hepatosplenomegaly.   Musculoskeletal: She exhibits no edema.   Neurological: She is alert and oriented to person, place, and time.   Skin: Skin is warm and dry. No rash noted. She is not diaphoretic.   Psychiatric: Her behavior is normal.   Nursing note and vitals reviewed.      Recent Labs      17   0625   WBC  11.0*  9.9   RBC  3.46*  4.40   HEMOGLOBIN  11.5*  14.8   HEMATOCRIT  33.4*  40.7   MCV  96.2  92.5   MCH  32.9  33.6*   MCHC  34.2  36.4*   RDW  48.0  44.3   PLATELETCT  247  328   MPV  10.3  9.7     Recent Labs      17   0625   SODIUM  140  131*   POTASSIUM  3.4*  2.4*   CHLORIDE  111  97   CO2  20  18*   GLUCOSE  105*  120*   BUN  6*  9   CREATININE  0.72  1.06   CALCIUM  8.8  9.4     Recent Labs      17   0625   ALTSGPT  11  23   ASTSGOT  14  29   ALKPHOSPHAT  65  81   TBILIRUBIN  0.6  2.2*   LIPASE   --   19   GLUCOSE  105*  120*                   Imaging  None   Assessment/Plan     I anticipate this patient will require at least two midnights for appropriate medical management, necessitating inpatient admission.    Hypokalemia due to loss of potassium   Assessment & Plan    Will replace orally and iv  Check magnesium level  Due to GI losses from  vomiting        Cyclical vomiting with nausea   Assessment & Plan    Due to marijuana use  Patient educated about this and given resources regarding education about this syndrome  Patient has improving symptoms  Will treat with zofran, compazine and ativan as needed  Start a clear liquid diet            VTE prophylaxis: lovenox.

## 2017-08-29 NOTE — ED NOTES
"Med rec updated and complete  Allergie reviewed  Pt states \"No antibiotics in the last 30 days\".  Pt states \"I have not picked up any of my recent RX\".    "

## 2017-08-29 NOTE — ED NOTES
Davion BURCH from [facility] parking lot. Pt walked in and walked back out and called 911. Told EMS to bring her here because she states she was not able to wait in the lobby. On arrival, c/o pain to R flank and dysuria.

## 2017-08-29 NOTE — PROGRESS NOTES
0930 Pt transferred to ICU room 323 by joselin, awake and alert able to ambulate from [facility] to bed. Pt states she is feeling weak and has been vomiting since Friday. Pt oriented to ICU, call light and routines, NS c 20 k started infusing per MD order, warm blanket given, VS stable at this time.     1025 Pt able to tolerate PO medications, Zofran given IV, Pt states she has 7/10 pain in her back and abdomen, Tylenol given as ordered, Pt requesting Ativan, order reviewed with Pt and medication schedule. Safety precautions and fall precautions in place. Call light within reach.

## 2017-08-29 NOTE — ED NOTES
Pt. Ambulate to desk to ask about calling an ambulance to take her to another hospital. Pt. Informed she can not be taken by ambulance elsewhere. Pt. Ambulate to phones to call a ride to different hospital.

## 2017-08-29 NOTE — DIETARY
Nutrition Services:  Patient with Nutrition Admit Trigger for poor oral intake.  42 year old female admitted for hypokalemia and cyclic vomiting and nausea.  PMH:  Per MD notes, recurrent nausea and vomiting related to marijuana use, C.diff, superior mesenteric artery syndrome, cyclic vomiting syndrome  Pertinent Labs:  Na 131, K 2.4, Glucose 120  Pertinent Medications/fluids:  NaCl with KCl at 125 ml/hr, Zofran, Kdur  BMI:  20.43  Diet Rx:  Clear liquids  Patient was sleeping at time of visit.  Discussed with RN.  Patient with poor oral intake.  Plan:  Monitor diet advancement and PO intake.

## 2017-08-29 NOTE — ASSESSMENT & PLAN NOTE
Due to marijuana use  Also concerning for self induced vomiting as she was in ICU yesterday and had no vomiting at all but when allowed to use the bathroom independently she is vomiting, but only when not seen in the bathroom  Will ask for strict I/O's, continue antiemetics, no bathroom priviledges without supervision  Will consult GI for evaluation, Dr. Arevalo

## 2017-08-30 LAB
ALBUMIN SERPL BCP-MCNC: 3 G/DL (ref 3.2–4.9)
ALBUMIN/GLOB SERPL: 1.3 G/DL
ALP SERPL-CCNC: 51 U/L (ref 30–99)
ALT SERPL-CCNC: 18 U/L (ref 2–50)
ANION GAP SERPL CALC-SCNC: 4 MMOL/L (ref 0–11.9)
AST SERPL-CCNC: 19 U/L (ref 12–45)
BASOPHILS # BLD AUTO: 0.5 % (ref 0–1.8)
BASOPHILS # BLD: 0.03 K/UL (ref 0–0.12)
BILIRUB SERPL-MCNC: 1.6 MG/DL (ref 0.1–1.5)
BUN SERPL-MCNC: <5 MG/DL (ref 8–22)
CALCIUM SERPL-MCNC: 8.1 MG/DL (ref 8.4–10.2)
CHLORIDE SERPL-SCNC: 109 MMOL/L (ref 96–112)
CO2 SERPL-SCNC: 21 MMOL/L (ref 20–33)
CREAT SERPL-MCNC: 0.68 MG/DL (ref 0.5–1.4)
EOSINOPHIL # BLD AUTO: 0.01 K/UL (ref 0–0.51)
EOSINOPHIL NFR BLD: 0.2 % (ref 0–6.9)
ERYTHROCYTE [DISTWIDTH] IN BLOOD BY AUTOMATED COUNT: 46.3 FL (ref 35.9–50)
GFR SERPL CREATININE-BSD FRML MDRD: >60 ML/MIN/1.73 M 2
GLOBULIN SER CALC-MCNC: 2.4 G/DL (ref 1.9–3.5)
GLUCOSE SERPL-MCNC: 90 MG/DL (ref 65–99)
HCT VFR BLD AUTO: 33 % (ref 37–47)
HGB BLD-MCNC: 11.5 G/DL (ref 12–16)
IMM GRANULOCYTES # BLD AUTO: 0.03 K/UL (ref 0–0.11)
IMM GRANULOCYTES NFR BLD AUTO: 0.5 % (ref 0–0.9)
LYMPHOCYTES # BLD AUTO: 1.57 K/UL (ref 1–4.8)
LYMPHOCYTES NFR BLD: 25.1 % (ref 22–41)
MAGNESIUM SERPL-MCNC: 1.9 MG/DL (ref 1.5–2.5)
MCH RBC QN AUTO: 33.1 PG (ref 27–33)
MCHC RBC AUTO-ENTMCNC: 34.7 G/DL (ref 33.6–35)
MCV RBC AUTO: 95.6 FL (ref 81.4–97.8)
MONOCYTES # BLD AUTO: 0.62 K/UL (ref 0–0.85)
MONOCYTES NFR BLD AUTO: 9.9 % (ref 0–13.4)
NEUTROPHILS # BLD AUTO: 3.99 K/UL (ref 2–7.15)
NEUTROPHILS NFR BLD: 63.8 % (ref 44–72)
NRBC # BLD AUTO: 0 K/UL
NRBC BLD AUTO-RTO: 0 /100 WBC
PLATELET # BLD AUTO: 216 K/UL (ref 164–446)
PMV BLD AUTO: 10 FL (ref 9–12.9)
POTASSIUM SERPL-SCNC: 4 MMOL/L (ref 3.6–5.5)
POTASSIUM SERPL-SCNC: 4.3 MMOL/L (ref 3.6–5.5)
PROT SERPL-MCNC: 5.4 G/DL (ref 6–8.2)
RBC # BLD AUTO: 3.44 M/UL (ref 4.2–5.4)
SODIUM SERPL-SCNC: 134 MMOL/L (ref 135–145)
WBC # BLD AUTO: 6.3 K/UL (ref 4.8–10.8)

## 2017-08-30 PROCEDURE — A9270 NON-COVERED ITEM OR SERVICE: HCPCS | Performed by: NURSE PRACTITIONER

## 2017-08-30 PROCEDURE — 700102 HCHG RX REV CODE 250 W/ 637 OVERRIDE(OP): Performed by: HOSPITALIST

## 2017-08-30 PROCEDURE — 85025 COMPLETE CBC W/AUTO DIFF WBC: CPT

## 2017-08-30 PROCEDURE — 770006 HCHG ROOM/CARE - MED/SURG/GYN SEMI*

## 2017-08-30 PROCEDURE — 80053 COMPREHEN METABOLIC PANEL: CPT

## 2017-08-30 PROCEDURE — 99232 SBSQ HOSP IP/OBS MODERATE 35: CPT | Performed by: INTERNAL MEDICINE

## 2017-08-30 PROCEDURE — 84132 ASSAY OF SERUM POTASSIUM: CPT

## 2017-08-30 PROCEDURE — A9270 NON-COVERED ITEM OR SERVICE: HCPCS | Performed by: INTERNAL MEDICINE

## 2017-08-30 PROCEDURE — 700102 HCHG RX REV CODE 250 W/ 637 OVERRIDE(OP): Performed by: NURSE PRACTITIONER

## 2017-08-30 PROCEDURE — A9270 NON-COVERED ITEM OR SERVICE: HCPCS | Performed by: HOSPITALIST

## 2017-08-30 PROCEDURE — 700102 HCHG RX REV CODE 250 W/ 637 OVERRIDE(OP): Performed by: INTERNAL MEDICINE

## 2017-08-30 PROCEDURE — 700111 HCHG RX REV CODE 636 W/ 250 OVERRIDE (IP): Performed by: INTERNAL MEDICINE

## 2017-08-30 PROCEDURE — 700101 HCHG RX REV CODE 250: Performed by: INTERNAL MEDICINE

## 2017-08-30 PROCEDURE — 83735 ASSAY OF MAGNESIUM: CPT

## 2017-08-30 RX ORDER — LORAZEPAM 0.5 MG/1
0.5 TABLET ORAL EVERY 8 HOURS PRN
Status: DISCONTINUED | OUTPATIENT
Start: 2017-08-30 | End: 2017-08-30

## 2017-08-30 RX ORDER — LORAZEPAM 0.5 MG/1
0.5 TABLET ORAL EVERY 6 HOURS PRN
Status: DISCONTINUED | OUTPATIENT
Start: 2017-08-30 | End: 2017-08-31

## 2017-08-30 RX ORDER — BUSPIRONE HYDROCHLORIDE 5 MG/1
5 TABLET ORAL 2 TIMES DAILY
Status: DISCONTINUED | OUTPATIENT
Start: 2017-08-30 | End: 2017-08-31

## 2017-08-30 RX ORDER — LORAZEPAM 1 MG/1
1 TABLET ORAL EVERY 6 HOURS PRN
Status: DISCONTINUED | OUTPATIENT
Start: 2017-08-30 | End: 2017-08-31

## 2017-08-30 RX ORDER — LORAZEPAM 0.5 MG/1
.5-1 TABLET ORAL EVERY 6 HOURS PRN
Status: DISCONTINUED | OUTPATIENT
Start: 2017-08-30 | End: 2017-08-30

## 2017-08-30 RX ADMIN — ONDANSETRON 4 MG: 2 INJECTION INTRAMUSCULAR; INTRAVENOUS at 05:41

## 2017-08-30 RX ADMIN — POTASSIUM CHLORIDE 40 MEQ: 1500 TABLET, EXTENDED RELEASE ORAL at 14:31

## 2017-08-30 RX ADMIN — ACETAMINOPHEN 650 MG: 325 TABLET, FILM COATED ORAL at 14:12

## 2017-08-30 RX ADMIN — ENOXAPARIN SODIUM 40 MG: 100 INJECTION SUBCUTANEOUS at 08:10

## 2017-08-30 RX ADMIN — ZOLPIDEM TARTRATE 10 MG: 5 TABLET ORAL at 21:29

## 2017-08-30 RX ADMIN — POTASSIUM CHLORIDE AND SODIUM CHLORIDE: 900; 150 INJECTION, SOLUTION INTRAVENOUS at 10:12

## 2017-08-30 RX ADMIN — POTASSIUM CHLORIDE 40 MEQ: 1500 TABLET, EXTENDED RELEASE ORAL at 08:10

## 2017-08-30 RX ADMIN — LORAZEPAM 0.5 MG: 2 INJECTION INTRAMUSCULAR; INTRAVENOUS at 01:55

## 2017-08-30 RX ADMIN — LORAZEPAM 1 MG: 1 TABLET ORAL at 21:02

## 2017-08-30 RX ADMIN — LORAZEPAM 0.5 MG: 0.5 TABLET ORAL at 15:07

## 2017-08-30 RX ADMIN — POTASSIUM CHLORIDE 40 MEQ: 1500 TABLET, EXTENDED RELEASE ORAL at 21:00

## 2017-08-30 RX ADMIN — POTASSIUM CHLORIDE AND SODIUM CHLORIDE: 900; 150 INJECTION, SOLUTION INTRAVENOUS at 01:59

## 2017-08-30 RX ADMIN — BUSPIRONE HYDROCHLORIDE 5 MG: 5 TABLET ORAL at 21:02

## 2017-08-30 RX ADMIN — LORAZEPAM 0.5 MG: 2 INJECTION INTRAMUSCULAR; INTRAVENOUS at 08:10

## 2017-08-30 RX ADMIN — ONDANSETRON 4 MG: 4 TABLET, ORALLY DISINTEGRATING ORAL at 15:55

## 2017-08-30 RX ADMIN — BUSPIRONE HYDROCHLORIDE 5 MG: 5 TABLET ORAL at 12:58

## 2017-08-30 ASSESSMENT — ENCOUNTER SYMPTOMS
NERVOUS/ANXIOUS: 1
PALPITATIONS: 0
DIAPHORESIS: 0
MYALGIAS: 0
FEVER: 0
SHORTNESS OF BREATH: 0
CHILLS: 0
VOMITING: 0
DIZZINESS: 0
HEADACHES: 0
COUGH: 0
NAUSEA: 1
ABDOMINAL PAIN: 0

## 2017-08-30 ASSESSMENT — PAIN SCALES - GENERAL
PAINLEVEL_OUTOF10: 0

## 2017-08-30 ASSESSMENT — COPD QUESTIONNAIRES
HAVE YOU SMOKED AT LEAST 100 CIGARETTES IN YOUR ENTIRE LIFE: YES
COPD SCREENING SCORE: 2
DO YOU EVER COUGH UP ANY MUCUS OR PHLEGM?: NO/ONLY WITH OCCASIONAL COLDS OR INFECTIONS
DURING THE PAST 4 WEEKS HOW MUCH DID YOU FEEL SHORT OF BREATH: NONE/LITTLE OF THE TIME

## 2017-08-30 ASSESSMENT — LIFESTYLE VARIABLES: DO YOU DRINK ALCOHOL: NO

## 2017-08-30 NOTE — PROGRESS NOTES
Pt resting calmly in bed, using call light appropriately standby assist to BSC, voiding well, K+ result back at 3.2, IVF infusing, Bed in low position, call light within reach.

## 2017-08-30 NOTE — PROGRESS NOTES
1145 Report received from ICU RN Darrel, pt transferred to room 304-B, ambulated.    1420 IV infiltrated. Ativan wasted. Tylenol and heat pack offered to for back pain.    1900 Report given to NOC nurse, Sobia, at bedside.

## 2017-08-30 NOTE — PROGRESS NOTES
[facility]ist Progress Note    Date of Service: 2017    Chief Complaint  42 y.o. female admitted 2017 with cyclic nausea and vomiting.    Interval Problem Update  Potassium is replaced, she is no longer vomiting  She is tolerating a clear liquid diet    Consultants/Specialty  None    Disposition  Home        Review of Systems   Constitutional: Negative for chills, diaphoresis and fever.   Respiratory: Negative for cough and shortness of breath.    Cardiovascular: Negative for chest pain and palpitations.   Gastrointestinal: Positive for nausea. Negative for abdominal pain and vomiting.   Genitourinary: Negative for dysuria and urgency.   Musculoskeletal: Negative for myalgias.   Skin: Negative for rash.   Neurological: Negative for dizziness and headaches.   Psychiatric/Behavioral: The patient is nervous/anxious.       Physical Exam  Laboratory/Imaging   Hemodynamics  Temp (24hrs), Av.2 °C (99 °F), Min:36.8 °C (98.2 °F), Max:37.6 °C (99.6 °F)   Temperature: 37.6 °C (99.6 °F)  Pulse  Av.8  Min: 56  Max: 103 Heart Rate (Monitored): (!) 56  Blood Pressure: 146/84, NIBP: 136/73      Respiratory      Respiration: (!) 24, Pulse Oximetry: 96 %             Fluids    Intake/Output Summary (Last 24 hours) at 17 1151  Last data filed at 17 0900   Gross per 24 hour   Intake             3340 ml   Output             2900 ml   Net              440 ml       Nutrition  Orders Placed This Encounter   Procedures   • DIET ORDER     Standing Status:   Standing     Number of Occurrences:   1     Order Specific Question:   Diet:     Answer:   Regular [1]     Physical Exam   Constitutional: No distress.   HENT:   Mouth/Throat: Oropharynx is clear and moist.   Eyes: Conjunctivae are normal. No scleral icterus.   Cardiovascular: Normal rate, regular rhythm and intact distal pulses.    No murmur heard.  Pulmonary/Chest: No respiratory distress. She has no wheezes. She has no rales.   Abdominal: Soft. Bowel  sounds are normal.   Musculoskeletal: She exhibits no edema.   Skin: Skin is warm and dry. She is not diaphoretic.   Psychiatric: Her behavior is normal.   Nursing note and vitals reviewed.      Recent Labs      08/29/17   0625  08/30/17   0640   WBC  9.9  6.3   RBC  4.40  3.44*   HEMOGLOBIN  14.8  11.5*   HEMATOCRIT  40.7  33.0*   MCV  92.5  95.6   MCH  33.6*  33.1*   MCHC  36.4*  34.7   RDW  44.3  46.3   PLATELETCT  328  216   MPV  9.7  10.0     Recent Labs      08/29/17   0625  08/29/17   1700  08/30/17   0005  08/30/17   0640   SODIUM  131*   --    --   134*   POTASSIUM  2.4*  3.2*  4.3  4.0   CHLORIDE  97   --    --   109   CO2  18*   --    --   21   GLUCOSE  120*   --    --   90   BUN  9   --    --   <5*   CREATININE  1.06   --    --   0.68   CALCIUM  9.4   --    --   8.1*                      Assessment/Plan     Hypokalemia due to loss of potassium   Assessment & Plan    resolved        Cyclical vomiting with nausea   Assessment & Plan    Due to marijuana use  Patient educated about this and given resources regarding education about this syndrome  Now resolved  Will advance diet  Monitor symtoms        Anxiety- (present on admission)   Assessment & Plan    Will start buspar if follow up appointment set up with pcp            Reviewed items::  Labs reviewed and Medications reviewed  Saldana catheter::  No Saldana  DVT prophylaxis pharmacological::  Enoxaparin (Lovenox)  Ulcer Prophylaxis::  Not indicated

## 2017-08-30 NOTE — PROGRESS NOTES
Received bedside report from Mary BORREGO with preceptor ELMO Ardon.  Patient sleeping in bed with no apparent signs of discomfort or distress.  Gtts verified, POC reviewed, and safety protocols in place.  Will continue to coordinate care.

## 2017-08-30 NOTE — CARE PLAN
Problem: Communication  Goal: The ability to communicate needs accurately and effectively will improve  Outcome: MET Date Met: 08/29/17  Pt calls for assistance appropriately. Pt oriented to room and routines. White board updated with to keep pt informed of POC. Pt verbalizes understanding of care plan.    Problem: Safety  Goal: Will remain free from injury  Outcome: PROGRESSING AS EXPECTED  Pt continues to call for assistance to commode. Call bell in reach. Pt in treaded socks. Bed alarm on, bed locked, and in low position. Will continue to monitor.

## 2017-08-30 NOTE — PROGRESS NOTES
Bedside report given to Alyx BORREGO.  Patient to be ambulated to med/tele (w/o monitoring) 304-2.

## 2017-08-30 NOTE — PROGRESS NOTES
1900- Bedside report received by ELMO Muhammad. Pt resting comfortably in bed at this time. Pt had and episode of emesis prior to entering room. Pt informed to avoid drinking until nausea subsides. Pt alert and oriented. Pt has not complaints at this time and denies any pain. All lines and pumps checked and verified. All orders, labs and medications reviewed. POC discussed with pt and questions addressed. Pt requesting ambien for sleep. Will call MD on call. Bed lock, in low position with alarm on. Call bell in reach. Will continue to monitor.    1925- Call placed to on call MD. Dr. Hayden called back at 1940 and gave orders for ambien at bedtime as needed for sleep.    0330- Pt provided sleeping mask at this time as lights in ICU making it difficult for pt to remain asleep. Pt offered ear plugs but refused. Ear plugs left at bedside to help promote rest if needed.    7135- Pt c/o nausea. Pulled zofran ODT from [facility]. Pt states she cannot keep medication down. Explained to pt that medication is not swallowed and dissolves under tongue. She states she takes it at home and it does not work. PO pill returned toADS and IV zofran given instead.

## 2017-08-30 NOTE — CARE PLAN
Problem: Safety  Goal: Will remain free from injury  Outcome: PROGRESSING AS EXPECTED    Goal: Will remain free from falls  Outcome: PROGRESSING AS EXPECTED      Problem: Psychosocial Needs:  Goal: Level of anxiety will decrease  Outcome: PROGRESSING AS EXPECTED

## 2017-08-31 LAB
ANION GAP SERPL CALC-SCNC: 8 MMOL/L (ref 0–11.9)
BUN SERPL-MCNC: <5 MG/DL (ref 8–22)
CALCIUM SERPL-MCNC: 9.1 MG/DL (ref 8.4–10.2)
CHLORIDE SERPL-SCNC: 106 MMOL/L (ref 96–112)
CO2 SERPL-SCNC: 19 MMOL/L (ref 20–33)
CREAT SERPL-MCNC: 0.87 MG/DL (ref 0.5–1.4)
GFR SERPL CREATININE-BSD FRML MDRD: >60 ML/MIN/1.73 M 2
GLUCOSE SERPL-MCNC: 94 MG/DL (ref 65–99)
POTASSIUM SERPL-SCNC: 4.6 MMOL/L (ref 3.6–5.5)
SODIUM SERPL-SCNC: 133 MMOL/L (ref 135–145)

## 2017-08-31 PROCEDURE — 80048 BASIC METABOLIC PNL TOTAL CA: CPT

## 2017-08-31 PROCEDURE — 770006 HCHG ROOM/CARE - MED/SURG/GYN SEMI*

## 2017-08-31 PROCEDURE — A9270 NON-COVERED ITEM OR SERVICE: HCPCS | Performed by: INTERNAL MEDICINE

## 2017-08-31 PROCEDURE — 99232 SBSQ HOSP IP/OBS MODERATE 35: CPT | Performed by: INTERNAL MEDICINE

## 2017-08-31 PROCEDURE — 700102 HCHG RX REV CODE 250 W/ 637 OVERRIDE(OP): Performed by: NURSE PRACTITIONER

## 2017-08-31 PROCEDURE — 700102 HCHG RX REV CODE 250 W/ 637 OVERRIDE(OP): Performed by: INTERNAL MEDICINE

## 2017-08-31 PROCEDURE — 700102 HCHG RX REV CODE 250 W/ 637 OVERRIDE(OP): Performed by: HOSPITALIST

## 2017-08-31 PROCEDURE — 302131 K PAD MOTOR: Performed by: INTERNAL MEDICINE

## 2017-08-31 PROCEDURE — 700111 HCHG RX REV CODE 636 W/ 250 OVERRIDE (IP): Performed by: INTERNAL MEDICINE

## 2017-08-31 PROCEDURE — A9270 NON-COVERED ITEM OR SERVICE: HCPCS | Performed by: HOSPITALIST

## 2017-08-31 PROCEDURE — A9270 NON-COVERED ITEM OR SERVICE: HCPCS | Performed by: NURSE PRACTITIONER

## 2017-08-31 PROCEDURE — 302151 K-PAD 14X20: Performed by: INTERNAL MEDICINE

## 2017-08-31 RX ORDER — LORAZEPAM 1 MG/1
1 TABLET ORAL EVERY 8 HOURS PRN
Status: DISCONTINUED | OUTPATIENT
Start: 2017-08-31 | End: 2017-09-01 | Stop reason: HOSPADM

## 2017-08-31 RX ORDER — LORAZEPAM 0.5 MG/1
0.5 TABLET ORAL EVERY 8 HOURS PRN
Status: DISCONTINUED | OUTPATIENT
Start: 2017-08-31 | End: 2017-09-01 | Stop reason: HOSPADM

## 2017-08-31 RX ORDER — OMEPRAZOLE 20 MG/1
20 CAPSULE, DELAYED RELEASE ORAL
Status: DISCONTINUED | OUTPATIENT
Start: 2017-08-31 | End: 2017-09-01 | Stop reason: HOSPADM

## 2017-08-31 RX ADMIN — LORAZEPAM 1 MG: 1 TABLET ORAL at 13:07

## 2017-08-31 RX ADMIN — ONDANSETRON 4 MG: 4 TABLET, ORALLY DISINTEGRATING ORAL at 13:13

## 2017-08-31 RX ADMIN — LORAZEPAM 1 MG: 1 TABLET ORAL at 21:05

## 2017-08-31 RX ADMIN — ENOXAPARIN SODIUM 40 MG: 100 INJECTION SUBCUTANEOUS at 09:12

## 2017-08-31 RX ADMIN — ZOLPIDEM TARTRATE 10 MG: 5 TABLET ORAL at 21:33

## 2017-08-31 RX ADMIN — OMEPRAZOLE 20 MG: 20 CAPSULE, DELAYED RELEASE ORAL at 15:45

## 2017-08-31 RX ADMIN — ONDANSETRON 4 MG: 4 TABLET, ORALLY DISINTEGRATING ORAL at 07:45

## 2017-08-31 RX ADMIN — ONDANSETRON 4 MG: 4 TABLET, ORALLY DISINTEGRATING ORAL at 02:48

## 2017-08-31 RX ADMIN — LORAZEPAM 0.5 MG: 0.5 TABLET ORAL at 02:48

## 2017-08-31 ASSESSMENT — PAIN SCALES - GENERAL: PAINLEVEL_OUTOF10: 7

## 2017-08-31 ASSESSMENT — ENCOUNTER SYMPTOMS
BRUISES/BLEEDS EASILY: 0
BLOOD IN STOOL: 0
MYALGIAS: 0
VOMITING: 1
ABDOMINAL PAIN: 1
FEVER: 0
NERVOUS/ANXIOUS: 1
DIAPHORESIS: 0
HEADACHES: 0
SHORTNESS OF BREATH: 0
WHEEZING: 0
DIZZINESS: 0
NAUSEA: 1
CONSTIPATION: 0
DIARRHEA: 0
COUGH: 0
PALPITATIONS: 0

## 2017-08-31 NOTE — DIETARY
"Nutrition services update.  Pt with poor po intake trigger.  42 yr old female with cyclic vomiting with nausea due to marijuana use. Pt had been on clear liquids, diet now advanced to Regular 8/30.    Labs and medications reviewed.   Ht:  5'5\"  Wt:  8/29 stand up scale 53.5 kg (118 lbs)  BMI:  19.63  Pt states she has lost 5 lbs recently.  Current appetite is poor. Pt refused breakfast this morning.  During my visit she was unable to talk but was crying.  RN aware.    Plan:  RD to monitor po intake progress, review menu choice options.   "

## 2017-08-31 NOTE — PROGRESS NOTES
Assumed care of patient. Bedside report from ELMO Wisdom. POC discussed. Mulitple complaints - per pt cannot eat and is having pain. Encouraged oral fluids and has warm pack. No IV lines. CLIP.

## 2017-08-31 NOTE — PROGRESS NOTES
[facility]    Date of Service: 2017    Chief Complaint  42 y.o. female admitted 2017 with cyclic nausea and vomiting.    Interval Problem Update  Potassium is replaced  She is tolerating a clear liquid diet  She had no vomiting in ICU  Overnight she was transferred to the medical floor and since she now has bathroom access independently she reports vomiting once again    Consultants/Specialty  GI    Disposition  Home        Review of Systems   Constitutional: Negative for diaphoresis and fever.   Respiratory: Negative for cough, shortness of breath and wheezing.    Cardiovascular: Negative for chest pain and palpitations.   Gastrointestinal: Positive for abdominal pain, nausea and vomiting. Negative for blood in stool, constipation and diarrhea.   Genitourinary: Negative for dysuria and urgency.   Musculoskeletal: Negative for myalgias.   Skin: Negative for rash.   Neurological: Negative for dizziness and headaches.   Endo/Heme/Allergies: Does not bruise/bleed easily.   Psychiatric/Behavioral: The patient is nervous/anxious.       Physical Exam  Laboratory/Imaging   Hemodynamics  Temp (24hrs), Av.9 °C (98.4 °F), Min:36.7 °C (98 °F), Max:37 °C (98.6 °F)   Temperature: 36.7 °C (98 °F)  Pulse  Av.9  Min: 56  Max: 105   Blood Pressure: 111/67      Respiratory      Respiration: 18, Pulse Oximetry: 96 %             Fluids    Intake/Output Summary (Last 24 hours) at 17 1137  Last data filed at 17 1000   Gross per 24 hour   Intake              240 ml   Output               50 ml   Net              190 ml       Nutrition  Orders Placed This Encounter   Procedures   • DIET ORDER     Standing Status:   Standing     Number of Occurrences:   1     Order Specific Question:   Diet:     Answer:   Regular [1]     Physical Exam   Constitutional: She is oriented to person, place, and time. No distress.   Eyes: No scleral icterus.   Neck: Neck supple.   Cardiovascular: Normal rate,  regular rhythm and intact distal pulses.    No murmur heard.  Pulmonary/Chest: No stridor. No respiratory distress. She has no wheezes. She has no rales.   Abdominal: Soft. Bowel sounds are normal. She exhibits no distension. There is no tenderness.   Musculoskeletal: She exhibits no edema.   Neurological: She is alert and oriented to person, place, and time.   Skin: Skin is warm. No rash noted.   Psychiatric: Her mood appears anxious. She is agitated.   Patient is tearful and trying to bargain for more ativan today although her dose was just increased last night   Nursing note and vitals reviewed.      Recent Labs      08/29/17   0625  08/30/17   0640   WBC  9.9  6.3   RBC  4.40  3.44*   HEMOGLOBIN  14.8  11.5*   HEMATOCRIT  40.7  33.0*   MCV  92.5  95.6   MCH  33.6*  33.1*   MCHC  36.4*  34.7   RDW  44.3  46.3   PLATELETCT  328  216   MPV  9.7  10.0     Recent Labs      08/29/17   0625   08/30/17   0005  08/30/17   0640  08/31/17   0232   SODIUM  131*   --    --   134*  133*   POTASSIUM  2.4*   < >  4.3  4.0  4.6   CHLORIDE  97   --    --   109  106   CO2  18*   --    --   21  19*   GLUCOSE  120*   --    --   90  94   BUN  9   --    --   <5*  <5*   CREATININE  1.06   --    --   0.68  0.87   CALCIUM  9.4   --    --   8.1*  9.1    < > = values in this interval not displayed.                      Assessment/Plan     Hypokalemia due to loss of potassium   Assessment & Plan    resolved        Cyclical vomiting with nausea   Assessment & Plan    Due to marijuana use  Also concerning for self induced vomiting as she was in ICU yesterday and had no vomiting at all but when allowed to use the bathroom independently she is vomiting, but only when not seen in the bathroom  Will ask for strict I/O's, continue antiemetics, no bathroom priviledges without supervision  Will consult GI for evaluation, Dr. Arevalo        Anxiety- (present on admission)   Assessment & Plan    Patient with worse anxiety overnight  She is vomiting  when unsupervised in the bathroom and states it is due to anxiety  Will stop buspar  Ativan increased overnight due to patient request, will decrease dose again today  Discussed with patient that these medications are not appropriate as she is trying to get pregnant currently            Reviewed items::  Labs reviewed and Medications reviewed  Saldana catheter::  No Saldana  DVT prophylaxis pharmacological::  Enoxaparin (Lovenox)  Ulcer Prophylaxis::  Not indicated

## 2017-08-31 NOTE — CARE PLAN
Problem: Safety  Goal: Will remain free from injury  Outcome: PROGRESSING AS EXPECTED  Up ad francisco with steady gait. CLIP. Pt calls for assist appropriately. Hourly rounding. Personal belongings within reach. Non-skid socks. Bed locked & in low position.          Problem: Knowledge Deficit  Goal: Knowledge of disease process/condition, treatment plan, diagnostic tests, and medications will improve  Outcome: PROGRESSING AS EXPECTED  POC discussed w/ pt. Understands disease process and treatment plan. Educated on new meds (ativan, ambien). Questions answered.      Problem: Discharge Barriers/Planning  Goal: Patient's continuum of care needs will be met  Outcome: PROGRESSING AS EXPECTED  Plan to d/c home when medically cleared. Nursing to continue coordinating with pt, MD, and case management.

## 2017-08-31 NOTE — PROGRESS NOTES
Patient in tears and feeling anxiety; active listening, reassurance and shower provided. Patient on bed feeling better.

## 2017-08-31 NOTE — PROGRESS NOTES
"2007 -- Pt crying and c/o of increased anxiety. States she was unaware that oral ativan frequency was changed. This was gone over in bedside report, but pt saying she had \"no idea\". Spoke to MATTHEW Lacey and received telephone orders to change Ativan 0.5-1mg PO q6hr PRN anxiety. RBV.    2104 -- Pt in much better mood. Ambulated hallway several times. Took shower twice.    2130 -- PRN ambien given (see MAR). No further needs. Monitoring.    0058 -- No status change. Pt sleeping comfortably. No signs of distress. Continue monitoring.    0251 -- Episode of vomiting, approx 200mL. PRN zofran & ativan given (see MAR). No other needs.    0621 -- Pt resting in bed comfortably. No changes.     0715 -- Bedside report given to ELMO Guthrie and ELMO Lange. POC discussed.Pt asleep, no distress noted.     "

## 2017-08-31 NOTE — PROGRESS NOTES
Patient complaining of nausea, vomiting and back pain.  Medication given for nausea and vomiting, she is showering to relieve back pain.

## 2017-08-31 NOTE — CARE PLAN
Problem: Pain Management  Goal: Pain level will decrease to patient's comfort goal    Intervention: Follow pain managment plan developed in collaboration with patient and Interdisciplinary Team  Pain management provided with ambulation, reposition in bed and hot showers.

## 2017-09-01 VITALS
RESPIRATION RATE: 18 BRPM | WEIGHT: 122.8 LBS | DIASTOLIC BLOOD PRESSURE: 81 MMHG | SYSTOLIC BLOOD PRESSURE: 149 MMHG | BODY MASS INDEX: 20.46 KG/M2 | HEIGHT: 65 IN | TEMPERATURE: 98.2 F | OXYGEN SATURATION: 98 % | HEART RATE: 85 BPM

## 2017-09-01 PROBLEM — E87.6 HYPOKALEMIA DUE TO LOSS OF POTASSIUM: Status: RESOLVED | Noted: 2017-08-29 | Resolved: 2017-09-01

## 2017-09-01 PROCEDURE — 700102 HCHG RX REV CODE 250 W/ 637 OVERRIDE(OP): Performed by: INTERNAL MEDICINE

## 2017-09-01 PROCEDURE — A9270 NON-COVERED ITEM OR SERVICE: HCPCS | Performed by: INTERNAL MEDICINE

## 2017-09-01 PROCEDURE — 700111 HCHG RX REV CODE 636 W/ 250 OVERRIDE (IP): Performed by: INTERNAL MEDICINE

## 2017-09-01 PROCEDURE — 99239 HOSP IP/OBS DSCHRG MGMT >30: CPT | Performed by: INTERNAL MEDICINE

## 2017-09-01 RX ORDER — PROMETHAZINE HYDROCHLORIDE 12.5 MG/1
12.5 SUPPOSITORY RECTAL EVERY 4 HOURS PRN
Qty: 10 SUPPOSITORY | Refills: 0 | Status: SHIPPED | OUTPATIENT
Start: 2017-09-01 | End: 2017-09-13

## 2017-09-01 RX ORDER — OMEPRAZOLE 20 MG/1
20 CAPSULE, DELAYED RELEASE ORAL
Qty: 60 CAP | Refills: 1 | Status: SHIPPED | OUTPATIENT
Start: 2017-09-01 | End: 2017-09-13

## 2017-09-01 RX ORDER — ONDANSETRON 4 MG/1
4 TABLET, ORALLY DISINTEGRATING ORAL EVERY 8 HOURS PRN
Qty: 20 TAB | Refills: 0 | Status: SHIPPED | OUTPATIENT
Start: 2017-09-01 | End: 2018-02-02

## 2017-09-01 RX ADMIN — PROMETHAZINE HYDROCHLORIDE 25 MG: 25 SUPPOSITORY RECTAL at 15:51

## 2017-09-01 RX ADMIN — PROMETHAZINE HYDROCHLORIDE 25 MG: 25 SUPPOSITORY RECTAL at 10:42

## 2017-09-01 RX ADMIN — OMEPRAZOLE 20 MG: 20 CAPSULE, DELAYED RELEASE ORAL at 09:06

## 2017-09-01 RX ADMIN — ENOXAPARIN SODIUM 40 MG: 100 INJECTION SUBCUTANEOUS at 09:06

## 2017-09-01 RX ADMIN — LORAZEPAM 1 MG: 1 TABLET ORAL at 05:33

## 2017-09-01 RX ADMIN — ONDANSETRON 4 MG: 4 TABLET, ORALLY DISINTEGRATING ORAL at 02:52

## 2017-09-01 RX ADMIN — ONDANSETRON 4 MG: 4 TABLET, ORALLY DISINTEGRATING ORAL at 02:35

## 2017-09-01 RX ADMIN — DOCUSATE SODIUM AND SENNOSIDES 2 TABLET: 8.6; 5 TABLET, FILM COATED ORAL at 09:06

## 2017-09-01 RX ADMIN — LORAZEPAM 1 MG: 1 TABLET ORAL at 13:48

## 2017-09-01 ASSESSMENT — ENCOUNTER SYMPTOMS
NERVOUS/ANXIOUS: 1
ABDOMINAL PAIN: 0
CARDIOVASCULAR NEGATIVE: 1
DIARRHEA: 0
BLOOD IN STOOL: 0
RESPIRATORY NEGATIVE: 1
CONSTIPATION: 0
DIZZINESS: 1
HEARTBURN: 1
ORTHOPNEA: 0
CONSTITUTIONAL NEGATIVE: 1
BACK PAIN: 1
VOMITING: 1
NAUSEA: 1

## 2017-09-01 ASSESSMENT — PAIN SCALES - GENERAL: PAINLEVEL_OUTOF10: 0

## 2017-09-01 NOTE — PROGRESS NOTES
Gastroenterology Progress Note     Author: Wayne Arevalo   Date & Time Created: 9/1/2017 6:58 AM    CC: Cyclic nausea and vomiting    Interval History:  42-year-old woman who has a long history of cyclic nausea and vomiting syndrome.  She is a cannabis user and it is unclear whether or not  this is cannabis hyperemesis versus true cyclic nausea and vomiting.  She has  a history of recurrent episodes of the same and continues to use cannabis despite repeated advice to stop.  She has a history of Shruti-en-Y surgery due to a suspected superior mesenteric artery syndrome.  On this occasion, she presented to the hospital with the same nausea, vomiting which was intractable  and associated with hypokalemia.  When she wakes in the morning is when her symptoms tend to be worse.  She was admitted to the ICU and electrolytes corrected, treated with anxiolytics, antiemetics and IV fluids, and she has   been doing quite well.  She was moved to the floor and early this morning, she  had severe nausea and vomiting upon waking up.  She states that she gets a sour taste in her mouth and a burning sensation in her epigastrium.  This then  leads to the nausea and vomiting.  She denies fevers, chills, or sweats.  No  hematemesis or coffee-ground emesis.  No dysphagia or odynophagia.  9/1/2017 - Still feeling nauseous, but less emesis.  Less burning epigastric pain.  Complained of back pain overnight.  Hot shower helped both nausea and pain    Review of Systems:  Review of Systems   Constitutional: Negative.    Respiratory: Negative.    Cardiovascular: Negative.  Negative for chest pain and orthopnea.   Gastrointestinal: Positive for heartburn, nausea and vomiting. Negative for abdominal pain, blood in stool, constipation and diarrhea.   Genitourinary: Negative for dysuria.   Musculoskeletal: Positive for back pain.   Skin: Negative.    Neurological: Positive for dizziness.   Psychiatric/Behavioral: The patient is  nervous/anxious.        Physical Exam:  Physical Exam   Constitutional: She is oriented to person, place, and time. She appears well-developed and well-nourished.   HENT:   Head: Normocephalic and atraumatic.   Eyes: Conjunctivae are normal.   Neck: Neck supple.   Cardiovascular: Normal rate and regular rhythm.    Pulmonary/Chest: Effort normal and breath sounds normal.   Abdominal: Soft. Bowel sounds are normal. There is no tenderness.   Neurological: She is alert and oriented to person, place, and time.   Skin: Skin is warm and dry.   Psychiatric: She has a normal mood and affect.   Nursing note and vitals reviewed.      Labs:  Recent Labs      17   1200   ISTATSPEC  Venous         Recent Labs      17   0005  17   0640  17   0232   SODIUM   --   134*  133*   POTASSIUM  4.3  4.0  4.6   CHLORIDE   --   109  106   CO2   --   21  19*   BUN   --   <5*  <5*   CREATININE   --   0.68  0.87   MAGNESIUM   --   1.9   --    CALCIUM   --   8.1*  9.1     Recent Labs      17   0640  17   0232   ALTSGPT  18   --    ASTSGOT  19   --    ALKPHOSPHAT  51   --    TBILIRUBIN  1.6*   --    GLUCOSE  90  94     Recent Labs      17   0640   RBC  3.44*   HEMOGLOBIN  11.5*   HEMATOCRIT  33.0*   PLATELETCT  216     Recent Labs      17   0640   WBC  6.3   NEUTSPOLYS  63.80   LYMPHOCYTES  25.10   MONOCYTES  9.90   EOSINOPHILS  0.20   BASOPHILS  0.50   ASTSGOT  19   ALTSGPT  18   ALKPHOSPHAT  51   TBILIRUBIN  1.6*     Hemodynamics:  Temp (24hrs), Av.7 °C (98.1 °F), Min:36.6 °C (97.8 °F), Max:36.8 °C (98.3 °F)  Temperature: 36.8 °C (98.3 °F)  Pulse  Av.1  Min: 56  Max: 105  Blood Pressure: 121/76     Respiratory:    Respiration: 16, Pulse Oximetry: 95 %           Fluids:    Intake/Output Summary (Last 24 hours) at 17 0658  Last data filed at 17 0526   Gross per 24 hour   Intake              820 ml   Output              490 ml   Net              330 ml         GI/Nutrition:  Orders Placed This Encounter   Procedures   • DIET ORDER     Standing Status:   Standing     Number of Occurrences:   1     Order Specific Question:   Diet:     Answer:   Regular [1]     Medical Decision Making, by Problem:  Active Hospital Problems    Diagnosis   • Anxiety [F41.9]   • Cyclical vomiting with nausea [G43.A0]   • Hypokalemia due to loss of potassium [E87.6]       Impression / Plan:  1.  Nausea and vomiting, unclear if cyclic vomiting versus cannabis hyperemesis (favor canabis)  2.  Ongoing cannabis abuse  3.  Hypokalemia  4.  Anxiety and bipolar disease      1.  Continue b.i.d. PPI therapy.  2.  I will have her elevate the head of her bed physically with a brick or piece of 4 x 4 to at least 10-15 degrees without using extra pillows  3.  Continue antiemetics  4.  Cannabis cessation  5.  She is trying to get pregnant and she now realizes that cannabis is likely contributing to her symptoms; therefore, she is motivated to stop  6.  Diet as tolerated      Reviewed items::  Labs reviewed and Medications reviewed

## 2017-09-01 NOTE — CONSULTS
DATE OF SERVICE:  08/31/2017    REASON FOR CONSULTATION:  Cyclic nausea and vomiting.    REQUESTING PHYSICIAN:  Linda Cotto MD    HISTORY OF PRESENT ILLNESS:  Thank you very much for asking me to see this   very pleasant 42-year-old woman who has a long history of cyclic nausea and   vomiting syndrome.  She is a cannabis user and it is unclear whether or not   this is cannabis hyperemesis versus true cyclic nausea and vomiting.  She has   a history of recurrent episodes of the same and continues to use cannabis   despite repeated advice to stop.  She has a history of Shruti-en-Y surgery due   to a suspected superior mesenteric artery syndrome.  On this occasion, she   presented to the hospital with the same nausea, vomiting which was intractable   and associated with hypokalemia.  When she wakes in the morning is when her   symptoms tend to be worse.  She was admitted to the ICU and electrolytes   corrected, treated with anxiolytics, antiemetics and IV fluids, and she has   been doing quite well.  She was moved to the floor and early this morning, she   had severe nausea and vomiting upon waking up.  She states that she gets a   sour taste in her mouth and a burning sensation in her epigastrium.  This then   leads to the nausea and vomiting.  She denies fevers, chills, or sweats.  No   hematemesis or coffee-ground emesis.  No dysphagia or odynophagia.    REVIEW OF SYSTEMS:  A 10-system review of systems performed by myself,   pertinent positives and negatives as stated otherwise noncontributory.    PAST MEDICAL HISTORY:  Cyclic nausea and vomiting, history of superior   mesenteric artery syndrome, bipolar disease, anxiety.    PAST SURGICAL HISTORY:  Shruti-en-Y surgery, cholecystectomy, appendectomy,   pyloroplasty.    ADVERSE DRUG REACTIONS:  METOCLOPRAMIDE and SULFA.    MEDICATIONS:  Reviewed by myself.  Please see the chart for detail.    SOCIAL HISTORY:  Positive marijuana use.  Denies alcohol abuse.    FAMILY  HISTORY:  Negative for gastrointestinal, hepatobiliary, or pancreatic   disease.    LABORATORY DATA:  White count 6.3, hemoglobin 11.5, hematocrit 33.0, platelets   216.  Sodium 133, potassium 4.6, chloride 106, bicarbonate 19, BUN less than   5, creatinine 0.87, AST 19, ALT 18, alkaline phosphatase 51, total bilirubin   1.6, lipase 19.    PHYSICAL EXAMINATION:  GENERAL:  She is in no distress, alert, oriented x3, very pleasant.  VITAL SIGNS:  Temperature 98.0, pulse of 68, blood pressure 111/67,   respirations 18.  HEAD AND NECK:  Sclerae nonicteric.  Mucous membranes are pink and moist.  LUNGS:  Clear bilaterally.  HEART:  Sounds regular.  ABDOMEN:  Soft, nontender, nondistended.  Bowel sounds normal.    ASSESSMENT:  1.  Nausea and vomiting, unclear cyclic vomiting versus cannabis hyperemesis.  2.  Ongoing cannabis abuse.  3.  Hypokalemia.  4.  Anxiety and bipolar disease.    DISCUSSION:  This indeed is likely cannabis hyperemesis plus or minus cyclic   nausea and vomiting.  She also has an element of reflux and her symptoms tend   to be worse in the morning.  I find that early morning nausea very frequently   is due to uncontrolled acid related disease.    PLAN:  1.  I will start her on b.i.d. PPI therapy.  2.  I will have her elevate the head of her bed physically with a brick or   piece of 4 x 4 to at least 10-15 degrees without using extra pillows.  3.  Continue antiemetics.  4.  Cannabis cessation.  5.  She is trying to get pregnant and she now realizes that cannabis is likely   contributing to her symptoms; therefore, she is motivated to stop.    Thank you very much for allowing me to participate in the care of this nice   lady.  If you have any questions, please do not hesitate to call.       ____________________________________     MD JUANA VILLALPANDO / TRAVIS    DD:  08/31/2017 14:17:02  DT:  08/31/2017 17:04:34    D#:  [id number]  Job#:  [id number]

## 2017-09-01 NOTE — PROGRESS NOTES
Patient vomited 100 mL of brownish emesis. Patient had watermelon and soda. Provided medication see MAR.

## 2017-09-01 NOTE — PROGRESS NOTES
"1900 -- Assumed care of patient. Bedside report from ELMO Guthrie. POC discussed w/ pt. Pt is very anxious & crying, asking why nothing is being done about her back pain. Asking for lidocaine. Explained that per MD, we are not adding any new medications. Heating pad in place. Lines patent. CLIP. Fall precautions in place.    1934 -- Pt more calm, but states back is really bothering her. Per pt, '\"heat is the only thing that helps\". Up to shower with supervision, then back to bed. Pt is more calm now.     1945 -- Visitor at bedside.    2108 -- PRN ativan given, states she's starting to get worked up again. Up to shower again with supervision. Offered electric heat pad, but pt refused saying \"it's not hot enough\". Prefers the heated wet towel.     0013 -- Sound asleep. No distress noted.     0221 -- Up to bathroom to void, then back to bed. Pt is being very cooperative about calling for staff to go to bathroom supervised. No episodes of vomiting. Pt is calm and making jokes. No anxiety at this time. Warm blankets given and made comfortable. Continue to monitor.    0236 -- Called back into  by pt d/t c/o nausea. PRN zofran and ice water given.     0254 -- Pt vomited into trash can, small amount of yellow emesis w/ watermelon chunks and zofran that was just given. Requested shower again to \"calm her\".     0534 -- Pt woke up nausea and vomited 100mL of yellow emesis. Up to bathroom to void. PRN ativan given (see MAR). Pt now ambulating hallway.    0715 -- Bedside report given to ELMO Babb and ELMO Sexton. POC discussed w/ pt. Lines patent. Fall precautions in place.     "

## 2017-09-01 NOTE — DISCHARGE SUMMARY
CHIEF COMPLAINT ON ADMISSION  Chief Complaint   Patient presents with   • Flank Pain       CODE STATUS  Full Code    HPI & HOSPITAL COURSE  This is a 42 y.o. female here with nausea and vomiting with regular marijuana use. She was noted to have low potassium and was admitted for aggressive hydration and potassium replacement. Her nausea and vomiting improved. Gastroenterology recommended prilosec twice daily and precautions for acid reflux with eating. her symptoms did improve. She was educated about discontinuing marijuana use and coping mechanisms for her anxiety.     Therefore, she is discharged in good and stable condition with close outpatient follow-up.    SPECIFIC OUTPATIENT FOLLOW-UP  Primary care provider in 2 weeks  GI as needed    DISCHARGE PROBLEM LIST  Active Problems:    Anxiety (Chronic) POA: Yes    Cyclical vomiting with nausea POA: Unknown  Resolved Problems:    Hypokalemia due to loss of potassium POA: Unknown      FOLLOW UP  Future Appointments  Date Time Provider Department Center   9/12/2017 8:45 AM Lavell Monte M.D. [facility] None     Lavell Monte M.D.  [address]  [facility] [address]  [place] [id number]  [phone]    Schedule an appointment as soon as possible for a visit in 2 weeks  Follow Up    Wayne Arevalo M.D.  [id number]  [place] [id number]  [phone]      As needed      MEDICATIONS ON DISCHARGE   Diana Hernandez   Home Medication Instructions JOSUÉ:[id number]    Printed on:09/01/17 1150   Medication Information                      omeprazole (PRILOSEC) 20 MG delayed-release capsule  Take 1 Cap by mouth 2 times daily, before breakfast and dinner.             ondansetron (ZOFRAN ODT) 4 MG TABLET DISPERSIBLE  Take 1 Tab by mouth every 8 hours as needed for Nausea/Vomiting.             Prenatal Vit-Fe Fumarate-FA (PRENATAL PO)  Take 1 Tab by mouth every day at 6 PM.             promethazine (PHENERGAN) 12.5 MG Suppos  Insert 1 Suppository in rectum every four hours as  needed for Nausea/Vomiting (if no relief from ondansetron or if unable to tolerate PO).                 DIET  Orders Placed This Encounter   Procedures   • DIET ORDER     Standing Status:   Standing     Number of Occurrences:   1     Order Specific Question:   Diet:     Answer:   Regular [1]       ACTIVITY  As tolerated.  Weight bearing as tolerated      CONSULTATIONS  GI Dr. Arevalo    PROCEDURES  CT abdomen showed hepatomegaly and no acute pathology    LABORATORY  Lab Results   Component Value Date/Time    SODIUM 133 (L) 08/31/2017 02:32 AM    POTASSIUM 4.6 08/31/2017 02:32 AM    CHLORIDE 106 08/31/2017 02:32 AM    CO2 19 (L) 08/31/2017 02:32 AM    GLUCOSE 94 08/31/2017 02:32 AM    BUN <5 (L) 08/31/2017 02:32 AM    CREATININE 0.87 08/31/2017 02:32 AM    CREATININE 0.9 05/18/2009 08:53 AM        Lab Results   Component Value Date/Time    WBC 6.3 08/30/2017 06:40 AM    HEMOGLOBIN 11.5 (L) 08/30/2017 06:40 AM    HEMATOCRIT 33.0 (L) 08/30/2017 06:40 AM    PLATELETCT 216 08/30/2017 06:40 AM        Total time of the discharge process exceeds 38 minutes

## 2017-09-01 NOTE — DISCHARGE INSTRUCTIONS
Discharge Instructions    Discharged to home by car with relative. Discharged via wheelchair, hospital escort: Yes.  Special equipment needed: Not Applicable    Be sure to schedule a follow-up appointment with your primary care doctor or any specialists as instructed.     Discharge Plan:   Diet Plan: Discussed  Activity Level: Discussed  Confirmed Follow up Appointment: Patient to Call and Schedule Appointment  Confirmed Symptoms Management: Discussed  Medication Reconciliation Updated: Yes  Influenza Vaccine Indication: Patient Refuses    I understand that a diet low in cholesterol, fat, and sodium is recommended for good health. Unless I have been given specific instructions below for another diet, I accept this instruction as my diet prescription.   Other diet: As tolerated    Special Instructions: None    · Is patient discharged on Warfarin / Coumadin?   No     · Is patient Post Blood Transfusion?  No    Hypokalemia  Hypokalemia means that the amount of potassium in the blood is lower than normal. Potassium is a chemical, called an electrolyte, that helps regulate the amount of fluid in the body. It also stimulates muscle contraction and helps nerves function properly. Most of the body's potassium is inside of cells, and only a very small amount is in the blood. Because the amount in the blood is so small, minor changes can be life-threatening.  CAUSES  · Antibiotics.  · Diarrhea or vomiting.  · Using laxatives too much, which can cause diarrhea.  · Chronic kidney disease.  · Water pills (diuretics).  · Eating disorders (bulimia).  · Low magnesium level.  · Sweating a lot.  SIGNS AND SYMPTOMS  · Weakness.  · Constipation.  · Fatigue.  · Muscle cramps.  · Mental confusion.  · Skipped heartbeats or irregular heartbeat (palpitations).  · Tingling or numbness.  DIAGNOSIS   Your health care provider can diagnose hypokalemia with blood tests. In addition to checking your potassium level, your health care provider may  also check other lab tests.  TREATMENT  Hypokalemia can be treated with potassium supplements taken by mouth or adjustments in your current medicines. If your potassium level is very low, you may need to get potassium through a vein (IV) and be monitored in the hospital. A diet high in potassium is also helpful. Foods high in potassium are:  · Nuts, such as peanuts and pistachios.  · Seeds, such as sunflower seeds and pumpkin seeds.  · Peas, lentils, and lima beans.  · Whole grain and bran cereals and breads.  · Fresh fruit and vegetables, such as apricots, avocado, bananas, cantaloupe, kiwi, oranges, tomatoes, asparagus, and potatoes.  · Orange and tomato juices.  · Red meats.  · Fruit yogurt.  HOME CARE INSTRUCTIONS  · Take all medicines as prescribed by your health care provider.  · Maintain a healthy diet by including nutritious food, such as fruits, vegetables, nuts, whole grains, and lean meats.  · If you are taking a laxative, be sure to follow the directions on the label.  SEEK MEDICAL CARE IF:  · Your weakness gets worse.  · You feel your heart pounding or racing.  · You are vomiting or having diarrhea.  · You are diabetic and having trouble keeping your blood glucose in the normal range.  SEEK IMMEDIATE MEDICAL CARE IF:  · You have chest pain, shortness of breath, or dizziness.  · You are vomiting or having diarrhea for more than 2 days.  · You faint.  MAKE SURE YOU:   · Understand these instructions.  · Will watch your condition.  · Will get help right away if you are not doing well or get worse.     This information is not intended to replace advice given to you by your health care provider. Make sure you discuss any questions you have with your health care provider.     Document Released: 12/18/2006 Document Revised: 01/08/2016 Document Reviewed: 06/20/2014  [employer] Interactive Patient Education ©2016 [employer]    Nausea and Vomiting  Nausea is a sick feeling that often comes before throwing up  (vomiting). Vomiting is a reflex where stomach contents come out of your mouth. Vomiting can cause severe loss of body fluids (dehydration). Children and elderly adults can become dehydrated quickly, especially if they also have diarrhea. Nausea and vomiting are symptoms of a condition or disease. It is important to find the cause of your symptoms.  CAUSES   · Direct irritation of the stomach lining. This irritation can result from increased acid production (gastroesophageal reflux disease), infection, food poisoning, taking certain medicines (such as nonsteroidal anti-inflammatory drugs), alcohol use, or tobacco use.  · Signals from the brain. These signals could be caused by a headache, heat exposure, an inner ear disturbance, increased pressure in the brain from injury, infection, a tumor, or a concussion, pain, emotional stimulus, or metabolic problems.  · An obstruction in the gastrointestinal tract (bowel obstruction).  · Illnesses such as diabetes, hepatitis, gallbladder problems, appendicitis, kidney problems, cancer, sepsis, atypical symptoms of a heart attack, or eating disorders.  · Medical treatments such as chemotherapy and radiation.  · Receiving medicine that makes you sleep (general anesthetic) during surgery.  DIAGNOSIS  Your caregiver may ask for tests to be done if the problems do not improve after a few days. Tests may also be done if symptoms are severe or if the reason for the nausea and vomiting is not clear. Tests may include:  · Urine tests.  · Blood tests.  · Stool tests.  · Cultures (to look for evidence of infection).  · X-rays or other imaging studies.  Test results can help your caregiver make decisions about treatment or the need for additional tests.  TREATMENT  You need to stay well hydrated. Drink frequently but in small amounts. You may wish to drink water, sports drinks, clear broth, or eat frozen ice pops or gelatin dessert to help stay hydrated. When you eat, eating slowly may  help prevent nausea. There are also some antinausea medicines that may help prevent nausea.  HOME CARE INSTRUCTIONS   · Take all medicine as directed by your caregiver.  · If you do not have an appetite, do not force yourself to eat. However, you must continue to drink fluids.  · If you have an appetite, eat a normal diet unless your caregiver tells you differently.  ¨ Eat a variety of complex carbohydrates (rice, wheat, potatoes, bread), lean meats, yogurt, fruits, and vegetables.  ¨ Avoid high-fat foods because they are more difficult to digest.  · Drink enough water and fluids to keep your urine clear or pale yellow.  · If you are dehydrated, ask your caregiver for specific rehydration instructions. Signs of dehydration may include:  ¨ Severe thirst.  ¨ Dry lips and mouth.  ¨ Dizziness.  ¨ Dark urine.  ¨ Decreasing urine frequency and amount.  ¨ Confusion.  ¨ Rapid breathing or pulse.  SEEK IMMEDIATE MEDICAL CARE IF:   · You have blood or brown flecks (like coffee grounds) in your vomit.  · You have black or bloody stools.  · You have a severe headache or stiff neck.  · You are confused.  · You have severe abdominal pain.  · You have chest pain or trouble breathing.  · You do not urinate at least once every 8 hours.  · You develop cold or clammy skin.  · You continue to vomit for longer than 24 to 48 hours.  · You have a fever.  MAKE SURE YOU:   · Understand these instructions.  · Will watch your condition.  · Will get help right away if you are not doing well or get worse.     This information is not intended to replace advice given to you by your health care provider. Make sure you discuss any questions you have with your health care provider.     Document Released: 12/18/2006 Document Revised: 03/11/2013 Document Reviewed: 05/16/2012  [employer] Interactive Patient Education ©2016 [employer]    Generalized Anxiety Disorder  Generalized anxiety disorder (ESTELA) is a mental disorder. It interferes with life  functions, including relationships, work, and school.  ESTELA is different from normal anxiety, which everyone experiences at some point in their lives in response to specific life events and activities. Normal anxiety actually helps us prepare for and get through these life events and activities. Normal anxiety goes away after the event or activity is over.   ESTELA causes anxiety that is not necessarily related to specific events or activities. It also causes excess anxiety in proportion to specific events or activities. The anxiety associated with ESTELA is also difficult to control. ESTELA can vary from mild to severe. People with severe ESTELA can have intense waves of anxiety with physical symptoms (panic attacks).   SYMPTOMS  The anxiety and worry associated with ESTELA are difficult to control. This anxiety and worry are related to many life events and activities and also occur more days than not for 6 months or longer. People with ESTELA also have three or more of the following symptoms (one or more in children):  · Restlessness.    · Fatigue.  · Difficulty concentrating.    · Irritability.  · Muscle tension.  · Difficulty sleeping or unsatisfying sleep.  DIAGNOSIS  ESTELA is diagnosed through an assessment by your health care provider. Your health care provider will ask you questions about your mood, physical symptoms, and events in your life. Your health care provider may ask you about your medical history and use of alcohol or drugs, including prescription medicines. Your health care provider may also do a physical exam and blood tests. Certain medical conditions and the use of certain substances can cause symptoms similar to those associated with ESTELA. Your health care provider may refer you to a mental health specialist for further evaluation.  TREATMENT  The following therapies are usually used to treat ESTELA:   · Medication. Antidepressant medication usually is prescribed for long-term daily control. Antianxiety medicines may  be added in severe cases, especially when panic attacks occur.    · Talk therapy (psychotherapy). Certain types of talk therapy can be helpful in treating ESTELA by providing support, education, and guidance. A form of talk therapy called cognitive behavioral therapy can teach you healthy ways to think about and react to daily life events and activities.  · Stress management techniques. These include yoga, meditation, and exercise and can be very helpful when they are practiced regularly.  A mental health specialist can help determine which treatment is best for you. Some people see improvement with one therapy. However, other people require a combination of therapies.     This information is not intended to replace advice given to you by your health care provider. Make sure you discuss any questions you have with your health care provider.     Document Released: 04/14/2014 Document Revised: 01/08/2016 Document Reviewed: 04/14/2014  [employer] Interactive Patient Education ©2016 [employer]    Depression / Suicide Risk    As you are discharged from this [facility] facility, it is important to learn how to keep safe from harming yourself.    Recognize the warning signs:  · Abrupt changes in personality, positive or negative- including increase in energy   · Giving away possessions  · Change in eating patterns- significant weight changes-  positive or negative  · Change in sleeping patterns- unable to sleep or sleeping all the time   · Unwillingness or inability to communicate  · Depression  · Unusual sadness, discouragement and loneliness  · Talk of wanting to die  · Neglect of personal appearance   · Rebelliousness- reckless behavior  · Withdrawal from people/activities they love  · Confusion- inability to concentrate     If you or a loved one observes any of these behaviors or has concerns about self-harm, here's what you can do:  · Talk about it- your feelings and reasons for harming yourself  · Remove any means  that you might use to hurt yourself (examples: pills, rope, extension cords, firearm)  · Get professional help from the community (Mental Health, Substance Abuse, psychological counseling)  · Do not be alone:Call your Safe Contact- someone whom you trust who will be there for you.  · Call your local CRISIS HOTLINE 424-4545 or 420-086-2182  · Call your local Children's Mobile Crisis Response Team [place] (719) 007-4977 or www.VoteIt  · Call the toll free National Suicide Prevention Hotlines   · National Suicide Prevention Lifeline 314-613-DNXR (1481)  · National Hope Line Network 800-SUICIDE (243-9473)

## 2017-09-01 NOTE — CARE PLAN
Problem: Bowel/Gastric:  Goal: Normal bowel function is maintained or improved  Educate patient regarding diet and fluid intake and slowly increase as tolerated. Reduce nausea and vomiting using pharmacologic and nonpharmacologic therapies to reduce nausea and vomiting.    Problem: Psychosocial Needs:  Goal: Level of anxiety will decrease  Outcome: PROGRESSING AS EXPECTED  Patient will identify stressors and coping mechanisms that reduce stress/anxiety. Patient will express feelings in an effort to reduce stress/anxiety.

## 2017-09-02 NOTE — PROGRESS NOTES
Discharge instructions and prescriptions explained & provided to patient. Verbalized understanding. IV previously removed, tip intact. Pt discharged to home with all personal belongings.

## 2017-09-12 ENCOUNTER — APPOINTMENT (OUTPATIENT)
Dept: INTERNAL MEDICINE | Facility: MEDICAL CENTER | Age: 42
End: 2017-09-12
Payer: MEDICAID

## 2017-09-13 ENCOUNTER — OFFICE VISIT (OUTPATIENT)
Dept: INTERNAL MEDICINE | Facility: MEDICAL CENTER | Age: 42
End: 2017-09-13
Payer: MEDICAID

## 2017-09-13 VITALS
SYSTOLIC BLOOD PRESSURE: 121 MMHG | HEART RATE: 88 BPM | OXYGEN SATURATION: 99 % | TEMPERATURE: 98 F | DIASTOLIC BLOOD PRESSURE: 89 MMHG | BODY MASS INDEX: 20.03 KG/M2 | WEIGHT: 120.2 LBS | HEIGHT: 65 IN

## 2017-09-13 DIAGNOSIS — F41.9 ANXIETY: Chronic | ICD-10-CM

## 2017-09-13 PROCEDURE — 99213 OFFICE O/P EST LOW 20 MIN: CPT | Mod: GE | Performed by: HOSPITALIST

## 2017-09-13 RX ORDER — BUSPIRONE HYDROCHLORIDE 10 MG/1
10 TABLET ORAL 2 TIMES DAILY
Qty: 60 TAB | Refills: 0 | Status: SHIPPED | OUTPATIENT
Start: 2017-09-13 | End: 2017-09-13 | Stop reason: SDUPTHER

## 2017-09-13 RX ORDER — BUSPIRONE HYDROCHLORIDE 10 MG/1
10 TABLET ORAL 2 TIMES DAILY
Qty: 60 TAB | Refills: 0 | Status: SHIPPED | OUTPATIENT
Start: 2017-09-13 | End: 2017-11-13 | Stop reason: SDUPTHER

## 2017-09-13 RX ORDER — BUSPIRONE HYDROCHLORIDE 10 MG/1
5 TABLET ORAL 2 TIMES DAILY
Qty: 3 TAB | Refills: 0 | Status: SHIPPED | OUTPATIENT
Start: 2017-09-13 | End: 2017-09-16

## 2017-09-13 NOTE — PATIENT INSTRUCTIONS
Please follow up in 5 weeks.     Please contract with gym : work out 2x per week.      Please contract with fiance/: eat chicken 3x per week.      Please go to grocery store/convenience store and obtain Boost protein drinks.  Have one each morning.

## 2017-09-14 NOTE — PROGRESS NOTES
Established Patient    Iris presents today with the following:    CC: hospital follow up    HPI: Ms. Hernandez is a 42 year old lady with PMHx significant for anxiety and superior mesenteric artery (SMA) syndrome who presents for hospital follow up.  She was recently admitted to the hospital for nausea and vomiting secondary to regular marijuana use.  She was also found to be hypokalemic.  She was discharged after a three days s/p potassium repletion.  Today she is very tearful, stating she is very anxious.      Anxiety: Patient reports history of anxiety for past 8 years, worsening over the past 2-3 years.  States she is frequently afraid of going outdoors.  She believes the anxiety began when she was diagnosed with SMA syndrome.  No alleviating factors except marijuana use.  Last use was 5 days ago.  Reports a poor appetite, which she attributes to the anxiety.  She states this makes her feel depressed because she is too afraid to obtain a job.        Patient Active Problem List    Diagnosis Date Noted   • AP (abdominal pain) 01/19/2016     Priority: High   • Substance abuse 09/15/2011     Priority: Medium   • Anxiety 01/08/2017     Priority: Low   • Anxiety 12/21/2014     Priority: Low   • Cyclic vomiting syndrome 02/22/2013     Priority: Low   • Planned pregnancy 05/30/2017   • History of seizures 04/12/2017   • SMAS (superior mesenteric artery syndrome) (CMS-[facility]) 06/20/2016   • Drug-seeking behavior 03/19/2015       Current Outpatient Prescriptions   Medication Sig Dispense Refill   • busPIRone (BUSPAR) 10 MG Tab Take 0.5 Tabs by mouth 2 times a day for 3 days. 3 Tab 0   • busPIRone (BUSPAR) 10 MG Tab Take 1 Tab by mouth 2 times a day. 60 Tab 0   • ondansetron (ZOFRAN ODT) 4 MG TABLET DISPERSIBLE Take 1 Tab by mouth every 8 hours as needed for Nausea/Vomiting. 20 Tab 0   • Prenatal Vit-Fe Fumarate-FA (PRENATAL PO) Take 1 Tab by mouth every day at 6 PM.       No current facility-administered medications for  "this visit.        ROS: 12 point review of systems negative except as reported in HPI and below:    Psych: Positive for anxiety and depression (which she attributes to her anxiety).          /89   Pulse 88   Temp 36.7 °C (98 °F)   Ht 1.651 m (5' 5\")   Wt 54.5 kg (120 lb 3.2 oz)   LMP 08/19/2017   SpO2 99%   BMI 20.00 kg/m²     Physical Exam   Constitutional:  Sitting in exam room, tearful.  Oriented to person, place, time, and situation.  In moderate emotional distress.   Eyes: Pupils are equal, round, and reactive to light. No scleral icterus.  Neck: Neck supple. No thyromegaly present.   Cardiovascular: Normal rate, regular rhythm and normal heart sounds.  Exam reveals no gallop and no friction rub.  No murmur heard.  Pulmonary/Chest: Breath sounds normal. Chest wall is not dull to percussion.   Musculoskeletal:   no edema.   Lymphadenopathy: no cervical adenopathy  Skin: No cyanosis. Nails show no clubbing.  Psych: Affect - sad, anxious.      Assessment and Plan    1. Anxiety  - Patient reports history of generalized anxiety for 8 years, worsening over the past 2-3 years.  Began when she was diagnosed with SMA syndrome.    - Was previously using marijuana to cope, but has not used it for 5 days.   - Patient trying to become pregnant, so will prescribe buspirone.  To start with 5 mg twice daily x3 days.  Then increase to 10 mg twice daily.    - Referral to outpatient Psychiatry placed.  - Patient to contract with gym  to work out 2x per week, thus assisting her leave her house.    - Will reassess on follow up.      2. Lack of appetite  - Patient to contract with [employer] to eat chicken 3x per week.  - Patient to go to grocery store/convenience store and obtain Boost protein drinks.  Have one each morning.      3. Health maintenance  - Last Pap smear/HPV: 2017.  Negative.    - Flu vaccine: States she will consider obtaining on follow up visit.       Followup: Return in about 5 weeks (around " 10/18/2017).      Signed by: Isa Rodriguez M.D.

## 2017-10-10 DIAGNOSIS — F41.9 ANXIETY: Chronic | ICD-10-CM

## 2017-10-10 NOTE — TELEPHONE ENCOUNTER
Last seen: 09/13/17 by Dr. Rodriguez  Next appt: 10/18/17 with Dr. Rodriguez    Was the patient seen in the last year in this department? Yes   Does patient have an active prescription for medications requested? No   Received Request Via: Pharmacy

## 2017-10-18 ENCOUNTER — OFFICE VISIT (OUTPATIENT)
Dept: INTERNAL MEDICINE | Facility: MEDICAL CENTER | Age: 42
End: 2017-10-18
Payer: MEDICAID

## 2017-10-18 VITALS
DIASTOLIC BLOOD PRESSURE: 67 MMHG | HEIGHT: 65 IN | SYSTOLIC BLOOD PRESSURE: 97 MMHG | TEMPERATURE: 98.3 F | WEIGHT: 126.8 LBS | OXYGEN SATURATION: 94 % | HEART RATE: 79 BPM | BODY MASS INDEX: 21.13 KG/M2

## 2017-10-18 DIAGNOSIS — F41.9 ANXIETY: ICD-10-CM

## 2017-10-18 PROCEDURE — 99213 OFFICE O/P EST LOW 20 MIN: CPT | Mod: GE | Performed by: INTERNAL MEDICINE

## 2017-10-18 NOTE — PROGRESS NOTES
"      Established Patient    Iris presents today with the following:    CC: 5-week follow up after starting buspirone.    HPI: Ms. Hernandez is a 42 year old lady with PMHx significant for anxiety and superior mesenteric artery (SMA) syndrome who presents for 5 week follow up after starting buspirone for her anxiety.  She reports very mild occasional anxiety but overall is feeling \"so much better.  It's like night and day.\"  She states her diet has improved.  She has decreased her marijuana use by half (\"from a bowl every [1.5 days] to every 3 days\").  She has been going to group meetings for anxiety twice a week.  She is happy with the gains she is making, including going to a job fair to try to start working again.  She has not seen a psychiatrist or an individual therapist/counselor.  Denies nausea/vomiting, abdominal pain.       Patient Active Problem List    Diagnosis Date Noted   • AP (abdominal pain) 01/19/2016     Priority: High   • Substance abuse 09/15/2011     Priority: Medium   • Anxiety 01/08/2017     Priority: Low   • Anxiety 12/21/2014     Priority: Low   • Cyclic vomiting syndrome 02/22/2013     Priority: Low   • Planned pregnancy 05/30/2017   • History of seizures 04/12/2017   • SMAS (superior mesenteric artery syndrome) (CMS-[facility]) 06/20/2016   • Drug-seeking behavior 03/19/2015       Current Outpatient Prescriptions   Medication Sig Dispense Refill   • busPIRone (BUSPAR) 10 MG Tab Take 1 Tab by mouth 2 times a day. 60 Tab 0   • Prenatal Vit-Fe Fumarate-FA (PRENATAL PO) Take 1 Tab by mouth every day at 6 PM.     • ondansetron (ZOFRAN ODT) 4 MG TABLET DISPERSIBLE Take 1 Tab by mouth every 8 hours as needed for Nausea/Vomiting. 20 Tab 0     No current facility-administered medications for this visit.        ROS: 12 point review of systems negative except as reported in HPI and below:    Psych: Positive for occasional mild anxiety.          BP (!) 97/67   Pulse 79   Temp 36.8 °C (98.3 °F)   Ht 1.651 m " "(5' 5\")   Wt 57.5 kg (126 lb 12.8 oz)   SpO2 94%   BMI 21.10 kg/m²     Physical Exam   Constitutional:  Sitting on exam room chair, in no apparent distress.   Eyes: Pupils are equal, round, and reactive to light. No scleral icterus.  Neck: Neck supple. No thyromegaly present.   Cardiovascular: Normal rate, regular rhythm and normal heart sounds.  Exam reveals no gallop and no friction rub.  No murmur heard.  Pulmonary/Chest: Breath sounds normal. Chest wall is not dull to percussion.   Abdominal: Soft, non-tender, non-distended.  Bowel sounds present.    Musculoskeletal:   no edema.   Lymphadenopathy: no cervical adenopathy  Neurological: alert and oriented to person, place, and time.   Skin: No cyanosis. Nails show no clubbing.      Note: I have reviewed all pertinent labs and diagnostic tests associated with this visit with specific comments listed under the assessment and plan below    Assessment and Plan    1. Anxiety  - Has a h/o generalized anxiety x8 years.    - Started buspirone 5 weeks ago.  Chose buspirone because she is attempting to get pregnant.  Now on 10 mg twice daily.   - Has been going to group meetings for anxiety twice a week.  - Has decreased marijuana use by half (\"from a bowl every [1.5 days] to every 3 days\").    - Anxiety much improved with only mild occasional anxiety present when she leaves home.  Appetite also much improved.    - Continue buspirone for now.    - Patient to call counselor for an appointment and to call our office with name of counselor.  - Will continue to monitor patient/reassess on follow up.  Goal is to develop coping skills via counselor and continue reducing use of marijuana and reduce use of buspirone.  Patient agrees with this plan (\"the less medication/marijuana, the better\").       2. Health maintenance  - Last Pap smear/HPV: 2017.  Negative.    - Flu vaccine: Continues to decline.        Followup: Return in about 6 months (around 4/18/2018).      Signed by: " Isa Rodriguez M.D.

## 2017-11-13 DIAGNOSIS — F41.9 ANXIETY: Chronic | ICD-10-CM

## 2017-11-13 NOTE — TELEPHONE ENCOUNTER
Last seen: 10/18/17 by Dr. Rodriguez  Next appt: 04/10/18 with Dr. Rodriguez    Was the patient seen in the last year in this department? Yes   Does patient have an active prescription for medications requested? No   Received Request Via: Pharmacy

## 2017-11-21 RX ORDER — BUSPIRONE HYDROCHLORIDE 10 MG/1
TABLET ORAL
Qty: 60 TAB | Refills: 0 | Status: SHIPPED | OUTPATIENT
Start: 2017-11-21 | End: 2017-12-22 | Stop reason: SDUPTHER

## 2017-12-22 DIAGNOSIS — F41.9 ANXIETY: Chronic | ICD-10-CM

## 2017-12-22 RX ORDER — BUSPIRONE HYDROCHLORIDE 10 MG/1
TABLET ORAL
Qty: 60 TAB | Refills: 0 | Status: SHIPPED | OUTPATIENT
Start: 2017-12-22 | End: 2018-01-24 | Stop reason: SDUPTHER

## 2017-12-22 NOTE — TELEPHONE ENCOUNTER
Refill provided.  As discussed during last office visit with patient, goal is to ultimately reduce use of both marijuana and buspirone.  Patient previously stated she would obtain a counselor to assist her with coping mechanisms.

## 2017-12-28 RX ORDER — BUSPIRONE HYDROCHLORIDE 10 MG/1
TABLET ORAL
Qty: 60 TAB | Refills: 1 | OUTPATIENT
Start: 2017-12-28

## 2018-02-02 ENCOUNTER — TELEPHONE (OUTPATIENT)
Dept: INTERNAL MEDICINE | Facility: MEDICAL CENTER | Age: 43
End: 2018-02-02

## 2018-02-02 ENCOUNTER — HOSPITAL ENCOUNTER (EMERGENCY)
Facility: MEDICAL CENTER | Age: 43
End: 2018-02-02
Attending: EMERGENCY MEDICINE
Payer: COMMERCIAL

## 2018-02-02 ENCOUNTER — APPOINTMENT (OUTPATIENT)
Dept: RADIOLOGY | Facility: MEDICAL CENTER | Age: 43
End: 2018-02-02
Attending: EMERGENCY MEDICINE
Payer: COMMERCIAL

## 2018-02-02 ENCOUNTER — HOSPITAL ENCOUNTER (EMERGENCY)
Facility: MEDICAL CENTER | Age: 43
End: 2018-02-02
Payer: MEDICAID

## 2018-02-02 VITALS
OXYGEN SATURATION: 97 % | BODY MASS INDEX: 22.08 KG/M2 | DIASTOLIC BLOOD PRESSURE: 67 MMHG | SYSTOLIC BLOOD PRESSURE: 97 MMHG | TEMPERATURE: 97.1 F | HEIGHT: 65 IN | RESPIRATION RATE: 30 BRPM | WEIGHT: 132.5 LBS | HEART RATE: 95 BPM

## 2018-02-02 VITALS
OXYGEN SATURATION: 100 % | RESPIRATION RATE: 20 BRPM | DIASTOLIC BLOOD PRESSURE: 53 MMHG | HEART RATE: 63 BPM | TEMPERATURE: 97.2 F | SYSTOLIC BLOOD PRESSURE: 105 MMHG

## 2018-02-02 DIAGNOSIS — F12.10 MILD TETRAHYDROCANNABINOL (THC) ABUSE: ICD-10-CM

## 2018-02-02 DIAGNOSIS — R11.2 NAUSEA AND VOMITING, INTRACTABILITY OF VOMITING NOT SPECIFIED, UNSPECIFIED VOMITING TYPE: ICD-10-CM

## 2018-02-02 DIAGNOSIS — M54.50 ACUTE BILATERAL LOW BACK PAIN WITHOUT SCIATICA: ICD-10-CM

## 2018-02-02 LAB
ANION GAP SERPL CALC-SCNC: 16 MMOL/L (ref 0–11.9)
APPEARANCE UR: ABNORMAL
BASOPHILS # BLD AUTO: 0.6 % (ref 0–1.8)
BASOPHILS # BLD: 0.09 K/UL (ref 0–0.12)
BUN SERPL-MCNC: 14 MG/DL (ref 8–22)
CALCIUM SERPL-MCNC: 9.8 MG/DL (ref 8.4–10.2)
CHLORIDE SERPL-SCNC: 106 MMOL/L (ref 96–112)
CO2 SERPL-SCNC: 16 MMOL/L (ref 20–33)
COLOR UR: ABNORMAL
COMMENT 1642: NORMAL
CREAT SERPL-MCNC: 1.02 MG/DL (ref 0.5–1.4)
EOSINOPHIL # BLD AUTO: 0.01 K/UL (ref 0–0.51)
EOSINOPHIL NFR BLD: 0.1 % (ref 0–6.9)
ERYTHROCYTE [DISTWIDTH] IN BLOOD BY AUTOMATED COUNT: 47.6 FL (ref 35.9–50)
GLUCOSE SERPL-MCNC: 177 MG/DL (ref 65–99)
GLUCOSE UR STRIP.AUTO-MCNC: 250 MG/DL
HCG UR QL: NEGATIVE
HCT VFR BLD AUTO: 45.4 % (ref 37–47)
HGB BLD-MCNC: 15.6 G/DL (ref 12–16)
IMM GRANULOCYTES # BLD AUTO: 0.07 K/UL (ref 0–0.11)
IMM GRANULOCYTES NFR BLD AUTO: 0.5 % (ref 0–0.9)
KETONES UR STRIP.AUTO-MCNC: >=160 MG/DL
LEUKOCYTE ESTERASE UR QL STRIP.AUTO: NEGATIVE
LYMPHOCYTES # BLD AUTO: 0.66 K/UL (ref 1–4.8)
LYMPHOCYTES NFR BLD: 4.7 % (ref 22–41)
MCH RBC QN AUTO: 34.4 PG (ref 27–33)
MCHC RBC AUTO-ENTMCNC: 34.4 G/DL (ref 33.6–35)
MCV RBC AUTO: 100 FL (ref 81.4–97.8)
MONOCYTES # BLD AUTO: 1.36 K/UL (ref 0–0.85)
MONOCYTES NFR BLD AUTO: 9.7 % (ref 0–13.4)
NEUTROPHILS # BLD AUTO: 11.84 K/UL (ref 2–7.15)
NEUTROPHILS NFR BLD: 84.4 % (ref 44–72)
NITRITE UR QL STRIP.AUTO: NEGATIVE
NRBC # BLD AUTO: 0 K/UL
NRBC BLD-RTO: 0 /100 WBC
PH UR STRIP.AUTO: 6.5 [PH]
PLATELET # BLD AUTO: 167 K/UL (ref 164–446)
PMV BLD AUTO: 11 FL (ref 9–12.9)
POTASSIUM SERPL-SCNC: 3.4 MMOL/L (ref 3.6–5.5)
PROT UR QL STRIP: 30 MG/DL
RBC # BLD AUTO: 4.54 M/UL (ref 4.2–5.4)
RBC UR QL AUTO: NEGATIVE
SODIUM SERPL-SCNC: 138 MMOL/L (ref 135–145)
SP GR UR STRIP.AUTO: 1.02
WBC # BLD AUTO: 14 K/UL (ref 4.8–10.8)

## 2018-02-02 PROCEDURE — 700105 HCHG RX REV CODE 258: Performed by: EMERGENCY MEDICINE

## 2018-02-02 PROCEDURE — 96374 THER/PROPH/DIAG INJ IV PUSH: CPT

## 2018-02-02 PROCEDURE — 302449 STATCHG TRIAGE ONLY (STATISTIC)

## 2018-02-02 PROCEDURE — 81025 URINE PREGNANCY TEST: CPT

## 2018-02-02 PROCEDURE — 85025 COMPLETE CBC W/AUTO DIFF WBC: CPT

## 2018-02-02 PROCEDURE — 700102 HCHG RX REV CODE 250 W/ 637 OVERRIDE(OP): Performed by: EMERGENCY MEDICINE

## 2018-02-02 PROCEDURE — 96376 TX/PRO/DX INJ SAME DRUG ADON: CPT

## 2018-02-02 PROCEDURE — 700111 HCHG RX REV CODE 636 W/ 250 OVERRIDE (IP): Performed by: EMERGENCY MEDICINE

## 2018-02-02 PROCEDURE — 96372 THER/PROPH/DIAG INJ SC/IM: CPT | Mod: XU

## 2018-02-02 PROCEDURE — 96375 TX/PRO/DX INJ NEW DRUG ADDON: CPT

## 2018-02-02 PROCEDURE — 99284 EMERGENCY DEPT VISIT MOD MDM: CPT

## 2018-02-02 PROCEDURE — 700101 HCHG RX REV CODE 250: Performed by: EMERGENCY MEDICINE

## 2018-02-02 PROCEDURE — A9270 NON-COVERED ITEM OR SERVICE: HCPCS | Performed by: EMERGENCY MEDICINE

## 2018-02-02 PROCEDURE — 81002 URINALYSIS NONAUTO W/O SCOPE: CPT

## 2018-02-02 PROCEDURE — 74176 CT ABD & PELVIS W/O CONTRAST: CPT

## 2018-02-02 PROCEDURE — 96361 HYDRATE IV INFUSION ADD-ON: CPT

## 2018-02-02 PROCEDURE — 80048 BASIC METABOLIC PNL TOTAL CA: CPT

## 2018-02-02 RX ORDER — HALOPERIDOL 5 MG/ML
5 INJECTION INTRAMUSCULAR ONCE
Status: COMPLETED | OUTPATIENT
Start: 2018-02-02 | End: 2018-02-02

## 2018-02-02 RX ORDER — SODIUM CHLORIDE 9 MG/ML
1000 INJECTION, SOLUTION INTRAVENOUS ONCE
Status: COMPLETED | OUTPATIENT
Start: 2018-02-02 | End: 2018-02-02

## 2018-02-02 RX ORDER — PROMETHAZINE HYDROCHLORIDE 25 MG/1
25 SUPPOSITORY RECTAL ONCE
Status: COMPLETED | OUTPATIENT
Start: 2018-02-02 | End: 2018-02-02

## 2018-02-02 RX ORDER — ONDANSETRON 2 MG/ML
4 INJECTION INTRAMUSCULAR; INTRAVENOUS ONCE
Status: COMPLETED | OUTPATIENT
Start: 2018-02-02 | End: 2018-02-02

## 2018-02-02 RX ORDER — PROMETHAZINE HYDROCHLORIDE 25 MG/1
25 SUPPOSITORY RECTAL EVERY 8 HOURS PRN
Qty: 15 SUPPOSITORY | Refills: 0 | Status: ON HOLD | OUTPATIENT
Start: 2018-02-02 | End: 2018-02-08

## 2018-02-02 RX ORDER — LORAZEPAM 2 MG/ML
1 INJECTION INTRAMUSCULAR ONCE
Status: COMPLETED | OUTPATIENT
Start: 2018-02-02 | End: 2018-02-02

## 2018-02-02 RX ORDER — KETOROLAC TROMETHAMINE 30 MG/ML
30 INJECTION, SOLUTION INTRAMUSCULAR; INTRAVENOUS EVERY 6 HOURS PRN
Status: DISCONTINUED | OUTPATIENT
Start: 2018-02-02 | End: 2018-02-02 | Stop reason: HOSPADM

## 2018-02-02 RX ORDER — DIPHENHYDRAMINE HYDROCHLORIDE 50 MG/ML
25 INJECTION INTRAMUSCULAR; INTRAVENOUS ONCE
Status: COMPLETED | OUTPATIENT
Start: 2018-02-02 | End: 2018-02-02

## 2018-02-02 RX ADMIN — ONDANSETRON 4 MG: 2 INJECTION INTRAMUSCULAR; INTRAVENOUS at 19:41

## 2018-02-02 RX ADMIN — SODIUM CHLORIDE 1000 ML: 9 INJECTION, SOLUTION INTRAVENOUS at 19:41

## 2018-02-02 RX ADMIN — ONDANSETRON 4 MG: 2 INJECTION INTRAMUSCULAR; INTRAVENOUS at 16:30

## 2018-02-02 RX ADMIN — SODIUM CHLORIDE 1000 ML: 9 INJECTION, SOLUTION INTRAVENOUS at 17:08

## 2018-02-02 RX ADMIN — DIPHENHYDRAMINE HYDROCHLORIDE 25 MG: 50 INJECTION, SOLUTION INTRAMUSCULAR; INTRAVENOUS at 15:00

## 2018-02-02 RX ADMIN — PROMETHAZINE HYDROCHLORIDE 25 MG: 25 SUPPOSITORY RECTAL at 17:09

## 2018-02-02 RX ADMIN — LIDOCAINE HYDROCHLORIDE 90 MG: 20 INJECTION, SOLUTION INFILTRATION; PERINEURAL at 17:56

## 2018-02-02 RX ADMIN — HALOPERIDOL LACTATE 5 MG: 5 INJECTION, SOLUTION INTRAMUSCULAR at 19:43

## 2018-02-02 RX ADMIN — KETOROLAC TROMETHAMINE 30 MG: 30 INJECTION, SOLUTION INTRAMUSCULAR at 15:23

## 2018-02-02 RX ADMIN — LORAZEPAM 1 MG: 2 INJECTION INTRAMUSCULAR; INTRAVENOUS at 15:00

## 2018-02-02 ASSESSMENT — PAIN SCALES - GENERAL
PAINLEVEL_OUTOF10: 7
PAINLEVEL_OUTOF10: 7

## 2018-02-02 ASSESSMENT — LIFESTYLE VARIABLES: DO YOU DRINK ALCOHOL: NO

## 2018-02-02 ASSESSMENT — PAIN SCALES - WONG BAKER: WONGBAKER_NUMERICALRESPONSE: HURTS A WHOLE LOT

## 2018-02-02 NOTE — ED PROVIDER NOTES
"ED Provider Note    CHIEF COMPLAINT  Chief Complaint   Patient presents with   • Back Pain     Upper and mid back pain beginning last evening.    • N/V     Today.    • Anxiety     \" I'm having a really bad anxiety attack right now\"       HPI  Diana Hernandez is a 42 y.o. female who presents complaining that she is having upper and lower back pain and anxiety attack. She states \"the only thing that works for me when I feel like this is Ativan, Dilaudid and morphine.\" The patient states that she has had nausea and vomiting today. She states her panic attack started at 7 a.m. and she has not been able to get out of it. She denies any suicidal ideation. She denies any fevers or chills, chest pain or productive cough. She denies any abdominal pain or dysuria. She states she cannot remove. The last time she urinated. She is currently under the care of [facility] for her anxiety.      REVIEW OF SYSTEMS  HEENT:  No ear pain, congestion or sore throat   EYES: no discharge redness or vision changes  CARDIAC: no chest pain, palpitations    PULMONARY: no dyspnea, cough or congestion   GI: Positive vomiting, no diarrhea or abdominal pain   : no dysuria, back pain or hematuria   Neuro: no weakness, numbness aphasia or headache  Musculoskeletal: no swelling deformity or pain no joint swelling  Endocrine: no fevers, sweating, weight loss   SKIN: no rash, erythema or contusions     See history of present illness all other systems are negative    PAST MEDICAL HISTORY  Past Medical History:   Diagnosis Date   • Anxiety     Followed by [facility]   • Anxiety disorder    • Backpain    • CLOSTRIDIUM DIFFICILE INFECTION 4/14/2010   • CVS disease    • Cyclic vomiting syndrome    • Dental disorder    • Drug-seeking behavior    • Dyspepsia 7/12/2009   • Nephrolithiasis 6/20/2009    kidney stones,post lithotrypsy   • Pain     abd and back   • Snoring    • Superior mesenteric artery syndrome (CMS-HCC) 7/12/2009   • Superior " mesenteric artery syndrome (CMS-HCC)    • Tobacco abuse 6/20/2009       FAMILY HISTORY  Family History   Problem Relation Age of Onset   • Cancer Mother 50     Colon cancer   • Hypertension Mother    • Allergies Father    • Hypertension Father    • Heart Disease Maternal Grandmother        SOCIAL HISTORY  Social History     Social History   • Marital status: Single     Spouse name: N/A   • Number of children: N/A   • Years of education: N/A     Social History Main Topics   • Smoking status: Former Smoker     Packs/day: 0.50     Years: 13.00     Types: Cigarettes     Quit date: 12/15/2012   • Smokeless tobacco: Never Used      Comment: 1/2ppd   • Alcohol use No   • Drug use: Yes     Types: Marijuana, Inhaled      Comment: marijuana   • Sexual activity: Not on file     Other Topics Concern   • Not on file     Social History Narrative   • No narrative on file       SURGICAL HISTORY  Past Surgical History:   Procedure Laterality Date   • GASTROSCOPY-ENDO  4/28/2016    Procedure: GASTROSCOPY-ENDO;  Surgeon: Blayne Blackburn M.D.;  Location: [facility];  Service:    • EXPLORATORY LAPAROTOMY  1/12/2016    Procedure: EXPLORATORY LAPAROTOMY;  Surgeon: Maciel Quintero M.D.;  Location: SURGERY [facility];  Service:    • BOWEL RESECTION  1/12/2016    Procedure: BOWEL RESECTION;  Surgeon: Maciel Quintero M.D.;  Location: SURGERY [facility];  Service:    • GASTROSCOPY WITH BIOPSY  3/9/2010    Performed by AMANDA MEJIA at ENDOSCOPY [facility]   • PYLOROPLASTY  7/27/2009    Performed by MACIEL QUINTERO at SURGERY [facility]   • EXPLORATORY LAPAROTOMY  7/27/2009    Performed by MACIEL QUINTERO at SURGERY [facility]   • APPENDECTOMY  7/27/2009    Performed by MACIEL QUINTERO at [facility]   • CHOLECYSTECTOMY  7/27/2009    Performed by MACIEL QUINTERO at [facility]   • GASTROSCOPY-ENDO  7/8/2009    Performed by  "ROSANNA WRIGHT at SURGERY [facility]   • LITHOTRIPSY     • OTHER      superior mesenteric artery correction        CURRENT MEDICATIONS  Home Medications     Reviewed by Anish Finney (Pharmacy Tech) on 02/02/18 at 1426  Med List Status: Complete   Medication Last Dose Status   busPIRone (BUSPAR) 10 MG Tab 2/2/2018 Active                ALLERGIES  Allergies   Allergen Reactions   • Metoclopramide Hives     Told by MD; pt suspects possible hives.   • Bactrim [Sulfamethoxazole W-Trimethoprim] Unspecified     Someone said I had a rash or something.      • Seroquel [Quetiapine] Unspecified     MD told her she was allergic- the room was spinning and eye eyes shook when I was in a coma         PHYSICAL EXAM  VITAL SIGNS: /82   Pulse 90   Temp 36.2 °C (97.1 °F)   Resp (!) 30   Ht 1.651 m (5' 5\")   Wt 60.1 kg (132 lb 7.9 oz)   LMP 12/31/2017 (Within Weeks)   SpO2 98%   BMI 22.05 kg/m²   Constitutional: Well developed, Well nourished, anxious and tearful  HEENT: Normocephalic, Atraumatic,  external ears normal, pharynx pink,  Mucous  Membranes moist, No rhinorrhea or mucosal edema  Eyes: PERRL, EOMI, Conjunctiva normal, No discharge.   Neck: Normal range of motion, No tenderness, Supple, No stridor.   Lymphatic: No lymphadenopathy    Cardiovascular: Regular Rate and Rhythm, No murmurs,  rubs, or gallops.   Thorax & Lungs: Lungs clear to auscultation bilaterally, No respiratory distress, No wheezes, rhales or rhonchi, No chest wall tenderness.   Abdomen: Bowel sounds normal, Soft, non tender, non distended,  No pulsatile masses., no rebound guarding or peritoneal signs.   Skin: Warm, Dry, No erythema, No rash,   Back:  No CVA tenderness,  No spinal tenderness, bony crepitance step offs or instability.   Extremities: Equal, intact distal pulses, No cyanosis, clubbing or edema,  No tenderness.   Musculoskeletal: Good range of motion in all major joints. No tenderness to palpation or major " deformities noted.   Neurologic: Alert & oriented x 3, Cranial nerves II-XII intact, Equal strength and sensation upper and lower extremities bilaterally,  No focal deficits noted.   Psychiatric: Anxious and tearful No suicidal or homicidal ideation      COURSE & MEDICAL DECISION MAKING  Pertinent Labs & Imaging studies reviewed. (See chart for details)  Differential diagnosis: Anxiety, drug reaction, dehydration    2:22 PM  An IV was started and labs were ordered. I gave the patient Zofran, Ativan, Toradol and Benadryl to help relieve her symptoms    Results for orders placed or performed during the hospital encounter of 02/02/18   CBC WITH DIFFERENTIAL   Result Value Ref Range    WBC 14.0 (H) 4.8 - 10.8 K/uL    RBC 4.54 4.20 - 5.40 M/uL    Hemoglobin 15.6 12.0 - 16.0 g/dL    Hematocrit 45.4 37.0 - 47.0 %    .0 (H) 81.4 - 97.8 fL    MCH 34.4 (H) 27.0 - 33.0 pg    MCHC 34.4 33.6 - 35.0 g/dL    RDW 47.6 35.9 - 50.0 fL    Platelet Count 167 164 - 446 K/uL    MPV 11.0 9.0 - 12.9 fL    Neutrophils-Polys 84.40 (H) 44.00 - 72.00 %    Lymphocytes 4.70 (L) 22.00 - 41.00 %    Monocytes 9.70 0.00 - 13.40 %    Eosinophils 0.10 0.00 - 6.90 %    Basophils 0.60 0.00 - 1.80 %    Immature Granulocytes 0.50 0.00 - 0.90 %    Nucleated RBC 0.00 /100 WBC    Neutrophils (Absolute) 11.84 (H) 2.00 - 7.15 K/uL    Lymphs (Absolute) 0.66 (L) 1.00 - 4.80 K/uL    Monos (Absolute) 1.36 (H) 0.00 - 0.85 K/uL    Eos (Absolute) 0.01 0.00 - 0.51 K/uL    Baso (Absolute) 0.09 0.00 - 0.12 K/uL    Immature Granulocytes (abs) 0.07 0.00 - 0.11 K/uL    NRBC (Absolute) 0.00 K/uL   BASIC METABOLIC PANEL   Result Value Ref Range    Sodium 138 135 - 145 mmol/L    Potassium 3.4 (L) 3.6 - 5.5 mmol/L    Chloride 106 96 - 112 mmol/L    Co2 16 (L) 20 - 33 mmol/L    Glucose 177 (H) 65 - 99 mg/dL    Bun 14 8 - 22 mg/dL    Creatinine 1.02 0.50 - 1.40 mg/dL    Calcium 9.8 8.4 - 10.2 mg/dL    Anion Gap 16.0 (H) 0.0 - 11.9   ESTIMATED GFR   Result Value Ref Range     GFR If African American >60 >60 mL/min/1.73 m 2    GFR If Non African American 60 >60 mL/min/1.73 m 2   DIFFERENTIAL COMMENT   Result Value Ref Range    Comments-Diff see below    POC UA   Result Value Ref Range    POC Color Miya     POC Appearance Slightly Cloudy (A)     POC Glucose 250 (A) Negative mg/dL    POC Ketones >=160 (A) Negative mg/dL    POC Specific Gravity 1.025 1.005 - 1.030    POC Blood Negative Negative    POC Urine PH 6.5 5.0 - 8.0    POC Protein 30 (A) Negative mg/dL    POC Nitrites Negative Negative    POC Leukocyte Esterase Negative Negative   POC URINE PREGNANCY   Result Value Ref Range    POC Urine Pregnancy Test Negative         After receiving the patient's initial lab work. The patient vomited several times and was unable to keep oral fluids down. I then gave her a liter of normal saline to rehydrate her. I gave her another dose of Zofran. A CT was done which was negative for any kidney stones in her urine shows dehydration, but no infection.      7:49 PM  Upon recheck, the patient still feels very uncomfortable. And her that her workup was negative except for some dehydration. The patient does admit to still smoking marijuana, even though she states she has decreased the amount that she smokes. Because this might still be marijuana induced hyperemesis. I will give her a dose of Haldol and recheck her.    9:20 PM after the Haldol the patient feels little better and ready for discharge. I will prescribe her some Phenergan suppositories for her nausea. I encouraged her to not use marijuana at all anymore. She should take Tylenol or Motrin for her back pain.  The patient was discharged home in stable condition.    Isa Rodriguez M.D.  [address] [place] [id number] [phone]    Call in 2 days  for recheck    Current Outpatient Prescriptions   Medication Sig Dispense Refill   • promethazine (PHENERGAN) 25 MG Suppos Insert 1 Suppository in rectum every 8 hours as needed for  Nausea/Vomiting for up to 15 doses. 15 Suppository 0   • busPIRone (BUSPAR) 10 MG Tab TAKE 1 TABLET BY MOUTH TWICE DAILY 60 Tab 1         FINAL IMPRESSION  1. Nausea and vomiting, intractability of vomiting not specified, unspecified vomiting type    2. Acute bilateral low back pain without sciatica    3. Mild tetrahydrocannabinol (THC) abuse           PLAN/DISPOSITION  discharge          Electronically signed by: Bhakti Huddleston, 2/2/2018 2:19 PM

## 2018-02-02 NOTE — ED NOTES
IV established left chest. Patient medicated. RN reassessed patient. Not vomiting. Requesting more ativan. ERP aware. No new orders.  David is maiden name.

## 2018-02-02 NOTE — ED NOTES
"Chief Complaint   Patient presents with   • Back Pain     Upper and mid back pain beginning last evening.    • N/V     Today.    • Anxiety     \" I'm having a really bad anxiety attack right now\"   Called [facility] from regional waiting room earlier to be transported here due to long wait time.     "

## 2018-02-02 NOTE — TELEPHONE ENCOUNTER
1. Caller Name: patient                      Call Back Number: [phone] (home)       2. Message: patient called, she is having a panic attack. She is home by herself, her  is out of town for work. She is vomiting and going through a stressful time with  but that's coming to an end.     3. Patient approves office to leave a detailed voicemail/MyChart message: N\A

## 2018-02-02 NOTE — ED NOTES
The Medication Reconciliation process has been completed by interviewing the patient    Allergies have been reviewed  Antibiotic use in 30 days - none    [facility]:  [facility]

## 2018-02-03 NOTE — ED NOTES
Patient stated feeling better. Used call light to call staff to say she was ready for discharge and father-in-law was on the way.   Discharge instructions reviewed.  Pt verbalized the understanding of discharge instructions to follow up with PCP and to return to ER if condition worsens.  Verbalized understanding of instructions to cease using marijuana. Pt ambulated out of ER without difficulty.   Educated on paper prescription for phenergan suppositories. Return precautions given.

## 2018-02-03 NOTE — ED NOTES
Patient up and alert. Stated would try crackers and apple juice. Stated father-in-law was coming to  patient.

## 2018-02-03 NOTE — ED NOTES
"1820 - Patient vomiting. Patient able to ambulate to bathroom post lidocaine infusion for urine sample.   1840 - Patient to CT.   1900 - Back from CT - requesting pain medication or ativan. ERP aware.  1920 - Vomiting. ERP notified. ERP to bedside to discuss plan of care with patient.   1940 - Patient medicated. Second liter IV fluids, zofran IV, haldol IM given.  2010 - Rounded on patient - sleeping on left side. Awoke to voice - stated feeling \"a little better\"  Declined crackers and apple juice.   "

## 2018-02-03 NOTE — TELEPHONE ENCOUNTER
Spoke with patient at the time she initially called.  Patient stated she was having a panic attack.  Denied suicidal ideation/desire to hurt herself.  Denied homicidal ideation.  States she was getting ready to go outdoors for an appointment and became panicked from that and called our office.  Asked patient if she had made an appointment with counselor, as she previously reported she would.  She states the one counselor she called could not see her until May 2018.  Recommended she try to call other counselors to arrange earlier appointments or if she felt she could not wait for a psychology or psychiatry appointment, go to the Emergency Department for further assessment/management.  Asked patient if there was anyone near by who could stay with her/support her during this time.  She said her mother lives one mile away.  I asked her to call her mother and and see if she would come over while she decided if she could look further into counseling or preferred to go to the ED.  She said she would do that.  I told her to go to the ED or call 911 if she felt she could not manage her panic attack.  She said she would do that.      Before hanging up phone, I ascertained again that the patient was not a danger to herself or others.

## 2018-02-05 ENCOUNTER — RESOLUTE PROFESSIONAL BILLING HOSPITAL PROF FEE (OUTPATIENT)
Dept: HOSPITALIST | Facility: MEDICAL CENTER | Age: 43
End: 2018-02-05
Payer: COMMERCIAL

## 2018-02-05 ENCOUNTER — HOSPITAL ENCOUNTER (INPATIENT)
Facility: MEDICAL CENTER | Age: 43
LOS: 2 days | DRG: 103 | End: 2018-02-08
Attending: EMERGENCY MEDICINE | Admitting: INTERNAL MEDICINE
Payer: COMMERCIAL

## 2018-02-05 ENCOUNTER — APPOINTMENT (OUTPATIENT)
Dept: RADIOLOGY | Facility: MEDICAL CENTER | Age: 43
DRG: 103 | End: 2018-02-05
Attending: EMERGENCY MEDICINE
Payer: COMMERCIAL

## 2018-02-05 DIAGNOSIS — R11.15 INTRACTABLE CYCLICAL VOMITING WITH NAUSEA: ICD-10-CM

## 2018-02-05 DIAGNOSIS — E87.6 HYPOKALEMIA: ICD-10-CM

## 2018-02-05 DIAGNOSIS — E86.0 DEHYDRATION: ICD-10-CM

## 2018-02-05 DIAGNOSIS — F41.9 ANXIETY: Chronic | ICD-10-CM

## 2018-02-05 DIAGNOSIS — N17.9 AKI (ACUTE KIDNEY INJURY) (HCC): ICD-10-CM

## 2018-02-05 LAB
ALBUMIN SERPL BCP-MCNC: 4.6 G/DL (ref 3.2–4.9)
ALBUMIN/GLOB SERPL: 1.2 G/DL
ALP SERPL-CCNC: 76 U/L (ref 30–99)
ALT SERPL-CCNC: 33 U/L (ref 2–50)
ANION GAP SERPL CALC-SCNC: 17 MMOL/L (ref 0–11.9)
AST SERPL-CCNC: 34 U/L (ref 12–45)
BASOPHILS # BLD AUTO: 0.3 % (ref 0–1.8)
BASOPHILS # BLD: 0.04 K/UL (ref 0–0.12)
BILIRUB SERPL-MCNC: 3 MG/DL (ref 0.1–1.5)
BUN SERPL-MCNC: 12 MG/DL (ref 8–22)
CALCIUM SERPL-MCNC: 10.1 MG/DL (ref 8.4–10.2)
CHLORIDE SERPL-SCNC: 96 MMOL/L (ref 96–112)
CO2 SERPL-SCNC: 20 MMOL/L (ref 20–33)
CREAT SERPL-MCNC: 1.21 MG/DL (ref 0.5–1.4)
EOSINOPHIL # BLD AUTO: 0.03 K/UL (ref 0–0.51)
EOSINOPHIL NFR BLD: 0.3 % (ref 0–6.9)
ERYTHROCYTE [DISTWIDTH] IN BLOOD BY AUTOMATED COUNT: 41.5 FL (ref 35.9–50)
GLOBULIN SER CALC-MCNC: 3.8 G/DL (ref 1.9–3.5)
GLUCOSE SERPL-MCNC: 96 MG/DL (ref 65–99)
HCG SERPL QL: NEGATIVE
HCT VFR BLD AUTO: 41.4 % (ref 37–47)
HGB BLD-MCNC: 15.3 G/DL (ref 12–16)
IMM GRANULOCYTES # BLD AUTO: 0.05 K/UL (ref 0–0.11)
IMM GRANULOCYTES NFR BLD AUTO: 0.4 % (ref 0–0.9)
LYMPHOCYTES # BLD AUTO: 2.04 K/UL (ref 1–4.8)
LYMPHOCYTES NFR BLD: 17.1 % (ref 22–41)
MCH RBC QN AUTO: 33.8 PG (ref 27–33)
MCHC RBC AUTO-ENTMCNC: 37 G/DL (ref 33.6–35)
MCV RBC AUTO: 91.6 FL (ref 81.4–97.8)
MONOCYTES # BLD AUTO: 1.59 K/UL (ref 0–0.85)
MONOCYTES NFR BLD AUTO: 13.4 % (ref 0–13.4)
NEUTROPHILS # BLD AUTO: 8.16 K/UL (ref 2–7.15)
NEUTROPHILS NFR BLD: 68.5 % (ref 44–72)
NRBC # BLD AUTO: 0 K/UL
NRBC BLD-RTO: 0 /100 WBC
PLATELET # BLD AUTO: 355 K/UL (ref 164–446)
PMV BLD AUTO: 9.7 FL (ref 9–12.9)
POTASSIUM SERPL-SCNC: 2.1 MMOL/L (ref 3.6–5.5)
PROT SERPL-MCNC: 8.4 G/DL (ref 6–8.2)
RBC # BLD AUTO: 4.52 M/UL (ref 4.2–5.4)
SODIUM SERPL-SCNC: 133 MMOL/L (ref 135–145)
WBC # BLD AUTO: 11.9 K/UL (ref 4.8–10.8)

## 2018-02-05 PROCEDURE — 700111 HCHG RX REV CODE 636 W/ 250 OVERRIDE (IP): Performed by: INTERNAL MEDICINE

## 2018-02-05 PROCEDURE — G0378 HOSPITAL OBSERVATION PER HR: HCPCS

## 2018-02-05 PROCEDURE — 84703 CHORIONIC GONADOTROPIN ASSAY: CPT

## 2018-02-05 PROCEDURE — A9270 NON-COVERED ITEM OR SERVICE: HCPCS | Performed by: STUDENT IN AN ORGANIZED HEALTH CARE EDUCATION/TRAINING PROGRAM

## 2018-02-05 PROCEDURE — 700105 HCHG RX REV CODE 258: Performed by: EMERGENCY MEDICINE

## 2018-02-05 PROCEDURE — 36556 INSERT NON-TUNNEL CV CATH: CPT

## 2018-02-05 PROCEDURE — 700102 HCHG RX REV CODE 250 W/ 637 OVERRIDE(OP): Performed by: STUDENT IN AN ORGANIZED HEALTH CARE EDUCATION/TRAINING PROGRAM

## 2018-02-05 PROCEDURE — 700102 HCHG RX REV CODE 250 W/ 637 OVERRIDE(OP): Performed by: INTERNAL MEDICINE

## 2018-02-05 PROCEDURE — 99285 EMERGENCY DEPT VISIT HI MDM: CPT

## 2018-02-05 PROCEDURE — A9270 NON-COVERED ITEM OR SERVICE: HCPCS | Performed by: INTERNAL MEDICINE

## 2018-02-05 PROCEDURE — 96361 HYDRATE IV INFUSION ADD-ON: CPT

## 2018-02-05 PROCEDURE — 02HV33Z INSERTION OF INFUSION DEVICE INTO SUPERIOR VENA CAVA, PERCUTANEOUS APPROACH: ICD-10-PCS | Performed by: EMERGENCY MEDICINE

## 2018-02-05 PROCEDURE — 80053 COMPREHEN METABOLIC PANEL: CPT

## 2018-02-05 PROCEDURE — C1751 CATH, INF, PER/CENT/MIDLINE: HCPCS

## 2018-02-05 PROCEDURE — 71045 X-RAY EXAM CHEST 1 VIEW: CPT

## 2018-02-05 PROCEDURE — 700111 HCHG RX REV CODE 636 W/ 250 OVERRIDE (IP): Performed by: EMERGENCY MEDICINE

## 2018-02-05 PROCEDURE — 700105 HCHG RX REV CODE 258: Performed by: INTERNAL MEDICINE

## 2018-02-05 PROCEDURE — A9270 NON-COVERED ITEM OR SERVICE: HCPCS | Performed by: EMERGENCY MEDICINE

## 2018-02-05 PROCEDURE — B543ZZA ULTRASONOGRAPHY OF RIGHT JUGULAR VEINS, GUIDANCE: ICD-10-PCS | Performed by: EMERGENCY MEDICINE

## 2018-02-05 PROCEDURE — 96365 THER/PROPH/DIAG IV INF INIT: CPT

## 2018-02-05 PROCEDURE — 96375 TX/PRO/DX INJ NEW DRUG ADDON: CPT

## 2018-02-05 PROCEDURE — 99220 PR INITIAL OBSERVATION CARE,LEVL III: CPT | Performed by: INTERNAL MEDICINE

## 2018-02-05 PROCEDURE — 85025 COMPLETE CBC W/AUTO DIFF WBC: CPT

## 2018-02-05 PROCEDURE — 93005 ELECTROCARDIOGRAM TRACING: CPT | Performed by: EMERGENCY MEDICINE

## 2018-02-05 PROCEDURE — 700102 HCHG RX REV CODE 250 W/ 637 OVERRIDE(OP): Performed by: EMERGENCY MEDICINE

## 2018-02-05 RX ORDER — PROMETHAZINE HYDROCHLORIDE 25 MG/1
12.5-25 TABLET ORAL EVERY 4 HOURS PRN
Status: DISCONTINUED | OUTPATIENT
Start: 2018-02-05 | End: 2018-02-08 | Stop reason: HOSPADM

## 2018-02-05 RX ORDER — ALPRAZOLAM 0.5 MG/1
1 TABLET ORAL 4 TIMES DAILY PRN
Status: DISCONTINUED | OUTPATIENT
Start: 2018-02-05 | End: 2018-02-08 | Stop reason: HOSPADM

## 2018-02-05 RX ORDER — PROMETHAZINE HYDROCHLORIDE 25 MG/1
12.5-25 SUPPOSITORY RECTAL EVERY 4 HOURS PRN
Status: DISCONTINUED | OUTPATIENT
Start: 2018-02-05 | End: 2018-02-08 | Stop reason: HOSPADM

## 2018-02-05 RX ORDER — POTASSIUM CHLORIDE 20 MEQ/1
60 TABLET, EXTENDED RELEASE ORAL ONCE
Status: COMPLETED | OUTPATIENT
Start: 2018-02-05 | End: 2018-02-05

## 2018-02-05 RX ORDER — SODIUM CHLORIDE 9 MG/ML
INJECTION, SOLUTION INTRAVENOUS CONTINUOUS
Status: DISCONTINUED | OUTPATIENT
Start: 2018-02-05 | End: 2018-02-07

## 2018-02-05 RX ORDER — SODIUM CHLORIDE 9 MG/ML
2000 INJECTION, SOLUTION INTRAVENOUS ONCE
Status: COMPLETED | OUTPATIENT
Start: 2018-02-05 | End: 2018-02-06

## 2018-02-05 RX ORDER — OXYCODONE HYDROCHLORIDE AND ACETAMINOPHEN 5; 325 MG/1; MG/1
1 TABLET ORAL EVERY 4 HOURS PRN
Status: CANCELLED | OUTPATIENT
Start: 2018-02-05

## 2018-02-05 RX ORDER — LORAZEPAM 2 MG/ML
1 INJECTION INTRAMUSCULAR ONCE
Status: COMPLETED | OUTPATIENT
Start: 2018-02-05 | End: 2018-02-05

## 2018-02-05 RX ORDER — ONDANSETRON 2 MG/ML
4 INJECTION INTRAMUSCULAR; INTRAVENOUS EVERY 4 HOURS PRN
Status: DISCONTINUED | OUTPATIENT
Start: 2018-02-05 | End: 2018-02-08 | Stop reason: HOSPADM

## 2018-02-05 RX ORDER — KETOROLAC TROMETHAMINE 30 MG/ML
30 INJECTION, SOLUTION INTRAMUSCULAR; INTRAVENOUS EVERY 6 HOURS PRN
Status: DISCONTINUED | OUTPATIENT
Start: 2018-02-05 | End: 2018-02-08 | Stop reason: HOSPADM

## 2018-02-05 RX ORDER — POLYETHYLENE GLYCOL 3350 17 G/17G
1 POWDER, FOR SOLUTION ORAL
Status: DISCONTINUED | OUTPATIENT
Start: 2018-02-05 | End: 2018-02-08 | Stop reason: HOSPADM

## 2018-02-05 RX ORDER — PROMETHAZINE HYDROCHLORIDE 25 MG/1
25 TABLET ORAL ONCE
Status: DISPENSED | OUTPATIENT
Start: 2018-02-05 | End: 2018-02-06

## 2018-02-05 RX ORDER — OMEPRAZOLE 20 MG/1
20 CAPSULE, DELAYED RELEASE ORAL 2 TIMES DAILY
Status: DISCONTINUED | OUTPATIENT
Start: 2018-02-05 | End: 2018-02-08 | Stop reason: HOSPADM

## 2018-02-05 RX ORDER — HEPARIN SODIUM 5000 [USP'U]/ML
5000 INJECTION, SOLUTION INTRAVENOUS; SUBCUTANEOUS EVERY 8 HOURS
Status: DISCONTINUED | OUTPATIENT
Start: 2018-02-05 | End: 2018-02-08 | Stop reason: HOSPADM

## 2018-02-05 RX ORDER — ZOLPIDEM TARTRATE 5 MG/1
5 TABLET ORAL ONCE
Status: COMPLETED | OUTPATIENT
Start: 2018-02-06 | End: 2018-02-05

## 2018-02-05 RX ORDER — POTASSIUM CHLORIDE 7.45 MG/ML
10 INJECTION INTRAVENOUS
Status: COMPLETED | OUTPATIENT
Start: 2018-02-05 | End: 2018-02-06

## 2018-02-05 RX ORDER — ONDANSETRON 2 MG/ML
4 INJECTION INTRAMUSCULAR; INTRAVENOUS ONCE
Status: COMPLETED | OUTPATIENT
Start: 2018-02-05 | End: 2018-02-05

## 2018-02-05 RX ORDER — ONDANSETRON 4 MG/1
4 TABLET, ORALLY DISINTEGRATING ORAL EVERY 4 HOURS PRN
Status: DISCONTINUED | OUTPATIENT
Start: 2018-02-05 | End: 2018-02-08 | Stop reason: HOSPADM

## 2018-02-05 RX ORDER — BISACODYL 10 MG
10 SUPPOSITORY, RECTAL RECTAL
Status: DISCONTINUED | OUTPATIENT
Start: 2018-02-05 | End: 2018-02-08 | Stop reason: HOSPADM

## 2018-02-05 RX ORDER — LORAZEPAM 2 MG/ML
0.5 INJECTION INTRAMUSCULAR ONCE
Status: COMPLETED | OUTPATIENT
Start: 2018-02-06 | End: 2018-02-06

## 2018-02-05 RX ORDER — AMOXICILLIN 250 MG
2 CAPSULE ORAL 2 TIMES DAILY
Status: DISCONTINUED | OUTPATIENT
Start: 2018-02-05 | End: 2018-02-08 | Stop reason: HOSPADM

## 2018-02-05 RX ORDER — KETOROLAC TROMETHAMINE 30 MG/ML
15 INJECTION, SOLUTION INTRAMUSCULAR; INTRAVENOUS ONCE
Status: COMPLETED | OUTPATIENT
Start: 2018-02-05 | End: 2018-02-05

## 2018-02-05 RX ORDER — POTASSIUM CHLORIDE 20 MEQ/1
40 TABLET, EXTENDED RELEASE ORAL 2 TIMES DAILY
Status: DISCONTINUED | OUTPATIENT
Start: 2018-02-06 | End: 2018-02-06

## 2018-02-05 RX ORDER — DIPHENHYDRAMINE HYDROCHLORIDE 50 MG/ML
25 INJECTION INTRAMUSCULAR; INTRAVENOUS ONCE
Status: COMPLETED | OUTPATIENT
Start: 2018-02-05 | End: 2018-02-05

## 2018-02-05 RX ORDER — HALOPERIDOL 5 MG/ML
1 INJECTION INTRAMUSCULAR EVERY 4 HOURS PRN
Status: DISCONTINUED | OUTPATIENT
Start: 2018-02-05 | End: 2018-02-06

## 2018-02-05 RX ORDER — BUSPIRONE HYDROCHLORIDE 5 MG/1
10 TABLET ORAL 2 TIMES DAILY
Status: DISCONTINUED | OUTPATIENT
Start: 2018-02-05 | End: 2018-02-06

## 2018-02-05 RX ORDER — DIPHENHYDRAMINE HCL 25 MG
25 TABLET ORAL EVERY 6 HOURS PRN
Status: DISCONTINUED | OUTPATIENT
Start: 2018-02-05 | End: 2018-02-08 | Stop reason: HOSPADM

## 2018-02-05 RX ORDER — ACETAMINOPHEN 325 MG/1
650 TABLET ORAL EVERY 6 HOURS PRN
Status: DISCONTINUED | OUTPATIENT
Start: 2018-02-05 | End: 2018-02-08 | Stop reason: HOSPADM

## 2018-02-05 RX ADMIN — FAMOTIDINE 20 MG: 10 INJECTION, SOLUTION INTRAVENOUS at 21:19

## 2018-02-05 RX ADMIN — SODIUM CHLORIDE: 9 INJECTION, SOLUTION INTRAVENOUS at 23:58

## 2018-02-05 RX ADMIN — SODIUM CHLORIDE 2000 ML: 9 INJECTION, SOLUTION INTRAVENOUS at 21:11

## 2018-02-05 RX ADMIN — POTASSIUM CHLORIDE 10 MEQ: 10 INJECTION, SOLUTION INTRAVENOUS at 22:26

## 2018-02-05 RX ADMIN — ONDANSETRON 4 MG: 2 INJECTION INTRAMUSCULAR; INTRAVENOUS at 21:14

## 2018-02-05 RX ADMIN — BUSPIRONE HYDROCHLORIDE 10 MG: 5 TABLET ORAL at 23:57

## 2018-02-05 RX ADMIN — DIPHENHYDRAMINE HYDROCHLORIDE 25 MG: 50 INJECTION, SOLUTION INTRAMUSCULAR; INTRAVENOUS at 21:13

## 2018-02-05 RX ADMIN — HEPARIN SODIUM 5000 UNITS: 5000 INJECTION, SOLUTION INTRAVENOUS; SUBCUTANEOUS at 23:58

## 2018-02-05 RX ADMIN — LORAZEPAM 1 MG: 2 INJECTION INTRAMUSCULAR; INTRAVENOUS at 21:17

## 2018-02-05 RX ADMIN — ALPRAZOLAM 1 MG: 0.5 TABLET ORAL at 22:27

## 2018-02-05 RX ADMIN — POTASSIUM CHLORIDE 60 MEQ: 1500 TABLET, EXTENDED RELEASE ORAL at 23:57

## 2018-02-05 RX ADMIN — ZOLPIDEM TARTRATE 5 MG: 5 TABLET ORAL at 23:57

## 2018-02-05 RX ADMIN — POTASSIUM CHLORIDE 10 MEQ: 10 INJECTION, SOLUTION INTRAVENOUS at 23:58

## 2018-02-05 RX ADMIN — KETOROLAC TROMETHAMINE 15 MG: 30 INJECTION, SOLUTION INTRAMUSCULAR at 21:15

## 2018-02-05 ASSESSMENT — ENCOUNTER SYMPTOMS
VOMITING: 1
ABDOMINAL PAIN: 1
NERVOUS/ANXIOUS: 1
CHILLS: 0
NAUSEA: 1
HEADACHES: 0
WEIGHT LOSS: 1
DOUBLE VISION: 0
FEVER: 0
BLURRED VISION: 0
DIZZINESS: 0
DIARRHEA: 0
SHORTNESS OF BREATH: 0
WEAKNESS: 1
FOCAL WEAKNESS: 0
COUGH: 0

## 2018-02-05 ASSESSMENT — PAIN SCALES - GENERAL: PAINLEVEL_OUTOF10: 4

## 2018-02-06 LAB
ANION GAP SERPL CALC-SCNC: 5 MMOL/L (ref 0–11.9)
APPEARANCE UR: CLEAR
BASOPHILS # BLD AUTO: 0.3 % (ref 0–1.8)
BASOPHILS # BLD: 0.04 K/UL (ref 0–0.12)
BILIRUB UR QL STRIP.AUTO: NEGATIVE
BUN SERPL-MCNC: 13 MG/DL (ref 8–22)
CALCIUM SERPL-MCNC: 8.3 MG/DL (ref 8.4–10.2)
CHLORIDE SERPL-SCNC: 109 MMOL/L (ref 96–112)
CO2 SERPL-SCNC: 21 MMOL/L (ref 20–33)
COLOR UR: YELLOW
CREAT SERPL-MCNC: 0.88 MG/DL (ref 0.5–1.4)
EKG IMPRESSION: NORMAL
EOSINOPHIL # BLD AUTO: 0.01 K/UL (ref 0–0.51)
EOSINOPHIL NFR BLD: 0.1 % (ref 0–6.9)
ERYTHROCYTE [DISTWIDTH] IN BLOOD BY AUTOMATED COUNT: 44.6 FL (ref 35.9–50)
GLUCOSE SERPL-MCNC: 87 MG/DL (ref 65–99)
GLUCOSE UR STRIP.AUTO-MCNC: NEGATIVE MG/DL
HCT VFR BLD AUTO: 36.7 % (ref 37–47)
HGB BLD-MCNC: 13.3 G/DL (ref 12–16)
IMM GRANULOCYTES # BLD AUTO: 0.07 K/UL (ref 0–0.11)
IMM GRANULOCYTES NFR BLD AUTO: 0.5 % (ref 0–0.9)
KETONES UR STRIP.AUTO-MCNC: 40 MG/DL
LACTATE BLD-SCNC: 1.13 MMOL/L (ref 0.5–2)
LEUKOCYTE ESTERASE UR QL STRIP.AUTO: NEGATIVE
LYMPHOCYTES # BLD AUTO: 1.28 K/UL (ref 1–4.8)
LYMPHOCYTES NFR BLD: 9.8 % (ref 22–41)
MAGNESIUM SERPL-MCNC: 2.1 MG/DL (ref 1.5–2.5)
MCH RBC QN AUTO: 34.5 PG (ref 27–33)
MCHC RBC AUTO-ENTMCNC: 36.2 G/DL (ref 33.6–35)
MCV RBC AUTO: 95.1 FL (ref 81.4–97.8)
MICRO URNS: ABNORMAL
MONOCYTES # BLD AUTO: 1.47 K/UL (ref 0–0.85)
MONOCYTES NFR BLD AUTO: 11.3 % (ref 0–13.4)
NEUTROPHILS # BLD AUTO: 10.16 K/UL (ref 2–7.15)
NEUTROPHILS NFR BLD: 78 % (ref 44–72)
NITRITE UR QL STRIP.AUTO: NEGATIVE
NRBC # BLD AUTO: 0 K/UL
NRBC BLD-RTO: 0 /100 WBC
PH UR STRIP.AUTO: 6 [PH]
PHOSPHATE SERPL-MCNC: 1.8 MG/DL (ref 2.5–4.5)
PLATELET # BLD AUTO: 277 K/UL (ref 164–446)
PMV BLD AUTO: 9.7 FL (ref 9–12.9)
POTASSIUM SERPL-SCNC: 3.7 MMOL/L (ref 3.6–5.5)
PROT UR QL STRIP: NEGATIVE MG/DL
RBC # BLD AUTO: 3.86 M/UL (ref 4.2–5.4)
RBC UR QL AUTO: NEGATIVE
SODIUM SERPL-SCNC: 135 MMOL/L (ref 135–145)
SP GR UR STRIP.AUTO: 1.01
WBC # BLD AUTO: 13 K/UL (ref 4.8–10.8)

## 2018-02-06 PROCEDURE — 700102 HCHG RX REV CODE 250 W/ 637 OVERRIDE(OP): Performed by: INTERNAL MEDICINE

## 2018-02-06 PROCEDURE — 83735 ASSAY OF MAGNESIUM: CPT

## 2018-02-06 PROCEDURE — A9270 NON-COVERED ITEM OR SERVICE: HCPCS | Performed by: INTERNAL MEDICINE

## 2018-02-06 PROCEDURE — 85025 COMPLETE CBC W/AUTO DIFF WBC: CPT

## 2018-02-06 PROCEDURE — 700102 HCHG RX REV CODE 250 W/ 637 OVERRIDE(OP): Performed by: FAMILY MEDICINE

## 2018-02-06 PROCEDURE — A9270 NON-COVERED ITEM OR SERVICE: HCPCS | Performed by: FAMILY MEDICINE

## 2018-02-06 PROCEDURE — 81003 URINALYSIS AUTO W/O SCOPE: CPT

## 2018-02-06 PROCEDURE — 770020 HCHG ROOM/CARE - TELE (206)

## 2018-02-06 PROCEDURE — 700111 HCHG RX REV CODE 636 W/ 250 OVERRIDE (IP): Performed by: EMERGENCY MEDICINE

## 2018-02-06 PROCEDURE — 700105 HCHG RX REV CODE 258: Performed by: INTERNAL MEDICINE

## 2018-02-06 PROCEDURE — 700111 HCHG RX REV CODE 636 W/ 250 OVERRIDE (IP): Performed by: INTERNAL MEDICINE

## 2018-02-06 PROCEDURE — 99233 SBSQ HOSP IP/OBS HIGH 50: CPT | Performed by: FAMILY MEDICINE

## 2018-02-06 PROCEDURE — 80048 BASIC METABOLIC PNL TOTAL CA: CPT

## 2018-02-06 PROCEDURE — 83605 ASSAY OF LACTIC ACID: CPT

## 2018-02-06 PROCEDURE — 84100 ASSAY OF PHOSPHORUS: CPT

## 2018-02-06 PROCEDURE — 700111 HCHG RX REV CODE 636 W/ 250 OVERRIDE (IP): Performed by: STUDENT IN AN ORGANIZED HEALTH CARE EDUCATION/TRAINING PROGRAM

## 2018-02-06 RX ORDER — ZOLPIDEM TARTRATE 5 MG/1
5 TABLET ORAL NIGHTLY PRN
Status: DISCONTINUED | OUTPATIENT
Start: 2018-02-06 | End: 2018-02-08 | Stop reason: HOSPADM

## 2018-02-06 RX ORDER — ONDANSETRON 4 MG/1
4 TABLET, ORALLY DISINTEGRATING ORAL EVERY 6 HOURS PRN
Status: ON HOLD | COMMUNITY
End: 2018-02-08

## 2018-02-06 RX ORDER — DIPHENHYDRAMINE HCL 25 MG
50 TABLET ORAL EVERY 6 HOURS PRN
COMMUNITY
End: 2018-07-12

## 2018-02-06 RX ORDER — BUSPIRONE HYDROCHLORIDE 5 MG/1
20 TABLET ORAL 2 TIMES DAILY
Status: DISCONTINUED | OUTPATIENT
Start: 2018-02-06 | End: 2018-02-08 | Stop reason: HOSPADM

## 2018-02-06 RX ORDER — POTASSIUM CHLORIDE 20 MEQ/1
20 TABLET, EXTENDED RELEASE ORAL 2 TIMES DAILY
Status: DISCONTINUED | OUTPATIENT
Start: 2018-02-06 | End: 2018-02-07

## 2018-02-06 RX ORDER — PROMETHAZINE HYDROCHLORIDE 25 MG/1
25 TABLET ORAL EVERY 6 HOURS PRN
Status: ON HOLD | COMMUNITY
End: 2018-02-08

## 2018-02-06 RX ORDER — CYCLOBENZAPRINE HCL 10 MG
10 TABLET ORAL 3 TIMES DAILY PRN
Status: DISCONTINUED | OUTPATIENT
Start: 2018-02-06 | End: 2018-02-08 | Stop reason: HOSPADM

## 2018-02-06 RX ADMIN — POTASSIUM CHLORIDE 20 MEQ: 1500 TABLET, EXTENDED RELEASE ORAL at 21:24

## 2018-02-06 RX ADMIN — ALPRAZOLAM 1 MG: 0.5 TABLET ORAL at 07:11

## 2018-02-06 RX ADMIN — DIBASIC SODIUM PHOSPHATE, MONOBASIC POTASSIUM PHOSPHATE AND MONOBASIC SODIUM PHOSPHATE 1 TABLET: 852; 155; 130 TABLET ORAL at 21:24

## 2018-02-06 RX ADMIN — POTASSIUM CHLORIDE 10 MEQ: 10 INJECTION, SOLUTION INTRAVENOUS at 04:29

## 2018-02-06 RX ADMIN — BUSPIRONE HYDROCHLORIDE 20 MG: 5 TABLET ORAL at 21:23

## 2018-02-06 RX ADMIN — ONDANSETRON 4 MG: 2 INJECTION INTRAMUSCULAR; INTRAVENOUS at 10:04

## 2018-02-06 RX ADMIN — ONDANSETRON 4 MG: 2 INJECTION INTRAMUSCULAR; INTRAVENOUS at 05:21

## 2018-02-06 RX ADMIN — SODIUM CHLORIDE: 9 INJECTION, SOLUTION INTRAVENOUS at 13:14

## 2018-02-06 RX ADMIN — LORAZEPAM 0.5 MG: 2 INJECTION INTRAMUSCULAR; INTRAVENOUS at 04:45

## 2018-02-06 RX ADMIN — POTASSIUM CHLORIDE 20 MEQ: 1500 TABLET, EXTENDED RELEASE ORAL at 09:49

## 2018-02-06 RX ADMIN — ALPRAZOLAM 1 MG: 0.5 TABLET ORAL at 19:34

## 2018-02-06 RX ADMIN — DIPHENHYDRAMINE HCL 25 MG: 25 TABLET ORAL at 11:26

## 2018-02-06 RX ADMIN — DOCUSATE SODIUM AND SENNOSIDES 2 TABLET: 8.6; 5 TABLET, FILM COATED ORAL at 09:49

## 2018-02-06 RX ADMIN — KETOROLAC TROMETHAMINE 30 MG: 30 INJECTION, SOLUTION INTRAMUSCULAR at 05:01

## 2018-02-06 RX ADMIN — CYCLOBENZAPRINE HYDROCHLORIDE 10 MG: 10 TABLET, FILM COATED ORAL at 21:24

## 2018-02-06 RX ADMIN — HEPARIN SODIUM 5000 UNITS: 5000 INJECTION, SOLUTION INTRAVENOUS; SUBCUTANEOUS at 05:02

## 2018-02-06 RX ADMIN — SODIUM CHLORIDE: 9 INJECTION, SOLUTION INTRAVENOUS at 21:23

## 2018-02-06 RX ADMIN — ZOLPIDEM TARTRATE 5 MG: 5 TABLET ORAL at 22:01

## 2018-02-06 RX ADMIN — OMEPRAZOLE 20 MG: 20 CAPSULE, DELAYED RELEASE ORAL at 21:24

## 2018-02-06 RX ADMIN — KETOROLAC TROMETHAMINE 30 MG: 30 INJECTION, SOLUTION INTRAMUSCULAR at 11:23

## 2018-02-06 RX ADMIN — KETOROLAC TROMETHAMINE 30 MG: 30 INJECTION, SOLUTION INTRAMUSCULAR at 17:29

## 2018-02-06 RX ADMIN — POTASSIUM CHLORIDE 10 MEQ: 10 INJECTION, SOLUTION INTRAVENOUS at 03:02

## 2018-02-06 RX ADMIN — ONDANSETRON 4 MG: 2 INJECTION INTRAMUSCULAR; INTRAVENOUS at 22:01

## 2018-02-06 RX ADMIN — BUSPIRONE HYDROCHLORIDE 10 MG: 5 TABLET ORAL at 09:49

## 2018-02-06 RX ADMIN — DIBASIC SODIUM PHOSPHATE, MONOBASIC POTASSIUM PHOSPHATE AND MONOBASIC SODIUM PHOSPHATE 1 TABLET: 852; 155; 130 TABLET ORAL at 09:49

## 2018-02-06 RX ADMIN — POTASSIUM CHLORIDE 10 MEQ: 10 INJECTION, SOLUTION INTRAVENOUS at 02:04

## 2018-02-06 RX ADMIN — ALPRAZOLAM 1 MG: 0.5 TABLET ORAL at 13:11

## 2018-02-06 RX ADMIN — CYCLOBENZAPRINE HYDROCHLORIDE 10 MG: 10 TABLET, FILM COATED ORAL at 15:20

## 2018-02-06 RX ADMIN — POTASSIUM CHLORIDE 10 MEQ: 10 INJECTION, SOLUTION INTRAVENOUS at 01:06

## 2018-02-06 ASSESSMENT — ENCOUNTER SYMPTOMS
SORE THROAT: 0
VOMITING: 1
TREMORS: 0
WHEEZING: 0
FOCAL WEAKNESS: 0
ABDOMINAL PAIN: 1
SHORTNESS OF BREATH: 0
COUGH: 0
NAUSEA: 1
WEAKNESS: 1
BACK PAIN: 1
SENSORY CHANGE: 0
BLURRED VISION: 0
DIZZINESS: 0
DIARRHEA: 0
CHILLS: 0
SPEECH CHANGE: 0
INSOMNIA: 1
HEARTBURN: 0
HEADACHES: 0
NERVOUS/ANXIOUS: 1
FEVER: 0

## 2018-02-06 ASSESSMENT — LIFESTYLE VARIABLES
EVER_SMOKED: YES
ALCOHOL_USE: NO

## 2018-02-06 ASSESSMENT — PATIENT HEALTH QUESTIONNAIRE - PHQ9
8. MOVING OR SPEAKING SO SLOWLY THAT OTHER PEOPLE COULD HAVE NOTICED. OR THE OPPOSITE, BEING SO FIGETY OR RESTLESS THAT YOU HAVE BEEN MOVING AROUND A LOT MORE THAN USUAL: NOT AT ALL
4. FEELING TIRED OR HAVING LITTLE ENERGY: MORE THAN HALF THE DAYS
6. FEELING BAD ABOUT YOURSELF - OR THAT YOU ARE A FAILURE OR HAVE LET YOURSELF OR YOUR FAMILY DOWN: NOT AL ALL
1. LITTLE INTEREST OR PLEASURE IN DOING THINGS: NOT AT ALL
5. POOR APPETITE OR OVEREATING: SEVERAL DAYS
2. FEELING DOWN, DEPRESSED, IRRITABLE, OR HOPELESS: SEVERAL DAYS
7. TROUBLE CONCENTRATING ON THINGS, SUCH AS READING THE NEWSPAPER OR WATCHING TELEVISION: SEVERAL DAYS
3. TROUBLE FALLING OR STAYING ASLEEP OR SLEEPING TOO MUCH: SEVERAL DAYS
9. THOUGHTS THAT YOU WOULD BE BETTER OFF DEAD, OR OF HURTING YOURSELF: NOT AT ALL
SUM OF ALL RESPONSES TO PHQ9 QUESTIONS 1 AND 2: 1
SUM OF ALL RESPONSES TO PHQ QUESTIONS 1-9: 6

## 2018-02-06 ASSESSMENT — PAIN SCALES - GENERAL
PAINLEVEL_OUTOF10: 3
PAINLEVEL_OUTOF10: 4
PAINLEVEL_OUTOF10: 4
PAINLEVEL_OUTOF10: 7

## 2018-02-06 NOTE — CARE PLAN
Problem: Safety  Goal: Will remain free from injury  Safety measures in place. Call light within reach. Bed alarm on due to poly-antianxiety meds administration. Educated pt on the importance of calling before getting up. Pt verbalized understanding.

## 2018-02-06 NOTE — PROGRESS NOTES
Pt. Very anxious, vomiting,wants 2mg of xanax, MD made aware but doesn't want to change the frequency, given zofran IV, toradol IV, on clear liquid diet, will continue to monitor.

## 2018-02-06 NOTE — ED NOTES
Pt is accompanied by family with a C/O exacerbating chronic anxiety with episodic N/V since earlier today.

## 2018-02-06 NOTE — ED NOTES
Silver from Lab called with critical result of K=2.1 at 2136. Critical lab result read back to Silver Palomino notified of critical lab result at 2207*.  Critical lab result read back by .

## 2018-02-06 NOTE — ED PROVIDER NOTES
ED Provider Note  CHIEF COMPLAINT  Chief Complaint   Patient presents with   • Anxiety   • N/V       HPI  Diana Hernandez is a 42 y.o. female who presents 3 days of severe nausea and vomiting, unable to keep anything down as well as anxiety. Patient was seen here on the 2nd of her similar symptoms. She stated at time of discharge he is feeling much better but started vomiting the next morning and is continued to vomit for last 3 days. Patient states that she has body aches everywhere she rates these as moderate. She denies any specific abdominal pain. She denies any blood in her vomit, blood in her stool, melena or hematochezia. She denies any diarrhea with this. She states she has not been able keep down any fluids and feels very dehydrated. Patient has a history of episodes of this. As well as episodes of hypokalemia. Patient states that she has not smoked any marijuana in a day or 2. But she does state that warm showers and baths help her symptoms. She does state that she has tried 2 doses of Zofran and a dose of Phenergan today without any improvement of her symptoms. She denies any chest pain, shortness of breath, cough, fever, chills, dysuria or hematuria    REVIEW OF SYSTEMS  As above. All other systems negative.    PAST MEDICAL HISTORY   has a past medical history of Anxiety; Anxiety disorder; Backpain; CLOSTRIDIUM DIFFICILE INFECTION (4/14/2010); CVS disease; Cyclic vomiting syndrome; Dental disorder; Drug-seeking behavior; Dyspepsia (7/12/2009); Nephrolithiasis (6/20/2009); Pain; Snoring; Superior mesenteric artery syndrome (CMS-HCC) (7/12/2009); Superior mesenteric artery syndrome (CMS-HCC); and Tobacco abuse (6/20/2009).    SOCIAL HISTORY  Social History     Social History Main Topics   • Smoking status: Former Smoker     Packs/day: 0.50     Years: 13.00     Types: Cigarettes     Quit date: 12/15/2012   • Smokeless tobacco: Never Used      Comment: 1/2ppd   • Alcohol use No   • Drug use: Yes      "Types: Marijuana, Inhaled      Comment: marijuana   • Sexual activity: Not on file       SURGICAL HISTORY   has a past surgical history that includes gastroscopy-endo (7/8/2009); lithotripsy; pyloroplasty (7/27/2009); gastroscopy with biopsy (3/9/2010); exploratory laparotomy (7/27/2009); appendectomy (7/27/2009); cholecystectomy (7/27/2009); other; exploratory laparotomy (1/12/2016); bowel resection (1/12/2016); and gastroscopy-endo (4/28/2016).    CURRENT MEDICATIONS  Home Medications     Reviewed by Venus Metzger R.N. (Registered Nurse) on 02/06/18 at 0012  Med List Status: Complete   Medication Last Dose Status   busPIRone (BUSPAR) 10 MG Tab 2/2/2018 Active   promethazine (PHENERGAN) 25 MG Suppos 2/5/2018 Active                ALLERGIES  Allergies   Allergen Reactions   • Metoclopramide Hives     Told by MD; pt suspects possible hives.   • Bactrim [Sulfamethoxazole W-Trimethoprim] Unspecified     Someone said I had a rash or something.      • Seroquel [Quetiapine] Unspecified     MD told her she was allergic- the room was spinning and eye eyes shook when I was in a coma         PHYSICAL EXAM  VITAL SIGNS: /75   Pulse 62   Temp 36.9 °C (98.5 °F)   Resp 20   Ht 1.651 m (5' 5\")   Wt 60.7 kg (133 lb 13.1 oz)   SpO2 100%   Breastfeeding? No   BMI 22.27 kg/m²   Constitutional: Well developed, Well nourished, moderate distress.   HENT: Normocephalic, Atraumatic, Oropharynx very dry No oral exudates.   Eyes: Conjunctiva normal, No discharge.   Cardiovascular: Normal heart rate, Normal rhythm, No murmurs, equal pulses.   Pulmonary: Normal breath sounds, No respiratory distress, No wheezing, No rales, No rhonchi.  Abdomen:Soft, slight epigastric tenderness, normal bowel sounds, No masses, no rebound, no guarding.   Back: Slight left-sided CVA tenderness  Musculoskeletal: No major deformities noted, No tenderness.   Skin: Poor skin turgor, Warm, Dry, No erythema, No rash.   Neurologic: Alert & oriented x 3, " Normal motor function,  No focal deficits noted.   Psychiatric: Patient is extremely anxious and somewhat tearful.      EKG  Sinus rhythm, rate 69, normal axis, no ST elevation, single T-wave inversion in lead 3. MA interval of 132, QTC of 442 interpretation nonspecific EKG    RADIOLOGY/PROCEDURES  DX-CHEST-PORTABLE (1 VIEW)   Final Result         1.  No acute cardiopulmonary disease.          Laboratory tests  Results for orders placed or performed during the hospital encounter of 02/05/18   CBC WITH DIFFERENTIAL   Result Value Ref Range    WBC 11.9 (H) 4.8 - 10.8 K/uL    RBC 4.52 4.20 - 5.40 M/uL    Hemoglobin 15.3 12.0 - 16.0 g/dL    Hematocrit 41.4 37.0 - 47.0 %    MCV 91.6 81.4 - 97.8 fL    MCH 33.8 (H) 27.0 - 33.0 pg    MCHC 37.0 (H) 33.6 - 35.0 g/dL    RDW 41.5 35.9 - 50.0 fL    Platelet Count 355 164 - 446 K/uL    MPV 9.7 9.0 - 12.9 fL    Neutrophils-Polys 68.50 44.00 - 72.00 %    Lymphocytes 17.10 (L) 22.00 - 41.00 %    Monocytes 13.40 0.00 - 13.40 %    Eosinophils 0.30 0.00 - 6.90 %    Basophils 0.30 0.00 - 1.80 %    Immature Granulocytes 0.40 0.00 - 0.90 %    Nucleated RBC 0.00 /100 WBC    Neutrophils (Absolute) 8.16 (H) 2.00 - 7.15 K/uL    Lymphs (Absolute) 2.04 1.00 - 4.80 K/uL    Monos (Absolute) 1.59 (H) 0.00 - 0.85 K/uL    Eos (Absolute) 0.03 0.00 - 0.51 K/uL    Baso (Absolute) 0.04 0.00 - 0.12 K/uL    Immature Granulocytes (abs) 0.05 0.00 - 0.11 K/uL    NRBC (Absolute) 0.00 K/uL   COMP METABOLIC PANEL   Result Value Ref Range    Sodium 133 (L) 135 - 145 mmol/L    Potassium 2.1 (LL) 3.6 - 5.5 mmol/L    Chloride 96 96 - 112 mmol/L    Co2 20 20 - 33 mmol/L    Anion Gap 17.0 (H) 0.0 - 11.9    Glucose 96 65 - 99 mg/dL    Bun 12 8 - 22 mg/dL    Creatinine 1.21 0.50 - 1.40 mg/dL    Calcium 10.1 8.4 - 10.2 mg/dL    AST(SGOT) 34 12 - 45 U/L    ALT(SGPT) 33 2 - 50 U/L    Alkaline Phosphatase 76 30 - 99 U/L    Total Bilirubin 3.0 (H) 0.1 - 1.5 mg/dL    Albumin 4.6 3.2 - 4.9 g/dL    Total Protein 8.4 (H) 6.0 -  8.2 g/dL    Globulin 3.8 (H) 1.9 - 3.5 g/dL    A-G Ratio 1.2 g/dL   HCG QUAL SERUM   Result Value Ref Range    Beta-Hcg Qualitative Serum Negative Negative   ESTIMATED GFR   Result Value Ref Range    GFR If  59 (A) >60 mL/min/1.73 m 2    GFR If Non African American 49 (A) >60 mL/min/1.73 m 2   EKG (NOW)   Result Value Ref Range    Report       [facility] Emergency Dept.    Test Date:  2018  Pt Name:    ALIYA MURRAY                 Department: [facility]  MRN:        [id number]                      [facility]  Gender:     Female                       Technician: HR  :        [id number]                   Requested By:PEGGY FALLON  Order #:    [id number]                    Reading MD:    Measurements  Intervals                                Axis  Rate:       69                           P:          0  AL:         132                          QRS:        45  QRSD:       102                          T:          -60  QT:         412  QTc:        442    Interpretive Statements  SINUS RHYTHM  NONSPECIFIC T ABNORMALITIES, INFERIOR LEADS  Compared to ECG 2017 14:21:31  No significant changes           COURSE & MEDICAL DECISION MAKING  Pertinent Labs & Imaging studies reviewed. (See chart for details)  Differential includes cyclic vomiting, hyperemesis cannabinoid syndrome, dehydration, electrolyte abnormalities    Multiple attempts were made by the nursing staff to establish an IV. When they were unsuccessful I tried ultrasound-guided peripheral IVs in both upper arms which were unsuccessful. At that point time given the fact we had no other IV access patient was consented for central line placement. Risks and benefits of procedure were explained to the patient including infection, bleeding, pneumothorax. Patient gave verbal consent for this.  Central Line Placement Procedure  Indication: vascular access, poor peripheral access and centrally administered  medications    Consent: The patient provided verbal consent for this procedure.    Procedure: The patient was positioned appropriately and the skin over the right internal jugular vein was prepped with betadine and draped in a sterile fashion. Local anesthesia was obtained by infiltration using 1% Lidocaine without epinephrine. Using ultrasound guidance a large bore needle was used to identify the vein.  A guide wire was then inserted into the vein through the needle. This was then verified to be in the vein with ultrasound. A triple lumen catheter was then inserted into the vessel over the guide wire using the Seldinger technique.  All ports showed good, free flowing blood return and were flushed with saline solution.  The catheter was then securely fastened to the skin with sutures and covered with a sterile dressing.  A post procedure X-ray was ordered and showed good line position.    The patient tolerated the procedure well.    Complications: None          IV fluids were given for significant vomiting with dry mucous membranes. Patient responded appropriately.    Medical decision making: Patient presents with significant vomiting and appeared very dehydrated. She does have some acute kidney injury on her labs as well as hypokalemia. Her potassium is been replaced. She is given both Ativan, Zofran, Benadryl for her vomiting with that it slightly improved. Discussed with the patient that she needs to stop all marijuana use as well. Patient was given some Ativan for her anxiety      DISPOSITION:  Patient will be admitted to Dr. Hagen in guarded condition.      FINAL IMPRESSION  1. Hypokalemia    2. Intractable cyclical vomiting with nausea    3. JILL (acute kidney injury) (CMS-HCC)    4. Dehydration               Electronically signed by: King Palomino, 2/5/2018 7:25 PM    This record was made with a voice recognition software. The software is not perfect. I have tried to correct any grammar, spelling or  context errors to the best of my ability, but errors may still remain. Interpretation of this chart should be taken in this context.

## 2018-02-06 NOTE — PROGRESS NOTES
Called ED for report at 2207 yet RN unavailable at that time. Called again at 2234 and received report from Sheyla BORREGO. Meds, labs, and plan of care all discussed. ED RN will be bringing up pt.

## 2018-02-06 NOTE — PROGRESS NOTES
"Scheduled meds given around 2358. Pt refused the Ativan 0.5mg IV x1 and stated she'll \"save it for tomorrow\" instead. Assessment and admission history completed by then also. Pt verbalized understanding of the plan of care. Call light and personal belongings within reach. Safety measures in place. Bed alarm on.    Pt sleeping now. No signs and symptoms of distress noted.  "

## 2018-02-06 NOTE — PROGRESS NOTES
Pt arrived to floor via stretcher at 2254, accompanied by ED RN. Pt stable, VS obtained, yet asking for more antianxiety meds. Hospitalist on-call paged.

## 2018-02-06 NOTE — ED NOTES
"cxr  At bedside. Pt asking for \"second round of meds\" during administration of meds. Per erp, ok to use distal port of line prior to cxr. Verbal reassurance provided. Pt aware of same   "

## 2018-02-06 NOTE — H&P
" Hospital Medicine History and Physical    Date of Service  2/5/2018    Chief Complaint  Chief Complaint   Patient presents with   • Anxiety   • N/V       History of Presenting Illness  42 y.o. female who presented 2/5/2018 with anxiety and N/V.    Patient has multiple admissions for cyclic vomiting syndrome and anxiety. She was in the ED 2/2/18 for anxiety.  She says she's been vomiting for days, can't keep water down, and losing weight. She says she stopped marijuana 2 weeks ago, but then says it might have been \"1-10 days ago\"  In the ED she was severely dehydration and unable to place PIV. A central line was placed for hydration.  Labs showed JILL and hypokalemia.  Primary Care Physician  Isa Rodriguez M.D.    Consultants  None    Code Status  Full    Review of Systems  Review of Systems   Constitutional: Positive for malaise/fatigue and weight loss. Negative for chills and fever.   Eyes: Negative for blurred vision and double vision.   Respiratory: Negative for cough and shortness of breath.    Cardiovascular: Negative for chest pain and leg swelling.   Gastrointestinal: Positive for abdominal pain, nausea and vomiting. Negative for diarrhea.   Genitourinary: Negative for dysuria.   Skin: Negative for rash.   Neurological: Positive for weakness. Negative for dizziness, focal weakness and headaches.   Psychiatric/Behavioral: The patient is nervous/anxious.         Past Medical History  Past Medical History:   Diagnosis Date   • CLOSTRIDIUM DIFFICILE INFECTION 4/14/2010   • Superior mesenteric artery syndrome (CMS-HCC) 7/12/2009   • Dyspepsia 7/12/2009   • Tobacco abuse 6/20/2009   • Nephrolithiasis 6/20/2009    kidney stones,post lithotrypsy   • Anxiety     Followed by [facility]   • Anxiety disorder    • Backpain    • CVS disease    • Cyclic vomiting syndrome    • Dental disorder    • Drug-seeking behavior    • Pain     abd and back   • Snoring    • Superior mesenteric artery syndrome (CMS-HCC)        Surgical " History  Past Surgical History:   Procedure Laterality Date   • GASTROSCOPY-ENDO  4/28/2016    Procedure: GASTROSCOPY-ENDO;  Surgeon: Blayne Blackburn M.D.;  Location: [facility];  Service:    • EXPLORATORY LAPAROTOMY  1/12/2016    Procedure: EXPLORATORY LAPAROTOMY;  Surgeon: Maciel Quintero M.D.;  Location: SURGERY [facility];  Service:    • BOWEL RESECTION  1/12/2016    Procedure: BOWEL RESECTION;  Surgeon: Maciel Quintero M.D.;  Location: SURGERY [facility];  Service:    • GASTROSCOPY WITH BIOPSY  3/9/2010    Performed by AMANDA MEJIA at ENDOSCOPY [facility]   • PYLOROPLASTY  7/27/2009    Performed by MACIEL QUINTERO at SURGERY [facility]   • EXPLORATORY LAPAROTOMY  7/27/2009    Performed by MACIEL QUINTERO at SURGERY [facility]   • APPENDECTOMY  7/27/2009    Performed by MACIEL QUINTERO at SURGERY [facility]   • CHOLECYSTECTOMY  7/27/2009    Performed by MACIEL QUINTERO at SURGERY [facility]   • GASTROSCOPY-ENDO  7/8/2009    Performed by ROSANNA WRIGHT at SURGERY [facility]   • LITHOTRIPSY     • OTHER      superior mesenteric artery correction        Medications  No current facility-administered medications on file prior to encounter.      Current Outpatient Prescriptions on File Prior to Encounter   Medication Sig Dispense Refill   • promethazine (PHENERGAN) 25 MG Suppos Insert 1 Suppository in rectum every 8 hours as needed for Nausea/Vomiting for up to 15 doses. 15 Suppository 0   • busPIRone (BUSPAR) 10 MG Tab TAKE 1 TABLET BY MOUTH TWICE DAILY 60 Tab 1       Family History  Family History   Problem Relation Age of Onset   • Cancer Mother 50     Colon cancer   • Hypertension Mother    • Allergies Father    • Hypertension Father    • Heart Disease Maternal Grandmother        Social History  Social History   Substance Use Topics   • Smoking status: Former Smoker     Packs/day: 0.50     Years:  13.00     Types: Cigarettes     Quit date: 12/15/2012   • Smokeless tobacco: Never Used      Comment: 2ppd   • Alcohol use No       Allergies  Allergies   Allergen Reactions   • Metoclopramide Hives     Told by MD; pt suspects possible hives.   • Bactrim [Sulfamethoxazole W-Trimethoprim] Unspecified     Someone said I had a rash or something.      • Seroquel [Quetiapine] Unspecified     MD told her she was allergic- the room was spinning and eye eyes shook when I was in a coma          Physical Exam  Laboratory   Hemodynamics  Temp (24hrs), Av.9 °C (98.4 °F), Min:36.9 °C (98.4 °F), Max:36.9 °C (98.4 °F)   Temperature: 36.9 °C (98.4 °F)  Pulse  Av.5  Min: 58  Max: 77 Heart Rate (Monitored): 76  Blood Pressure: 127/82, NIBP: 131/68      Respiratory      Respiration: 17, Pulse Oximetry: 100 %             Physical Exam   Constitutional: She is oriented to person, place, and time. She is cooperative.   Thin, frail   HENT:   Head: Normocephalic and atraumatic.   Dry mucus membranes   Eyes: Conjunctivae and EOM are normal.   Cardiovascular: Regular rhythm, normal heart sounds and intact distal pulses.    tachycardic   Pulmonary/Chest: Effort normal. She has no wheezes. She has no rales.   Abdominal: Soft. Bowel sounds are normal. She exhibits no distension. There is no tenderness. There is no rebound and no guarding.   Musculoskeletal: She exhibits no edema.   Neurological: She is alert and oriented to person, place, and time.   Skin: Skin is warm and dry.   Psychiatric:   anxious   Nursing note and vitals reviewed.      Recent Labs      18   WBC  11.9*   RBC  4.52   HEMOGLOBIN  15.3   HEMATOCRIT  41.4   MCV  91.6   MCH  33.8*   MCHC  37.0*   RDW  41.5   PLATELETCT  355   MPV  9.7     Recent Labs      18   SODIUM  133*   POTASSIUM  2.1*   CHLORIDE  96   CO2  20   GLUCOSE  96   BUN  12   CREATININE  1.21   CALCIUM  10.1     Recent Labs      18   ALTSGPT  33   ASTSGOT  34  "  ALKPHOSPHAT  76   TBILIRUBIN  3.0*   GLUCOSE  96                 Lab Results   Component Value Date    TROPONINI <0.01 05/11/2016     Urinalysis:    Lab Results  Component Value Date/Time   SPECGRAVITY 1.031 08/29/2017 0605   GLUCOSEUR 100 (A) 08/29/2017 0605   KETONES 40 (A) 08/29/2017 0605   NITRITE Positive (A) 08/29/2017 0605   WBCURINE 2-5 08/29/2017 0605   RBCURINE Rare 08/29/2017 0605   BACTERIA Many (A) 08/29/2017 0605   EPITHELCELL Moderate (A) 08/29/2017 0605        Imaging  DX-CHEST-PORTABLE (1 VIEW)   Final Result         1.  No acute cardiopulmonary disease.           Assessment/Plan     I anticipate this patient is appropriate for observation status at this time.    * Hypokalemia   Assessment & Plan    Due to vomiting.  Repleted and monitor        Dehydration   Assessment & Plan    Severe dehydration and hypokalemia.  Cont on IVF        Cyclic vomiting syndrome- (present on admission)   Assessment & Plan    Seen by GI previously. Thought to be 2/2 marijuana. Patient she quit 2 weeks ago, but after thinking about says \"well it was between 1-10 days ago\" that she quit.   - cont PPI BID as recommended by GI  - clear liquid diet  - marijuana cessation  - antiemetics        Anxiety- (present on admission)   Assessment & Plan    Multiple admissions for anxiety including 2 days ago.  Xanax PRN        Drug-seeking behavior- (present on admission)   Assessment & Plan    Multiple admissions with drug seeking behavior  Ketorolac, tylenol PRN            VTE prophylaxis: heparin.         "

## 2018-02-06 NOTE — PROGRESS NOTES
"Checked in on pt again. Pt verbalized the zofran helped her N/V. Pt further stated she'll \"try to get some sleep again.\" Performed teach-back method on breathing and relaxation techniques. Other safety measures in place. No other needs reported by pt as of this time. Will continue to monitor.  "

## 2018-02-06 NOTE — CARE PLAN
Problem: Psychosocial Needs:  Goal: Level of anxiety will decrease  Educated pt on nonpharmacological and relaxation techniques to decrease pain and anxiety. Also educated pt on polydrug use. Pt verbalized understanding.

## 2018-02-06 NOTE — PROGRESS NOTES
Pt awake, complaining of pain, anxiety, nausea and vomiting. Vomited 300cc clear, yellow-green emesis. Zofran 4mg IV administered. Will recheck on her status shortly.

## 2018-02-06 NOTE — ED NOTES
Pt awake, alert, anxious sitting up in bed, with head bent between legs . State abd pain and back pain. Unable to establish iv /blood work in triage. Pt also states unable to provide urine spec at this time. Seen 2/2 for same

## 2018-02-06 NOTE — PROGRESS NOTES
Bedside report given to shirley Hdz. Plan of care discussed. All questions answered. Safety measures in place. Pt still c/o N/V. Dayshift RN aware.

## 2018-02-06 NOTE — PROGRESS NOTES
"Spoke with Dr. Huntley over the phone regarding pt's request of Ambien tonight. Informed MD also that pt was wanting \"2mg of Xanax PO instead of just 1mg\" and that she wants \"the highest dose of Ativan\" instead of Haldol. Dr. Huntley stated she won't change any of Dr. Kent's ordered meds already, but did give me orders for Ambien 5mg PO x1 and Ativan 0.5mg IV x1 telephone with read-back.     ----  Charge ELMO Jimenez aware of pt's requests.  "

## 2018-02-07 LAB
ANION GAP SERPL CALC-SCNC: 6 MMOL/L (ref 0–11.9)
BASOPHILS # BLD AUTO: 1 % (ref 0–1.8)
BASOPHILS # BLD: 0.05 K/UL (ref 0–0.12)
BUN SERPL-MCNC: 10 MG/DL (ref 8–22)
CALCIUM SERPL-MCNC: 7.8 MG/DL (ref 8.4–10.2)
CHLORIDE SERPL-SCNC: 112 MMOL/L (ref 96–112)
CO2 SERPL-SCNC: 22 MMOL/L (ref 20–33)
CREAT SERPL-MCNC: 0.59 MG/DL (ref 0.5–1.4)
EOSINOPHIL # BLD AUTO: 0.04 K/UL (ref 0–0.51)
EOSINOPHIL NFR BLD: 0.8 % (ref 0–6.9)
ERYTHROCYTE [DISTWIDTH] IN BLOOD BY AUTOMATED COUNT: 46.1 FL (ref 35.9–50)
GLUCOSE SERPL-MCNC: 81 MG/DL (ref 65–99)
HCT VFR BLD AUTO: 32.4 % (ref 37–47)
HGB BLD-MCNC: 11.3 G/DL (ref 12–16)
IMM GRANULOCYTES # BLD AUTO: 0.04 K/UL (ref 0–0.11)
IMM GRANULOCYTES NFR BLD AUTO: 0.8 % (ref 0–0.9)
LYMPHOCYTES # BLD AUTO: 1.99 K/UL (ref 1–4.8)
LYMPHOCYTES NFR BLD: 41 % (ref 22–41)
MCH RBC QN AUTO: 34 PG (ref 27–33)
MCHC RBC AUTO-ENTMCNC: 34.9 G/DL (ref 33.6–35)
MCV RBC AUTO: 97.6 FL (ref 81.4–97.8)
MONOCYTES # BLD AUTO: 0.48 K/UL (ref 0–0.85)
MONOCYTES NFR BLD AUTO: 9.9 % (ref 0–13.4)
NEUTROPHILS # BLD AUTO: 2.25 K/UL (ref 2–7.15)
NEUTROPHILS NFR BLD: 46.5 % (ref 44–72)
NRBC # BLD AUTO: 0 K/UL
NRBC BLD-RTO: 0 /100 WBC
PLATELET # BLD AUTO: 224 K/UL (ref 164–446)
PMV BLD AUTO: 9.7 FL (ref 9–12.9)
POTASSIUM SERPL-SCNC: 3.5 MMOL/L (ref 3.6–5.5)
RBC # BLD AUTO: 3.32 M/UL (ref 4.2–5.4)
SODIUM SERPL-SCNC: 140 MMOL/L (ref 135–145)
WBC # BLD AUTO: 4.9 K/UL (ref 4.8–10.8)

## 2018-02-07 PROCEDURE — 700111 HCHG RX REV CODE 636 W/ 250 OVERRIDE (IP): Performed by: FAMILY MEDICINE

## 2018-02-07 PROCEDURE — 700111 HCHG RX REV CODE 636 W/ 250 OVERRIDE (IP): Performed by: INTERNAL MEDICINE

## 2018-02-07 PROCEDURE — A9270 NON-COVERED ITEM OR SERVICE: HCPCS | Performed by: INTERNAL MEDICINE

## 2018-02-07 PROCEDURE — 85025 COMPLETE CBC W/AUTO DIFF WBC: CPT

## 2018-02-07 PROCEDURE — A9270 NON-COVERED ITEM OR SERVICE: HCPCS | Performed by: FAMILY MEDICINE

## 2018-02-07 PROCEDURE — 770020 HCHG ROOM/CARE - TELE (206)

## 2018-02-07 PROCEDURE — 700102 HCHG RX REV CODE 250 W/ 637 OVERRIDE(OP): Performed by: FAMILY MEDICINE

## 2018-02-07 PROCEDURE — 700102 HCHG RX REV CODE 250 W/ 637 OVERRIDE(OP): Performed by: INTERNAL MEDICINE

## 2018-02-07 PROCEDURE — 99406 BEHAV CHNG SMOKING 3-10 MIN: CPT

## 2018-02-07 PROCEDURE — 700105 HCHG RX REV CODE 258: Performed by: INTERNAL MEDICINE

## 2018-02-07 PROCEDURE — 99232 SBSQ HOSP IP/OBS MODERATE 35: CPT | Performed by: FAMILY MEDICINE

## 2018-02-07 PROCEDURE — 80048 BASIC METABOLIC PNL TOTAL CA: CPT

## 2018-02-07 PROCEDURE — 700101 HCHG RX REV CODE 250: Performed by: FAMILY MEDICINE

## 2018-02-07 PROCEDURE — 700111 HCHG RX REV CODE 636 W/ 250 OVERRIDE (IP)

## 2018-02-07 RX ORDER — DIPHENHYDRAMINE HYDROCHLORIDE 50 MG/ML
25 INJECTION INTRAMUSCULAR; INTRAVENOUS ONCE
Status: COMPLETED | OUTPATIENT
Start: 2018-02-07 | End: 2018-02-07

## 2018-02-07 RX ORDER — DIPHENHYDRAMINE HYDROCHLORIDE 50 MG/ML
INJECTION INTRAMUSCULAR; INTRAVENOUS
Status: COMPLETED
Start: 2018-02-07 | End: 2018-02-07

## 2018-02-07 RX ORDER — LORAZEPAM 2 MG/ML
1 INJECTION INTRAMUSCULAR ONCE
Status: COMPLETED | OUTPATIENT
Start: 2018-02-07 | End: 2018-02-07

## 2018-02-07 RX ORDER — POTASSIUM CHLORIDE 20 MEQ/1
20 TABLET, EXTENDED RELEASE ORAL 2 TIMES DAILY
Status: DISCONTINUED | OUTPATIENT
Start: 2018-02-07 | End: 2018-02-08 | Stop reason: HOSPADM

## 2018-02-07 RX ORDER — SODIUM CHLORIDE AND POTASSIUM CHLORIDE 150; 900 MG/100ML; MG/100ML
INJECTION, SOLUTION INTRAVENOUS CONTINUOUS
Status: DISCONTINUED | OUTPATIENT
Start: 2018-02-07 | End: 2018-02-08 | Stop reason: HOSPADM

## 2018-02-07 RX ADMIN — KETOROLAC TROMETHAMINE 30 MG: 30 INJECTION, SOLUTION INTRAMUSCULAR at 14:07

## 2018-02-07 RX ADMIN — POTASSIUM CHLORIDE AND SODIUM CHLORIDE: 900; 150 INJECTION, SOLUTION INTRAVENOUS at 08:05

## 2018-02-07 RX ADMIN — ALPRAZOLAM 1 MG: 0.5 TABLET ORAL at 22:12

## 2018-02-07 RX ADMIN — CYCLOBENZAPRINE HYDROCHLORIDE 10 MG: 10 TABLET, FILM COATED ORAL at 17:59

## 2018-02-07 RX ADMIN — ALPRAZOLAM 1 MG: 0.5 TABLET ORAL at 14:02

## 2018-02-07 RX ADMIN — POTASSIUM CHLORIDE 20 MEQ: 1500 TABLET, EXTENDED RELEASE ORAL at 20:47

## 2018-02-07 RX ADMIN — DIPHENHYDRAMINE HYDROCHLORIDE 50 MG: 50 INJECTION, SOLUTION INTRAMUSCULAR; INTRAVENOUS at 08:27

## 2018-02-07 RX ADMIN — PROMETHAZINE HYDROCHLORIDE 25 MG: 25 TABLET ORAL at 12:11

## 2018-02-07 RX ADMIN — LORAZEPAM 1 MG: 2 INJECTION INTRAMUSCULAR; INTRAVENOUS at 09:30

## 2018-02-07 RX ADMIN — DIBASIC SODIUM PHOSPHATE, MONOBASIC POTASSIUM PHOSPHATE AND MONOBASIC SODIUM PHOSPHATE 1 TABLET: 852; 155; 130 TABLET ORAL at 20:47

## 2018-02-07 RX ADMIN — ALPRAZOLAM 1 MG: 0.5 TABLET ORAL at 06:55

## 2018-02-07 RX ADMIN — KETOROLAC TROMETHAMINE 30 MG: 30 INJECTION, SOLUTION INTRAMUSCULAR at 08:06

## 2018-02-07 RX ADMIN — ONDANSETRON 4 MG: 2 INJECTION INTRAMUSCULAR; INTRAVENOUS at 12:11

## 2018-02-07 RX ADMIN — PROMETHAZINE HYDROCHLORIDE 25 MG: 25 TABLET ORAL at 17:59

## 2018-02-07 RX ADMIN — ONDANSETRON 4 MG: 2 INJECTION INTRAMUSCULAR; INTRAVENOUS at 06:08

## 2018-02-07 RX ADMIN — ZOLPIDEM TARTRATE 5 MG: 5 TABLET ORAL at 22:12

## 2018-02-07 RX ADMIN — DIPHENHYDRAMINE HYDROCHLORIDE 25 MG: 50 INJECTION, SOLUTION INTRAMUSCULAR; INTRAVENOUS at 08:35

## 2018-02-07 RX ADMIN — BUSPIRONE HYDROCHLORIDE 20 MG: 5 TABLET ORAL at 20:47

## 2018-02-07 RX ADMIN — PROMETHAZINE HYDROCHLORIDE 25 MG: 25 TABLET ORAL at 08:06

## 2018-02-07 RX ADMIN — CYCLOBENZAPRINE HYDROCHLORIDE 10 MG: 10 TABLET, FILM COATED ORAL at 12:11

## 2018-02-07 RX ADMIN — PROCHLORPERAZINE EDISYLATE 10 MG: 5 INJECTION INTRAMUSCULAR; INTRAVENOUS at 12:11

## 2018-02-07 RX ADMIN — HEPARIN SODIUM 5000 UNITS: 5000 INJECTION, SOLUTION INTRAVENOUS; SUBCUTANEOUS at 14:04

## 2018-02-07 RX ADMIN — BUSPIRONE HYDROCHLORIDE 20 MG: 5 TABLET ORAL at 08:05

## 2018-02-07 RX ADMIN — SODIUM CHLORIDE: 9 INJECTION, SOLUTION INTRAVENOUS at 06:09

## 2018-02-07 RX ADMIN — PROCHLORPERAZINE EDISYLATE 10 MG: 5 INJECTION INTRAMUSCULAR; INTRAVENOUS at 17:59

## 2018-02-07 RX ADMIN — POTASSIUM CHLORIDE AND SODIUM CHLORIDE: 900; 150 INJECTION, SOLUTION INTRAVENOUS at 18:05

## 2018-02-07 RX ADMIN — POTASSIUM CHLORIDE 20 MEQ: 1500 TABLET, EXTENDED RELEASE ORAL at 08:06

## 2018-02-07 RX ADMIN — DIPHENHYDRAMINE HCL 25 MG: 25 TABLET ORAL at 17:59

## 2018-02-07 RX ADMIN — OMEPRAZOLE 20 MG: 20 CAPSULE, DELAYED RELEASE ORAL at 08:06

## 2018-02-07 RX ADMIN — OMEPRAZOLE 20 MG: 20 CAPSULE, DELAYED RELEASE ORAL at 20:46

## 2018-02-07 RX ADMIN — ONDANSETRON 4 MG: 2 INJECTION INTRAMUSCULAR; INTRAVENOUS at 17:59

## 2018-02-07 RX ADMIN — DIBASIC SODIUM PHOSPHATE, MONOBASIC POTASSIUM PHOSPHATE AND MONOBASIC SODIUM PHOSPHATE 1 TABLET: 852; 155; 130 TABLET ORAL at 08:07

## 2018-02-07 ASSESSMENT — PATIENT HEALTH QUESTIONNAIRE - PHQ9
SUM OF ALL RESPONSES TO PHQ9 QUESTIONS 1 AND 2: 1
1. LITTLE INTEREST OR PLEASURE IN DOING THINGS: NOT AT ALL
2. FEELING DOWN, DEPRESSED, IRRITABLE, OR HOPELESS: SEVERAL DAYS
SUM OF ALL RESPONSES TO PHQ QUESTIONS 1-9: 1

## 2018-02-07 ASSESSMENT — ENCOUNTER SYMPTOMS
HEARTBURN: 0
HALLUCINATIONS: 0
COUGH: 0
DIARRHEA: 0
NAUSEA: 1
NERVOUS/ANXIOUS: 1
WHEEZING: 0
ABDOMINAL PAIN: 1
VOMITING: 1
FEVER: 0
CHILLS: 0
SORE THROAT: 0
SHORTNESS OF BREATH: 0
BLURRED VISION: 0
DIZZINESS: 0

## 2018-02-07 ASSESSMENT — PAIN SCALES - WONG BAKER: WONGBAKER_NUMERICALRESPONSE: HURTS JUST A LITTLE BIT

## 2018-02-07 ASSESSMENT — PAIN SCALES - GENERAL
PAINLEVEL_OUTOF10: 4
PAINLEVEL_OUTOF10: 3

## 2018-02-07 NOTE — H&P
Hospital Medicine History and Physical    Date of Service  2/6/2018    Chief Complaint  Chief Complaint   Patient presents with   • Anxiety   • N/V       History of Presenting Illness  42 y.o. female who presented 2/5/2018 with nausea and vomiting for the past several days, she has known history cyclic vomiting syndrome, she has had repeated admissions in the past. She has known marijuana user and is equivocal on when she stopped using. She was a history of known anxiety disorder, she has been increasingly nervous and anxious over the past several days. She denies any suicidal ideas or ideation. She was noted to have low potassium and phosphorus, dehydration, acute kidney injury on admission.   Primary Care Physician  Isa Rodriguez M.D.    Consultants  None    Code Status  Full    Review of Systems  Review of Systems   Constitutional: Positive for malaise/fatigue. Negative for chills and fever.   HENT: Negative for hearing loss and sore throat.    Eyes: Negative for blurred vision.   Respiratory: Negative for cough, shortness of breath and wheezing.    Cardiovascular: Negative for chest pain and leg swelling.   Gastrointestinal: Positive for abdominal pain, nausea and vomiting. Negative for diarrhea and heartburn.   Genitourinary: Negative for dysuria.   Musculoskeletal: Positive for back pain.   Neurological: Positive for weakness. Negative for dizziness, tremors, sensory change, speech change, focal weakness and headaches.   Psychiatric/Behavioral: Negative for suicidal ideas. The patient is nervous/anxious and has insomnia.         Past Medical History  Past Medical History:   Diagnosis Date   • CLOSTRIDIUM DIFFICILE INFECTION 4/14/2010   • Superior mesenteric artery syndrome (CMS-HCC) 7/12/2009   • Dyspepsia 7/12/2009   • Tobacco abuse 6/20/2009   • Nephrolithiasis 6/20/2009    kidney stones,post lithotrypsy   • Anxiety     Followed by [facility]   • Anxiety disorder    • Backpain    • CVS disease    • Cyclic  vomiting syndrome    • Dental disorder    • Drug-seeking behavior    • Pain     abd and back   • Snoring    • Superior mesenteric artery syndrome (CMS-HCC)        Surgical History  Past Surgical History:   Procedure Laterality Date   • GASTROSCOPY-ENDO  4/28/2016    Procedure: GASTROSCOPY-ENDO;  Surgeon: Blayne Blackburn M.D.;  Location: ENDOSCOPY [facility];  Service:    • EXPLORATORY LAPAROTOMY  1/12/2016    Procedure: EXPLORATORY LAPAROTOMY;  Surgeon: Maciel Quintero M.D.;  Location: SURGERY [facility];  Service:    • BOWEL RESECTION  1/12/2016    Procedure: BOWEL RESECTION;  Surgeon: Maciel Quintero M.D.;  Location: SURGERY [facility];  Service:    • GASTROSCOPY WITH BIOPSY  3/9/2010    Performed by AMANDA MEJIA at ENDOSCOPY [facility]   • PYLOROPLASTY  7/27/2009    Performed by MACIEL QUINTERO at SURGERY [facility]   • EXPLORATORY LAPAROTOMY  7/27/2009    Performed by MACIEL QUINTERO at SURGERY [facility]   • APPENDECTOMY  7/27/2009    Performed by MACIEL QUINTERO at SURGERY [facility]   • CHOLECYSTECTOMY  7/27/2009    Performed by MACIEL QUINTERO at SURGERY [facility]   • GASTROSCOPY-ENDO  7/8/2009    Performed by ROSANNA WRIGHT at SURGERY [facility]   • LITHOTRIPSY     • OTHER      superior mesenteric artery correction        Medications  No current facility-administered medications on file prior to encounter.      Current Outpatient Prescriptions on File Prior to Encounter   Medication Sig Dispense Refill   • promethazine (PHENERGAN) 25 MG Suppos Insert 1 Suppository in rectum every 8 hours as needed for Nausea/Vomiting for up to 15 doses. 15 Suppository 0   • busPIRone (BUSPAR) 10 MG Tab TAKE 1 TABLET BY MOUTH TWICE DAILY 60 Tab 1       Family History  Family History   Problem Relation Age of Onset   • Cancer Mother 50     Colon cancer   • Hypertension Mother    • Allergies Father    • Hypertension  Father    • Heart Disease Maternal Grandmother        Social History  Social History   Substance Use Topics   • Smoking status: Former Smoker     Packs/day: 0.50     Years: 13.00     Types: Cigarettes     Quit date: 12/15/2012   • Smokeless tobacco: Never Used      Comment: 2ppd   • Alcohol use No       Allergies  Allergies   Allergen Reactions   • Metoclopramide Hives     Told by MD; pt suspects possible hives.   • Bactrim [Sulfamethoxazole W-Trimethoprim] Unspecified     Someone said I had a rash or something.      • Seroquel [Quetiapine] Unspecified     MD told her she was allergic- the room was spinning and eye eyes shook when I was in a coma          Physical Exam  Laboratory   Hemodynamics  Temp (24hrs), Av.8 °C (98.2 °F), Min:36.3 °C (97.4 °F), Max:36.9 °C (98.5 °F)   Temperature: 36.8 °C (98.3 °F)  Pulse  Av.2  Min: 58  Max: 87 Heart Rate (Monitored): 73  Blood Pressure: 133/92, NIBP: 116/62      Respiratory      Respiration: 20, Pulse Oximetry: 100 %             Physical Exam   Constitutional: She is oriented to person, place, and time. She appears well-developed and well-nourished.   HENT:   Head: Normocephalic and atraumatic.   Eyes: Conjunctivae are normal. Pupils are equal, round, and reactive to light.   Neck: No tracheal deviation present. No thyromegaly present.   Cardiovascular: Normal rate and regular rhythm.    Pulmonary/Chest: Effort normal and breath sounds normal.   Abdominal: Soft. Bowel sounds are normal. She exhibits no distension. There is no tenderness. There is no rebound and no guarding.   Musculoskeletal: She exhibits no edema.   Lymphadenopathy:     She has no cervical adenopathy.   Neurological: She is alert and oriented to person, place, and time.   Skin: Skin is warm and dry.   Psychiatric: Her mood appears anxious. Thought content is not paranoid. She expresses no homicidal and no suicidal ideation. She expresses no suicidal plans and no homicidal plans.   Nursing note  and vitals reviewed.      Recent Labs      02/05/18 2107 02/06/18   0655   WBC  11.9*  13.0*   RBC  4.52  3.86*   HEMOGLOBIN  15.3  13.3   HEMATOCRIT  41.4  36.7*   MCV  91.6  95.1   MCH  33.8*  34.5*   MCHC  37.0*  36.2*   RDW  41.5  44.6   PLATELETCT  355  277   MPV  9.7  9.7     Recent Labs      02/05/18 2107 02/06/18   0655   SODIUM  133*  135   POTASSIUM  2.1*  3.7   CHLORIDE  96  109   CO2  20  21   GLUCOSE  96  87   BUN  12  13   CREATININE  1.21  0.88   CALCIUM  10.1  8.3*     Recent Labs      02/05/18 2107 02/06/18   0655   ALTSGPT  33   --    ASTSGOT  34   --    ALKPHOSPHAT  76   --    TBILIRUBIN  3.0*   --    GLUCOSE  96  87                 Lab Results   Component Value Date    TROPONINI <0.01 05/11/2016     Urinalysis:    Lab Results  Component Value Date/Time   SPECGRAVITY 1.010 02/06/2018 0953   GLUCOSEUR Negative 02/06/2018 0953   KETONES 40 (A) 02/06/2018 0953   NITRITE Negative 02/06/2018 0953   WBCURINE 2-5 08/29/2017 0605   RBCURINE Rare 08/29/2017 0605   BACTERIA Many (A) 08/29/2017 0605   EPITHELCELL Moderate (A) 08/29/2017 0605        Imaging    CXR  No acute cardiopulmonary disease   Assessment/Plan     I anticipate this patient will require at least two midnights for appropriate medical management, necessitating inpatient admission.    * Cyclic vomiting syndrome- (present on admission)   Assessment & Plan    IVF NS  Advance to full liquids        JILL (acute kidney injury) (CMS-HCC)- (present on admission)   Assessment & Plan    IVF NS, follow bmp        Hypokalemia- (present on admission)   Assessment & Plan    Kdur, follow bmp        Dehydration- (present on admission)   Assessment & Plan    IVF NS        Hypophosphatemia- (present on admission)   Assessment & Plan    Start Kphos        Drug-seeking behavior- (present on admission)   Assessment & Plan    Judicious pain control           Leukocytosis- (present on admission)   Assessment & Plan    Reactive?  Follow cbc        Anxiety-  (present on admission)   Assessment & Plan    Increase Buspar  continue Xanax prn            VTE prophylaxis: Heparin.

## 2018-02-07 NOTE — CARE PLAN
Problem: Communication  Goal: The ability to communicate needs accurately and effectively will improve    Intervention: Educate patient and significant other/support system about the plan of care, procedures, treatments, medications and allow for questions  Discussed plan of care with pt. Oriented pt to room and environment. Answered any questions and educated her on her treatment plans. Pt verbalized understanding.      Problem: Pain Management  Goal: Pain level will decrease to patient's comfort goal    Intervention: Educate and implement non-pharmacologic comfort measures. Examples: relaxation, distration, play therapy, activity therapy, massage, etc.  Educated pt on non-pharmacological pain&anxiety reliever and consider limiting PRN medications. Pt verbalized understanding and tried warm packs, warm blankets, breathing techniques, rest and repositioning.

## 2018-02-07 NOTE — DIETARY
"Nutrition Services:  Nutrition Admit Triggers for poor oral intake and weight loss prior to admit.  42 year old female admitted for hypokalemia and dehydration.  Pertinent Medical History:  Anxiety, cyclic vomiting syndrome, drug seeking behavior  Labs and medications reviewed  BMI:  22.27  Weight:  60.7 kg (133 lbs)  Ht:  5'5\"  IBW:  125 lbs  %IBW:  106%  No skin break down noted  Diet Rx:  Regular  Patient known from previous admits for cyclic N/V due to marijuana use.  Visited with patient. She has been on a clear liquid diet.  She stated she has not had any N/V and she is hungry and would like to be advanced to a Regular diet.  Discussed with Dr. Mccabe.  Per MD, okay to advance diet to Regular.  Patient given room service menu to order a late lunch.  Per patient, her usual body weight is 125-130 lbs. No stated weight loss.  Current weight is 133 lbs.    Plan/Recommendations:  1)  Nutrition Services to work with patient for food preferences.  2)  RD to monitor PO intake.  "

## 2018-02-07 NOTE — CARE PLAN
Problem: Nutritional:  Goal: Achieve adequate nutritional intake  Diet advancement beyond clears.  Outcome: MET Date Met: 02/07/18

## 2018-02-07 NOTE — PROGRESS NOTES
Received report from night shift RN (Venus). Discussed plan of care, assumed care of patient. Safety measures in place.

## 2018-02-07 NOTE — PROGRESS NOTES
Talked to Dr. Mccabe again that flexeril doesn't work and patient wants stronger meds, awaiting order.

## 2018-02-07 NOTE — PROGRESS NOTES
"Pt crying in bed, upset that ambien is not ordered for her tonight, stating she \"hasn't slept in 3 days\". Other scheduled medications given. Assessment completed. Hospitalist on-call paged at [phone].      Dr. Owens called back at [phone]. Informed him of pt's request for ambien and he stated he'll take a look at pt's chart.  "

## 2018-02-07 NOTE — PROGRESS NOTES
Administered ambien 5mg PO and zofran 4mg IV for nausea and pt verbalized being more calm now. Only requesting for some warm packs, which were then provided for her as well. Pt stated she's more relaxed to try to go to sleep now. Will continue to monitor.

## 2018-02-07 NOTE — PROGRESS NOTES
[facility] Hospitalist Progress Note    Date of Service: 2018    Chief Complaint  42 y.o. female admitted 2018 with nausea and vomiting, Hypokalemia, JILL.    Interval Problem Update  N/V - tolerating full lqiuids  JILL - crea better  Anxiety - continually asking for IV Ativan  Low K    Consultants/Specialty  None    Disposition  TBD        Review of Systems   Constitutional: Negative for chills and fever.   HENT: Negative for hearing loss and sore throat.    Eyes: Negative for blurred vision.   Respiratory: Negative for cough, shortness of breath and wheezing.    Cardiovascular: Negative for leg swelling.   Gastrointestinal: Positive for abdominal pain, nausea and vomiting. Negative for diarrhea and heartburn.   Genitourinary: Negative for dysuria.   Neurological: Negative for dizziness.   Psychiatric/Behavioral: Negative for hallucinations and suicidal ideas. The patient is nervous/anxious.       Physical Exam  Laboratory/Imaging   Hemodynamics  Temp (24hrs), Av.7 °C (98.1 °F), Min:36.4 °C (97.6 °F), Max:36.9 °C (98.4 °F)   Temperature: 36.8 °C (98.3 °F)  Pulse  Av.6  Min: 58  Max: 94    Blood Pressure: 135/77      Respiratory      Respiration: 18, Pulse Oximetry: 99 %             Fluids    Intake/Output Summary (Last 24 hours) at 18 1518  Last data filed at 18 1836   Gross per 24 hour   Intake             1790 ml   Output                0 ml   Net             1790 ml       Nutrition  Orders Placed This Encounter   Procedures   • Diet Order     Standing Status:   Standing     Number of Occurrences:   1     Order Specific Question:   Diet:     Answer:   Regular [1]     Physical Exam   Constitutional: She is oriented to person, place, and time. She appears well-developed and well-nourished.   HENT:   Head: Normocephalic and atraumatic.   Eyes: Conjunctivae are normal. Pupils are equal, round, and reactive to light.   Neck: No tracheal deviation present. No thyromegaly present.    Cardiovascular: Normal rate and regular rhythm.    Pulmonary/Chest: Effort normal and breath sounds normal.   Abdominal: Soft. Bowel sounds are normal. She exhibits no distension. There is tenderness. There is no rebound and no guarding.   Musculoskeletal: She exhibits no edema.   Lymphadenopathy:     She has no cervical adenopathy.   Neurological: She is alert and oriented to person, place, and time.   Skin: Skin is warm and dry.   Psychiatric: Her mood appears anxious. Thought content is not paranoid. She expresses no homicidal and no suicidal ideation. She expresses no suicidal plans and no homicidal plans.   Nursing note and vitals reviewed.      Recent Labs      02/05/18 2107 02/06/18   0655  02/07/18   0520   WBC  11.9*  13.0*  4.9   RBC  4.52  3.86*  3.32*   HEMOGLOBIN  15.3  13.3  11.3*   HEMATOCRIT  41.4  36.7*  32.4*   MCV  91.6  95.1  97.6   MCH  33.8*  34.5*  34.0*   MCHC  37.0*  36.2*  34.9   RDW  41.5  44.6  46.1   PLATELETCT  355  277  224   MPV  9.7  9.7  9.7     Recent Labs      02/05/18 2107 02/06/18   0655  02/07/18   0520   SODIUM  133*  135  140   POTASSIUM  2.1*  3.7  3.5*   CHLORIDE  96  109  112   CO2  20  21  22   GLUCOSE  96  87  81   BUN  12  13  10   CREATININE  1.21  0.88  0.59   CALCIUM  10.1  8.3*  7.8*                      Assessment/Plan     * Cyclic vomiting syndrome- (present on admission)   Assessment & Plan    IVF NS  Advance to regular diet        JILL (acute kidney injury) (CMS-HCC)- (present on admission)   Assessment & Plan    IVF NS, follow bmp        Hypokalemia- (present on admission)   Assessment & Plan    Kdur, IV KCl, follow bmp        Dehydration- (present on admission)   Assessment & Plan    IVF NS        Hypophosphatemia- (present on admission)   Assessment & Plan    Kphos        Drug-seeking behavior- (present on admission)   Assessment & Plan    Judicious pain control           Leukocytosis- (present on admission)   Assessment & Plan    Reactive?  Follow  cbc        Anxiety- (present on admission)   Assessment & Plan    Bhakti Oconnor            Reviewed items::  EKG reviewed, Radiology images reviewed, Labs reviewed and Medications reviewed  Saldana catheter::  No Saldana  DVT prophylaxis pharmacological::  Heparin  Ulcer Prophylaxis::  Yes

## 2018-02-07 NOTE — PROGRESS NOTES
Pt sleeping comfortably in bed. No signs or symptoms of distress noted. Safety measures in place. Will continue to monitor.

## 2018-02-07 NOTE — RESPIRATORY CARE
"COPD Education by COPD Clinical Educator  2/7/2018 at 1:25 PM by Afia Montes    Patient interviewed by COPD education team.  Patient refused full COPD program at this time, but agreed to short intervention.  A comprehensive packet including information about smoking cessation given.  Smoking Cessation Intervention 3 -10 minutes completed.    Provided smoking cessation packet with \"Tips to Quit\" and flyer for \"Free Smoking Cessation Classes\". Provided \"Quit Card\" with the phone number to [employer] [facility]uit Line for free nicotine replacement therapy.  "

## 2018-02-07 NOTE — DISCHARGE PLANNING
Medical Social Work    Referral: JUDY reviewed the chart this AM.      Intervention: Per flowsheet, pt lives with significant other and expects to d.c home.  Pt does not have home O2 and is not currently on O2.  Therapies have not evaluated at the time of this note.  Based upon this review and information, there are no SS or DC needs identified at this time.      Plan: JUDY Joseph available to assist with any d.c planning.    Care Transition Team Assessment    Information Source  Information Given By: Patient  Informant's Name: Iris    Readmission Evaluation  Is this a readmission?: Yes - unplanned readmission    Elopement Risk  Legal Hold: No  Ambulatory or Self Mobile in Wheelchair: No-Not an Elopement Risk  Elopement Risk: Not at Risk for Elopement    Interdisciplinary Discharge Planning  Does Admitting Nurse Feel This Could be a Complex Discharge?: No  Lives with - Patient's Self Care Capacity: Significant Other  Patient or legal guardian wants to designate a caregiver (see row info): No  Support Systems: Spouse / Significant Other  Housing / Facility: [address]  Do You Take your Prescribed Medications Regularly: Yes  Able to Return to Previous ADL's: Yes  Mobility Issues: No  Prior Services: None  Patient Expects to be Discharged to:: Home  Assistance Needed: Unknown at this Time  Durable Medical Equipment: Not Applicable    Discharge Preparedness  What is your plan after discharge?: Home with help         Finances  Prescription Coverage: Yes    Vision / Hearing Impairment  Vision Impairment : No  Hearing Impairment : No         Advance Directive  Advance Directive?: None    Domestic Abuse  Have you ever been the victim of abuse or violence?: No  Physical Abuse or Sexual Abuse: No  Verbal Abuse or Emotional Abuse: No  Possible Abuse Reported to:: Not Applicable

## 2018-02-07 NOTE — PROGRESS NOTES
Received bedside report from shirley Hdz . Plan of care discussed. Pt resting in bed. Safety measures in place.

## 2018-02-08 VITALS
OXYGEN SATURATION: 100 % | BODY MASS INDEX: 22.3 KG/M2 | SYSTOLIC BLOOD PRESSURE: 117 MMHG | HEART RATE: 79 BPM | DIASTOLIC BLOOD PRESSURE: 71 MMHG | RESPIRATION RATE: 18 BRPM | HEIGHT: 65 IN | TEMPERATURE: 97.3 F | WEIGHT: 133.82 LBS

## 2018-02-08 LAB
ANION GAP SERPL CALC-SCNC: 9 MMOL/L (ref 0–11.9)
BUN SERPL-MCNC: 5 MG/DL (ref 8–22)
CALCIUM SERPL-MCNC: 9.3 MG/DL (ref 8.4–10.2)
CHLORIDE SERPL-SCNC: 106 MMOL/L (ref 96–112)
CO2 SERPL-SCNC: 21 MMOL/L (ref 20–33)
CREAT SERPL-MCNC: 0.79 MG/DL (ref 0.5–1.4)
GLUCOSE SERPL-MCNC: 85 MG/DL (ref 65–99)
POTASSIUM SERPL-SCNC: 3.5 MMOL/L (ref 3.6–5.5)
SODIUM SERPL-SCNC: 136 MMOL/L (ref 135–145)

## 2018-02-08 PROCEDURE — A9270 NON-COVERED ITEM OR SERVICE: HCPCS | Performed by: INTERNAL MEDICINE

## 2018-02-08 PROCEDURE — 700101 HCHG RX REV CODE 250: Performed by: FAMILY MEDICINE

## 2018-02-08 PROCEDURE — 99239 HOSP IP/OBS DSCHRG MGMT >30: CPT | Performed by: FAMILY MEDICINE

## 2018-02-08 PROCEDURE — 700102 HCHG RX REV CODE 250 W/ 637 OVERRIDE(OP): Performed by: INTERNAL MEDICINE

## 2018-02-08 PROCEDURE — 700111 HCHG RX REV CODE 636 W/ 250 OVERRIDE (IP): Performed by: FAMILY MEDICINE

## 2018-02-08 PROCEDURE — A9270 NON-COVERED ITEM OR SERVICE: HCPCS | Performed by: FAMILY MEDICINE

## 2018-02-08 PROCEDURE — 700102 HCHG RX REV CODE 250 W/ 637 OVERRIDE(OP): Performed by: FAMILY MEDICINE

## 2018-02-08 PROCEDURE — 700111 HCHG RX REV CODE 636 W/ 250 OVERRIDE (IP): Performed by: INTERNAL MEDICINE

## 2018-02-08 PROCEDURE — 80048 BASIC METABOLIC PNL TOTAL CA: CPT

## 2018-02-08 RX ORDER — LORAZEPAM 2 MG/ML
1 INJECTION INTRAMUSCULAR ONCE
Status: COMPLETED | OUTPATIENT
Start: 2018-02-08 | End: 2018-02-08

## 2018-02-08 RX ORDER — ONDANSETRON 4 MG/1
4 TABLET, ORALLY DISINTEGRATING ORAL EVERY 4 HOURS PRN
Qty: 30 TAB | Refills: 0 | Status: SHIPPED | OUTPATIENT
Start: 2018-02-08 | End: 2018-04-10

## 2018-02-08 RX ORDER — ALPRAZOLAM 1 MG/1
1 TABLET ORAL 4 TIMES DAILY PRN
Qty: 12 TAB | Refills: 0 | Status: SHIPPED | OUTPATIENT
Start: 2018-02-08 | End: 2018-02-11

## 2018-02-08 RX ORDER — BUSPIRONE HYDROCHLORIDE 10 MG/1
20 TABLET ORAL 2 TIMES DAILY
Qty: 120 TAB | Refills: 0 | Status: SHIPPED | OUTPATIENT
Start: 2018-02-08 | End: 2018-02-13

## 2018-02-08 RX ORDER — PROMETHAZINE HYDROCHLORIDE 25 MG/1
25 TABLET ORAL EVERY 6 HOURS PRN
Qty: 30 TAB | Refills: 0 | Status: SHIPPED | OUTPATIENT
Start: 2018-02-08 | End: 2018-07-12

## 2018-02-08 RX ADMIN — DIBASIC SODIUM PHOSPHATE, MONOBASIC POTASSIUM PHOSPHATE AND MONOBASIC SODIUM PHOSPHATE 1 TABLET: 852; 155; 130 TABLET ORAL at 12:16

## 2018-02-08 RX ADMIN — POTASSIUM CHLORIDE 20 MEQ: 1500 TABLET, EXTENDED RELEASE ORAL at 12:15

## 2018-02-08 RX ADMIN — ALPRAZOLAM 1 MG: 0.5 TABLET ORAL at 12:16

## 2018-02-08 RX ADMIN — KETOROLAC TROMETHAMINE 30 MG: 30 INJECTION, SOLUTION INTRAMUSCULAR at 09:08

## 2018-02-08 RX ADMIN — LORAZEPAM 1 MG: 2 INJECTION INTRAMUSCULAR; INTRAVENOUS at 10:06

## 2018-02-08 RX ADMIN — CYCLOBENZAPRINE HYDROCHLORIDE 10 MG: 10 TABLET, FILM COATED ORAL at 16:11

## 2018-02-08 RX ADMIN — ONDANSETRON 4 MG: 2 INJECTION INTRAMUSCULAR; INTRAVENOUS at 09:07

## 2018-02-08 RX ADMIN — BUSPIRONE HYDROCHLORIDE 20 MG: 5 TABLET ORAL at 12:14

## 2018-02-08 RX ADMIN — LORAZEPAM 1 MG: 2 INJECTION INTRAMUSCULAR; INTRAVENOUS at 16:05

## 2018-02-08 RX ADMIN — POTASSIUM CHLORIDE AND SODIUM CHLORIDE: 900; 150 INJECTION, SOLUTION INTRAVENOUS at 12:13

## 2018-02-08 RX ADMIN — DOCUSATE SODIUM AND SENNOSIDES 2 TABLET: 8.6; 5 TABLET, FILM COATED ORAL at 12:14

## 2018-02-08 RX ADMIN — DIPHENHYDRAMINE HCL 25 MG: 25 TABLET ORAL at 12:17

## 2018-02-08 RX ADMIN — ONDANSETRON 4 MG: 2 INJECTION INTRAMUSCULAR; INTRAVENOUS at 16:05

## 2018-02-08 ASSESSMENT — PAIN SCALES - GENERAL: PAINLEVEL_OUTOF10: 4

## 2018-02-08 NOTE — DISCHARGE INSTRUCTIONS
Dehydration, Adult  Dehydration means your body does not have as much fluid as it needs. Your kidneys, brain, and heart will not work properly without the right amount of fluids and salt.   HOME CARE  · Ask your doctor how to replace body fluid losses (rehydrate).  · Drink enough fluids to keep your pee (urine) clear or pale yellow.  · Drink small amounts of fluids often if you feel sick to your stomach (nauseous) or throw up (vomit).  · Eat like you normally do.  · Avoid:  ¨ Foods or drinks high in sugar.  ¨ Bubbly (carbonated) drinks.  ¨ Juice.  ¨ Very hot or cold fluids.  ¨ Drinks with caffeine.  ¨ Fatty, greasy foods.  ¨ Alcohol.  ¨ Tobacco.  ¨ Eating too much.  ¨ Gelatin desserts.  · Wash your hands to avoid spreading germs (bacteria, viruses).  · Only take medicine as told by your doctor.  · Keep all doctor visits as told.  GET HELP RIGHT AWAY IF:   · You cannot drink something without throwing up.  · You get worse even with treatment.  · Your vomit has blood in it or looks greenish.  · Your poop (stool) has blood in it or looks black and tarry.  · You have not peed in 6 to 8 hours.  · You pee a small amount of very dark pee.  · You have a fever.  · You pass out (faint).  · You have belly (abdominal) pain that gets worse or stays in one spot (localizes).  · You have a rash, stiff neck, or bad headache.  · You get easily annoyed, sleepy, or are hard to wake up.  · You feel weak, dizzy, or very thirsty.  MAKE SURE YOU:   · Understand these instructions.  · Will watch your condition.  · Will get help right away if you are not doing well or get worse.     This information is not intended to replace advice given to you by your health care provider. Make sure you discuss any questions you have with your health care provider.     Document Released: 10/14/2010 Document Revised: 03/11/2013 Document Reviewed: 08/06/2012  [employer] Interactive Patient Education ©2016 [employer]      Hypokalemia  Hypokalemia means a low  potassium level in the blood. Symptoms may include muscle weakness and cramping, fatigue, abdominal pain, vomiting, constipation, or irregularities of the heartbeat. Sometimes hypokalemia is discovered by your caregiver if you are taking certain medicines for high blood pressure or kidney disease.   Potassium is an electrolyte that helps regulate the amount of fluid in the body. It also stimulates muscle contraction and maintains a stable acid-base balance. If potassium levels go too low or too high, your health may be in danger. You are at risk for developing shock, heart, and lung problems. Hypokalemia can occur if you have excessive diarrhea, vomiting, or sweating. Potassium can be lost through your kidneys in the urine. Certain common medicines can also cause potassium loss, especially water pills (diuretics). The same is possible with cortisone medications or certain types of antibiotics. Low potassium can be dangerous if you are taking certain heart medicines. In diabetes, your potassium may fall after you take insulin, especially if your diabetes had been out of control for a while. In rare cases, potassium may be low because you are not getting enough in your diet.   In adults, a potassium level below 3.5 mEq/L is usually considered low.  Hypokalemia can be treated with potassium supplements taken by mouth and a diet that is high in potassium. Foods with high potassium content are:  · Peas, lentils, lima beans, nuts, and dried fruit.   · Whole grain and bran cereals and breads.   · Fresh fruit and vegetables. Examples include:   · Bananas.   · Cantaloupe.   · Grapefruit.   · Oranges.   · Tomatoes.   · Honeydew melons.   · Potatoes.   · Peaches.   · Orange and tomato juices.   · Meats.   See your caregiver as instructed for a follow-up blood test to be sure your potassium is back to normal.  SEEK MEDICAL CARE IF:   · You have nausea, vomiting, constipation, or abdominal pain.   · You have palpitations or  irregular heartbeats, chest pain or shortness of breath.   · You have muscle cramps or weakness or fatigue.   · You have lethargy.   SEEK IMMEDIATE MEDICAL CARE IF:   · You have paralysis.   · You have confusion or other mental status changes.   Document Released: 12/18/2006 Document Revised: 03/11/2013 Document Reviewed: 04/13/2011  [employer]® Patient Information ©2013 [employer].      Discharge Instructions    Discharged to home by car with relative. Discharged via wheelchair, hospital escort: Yes.  Special equipment needed: Not Applicable    Be sure to schedule a follow-up appointment with your primary care doctor or any specialists as instructed.     Discharge Plan:   Diet Plan: Discussed (Regular as tolerated)  Activity Level: Discussed (as tolerated)  Influenza Vaccine Indication: Patient Refuses    I understand that a diet low in cholesterol, fat, and sodium is recommended for good health. Unless I have been given specific instructions below for another diet, I accept this instruction as my diet prescription.   Other diet: as tolerated    Special Instructions: None    Is patient discharged on Warfarin / Coumadin?   No       Alprazolam tablets  What is this medicine?  ALPRAZOLAM (al PRAY rodrigo menezes) is a benzodiazepine. It is used to treat anxiety and panic attacks.  This medicine may be used for other purposes; ask your health care provider or pharmacist if you have questions.  COMMON BRAND NAME(S): Xanax  What should I tell my health care provider before I take this medicine?  They need to know if you have any of these conditions:  -an alcohol or drug abuse problem  -bipolar disorder, depression, psychosis or other mental health conditions  -glaucoma  -kidney or liver disease  -lung or breathing disease  -myasthenia gravis  -Parkinson's disease  -porphyria  -seizures or a history of seizures  -suicidal thoughts  -an unusual or allergic reaction to alprazolam, other benzodiazepines, foods, dyes, or  preservatives  -pregnant or trying to get pregnant  -breast-feeding  How should I use this medicine?  Take this medicine by mouth with a glass of water. Follow the directions on the prescription label. Take your medicine at regular intervals. Do not take it more often than directed. If you have been taking this medicine regularly for some time, do not suddenly stop taking it. You must gradually reduce the dose or you may get severe side effects. Ask your doctor or health care professional for advice. Even after you stop taking this medicine it can still affect your body for several days.  Talk to your pediatrician regarding the use of this medicine in children. Special care may be needed.  Overdosage: If you think you have taken too much of this medicine contact a poison control center or emergency room at once.  NOTE: This medicine is only for you. Do not share this medicine with others.  What if I miss a dose?  If you miss a dose, take it as soon as you can. If it is almost time for your next dose, take only that dose. Do not take double or extra doses.  What may interact with this medicine?  Do not take this medicine with any of the following medications:  -certain medicines for HIV infection or AIDS  -ketoconazole  -itraconazole  This medicine may also interact with the following medications:  -birth control pills  -certain macrolide antibiotics like clarithromycin, erythromycin, troleandomycin  -cimetidine  -cyclosporine  -ergotamine  -grapefruit juice  -herbal or dietary supplements like kava kava, melatonin, dehydroepiandrosterone, DHEA, Delaney's Wort or valerian  -imatinib, STI-571  -isoniazid  -levodopa  -medicines for depression, anxiety, or psychotic disturbances  -prescription pain medicines  -rifampin, rifapentine, or rifabutin  -some medicines for blood pressure or heart problems  -some medicines for seizures like carbamazepine, oxcarbazepine, phenobarbital, phenytoin, primidone  This list may not  describe all possible interactions. Give your health care provider a list of all the medicines, herbs, non-prescription drugs, or dietary supplements you use. Also tell them if you smoke, drink alcohol, or use illegal drugs. Some items may interact with your medicine.  What should I watch for while using this medicine?  Visit your doctor or health care professional for regular checks on your progress. Your body can become dependent on this medicine. Ask your doctor or health care professional if you still need to take it.  You may get drowsy or dizzy. Do not drive, use machinery, or do anything that needs mental alertness until you know how this medicine affects you. To reduce the risk of dizzy and fainting spells, do not stand or sit up quickly, especially if you are an older patient. Alcohol may increase dizziness and drowsiness. Avoid alcoholic drinks.  Do not treat yourself for coughs, colds or allergies without asking your doctor or health care professional for advice. Some ingredients can increase possible side effects.  What side effects may I notice from receiving this medicine?  Side effects that you should report to your doctor or health care professional as soon as possible:  -allergic reactions like skin rash, itching or hives, swelling of the face, lips, or tongue  -confusion, forgetfulness  -depression  -difficulty sleeping  -difficulty speaking  -feeling faint or lightheaded, falls  -mood changes, excitability or aggressive behavior  -muscle cramps  -trouble passing urine or change in the amount of urine  -unusually weak or tired  Side effects that usually do not require medical attention (report to your doctor or health care professional if they continue or are bothersome):  -change in sex drive or performance  -changes in appetite  This list may not describe all possible side effects. Call your doctor for medical advice about side effects. You may report side effects to FDA at 1-097-FDA-8809.  Where  should I keep my medicine?  Keep out of the reach of children. This medicine can be abused. Keep your medicine in a safe place to protect it from theft. Do not share this medicine with anyone. Selling or giving away this medicine is dangerous and against the law.  Store at room temperature between 20 and 25 degrees C (68 and 77 degrees F). Throw away any unused medicine after the expiration date.  NOTE: This sheet is a summary. It may not cover all possible information. If you have questions about this medicine, talk to your doctor, pharmacist, or health care provider.  © 2014, [employer]. (3/12/2009 10:34:46 AM)      Depression / Suicide Risk    As you are discharged from this [facility], it is important to learn how to keep safe from harming yourself.    Recognize the warning signs:  Abrupt changes in personality, positive or negative- including increase in energy   Giving away possessions  Change in eating patterns- significant weight changes-  positive or negative  Change in sleeping patterns- unable to sleep or sleeping all the time   Unwillingness or inability to communicate  Depression  Unusual sadness, discouragement and loneliness  Talk of wanting to die  Neglect of personal appearance   Rebelliousness- reckless behavior  Withdrawal from people/activities they love  Confusion- inability to concentrate     If you or a loved one observes any of these behaviors or has concerns about self-harm, here's what you can do:  Talk about it- your feelings and reasons for harming yourself  Remove any means that you might use to hurt yourself (examples: pills, rope, extension cords, firearm)  Get professional help from the community (Mental Health, Substance Abuse, psychological counseling)  Do not be alone:Call your Safe Contact- someone whom you trust who will be there for you.  Call your local CRISIS HOTLINE 952-4747 or 523-496-9788  Call your local Children's Mobile Crisis Response Team  [facility] [phone] or www.[employer]  Call the toll free National Suicide Prevention Hotlines   National Suicide Prevention Lifeline 136-248-FXQS (0360)  [facility] 800-SUICIDE (221-1376)    Promethazine tablets  What is this medicine?  PROMETHAZINE (proe METH a zeen) is an antihistamine. It is used to treat allergic reactions and to treat or prevent nausea and vomiting from illness or motion sickness. It is also used to make you sleep before surgery, and to help treat pain or nausea after surgery.  This medicine may be used for other purposes; ask your health care provider or pharmacist if you have questions.  COMMON BRAND NAME(S): Phenergan  What should I tell my health care provider before I take this medicine?  They need to know if you have any of these conditions:  -glaucoma  -high blood pressure or heart disease  -kidney disease  -liver disease  -lung or breathing disease, like asthma  -prostate trouble  -pain or difficulty passing urine  -seizures  -an unusual or allergic reaction to promethazine or phenothiazines, other medicines, foods, dyes, or preservatives  -pregnant or trying to get pregnant  -breast-feeding  How should I use this medicine?  Take this medicine by mouth with a glass of water. Follow the directions on the prescription label. Take your doses at regular intervals. Do not take your medicine more often than directed.  Talk to your pediatrician regarding the use of this medicine in children. Special care may be needed. This medicine should not be given to infants and children younger than 2 years old.  Overdosage: If you think you have taken too much of this medicine contact a poison control center or emergency room at once.  NOTE: This medicine is only for you. Do not share this medicine with others.  What if I miss a dose?  If you miss a dose, take it as soon as you can. If it is almost time for your next dose, take only that dose. Do not take double or extra  doses.  What may interact with this medicine?  Do not take this medicine with any of the following medications:  -medicines called MAO Inhibitors like Nardil, Parnate, Marplan, Eldepryl  -other phenothiazines like trimethobenzamide  This medicine may also interact with the following medications:  -barbiturates like phenobarbital  -bromocriptine  -certain antidepressants  -certain antihistamines used in allergy or cold medicines  -epinephrine  -levodopa  -medicines for sleep  -medicines for mental problems and psychotic disturbances  -medicines for movement abnormalities as in Parkinson's disease, or for gastrointestinal problems  -muscle relaxants  -prescription pain medicines  This list may not describe all possible interactions. Give your health care provider a list of all the medicines, herbs, non-prescription drugs, or dietary supplements you use. Also tell them if you smoke, drink alcohol, or use illegal drugs. Some items may interact with your medicine.  What should I watch for while using this medicine?  Tell your doctor or health care professional if your symptoms do not start to get better in 1 to 2 days.  You may get drowsy or dizzy. Do not drive, use machinery, or do anything that needs mental alertness until you know how this medicine affects you. To reduce the risk of dizzy or fainting spells, do not stand or sit up quickly, especially if you are an older patient. Alcohol may increase dizziness and drowsiness. Avoid alcoholic drinks.  Your mouth may get dry. Chewing sugarless gum or sucking hard candy, and drinking plenty of water may help. Contact your doctor if the problem does not go away or is severe.  This medicine may cause dry eyes and blurred vision. If you wear contact lenses you may feel some discomfort. Lubricating drops may help. See your eye doctor if the problem does not go away or is severe.  This medicine can make you more sensitive to the sun. Keep out of the sun. If you cannot avoid  being in the sun, wear protective clothing and use sunscreen. Do not use sun lamps or tanning beds/booths.  If you are diabetic, check your blood-sugar levels regularly.  What side effects may I notice from receiving this medicine?  Side effects that you should report to your doctor or health care professional as soon as possible:  -blurred vision  -irregular heartbeat, palpitations or chest pain  -muscle or facial twitches  -pain or difficulty passing urine  -seizures  -skin rash  -slowed or shallow breathing  -unusual bleeding or bruising  -yellowing of the eyes or skin  Side effects that usually do not require medical attention (report to your doctor or health care professional if they continue or are bothersome):  -headache  -nightmares, agitation, nervousness, excitability, not able to sleep (these are more likely in children)  -stuffy nose  This list may not describe all possible side effects. Call your doctor for medical advice about side effects. You may report side effects to FDA at 0-631-FDA-2878.  Where should I keep my medicine?  Keep out of the reach of children.  Store at room temperature, between 20 and 25 degrees C (68 and 77 degrees F). Protect from light. Throw away any unused medicine after the expiration date.  NOTE: This sheet is a summary. It may not cover all possible information. If you have questions about this medicine, talk to your doctor, pharmacist, or health care provider.  © 2014, [employer]. (7/16/2009 12:05:26 PM)      Ondansetron oral dissolving tablet  What is this medicine?  ONDANSETRON (on DAN se didi) is used to treat nausea and vomiting caused by chemotherapy. It is also used to prevent or treat nausea and vomiting after surgery.  This medicine may be used for other purposes; ask your health care provider or pharmacist if you have questions.  COMMON BRAND NAME(S): Zofran ODT  What should I tell my health care provider before I take this medicine?  They need to know  if you have any of these conditions:  -heart disease  -history of irregular heartbeat  -liver disease  -low levels of magnesium or potassium in the blood  -an unusual or allergic reaction to ondansetron, granisetron, other medicines, foods, dyes, or preservatives  -pregnant or trying to get pregnant  -breast-feeding  How should I use this medicine?  These tablets are made to dissolve in the mouth. Do not try to push the tablet through the foil backing. With dry hands, peel away the foil backing and gently remove the tablet. Place the tablet in the mouth and allow it to dissolve, then swallow. While you may take these tablets with water, it is not necessary to do so.  Talk to your pediatrician regarding the use of this medicine in children. Special care may be needed.  Overdosage: If you think you have taken too much of this medicine contact a poison control center or emergency room at once.  NOTE: This medicine is only for you. Do not share this medicine with others.  What if I miss a dose?  If you miss a dose, take it as soon as you can. If it is almost time for your next dose, take only that dose. Do not take double or extra doses.  What may interact with this medicine?  Do not take this medicine with any of the following medications:  -apomorphine  -cisapride  -dofetilide  -dronedarone  -pimozide  -thioridazine  -ziprasidone   This medicine may also interact with the following medications:  -carbamazepine  -phenytoin  -rifampicin  -tramadol  -other medicines that prolong the QT interval (cause an abnormal heart rhythm)  This list may not describe all possible interactions. Give your health care provider a list of all the medicines, herbs, non-prescription drugs, or dietary supplements you use. Also tell them if you smoke, drink alcohol, or use illegal drugs. Some items may interact with your medicine.  What should I watch for while using this medicine?  Check with your doctor or health care professional as soon  as you can if you have any sign of an allergic reaction.  What side effects may I notice from receiving this medicine?  Side effects that you should report to your doctor or health care professional as soon as possible:  -allergic reactions like skin rash, itching or hives, swelling of the face, lips, or tongue  -breathing problems  -dizziness  -fast or irregular heartbeat  -feeling faint or lightheaded, falls  -fever and chills  -swelling of the hands and feet  -tightness in the chest  Side effects that usually do not require medical attention (report to your doctor or health care professional if they continue or are bothersome):  -constipation or diarrhea  -headache  This list may not describe all possible side effects. Call your doctor for medical advice about side effects. You may report side effects to FDA at 6-220-FDA-7862.  Where should I keep my medicine?  Keep out of the reach of children.  Store between 2 and 30 degrees C (36 and 86 degrees F). Throw away any unused medicine after the expiration date.  NOTE: This sheet is a summary. It may not cover all possible information. If you have questions about this medicine, talk to your doctor, pharmacist, or health care provider.  © 2014, [employer]. (12/19/2013 3:55:03 PM)      Buspirone tablets  What is this medicine?  BUSPIRONE (byana laura williamson) is used to treat anxiety disorders.  This medicine may be used for other purposes; ask your health care provider or pharmacist if you have questions.  COMMON BRAND NAME(S): BuSpar  What should I tell my health care provider before I take this medicine?  They need to know if you have any of these conditions:  -kidney or liver disease  -an unusual or allergic reaction to buspirone, other medicines, foods, dyes, or preservatives  -pregnant or trying to get pregnant  -breast-feeding  How should I use this medicine?  Take this medicine by mouth with a glass of water. Follow the directions on the prescription  label. You may take this medicine with or without food. To ensure that this medicine always works the same way for you, you should take it either always with or always without food. Take your doses at regular intervals. Do not take your medicine more often than directed. Do not stop taking except on the advice of your doctor or health care professional.  Talk to your pediatrician regarding the use of this medicine in children. Special care may be needed.  Overdosage: If you think you have taken too much of this medicine contact a poison control center or emergency room at once.  NOTE: This medicine is only for you. Do not share this medicine with others.  What if I miss a dose?  If you miss a dose, take it as soon as you can. If it is almost time for your next dose, take only that dose. Do not take double or extra doses.  What may interact with this medicine?  Do not take this medicine with any of the following medications:  -linezolid  -MAOIs like Carbex, Eldepryl, Marplan, Nardil, and Parnate  -methylene blue  -procarbazine  This medicine may also interact with the following medications:  -diazepam  -digoxin  -diltiazem  -erythromycin  -grapefruit juice  -haloperidol  -medicines for mental depression or mood problems  -medicines for seizures like carbamazepine, phenobarbital and phenytoin  -nefazodone  -other medications for anxiety  -rifampin  -ritonavir  -some antifungal medicines like itraconazole, ketoconazole, and voriconazole  -verapamil  -warfarin  This list may not describe all possible interactions. Give your health care provider a list of all the medicines, herbs, non-prescription drugs, or dietary supplements you use. Also tell them if you smoke, drink alcohol, or use illegal drugs. Some items may interact with your medicine.  What should I watch for while using this medicine?  Visit your doctor or health care professional for regular checks on your progress. It may take 1 to 2 weeks before your anxiety  gets better.  You may get drowsy or dizzy. Do not drive, use machinery, or do anything that needs mental alertness until you know how this drug affects you. Do not stand or sit up quickly, especially if you are an older patient. This reduces the risk of dizzy or fainting spells. Alcohol can make you more drowsy and dizzy. Avoid alcoholic drinks.  What side effects may I notice from receiving this medicine?  Side effects that you should report to your doctor or health care professional as soon as possible:  -blurred vision or other vision changes  -chest pain  -confusion  -difficulty breathing  -feelings of hostility or anger  -muscle aches and pains  -numbness or tingling in hands or feet  -ringing in the ears  -skin rash and itching  -vomiting  -weakness  Side effects that usually do not require medical attention (report to your doctor or health care professional if they continue or are bothersome):  -disturbed dreams, nightmares  -headache  -nausea  -restlessness or nervousness  -sore throat and nasal congestion  -stomach upset  This list may not describe all possible side effects. Call your doctor for medical advice about side effects. You may report side effects to FDA at 9-086-FDA-5582.  Where should I keep my medicine?  Keep out of the reach of children.  Store at room temperature below 30 degrees C (86 degrees F). Protect from light. Keep container tightly closed. Throw away any unused medicine after the expiration date.  NOTE: This sheet is a summary. It may not cover all possible information. If you have questions about this medicine, talk to your doctor, pharmacist, or health care provider.  © 2014, [employer]. (7/28/2011 6:06:11 PM)

## 2018-02-08 NOTE — PROGRESS NOTES
"Patient reports that the regular dinner didn't \"sit well\", requests all her anti-emetics, benadryl, muscle relaxant, and xanax. Unable to administer xanax as that was provided at 2pm. Explained to patient that xanax is every 6 hours and she can obtain that at 8pm. Patient requests and received hot towel for her back. Requests and receives assistance with a second shower.   "

## 2018-02-08 NOTE — PROGRESS NOTES
Patient requests and receives a hot towel for her back. Patient receives all prn medication available to her.

## 2018-02-08 NOTE — CARE PLAN
Problem: Safety  Goal: Will remain free from injury    Intervention: Collaborate with Interdisciplinary Team for safe transfer and mobilization techniques  Educated pt on the importance of safe transfers and assistive devices if she needs it. Oriented to room and call light within reach. Pt verbalized understanding.      Problem: Psychosocial Needs:  Goal: Level of anxiety will decrease    Intervention: Identify and develop with patient strategies to cope with anxiety triggers  Educated and reinforced non-pharmacological strategies on relieving anxiety attacks such as breathing and relaxation techniques, warm packs, and rest. Pt verbalized understanding and performed teach-back method.

## 2018-02-08 NOTE — PROGRESS NOTES
Patient wonders if she could take all the anti-emetics she has ordered at the same time. MD made aware. Schedule prohibits this, she is willing to wait until all are due.

## 2018-02-08 NOTE — PROGRESS NOTES
1330 Patient mbjulated in the hallway x2 laps, noted with steady gait. No distress noted. IVF patent & infusing.

## 2018-02-08 NOTE — PROGRESS NOTES
0956 Patient showered, beddings changed by CNA.    1006 Medicated with Ativan for c/o's anxiety (one time order). Patient still refused to take due am medication. Notified Primary Nurse to come back in 30 minutes.

## 2018-02-08 NOTE — PROGRESS NOTES
Patient requests and receives hot towel for her back. Patient is requesting IV ativan as opposed to oral ativan. MD aware, orders received.

## 2018-02-08 NOTE — PROGRESS NOTES
1539 Patient's c/o's anxiety, it's not time for Xanax yet. Notified Dr Mccabe of patient's c/o's anxiety, new orders received & acknowledged for a one time order of Ativan (see MAR).

## 2018-02-08 NOTE — DISCHARGE PLANNING
Medical Social Work    Referral: JUDY reviewed the chart this AM.      Intervention: Per flowsheet, pt lives with significant other and expects to d.c home.  Pt does not have home O2 and is not currently on O2.  Therapies have not evaluated at the time of this note.  Based upon this review and information, there are no SS or DC needs identified at this time.      Plan: JUDY Peñay available to assist with any d.c planning.

## 2018-02-08 NOTE — DISCHARGE SUMMARY
Hospital Medicine Discharge Note     Admit Date:  2/5/2018       Discharge Date:   2/8/2018    Attending Physician:  Bony Mccabe     Diagnoses (includes active and resolved):   Principal Problem:    Cyclic vomiting syndrome (Chronic) POA: Yes  Active Problems:    Dehydration POA: Yes    Hypokalemia POA: Yes    JILL (acute kidney injury) (CMS-HCC) POA: Yes    Anxiety (Chronic) POA: Yes    Leukocytosis POA: Yes    Drug-seeking behavior POA: Yes    Hypophosphatemia POA: Yes  Resolved Problems:    * No resolved hospital problems. *      Hospital Summary (Brief Narrative):       42-year-old white female came in with nausea and vomiting. She has no history of cyclic vomiting syndrome. She has known history of marijuana use. She was equivocal on when her last use was. She was noted to be dehydrated was started on intravenous fluids, electrolytes were replaced. Diet was slowly advanced. She also complained of anxiety which is chronic. Her BuSpar was increased. She was given some Xanax as needed. We have discussed with her the possible risk of abuse with Xanax.     In prescribing controlled substances to this patient, I certify that I have obtained and reviewed the medical history of Diana Hernandez. I have also made a good herminio effort to obtain applicable records from other providers who have treated the patient and records did not demonstrate any increased risk of substance abuse that would prevent me from prescribing controlled substances.     I have conducted a physical exam and documented it. I have reviewed Ms. Hernandez’s prescription history as maintained by the [place] Prescription Monitoring Program.     I have assessed the patient’s risk for abuse, dependency, and addiction using the validated Opioid Risk Tool available at https://www.mdcalc.com/wbeqes-ajdl-uaol-ort-narcotic-abuse.     Given the above, I believe the benefits of controlled substance therapy outweigh the risks. The reasons for prescribing  controlled substances include in my professional opinion, controlled substances are the only reasonable choice for this patient because symptom control. Accordingly, I have discussed the risk and benefits, treatment plan, and alternative therapies with the patient.       Consultants:      None    Procedures:        None    Discharge Medications:        Medication Reconciliation Completed     Medication List      START taking these medications      Instructions   ALPRAZolam 1 MG Tabs  Commonly known as:  XANAX   Take 1 Tab by mouth 4 times a day as needed for Anxiety or Sleep for up to 3 days.  Dose:  1 mg        CHANGE how you take these medications      Instructions   busPIRone 10 MG Tabs  What changed:  See the new instructions.  Commonly known as:  BUSPAR   Take 2 Tabs by mouth 2 Times a Day.  Dose:  20 mg     ondansetron 4 MG Tbdp  What changed:  · when to take this  · reasons to take this  Commonly known as:  ZOFRAN ODT   Take 1 Tab by mouth every four hours as needed (give PO if no IV route available).  Dose:  4 mg     promethazine 25 MG Tabs  What changed:  Another medication with the same name was removed. Continue taking this medication, and follow the directions you see here.  Commonly known as:  PHENERGAN   Take 1 Tab by mouth every 6 hours as needed for Nausea/Vomiting.  Dose:  25 mg        CONTINUE taking these medications      Instructions   diphenhydrAMINE 25 MG Tabs  Commonly known as:  BENADRYL   Take 25-50 mg by mouth every 6 hours as needed for Sleep.  Dose:  25-50 mg     PRENATAL PO   Take 1 Tab by mouth every day.  Dose:  1 Tab              Disposition:  Home       Diet:   Regular    Activity:   As tolerated    Code status:  Full    Primary Care Provider:    Isa Rodriguez M.D.    Follow up appointment details :        No follow-up provider specified.  Future Appointments  Date Time Provider Department Center   2/13/2018 10:00 AM Leatha Hernandez M.D. [facility] None   4/10/2018 10:00 AM Isa GARRIDO  PARESH Rodriguez [facility] None       Pending Studies:        None    Time spent on discharge day patient visit: 35 minutes    #################################################      Most Recent Labs:    Lab Results   Component Value Date/Time    WBC 4.9 02/07/2018 05:20 AM    RBC 3.32 (L) 02/07/2018 05:20 AM    HEMOGLOBIN 11.3 (L) 02/07/2018 05:20 AM    HEMATOCRIT 32.4 (L) 02/07/2018 05:20 AM    MCV 97.6 02/07/2018 05:20 AM    MCH 34.0 (H) 02/07/2018 05:20 AM    MCHC 34.9 02/07/2018 05:20 AM    MPV 9.7 02/07/2018 05:20 AM    NEUTSPOLYS 46.50 02/07/2018 05:20 AM    LYMPHOCYTES 41.00 02/07/2018 05:20 AM    MONOCYTES 9.90 02/07/2018 05:20 AM    EOSINOPHILS 0.80 02/07/2018 05:20 AM    BASOPHILS 1.00 02/07/2018 05:20 AM    HYPOCHROMIA 1+ 04/27/2016 12:49 PM    ANISOCYTOSIS 1+ 09/13/2016 12:29 AM      Lab Results   Component Value Date/Time    SODIUM 136 02/08/2018 09:00 AM    POTASSIUM 3.5 (L) 02/08/2018 09:00 AM    CHLORIDE 106 02/08/2018 09:00 AM    CO2 21 02/08/2018 09:00 AM    GLUCOSE 85 02/08/2018 09:00 AM    BUN 5 (L) 02/08/2018 09:00 AM    CREATININE 0.79 02/08/2018 09:00 AM    CREATININE 0.9 05/18/2009 08:53 AM      Lab Results   Component Value Date/Time    ALTSGPT 33 02/05/2018 09:07 PM    ASTSGOT 34 02/05/2018 09:07 PM    ALKPHOSPHAT 76 02/05/2018 09:07 PM    TBILIRUBIN 3.0 (H) 02/05/2018 09:07 PM    LIPASE 19 08/29/2017 06:25 AM    ALBUMIN 4.6 02/05/2018 09:07 PM    GLOBULIN 3.8 (H) 02/05/2018 09:07 PM    PREALBUMIN 23.0 05/09/2016 12:00 AM    INR 0.98 04/24/2017 09:39 AM    MACROCYTOSIS 1+ 09/13/2016 12:29 AM     Lab Results   Component Value Date/Time    PROTHROMBTM 13.3 04/24/2017 09:39 AM    INR 0.98 04/24/2017 09:39 AM        Imaging/ Testing:      DX-CHEST-PORTABLE (1 VIEW)   Final Result         1.  No acute cardiopulmonary disease.          Instructions:      The patient was instructed to return to the ER in the event of worsening symptoms. I have counseled the patient on the importance of compliance and  the patient has agreed to proceed with all medical recommendations and follow up plan indicated above.   The patient understands that all medications come with benefits and risks. Risks may include permanent injury or death and these risks can be minimized with close reassessment and monitoring.

## 2018-02-08 NOTE — PROGRESS NOTES
1215 Patient's sitting up in bed, having lunch. IVF patent & infusing. Due am medications given, per patient she's ready to take them now.    1225 Patient ambulating in the hallway,noted with steady gait.

## 2018-02-08 NOTE — PROGRESS NOTES
"Pt resting in bed, watching a movie. Scheduled medications given, assessment completed. Right IJ dressing changed (got loose during shower). Pt more calm and talkative this time and even verbalized \"i'm feeling much better. I remember you the other night but I don't remember talking to you this much.\" Pt did not ask for any PRN medications at this time but did ask if she can take Xanax and Ambien later tonight right before she goes to sleep. Warm pack provided for back. No other complaints or needs identified currently. Safety measures in place. Call light and personal belongings within reach. Will continue to monitor.  "

## 2018-02-08 NOTE — PROGRESS NOTES
1500 Patient's in bed. IVF patent & infusing. No c/o's at this time.     1511 New orders received & acknowledged from Dr Mccabe for the patient to be discharged home.

## 2018-02-08 NOTE — PROGRESS NOTES
1040 Checked wit the patient if she's ready to take her scheduled am medications, per patient she's still not ready yet. Will monitor.

## 2018-02-08 NOTE — PROGRESS NOTES
Bedside report given to dayshiyoni Lau. Plan of care discussed. All questions answered. Pt resting in bed, no complaints or needs as of this time.

## 2018-02-08 NOTE — CARE PLAN
Problem: Safety  Goal: Will remain free from injury  Outcome: PROGRESSING AS EXPECTED  Safety precaution in effect. Non skid socks in use. Call light within reach. Reminded patient to call for assist. Hourly rounds in effect. Verbalized understanding.    Problem: Infection  Goal: Will remain free from infection  Outcome: PROGRESSING AS EXPECTED  Hand washing every encounter & before & after patient care. Verbalized understanding.    Problem: Knowledge Deficit  Goal: Knowledge of disease process/condition, treatment plan, diagnostic tests, and medications will improve  Outcome: PROGRESSING AS EXPECTED  Discussed Plan of care. Questions answered. Verbalized understanding.    Problem: Pain Management  Goal: Pain level will decrease to patient's comfort goal    Intervention: Follow pain managment plan developed in collaboration with patient and Interdisciplinary Team  Outcome : Progressing as expected.                    Educated on pain scale. Encouraged to verbalize pain. Will medicate as per MAR.

## 2018-02-08 NOTE — PROGRESS NOTES
0650 Bedside report received from NOC RN (Venus) at the beginning of the shift. No distress noted.    0855 Patient's vomited 200 ml of yellow clear emesis after Breakfast. Refused medication at this time.    0900 Felipe blood from T[facility] as per MD's order for BMP, then sent to the lab. Patient's requesting for IV Ativan for c/o's anxiety.    0908 Medicated with Toradol (see MAR) for c/o's back pain, rates pain 4/10 & Zofran (see MAR) for c/o's n/v. Assessment completed. NO distress noted. Plan of care reviewed with the patient. Verbalized understanding.  Patient's requesting for a shower. Notified CNA.  Will continue to monitor.    0921 Placed a call to Dr Mccabe, notified MD that patient's requesting for Ativan for c/o's anxiety, new orders received & acknowledged (see MAR- Ativan, one time order).

## 2018-02-08 NOTE — PROGRESS NOTES
"Patient requests and receives hot towel for her back. Would like her diet advanced. \"If I'm going home tonight, I need to have real food\". MD notified, orders received.  "

## 2018-02-08 NOTE — PROGRESS NOTES
Received bedside report from sarahhiyoni Miller. Plan of care discussed. Safety measures in place. No complaints or needs from patient as of this time.

## 2018-02-09 ENCOUNTER — PATIENT OUTREACH (OUTPATIENT)
Dept: HEALTH INFORMATION MANAGEMENT | Facility: OTHER | Age: 43
End: 2018-02-09

## 2018-02-09 NOTE — PROGRESS NOTES
1602 Discharge instructions given to the patient. Verbalized understanding. Controlled substance abuse signed by the patient.    1605 Medicated with Ativan (see MAR) as a one time order for anxiety. Medicated with Zofran (se MAR) for c/o's nausea,no emesis noted.    1611 Medicated with Flexeril (see MAR) for c/o's muscle spasms.    1615 IV on right IJ TLC, dc'd as per MD's order. Pressure was applied for 25 minutes. No sins & symptoms of bleeding noted. Awaiting for her ride home.

## 2018-02-09 NOTE — PROGRESS NOTES
1700 Patient was discharged with patient's belongings, prescription for Xanax & e prescription for Phenergan, Zofran & Buspar via w/c accompanied by one female CNA & father-in-law via Private car.

## 2018-02-13 ENCOUNTER — OFFICE VISIT (OUTPATIENT)
Dept: INTERNAL MEDICINE | Facility: MEDICAL CENTER | Age: 43
End: 2018-02-13
Payer: COMMERCIAL

## 2018-02-13 VITALS
DIASTOLIC BLOOD PRESSURE: 78 MMHG | BODY MASS INDEX: 21.85 KG/M2 | HEIGHT: 64 IN | HEART RATE: 89 BPM | WEIGHT: 128 LBS | SYSTOLIC BLOOD PRESSURE: 104 MMHG | OXYGEN SATURATION: 99 % | TEMPERATURE: 98.7 F

## 2018-02-13 DIAGNOSIS — G43.A0 CYCLICAL VOMITING WITH NAUSEA, INTRACTABILITY OF VOMITING NOT SPECIFIED: Chronic | ICD-10-CM

## 2018-02-13 DIAGNOSIS — F41.9 ANXIETY: Chronic | ICD-10-CM

## 2018-02-13 PROCEDURE — 99214 OFFICE O/P EST MOD 30 MIN: CPT | Mod: GC | Performed by: INTERNAL MEDICINE

## 2018-02-13 RX ORDER — ALPRAZOLAM 1 MG/1
1 TABLET ORAL NIGHTLY PRN
COMMUNITY
End: 2018-03-22

## 2018-02-13 RX ORDER — BUSPIRONE HYDROCHLORIDE 10 MG/1
TABLET ORAL
COMMUNITY
Start: 2017-11-21 | End: 2018-02-13

## 2018-02-13 RX ORDER — BUSPIRONE HYDROCHLORIDE 15 MG/1
15 TABLET ORAL 2 TIMES DAILY
Qty: 90 TAB | Refills: 1 | Status: SHIPPED | OUTPATIENT
Start: 2018-02-13 | End: 2018-07-12

## 2018-02-13 ASSESSMENT — ENCOUNTER SYMPTOMS
PALPITATIONS: 0
FEVER: 0
DEPRESSION: 1
SHORTNESS OF BREATH: 0
NAUSEA: 0
ABDOMINAL PAIN: 0
VOMITING: 0
CHILLS: 0
COUGH: 0
INSOMNIA: 1
NERVOUS/ANXIOUS: 1
WHEEZING: 0
MUSCULOSKELETAL NEGATIVE: 1
DIARRHEA: 0

## 2018-02-13 NOTE — PROGRESS NOTES
"      Established Patient    Iris presents today with the following:    CC: Post hospital follow up.    HPI: 41 yo Ms. 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  She was again dx with Cyclic vomiting syndrome, JILL and anxiety. Her symptoms resolved, kidney function returned to normal after discharge. She was prescribed xanax on discharge (#10 tab). She is wondering whether she needs to take the xanax or not.   Today she is medically stable. She is really tearful/furstrated about her life. She stated she feels like being crazy. 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 Lost her job, insurance after she had two abdominal surgery for SMA syndrome. She started to think about horrible staff about her life and feels super anxious. She lost her father about 2 years back who was her huge support (\"was my touchstone\"). After the event she had brain seizure, was in coma for 15 days. She became severely depressed. About 1 year ago she got  to her best friend from high school. She is now planning to have a baby which is also adding stress to her life. She claimed that she stopped using marijuana. Last time she used was 1 week ago ( before the hospitalization). She can't see the behavioral therapist until May' 18. She is very frustrated.    Patient Active Problem List    Diagnosis Date Noted   • Dehydration 02/05/2018     Priority: High   • Hypokalemia 02/05/2018     Priority: High   • JILL (acute kidney injury) (CMS-[facility]) 02/05/2018     Priority: High   • AP (abdominal pain) 01/19/2016     Priority: High   • Cyclic vomiting syndrome 02/22/2013     Priority: High   • Hypophosphatemia 06/19/2016     Priority: Medium   • Drug-seeking behavior 03/19/2015     Priority: Medium   • Leukocytosis 02/24/2015     " "Priority: Medium   • Anxiety 12/21/2014     Priority: Medium   • Substance abuse 09/15/2011     Priority: Medium   • Anxiety 01/08/2017     Priority: Low   • Planned pregnancy 05/30/2017   • History of seizures 04/12/2017   • SMAS (superior mesenteric artery syndrome) (CMS-HCC) 06/20/2016       Current Outpatient Prescriptions   Medication Sig Dispense Refill   • ALPRAZolam (XANAX) 1 MG Tab Take 1 mg by mouth at bedtime as needed for Sleep.     • busPIRone (BUSPAR) 15 MG tablet Take 1 Tab by mouth 2 times a day. 90 Tab 1   • ondansetron (ZOFRAN ODT) 4 MG TABLET DISPERSIBLE Take 1 Tab by mouth every four hours as needed (give PO if no IV route available). 30 Tab 0   • promethazine (PHENERGAN) 25 MG Tab Take 1 Tab by mouth every 6 hours as needed for Nausea/Vomiting. 30 Tab 0   • Prenatal Vit-Fe Fumarate-FA (PRENATAL PO) Take 1 Tab by mouth every day.     • diphenhydrAMINE (BENADRYL) 25 MG Tab Take 25-50 mg by mouth every 6 hours as needed for Sleep.       No current facility-administered medications for this visit.        ROS: As per HPI. Additional pertinent symptoms as noted below.    Review of Systems   Constitutional: Negative for chills and fever.   Respiratory: Negative for cough, shortness of breath and wheezing.    Cardiovascular: Negative for chest pain and palpitations.   Gastrointestinal: Negative for abdominal pain, diarrhea, nausea and vomiting.   Musculoskeletal: Negative.    Psychiatric/Behavioral: Positive for depression. Negative for suicidal ideas. The patient is nervous/anxious and has insomnia.          /78   Pulse 89   Temp 37.1 °C (98.7 °F)   Ht 1.635 m (5' 4.37\")   Wt 58.1 kg (128 lb)   SpO2 99%   BMI 21.72 kg/m²     Physical Exam   Constitutional:   Apparently younger looking than her age, Caucsian female, emotionally labile, tearful.   HENT:   Head: Normocephalic and atraumatic.   Eyes: EOM are normal.   Cardiovascular: Normal rate, regular rhythm and normal heart sounds.  "   Pulmonary/Chest: Effort normal and breath sounds normal. No respiratory distress. She has no wheezes.   Abdominal: Soft. She exhibits no distension. There is no tenderness. There is no rebound.   Musculoskeletal: She exhibits no edema or deformity.   Skin: Skin is warm and dry.   Psychiatric:   Emotionally labile, anxious, intermittently burst into tear.       Note: I have reviewed all pertinent labs and diagnostic tests associated with this visit with specific comments listed under the assessment and plan below    Assessment and Plan    1. Anxiety  Very anxious.  Pending psychotherapy and psych eval  On Buspirone 10 mg bid  Ordered urgent referral to psychiatry and behavioral health for her uncontrolled anxiety.  Gave her information about mindful meditation online link  Increased the dose of Buspirone 15 mg bid  No Benzodiazepine prescribed today due to risk of dependence and abuse.  Significant time spent to  her and reassure her for anxiety.  Advised to f/u with PCP soon, in 2 weeks.    2. Cyclical vomiting with nausea, intractability of vomiting not specified  Resolved.  Advised to stop using marijuana.  Pt verbalized understanding the risk.          Followup: Return in about 3 weeks (around 3/6/2018) for 3 weeks with pcp.      Signed by: Leatha Hernandez M.D.

## 2018-02-28 ENCOUNTER — HOSPITAL ENCOUNTER (OUTPATIENT)
Facility: MEDICAL CENTER | Age: 43
End: 2018-02-28
Attending: OBSTETRICS & GYNECOLOGY
Payer: COMMERCIAL

## 2018-02-28 LAB
AMBIGUOUS DTTM AMBI4: NORMAL
PROLACTIN SERPL-MCNC: 10.8 NG/ML (ref 2.8–26)
RUBV AB SER QL: 120.5 IU/ML
TSH SERPL DL<=0.005 MIU/L-ACNC: 0.95 UIU/ML (ref 0.38–5.33)

## 2018-02-28 PROCEDURE — 84146 ASSAY OF PROLACTIN: CPT

## 2018-02-28 PROCEDURE — 84443 ASSAY THYROID STIM HORMONE: CPT

## 2018-02-28 PROCEDURE — 86762 RUBELLA ANTIBODY: CPT

## 2018-02-28 PROCEDURE — 83520 IMMUNOASSAY QUANT NOS NONAB: CPT

## 2018-03-04 LAB — MIS SERPL-MCNC: 0.03 NG/ML

## 2018-03-22 ENCOUNTER — RESOLUTE PROFESSIONAL BILLING HOSPITAL PROF FEE (OUTPATIENT)
Dept: HOSPITALIST | Facility: MEDICAL CENTER | Age: 43
End: 2018-03-22
Payer: COMMERCIAL

## 2018-03-22 ENCOUNTER — HOSPITAL ENCOUNTER (OUTPATIENT)
Facility: MEDICAL CENTER | Age: 43
End: 2018-03-23
Attending: EMERGENCY MEDICINE | Admitting: INTERNAL MEDICINE
Payer: COMMERCIAL

## 2018-03-22 DIAGNOSIS — R11.15 INTRACTABLE CYCLICAL VOMITING WITH NAUSEA: ICD-10-CM

## 2018-03-22 PROBLEM — E87.20 METABOLIC ACIDOSIS: Status: ACTIVE | Noted: 2018-03-22

## 2018-03-22 LAB
ANION GAP SERPL CALC-SCNC: 12 MMOL/L (ref 0–11.9)
BASOPHILS # BLD AUTO: 0.7 % (ref 0–1.8)
BASOPHILS # BLD: 0.1 K/UL (ref 0–0.12)
BUN SERPL-MCNC: 15 MG/DL (ref 8–22)
CALCIUM SERPL-MCNC: 9.5 MG/DL (ref 8.4–10.2)
CHLORIDE SERPL-SCNC: 110 MMOL/L (ref 96–112)
CO2 SERPL-SCNC: 16 MMOL/L (ref 20–33)
CREAT SERPL-MCNC: 0.92 MG/DL (ref 0.5–1.4)
EOSINOPHIL # BLD AUTO: 0.01 K/UL (ref 0–0.51)
EOSINOPHIL NFR BLD: 0.1 % (ref 0–6.9)
ERYTHROCYTE [DISTWIDTH] IN BLOOD BY AUTOMATED COUNT: 45.2 FL (ref 35.9–50)
GLUCOSE SERPL-MCNC: 166 MG/DL (ref 65–99)
HCG SERPL QL: NEGATIVE
HCT VFR BLD AUTO: 41.2 % (ref 37–47)
HGB BLD-MCNC: 14.3 G/DL (ref 12–16)
IMM GRANULOCYTES # BLD AUTO: 0.06 K/UL (ref 0–0.11)
IMM GRANULOCYTES NFR BLD AUTO: 0.4 % (ref 0–0.9)
LYMPHOCYTES # BLD AUTO: 1.54 K/UL (ref 1–4.8)
LYMPHOCYTES NFR BLD: 10.5 % (ref 22–41)
MCH RBC QN AUTO: 33.6 PG (ref 27–33)
MCHC RBC AUTO-ENTMCNC: 34.7 G/DL (ref 33.6–35)
MCV RBC AUTO: 96.7 FL (ref 81.4–97.8)
MONOCYTES # BLD AUTO: 1.03 K/UL (ref 0–0.85)
MONOCYTES NFR BLD AUTO: 7 % (ref 0–13.4)
NEUTROPHILS # BLD AUTO: 11.91 K/UL (ref 2–7.15)
NEUTROPHILS NFR BLD: 81.3 % (ref 44–72)
NRBC # BLD AUTO: 0 K/UL
NRBC BLD-RTO: 0 /100 WBC
PLATELET # BLD AUTO: 324 K/UL (ref 164–446)
PMV BLD AUTO: 10.2 FL (ref 9–12.9)
POTASSIUM SERPL-SCNC: 3.5 MMOL/L (ref 3.6–5.5)
RBC # BLD AUTO: 4.26 M/UL (ref 4.2–5.4)
SODIUM SERPL-SCNC: 138 MMOL/L (ref 135–145)
WBC # BLD AUTO: 14.7 K/UL (ref 4.8–10.8)

## 2018-03-22 PROCEDURE — 80048 BASIC METABOLIC PNL TOTAL CA: CPT

## 2018-03-22 PROCEDURE — 700102 HCHG RX REV CODE 250 W/ 637 OVERRIDE(OP): Performed by: EMERGENCY MEDICINE

## 2018-03-22 PROCEDURE — 700105 HCHG RX REV CODE 258: Performed by: EMERGENCY MEDICINE

## 2018-03-22 PROCEDURE — 700111 HCHG RX REV CODE 636 W/ 250 OVERRIDE (IP): Performed by: EMERGENCY MEDICINE

## 2018-03-22 PROCEDURE — 99219 PR INITIAL OBSERVATION CARE,LEVL II: CPT | Performed by: INTERNAL MEDICINE

## 2018-03-22 PROCEDURE — 96374 THER/PROPH/DIAG INJ IV PUSH: CPT

## 2018-03-22 PROCEDURE — 96361 HYDRATE IV INFUSION ADD-ON: CPT

## 2018-03-22 PROCEDURE — 84703 CHORIONIC GONADOTROPIN ASSAY: CPT

## 2018-03-22 PROCEDURE — G0378 HOSPITAL OBSERVATION PER HR: HCPCS

## 2018-03-22 PROCEDURE — 96376 TX/PRO/DX INJ SAME DRUG ADON: CPT

## 2018-03-22 PROCEDURE — 700105 HCHG RX REV CODE 258: Performed by: INTERNAL MEDICINE

## 2018-03-22 PROCEDURE — 85025 COMPLETE CBC W/AUTO DIFF WBC: CPT

## 2018-03-22 PROCEDURE — 700111 HCHG RX REV CODE 636 W/ 250 OVERRIDE (IP): Performed by: INTERNAL MEDICINE

## 2018-03-22 PROCEDURE — A9270 NON-COVERED ITEM OR SERVICE: HCPCS | Performed by: INTERNAL MEDICINE

## 2018-03-22 PROCEDURE — A9270 NON-COVERED ITEM OR SERVICE: HCPCS | Performed by: EMERGENCY MEDICINE

## 2018-03-22 PROCEDURE — 700102 HCHG RX REV CODE 250 W/ 637 OVERRIDE(OP): Performed by: INTERNAL MEDICINE

## 2018-03-22 PROCEDURE — 99285 EMERGENCY DEPT VISIT HI MDM: CPT

## 2018-03-22 PROCEDURE — 96375 TX/PRO/DX INJ NEW DRUG ADDON: CPT

## 2018-03-22 RX ORDER — SODIUM CHLORIDE 9 MG/ML
1000 INJECTION, SOLUTION INTRAVENOUS CONTINUOUS
Status: ACTIVE | OUTPATIENT
Start: 2018-03-22 | End: 2018-03-22

## 2018-03-22 RX ORDER — SODIUM CHLORIDE 9 MG/ML
INJECTION, SOLUTION INTRAVENOUS CONTINUOUS
Status: DISCONTINUED | OUTPATIENT
Start: 2018-03-22 | End: 2018-03-23 | Stop reason: HOSPADM

## 2018-03-22 RX ORDER — BISACODYL 10 MG
10 SUPPOSITORY, RECTAL RECTAL
Status: DISCONTINUED | OUTPATIENT
Start: 2018-03-22 | End: 2018-03-23 | Stop reason: HOSPADM

## 2018-03-22 RX ORDER — AMOXICILLIN 250 MG
2 CAPSULE ORAL 2 TIMES DAILY
Status: DISCONTINUED | OUTPATIENT
Start: 2018-03-22 | End: 2018-03-23 | Stop reason: HOSPADM

## 2018-03-22 RX ORDER — DIPHENHYDRAMINE HCL 25 MG
25 TABLET ORAL EVERY 6 HOURS PRN
Status: DISCONTINUED | OUTPATIENT
Start: 2018-03-22 | End: 2018-03-23 | Stop reason: HOSPADM

## 2018-03-22 RX ORDER — ONDANSETRON 2 MG/ML
4 INJECTION INTRAMUSCULAR; INTRAVENOUS ONCE
Status: DISCONTINUED | OUTPATIENT
Start: 2018-03-22 | End: 2018-03-23 | Stop reason: HOSPADM

## 2018-03-22 RX ORDER — DIPHENHYDRAMINE HCL 25 MG
25 TABLET ORAL ONCE
Status: COMPLETED | OUTPATIENT
Start: 2018-03-22 | End: 2018-03-22

## 2018-03-22 RX ORDER — HEPARIN SODIUM 5000 [USP'U]/ML
5000 INJECTION, SOLUTION INTRAVENOUS; SUBCUTANEOUS EVERY 8 HOURS
Status: DISCONTINUED | OUTPATIENT
Start: 2018-03-22 | End: 2018-03-23 | Stop reason: HOSPADM

## 2018-03-22 RX ORDER — ONDANSETRON 2 MG/ML
4 INJECTION INTRAMUSCULAR; INTRAVENOUS EVERY 4 HOURS PRN
Status: DISCONTINUED | OUTPATIENT
Start: 2018-03-22 | End: 2018-03-23 | Stop reason: HOSPADM

## 2018-03-22 RX ORDER — SODIUM CHLORIDE 9 MG/ML
1000 INJECTION, SOLUTION INTRAVENOUS CONTINUOUS
Status: DISPENSED | OUTPATIENT
Start: 2018-03-22 | End: 2018-03-22

## 2018-03-22 RX ORDER — DIPHENHYDRAMINE HCL 25 MG
50 TABLET ORAL EVERY 6 HOURS PRN
Status: DISCONTINUED | OUTPATIENT
Start: 2018-03-22 | End: 2018-03-23 | Stop reason: HOSPADM

## 2018-03-22 RX ORDER — ONDANSETRON 4 MG/1
4 TABLET, ORALLY DISINTEGRATING ORAL EVERY 4 HOURS PRN
Status: DISCONTINUED | OUTPATIENT
Start: 2018-03-22 | End: 2018-03-23 | Stop reason: HOSPADM

## 2018-03-22 RX ORDER — BUSPIRONE HYDROCHLORIDE 5 MG/1
15 TABLET ORAL 2 TIMES DAILY
Status: DISCONTINUED | OUTPATIENT
Start: 2018-03-22 | End: 2018-03-23 | Stop reason: HOSPADM

## 2018-03-22 RX ORDER — PROMETHAZINE HYDROCHLORIDE 25 MG/1
12.5-25 SUPPOSITORY RECTAL EVERY 4 HOURS PRN
Status: DISCONTINUED | OUTPATIENT
Start: 2018-03-22 | End: 2018-03-23 | Stop reason: HOSPADM

## 2018-03-22 RX ORDER — PROMETHAZINE HYDROCHLORIDE 25 MG/1
12.5-25 TABLET ORAL EVERY 4 HOURS PRN
Status: DISCONTINUED | OUTPATIENT
Start: 2018-03-22 | End: 2018-03-23 | Stop reason: HOSPADM

## 2018-03-22 RX ORDER — POLYETHYLENE GLYCOL 3350 17 G/17G
1 POWDER, FOR SOLUTION ORAL
Status: DISCONTINUED | OUTPATIENT
Start: 2018-03-22 | End: 2018-03-23 | Stop reason: HOSPADM

## 2018-03-22 RX ORDER — ACETAMINOPHEN 325 MG/1
650 TABLET ORAL EVERY 6 HOURS PRN
Status: DISCONTINUED | OUTPATIENT
Start: 2018-03-22 | End: 2018-03-23 | Stop reason: HOSPADM

## 2018-03-22 RX ORDER — HALOPERIDOL 5 MG/ML
3 INJECTION INTRAMUSCULAR ONCE
Status: COMPLETED | OUTPATIENT
Start: 2018-03-22 | End: 2018-03-22

## 2018-03-22 RX ORDER — DIPHENHYDRAMINE HCL 25 MG
25-50 TABLET ORAL EVERY 6 HOURS PRN
Status: DISCONTINUED | OUTPATIENT
Start: 2018-03-22 | End: 2018-03-22

## 2018-03-22 RX ORDER — LORAZEPAM 2 MG/ML
0.5 INJECTION INTRAMUSCULAR ONCE
Status: COMPLETED | OUTPATIENT
Start: 2018-03-22 | End: 2018-03-22

## 2018-03-22 RX ORDER — LORAZEPAM 2 MG/ML
1 INJECTION INTRAMUSCULAR ONCE
Status: COMPLETED | OUTPATIENT
Start: 2018-03-22 | End: 2018-03-22

## 2018-03-22 RX ADMIN — LORAZEPAM 1 MG: 2 INJECTION INTRAMUSCULAR; INTRAVENOUS at 12:23

## 2018-03-22 RX ADMIN — BUSPIRONE HYDROCHLORIDE 15 MG: 5 TABLET ORAL at 20:38

## 2018-03-22 RX ADMIN — PROCHLORPERAZINE EDISYLATE 5 MG: 5 INJECTION INTRAMUSCULAR; INTRAVENOUS at 12:24

## 2018-03-22 RX ADMIN — SODIUM CHLORIDE 1000 ML: 9 INJECTION, SOLUTION INTRAVENOUS at 12:20

## 2018-03-22 RX ADMIN — SODIUM CHLORIDE: 9 INJECTION, SOLUTION INTRAVENOUS at 20:37

## 2018-03-22 RX ADMIN — HALOPERIDOL LACTATE 3 MG: 5 INJECTION, SOLUTION INTRAMUSCULAR at 13:06

## 2018-03-22 RX ADMIN — HEPARIN SODIUM 5000 UNITS: 5000 INJECTION, SOLUTION INTRAVENOUS; SUBCUTANEOUS at 20:37

## 2018-03-22 RX ADMIN — DIPHENHYDRAMINE HCL 25 MG: 25 TABLET ORAL at 13:04

## 2018-03-22 RX ADMIN — ONDANSETRON 4 MG: 2 INJECTION INTRAMUSCULAR; INTRAVENOUS at 16:12

## 2018-03-22 RX ADMIN — LORAZEPAM 0.5 MG: 2 INJECTION INTRAMUSCULAR; INTRAVENOUS at 16:10

## 2018-03-22 RX ADMIN — DIPHENHYDRAMINE HCL 25 MG: 25 TABLET ORAL at 20:38

## 2018-03-22 ASSESSMENT — ENCOUNTER SYMPTOMS
PALPITATIONS: 0
NERVOUS/ANXIOUS: 0
DEPRESSION: 0
BACK PAIN: 0
EYE PAIN: 0
STRIDOR: 0
FOCAL WEAKNESS: 0
INSOMNIA: 0
EYE REDNESS: 0
COUGH: 0
CHILLS: 0
FEVER: 0
ABDOMINAL PAIN: 0
WEIGHT LOSS: 0
HEARTBURN: 0
NECK PAIN: 0
VOMITING: 1
BLURRED VISION: 0
MYALGIAS: 0
SHORTNESS OF BREATH: 0
ORTHOPNEA: 0
SPUTUM PRODUCTION: 0
NAUSEA: 1
DIARRHEA: 0
DIZZINESS: 0
EYE DISCHARGE: 0
SEIZURES: 0
HEADACHES: 0

## 2018-03-22 NOTE — PROGRESS NOTES
"Admitted into room 211-2 from ER via [facility] with intractable nausea and vomiting.Was able to ambulate into room,\"dry heaving\" drowsy but still requesting for her Benadryl and Haldol. Re-started IVF,made comfortable in bed and encouraged to rest and sleep.  "

## 2018-03-22 NOTE — ED NOTES
Patient bib [facility] with anxiety and N/V. Patient was at fertility clinic earlier this am for a procedure and patient stated it was esther painful it caused her to become anxious. Patient currently anxious and has N/V. Patient speaking in full sentences. IV in place by [facility]  Fentanyl 100mcg given by [facility]  Zofran 4mg given by [facility]

## 2018-03-22 NOTE — H&P
Hospital Medicine History and Physical      Date of Service  3/22/2018    Chief Complaint  Chief Complaint   Patient presents with   • Anxiety   • N/V       History of Presenting Illness  David is a 42 y.o. female PMH of marijuana abuse, anxiety disorder, who presents with intractable nausea, vomiting since 7 30 am today. She has history of cyclic vomiting syndrome in the past. She was found to be dehydrated from above symptoms and she is feeling very weak and tired. She will be admitted for observation.    Primary Care Physician  Isa Rodriguez M.D.      Code Status  Full code    Review of Systems  Review of Systems   Constitutional: Negative for chills, fever and weight loss.   HENT: Negative for congestion and nosebleeds.    Eyes: Negative for blurred vision, pain, discharge and redness.   Respiratory: Negative for cough, sputum production, shortness of breath and stridor.    Cardiovascular: Negative for chest pain, palpitations and orthopnea.   Gastrointestinal: Positive for nausea and vomiting. Negative for abdominal pain, diarrhea and heartburn.   Genitourinary: Negative for dysuria, frequency and urgency.   Musculoskeletal: Negative for back pain, myalgias and neck pain.   Skin: Negative for itching and rash.   Neurological: Negative for dizziness, focal weakness, seizures and headaches.   Psychiatric/Behavioral: Negative for depression. The patient is not nervous/anxious and does not have insomnia.      Please see HPI, all other systems were reviewed and are negative (AMA/CMS criteria)     Past Medical History  Past Medical History:   Diagnosis Date   • CLOSTRIDIUM DIFFICILE INFECTION 4/14/2010   • Superior mesenteric artery syndrome (CMS-HCC) 7/12/2009   • Dyspepsia 7/12/2009   • Tobacco abuse 6/20/2009   • Nephrolithiasis 6/20/2009    kidney stones,post lithotrypsy   • Anxiety     Followed by [facility]   • Anxiety disorder    • Backpain    • CVS disease    • Cyclic vomiting syndrome    • Dental disorder     • Drug-seeking behavior    • Pain     abd and back   • Snoring    • Superior mesenteric artery syndrome (CMS-HCC)        Surgical History  Past Surgical History:   Procedure Laterality Date   • GASTROSCOPY-ENDO  4/28/2016    Procedure: GASTROSCOPY-ENDO;  Surgeon: Blayne Blackburn M.D.;  Location: [facility];  Service:    • EXPLORATORY LAPAROTOMY  1/12/2016    Procedure: EXPLORATORY LAPAROTOMY;  Surgeon: Maciel Quintero M.D.;  Location: SURGERY [facility];  Service:    • BOWEL RESECTION  1/12/2016    Procedure: BOWEL RESECTION;  Surgeon: Maciel Quintero M.D.;  Location: SURGERY [facility];  Service:    • GASTROSCOPY WITH BIOPSY  3/9/2010    Performed by AMANDA MEJIA at ENDOSCOPY [facility]   • PYLOROPLASTY  7/27/2009    Performed by MACIEL QIUNTERO at SURGERY [facility]   • EXPLORATORY LAPAROTOMY  7/27/2009    Performed by MACIEL QUINTERO at SURGERY [facility]   • APPENDECTOMY  7/27/2009    Performed by MACIEL QUINTERO at SURGERY [facility]   • CHOLECYSTECTOMY  7/27/2009    Performed by MACIEL QUINTERO at SURGERY [facility]   • GASTROSCOPY-ENDO  7/8/2009    Performed by ROSANNA WRIGHT at SURGERY [facility]   • LITHOTRIPSY     • OTHER      superior mesenteric artery correction        Medications  No current facility-administered medications on file prior to encounter.      Current Outpatient Prescriptions on File Prior to Encounter   Medication Sig Dispense Refill   • busPIRone (BUSPAR) 15 MG tablet Take 1 Tab by mouth 2 times a day. 90 Tab 1   • ondansetron (ZOFRAN ODT) 4 MG TABLET DISPERSIBLE Take 1 Tab by mouth every four hours as needed (give PO if no IV route available). 30 Tab 0   • promethazine (PHENERGAN) 25 MG Tab Take 1 Tab by mouth every 6 hours as needed for Nausea/Vomiting. 30 Tab 0   • Prenatal Vit-Fe Fumarate-FA (PRENATAL PO) Take 1 Tab by mouth every day.     • diphenhydrAMINE  (BENADRYL) 25 MG Tab Take 25-50 mg by mouth every 6 hours as needed for Sleep.       Family History  Family History   Problem Relation Age of Onset   • Cancer Mother 50     Colon cancer   • Hypertension Mother    • Allergies Father    • Hypertension Father    • Heart Disease Maternal Grandmother          Social History  Social History   Substance Use Topics   • Smoking status: Former Smoker     Packs/day: 0.50     Years: 13.00     Types: Cigarettes     Quit date: 12/15/2012   • Smokeless tobacco: Never Used      Comment: 1/2ppd   • Alcohol use No       Allergies  Allergies   Allergen Reactions   • Metoclopramide Hives     Told by MD; pt suspects possible hives.   • Bactrim [Sulfamethoxazole W-Trimethoprim] Unspecified     Someone said I had a rash or something.      • Seroquel [Quetiapine] Unspecified     MD told her she was allergic- the room was spinning and eye eyes shook when I was in a coma          Physical Exam  Laboratory   Hemodynamics  Temp (24hrs), Av.1 °C (97 °F), Min:36.1 °C (97 °F), Max:36.1 °C (97 °F)   Temperature: 36.1 °C (97 °F)  Pulse  Av  Min: 64  Max: 105    Blood Pressure: 108/68, NIBP: 104/68      Respiratory      Respiration: 20, Pulse Oximetry: (!) 80 %             Physical Exam   Constitutional: She is oriented to person, place, and time. No distress.   HENT:   Head: Normocephalic and atraumatic.   Mouth/Throat: Oropharynx is clear and moist.   Eyes: Conjunctivae and EOM are normal. Pupils are equal, round, and reactive to light.   Neck: Normal range of motion. Neck supple. No tracheal deviation present. No thyromegaly present.   Cardiovascular: Normal rate and regular rhythm.    No murmur heard.  Pulmonary/Chest: Effort normal and breath sounds normal. No respiratory distress. She has no wheezes.   Abdominal: Soft. Bowel sounds are normal. She exhibits no distension. There is no tenderness.   Musculoskeletal: She exhibits no edema or tenderness.   Neurological: She is alert and  oriented to person, place, and time. No cranial nerve deficit.   Skin: Skin is warm and dry. She is not diaphoretic. No erythema.   Psychiatric: She has a normal mood and affect. Her behavior is normal. Thought content normal.       Recent Labs      03/22/18   1225   WBC  14.7*   RBC  4.26   HEMOGLOBIN  14.3   HEMATOCRIT  41.2   MCV  96.7   MCH  33.6*   MCHC  34.7   RDW  45.2   PLATELETCT  324   MPV  10.2     Recent Labs      03/22/18   1225   SODIUM  138   POTASSIUM  3.5*   CHLORIDE  110   CO2  16*   GLUCOSE  166*   BUN  15   CREATININE  0.92   CALCIUM  9.5     Recent Labs      03/22/18   1225   GLUCOSE  166*                 Lab Results   Component Value Date    TROPONINI <0.01 05/11/2016       Imaging  No orders to display          Assessment/Plan     I anticipate this patient is appropriate for observation status at this time.    Hypokalemia- (present on admission)   Assessment & Plan    Replete as needed        Cyclic vomiting syndrome- (present on admission)   Assessment & Plan    Related to marijuana abuse  Cessation education  Supportive care with IVF, antiemetic        Leukocytosis- (present on admission)   Assessment & Plan    Likely reactive        Metabolic acidosis- (present on admission)   Assessment & Plan    Related to vomiting  Plan as above            Prophylaxis:  sc heparin

## 2018-03-22 NOTE — ED NOTES
Patient continues to be very anxious and requesting certain medications to help her with pain relief. ERP aware and has ordered IV ativAN and compazine to help with the anxiety and N/V. ERP spoke with patient about medications and how certain medications work for her condition. Patient continues to request other medications. She was instructed to allow the medications to take effect

## 2018-03-23 ENCOUNTER — PATIENT OUTREACH (OUTPATIENT)
Dept: HEALTH INFORMATION MANAGEMENT | Facility: OTHER | Age: 43
End: 2018-03-23

## 2018-03-23 VITALS
SYSTOLIC BLOOD PRESSURE: 102 MMHG | HEIGHT: 64 IN | DIASTOLIC BLOOD PRESSURE: 60 MMHG | WEIGHT: 138.89 LBS | TEMPERATURE: 97.8 F | OXYGEN SATURATION: 95 % | HEART RATE: 96 BPM | BODY MASS INDEX: 23.71 KG/M2 | RESPIRATION RATE: 16 BRPM

## 2018-03-23 LAB
ANION GAP SERPL CALC-SCNC: 4 MMOL/L (ref 0–11.9)
BUN SERPL-MCNC: 9 MG/DL (ref 8–22)
CALCIUM SERPL-MCNC: 8.6 MG/DL (ref 8.4–10.2)
CHLORIDE SERPL-SCNC: 111 MMOL/L (ref 96–112)
CO2 SERPL-SCNC: 22 MMOL/L (ref 20–33)
CREAT SERPL-MCNC: 0.69 MG/DL (ref 0.5–1.4)
ERYTHROCYTE [DISTWIDTH] IN BLOOD BY AUTOMATED COUNT: 48.1 FL (ref 35.9–50)
GLUCOSE SERPL-MCNC: 92 MG/DL (ref 65–99)
HCT VFR BLD AUTO: 34.4 % (ref 37–47)
HGB BLD-MCNC: 11.9 G/DL (ref 12–16)
MCH RBC QN AUTO: 34.1 PG (ref 27–33)
MCHC RBC AUTO-ENTMCNC: 34.6 G/DL (ref 33.6–35)
MCV RBC AUTO: 98.6 FL (ref 81.4–97.8)
PLATELET # BLD AUTO: 240 K/UL (ref 164–446)
PMV BLD AUTO: 10.1 FL (ref 9–12.9)
POTASSIUM SERPL-SCNC: 3.1 MMOL/L (ref 3.6–5.5)
RBC # BLD AUTO: 3.49 M/UL (ref 4.2–5.4)
SODIUM SERPL-SCNC: 137 MMOL/L (ref 135–145)
WBC # BLD AUTO: 9.6 K/UL (ref 4.8–10.8)

## 2018-03-23 PROCEDURE — 96375 TX/PRO/DX INJ NEW DRUG ADDON: CPT

## 2018-03-23 PROCEDURE — 700111 HCHG RX REV CODE 636 W/ 250 OVERRIDE (IP): Performed by: INTERNAL MEDICINE

## 2018-03-23 PROCEDURE — 85027 COMPLETE CBC AUTOMATED: CPT

## 2018-03-23 PROCEDURE — 96376 TX/PRO/DX INJ SAME DRUG ADON: CPT

## 2018-03-23 PROCEDURE — 700102 HCHG RX REV CODE 250 W/ 637 OVERRIDE(OP): Performed by: INTERNAL MEDICINE

## 2018-03-23 PROCEDURE — 36415 COLL VENOUS BLD VENIPUNCTURE: CPT

## 2018-03-23 PROCEDURE — 99217 PR OBSERVATION CARE DISCHARGE: CPT | Performed by: INTERNAL MEDICINE

## 2018-03-23 PROCEDURE — 700105 HCHG RX REV CODE 258: Performed by: INTERNAL MEDICINE

## 2018-03-23 PROCEDURE — A9270 NON-COVERED ITEM OR SERVICE: HCPCS | Performed by: INTERNAL MEDICINE

## 2018-03-23 PROCEDURE — 80048 BASIC METABOLIC PNL TOTAL CA: CPT

## 2018-03-23 PROCEDURE — G0378 HOSPITAL OBSERVATION PER HR: HCPCS

## 2018-03-23 RX ORDER — ALPRAZOLAM 0.5 MG/1
0.5 TABLET ORAL ONCE
Status: COMPLETED | OUTPATIENT
Start: 2018-03-23 | End: 2018-03-23

## 2018-03-23 RX ORDER — POTASSIUM CHLORIDE 20 MEQ/1
20 TABLET, EXTENDED RELEASE ORAL 2 TIMES DAILY
Status: DISCONTINUED | OUTPATIENT
Start: 2018-03-23 | End: 2018-03-23 | Stop reason: HOSPADM

## 2018-03-23 RX ORDER — LORAZEPAM 2 MG/ML
0.5 INJECTION INTRAMUSCULAR EVERY 4 HOURS PRN
Status: DISCONTINUED | OUTPATIENT
Start: 2018-03-23 | End: 2018-03-23 | Stop reason: HOSPADM

## 2018-03-23 RX ADMIN — BUSPIRONE HYDROCHLORIDE 15 MG: 5 TABLET ORAL at 08:17

## 2018-03-23 RX ADMIN — ALPRAZOLAM 0.5 MG: 0.5 TABLET ORAL at 06:17

## 2018-03-23 RX ADMIN — PROCHLORPERAZINE EDISYLATE 10 MG: 5 INJECTION INTRAMUSCULAR; INTRAVENOUS at 08:28

## 2018-03-23 RX ADMIN — SODIUM CHLORIDE: 9 INJECTION, SOLUTION INTRAVENOUS at 05:57

## 2018-03-23 RX ADMIN — PROMETHAZINE HYDROCHLORIDE 25 MG: 25 SUPPOSITORY RECTAL at 12:26

## 2018-03-23 RX ADMIN — HEPARIN SODIUM 5000 UNITS: 5000 INJECTION, SOLUTION INTRAVENOUS; SUBCUTANEOUS at 05:39

## 2018-03-23 RX ADMIN — LORAZEPAM 0.5 MG: 2 INJECTION INTRAMUSCULAR; INTRAVENOUS at 11:55

## 2018-03-23 RX ADMIN — POTASSIUM CHLORIDE 20 MEQ: 1500 TABLET, EXTENDED RELEASE ORAL at 09:41

## 2018-03-23 RX ADMIN — Medication 400 MG: at 09:41

## 2018-03-23 ASSESSMENT — PATIENT HEALTH QUESTIONNAIRE - PHQ9
1. LITTLE INTEREST OR PLEASURE IN DOING THINGS: NOT AT ALL
2. FEELING DOWN, DEPRESSED, IRRITABLE, OR HOPELESS: NOT AT ALL
SUM OF ALL RESPONSES TO PHQ9 QUESTIONS 1 AND 2: 0

## 2018-03-23 NOTE — PROGRESS NOTES
Discharging pt home per MD order. Discussed discharge instructions, follow up appointments, prescriptions, and home care for N/V. Pt voiding and ambulating without difficulty, pain controlled, tolerating diet. Family at bedside, all questions answered. Pt discharged off unit with hospital escort at 1418.

## 2018-03-23 NOTE — PROGRESS NOTES
Received report from NOC RN; assumed pt care. Pt A&Ox4, sitting up in bed. Pt extremely anxious and wants to go home. Small amount of emesis x1, thin, yellow. Pt notified to call for assistance, call light within reach.

## 2018-03-23 NOTE — CARE PLAN
Problem: Bowel/Gastric:  Goal: Normal bowel function is maintained or improved  Outcome: PROGRESSING AS EXPECTED  Pt complains of n/v, medicated per MAR.     Problem: Discharge Barriers/Planning  Goal: Patient's continuum of care needs will be met  Outcome: PROGRESSING AS EXPECTED  Pt updated on POC, all questions answered.

## 2018-03-23 NOTE — PROGRESS NOTES
[facility] Hospitalist Progress Note    Date of Service: 3/23/2018    Chief Complaint  42 y.o. female admitted 3/22/2018 with history of marijuana abuse with cyclical nausea and vomiting and anxiety disorder.    Interval Problem Update  ***    Consultants/Specialty  ***    Disposition  ***        ROS   Physical Exam  Laboratory/Imaging   Hemodynamics  Temp (24hrs), Av.7 °C (98.1 °F), Min:36.1 °C (97 °F), Max:37.1 °C (98.8 °F)   Temperature: 37 °C (98.6 °F)  Pulse  Av.6  Min: 64  Max: 105    Blood Pressure: (!) 93/54, NIBP: 117/70      Respiratory      Respiration: 16, Pulse Oximetry: 92 %             Fluids    Intake/Output Summary (Last 24 hours) at 18 0816  Last data filed at 18 0600   Gross per 24 hour   Intake             2300 ml   Output                0 ml   Net             2300 ml       Nutrition  Orders Placed This Encounter   Procedures   • Diet Order     Standing Status:   Standing     Number of Occurrences:   1     Order Specific Question:   Diet:     Answer:   Low Fiber(GI Soft) [2]     Physical Exam    Recent Labs      18   1225  18   0447   WBC  14.7*  9.6   RBC  4.26  3.49*   HEMOGLOBIN  14.3  11.9*   HEMATOCRIT  41.2  34.4*   MCV  96.7  98.6*   MCH  33.6*  34.1*   MCHC  34.7  34.6   RDW  45.2  48.1   PLATELETCT  324  240   MPV  10.2  10.1     Recent Labs      18   1225  18   0447   SODIUM  138  137   POTASSIUM  3.5*  3.1*   CHLORIDE  110  111   CO2  16*  22   GLUCOSE  166*  92   BUN  15  9   CREATININE  0.92  0.69   CALCIUM  9.5  8.6                      Assessment/Plan     Hypokalemia- (present on admission)   Assessment & Plan    Replete as needed    Add oral potassium  Add oral minute magnesium  Follow-up BMP        Cyclic vomiting syndrome- (present on admission)   Assessment & Plan    Related to marijuana abuse  Cessation education  Supportive care with IVF, antiemetic          Leukocytosis- (present on admission)   Assessment & Plan    Likely  reactive    Resolved        Anxiety- (present on admission)   Assessment & Plan    Add Ativan as needed, limit use        Metabolic acidosis- (present on admission)   Assessment & Plan    Related to vomiting  Plan as above          Quality-Core Measures

## 2018-03-23 NOTE — ED PROVIDER NOTES
CHIEF COMPLAINT  Chief Complaint   Patient presents with   • Anxiety   • N/V       HPI  Diana Hernandez is a 42 y.o. female who presents to the emergency room today with nausea, vomiting, anxiety. Patient apparently was at her GYN/of fertility specialist today having procedure done this triggered a panic attack and she started having vomiting. She's had a history of similar episodes in the past and also known to have cyclic vomiting for marijuana use which she admits to using yesterday. Denies chest pain but she does have some generalized aches and pains requesting medications including Ativan, Haldol, Benadryl and pain medication. She's had several episodes of vomiting/dry heaves here in the emergency room and states that she cannot hold anything down.    REVIEW OF SYSTEMS  See HPI for further details. All other systems are negative.     PAST MEDICAL HISTORY  Past Medical History:   Diagnosis Date   • CLOSTRIDIUM DIFFICILE INFECTION 4/14/2010   • Superior mesenteric artery syndrome (CMS-HCC) 7/12/2009   • Dyspepsia 7/12/2009   • Tobacco abuse 6/20/2009   • Nephrolithiasis 6/20/2009    kidney stones,post lithotrypsy   • Anxiety     Followed by [facility]   • Anxiety disorder    • Backpain    • CVS disease    • Cyclic vomiting syndrome    • Dental disorder    • Drug-seeking behavior    • Pain     abd and back   • Snoring    • Superior mesenteric artery syndrome (CMS-HCC)        FAMILY HISTORY  [unfilled]    SOCIAL HISTORY  Social History     Social History   • Marital status: Single     Spouse name: N/A   • Number of children: N/A   • Years of education: N/A     Social History Main Topics   • Smoking status: Former Smoker     Packs/day: 0.50     Years: 13.00     Types: Cigarettes     Quit date: 12/15/2012   • Smokeless tobacco: Never Used      Comment: 1/2ppd   • Alcohol use No   • Drug use: Yes     Types: Marijuana, Inhaled      Comment: marijuana   • Sexual activity: Not on file     Other Topics Concern   • Not on  file     Social History Narrative   • No narrative on file       SURGICAL HISTORY  Past Surgical History:   Procedure Laterality Date   • GASTROSCOPY-ENDO  4/28/2016    Procedure: GASTROSCOPY-ENDO;  Surgeon: Blayne Blackburn M.D.;  Location: ENDOSCOPY [facility];  Service:    • EXPLORATORY LAPAROTOMY  1/12/2016    Procedure: EXPLORATORY LAPAROTOMY;  Surgeon: Maciel Quintero M.D.;  Location: SURGERY [facility];  Service:    • BOWEL RESECTION  1/12/2016    Procedure: BOWEL RESECTION;  Surgeon: Maciel Quintero M.D.;  Location: SURGERY [facility];  Service:    • GASTROSCOPY WITH BIOPSY  3/9/2010    Performed by AMANDA MEJIA at ENDOSCOPY [facility]   • PYLOROPLASTY  7/27/2009    Performed by MACIEL QUINTERO at SURGERY [facility]   • EXPLORATORY LAPAROTOMY  7/27/2009    Performed by MACIEL QUINTERO at SURGERY [facility]   • APPENDECTOMY  7/27/2009    Performed by MACIEL QUINTERO at SURGERY [facility]   • CHOLECYSTECTOMY  7/27/2009    Performed by MACIEL QUINTERO at SURGERY [facility]   • GASTROSCOPY-ENDO  7/8/2009    Performed by ROSANNA WRIGHT at SURGERY [facility]   • LITHOTRIPSY     • OTHER      superior mesenteric artery correction        CURRENT MEDICATIONS  Home Medications     Reviewed by Anish Finney (Pharmacy Tech) on 03/22/18 at 1154  Med List Status: Complete   Medication Last Dose Status   busPIRone (BUSPAR) 15 MG tablet 3/22/2018 Active   diphenhydrAMINE (BENADRYL) 25 MG Tab 3/20/2018 Active   ondansetron (ZOFRAN ODT) 4 MG TABLET DISPERSIBLE 3/22/2018 Active   Prenatal Vit-Fe Fumarate-FA (PRENATAL PO) 2 weeks Active   promethazine (PHENERGAN) 25 MG Tab unk Active                ALLERGIES  Allergies   Allergen Reactions   • Metoclopramide Hives     Told by MD; pt suspects possible hives.   • Bactrim [Sulfamethoxazole W-Trimethoprim] Unspecified     Someone said I had a rash or something.      •  "Seroquel [Quetiapine] Unspecified     MD told her she was allergic- the room was spinning and eye eyes shook when I was in a coma         PHYSICAL EXAM  VITAL SIGNS: /60   Pulse 90   Temp 36.7 °C (98 °F)   Resp 19   Ht 1.626 m (5' 4\")   Wt 63 kg (138 lb 14.2 oz)   SpO2 96%   BMI 23.84 kg/m²  Room air O2: 100    Constitutional: Well developed, Well nourished,  acute distress, Non-toxic appearance.   HENT: Normocephalic, Atraumatic, Bilateral external ears normal, Oropharynx moist, No oral exudates, Nose normal.   Eyes: PERRLA, EOMI, Conjunctiva normal, No discharge.   Neck: Normal range of motion, No tenderness, Supple, No stridor.   Lymphatic: No lymphadenopathy noted.   Cardiovascular: Normal heart rate, Normal rhythm, No murmurs, No rubs, No gallops.   Thorax & Lungs: Normal breath sounds, No respiratory distress, No wheezing, No chest tenderness.   Abdomen: Bowel sounds normal, Soft, diffuse tenderness, No masses, No pulsatile masses. No rebound, guarding or peritoneal signs noted   Skin: Warm, Dry, No erythema, No rash.   Back: No tenderness, No CVA tenderness.     Extremities: Intact distal pulses, No edema, No tenderness, No cyanosis, No clubbing.   Musculoskeletal: Good range of motion in all major joints. No tenderness to palpation or major deformities noted.   Neurologic: Alert & oriented x 3, Normal motor function, Normal sensory function, No focal deficits noted.   Psychiatric: Affect normal, Judgment poor, Mood anxious.       COURSE & MEDICAL DECISION MAKING  Pertinent Labs & Imaging studies reviewed. (See chart for details)  Patient given 2 L of IV fluid given Zofran, Compazine continue vomiting in the emergency room given Ativan didn't Haldol/Benadryl. Continued to have vomiting here in the emergency room be admitted for her cyclic vomiting intractable discuss case with hospitalist.    FINAL IMPRESSION  1. Intractable nausea/vomiting  2. Cannabis induced vomiting  3. Anxiety   "       Electronically signed by: Rey Grimes, 3/22/2018 5:45 PM

## 2018-03-23 NOTE — PROGRESS NOTES
Pt having a melt down, crying & talking about her home life. Pt feels anxious and wants to move around and do something but at the same time is feeling very fatigued. RN reassured pt, in calming her. Pt requesting to have medication for the anxiety. RN called & spoke with Dr. Valentin. MD given update on pt status. New order received - xanax x1.

## 2018-03-23 NOTE — DISCHARGE SUMMARY
CHIEF COMPLAINT ON ADMISSION  Chief Complaint   Patient presents with   • Anxiety   • N/V       CODE STATUS  Full Code    HPI & HOSPITAL COURSE  This is a 42 y.o. female here with history of marijuana abuse with cyclical nausea and vomiting and anxiety disorder. She was admitted for dehydration and was started on IV fluids. She was given antiemetics with resolution of symptoms. During the course of her stay patient was noted to have intermittent bouts of anxiety which appears to be controlled at this time. On my exam patient's nausea has significantly improved. She is tolerating a diet. We have educated patient regarding marijuana cessation. She is cleared for discharge to home.         Therefore, she is discharged in good and stable condition with close outpatient follow-up.    SPECIFIC OUTPATIENT FOLLOW-UP  Primary care provider/discharge clinic in one week    DISCHARGE PROBLEM LIST  Active Problems:    Cyclic vomiting syndrome (Chronic) POA: Yes    Hypokalemia POA: Yes    Anxiety (Chronic) POA: Yes    Leukocytosis POA: Yes    Metabolic acidosis POA: Yes  Resolved Problems:    * No resolved hospital problems. *      FOLLOW UP  Future Appointments  Date Time Provider Department Center   4/10/2018 10:00 AM Isa Rodriguez M.D. [facility] None     No follow-up provider specified.    MEDICATIONS ON DISCHARGE   Diana Hernandez   Home Medication Instructions JOSUÉ:[id number]    Printed on:03/23/18 1240   Medication Information                      busPIRone (BUSPAR) 15 MG tablet  Take 1 Tab by mouth 2 times a day.             diphenhydrAMINE (BENADRYL) 25 MG Tab  Take 25-50 mg by mouth every 6 hours as needed for Sleep.             ondansetron (ZOFRAN ODT) 4 MG TABLET DISPERSIBLE  Take 1 Tab by mouth every four hours as needed (give PO if no IV route available).             Prenatal Vit-Fe Fumarate-FA (PRENATAL PO)  Take 1 Tab by mouth every day.             promethazine (PHENERGAN) 25 MG Tab  Take 1 Tab by mouth every 6  hours as needed for Nausea/Vomiting.                 DIET  Orders Placed This Encounter   Procedures   • Diet Order     Standing Status:   Standing     Number of Occurrences:   1     Order Specific Question:   Diet:     Answer:   Low Fiber(GI Soft) [2]       ACTIVITY  As tolerated.  Weight bearing as tolerated      CONSULTATIONS  None    PROCEDURES  None    LABORATORY  Lab Results   Component Value Date/Time    SODIUM 137 03/23/2018 04:47 AM    POTASSIUM 3.1 (L) 03/23/2018 04:47 AM    CHLORIDE 111 03/23/2018 04:47 AM    CO2 22 03/23/2018 04:47 AM    GLUCOSE 92 03/23/2018 04:47 AM    BUN 9 03/23/2018 04:47 AM    CREATININE 0.69 03/23/2018 04:47 AM    CREATININE 0.9 05/18/2009 08:53 AM        Lab Results   Component Value Date/Time    WBC 9.6 03/23/2018 04:47 AM    HEMOGLOBIN 11.9 (L) 03/23/2018 04:47 AM    HEMATOCRIT 34.4 (L) 03/23/2018 04:47 AM    PLATELETCT 240 03/23/2018 04:47 AM        Total time of the discharge process exceeds 45 minutes

## 2018-03-23 NOTE — DISCHARGE INSTRUCTIONS
Discharge Instructions    Discharged to home by car with relative. Discharged via wheelchair, hospital escort: Yes.  Special equipment needed: Not Applicable    Be sure to schedule a follow-up appointment with your primary care doctor or any specialists as instructed.     Discharge Plan:        I understand that a diet low in cholesterol, fat, and sodium is recommended for good health. Unless I have been given specific instructions below for another diet, I accept this instruction as my diet prescription.   Other diet: Regular    Special Instructions: None    · Is patient discharged on Warfarin / Coumadin?   No     Depression / Suicide Risk    As you are discharged from this [facility] facility, it is important to learn how to keep safe from harming yourself.    Recognize the warning signs:  · Abrupt changes in personality, positive or negative- including increase in energy   · Giving away possessions  · Change in eating patterns- significant weight changes-  positive or negative  · Change in sleeping patterns- unable to sleep or sleeping all the time   · Unwillingness or inability to communicate  · Depression  · Unusual sadness, discouragement and loneliness  · Talk of wanting to die  · Neglect of personal appearance   · Rebelliousness- reckless behavior  · Withdrawal from people/activities they love  · Confusion- inability to concentrate     If you or a loved one observes any of these behaviors or has concerns about self-harm, here's what you can do:  · Talk about it- your feelings and reasons for harming yourself  · Remove any means that you might use to hurt yourself (examples: pills, rope, extension cords, firearm)  · Get professional help from the community (Mental Health, Substance Abuse, psychological counseling)  · Do not be alone:Call your Safe Contact- someone whom you trust who will be there for you.  · Call your local CRISIS HOTLINE 619-1494 or 218-022-6357  · Call your local Children's Mobile Crisis  Response Team [facility] (453) 700-5490 or www.[employer].Instamedia  · Call the toll free National Suicide Prevention Hotlines   · National Suicide Prevention Lifeline 510-530-VJDI (2258)  · National Hope Line Network 800-SUICIDE (859-4717)

## 2018-03-23 NOTE — CARE PLAN
Problem: Safety  Goal: Will remain free from falls  Able to demo call light & within reach, non-skid socks in place, personal belongings within reach, room floor free of clutter    Problem: Venous Thromboembolism (VTW)/Deep Vein Thrombosis (DVT) Prevention:  Goal: Patient will participate in Venous Thrombosis (VTE)/Deep Vein Thrombosis (DVT)Prevention Measures  Scheduled heparin given this morning - see MAR

## 2018-04-10 ENCOUNTER — HOSPITAL ENCOUNTER (EMERGENCY)
Facility: MEDICAL CENTER | Age: 43
End: 2018-04-10
Attending: EMERGENCY MEDICINE
Payer: COMMERCIAL

## 2018-04-10 ENCOUNTER — HOSPITAL ENCOUNTER (EMERGENCY)
Facility: MEDICAL CENTER | Age: 43
End: 2018-04-10
Payer: COMMERCIAL

## 2018-04-10 ENCOUNTER — OFFICE VISIT (OUTPATIENT)
Dept: INTERNAL MEDICINE | Facility: MEDICAL CENTER | Age: 43
End: 2018-04-10
Payer: COMMERCIAL

## 2018-04-10 VITALS
RESPIRATION RATE: 20 BRPM | TEMPERATURE: 96.8 F | HEART RATE: 78 BPM | SYSTOLIC BLOOD PRESSURE: 109 MMHG | OXYGEN SATURATION: 99 % | DIASTOLIC BLOOD PRESSURE: 84 MMHG

## 2018-04-10 VITALS
TEMPERATURE: 96.7 F | OXYGEN SATURATION: 100 % | HEART RATE: 88 BPM | HEIGHT: 65 IN | SYSTOLIC BLOOD PRESSURE: 120 MMHG | DIASTOLIC BLOOD PRESSURE: 80 MMHG

## 2018-04-10 VITALS
WEIGHT: 139 LBS | SYSTOLIC BLOOD PRESSURE: 117 MMHG | DIASTOLIC BLOOD PRESSURE: 74 MMHG | TEMPERATURE: 97 F | OXYGEN SATURATION: 99 % | RESPIRATION RATE: 16 BRPM | HEART RATE: 76 BPM | BODY MASS INDEX: 23.15 KG/M2

## 2018-04-10 DIAGNOSIS — F41.9 ANXIETY: Chronic | ICD-10-CM

## 2018-04-10 DIAGNOSIS — F41.9 ANXIETY: ICD-10-CM

## 2018-04-10 LAB — EKG IMPRESSION: NORMAL

## 2018-04-10 PROCEDURE — 93005 ELECTROCARDIOGRAM TRACING: CPT

## 2018-04-10 PROCEDURE — 302449 STATCHG TRIAGE ONLY (STATISTIC)

## 2018-04-10 PROCEDURE — 700102 HCHG RX REV CODE 250 W/ 637 OVERRIDE(OP): Performed by: EMERGENCY MEDICINE

## 2018-04-10 PROCEDURE — A9270 NON-COVERED ITEM OR SERVICE: HCPCS | Performed by: EMERGENCY MEDICINE

## 2018-04-10 PROCEDURE — 96372 THER/PROPH/DIAG INJ SC/IM: CPT

## 2018-04-10 PROCEDURE — 700111 HCHG RX REV CODE 636 W/ 250 OVERRIDE (IP): Performed by: EMERGENCY MEDICINE

## 2018-04-10 PROCEDURE — 93005 ELECTROCARDIOGRAM TRACING: CPT | Performed by: EMERGENCY MEDICINE

## 2018-04-10 PROCEDURE — 99213 OFFICE O/P EST LOW 20 MIN: CPT | Mod: GE | Performed by: INTERNAL MEDICINE

## 2018-04-10 PROCEDURE — 99284 EMERGENCY DEPT VISIT MOD MDM: CPT

## 2018-04-10 RX ORDER — LORAZEPAM 1 MG/1
1 TABLET ORAL EVERY 8 HOURS PRN
Qty: 6 TAB | Refills: 0 | Status: SHIPPED | OUTPATIENT
Start: 2018-04-10 | End: 2018-04-12

## 2018-04-10 RX ORDER — LORAZEPAM 2 MG/ML
2 INJECTION INTRAMUSCULAR ONCE
Status: COMPLETED | OUTPATIENT
Start: 2018-04-10 | End: 2018-04-10

## 2018-04-10 RX ORDER — HYDROCODONE BITARTRATE AND ACETAMINOPHEN 5; 325 MG/1; MG/1
2 TABLET ORAL ONCE
Status: COMPLETED | OUTPATIENT
Start: 2018-04-10 | End: 2018-04-10

## 2018-04-10 RX ORDER — ONDANSETRON 4 MG/1
4 TABLET, ORALLY DISINTEGRATING ORAL EVERY 4 HOURS PRN
Status: SHIPPED | COMMUNITY
End: 2018-07-12

## 2018-04-10 RX ORDER — DIPHENHYDRAMINE HCL 25 MG
25 TABLET ORAL ONCE
Status: COMPLETED | OUTPATIENT
Start: 2018-04-10 | End: 2018-04-10

## 2018-04-10 RX ADMIN — LORAZEPAM 2 MG: 2 INJECTION INTRAMUSCULAR; INTRAVENOUS at 13:32

## 2018-04-10 RX ADMIN — PROCHLORPERAZINE EDISYLATE 10 MG: 5 INJECTION INTRAMUSCULAR; INTRAVENOUS at 13:31

## 2018-04-10 RX ADMIN — HYDROCODONE BITARTRATE AND ACETAMINOPHEN 2 TABLET: 5; 325 TABLET ORAL at 15:49

## 2018-04-10 RX ADMIN — DIPHENHYDRAMINE HCL 25 MG: 25 TABLET ORAL at 14:57

## 2018-04-10 NOTE — DISCHARGE INSTRUCTIONS
Panic Attacks  Panic attacks are sudden, short feelings of great fear or discomfort. You may have them for no reason when you are relaxed, when you are uneasy (anxious), or when you are sleeping.  Follow these instructions at home:  · Take all your medicines as told.  · Check with your doctor before starting new medicines.  · Keep all doctor visits.  Contact a doctor if:  · You are not able to take your medicines as told.  · Your symptoms do not get better.  · Your symptoms get worse.  Get help right away if:  · Your attacks seem different than your normal attacks.  · You have thoughts about hurting yourself or others.  · You take panic attack medicine and you have a side effect.  This information is not intended to replace advice given to you by your health care provider. Make sure you discuss any questions you have with your health care provider.  Document Released: 01/20/2012 Document Revised: 05/25/2017 Document Reviewed: 08/01/2014  [employer] Interactive Patient Education © 2017 [employer]

## 2018-04-10 NOTE — ED NOTES
"Pt out to nurses station. States \"im still having a panic attack and my legs are cramping\" requesting more ativan at this time. Provider notified. No further orders received at this time. Will continue to monitor.   "

## 2018-04-10 NOTE — ED NOTES
RN to room to administer ordered medications, pt requesting IV fluids, IV benadryl. Provider notified and no changes in original order received. Ordered IM medications administered. Pt provided blankets, lights dimmed. Instructed to notify nurse with any further needs. Will continue to monitor

## 2018-04-10 NOTE — ED NOTES
Pt sitting up on gurney texting on phone, respirations even and unlabored, no signs of distress noted.

## 2018-04-10 NOTE — ED NOTES
"Reviewed discharge instructions and prescription w/ pt, verbalized understanding to information provided including follow up care.  Pt stated, \"I feel my anxiety coming on,\" encouraged to take slow deep breath and verbalize concern.  Pt stated friend would be coming to provide ride home, denied questions/concerns.  Pt ambulated to lobby to wait for ride home.    "

## 2018-04-10 NOTE — ED NOTES
"Pt reports has been anxious since this am, reports \"I was worked up about the appointment all morning, throwing up and panicking\" reports hx of anxiety. Takes buspar regularly. States saw pcp this am was given rx for ativan and told to follow up with [facility], states \"I went to the ER to [facility] and they wait was too long so I came here for help\" tachypniec at this time. Reports \"im just in the worst pain of my life and I cant catch my breath\" skin pwd. [id number]. Will continue to monitor  "

## 2018-04-10 NOTE — ED NOTES
Pt calling out to nurses station, incontinent of stool. Pt provided with gown and disposable brief. Provider notified, additional medications administered

## 2018-04-10 NOTE — ED NOTES
Pt states had a dr appt with PCP this morning and going there triggered a panic attack. She now has N/V, SOB and CP. Pt states she wants to go to [facility] after the panic attack stops.

## 2018-04-10 NOTE — ED PROVIDER NOTES
ED Provider Note    CHIEF COMPLAINT  Chief Complaint   Patient presents with   • Panic Attack   • N/V   • Shortness of Breath   • Chest Pain       HPI  Diana Hernandez is a 42 y.o. female here for evaluation of anxiety. The patient states that she has a long history of anxiety, and that she became anxious on her way over to her family practice doctor's office, for routine visit. She has no particular reason for anxiety, no chest pain, no abdominal pain, and no fever. She states that usually she has come to the ER to get her anxiety under control. She has no suicidal or homicidal ideations.    PAST MEDICAL HISTORY   has a past medical history of Anxiety; Anxiety disorder; Backpain; CLOSTRIDIUM DIFFICILE INFECTION (4/14/2010); CVS disease; Cyclic vomiting syndrome; Dental disorder; Drug-seeking behavior; Dyspepsia (7/12/2009); Nephrolithiasis (6/20/2009); Pain; Snoring; Superior mesenteric artery syndrome (CMS-HCC) (7/12/2009); Superior mesenteric artery syndrome (CMS-HCC); and Tobacco abuse (6/20/2009).    SOCIAL HISTORY  Social History     Social History Main Topics   • Smoking status: Former Smoker     Packs/day: 0.50     Years: 13.00     Types: Cigarettes     Quit date: 12/15/2012   • Smokeless tobacco: Never Used      Comment: 1/2ppd   • Alcohol use No   • Drug use: Yes     Types: Marijuana, Inhaled      Comment: marijuana, hasnt smoked in a week   • Sexual activity: Not on file       SURGICAL HISTORY   has a past surgical history that includes gastroscopy-endo (7/8/2009); lithotripsy; pyloroplasty (7/27/2009); gastroscopy with biopsy (3/9/2010); exploratory laparotomy (7/27/2009); appendectomy (7/27/2009); cholecystectomy (7/27/2009); other; exploratory laparotomy (1/12/2016); bowel resection (1/12/2016); and gastroscopy-endo (4/28/2016).    CURRENT MEDICATIONS  Home Medications    **Home medications have not yet been reviewed for this encounter**         ALLERGIES  Allergies   Allergen Reactions   •  Metoclopramide Hives     Told by MD; pt suspects possible hives.   • Bactrim [Sulfamethoxazole W-Trimethoprim] Unspecified     Someone said I had a rash or something.      • Seroquel [Quetiapine] Unspecified     MD told her she was allergic- the room was spinning and eye eyes shook when I was in a coma         REVIEW OF SYSTEMS  See HPI for further details. Review of systems as above, otherwise all other systems are negative.     PHYSICAL EXAM  VITAL SIGNS: /74   Pulse 76   Temp 36.1 °C (97 °F)   Resp 16   Wt 63 kg (139 lb)   SpO2 99%   BMI 23.15 kg/m²     Constitutional: Well developed, well nourished. No acute distress.  HEENT: Normocephalic, atraumatic. MMM  Neck: Supple, Full range of motion   Chest/Pulmonary:  No respiratory distress.  Equal expansion   Musculoskeletal: No deformity, no edema, neurovascular intact.   Neuro: Clear speech, appropriate, cooperative, cranial nerves II-XII grossly intact.  Psych: Anxious, agitated, tearful      PROCEDURES     MEDICAL RECORD  I have reviewed patient's medical record and pertinent results are listed above.    COURSE & MEDICAL DECISION MAKING  I have reviewed any medical record information, laboratory studies and radiographic results as noted above.      4:39 PM  The patient required Ativan, Benadryl, and her typical pain medications for relief of her anxiety. At this time she is very calm, collected, and is comfortable going home. She will return here for any further issues or concerns. She has someone coming from home to come and pick her up. Currently she has no chest pain, no shortness of breath, and no vomiting. Patient initially requested an IV be placed with fluids and all of her medications be given IV. I do not feel this is appropriate secondary to the patient not have any current vomiting while here, and results being very similar p.m. medications.    I you have had any blood pressure issues while here in the emergency department, please see your  doctor for a further evaluation or work up.    Differential diagnoses include but not limited to: anxiety, depression.     This patient presents with anxiety .  At this time, I have counseled the patient/family regarding their medications, pain control, and follow up.  They will continue their medications, if any, as prescribed.  They will return immediately for any worsening symptoms and/or any other medical concerns.  They will see their doctor, or contact the doctor provided, in 1-2 days for follow up.       FINAL IMPRESSION  1. Anxiety            Electronically signed by: Curtis Kenney, 4/10/2018 4:07 PM

## 2018-04-10 NOTE — ED NOTES
"Pt requesting \"another dose of medication\" states \"im still just really suffering in here\" provider notified. Will continue to monitor.   "

## 2018-04-10 NOTE — ED NOTES
Med rec updated and complete  Allergies reviewed  Pt reports that she took an antibiotic, not sure of the name or dose.  Called Mehdi @ [phone] to verify name and dose of antibiotic.  Per pharmacy pt did not  an antibiotic in the last 30 days.  Asked pt if she went to another pharmacy, pt reports no.  Pt reports that she can't remember when she took her ZOFRAN 4MG and PROMETHAZINE 25MG last.  Pt reports no vitamins.

## 2018-04-11 ENCOUNTER — PATIENT OUTREACH (OUTPATIENT)
Dept: HEALTH INFORMATION MANAGEMENT | Facility: OTHER | Age: 43
End: 2018-04-11

## 2018-04-11 NOTE — PROGRESS NOTES
Established Patient    Iris presents today with the following:    CC: 6 month follow up, anxiety.    HPI: Ms. Hernandez is a 42 year old lady with past medical history significant for anxiety, marijuana use who presents for six-month follow-up.    Anxiety: Patient presented to clinic with extreme anxiety, writhing in pain secondary to muscle spasms because of anxiety. Also retching, crying, and intermittently yelling out. She states anxiety has gotten worse over the past two months for no definable reason.  She has not been able to see a psychologist because she has not been able to leave her house.  Also states she never received a Psychiatry appointment (referral placed during last office visit).  Patient's anxiety is worsened by having to leave her house.  Denies suicide ideation/homicidal ideation.  Denies audio/video hallucinations.  Toward end of office visit, [facility] but, against recommendation, patient preferred to walk to [facility] for one dose of 0.5 mg lorazepam prior to transport.  Patient, however, obtained prescription at [facility] and then went to the ED for further management.      Marijuana use: Last use was 1 week ago.  Denies nausea/vomiting during past week, abdominal pain.        Patient Active Problem List    Diagnosis Date Noted   • Dehydration 02/05/2018     Priority: High   • Hypokalemia 02/05/2018     Priority: High   • JILL (acute kidney injury) (CMS-HCC) 02/05/2018     Priority: High   • AP (abdominal pain) 01/19/2016     Priority: High   • Cyclic vomiting syndrome 02/22/2013     Priority: High   • Hypophosphatemia 06/19/2016     Priority: Medium   • Drug-seeking behavior 03/19/2015     Priority: Medium   • Leukocytosis 02/24/2015     Priority: Medium   • Anxiety 12/21/2014     Priority: Medium   • Substance abuse 09/15/2011     Priority: Medium   • Anxiety 01/08/2017     Priority: Low   •  "Metabolic acidosis 03/22/2018   • Planned pregnancy 05/30/2017   • History of seizures 04/12/2017   • SMAS (superior mesenteric artery syndrome) (CMS-HCC) 06/20/2016       Current Outpatient Prescriptions   Medication Sig Dispense Refill   • busPIRone (BUSPAR) 15 MG tablet Take 1 Tab by mouth 2 times a day. 90 Tab 1   • diphenhydrAMINE (BENADRYL) 25 MG Tab Take 50 mg by mouth every 6 hours as needed for Sleep.     • ondansetron (ZOFRAN ODT) 4 MG TABLET DISPERSIBLE Take 4 mg by mouth every four hours as needed for Nausea.     • LORazepam (ATIVAN) 1 MG Tab Take 1 Tab by mouth every 8 hours as needed for Anxiety for up to 2 days. 6 Tab 0   • promethazine (PHENERGAN) 25 MG Tab Take 1 Tab by mouth every 6 hours as needed for Nausea/Vomiting. 30 Tab 0     No current facility-administered medications for this visit.        ROS: 12 point review of systems negative except as per HPI and below.     GI:  Positive for nausea, retching.    MSK:  Positive for back spasms during acute anxiety.    Psych: Positive for anxiety.        /80   Pulse 88   Temp 35.9 °C (96.7 °F)   Ht 1.65 m (5' 4.97\")   SpO2 100%     Physical Exam   Constitutional:  oriented to person, place, and time.  In acute distress secondary to severe anxiety.   Eyes: Pupils are equal, round, and reactive to light. No scleral icterus.  Neck: Neck supple. No thyromegaly present.   Cardiovascular: Normal rate, regular rhythm and normal heart sounds.  Exam reveals no gallop and no friction rub.  No murmur heard.  Pulmonary/Chest: Breath sounds normal. Chest wall is not dull to percussion.   Abdominal: Soft, nontender, nondistended. Bowel sounds present.   Musculoskeletal:  Mild diffuse tenderness to palpation of back, though difficulty to fully assess given patient's overall distress.    Lymphadenopathy: no cervical adenopathy  Neurological: alert and oriented to person, place, and time.   Skin: No cyanosis. No rashes.      Note: I have reviewed all pertinent " labs and diagnostic tests associated with this visit with specific comments listed under the assessment and plan below    Assessment and Plan    1. Anxiety  - Has anxiety.  Presented to clinic with extreme acute anxiety - retching, crying, and intermittently yelling out.  Leaving the house for doctor's appointment was trigger for acute anxiety.  Anxiety has increased overall x2 months for no definable reason.  Denies suicide ideation/homicidal ideation.  Denies audio/video hallucinations.  Has not seen a psychologist because has not been able to leave her house.  Did not receive a call for a Psychiatry appointment (referral placed during last office visit).    - Arrangements made for patient to receive psychiatric assessment at [facility] but, against recommendation, patient preferred walking to [facility] for one dose of 0.5 mg lorazepam (given extreme anxiety) prior to transport.  Agreed to this as patient had agreed to return to clinic for transport to [facility].  However, she did not return and instead went to ED (please see ED notes) for further management.   - Recommend follow up with Psychiatry.  Has been difficult to obtain appointment and difficult for patient to leave house should she obtain an appointment.  Has support of , who patient states is very patient with her.  Will call patient later in week to see if she made a Psychiatry appointment or if she walked into [facility].      2. Marijuana use  - Last use was 1 week ago.  Denies nausea/vomiting during past week, abdominal pain.    - Counseled against it given past history of cyclical vomiting.        Followup: Return if symptoms worsen or fail to improve.    Signed by: Isa Rodriguez M.D.

## 2018-04-12 ENCOUNTER — HOSPITAL ENCOUNTER (EMERGENCY)
Facility: MEDICAL CENTER | Age: 43
End: 2018-04-12
Attending: EMERGENCY MEDICINE
Payer: COMMERCIAL

## 2018-04-12 VITALS
HEIGHT: 64 IN | HEART RATE: 79 BPM | WEIGHT: 125.44 LBS | OXYGEN SATURATION: 97 % | TEMPERATURE: 98 F | SYSTOLIC BLOOD PRESSURE: 104 MMHG | BODY MASS INDEX: 21.42 KG/M2 | RESPIRATION RATE: 20 BRPM | DIASTOLIC BLOOD PRESSURE: 72 MMHG

## 2018-04-12 DIAGNOSIS — R11.15 NON-INTRACTABLE CYCLICAL VOMITING WITH NAUSEA: ICD-10-CM

## 2018-04-12 DIAGNOSIS — F41.1 ANXIETY REACTION: ICD-10-CM

## 2018-04-12 DIAGNOSIS — F19.10 SUBSTANCE ABUSE (HCC): ICD-10-CM

## 2018-04-12 DIAGNOSIS — F41.9 ANXIETY: Chronic | ICD-10-CM

## 2018-04-12 LAB
ANION GAP SERPL CALC-SCNC: 15 MMOL/L (ref 0–11.9)
BUN SERPL-MCNC: 20 MG/DL (ref 8–22)
CALCIUM SERPL-MCNC: 9.9 MG/DL (ref 8.4–10.2)
CHLORIDE SERPL-SCNC: 95 MMOL/L (ref 96–112)
CO2 SERPL-SCNC: 17 MMOL/L (ref 20–33)
CREAT SERPL-MCNC: 1.28 MG/DL (ref 0.5–1.4)
GLUCOSE SERPL-MCNC: 111 MG/DL (ref 65–99)
POTASSIUM SERPL-SCNC: 3.6 MMOL/L (ref 3.6–5.5)
SODIUM SERPL-SCNC: 127 MMOL/L (ref 135–145)

## 2018-04-12 PROCEDURE — 96374 THER/PROPH/DIAG INJ IV PUSH: CPT

## 2018-04-12 PROCEDURE — 700102 HCHG RX REV CODE 250 W/ 637 OVERRIDE(OP): Performed by: EMERGENCY MEDICINE

## 2018-04-12 PROCEDURE — 99285 EMERGENCY DEPT VISIT HI MDM: CPT

## 2018-04-12 PROCEDURE — 36415 COLL VENOUS BLD VENIPUNCTURE: CPT

## 2018-04-12 PROCEDURE — 700105 HCHG RX REV CODE 258: Performed by: EMERGENCY MEDICINE

## 2018-04-12 PROCEDURE — 80048 BASIC METABOLIC PNL TOTAL CA: CPT

## 2018-04-12 PROCEDURE — A9270 NON-COVERED ITEM OR SERVICE: HCPCS | Performed by: EMERGENCY MEDICINE

## 2018-04-12 PROCEDURE — 700111 HCHG RX REV CODE 636 W/ 250 OVERRIDE (IP): Performed by: EMERGENCY MEDICINE

## 2018-04-12 PROCEDURE — 96376 TX/PRO/DX INJ SAME DRUG ADON: CPT

## 2018-04-12 PROCEDURE — 700111 HCHG RX REV CODE 636 W/ 250 OVERRIDE (IP)

## 2018-04-12 PROCEDURE — 96375 TX/PRO/DX INJ NEW DRUG ADDON: CPT

## 2018-04-12 PROCEDURE — 96361 HYDRATE IV INFUSION ADD-ON: CPT

## 2018-04-12 RX ORDER — LORAZEPAM 2 MG/ML
1 INJECTION INTRAMUSCULAR ONCE
Status: COMPLETED | OUTPATIENT
Start: 2018-04-12 | End: 2018-04-12

## 2018-04-12 RX ORDER — SODIUM CHLORIDE 9 MG/ML
1000 INJECTION, SOLUTION INTRAVENOUS ONCE
Status: COMPLETED | OUTPATIENT
Start: 2018-04-12 | End: 2018-04-12

## 2018-04-12 RX ORDER — LORAZEPAM 2 MG/ML
INJECTION INTRAMUSCULAR
Status: COMPLETED
Start: 2018-04-12 | End: 2018-04-12

## 2018-04-12 RX ORDER — ONDANSETRON 2 MG/ML
4 INJECTION INTRAMUSCULAR; INTRAVENOUS ONCE
Status: DISCONTINUED | OUTPATIENT
Start: 2018-04-12 | End: 2018-04-12 | Stop reason: HOSPADM

## 2018-04-12 RX ORDER — HALOPERIDOL 5 MG/ML
1 INJECTION INTRAMUSCULAR ONCE
Status: COMPLETED | OUTPATIENT
Start: 2018-04-12 | End: 2018-04-12

## 2018-04-12 RX ORDER — ONDANSETRON 2 MG/ML
4 INJECTION INTRAMUSCULAR; INTRAVENOUS ONCE
Status: COMPLETED | OUTPATIENT
Start: 2018-04-12 | End: 2018-04-12

## 2018-04-12 RX ORDER — DIPHENHYDRAMINE HYDROCHLORIDE 50 MG/ML
25 INJECTION INTRAMUSCULAR; INTRAVENOUS ONCE
Status: COMPLETED | OUTPATIENT
Start: 2018-04-12 | End: 2018-04-12

## 2018-04-12 RX ORDER — HYDROCODONE BITARTRATE AND ACETAMINOPHEN 5; 325 MG/1; MG/1
1 TABLET ORAL ONCE
Status: COMPLETED | OUTPATIENT
Start: 2018-04-12 | End: 2018-04-12

## 2018-04-12 RX ADMIN — LORAZEPAM 1 MG: 2 INJECTION INTRAMUSCULAR; INTRAVENOUS at 15:50

## 2018-04-12 RX ADMIN — SODIUM CHLORIDE 1000 ML: 9 INJECTION, SOLUTION INTRAVENOUS at 14:16

## 2018-04-12 RX ADMIN — HYDROCODONE BITARTRATE AND ACETAMINOPHEN 1 TABLET: 5; 325 TABLET ORAL at 14:54

## 2018-04-12 RX ADMIN — HALOPERIDOL LACTATE 1 MG: 5 INJECTION, SOLUTION INTRAMUSCULAR at 14:19

## 2018-04-12 RX ADMIN — LORAZEPAM 1 MG: 2 INJECTION INTRAMUSCULAR at 14:24

## 2018-04-12 RX ADMIN — ONDANSETRON 4 MG: 2 INJECTION INTRAMUSCULAR; INTRAVENOUS at 14:17

## 2018-04-12 RX ADMIN — DIPHENHYDRAMINE HYDROCHLORIDE 25 MG: 50 INJECTION, SOLUTION INTRAMUSCULAR; INTRAVENOUS at 14:16

## 2018-04-12 RX ADMIN — LORAZEPAM 1 MG: 2 INJECTION INTRAMUSCULAR; INTRAVENOUS at 14:24

## 2018-04-12 ASSESSMENT — PAIN SCALES - GENERAL: PAINLEVEL_OUTOF10: 8

## 2018-04-12 NOTE — DISCHARGE INSTRUCTIONS
Generalized Anxiety Disorder  Generalized anxiety disorder (ESTELA) is a mental disorder. It interferes with life functions, including relationships, work, and school.  ESTELA is different from normal anxiety, which everyone experiences at some point in their lives in response to specific life events and activities. Normal anxiety actually helps us prepare for and get through these life events and activities. Normal anxiety goes away after the event or activity is over.   ESTELA causes anxiety that is not necessarily related to specific events or activities. It also causes excess anxiety in proportion to specific events or activities. The anxiety associated with ESTELA is also difficult to control. ESTELA can vary from mild to severe. People with severe ESTELA can have intense waves of anxiety with physical symptoms (panic attacks).   SYMPTOMS  The anxiety and worry associated with ESTELA are difficult to control. This anxiety and worry are related to many life events and activities and also occur more days than not for 6 months or longer. People with ESTELA also have three or more of the following symptoms (one or more in children):  · Restlessness.    · Fatigue.  · Difficulty concentrating.    · Irritability.  · Muscle tension.  · Difficulty sleeping or unsatisfying sleep.  DIAGNOSIS  ESTELA is diagnosed through an assessment by your health care provider. Your health care provider will ask you questions about your mood, physical symptoms, and events in your life. Your health care provider may ask you about your medical history and use of alcohol or drugs, including prescription medicines. Your health care provider may also do a physical exam and blood tests. Certain medical conditions and the use of certain substances can cause symptoms similar to those associated with ESTELA. Your health care provider may refer you to a mental health specialist for further evaluation.  TREATMENT  The following therapies are usually used to treat ESTELA:    · Medication. Antidepressant medication usually is prescribed for long-term daily control. Antianxiety medicines may be added in severe cases, especially when panic attacks occur.    · Talk therapy (psychotherapy). Certain types of talk therapy can be helpful in treating ESTELA by providing support, education, and guidance. A form of talk therapy called cognitive behavioral therapy can teach you healthy ways to think about and react to daily life events and activities.  · Stress management techniques. These include yoga, meditation, and exercise and can be very helpful when they are practiced regularly.  A mental health specialist can help determine which treatment is best for you. Some people see improvement with one therapy. However, other people require a combination of therapies.  This information is not intended to replace advice given to you by your health care provider. Make sure you discuss any questions you have with your health care provider.  Document Released: 04/14/2014 Document Revised: 01/08/2016 Document Reviewed: 04/14/2014  [employer] Interactive Patient Education © 2017 [employer]          Vomiting, Adult  Vomiting occurs when stomach contents are thrown up and out of the mouth. Many people notice nausea before vomiting. Vomiting can make you feel weak and dehydrated. Dehydration can make you tired and thirsty, cause you to have a dry mouth, and decrease how often you urinate. Older adults and people who have other diseases or a weak immune system are at higher risk for dehydration. It is important to treat vomiting as told by your health care provider.  Follow these instructions at home:  Follow your health care provider’s instructions about how to care for yourself at home.  Eating and drinking  Follow these recommendations as told by your health care provider:  · Take an oral rehydration solution (ORS). This is a drink that is sold at pharmacies and retail stores.  · Eat bland, easy-to-digest  foods in small amounts as you are able. These foods include bananas, applesauce, rice, lean meats, toast, and crackers.  · Drink clear fluids in small amounts as you are able. Clear fluids include water, ice chips, low-calorie sports drinks, and fruit juice that has water added (diluted fruit juice).  · Avoid fluids that contain a lot of sugar or caffeine.  · Avoid alcohol and foods that are spicy or fatty.  General instructions  · Wash your hands frequently with soap and water. If soap and water are not available, use hand . Make sure that everyone in your household washes their hands frequently.  · Take over-the-counter and prescription medicines only as told by your health care provider.  · Watch your condition for any changes.  · Keep all follow-up visits as told by your health care provider. This is important.  Contact a health care provider if:  · You have a fever.  · You are not able to keep fluids down.  · Your vomiting gets worse.  · You have new symptoms.  · You feel light-headed or dizzy.  · You have a headache.  · You have muscle cramps.  Get help right away if:  · You have pain in your chest, neck, arm, or jaw.  · You feel extremely weak or you faint.  · You have persistent vomiting.  · You have vomit that is bright red or looks like black coffee grounds.  · You have stools that are bloody or black, or stools that look like tar.  · You have severe pain, cramping, or bloating in your abdomen.  · You have a severe headache, a stiff neck, or both.  · You have a rash.  · You have trouble breathing or you are breathing very quickly.  · Your heart is beating very quickly.  · Your skin feels cold and clammy.  · You feel confused.  · You have pain while urinating.  · You have signs of dehydration, such as:  ¨ Dark urine, or very little or no urine.  ¨ Cracked lips.  ¨ Dry mouth.  ¨ Sunken eyes.  ¨ Sleepiness.  ¨ Weakness.  These symptoms may represent a serious problem that is an emergency. Do not wait  to see if the symptoms will go away. Get medical help right away. Call your local emergency services (911 in the [place] Do not drive yourself to the hospital.   This information is not intended to replace advice given to you by your health care provider. Make sure you discuss any questions you have with your health care provider.  Document Released: 01/13/2017 Document Revised: 05/25/2017 Document Reviewed: 08/23/2016  [employer] Interactive Patient Education © 2017 [employer]

## 2018-04-12 NOTE — ED PROVIDER NOTES
ED Provider Note    CHIEF COMPLAINT  Chief Complaint   Patient presents with   • Anxiety     Took ativan , zofran, bennadryl, buspar today without improvement.   • Abdominal Pain   • Chest Pain     L  sided today , states she feels crackles   • N/V      x 3 days .       HPI  Diana Hernandez is a 42 y.o. female who presents for evaluation of severe anxiety, vomiting as well as abdominal pain. She is a very complicated past medical history including cyclic vomiting syndrome as well as suspicion of cannabinoid induced hyperemesis, she also has a history of severe anxiety and chronic abdominal pain. Her current episode of symptoms began a couple days ago, she was evaluated in emergency department and ultimately discharged, she felt she was feeling better but this morning seems to have worsened again. She states she has not been able to eat or drink anything in the past 2 days. No fever, no blood in her vomit. She denies any diarrhea. She states that she has pain in her upper abdomen on this early in her chest, no shortness of breath and no other complaints. She states she tried to follow up with her regular psychiatry team but now she is  she has health insurance and they would not see her. She scheduled an appointment with a new psychiatry team but had an anxiety attack just prior to her appointment and never went. Furthermore, she reports bilateral flank pain which she admits is chronic.    REVIEW OF SYSTEMS  Negative for fever, rash, headache, focal weakness, focal numbness, focal tingling, back pain. All other systems are negative.     PAST MEDICAL HISTORY  Past Medical History:   Diagnosis Date   • Anxiety     Followed by [facility]   • Anxiety disorder    • Backpain    • CLOSTRIDIUM DIFFICILE INFECTION 4/14/2010   • CVS disease    • Cyclic vomiting syndrome    • Dental disorder    • Drug-seeking behavior    • Dyspepsia 7/12/2009   • Nephrolithiasis 6/20/2009    kidney stones,post lithotrypsy   • Pain     abd  and back   • Snoring    • Superior mesenteric artery syndrome (CMS-HCC) 7/12/2009   • Superior mesenteric artery syndrome (CMS-HCC)    • Tobacco abuse 6/20/2009       FAMILY HISTORY  Family History   Problem Relation Age of Onset   • Cancer Mother 50     Colon cancer   • Hypertension Mother    • Allergies Father    • Hypertension Father    • Heart Disease Maternal Grandmother        SOCIAL HISTORY  Social History   Substance Use Topics   • Smoking status: Former Smoker     Packs/day: 0.50     Years: 13.00     Types: Cigarettes     Quit date: 12/15/2012   • Smokeless tobacco: Never Used      Comment: 1/2ppd   • Alcohol use No       SURGICAL HISTORY  Past Surgical History:   Procedure Laterality Date   • GASTROSCOPY-ENDO  4/28/2016    Procedure: GASTROSCOPY-ENDO;  Surgeon: Blayne Blackburn M.D.;  Location: [facility];  Service:    • EXPLORATORY LAPAROTOMY  1/12/2016    Procedure: EXPLORATORY LAPAROTOMY;  Surgeon: Maciel Quintero M.D.;  Location: SURGERY [facility];  Service:    • BOWEL RESECTION  1/12/2016    Procedure: BOWEL RESECTION;  Surgeon: Maciel Quintero M.D.;  Location: SURGERY [facility];  Service:    • GASTROSCOPY WITH BIOPSY  3/9/2010    Performed by AMANDA MEJIA at ENDOSCOPY [facility]   • PYLOROPLASTY  7/27/2009    Performed by MACIEL QUINTERO at SURGERY [facility]   • EXPLORATORY LAPAROTOMY  7/27/2009    Performed by MACIEL QUINTERO at SURGERY [facility]   • APPENDECTOMY  7/27/2009    Performed by MACIEL QUINTERO at SURGERY [facility]   • CHOLECYSTECTOMY  7/27/2009    Performed by MACIEL QUINTERO at SURGERY [facility]   • GASTROSCOPY-ENDO  7/8/2009    Performed by ROSANNA WRIGHT at SURGERY [facility]   • LITHOTRIPSY     • OTHER      superior mesenteric artery correction        CURRENT MEDICATIONS  I personally reviewed the medication list in the charting documentation.  "    ALLERGIES  Allergies   Allergen Reactions   • Metoclopramide Hives     Told by MD; pt suspects possible hives.   • Bactrim [Sulfamethoxazole W-Trimethoprim] Unspecified     Someone said I had a rash or something.      • Seroquel [Quetiapine] Unspecified     MD told her she was allergic- the room was spinning and eye eyes shook when I was in a coma         MEDICAL RECORD  I have reviewed patient's medical record and pertinent results are listed above.      PHYSICAL EXAM  VITAL SIGNS: /92   Pulse 83   Temp 36.7 °C (98 °F)   Resp 20   Ht 1.626 m (5' 4\")   Wt 56.9 kg (125 lb 7.1 oz)   LMP 04/12/2018   SpO2 100%   BMI 21.53 kg/m²    Constitutional: Seems uncomfortable, nontoxic  HENT: Mucus membranes dry, dry cracked lips  Eyes: No scleral icterus. Normal conjunctiva   Neck: Supple, comfortable, nonpainful range of motion.   Cardiovascular: Regular heart rate and rhythm.   Thorax & Lungs: Chest is nontender.  Lungs are clear to auscultation with good air movement bilaterally.  No wheeze, rhonchi, nor rales.   Abdomen: Soft, generalized tenderness worse in the epigastrium, no rebound, no guarding.  Skin: Warm, dry. No rash appreciated  Extremities/Musculoskeletal: No sign of trauma. No asymmetric calf tenderness, erythema or edema. Normal range of motion   Neurologic: Alert & oriented. No focal deficits observed.   Psychiatric: Normal affect appropriate for the clinical situation.    DIAGNOSTIC STUDIES / PROCEDURES    LABS  Results for orders placed or performed during the hospital encounter of 04/12/18   BASIC METABOLIC PANEL   Result Value Ref Range    Sodium 127 (L) 135 - 145 mmol/L    Potassium 3.6 3.6 - 5.5 mmol/L    Chloride 95 (L) 96 - 112 mmol/L    Co2 17 (L) 20 - 33 mmol/L    Glucose 111 (H) 65 - 99 mg/dL    Bun 20 8 - 22 mg/dL    Creatinine 1.28 0.50 - 1.40 mg/dL    Calcium 9.9 8.4 - 10.2 mg/dL    Anion Gap 15.0 (H) 0.0 - 11.9   ESTIMATED GFR   Result Value Ref Range    GFR If  " 56 (A) >60 mL/min/1.73 m 2    GFR If Non  46 (A) >60 mL/min/1.73 m 2         COURSE & MEDICAL DECISION MAKING  I have reviewed any medical record information, laboratory studies and radiographic results as noted above.    Encounter Summary: This is a 42 y.o. female with history of cyclic vomiting is severe anxiety presenting with complaints of both over the past few days, on exam she clearly appears dehydrated she has minor tenderness in the upper abdomen but no evidence of peritonitis. I will treat her with Haldol, Ativan and Benadryl as well as Zofran, will administer IV fluids as she is dehydrated and check a chem panel given her history of renal failure and potassium abnormalities. We'll then reevaluate ------ after the IV fluids, the patient is feeling somewhat improved, has been no vomiting, her pain continues. She was treated with the above medication as well as the Norco tablet, she is requesting additional medication, I will give her an additional dose of Ativan as I feels that she is exceedingly anxious. However will not treat her with any further pain medication, I contacted our psychiatric evaluator who will referral into [facility] and hopefully they will give her a call in the morning and help facilitate follow-up.      DISPOSITION: Discharged home in stable condition      FINAL IMPRESSION  1. Anxiety reaction    2. Non-intractable cyclical vomiting with nausea           This dictation was created using voice recognition software. The accuracy of the dictation is limited to the abilities of the software. I expect there may be some errors of grammar and possibly content. The nursing notes were reviewed and certain aspects of this information were incorporated into this note.    Electronically signed by: Kel Buenrostro, 4/12/2018 1:35 PM

## 2018-04-12 NOTE — ED NOTES
Patient requesting norco and more ativan. ERP made aware and waiting to assess how patient feels after medications given. Patient continues to ask for more medication. ERP aware

## 2018-04-12 NOTE — DISCHARGE PLANNING
Alert team note:  Dr Cervantes called to inquire about resources for this patient.  She has [facility] so she has been referred to [facility] who will contact her.

## 2018-04-15 ENCOUNTER — RESOLUTE PROFESSIONAL BILLING HOSPITAL PROF FEE (OUTPATIENT)
Dept: HOSPITALIST | Facility: MEDICAL CENTER | Age: 43
End: 2018-04-15
Payer: COMMERCIAL

## 2018-04-15 ENCOUNTER — HOSPITAL ENCOUNTER (INPATIENT)
Facility: MEDICAL CENTER | Age: 43
LOS: 1 days | DRG: 641 | End: 2018-04-16
Attending: EMERGENCY MEDICINE | Admitting: INTERNAL MEDICINE
Payer: COMMERCIAL

## 2018-04-15 ENCOUNTER — TELEPHONE (OUTPATIENT)
Dept: HOSPITALIST | Facility: MEDICAL CENTER | Age: 43
End: 2018-04-15

## 2018-04-15 DIAGNOSIS — N17.9 ACUTE RENAL FAILURE, UNSPECIFIED ACUTE RENAL FAILURE TYPE (HCC): ICD-10-CM

## 2018-04-15 DIAGNOSIS — E87.6 HYPOKALEMIA: ICD-10-CM

## 2018-04-15 DIAGNOSIS — E86.0 DEHYDRATION: ICD-10-CM

## 2018-04-15 LAB
ALBUMIN SERPL BCP-MCNC: 4.8 G/DL (ref 3.2–4.9)
ALBUMIN/GLOB SERPL: 1.3 G/DL
ALP SERPL-CCNC: 75 U/L (ref 30–99)
ALT SERPL-CCNC: 13 U/L (ref 2–50)
ANION GAP SERPL CALC-SCNC: 15 MMOL/L (ref 0–11.9)
AST SERPL-CCNC: 16 U/L (ref 12–45)
BASOPHILS # BLD AUTO: 0.4 % (ref 0–1.8)
BASOPHILS # BLD: 0.05 K/UL (ref 0–0.12)
BILIRUB SERPL-MCNC: 2.1 MG/DL (ref 0.1–1.5)
BUN SERPL-MCNC: 29 MG/DL (ref 8–22)
CALCIUM SERPL-MCNC: 9.6 MG/DL (ref 8.5–10.5)
CHLORIDE SERPL-SCNC: 90 MMOL/L (ref 96–112)
CO2 SERPL-SCNC: 21 MMOL/L (ref 20–33)
CREAT SERPL-MCNC: 1.71 MG/DL (ref 0.5–1.4)
EOSINOPHIL # BLD AUTO: 0.01 K/UL (ref 0–0.51)
EOSINOPHIL NFR BLD: 0.1 % (ref 0–6.9)
ERYTHROCYTE [DISTWIDTH] IN BLOOD BY AUTOMATED COUNT: 41.6 FL (ref 35.9–50)
GLOBULIN SER CALC-MCNC: 3.7 G/DL (ref 1.9–3.5)
GLUCOSE SERPL-MCNC: 103 MG/DL (ref 65–99)
HCT VFR BLD AUTO: 38.9 % (ref 37–47)
HGB BLD-MCNC: 13.9 G/DL (ref 12–16)
IMM GRANULOCYTES # BLD AUTO: 0.07 K/UL (ref 0–0.11)
IMM GRANULOCYTES NFR BLD AUTO: 0.6 % (ref 0–0.9)
LIPASE SERPL-CCNC: 16 U/L (ref 11–82)
LYMPHOCYTES # BLD AUTO: 2.3 K/UL (ref 1–4.8)
LYMPHOCYTES NFR BLD: 19.7 % (ref 22–41)
MAGNESIUM SERPL-MCNC: 2.4 MG/DL (ref 1.5–2.5)
MCH RBC QN AUTO: 33.3 PG (ref 27–33)
MCHC RBC AUTO-ENTMCNC: 35.7 G/DL (ref 33.6–35)
MCV RBC AUTO: 93.3 FL (ref 81.4–97.8)
MONOCYTES # BLD AUTO: 1.24 K/UL (ref 0–0.85)
MONOCYTES NFR BLD AUTO: 10.6 % (ref 0–13.4)
NEUTROPHILS # BLD AUTO: 7.99 K/UL (ref 2–7.15)
NEUTROPHILS NFR BLD: 68.6 % (ref 44–72)
NRBC # BLD AUTO: 0 K/UL
NRBC BLD-RTO: 0 /100 WBC
PLATELET # BLD AUTO: 425 K/UL (ref 164–446)
PMV BLD AUTO: 9.5 FL (ref 9–12.9)
POTASSIUM SERPL-SCNC: 2.5 MMOL/L (ref 3.6–5.5)
PROT SERPL-MCNC: 8.5 G/DL (ref 6–8.2)
RBC # BLD AUTO: 4.17 M/UL (ref 4.2–5.4)
SODIUM SERPL-SCNC: 126 MMOL/L (ref 135–145)
WBC # BLD AUTO: 11.7 K/UL (ref 4.8–10.8)

## 2018-04-15 PROCEDURE — 99223 1ST HOSP IP/OBS HIGH 75: CPT | Performed by: INTERNAL MEDICINE

## 2018-04-15 PROCEDURE — 80053 COMPREHEN METABOLIC PANEL: CPT

## 2018-04-15 PROCEDURE — 700102 HCHG RX REV CODE 250 W/ 637 OVERRIDE(OP): Performed by: EMERGENCY MEDICINE

## 2018-04-15 PROCEDURE — 700105 HCHG RX REV CODE 258: Performed by: EMERGENCY MEDICINE

## 2018-04-15 PROCEDURE — 700111 HCHG RX REV CODE 636 W/ 250 OVERRIDE (IP): Performed by: EMERGENCY MEDICINE

## 2018-04-15 PROCEDURE — 770020 HCHG ROOM/CARE - TELE (206)

## 2018-04-15 PROCEDURE — 96361 HYDRATE IV INFUSION ADD-ON: CPT

## 2018-04-15 PROCEDURE — 85025 COMPLETE CBC W/AUTO DIFF WBC: CPT

## 2018-04-15 PROCEDURE — 93005 ELECTROCARDIOGRAM TRACING: CPT | Performed by: EMERGENCY MEDICINE

## 2018-04-15 PROCEDURE — 96365 THER/PROPH/DIAG IV INF INIT: CPT

## 2018-04-15 PROCEDURE — 96375 TX/PRO/DX INJ NEW DRUG ADDON: CPT

## 2018-04-15 PROCEDURE — 83735 ASSAY OF MAGNESIUM: CPT

## 2018-04-15 PROCEDURE — 700111 HCHG RX REV CODE 636 W/ 250 OVERRIDE (IP): Performed by: INTERNAL MEDICINE

## 2018-04-15 PROCEDURE — 99285 EMERGENCY DEPT VISIT HI MDM: CPT

## 2018-04-15 PROCEDURE — A9270 NON-COVERED ITEM OR SERVICE: HCPCS | Performed by: INTERNAL MEDICINE

## 2018-04-15 PROCEDURE — A9270 NON-COVERED ITEM OR SERVICE: HCPCS | Performed by: EMERGENCY MEDICINE

## 2018-04-15 PROCEDURE — 700102 HCHG RX REV CODE 250 W/ 637 OVERRIDE(OP): Performed by: INTERNAL MEDICINE

## 2018-04-15 PROCEDURE — 700101 HCHG RX REV CODE 250: Performed by: INTERNAL MEDICINE

## 2018-04-15 PROCEDURE — 83690 ASSAY OF LIPASE: CPT

## 2018-04-15 RX ORDER — PROMETHAZINE HYDROCHLORIDE 25 MG/1
12.5-25 TABLET ORAL EVERY 4 HOURS PRN
Status: DISCONTINUED | OUTPATIENT
Start: 2018-04-15 | End: 2018-04-16 | Stop reason: HOSPADM

## 2018-04-15 RX ORDER — LORAZEPAM 2 MG/ML
2 INJECTION INTRAMUSCULAR ONCE
Status: COMPLETED | OUTPATIENT
Start: 2018-04-15 | End: 2018-04-15

## 2018-04-15 RX ORDER — ONDANSETRON 2 MG/ML
4 INJECTION INTRAMUSCULAR; INTRAVENOUS EVERY 4 HOURS PRN
Status: DISCONTINUED | OUTPATIENT
Start: 2018-04-15 | End: 2018-04-16 | Stop reason: HOSPADM

## 2018-04-15 RX ORDER — LORAZEPAM 1 MG/1
0.5 TABLET ORAL EVERY 6 HOURS PRN
Status: DISCONTINUED | OUTPATIENT
Start: 2018-04-15 | End: 2018-04-16 | Stop reason: HOSPADM

## 2018-04-15 RX ORDER — POLYETHYLENE GLYCOL 3350 17 G/17G
1 POWDER, FOR SOLUTION ORAL
Status: DISCONTINUED | OUTPATIENT
Start: 2018-04-15 | End: 2018-04-16 | Stop reason: HOSPADM

## 2018-04-15 RX ORDER — LORAZEPAM 2 MG/ML
0.5 INJECTION INTRAMUSCULAR EVERY 6 HOURS PRN
Status: DISCONTINUED | OUTPATIENT
Start: 2018-04-15 | End: 2018-04-16 | Stop reason: HOSPADM

## 2018-04-15 RX ORDER — PROMETHAZINE HYDROCHLORIDE 25 MG/1
12.5-25 SUPPOSITORY RECTAL EVERY 4 HOURS PRN
Status: DISCONTINUED | OUTPATIENT
Start: 2018-04-15 | End: 2018-04-16 | Stop reason: HOSPADM

## 2018-04-15 RX ORDER — POTASSIUM CHLORIDE 7.45 MG/ML
40 INJECTION INTRAVENOUS ONCE
Status: DISCONTINUED | OUTPATIENT
Start: 2018-04-15 | End: 2018-04-15

## 2018-04-15 RX ORDER — BUSPIRONE HYDROCHLORIDE 5 MG/1
15 TABLET ORAL 2 TIMES DAILY
Status: DISCONTINUED | OUTPATIENT
Start: 2018-04-15 | End: 2018-04-16 | Stop reason: HOSPADM

## 2018-04-15 RX ORDER — HEPARIN SODIUM 5000 [USP'U]/ML
5000 INJECTION, SOLUTION INTRAVENOUS; SUBCUTANEOUS EVERY 8 HOURS
Status: DISCONTINUED | OUTPATIENT
Start: 2018-04-15 | End: 2018-04-16 | Stop reason: HOSPADM

## 2018-04-15 RX ORDER — ONDANSETRON 4 MG/1
4 TABLET, ORALLY DISINTEGRATING ORAL EVERY 4 HOURS PRN
Status: DISCONTINUED | OUTPATIENT
Start: 2018-04-15 | End: 2018-04-16 | Stop reason: HOSPADM

## 2018-04-15 RX ORDER — AMOXICILLIN 250 MG
2 CAPSULE ORAL 2 TIMES DAILY
Status: DISCONTINUED | OUTPATIENT
Start: 2018-04-15 | End: 2018-04-16 | Stop reason: HOSPADM

## 2018-04-15 RX ORDER — HALOPERIDOL 5 MG/ML
5 INJECTION INTRAMUSCULAR ONCE
Status: COMPLETED | OUTPATIENT
Start: 2018-04-15 | End: 2018-04-15

## 2018-04-15 RX ORDER — BISACODYL 10 MG
10 SUPPOSITORY, RECTAL RECTAL
Status: DISCONTINUED | OUTPATIENT
Start: 2018-04-15 | End: 2018-04-16 | Stop reason: HOSPADM

## 2018-04-15 RX ORDER — POTASSIUM CHLORIDE 20 MEQ/1
20 TABLET, EXTENDED RELEASE ORAL 3 TIMES DAILY
Status: DISCONTINUED | OUTPATIENT
Start: 2018-04-15 | End: 2018-04-16

## 2018-04-15 RX ORDER — DIPHENHYDRAMINE HCL 25 MG
50 TABLET ORAL EVERY 6 HOURS PRN
Status: DISCONTINUED | OUTPATIENT
Start: 2018-04-15 | End: 2018-04-16 | Stop reason: HOSPADM

## 2018-04-15 RX ORDER — DIPHENHYDRAMINE HYDROCHLORIDE 50 MG/ML
25 INJECTION INTRAMUSCULAR; INTRAVENOUS ONCE
Status: COMPLETED | OUTPATIENT
Start: 2018-04-15 | End: 2018-04-15

## 2018-04-15 RX ORDER — SODIUM CHLORIDE 9 MG/ML
1000 INJECTION, SOLUTION INTRAVENOUS ONCE
Status: COMPLETED | OUTPATIENT
Start: 2018-04-15 | End: 2018-04-15

## 2018-04-15 RX ORDER — ONDANSETRON 2 MG/ML
4 INJECTION INTRAMUSCULAR; INTRAVENOUS ONCE
Status: COMPLETED | OUTPATIENT
Start: 2018-04-15 | End: 2018-04-15

## 2018-04-15 RX ORDER — ZOLPIDEM TARTRATE 5 MG/1
5 TABLET ORAL NIGHTLY PRN
Status: DISCONTINUED | OUTPATIENT
Start: 2018-04-15 | End: 2018-04-16 | Stop reason: HOSPADM

## 2018-04-15 RX ORDER — SODIUM CHLORIDE AND POTASSIUM CHLORIDE 150; 900 MG/100ML; MG/100ML
INJECTION, SOLUTION INTRAVENOUS CONTINUOUS
Status: DISCONTINUED | OUTPATIENT
Start: 2018-04-15 | End: 2018-04-16 | Stop reason: HOSPADM

## 2018-04-15 RX ORDER — ONDANSETRON 4 MG/1
4 TABLET, ORALLY DISINTEGRATING ORAL EVERY 4 HOURS PRN
Status: DISCONTINUED | OUTPATIENT
Start: 2018-04-15 | End: 2018-04-15

## 2018-04-15 RX ORDER — POTASSIUM CHLORIDE 20 MEQ/1
40 TABLET, EXTENDED RELEASE ORAL ONCE
Status: COMPLETED | OUTPATIENT
Start: 2018-04-15 | End: 2018-04-15

## 2018-04-15 RX ADMIN — POTASSIUM CHLORIDE 20 MEQ: 1500 TABLET, EXTENDED RELEASE ORAL at 22:08

## 2018-04-15 RX ADMIN — STANDARDIZED SENNA CONCENTRATE AND DOCUSATE SODIUM 2 TABLET: 8.6; 5 TABLET, FILM COATED ORAL at 22:08

## 2018-04-15 RX ADMIN — HEPARIN SODIUM 5000 UNITS: 5000 INJECTION, SOLUTION INTRAVENOUS; SUBCUTANEOUS at 21:55

## 2018-04-15 RX ADMIN — POTASSIUM CHLORIDE 40 MEQ: 2 INJECTION, SOLUTION, CONCENTRATE INTRAVENOUS at 20:51

## 2018-04-15 RX ADMIN — SODIUM CHLORIDE 1000 ML: 9 INJECTION, SOLUTION INTRAVENOUS at 14:50

## 2018-04-15 RX ADMIN — LORAZEPAM 0.5 MG: 2 INJECTION INTRAMUSCULAR; INTRAVENOUS at 21:55

## 2018-04-15 RX ADMIN — ONDANSETRON 4 MG: 2 INJECTION, SOLUTION INTRAMUSCULAR; INTRAVENOUS at 22:07

## 2018-04-15 RX ADMIN — DIPHENHYDRAMINE HYDROCHLORIDE 25 MG: 50 INJECTION, SOLUTION INTRAMUSCULAR; INTRAVENOUS at 14:51

## 2018-04-15 RX ADMIN — POTASSIUM CHLORIDE 40 MEQ: 1500 TABLET, EXTENDED RELEASE ORAL at 19:02

## 2018-04-15 RX ADMIN — ONDANSETRON 4 MG: 2 INJECTION, SOLUTION INTRAMUSCULAR; INTRAVENOUS at 14:51

## 2018-04-15 RX ADMIN — HALOPERIDOL LACTATE 5 MG: 5 INJECTION, SOLUTION INTRAMUSCULAR at 14:51

## 2018-04-15 RX ADMIN — LORAZEPAM 2 MG: 2 INJECTION INTRAMUSCULAR; INTRAVENOUS at 14:51

## 2018-04-15 RX ADMIN — POTASSIUM CHLORIDE AND SODIUM CHLORIDE: 900; 150 INJECTION, SOLUTION INTRAVENOUS at 21:56

## 2018-04-15 RX ADMIN — DIPHENHYDRAMINE HCL 50 MG: 25 TABLET ORAL at 21:55

## 2018-04-15 RX ADMIN — BUSPIRONE HYDROCHLORIDE 15 MG: 5 TABLET ORAL at 22:07

## 2018-04-15 RX ADMIN — ZOLPIDEM TARTRATE 5 MG: 5 TABLET ORAL at 23:34

## 2018-04-15 ASSESSMENT — ENCOUNTER SYMPTOMS
DEPRESSION: 1
SPUTUM PRODUCTION: 0
FEVER: 0
MYALGIAS: 0
CHILLS: 0
HEARTBURN: 0
FOCAL WEAKNESS: 0
DIZZINESS: 0
VOMITING: 1
WEIGHT LOSS: 1
NECK PAIN: 0
SHORTNESS OF BREATH: 0
PALPITATIONS: 0
HEMOPTYSIS: 0
DIARRHEA: 0
LOSS OF CONSCIOUSNESS: 0
CONSTIPATION: 0
NERVOUS/ANXIOUS: 1
BLURRED VISION: 0
NAUSEA: 1
WEAKNESS: 1

## 2018-04-15 ASSESSMENT — PATIENT HEALTH QUESTIONNAIRE - PHQ9
4. FEELING TIRED OR HAVING LITTLE ENERGY: NEARLY EVERY DAY
SUM OF ALL RESPONSES TO PHQ QUESTIONS 1-9: 18
1. LITTLE INTEREST OR PLEASURE IN DOING THINGS: NOT AT ALL
6. FEELING BAD ABOUT YOURSELF - OR THAT YOU ARE A FAILURE OR HAVE LET YOURSELF OR YOUR FAMILY DOWN: NEARLY EVERY DAY
8. MOVING OR SPEAKING SO SLOWLY THAT OTHER PEOPLE COULD HAVE NOTICED. OR THE OPPOSITE, BEING SO FIGETY OR RESTLESS THAT YOU HAVE BEEN MOVING AROUND A LOT MORE THAN USUAL: NOT AT ALL
SUM OF ALL RESPONSES TO PHQ9 QUESTIONS 1 AND 2: 3
3. TROUBLE FALLING OR STAYING ASLEEP OR SLEEPING TOO MUCH: NEARLY EVERY DAY
7. TROUBLE CONCENTRATING ON THINGS, SUCH AS READING THE NEWSPAPER OR WATCHING TELEVISION: NEARLY EVERY DAY
5. POOR APPETITE OR OVEREATING: NEARLY EVERY DAY
9. THOUGHTS THAT YOU WOULD BE BETTER OFF DEAD, OR OF HURTING YOURSELF: NOT AT ALL
2. FEELING DOWN, DEPRESSED, IRRITABLE, OR HOPELESS: NEARLY EVERY DAY

## 2018-04-15 ASSESSMENT — COGNITIVE AND FUNCTIONAL STATUS - GENERAL
DAILY ACTIVITIY SCORE: 20
SUGGESTED CMS G CODE MODIFIER MOBILITY: CI
MOBILITY SCORE: 23
HELP NEEDED FOR BATHING: A LITTLE
SUGGESTED CMS G CODE MODIFIER DAILY ACTIVITY: CJ
PERSONAL GROOMING: A LITTLE
CLIMB 3 TO 5 STEPS WITH RAILING: A LITTLE
TOILETING: A LITTLE
DRESSING REGULAR UPPER BODY CLOTHING: A LITTLE

## 2018-04-15 ASSESSMENT — LIFESTYLE VARIABLES
EVER_SMOKED: YES
DO YOU DRINK ALCOHOL: NO
ALCOHOL_USE: NO
SUBSTANCE_ABUSE: 0

## 2018-04-15 ASSESSMENT — PAIN SCALES - GENERAL: PAINLEVEL_OUTOF10: 0

## 2018-04-15 NOTE — ED TRIAGE NOTES
"43 y/o female ambulatory to triage with c/o back pain and nausea/vomiting, pt states she believes her symptoms are related to depression and anxiety but \"I just can't get it to stop\", everything hurts.   "

## 2018-04-15 NOTE — ED NOTES
Pt highly anxious. Pt has a cpounseling session scheduled for Monday. Patient has been vomiting which increases her anxiety and she vomits because she is anxious  Patient was seen at [facility] on thursday

## 2018-04-16 VITALS
SYSTOLIC BLOOD PRESSURE: 128 MMHG | DIASTOLIC BLOOD PRESSURE: 75 MMHG | HEART RATE: 102 BPM | TEMPERATURE: 98.5 F | HEIGHT: 65 IN | OXYGEN SATURATION: 98 % | BODY MASS INDEX: 21.49 KG/M2 | WEIGHT: 128.97 LBS | RESPIRATION RATE: 16 BRPM

## 2018-04-16 PROBLEM — E87.6 HYPOKALEMIA: Status: RESOLVED | Noted: 2018-02-05 | Resolved: 2018-04-16

## 2018-04-16 PROBLEM — N17.9 AKI (ACUTE KIDNEY INJURY) (HCC): Status: RESOLVED | Noted: 2018-02-05 | Resolved: 2018-04-16

## 2018-04-16 LAB
ANION GAP SERPL CALC-SCNC: 6 MMOL/L (ref 0–11.9)
BASOPHILS # BLD AUTO: 0.6 % (ref 0–1.8)
BASOPHILS # BLD: 0.05 K/UL (ref 0–0.12)
BUN SERPL-MCNC: 21 MG/DL (ref 8–22)
CALCIUM SERPL-MCNC: 8.2 MG/DL (ref 8.5–10.5)
CHLORIDE SERPL-SCNC: 107 MMOL/L (ref 96–112)
CO2 SERPL-SCNC: 20 MMOL/L (ref 20–33)
CREAT SERPL-MCNC: 1.09 MG/DL (ref 0.5–1.4)
EOSINOPHIL # BLD AUTO: 0.06 K/UL (ref 0–0.51)
EOSINOPHIL NFR BLD: 0.8 % (ref 0–6.9)
ERYTHROCYTE [DISTWIDTH] IN BLOOD BY AUTOMATED COUNT: 46.1 FL (ref 35.9–50)
GLUCOSE SERPL-MCNC: 84 MG/DL (ref 65–99)
HCT VFR BLD AUTO: 32.4 % (ref 37–47)
HGB BLD-MCNC: 11 G/DL (ref 12–16)
IMM GRANULOCYTES # BLD AUTO: 0.07 K/UL (ref 0–0.11)
IMM GRANULOCYTES NFR BLD AUTO: 0.9 % (ref 0–0.9)
LYMPHOCYTES # BLD AUTO: 2.24 K/UL (ref 1–4.8)
LYMPHOCYTES NFR BLD: 28.3 % (ref 22–41)
MAGNESIUM SERPL-MCNC: 2.3 MG/DL (ref 1.5–2.5)
MCH RBC QN AUTO: 33.4 PG (ref 27–33)
MCHC RBC AUTO-ENTMCNC: 34 G/DL (ref 33.6–35)
MCV RBC AUTO: 98.5 FL (ref 81.4–97.8)
MONOCYTES # BLD AUTO: 1.06 K/UL (ref 0–0.85)
MONOCYTES NFR BLD AUTO: 13.4 % (ref 0–13.4)
NEUTROPHILS # BLD AUTO: 4.43 K/UL (ref 2–7.15)
NEUTROPHILS NFR BLD: 56 % (ref 44–72)
NRBC # BLD AUTO: 0 K/UL
NRBC BLD-RTO: 0 /100 WBC
PLATELET # BLD AUTO: 314 K/UL (ref 164–446)
PMV BLD AUTO: 9.6 FL (ref 9–12.9)
POTASSIUM SERPL-SCNC: 4.3 MMOL/L (ref 3.6–5.5)
RBC # BLD AUTO: 3.29 M/UL (ref 4.2–5.4)
SODIUM SERPL-SCNC: 133 MMOL/L (ref 135–145)
WBC # BLD AUTO: 7.9 K/UL (ref 4.8–10.8)

## 2018-04-16 PROCEDURE — 80048 BASIC METABOLIC PNL TOTAL CA: CPT

## 2018-04-16 PROCEDURE — 85025 COMPLETE CBC W/AUTO DIFF WBC: CPT

## 2018-04-16 PROCEDURE — 700102 HCHG RX REV CODE 250 W/ 637 OVERRIDE(OP): Performed by: INTERNAL MEDICINE

## 2018-04-16 PROCEDURE — A9270 NON-COVERED ITEM OR SERVICE: HCPCS | Performed by: INTERNAL MEDICINE

## 2018-04-16 PROCEDURE — 83735 ASSAY OF MAGNESIUM: CPT

## 2018-04-16 PROCEDURE — 700111 HCHG RX REV CODE 636 W/ 250 OVERRIDE (IP): Performed by: INTERNAL MEDICINE

## 2018-04-16 PROCEDURE — 36415 COLL VENOUS BLD VENIPUNCTURE: CPT

## 2018-04-16 PROCEDURE — 99239 HOSP IP/OBS DSCHRG MGMT >30: CPT | Performed by: HOSPITALIST

## 2018-04-16 PROCEDURE — 94760 N-INVAS EAR/PLS OXIMETRY 1: CPT

## 2018-04-16 RX ADMIN — BUSPIRONE HYDROCHLORIDE 15 MG: 5 TABLET ORAL at 08:33

## 2018-04-16 RX ADMIN — HEPARIN SODIUM 5000 UNITS: 5000 INJECTION, SOLUTION INTRAVENOUS; SUBCUTANEOUS at 05:04

## 2018-04-16 RX ADMIN — LORAZEPAM 0.5 MG: 2 INJECTION INTRAMUSCULAR; INTRAVENOUS at 04:59

## 2018-04-16 RX ADMIN — PROCHLORPERAZINE EDISYLATE 10 MG: 5 INJECTION INTRAMUSCULAR; INTRAVENOUS at 04:59

## 2018-04-16 ASSESSMENT — PAIN SCALES - GENERAL
PAINLEVEL_OUTOF10: 0
PAINLEVEL_OUTOF10: 0

## 2018-04-16 ASSESSMENT — COPD QUESTIONNAIRES
HAVE YOU SMOKED AT LEAST 100 CIGARETTES IN YOUR ENTIRE LIFE: YES
DO YOU EVER COUGH UP ANY MUCUS OR PHLEGM?: NO/ONLY WITH OCCASIONAL COLDS OR INFECTIONS
DURING THE PAST 4 WEEKS HOW MUCH DID YOU FEEL SHORT OF BREATH: NONE/LITTLE OF THE TIME
COPD SCREENING SCORE: 2

## 2018-04-16 ASSESSMENT — LIFESTYLE VARIABLES: EVER_SMOKED: YES

## 2018-04-16 NOTE — PROGRESS NOTES
Pt discharged home. IV dc'd, tip intact. Discharge instructions discussed, she verbalized understanding and denied questions. Pt ambulated off unit with family. All bedside belongings sent with pt.

## 2018-04-16 NOTE — ASSESSMENT & PLAN NOTE
-Secondary to volume loss from cyclic vomiting  -IV fluids with normal saline and trend urine output and creatinine levels closely

## 2018-04-16 NOTE — PROGRESS NOTES
Bedside report received, assumed pt care @1421. Pt A&Ox4. She c/o anxiety; she is requesting her dosage of ativan to be increased. POC discussed, she verbalized understanding. Pt steady on her feet. Call light within reach

## 2018-04-16 NOTE — PROGRESS NOTES
2 RN skin check completed with ELMO Lockhart:     Generalized skin intact.  Head intact.  Bilat ears intact.  Generalized redness and blanching on neck and chest region.  Generalized redness, blanching, dryness, and peeling on pt's upper back.  Bilat elbows intact.  Sacral area intact.  Bilat heels calloused and blanching.

## 2018-04-16 NOTE — ASSESSMENT & PLAN NOTE
-History of prior abdominal surgery including a Shruti-en-Y and bowel resection and appears symptoms that started after this procedure  -No current abdominal pain

## 2018-04-16 NOTE — ED NOTES
Med rec updated and complete.  Allergies reviewed.  No antibiotic use in last 30 days.  Pt thought she had been on an antibiotic but   Done found in pts profile at pharmacy.

## 2018-04-16 NOTE — ASSESSMENT & PLAN NOTE
-Not well-controlled  -Continue IV Ativan and BuSpar  -Needs close follow-up with an outpatient psychologist or psychiatrist

## 2018-04-16 NOTE — ED PROVIDER NOTES
ED Provider Note    CHIEF COMPLAINT  Chief Complaint   Patient presents with   • Anxiety   • N/V     since thursday   • Back Pain   • Neck Pain       HPI  Diana Hernandez is a 42 y.o. female who presents for evaluation of continuing vomiting, history of cyclic vomiting syndrome was recently evaluated 2 days ago by myself at another emergency facility, she states after going home she resumed vomiting. She has her chronic abdominal pain as well as her neck and back pain, both of which are chronic. No focal weakness numbness or tingling. She also reports feeling extremely anxious which again is chronic. No fever, no other acute complaints.    REVIEW OF SYSTEMS  Negative for fever, rash, chest pain, dyspnea, diarrhea, focal weakness, focal numbness, focal tingling. All other systems are negative.     PAST MEDICAL HISTORY  Past Medical History:   Diagnosis Date   • Anxiety     Followed by [facility]   • Anxiety disorder    • Backpain    • CLOSTRIDIUM DIFFICILE INFECTION 4/14/2010   • CVS disease    • Cyclic vomiting syndrome    • Dental disorder    • Drug-seeking behavior    • Dyspepsia 7/12/2009   • Nephrolithiasis 6/20/2009    kidney stones,post lithotrypsy   • Pain     abd and back   • Snoring    • Superior mesenteric artery syndrome (CMS-HCC) 7/12/2009   • Superior mesenteric artery syndrome (CMS-HCC)    • Tobacco abuse 6/20/2009       FAMILY HISTORY  Family History   Problem Relation Age of Onset   • Cancer Mother 50     Colon cancer   • Hypertension Mother    • Allergies Father    • Hypertension Father    • Heart Disease Maternal Grandmother        SOCIAL HISTORY  Social History   Substance Use Topics   • Smoking status: Former Smoker     Packs/day: 0.50     Years: 13.00     Types: Cigarettes     Quit date: 12/15/2012   • Smokeless tobacco: Never Used      Comment: 1/2ppd   • Alcohol use No       SURGICAL HISTORY  Past Surgical History:   Procedure Laterality Date   • GASTROSCOPY-ENDO  4/28/2016    Procedure:  "GASTROSCOPY-ENDO;  Surgeon: Blayne Blackburn M.D.;  Location: ENDOSCOPY [facility];  Service:    • EXPLORATORY LAPAROTOMY  1/12/2016    Procedure: EXPLORATORY LAPAROTOMY;  Surgeon: Maciel Quintero M.D.;  Location: SURGERY [facility];  Service:    • BOWEL RESECTION  1/12/2016    Procedure: BOWEL RESECTION;  Surgeon: Maciel Quintero M.D.;  Location: SURGERY [facility];  Service:    • GASTROSCOPY WITH BIOPSY  3/9/2010    Performed by AMANDA MEJIA at ENDOSCOPY [facility]   • PYLOROPLASTY  7/27/2009    Performed by MACIEL QUINTERO at SURGERY [facility]   • EXPLORATORY LAPAROTOMY  7/27/2009    Performed by MACIEL QUINTERO at SURGERY [facility]   • APPENDECTOMY  7/27/2009    Performed by MACIEL QUINTERO at SURGERY [facility]   • CHOLECYSTECTOMY  7/27/2009    Performed by MACIEL QUINTERO at SURGERY [facility]   • GASTROSCOPY-ENDO  7/8/2009    Performed by ROSANNA WRIGHT at SURGERY [facility]   • LITHOTRIPSY     • OTHER      superior mesenteric artery correction        CURRENT MEDICATIONS  I personally reviewed the medication list in the charting documentation.     ALLERGIES  Allergies   Allergen Reactions   • Metoclopramide Hives     Told by MD; pt suspects possible hives.   • Bactrim [Sulfamethoxazole W-Trimethoprim] Unspecified     Someone said I had a rash or something.      • Seroquel [Quetiapine] Unspecified     MD told her she was allergic- the room was spinning and eye eyes shook when I was in a coma         MEDICAL RECORD  I have reviewed patient's medical record and pertinent results are listed above.      PHYSICAL EXAM  VITAL SIGNS: /69   Pulse (!) 105   Temp 36.9 °C (98.5 °F)   Resp 18   Ht 1.651 m (5' 5\")   Wt 56.7 kg (125 lb)   LMP 04/12/2018   SpO2 100%   BMI 20.80 kg/m²    Constitutional: Generally well-appearing, no acute distress  HENT: Mucus membranes dry.    Eyes: No scleral icterus. " Normal conjunctiva   Neck: Supple, comfortable, nonpainful range of motion.   Cardiovascular: Tachycardic, regular  Thorax & Lungs: Chest is nontender.  Lungs are clear to auscultation with good air movement bilaterally.  No wheeze, rhonchi, nor rales.   Abdomen: Soft, nondistended, mild generalized tenderness, no rebound, no guarding  Skin: Warm, dry. No rash appreciated  Extremities/Musculoskeletal: No sign of trauma. No asymmetric calf tenderness, erythema or edema. Normal range of motion   Neurologic: Alert & oriented. No focal deficits observed.   Psychiatric: Normal affect appropriate for the clinical situation.    DIAGNOSTIC STUDIES / PROCEDURES    EKG  12 Lead EKG interpreted by me to show:    Rate 100  Rhythm: Normal sinus rhythm  Axis: Normal  TX and QRS Intervals: Normal  T waves: Nonspecific changes diffusely  ST segments: No acute changes  Ectopy: None.    My impression of this EKG: Nonspecific T-wave changes      LABS     Results for orders placed or performed during the hospital encounter of 04/15/18   CBC WITH DIFFERENTIAL   Result Value Ref Range    WBC 11.7 (H) 4.8 - 10.8 K/uL    RBC 4.17 (L) 4.20 - 5.40 M/uL    Hemoglobin 13.9 12.0 - 16.0 g/dL    Hematocrit 38.9 37.0 - 47.0 %    MCV 93.3 81.4 - 97.8 fL    MCH 33.3 (H) 27.0 - 33.0 pg    MCHC 35.7 (H) 33.6 - 35.0 g/dL    RDW 41.6 35.9 - 50.0 fL    Platelet Count 425 164 - 446 K/uL    MPV 9.5 9.0 - 12.9 fL    Neutrophils-Polys 68.60 44.00 - 72.00 %    Lymphocytes 19.70 (L) 22.00 - 41.00 %    Monocytes 10.60 0.00 - 13.40 %    Eosinophils 0.10 0.00 - 6.90 %    Basophils 0.40 0.00 - 1.80 %    Immature Granulocytes 0.60 0.00 - 0.90 %    Nucleated RBC 0.00 /100 WBC    Neutrophils (Absolute) 7.99 (H) 2.00 - 7.15 K/uL    Lymphs (Absolute) 2.30 1.00 - 4.80 K/uL    Monos (Absolute) 1.24 (H) 0.00 - 0.85 K/uL    Eos (Absolute) 0.01 0.00 - 0.51 K/uL    Baso (Absolute) 0.05 0.00 - 0.12 K/uL    Immature Granulocytes (abs) 0.07 0.00 - 0.11 K/uL    NRBC (Absolute)  0.00 K/uL   COMP METABOLIC PANEL   Result Value Ref Range    Sodium 126 (L) 135 - 145 mmol/L    Potassium 2.5 (LL) 3.6 - 5.5 mmol/L    Chloride 90 (L) 96 - 112 mmol/L    Co2 21 20 - 33 mmol/L    Anion Gap 15.0 (H) 0.0 - 11.9    Glucose 103 (H) 65 - 99 mg/dL    Bun 29 (H) 8 - 22 mg/dL    Creatinine 1.71 (H) 0.50 - 1.40 mg/dL    Calcium 9.6 8.5 - 10.5 mg/dL    AST(SGOT) 16 12 - 45 U/L    ALT(SGPT) 13 2 - 50 U/L    Alkaline Phosphatase 75 30 - 99 U/L    Total Bilirubin 2.1 (H) 0.1 - 1.5 mg/dL    Albumin 4.8 3.2 - 4.9 g/dL    Total Protein 8.5 (H) 6.0 - 8.2 g/dL    Globulin 3.7 (H) 1.9 - 3.5 g/dL    A-G Ratio 1.3 g/dL   LIPASE   Result Value Ref Range    Lipase 16 11 - 82 U/L   ESTIMATED GFR   Result Value Ref Range    GFR If  40 (A) >60 mL/min/1.73 m 2    GFR If Non  33 (A) >60 mL/min/1.73 m 2   MAGNESIUM   Result Value Ref Range    Magnesium 2.4 1.5 - 2.5 mg/dL   EKG (ER)   Result Value Ref Range    Report       [facility] Emergency Dept.    Test Date:  2018-04-15  Pt Name:    ALIYA MURRAY                 Department: ER  MRN:        [id number]                      Room:       RD 08  Gender:     Female                       Technician: [id number]  :        [id number]                   Requested By:SAJAN HARLEY  Order #:    [id number]                    Reading MD:    Measurements  Intervals                                Axis  Rate:       111                          P:  UT:                                      QRS:        74  QRSD:       94                           T:          -32  QT:         340  QTc:        462    Interpretive Statements  JUNCTIONAL TACHYCARDIA  NONSPECIFIC T ABNORMALITIES, INFERIOR LEADS  BASELINE WANDER IN LEAD(S) V3,V4,V5,V6  Compared to ECG 04/10/2018 11:52:08  Junctional tachycardia now present  T-wave abnormality now present  Sinus rhythm no longer present           COURSE & MEDICAL DECISION MAKING  I have reviewed any medical record  information, laboratory studies and radiographic results as noted above.  Differential diagnoses includes: Dehydration, electro light abnormalities, anemia, renal failure, anxiety reaction, chronic pain    Encounter Summary: This is a 42 y.o. female with vomiting, history of cyclic vomiting syndrome, evaluated 2 days ago by myself, has continued to vomit, presents today tachycardic, her abdomen still is no evidence of peritonitis, her blood work reveals acute renal failure with hypokalemia which is acutely change from a few days ago, she's been treated with multiple liters of IV fluid as she is clearly dehydrated, she'll be repleted for potassium and she'll be admitted to the hospital for further evaluation      DISPOSITION: Admitted in guarded condition      FINAL IMPRESSION  1. Acute renal failure, unspecified acute renal failure type (CMS-HCC)    2. Hypokalemia    3. Dehydration           This dictation was created using voice recognition software. The accuracy of the dictation is limited to the abilities of the software. I expect there may be some errors of grammar and possibly content. The nursing notes were reviewed and certain aspects of this information were incorporated into this note.    Electronically signed by: Kel Buenrostro, 4/15/2018 5:41 PM

## 2018-04-16 NOTE — PROGRESS NOTES
Received report from ELMO Solano (ER). Pt arrived to unit via rney with ACLS RN and Zoll monitor on. Tele box on pt, confirmed with Cyndee (CCT) from monitor room. Per Cyndee, pt is in sinus rhythm with heart rate of 98. A&O x 4. Pt ambulated steadily with stand-by assist from rney to hospital bed, tolerated well. Pt oriented to call light and unit, verbalized understanding. No report of pain at this time. Bed locked and in lowest position with controls on. Treaded socks on. Fall precautions in place. Assessment completed. Will continue to monitor.

## 2018-04-16 NOTE — ASSESSMENT & PLAN NOTE
-Recurrent exacerbations, I do wonder if the patient still is using marijuana or not, benign belly exam  -Underlying psychiatric and psychological stressors concern to be contributing  -IV fluids of normal saline, IV anti-emetics and use IV Ativan to help with both anxiety and nausea

## 2018-04-16 NOTE — DISCHARGE INSTRUCTIONS
Discharge Instructions    Discharged to home by car with self. Discharged via wheelchair, hospital escort: Refused.  Special equipment needed: Not Applicable    Be sure to schedule a follow-up appointment with your primary care doctor or any specialists as instructed.     Discharge Plan:   Smoking Cessation Offered: Patient Refused  Influenza Vaccine Indication: Patient Refuses    I understand that a diet low in cholesterol, fat, and sodium is recommended for good health. Unless I have been given specific instructions below for another diet, I accept this instruction as my diet prescription.     Special Instructions: None    · Is patient discharged on Warfarin / Coumadin?   No       Hypokalemia  Hypokalemia means that the amount of potassium in the blood is lower than normal. Potassium is a chemical that helps regulate the amount of fluid in the body (electrolyte). It also stimulates muscle tightening (contraction) and helps nerves work properly. Normally, most of the body’s potassium is inside of cells, and only a very small amount is in the blood. Because the amount in the blood is so small, minor changes to potassium levels in the blood can be life-threatening.  What are the causes?  This condition may be caused by:  · Antibiotic medicine.  · Diarrhea or vomiting. Taking too much of a medicine that helps you have a bowel movement (laxative) can cause diarrhea and lead to hypokalemia.  · Chronic kidney disease (CKD).  · Medicines that help the body get rid of excess fluid (diuretics).  · Eating disorders, such as bulimia.  · Low magnesium levels in the body.  · Sweating a lot.  What are the signs or symptoms?  Symptoms of this condition include:  · Weakness.  · Constipation.  · Fatigue.  · Muscle cramps.  · Mental confusion.  · Skipped heartbeats or irregular heartbeat (palpitations).  · Tingling or numbness.  How is this diagnosed?  This condition is diagnosed with a blood test.  How is this treated?  Hypokalemia  can be treated by taking potassium supplements by mouth or adjusting the medicines that you take. Treatment may also include eating more foods that contain a lot of potassium. If your potassium level is very low, you may need to get potassium through an IV tube in one of your veins and be monitored in the hospital.  Follow these instructions at home:  · Take over-the-counter and prescription medicines only as told by your health care provider. This includes vitamins and supplements.  · Eat a healthy diet. A healthy diet includes fresh fruits and vegetables, whole grains, healthy fats, and lean proteins.  · If instructed, eat more foods that contain a lot of potassium, such as:  ¨ Nuts, such as peanuts and pistachios.  ¨ Seeds, such as sunflower seeds and pumpkin seeds.  ¨ Peas, lentils, and lima beans.  ¨ Whole grain and bran cereals and breads.  ¨ Fresh fruits and vegetables, such as apricots, avocado, bananas, cantaloupe, kiwi, oranges, tomatoes, asparagus, and potatoes.  ¨ Orange juice.  ¨ Tomato juice.  ¨ Red meats.  ¨ Yogurt.  · Keep all follow-up visits as told by your health care provider. This is important.  Contact a health care provider if:  · You have weakness that gets worse.  · You feel your heart pounding or racing.  · You vomit.  · You have diarrhea.  · You have diabetes (diabetes mellitus) and you have trouble keeping your blood sugar (glucose) in your target range.  Get help right away if:  · You have chest pain.  · You have shortness of breath.  · You have vomiting or diarrhea that lasts for more than 2 days.  · You faint.  This information is not intended to replace advice given to you by your health care provider. Make sure you discuss any questions you have with your health care provider.  Document Released: 12/18/2006 Document Revised: 08/05/2017 Document Reviewed: 08/05/2017  Elsevier Interactive Patient Education © 2017 Elsevier Inc[employer]          Depression / Suicide Risk    As you are discharged  from this [facility] facility, it is important to learn how to keep safe from harming yourself.    Recognize the warning signs:  · Abrupt changes in personality, positive or negative- including increase in energy   · Giving away possessions  · Change in eating patterns- significant weight changes-  positive or negative  · Change in sleeping patterns- unable to sleep or sleeping all the time   · Unwillingness or inability to communicate  · Depression  · Unusual sadness, discouragement and loneliness  · Talk of wanting to die  · Neglect of personal appearance   · Rebelliousness- reckless behavior  · Withdrawal from people/activities they love  · Confusion- inability to concentrate     If you or a loved one observes any of these behaviors or has concerns about self-harm, here's what you can do:  · Talk about it- your feelings and reasons for harming yourself  · Remove any means that you might use to hurt yourself (examples: pills, rope, extension cords, firearm)  · Get professional help from the community (Mental Health, Substance Abuse, psychological counseling)  · Do not be alone:Call your Safe Contact- someone whom you trust who will be there for you.  · Call your local CRISIS HOTLINE 395-3688 or 810-460-8252  · Call your local Children's Mobile Crisis Response Team [place] (065) 876-8150 or www.[employer] [place]  · Call the toll free National Suicide Prevention Hotlines   · National Suicide Prevention Lifeline 842-373-AXSO (0475)  · National Hope Line Network 800-SUICIDE (775-2007)

## 2018-04-16 NOTE — ASSESSMENT & PLAN NOTE
-Likely secondary to stress reaction with ongoing cyclic vomiting syndrome exacerbation  -No evidence of active infection therefore defer antibiotics  -Trend white blood cell count fever curve

## 2018-04-16 NOTE — DISCHARGE SUMMARY
CHIEF COMPLAINT ON ADMISSION  Chief Complaint   Patient presents with   • Anxiety   • N/V     since thursday   • Back Pain   • Neck Pain       CODE STATUS  Full Code    HPI & HOSPITAL COURSE  Please see history and physical from 4/15/18 for further details.  This is a 42 y.o. female here with chronic anxiety and cyclical nausea and vomiting likely related to chronic marijuana use.    She was found to have significant hypokalemia with a potassium of 2.5 and acute renal failure due to dehydration.  She was hydrated and her electrolyte abnormalities were corrected.  She had no further nausea, vomiting or abdominal pain and was tolerating a bland soft diet at time of discharge.    She also had hyponatremia due to dehydration which has greatly improved and I have recommended that she have a repeat bmp with her pcp next week.  She does have ongoing anxiety which she states is chronic and she was going to follow up with psych as an outpatient this afternoon.  She denies SI, HI.      Cannabis and tobacco cessation were recommended.    The patient recovered much more quickly than anticipated on admission.    Therefore, she is discharged in fair and stable condition with close outpatient follow-up.    SPECIFIC OUTPATIENT FOLLOW-UP  Psych  pcp    DISCHARGE PROBLEM LIST  Principal Problem:    Cyclic vomiting syndrome (Chronic) POA: Yes  Active Problems:    Tobacco abuse disorder POA: Yes    Anxiety (Chronic) POA: Yes    Hyponatremia POA: Yes  Resolved Problems:    Hypokalemia POA: Yes    JILL (acute kidney injury) (CMS-HCC) POA: Yes    Leukocytosis POA: Yes    SMAS (superior mesenteric artery syndrome) (CMS-HCC) (Chronic) POA: Yes      Overview: S/p vlad-en-y duodenojejunostomy and bowel resection    Physical Exam   Constitutional: She is oriented to person, place, and time. She appears well-developed and well-nourished. Anxious  HENT: WNL  Neck: Normal range of motion. Neck supple. No thyromegaly present.   Cardiovascular:  Normal rate, regular rhythm, normal heart sounds and intact distal pulses.    No murmur heard.  Pulmonary/Chest: Effort normal and breath sounds normal. No stridor. No respiratory distress. She has no wheezes.   Abdominal: Soft. Bowel sounds are normal. She exhibits no distension. There is no tenderness.    Musculoskeletal: Normal range of motion. She exhibits no edema or tenderness.   Neurological: She is alert and oriented to person, place, and time. No cranial nerve deficit.   Skin: Skin is warm and dry. No rash noted.   Psychiatric:   Nursing note and vitals reviewed.    FOLLOW UP  No future appointments.  Isa Rodriguez M.D.  [address]  [address]  [facility] [address]  [phone]            MEDICATIONS ON DISCHARGE   DavidDiana   Home Medication Instructions JOSUÉ:[id number]    Printed on:04/16/18 1250   Medication Information                      busPIRone (BUSPAR) 15 MG tablet  Take 1 Tab by mouth 2 times a day.             diphenhydrAMINE (BENADRYL) 25 MG Tab  Take 50 mg by mouth every 6 hours as needed for Sleep.             ondansetron (ZOFRAN ODT) 4 MG TABLET DISPERSIBLE  Take 4 mg by mouth every four hours as needed for Nausea.             promethazine (PHENERGAN) 25 MG Tab  Take 1 Tab by mouth every 6 hours as needed for Nausea/Vomiting.                 DIET  Orders Placed This Encounter   Procedures   • Diet Order     Standing Status:   Standing     Number of Occurrences:   1     Order Specific Question:   Diet:     Answer:   Low Fiber(GI Soft) [2]       ACTIVITY  As tolerated.        CONSULTATIONS      PROCEDURES  No orders to display         LABORATORY  Lab Results   Component Value Date/Time    SODIUM 133 (L) 04/16/2018 02:25 AM    POTASSIUM 4.3 04/16/2018 02:25 AM    CHLORIDE 107 04/16/2018 02:25 AM    CO2 20 04/16/2018 02:25 AM    GLUCOSE 84 04/16/2018 02:25 AM    BUN 21 04/16/2018 02:25 AM    CREATININE 1.09 04/16/2018 02:25 AM    CREATININE 0.9 05/18/2009 08:53 AM        Lab Results    Component Value Date/Time    WBC 7.9 04/16/2018 02:25 AM    HEMOGLOBIN 11.0 (L) 04/16/2018 02:25 AM    HEMATOCRIT 32.4 (L) 04/16/2018 02:25 AM    PLATELETCT 314 04/16/2018 02:25 AM        Total time of the discharge process exceeds 45 minutes

## 2018-04-16 NOTE — ASSESSMENT & PLAN NOTE
-Secondary to ongoing nausea and vomiting  -Replace aggressively with both by mouth and IV formulations  -Checks potassium and and magnesium levels in the morning

## 2018-04-16 NOTE — ASSESSMENT & PLAN NOTE
-Secondary to massive volume loss from cyclic vomiting, IV fluids of normal saline and trend sodium levels daily

## 2018-04-16 NOTE — H&P
Hospital Medicine History and Physical    Date of Service  4/15/2018    Chief Complaint  Chief Complaint   Patient presents with   • Anxiety   • N/V     since thursday   • Back Pain   • Neck Pain       History of Presenting Illness  42 y.o. female who presented 4/15/2018 with above chief complaint. Patient just been admitted to this hospital and discharged on March 23, 2018 from his identical symptoms. This will be the patient's 39 admission in the last 5 years to our facility. Patient has a history of possible bipolar disorder along with anxiety and cyclic vomiting syndrome that started to be induced by ongoing marijuana abuse. And states that since she's been discharged from the hospital she is abstaining from marijuana for the last 2 weeks and that she is actually finally getting get into a psychiatrist tomorrow to help deal with her anxiety. Her anxieties been out of control for the last several years and she saw her primary care doctor on Tuesday with a try to do further adjustments though she couldn't specify exactly what to help of her ongoing nausea vomiting. She finally came in today she was dry heaving cannot keep anything down in the last note she had was on Thursday which is approximately 3 days ago. She claims that anxiety and depression seem to be driving his symptoms and that is why she was using marijuana in the past to help quell her anxiety. Patient denies any actual belly pain and denies any diarrhea and is not feeling hungry at all and feels that she is getting a little bit more anxious now that the IV Ativan as worn off. Denies any abdominal trauma.    Primary Care Physician  Isa Rodriguez M.D.    Code Status  fc/fc    Review of Systems  Review of Systems   Constitutional: Positive for malaise/fatigue and weight loss. Negative for chills and fever.   HENT: Negative for hearing loss.    Eyes: Negative for blurred vision.   Respiratory: Negative for hemoptysis, sputum production and shortness  of breath.    Cardiovascular: Negative for chest pain, palpitations and leg swelling.   Gastrointestinal: Positive for nausea and vomiting. Negative for constipation, diarrhea and heartburn.   Genitourinary: Negative for dysuria and urgency.   Musculoskeletal: Negative for myalgias and neck pain.   Skin: Negative for itching.   Neurological: Positive for weakness. Negative for dizziness, focal weakness and loss of consciousness.   Psychiatric/Behavioral: Positive for depression. Negative for substance abuse and suicidal ideas. The patient is nervous/anxious.    All other systems reviewed and are negative.         Past Medical History  Past Medical History:   Diagnosis Date   • CLOSTRIDIUM DIFFICILE INFECTION 4/14/2010   • Superior mesenteric artery syndrome (CMS-HCC) 7/12/2009   • Dyspepsia 7/12/2009   • Tobacco abuse 6/20/2009   • Nephrolithiasis 6/20/2009    kidney stones,post lithotrypsy   • Anxiety     Followed by [facility]   • Anxiety disorder    • Backpain    • CVS disease    • Cyclic vomiting syndrome    • Dental disorder    • Drug-seeking behavior    • Pain     abd and back   • Snoring    • Superior mesenteric artery syndrome (CMS-HCC)        Surgical History  Past Surgical History:   Procedure Laterality Date   • GASTROSCOPY-ENDO  4/28/2016    Procedure: GASTROSCOPY-ENDO;  Surgeon: Blayne Blackburn M.D.;  Location: [facility];  Service:    • EXPLORATORY LAPAROTOMY  1/12/2016    Procedure: EXPLORATORY LAPAROTOMY;  Surgeon: Maciel Quintero M.D.;  Location: SURGERY [facility];  Service:    • BOWEL RESECTION  1/12/2016    Procedure: BOWEL RESECTION;  Surgeon: Maciel Quintero M.D.;  Location: SURGERY [facility];  Service:    • GASTROSCOPY WITH BIOPSY  3/9/2010    Performed by AMANDA MEJIA at ENDOSCOPY [facility]   • PYLOROPLASTY  7/27/2009    Performed by MACIEL QUINTERO at SURGERY [facility]   • EXPLORATORY LAPAROTOMY  7/27/2009    Performed by  MACIEL QUINTERO at SURGERY [facility]   • APPENDECTOMY  7/27/2009    Performed by MACIEL QUINTERO at SURGERY [facility]   • CHOLECYSTECTOMY  7/27/2009    Performed by MACIEL QUINTERO at SURGERY [facility]   • GASTROSCOPY-ENDO  7/8/2009    Performed by ROSANNA WRIGHT at SURGERY [facility]   • LITHOTRIPSY     • OTHER      superior mesenteric artery correction        Medications  No current facility-administered medications on file prior to encounter.      Current Outpatient Prescriptions on File Prior to Encounter   Medication Sig Dispense Refill   • ondansetron (ZOFRAN ODT) 4 MG TABLET DISPERSIBLE Take 4 mg by mouth every four hours as needed for Nausea.     • busPIRone (BUSPAR) 15 MG tablet Take 1 Tab by mouth 2 times a day. 90 Tab 1   • promethazine (PHENERGAN) 25 MG Tab Take 1 Tab by mouth every 6 hours as needed for Nausea/Vomiting. 30 Tab 0   • diphenhydrAMINE (BENADRYL) 25 MG Tab Take 50 mg by mouth every 6 hours as needed for Sleep.         Family History  Family History   Problem Relation Age of Onset   • Cancer Mother 50     Colon cancer   • Hypertension Mother    • Allergies Father    • Hypertension Father    • Heart Disease Maternal Grandmother      Problem Relation   • Cancer Mother     Colon cancer   • Hypertension Mother   • Allergies Father   • Hypertension Father   • Heart Disease Maternal Grandmother       Social History  Social History   Substance Use Topics   • Smoking status: Former Smoker     Packs/day: 0.50     Years: 13.00     Types: Cigarettes     Quit date: 12/15/2012   • Smokeless tobacco: Never Used      Comment: 1/2ppd   • Alcohol use No       Allergies  Allergies   Allergen Reactions   • Metoclopramide Hives     Told by MD; pt suspects possible hives.   • Bactrim [Sulfamethoxazole W-Trimethoprim] Unspecified     Someone said I had a rash or something.      • Seroquel [Quetiapine] Unspecified     MD told her she was allergic- the room was  spinning and eye eyes shook when I was in a coma          Physical Exam  Laboratory   Hemodynamics  Temp (24hrs), Av.9 °C (98.5 °F), Min:36.9 °C (98.5 °F), Max:36.9 °C (98.5 °F)   Temperature: 36.9 °C (98.5 °F)  Pulse  Av.3  Min: 105  Max: 111    Blood Pressure: 113/69, NIBP: 101/70      Respiratory      Respiration: 12, Pulse Oximetry: 93 %             Physical Exam   Constitutional: She is oriented to person, place, and time. She appears well-developed and well-nourished. No distress.   Thin and appears anxious   HENT:   Head: Normocephalic and atraumatic.   Mouth/Throat: No oropharyngeal exudate.   Eyes: Pupils are equal, round, and reactive to light. Right eye exhibits no discharge. Left eye exhibits no discharge.   Neck: Normal range of motion. Neck supple. No thyromegaly present.   Cardiovascular: Normal rate, regular rhythm, normal heart sounds and intact distal pulses.    No murmur heard.  Pulmonary/Chest: Effort normal and breath sounds normal. No stridor. No respiratory distress. She has no wheezes.   Abdominal: Soft. Bowel sounds are normal. She exhibits no distension. There is no tenderness.   Somewhat scaphoid, somewhat hypoactive bowel sounds   Musculoskeletal: Normal range of motion. She exhibits no edema or tenderness.   Neurological: She is alert and oriented to person, place, and time. No cranial nerve deficit.   Skin: Skin is warm and dry. No rash noted.   Psychiatric:   Anxious and depressed   Nursing note and vitals reviewed.      Recent Labs      04/15/18   1625   WBC  11.7*   RBC  4.17*   HEMOGLOBIN  13.9   HEMATOCRIT  38.9   MCV  93.3   MCH  33.3*   MCHC  35.7*   RDW  41.6   PLATELETCT  425   MPV  9.5     Recent Labs      04/15/18   1625   SODIUM  126*   POTASSIUM  2.5*   CHLORIDE  90*   CO2  21   GLUCOSE  103*   BUN  29*   CREATININE  1.71*   CALCIUM  9.6                   Imaging  No orders to display        Assessment/Plan     I anticipate this patient will require at least two  midnights for appropriate medical management, necessitating inpatient admission.    * Cyclic vomiting syndrome- (present on admission)   Assessment & Plan    -Recurrent exacerbations, I do wonder if the patient still is using marijuana or not, benign belly exam  -Underlying psychiatric and psychological stressors concern to be contributing  -IV fluids of normal saline, IV anti-emetics and use IV Ativan to help with both anxiety and nausea        JILL (acute kidney injury) (CMS-HCC)- (present on admission)   Assessment & Plan    -Secondary to volume loss from cyclic vomiting  -IV fluids with normal saline and trend urine output and creatinine levels closely        Hypokalemia- (present on admission)   Assessment & Plan    -Secondary to ongoing nausea and vomiting  -Replace aggressively with both by mouth and IV formulations  -Checks potassium and and magnesium levels in the morning        Leukocytosis- (present on admission)   Assessment & Plan    -Likely secondary to stress reaction with ongoing cyclic vomiting syndrome exacerbation  -No evidence of active infection therefore defer antibiotics  -Trend white blood cell count fever curve        Anxiety- (present on admission)   Assessment & Plan    -Not well-controlled  -Continue IV Ativan and BuSpar  -Needs close follow-up with an outpatient psychologist or psychiatrist        Tobacco abuse disorder- (present on admission)   Assessment & Plan    -Encouraged cessation        SMAS (superior mesenteric artery syndrome) (CMS-HCC)- (present on admission)   Assessment & Plan    -History of prior abdominal surgery including a Shruti-en-Y and bowel resection and appears symptoms that started after this procedure  -No current abdominal pain        Hyponatremia- (present on admission)   Assessment & Plan    -Secondary to massive volume loss from cyclic vomiting, IV fluids of normal saline and trend sodium levels daily            VTE prophylaxis heparin 5k units q9cpscl.

## 2018-04-17 ENCOUNTER — PATIENT OUTREACH (OUTPATIENT)
Dept: HEALTH INFORMATION MANAGEMENT | Facility: OTHER | Age: 43
End: 2018-04-17

## 2018-04-21 LAB — EKG IMPRESSION: NORMAL

## 2018-05-19 ENCOUNTER — APPOINTMENT (OUTPATIENT)
Dept: RADIOLOGY | Facility: MEDICAL CENTER | Age: 43
DRG: 683 | End: 2018-05-19
Attending: FAMILY MEDICINE
Payer: COMMERCIAL

## 2018-05-19 ENCOUNTER — HOSPITAL ENCOUNTER (INPATIENT)
Facility: MEDICAL CENTER | Age: 43
LOS: 1 days | DRG: 683 | End: 2018-05-21
Attending: EMERGENCY MEDICINE | Admitting: HOSPITALIST
Payer: COMMERCIAL

## 2018-05-19 DIAGNOSIS — F45.8 ACUTE HYPERVENTILATION SYNDROME: ICD-10-CM

## 2018-05-19 DIAGNOSIS — F43.23 ACUTE ADJUSTMENT DISORDER WITH MIXED ANXIETY AND DEPRESSED MOOD: ICD-10-CM

## 2018-05-19 DIAGNOSIS — D72.829 LEUKOCYTOSIS, UNSPECIFIED TYPE: ICD-10-CM

## 2018-05-19 DIAGNOSIS — R25.2 FOOT CRAMPS: ICD-10-CM

## 2018-05-19 DIAGNOSIS — E87.20 METABOLIC ACIDOSIS: ICD-10-CM

## 2018-05-19 DIAGNOSIS — N17.9 ACUTE RENAL FAILURE, UNSPECIFIED ACUTE RENAL FAILURE TYPE (HCC): ICD-10-CM

## 2018-05-19 DIAGNOSIS — F33.1 MODERATE EPISODE OF RECURRENT MAJOR DEPRESSIVE DISORDER (HCC): ICD-10-CM

## 2018-05-19 DIAGNOSIS — F50.89 PSYCHOGENIC VOMITING WITH NAUSEA: ICD-10-CM

## 2018-05-19 DIAGNOSIS — E83.52 HYPERCALCEMIA: ICD-10-CM

## 2018-05-19 PROBLEM — F41.9 ANXIETY: Status: ACTIVE | Noted: 2018-05-19

## 2018-05-19 PROBLEM — F32.A DEPRESSION: Status: ACTIVE | Noted: 2018-05-19

## 2018-05-19 PROBLEM — R11.15 CYCLIC VOMITING SYNDROME: Status: ACTIVE | Noted: 2018-05-19

## 2018-05-19 PROBLEM — E87.1 HYPONATREMIA: Status: ACTIVE | Noted: 2018-05-19

## 2018-05-19 PROBLEM — F12.20 ADDICTION, MARIJUANA (HCC): Status: ACTIVE | Noted: 2018-05-19

## 2018-05-19 PROBLEM — E86.0 DEHYDRATION: Status: ACTIVE | Noted: 2018-05-19

## 2018-05-19 PROBLEM — R73.9 HYPERGLYCEMIA: Status: ACTIVE | Noted: 2018-05-19

## 2018-05-19 LAB
ALBUMIN SERPL BCP-MCNC: 4.5 G/DL (ref 3.2–4.9)
ALBUMIN/GLOB SERPL: 1.3 G/DL
ALP SERPL-CCNC: 61 U/L (ref 30–99)
ALT SERPL-CCNC: 11 U/L (ref 2–50)
ANION GAP SERPL CALC-SCNC: 13 MMOL/L (ref 0–11.9)
ANION GAP SERPL CALC-SCNC: 26 MMOL/L (ref 0–11.9)
APPEARANCE UR: CLEAR
AST SERPL-CCNC: 17 U/L (ref 12–45)
BASOPHILS # BLD AUTO: 0.2 % (ref 0–1.8)
BASOPHILS # BLD AUTO: 0.2 % (ref 0–1.8)
BASOPHILS # BLD: 0.05 K/UL (ref 0–0.12)
BASOPHILS # BLD: 0.05 K/UL (ref 0–0.12)
BILIRUB SERPL-MCNC: 1.5 MG/DL (ref 0.1–1.5)
BILIRUB UR QL STRIP.AUTO: NEGATIVE
BUN SERPL-MCNC: 22 MG/DL (ref 8–22)
BUN SERPL-MCNC: 24 MG/DL (ref 8–22)
CALCIUM SERPL-MCNC: 12.5 MG/DL (ref 8.4–10.2)
CALCIUM SERPL-MCNC: 9.5 MG/DL (ref 8.4–10.2)
CHLORIDE SERPL-SCNC: 100 MMOL/L (ref 96–112)
CHLORIDE SERPL-SCNC: 96 MMOL/L (ref 96–112)
CO2 SERPL-SCNC: 17 MMOL/L (ref 20–33)
CO2 SERPL-SCNC: 9 MMOL/L (ref 20–33)
COLOR UR: YELLOW
CREAT SERPL-MCNC: 3.11 MG/DL (ref 0.5–1.4)
CREAT SERPL-MCNC: 3.71 MG/DL (ref 0.5–1.4)
EOSINOPHIL # BLD AUTO: 0.01 K/UL (ref 0–0.51)
EOSINOPHIL # BLD AUTO: 0.02 K/UL (ref 0–0.51)
EOSINOPHIL NFR BLD: 0 % (ref 0–6.9)
EOSINOPHIL NFR BLD: 0.1 % (ref 0–6.9)
ERYTHROCYTE [DISTWIDTH] IN BLOOD BY AUTOMATED COUNT: 46.2 FL (ref 35.9–50)
ERYTHROCYTE [DISTWIDTH] IN BLOOD BY AUTOMATED COUNT: 46.6 FL (ref 35.9–50)
EST. AVERAGE GLUCOSE BLD GHB EST-MCNC: 126 MG/DL
GLOBULIN SER CALC-MCNC: 3.4 G/DL (ref 1.9–3.5)
GLUCOSE SERPL-MCNC: 125 MG/DL (ref 65–99)
GLUCOSE SERPL-MCNC: 252 MG/DL (ref 65–99)
GLUCOSE UR STRIP.AUTO-MCNC: NEGATIVE MG/DL
HBA1C MFR BLD: 6 % (ref 0–5.6)
HCT VFR BLD AUTO: 37.3 % (ref 37–47)
HCT VFR BLD AUTO: 41.5 % (ref 37–47)
HGB BLD-MCNC: 13 G/DL (ref 12–16)
HGB BLD-MCNC: 14.4 G/DL (ref 12–16)
IMM GRANULOCYTES # BLD AUTO: 0.13 K/UL (ref 0–0.11)
IMM GRANULOCYTES # BLD AUTO: 0.17 K/UL (ref 0–0.11)
IMM GRANULOCYTES NFR BLD AUTO: 0.6 % (ref 0–0.9)
IMM GRANULOCYTES NFR BLD AUTO: 0.8 % (ref 0–0.9)
KETONES UR STRIP.AUTO-MCNC: ABNORMAL MG/DL
LEUKOCYTE ESTERASE UR QL STRIP.AUTO: NEGATIVE
LYMPHOCYTES # BLD AUTO: 1.37 K/UL (ref 1–4.8)
LYMPHOCYTES # BLD AUTO: 1.54 K/UL (ref 1–4.8)
LYMPHOCYTES NFR BLD: 6.4 % (ref 22–41)
LYMPHOCYTES NFR BLD: 7.3 % (ref 22–41)
MCH RBC QN AUTO: 32.6 PG (ref 27–33)
MCH RBC QN AUTO: 32.6 PG (ref 27–33)
MCHC RBC AUTO-ENTMCNC: 34.7 G/DL (ref 33.6–35)
MCHC RBC AUTO-ENTMCNC: 34.9 G/DL (ref 33.6–35)
MCV RBC AUTO: 93.5 FL (ref 81.4–97.8)
MCV RBC AUTO: 93.9 FL (ref 81.4–97.8)
MICRO URNS: ABNORMAL
MONOCYTES # BLD AUTO: 1.33 K/UL (ref 0–0.85)
MONOCYTES # BLD AUTO: 1.39 K/UL (ref 0–0.85)
MONOCYTES NFR BLD AUTO: 6.3 % (ref 0–13.4)
MONOCYTES NFR BLD AUTO: 6.5 % (ref 0–13.4)
NEUTROPHILS # BLD AUTO: 18.08 K/UL (ref 2–7.15)
NEUTROPHILS # BLD AUTO: 18.38 K/UL (ref 2–7.15)
NEUTROPHILS NFR BLD: 85.6 % (ref 44–72)
NEUTROPHILS NFR BLD: 86 % (ref 44–72)
NITRITE UR QL STRIP.AUTO: NEGATIVE
NRBC # BLD AUTO: 0 K/UL
NRBC # BLD AUTO: 0 K/UL
NRBC BLD-RTO: 0 /100 WBC
NRBC BLD-RTO: 0 /100 WBC
PH UR STRIP.AUTO: 5.5 [PH]
PLATELET # BLD AUTO: 316 K/UL (ref 164–446)
PLATELET # BLD AUTO: 412 K/UL (ref 164–446)
PMV BLD AUTO: 10.6 FL (ref 9–12.9)
PMV BLD AUTO: 9.5 FL (ref 9–12.9)
POTASSIUM SERPL-SCNC: 3.3 MMOL/L (ref 3.6–5.5)
POTASSIUM SERPL-SCNC: 4.1 MMOL/L (ref 3.6–5.5)
PROT SERPL-MCNC: 7.9 G/DL (ref 6–8.2)
PROT UR QL STRIP: NEGATIVE MG/DL
RBC # BLD AUTO: 3.99 M/UL (ref 4.2–5.4)
RBC # BLD AUTO: 4.42 M/UL (ref 4.2–5.4)
RBC UR QL AUTO: NEGATIVE
SODIUM SERPL-SCNC: 130 MMOL/L (ref 135–145)
SODIUM SERPL-SCNC: 131 MMOL/L (ref 135–145)
SP GR UR STRIP.AUTO: 1.01
WBC # BLD AUTO: 21.1 K/UL (ref 4.8–10.8)
WBC # BLD AUTO: 21.4 K/UL (ref 4.8–10.8)

## 2018-05-19 PROCEDURE — 700111 HCHG RX REV CODE 636 W/ 250 OVERRIDE (IP): Performed by: EMERGENCY MEDICINE

## 2018-05-19 PROCEDURE — 80048 BASIC METABOLIC PNL TOTAL CA: CPT

## 2018-05-19 PROCEDURE — 80053 COMPREHEN METABOLIC PANEL: CPT

## 2018-05-19 PROCEDURE — 700105 HCHG RX REV CODE 258: Performed by: HOSPITALIST

## 2018-05-19 PROCEDURE — 96372 THER/PROPH/DIAG INJ SC/IM: CPT

## 2018-05-19 PROCEDURE — A9270 NON-COVERED ITEM OR SERVICE: HCPCS | Performed by: FAMILY MEDICINE

## 2018-05-19 PROCEDURE — 700111 HCHG RX REV CODE 636 W/ 250 OVERRIDE (IP): Performed by: HOSPITALIST

## 2018-05-19 PROCEDURE — G0378 HOSPITAL OBSERVATION PER HR: HCPCS

## 2018-05-19 PROCEDURE — 700105 HCHG RX REV CODE 258: Performed by: FAMILY MEDICINE

## 2018-05-19 PROCEDURE — 700111 HCHG RX REV CODE 636 W/ 250 OVERRIDE (IP): Performed by: FAMILY MEDICINE

## 2018-05-19 PROCEDURE — 71045 X-RAY EXAM CHEST 1 VIEW: CPT

## 2018-05-19 PROCEDURE — 99285 EMERGENCY DEPT VISIT HI MDM: CPT

## 2018-05-19 PROCEDURE — 83036 HEMOGLOBIN GLYCOSYLATED A1C: CPT

## 2018-05-19 PROCEDURE — 81003 URINALYSIS AUTO W/O SCOPE: CPT

## 2018-05-19 PROCEDURE — 85025 COMPLETE CBC W/AUTO DIFF WBC: CPT | Mod: 91

## 2018-05-19 PROCEDURE — 99220 PR INITIAL OBSERVATION CARE,LEVL III: CPT | Performed by: FAMILY MEDICINE

## 2018-05-19 PROCEDURE — 700105 HCHG RX REV CODE 258: Performed by: EMERGENCY MEDICINE

## 2018-05-19 PROCEDURE — 700102 HCHG RX REV CODE 250 W/ 637 OVERRIDE(OP): Performed by: FAMILY MEDICINE

## 2018-05-19 RX ORDER — FLUOXETINE HYDROCHLORIDE 20 MG/1
20 CAPSULE ORAL DAILY
Status: DISCONTINUED | OUTPATIENT
Start: 2018-05-19 | End: 2018-05-21 | Stop reason: HOSPADM

## 2018-05-19 RX ORDER — SODIUM BICARBONATE 650 MG/1
650 TABLET ORAL 3 TIMES DAILY
Status: DISCONTINUED | OUTPATIENT
Start: 2018-05-19 | End: 2018-05-20

## 2018-05-19 RX ORDER — SODIUM CHLORIDE 9 MG/ML
1000 INJECTION, SOLUTION INTRAVENOUS ONCE
Status: COMPLETED | OUTPATIENT
Start: 2018-05-19 | End: 2018-05-19

## 2018-05-19 RX ORDER — LORAZEPAM 1 MG/1
1 TABLET ORAL 2 TIMES DAILY PRN
COMMUNITY
End: 2018-05-29

## 2018-05-19 RX ORDER — LORAZEPAM 2 MG/ML
1 INJECTION INTRAMUSCULAR ONCE
Status: COMPLETED | OUTPATIENT
Start: 2018-05-19 | End: 2018-05-19

## 2018-05-19 RX ORDER — ONDANSETRON 2 MG/ML
4 INJECTION INTRAMUSCULAR; INTRAVENOUS EVERY 4 HOURS PRN
Status: DISCONTINUED | OUTPATIENT
Start: 2018-05-19 | End: 2018-05-21 | Stop reason: HOSPADM

## 2018-05-19 RX ORDER — FLUOXETINE HYDROCHLORIDE 20 MG/1
20 CAPSULE ORAL DAILY
COMMUNITY
End: 2018-05-29

## 2018-05-19 RX ORDER — HEPARIN SODIUM 5000 [USP'U]/ML
5000 INJECTION, SOLUTION INTRAVENOUS; SUBCUTANEOUS EVERY 8 HOURS
Status: DISCONTINUED | OUTPATIENT
Start: 2018-05-19 | End: 2018-05-21 | Stop reason: HOSPADM

## 2018-05-19 RX ORDER — ONDANSETRON 2 MG/ML
8 INJECTION INTRAMUSCULAR; INTRAVENOUS ONCE
Status: DISCONTINUED | OUTPATIENT
Start: 2018-05-19 | End: 2018-05-19

## 2018-05-19 RX ORDER — HALOPERIDOL 5 MG/ML
5 INJECTION INTRAMUSCULAR EVERY 6 HOURS PRN
Status: DISCONTINUED | OUTPATIENT
Start: 2018-05-19 | End: 2018-05-21 | Stop reason: HOSPADM

## 2018-05-19 RX ORDER — DIPHENHYDRAMINE HYDROCHLORIDE 50 MG/ML
12.5-25 INJECTION INTRAMUSCULAR; INTRAVENOUS EVERY 6 HOURS PRN
Status: DISCONTINUED | OUTPATIENT
Start: 2018-05-19 | End: 2018-05-21 | Stop reason: HOSPADM

## 2018-05-19 RX ORDER — ACETAMINOPHEN 325 MG/1
650 TABLET ORAL EVERY 6 HOURS PRN
Status: DISCONTINUED | OUTPATIENT
Start: 2018-05-19 | End: 2018-05-21 | Stop reason: HOSPADM

## 2018-05-19 RX ORDER — ALPRAZOLAM 0.5 MG/1
1 TABLET ORAL 3 TIMES DAILY PRN
Status: DISCONTINUED | OUTPATIENT
Start: 2018-05-19 | End: 2018-05-21 | Stop reason: HOSPADM

## 2018-05-19 RX ORDER — ONDANSETRON 2 MG/ML
8 INJECTION INTRAMUSCULAR; INTRAVENOUS ONCE
Status: COMPLETED | OUTPATIENT
Start: 2018-05-19 | End: 2018-05-19

## 2018-05-19 RX ORDER — SODIUM CHLORIDE 9 MG/ML
INJECTION, SOLUTION INTRAVENOUS CONTINUOUS
Status: DISCONTINUED | OUTPATIENT
Start: 2018-05-19 | End: 2018-05-21 | Stop reason: HOSPADM

## 2018-05-19 RX ORDER — POTASSIUM CHLORIDE 7.45 MG/ML
10 INJECTION INTRAVENOUS
Status: COMPLETED | OUTPATIENT
Start: 2018-05-19 | End: 2018-05-19

## 2018-05-19 RX ORDER — SODIUM CHLORIDE 9 MG/ML
2000 INJECTION, SOLUTION INTRAVENOUS ONCE
Status: COMPLETED | OUTPATIENT
Start: 2018-05-19 | End: 2018-05-19

## 2018-05-19 RX ORDER — BUSPIRONE HYDROCHLORIDE 10 MG/1
10 TABLET ORAL 2 TIMES DAILY
COMMUNITY
End: 2018-05-29

## 2018-05-19 RX ORDER — LABETALOL HYDROCHLORIDE 5 MG/ML
10 INJECTION, SOLUTION INTRAVENOUS EVERY 4 HOURS PRN
Status: DISCONTINUED | OUTPATIENT
Start: 2018-05-19 | End: 2018-05-21 | Stop reason: HOSPADM

## 2018-05-19 RX ORDER — DIPHENHYDRAMINE HCL 25 MG
25 TABLET ORAL EVERY 6 HOURS PRN
Status: DISCONTINUED | OUTPATIENT
Start: 2018-05-19 | End: 2018-05-21 | Stop reason: HOSPADM

## 2018-05-19 RX ORDER — LORAZEPAM 2 MG/ML
1 INJECTION INTRAMUSCULAR ONCE
Status: DISCONTINUED | OUTPATIENT
Start: 2018-05-19 | End: 2018-05-19

## 2018-05-19 RX ADMIN — SODIUM BICARBONATE 650 MG TABLET 650 MG: at 21:09

## 2018-05-19 RX ADMIN — SODIUM CHLORIDE: 9 INJECTION, SOLUTION INTRAVENOUS at 05:40

## 2018-05-19 RX ADMIN — SODIUM CHLORIDE 1000 ML: 9 INJECTION, SOLUTION INTRAVENOUS at 03:59

## 2018-05-19 RX ADMIN — SODIUM BICARBONATE 650 MG TABLET 650 MG: at 09:13

## 2018-05-19 RX ADMIN — POTASSIUM CHLORIDE 10 MEQ: 10 INJECTION, SOLUTION INTRAVENOUS at 12:00

## 2018-05-19 RX ADMIN — SODIUM BICARBONATE 50 MEQ: 84 INJECTION, SOLUTION INTRAVENOUS at 05:39

## 2018-05-19 RX ADMIN — POTASSIUM CHLORIDE 10 MEQ: 10 INJECTION, SOLUTION INTRAVENOUS at 09:54

## 2018-05-19 RX ADMIN — POTASSIUM CHLORIDE 10 MEQ: 10 INJECTION, SOLUTION INTRAVENOUS at 13:25

## 2018-05-19 RX ADMIN — HEPARIN SODIUM 5000 UNITS: 5000 INJECTION, SOLUTION INTRAVENOUS; SUBCUTANEOUS at 15:40

## 2018-05-19 RX ADMIN — ONDANSETRON 4 MG: 2 INJECTION INTRAMUSCULAR; INTRAVENOUS at 11:12

## 2018-05-19 RX ADMIN — LORAZEPAM 1 MG: 2 INJECTION INTRAMUSCULAR; INTRAVENOUS at 02:46

## 2018-05-19 RX ADMIN — ACETAMINOPHEN 650 MG: 325 TABLET, FILM COATED ORAL at 19:05

## 2018-05-19 RX ADMIN — ALPRAZOLAM 1 MG: 0.5 TABLET ORAL at 09:13

## 2018-05-19 RX ADMIN — ONDANSETRON 8 MG: 2 INJECTION INTRAMUSCULAR; INTRAVENOUS at 02:46

## 2018-05-19 RX ADMIN — DIPHENHYDRAMINE HCL 25 MG: 25 TABLET ORAL at 21:33

## 2018-05-19 RX ADMIN — HEPARIN SODIUM 5000 UNITS: 5000 INJECTION, SOLUTION INTRAVENOUS; SUBCUTANEOUS at 05:39

## 2018-05-19 RX ADMIN — SODIUM CHLORIDE 2000 ML: 9 INJECTION, SOLUTION INTRAVENOUS at 09:57

## 2018-05-19 RX ADMIN — HEPARIN SODIUM 5000 UNITS: 5000 INJECTION, SOLUTION INTRAVENOUS; SUBCUTANEOUS at 21:09

## 2018-05-19 RX ADMIN — SODIUM BICARBONATE 650 MG TABLET 650 MG: at 15:00

## 2018-05-19 RX ADMIN — ONDANSETRON 4 MG: 2 INJECTION INTRAMUSCULAR; INTRAVENOUS at 19:44

## 2018-05-19 RX ADMIN — POTASSIUM CHLORIDE 10 MEQ: 10 INJECTION, SOLUTION INTRAVENOUS at 11:00

## 2018-05-19 RX ADMIN — ALPRAZOLAM 1 MG: 0.5 TABLET ORAL at 19:05

## 2018-05-19 RX ADMIN — FLUOXETINE 20 MG: 20 CAPSULE ORAL at 09:13

## 2018-05-19 ASSESSMENT — ENCOUNTER SYMPTOMS
EYE PAIN: 0
TINGLING: 0
FEVER: 0
BLURRED VISION: 0
SORE THROAT: 0
COUGH: 0
NECK PAIN: 0
NERVOUS/ANXIOUS: 1
CHILLS: 0
DIZZINESS: 0
HEADACHES: 0
PALPITATIONS: 0
NAUSEA: 1
BACK PAIN: 0
ABDOMINAL PAIN: 0
SHORTNESS OF BREATH: 0
VOMITING: 1
DEPRESSION: 0
INSOMNIA: 0

## 2018-05-19 ASSESSMENT — LIFESTYLE VARIABLES
EVER_SMOKED: YES
ALCOHOL_USE: NO

## 2018-05-19 ASSESSMENT — COPD QUESTIONNAIRES
COPD SCREENING SCORE: 2
HAVE YOU SMOKED AT LEAST 100 CIGARETTES IN YOUR ENTIRE LIFE: YES
DURING THE PAST 4 WEEKS HOW MUCH DID YOU FEEL SHORT OF BREATH: NONE/LITTLE OF THE TIME
IN THE PAST 12 MONTHS DO YOU DO LESS THAN YOU USED TO BECAUSE OF YOUR BREATHING PROBLEMS: DISAGREE/UNSURE
DO YOU EVER COUGH UP ANY MUCUS OR PHLEGM?: NO/ONLY WITH OCCASIONAL COLDS OR INFECTIONS

## 2018-05-19 ASSESSMENT — PAIN SCALES - GENERAL
PAINLEVEL_OUTOF10: 4
PAINLEVEL_OUTOF10: 8
PAINLEVEL_OUTOF10: 3
PAINLEVEL_OUTOF10: 0
PAINLEVEL_OUTOF10: 7

## 2018-05-19 NOTE — DIETARY
"Nutrition services: Day 0 of admit.  Diana Snyder is a 42 y.o. female with admitting DX of Acute Renal Failure, Dehydration  Consult received for Admit Nutrition Screen trigger of Poor oral intake for 4 or more days    Assessment:  Height: 165.1 cm (5' 5\")  Weight: 66 kg (145 lb 8.1 oz)  Body mass index is 24.21 kg/m².  Diet/Intake: Regular.  Recorded PO intake 0% at Breakfast    Evaluation:   1. Pt admitted with nausea, vomiting, and cramping and has a history of Cyclic Vomiting Syndrome due to Marijuana use.  2. Pt refused Breakfast.  Sleeping at time of visit with lunch tray untouched.    Recommendations/Plan:  1. Provide Regular diet as tolerated.  2. Encourage improved intake of at least 50% of meals.  3. Document intake of all meals as % taken in ADL's to provide interdisciplinary communication across all shifts.   4. Monitor weight.  5. Nutrition rep will continue to see patient for ongoing meal and snack preferences.   6. RD to monitor.            "

## 2018-05-19 NOTE — ED NOTES
Attempted IV about 7 times. Unable to obtain IV access at this time. ERP notified and gave verbal orders to administer medications IM - see mar.     Pt  at the bedside.

## 2018-05-19 NOTE — PROGRESS NOTES
Tele Strip at 0520 shows SR with HR of 70  Measurements: 0.14 / 0.10 / 0.38    Tele Shift Summary:  Rhythm: SR  Rate: 60-80s  Ectopy: none    Telemetry monitoring strips placed in patient's chart.

## 2018-05-19 NOTE — PROGRESS NOTES
0433 Received report from ER ELMO Ivan. Patient to be up to [phone] shortly.    0458 Patient arrives to floor via gurney,  present at bedside. Patient extremely anxious, hyperventilating, trembling, shivering.     0530 Admit profile completed to best of ability, patient drifting in and out of sleep,  assists at points. Assessment completed.    0540 Medications given.    0640 Patient resting comfortably in bed.    0700 Report given to Jerri BORREGO, POC discussed.

## 2018-05-19 NOTE — ED PROVIDER NOTES
"CHIEF COMPLAINT  Chief Complaint   Patient presents with   • Cramping     left foot cramping x2 days       HPI  Diana Snyder is a 42 y.o. female who presents tonight via ambulance secondary to nausea with vomiting for the last 2 days, severe foot cramps, anxiety and depression. Patient is hyperventilating and states she can't get her foot to stop cramping. Patient is well-known to the emergency departments for her cyclic vomiting, marijuana abuse and anxiety disorder.    REVIEW OF SYSTEMS  See HPI for further details. All other system reviews are negative.    PAST MEDICAL HISTORY  Anxiety  Depression  Cyclic vomiting syndrome    FAMILY HISTORY  History reviewed. No pertinent family history.    SOCIAL HISTORY  Social History     Social History   • Marital status:      Spouse name: N/A   • Number of children: N/A   • Years of education: N/A     Social History Main Topics   • Smoking status: Never Smoker   • Smokeless tobacco: Never Used   • Alcohol use No   • Drug use: Yes     Types: Inhaled      Comment: marijuana   • Sexual activity: Not on file     Other Topics Concern   • Not on file     Social History Narrative   • No narrative on file       SURGICAL HISTORY  History reviewed. No pertinent surgical history.    CURRENT MEDICATIONS  See nurse's notes    ALLERGIES  Allergies   Allergen Reactions   • Bactrim [Sulfamethoxazole W-Trimethoprim]    • Reglan [Metoclopramide]    • Seroquel [Quetiapine]      \"Seizures\"       PHYSICAL EXAM  VITAL SIGNS: /86   Pulse 87   Temp 36.7 °C (98.1 °F)   Resp 20   Ht 1.651 m (5' 5\")   Wt 71 kg (156 lb 8.4 oz)   SpO2 100%   BMI 26.05 kg/m²     Constitutional: Patient is well developed, well nourished in moderate distress, extremely anxious, hyperventilating.  HENT: Normocephalic, Oropharynx dry, nose normal with no drainage.   Eyes: PERRL, EOMI, Conjunctiva without erythema or exudates.   Neck: Supple with no anterior/posterior cervical adenopathy. Normal range of " motion in flexion, extension and lateral rotation. No tenderness along the bony prominences or paraspinal muscles. No stridor. No rigidity.  Lymphatic: No lymphadenopathy noted.   Cardiovascular: Normal heart rate and rhythm. No murmur, gallop or friction rub. Good heart tones.  Thorax & Lungs: Clear and equal breath sounds with good excursion.  Abdomen: Bowel sounds normal in all four quadrants. Soft,nontender, no rebound , guarding, palpable masses.   Skin: Warm, Dry, No erythema, No rashes.   Back: No cervical, thoracic, or lumbosacral tenderness.    Extremities: Peripheral pulses 4/4 No edema, Patient is grabbing her left foot secondary to cramping in the arch but there is no signs of trauma, erythema, warmth, lesions. She has good pedal and posterior tibial pulses.   Musculoskeletal: Normal range of motion in all major joints. No major deformities noted.   Neurologic: Alert & oriented x 3, Normal motor function, Normal sensory function,  DTR's 4/4 bilaterally.  Psychiatric: Affect odd, Judgment impaired due to psychiatric disorder, Mood anxious, depressed.       COURSE & MEDICAL DECISION MAKING  Pertinent Labs & Imaging studies reviewed. (See chart for details)  Patient received an IV of normal saline, laboratories were drawn, she was given a brown paper bag to help increase her CO2 to help with the foot cramping. This seemed to help remarkably. I gave her Ativan 1 mg IV push as well as Zofran for her nausea and vomiting. Patient's treatment was delayed secondary to inability to obtain IV access on the patient. We have laboratory draw her and most of her labs were grossly abnormal. CBC shows markedly elevated white blood cell count of 21,000. Her sodium is low at 131, CO2 is markedly low at 9, anion gap is 26, glucose is 252, BUN 22 with a creatinine of 3.71. Her calcium is elevated at 12.5. Initially we are able to obtain an IV on the patient and she was given a liter of fluid. Her medications had initially  been given to her intramuscular so we can get her anxiety, hyperventilation and nausea and vomiting under control. I spoke with Dr. Jaime the patient will be admitted into the hospital for aggressive IV hydration, antiemetics and correction of her dehydration and acidosis.    FINAL IMPRESSION  1. Major depression  2. Acute anxiety with depressive disorder  3. Hyperventilation syndrome  4. Foot cramping  5. Cyclical vomiting  6. Hypercalcemia  7. Acute renal failure  8. Dehydration  9. Metabolic acidosis         Electronically signed by: Danii Mccall, 5/19/2018 2:07 [facility] Provider Note

## 2018-05-19 NOTE — H&P
"HPI:      Patient is a 42 year old female who comes to the ER because of nausea, vomiting and cramping. She states that over the past 2 days, she's had multiple episodes on non- bloody emesis along with severe cramping in her feet. She does have a history of cyclic vomiting syndrome due to heavy marijuana use and apparently, she is well known to the ER physician for multiple visits to the ER here and at [facility] for cyclic vomiting syndrome. She is very anxious and hyperventilating. She denies any fevers, chills, abdominal pain, or diarrhea. In the ER, Bun/Cr is 22/3.7. CO2 is 9. Sodium is 131. WBC is 21,000. She is afebrile.        ROS: Positive for nausea, vomiting, cramps, anxiety. All other ROS are negative      Past Medical History:   Diagnosis Date   • Anxiety disorder        History reviewed. No pertinent surgical history.      Social History     Social History   • Marital status:      Spouse name: N/A   • Number of children: N/A   • Years of education: N/A     Occupational History   • Not on file.     Social History Main Topics   • Smoking status: Never Smoker   • Smokeless tobacco: Never Used   • Alcohol use No   • Drug use: Yes     Types: Inhaled      Comment: marijuana   • Sexual activity: Not on file     Other Topics Concern   • Not on file     Social History Narrative   • No narrative on file       History reviewed. No pertinent family history.    No current facility-administered medications on file prior to encounter.      No current outpatient prescriptions on file prior to encounter.       Allergies   Allergen Reactions   • Bactrim [Sulfamethoxazole W-Trimethoprim]    • Reglan [Metoclopramide]    • Seroquel [Quetiapine]      \"Seizures\"           Physical Exam:  Gen: mild distress  HEENT: NC/AT, MMM  Chest: symmetrical expansion, no costochondral tenderness on palpation  Lungs: CTA B/L, no w/r/r  CV: +S1, S2, RRR  Abdomen: S/NT/ND, + BS x 4  Ext: no c/c/e, FROM x 4    Skin: dry, " warm to touch   Neuro: CN II - XII intact, no focal motor or sensory deficits noted  Psych: anxious        Labs (CBC): Last 72 Hours       05/19/18   0440    WBC  21.4    Neutrophils-Polys  86.00    Basophils  0.20    RBC  4.42    Hemoglobin  14.4    Hematocrit  41.5    MCV  93.9    MCH  32.6    MCHC  34.7    Platelet Count  412    RDW  46.6    MPV  9.5    Neutrophils (Absolute)   18.38    Lymphs (Absolute)  1.37           - Comment  - High     Labs (Metabolic): Last 72 Hours       05/19/18   0245    Sodium  131    Potassium  4.1    Chloride  96    Co2   9    Glucose  252    Bun  22    Creatinine  3.71    Calcium   12.5    AST(SGOT)      ALT(SGPT)      Alkaline Phosphatase      Total Bilirubin      Albumin      Total Protein      Globulin      A-G Ratio      GFR If   16    GFR If Non   13    Anion Gap  26.0            Assessment/Plan:    Acute renal failure  Dehydration  Cyclic vomiting syndrome  Metabolic acidosis  Leukocytosis  Hyponatremia  Hypercalcemia  Hyperglycemia  Anxiety  Depression  Marijuana use      Patient with acute renal failure due to multiple episodes of vomiting and poor PO intake. Current Cr - 3.71. Continue aggressive IV fluid hydration. Repeat labs this morning to monitor for improvement of renal function  Current CO2 is 9. Most likely due to hyperventilation. Will give 1 dose of IV sodium bicarb and start sodium bicarb 650 TID  Current WBC -21,000, calcium is 12.5 and glucose is 252. Most likely due to hemoconcentrated blood. Will check UA and CXR to r/o signs of infection. Repeat CBC later this morning. Check CMP and HgbA1c later this morning after hydration  Start Xanax prn anxiety. Continue Prozac

## 2018-05-19 NOTE — ED NOTES
ERP at the bedside. Pt cooperative with ERP at the bedside, then is noncompliant with treatment when ERP leaves the room.

## 2018-05-19 NOTE — ED NOTES
.Pt prepared for transport to acute floor. Pt leaves ER in stable condition and with all belongings.

## 2018-05-19 NOTE — CARE PLAN
Problem: Communication  Goal: The ability to communicate needs accurately and effectively will improve  Outcome: PROGRESSING AS EXPECTED    Intervention: [place] patient and significant other/support system to call light to alert staff of needs  Patient oriented to call light system and all relevant hospital policies. Call light within reach and used appropriately when in need of assistance.        Problem: Knowledge Deficit  Goal: Knowledge of disease process/condition, treatment plan, diagnostic tests, and medications will improve  Outcome: PROGRESSING AS EXPECTED    Intervention: Assess knowledge level of disease process/condition, treatment plan, diagnostic tests, and medications  Patient's knowledge of plan of care, diagnostics, and medications regularly assessed. RN answers patient questions appropriately, education provided. Patient verbalizes better understanding of their condition.

## 2018-05-19 NOTE — PROGRESS NOTES
[facility] Hospitalist Progress Note    Date of Service: 2018    Chief Complaint  42 y.o. female admitted 2018 with a history of marijuana related vomiting here wiht a repeat episode of this complicated by severe dehydration and related acidosis and renal failure.     Interval Problem Update  Hot showers helping with sx. We discussed her recurrent admissions and relation of THC to her vomiting episodes. She appears to have no insight into this connection. Vomiting improved. No other events.     Consultants/Specialty  none    Disposition  Home once improved.         Review of Systems   Constitutional: Negative for chills and fever.   HENT: Negative for sore throat.    Eyes: Negative for blurred vision and pain.   Respiratory: Negative for cough and shortness of breath.    Cardiovascular: Negative for chest pain and palpitations.   Gastrointestinal: Positive for nausea and vomiting. Negative for abdominal pain.   Genitourinary: Negative for dysuria and urgency.   Musculoskeletal: Negative for back pain and neck pain.   Skin: Negative for itching and rash.   Neurological: Negative for dizziness, tingling and headaches.   Psychiatric/Behavioral: Negative for depression. The patient is nervous/anxious. The patient does not have insomnia.    All other systems reviewed and are negative.     Physical Exam  Laboratory/Imaging   Hemodynamics  Temp (24hrs), Av.6 °C (97.8 °F), Min:36.4 °C (97.5 °F), Max:36.7 °C (98.1 °F)   Temperature: 36.4 °C (97.5 °F)  Pulse  Av  Min: 64  Max: 87 Heart Rate (Monitored): 80  Blood Pressure: 133/77, NIBP: 135/77      Respiratory      Respiration: 18, Pulse Oximetry: 100 %     Work Of Breathing / Effort: Mild;Tachypnea       Fluids    Intake/Output Summary (Last 24 hours) at 18 0931  Last data filed at 18 0700   Gross per 24 hour   Intake             1450 ml   Output                0 ml   Net             1450 ml       Nutrition  Orders Placed This Encounter    Procedures   • Diet Order     Standing Status:   Standing     Number of Occurrences:   1     Order Specific Question:   Diet:     Answer:   Regular [1]     Physical Exam   Constitutional: She is oriented to person, place, and time. She appears well-developed and well-nourished. No distress.   HENT:   Right Ear: External ear normal.   Left Ear: External ear normal.   Nose: Nose normal.   Eyes: Conjunctivae are normal. Right eye exhibits no discharge. Left eye exhibits no discharge.   Neck: No JVD present.   Cardiovascular: Regular rhythm and normal heart sounds.    No murmur heard.  Cap refill 2sec  Pulses 2+ throughout  Normal skin  Color.    Pulmonary/Chest: Effort normal and breath sounds normal. No stridor. No respiratory distress. She has no wheezes. She has no rales.   Abdominal: Soft. Bowel sounds are normal. She exhibits no distension. There is no tenderness.   Musculoskeletal: She exhibits no edema or tenderness.   Neurological: She is alert and oriented to person, place, and time.   Skin: Skin is warm and dry. She is not diaphoretic. No erythema.   Psychiatric: She has a normal mood and affect. Her behavior is normal.   Nursing note and vitals reviewed.      Recent Labs      05/19/18   0440  05/19/18   0830   WBC  21.4*  21.1*   RBC  4.42  3.99*   HEMOGLOBIN  14.4  13.0   HEMATOCRIT  41.5  37.3   MCV  93.9  93.5   MCH  32.6  32.6   MCHC  34.7  34.9   RDW  46.6  46.2   PLATELETCT  412  316   MPV  9.5  10.6     Recent Labs      05/19/18   0245  05/19/18   0830   SODIUM  131*  130*   POTASSIUM  4.1  3.3*   CHLORIDE  96  100   CO2  9*  17*   GLUCOSE  252*  125*   BUN  22  24*   CREATININE  3.71*  3.11*   CALCIUM  12.5*  9.5                      Assessment/Plan     Anxiety   Assessment & Plan    Chronic. Avoid THC          Hyponatremia   Assessment & Plan    Hypovolemic  Iv fluids          Hyperglycemia   Assessment & Plan    Reactive? qwuite high for this  a1c pending.         Metabolic acidosis   Assessment  & Plan    Continue bicarb and iv fluids  Improving.   ??dka given glucose.   No need for dka protocol given improvement. a1c pending.           Hypercalcemia   Assessment & Plan    2/2 dehydration  Iv fluids ongoing        Leukocytosis   Assessment & Plan    Suspect reactive. Will trend.         Addiction, marijuana (HCC)   Assessment & Plan    Leading to vomiting. We discussed this in detail today.         Dehydration   Assessment & Plan    Continue iv fluids and control vomiting.         Acute renal failure (ARF) (HCC)   Assessment & Plan    Severe. Increase fluids today. Likely still prerenal. Bladder scan ok. Check UA          Quality-Core Measures   Reviewed items::  EKG reviewed, Radiology images reviewed, Labs reviewed and Medications reviewed  Saldana catheter::  No Saldana  DVT prophylaxis - mechanical:  SCDs  Ulcer Prophylaxis::  Not indicated  Assessed for rehabilitation services:  Patient was assess for and/or received rehabilitation services during this hospitalization

## 2018-05-20 LAB
ALBUMIN SERPL BCP-MCNC: 3.4 G/DL (ref 3.2–4.9)
ALBUMIN/GLOB SERPL: 1.3 G/DL
ALP SERPL-CCNC: 50 U/L (ref 30–99)
ALT SERPL-CCNC: 10 U/L (ref 2–50)
ANION GAP SERPL CALC-SCNC: 5 MMOL/L (ref 0–11.9)
AST SERPL-CCNC: 19 U/L (ref 12–45)
BASOPHILS # BLD AUTO: 0.3 % (ref 0–1.8)
BASOPHILS # BLD: 0.03 K/UL (ref 0–0.12)
BILIRUB SERPL-MCNC: 1 MG/DL (ref 0.1–1.5)
BUN SERPL-MCNC: 10 MG/DL (ref 8–22)
CALCIUM SERPL-MCNC: 8.2 MG/DL (ref 8.4–10.2)
CHLORIDE SERPL-SCNC: 113 MMOL/L (ref 96–112)
CO2 SERPL-SCNC: 20 MMOL/L (ref 20–33)
CREAT SERPL-MCNC: 1.04 MG/DL (ref 0.5–1.4)
EOSINOPHIL # BLD AUTO: 0 K/UL (ref 0–0.51)
EOSINOPHIL NFR BLD: 0 % (ref 0–6.9)
ERYTHROCYTE [DISTWIDTH] IN BLOOD BY AUTOMATED COUNT: 48.6 FL (ref 35.9–50)
GLOBULIN SER CALC-MCNC: 2.7 G/DL (ref 1.9–3.5)
GLUCOSE SERPL-MCNC: 93 MG/DL (ref 65–99)
HCT VFR BLD AUTO: 30.7 % (ref 37–47)
HGB BLD-MCNC: 10.4 G/DL (ref 12–16)
IMM GRANULOCYTES # BLD AUTO: 0.05 K/UL (ref 0–0.11)
IMM GRANULOCYTES NFR BLD AUTO: 0.5 % (ref 0–0.9)
LYMPHOCYTES # BLD AUTO: 1.33 K/UL (ref 1–4.8)
LYMPHOCYTES NFR BLD: 12.6 % (ref 22–41)
MAGNESIUM SERPL-MCNC: 1.9 MG/DL (ref 1.5–2.5)
MCH RBC QN AUTO: 32.1 PG (ref 27–33)
MCHC RBC AUTO-ENTMCNC: 33.8 G/DL (ref 33.6–35)
MCV RBC AUTO: 95 FL (ref 81.4–97.8)
MONOCYTES # BLD AUTO: 0.87 K/UL (ref 0–0.85)
MONOCYTES NFR BLD AUTO: 8.2 % (ref 0–13.4)
NEUTROPHILS # BLD AUTO: 8.28 K/UL (ref 2–7.15)
NEUTROPHILS NFR BLD: 78.4 % (ref 44–72)
NRBC # BLD AUTO: 0 K/UL
NRBC BLD-RTO: 0 /100 WBC
PHOSPHATE SERPL-MCNC: 2.2 MG/DL (ref 2.5–4.5)
PLATELET # BLD AUTO: 286 K/UL (ref 164–446)
PMV BLD AUTO: 10 FL (ref 9–12.9)
POTASSIUM SERPL-SCNC: 3.3 MMOL/L (ref 3.6–5.5)
PROT SERPL-MCNC: 6.1 G/DL (ref 6–8.2)
RBC # BLD AUTO: 3.21 M/UL (ref 4.2–5.4)
SODIUM SERPL-SCNC: 138 MMOL/L (ref 135–145)
WBC # BLD AUTO: 10.6 K/UL (ref 4.8–10.8)

## 2018-05-20 PROCEDURE — 85025 COMPLETE CBC W/AUTO DIFF WBC: CPT

## 2018-05-20 PROCEDURE — 83735 ASSAY OF MAGNESIUM: CPT

## 2018-05-20 PROCEDURE — 84100 ASSAY OF PHOSPHORUS: CPT

## 2018-05-20 PROCEDURE — 700111 HCHG RX REV CODE 636 W/ 250 OVERRIDE (IP): Performed by: FAMILY MEDICINE

## 2018-05-20 PROCEDURE — 700105 HCHG RX REV CODE 258: Performed by: HOSPITALIST

## 2018-05-20 PROCEDURE — 770006 HCHG ROOM/CARE - MED/SURG/GYN SEMI*

## 2018-05-20 PROCEDURE — A9270 NON-COVERED ITEM OR SERVICE: HCPCS | Performed by: FAMILY MEDICINE

## 2018-05-20 PROCEDURE — 99232 SBSQ HOSP IP/OBS MODERATE 35: CPT | Performed by: HOSPITALIST

## 2018-05-20 PROCEDURE — 700102 HCHG RX REV CODE 250 W/ 637 OVERRIDE(OP): Performed by: FAMILY MEDICINE

## 2018-05-20 PROCEDURE — 80053 COMPREHEN METABOLIC PANEL: CPT

## 2018-05-20 RX ADMIN — SODIUM CHLORIDE: 9 INJECTION, SOLUTION INTRAVENOUS at 05:40

## 2018-05-20 RX ADMIN — HALOPERIDOL LACTATE 5 MG: 5 INJECTION, SOLUTION INTRAMUSCULAR at 22:26

## 2018-05-20 RX ADMIN — DIPHENHYDRAMINE HYDROCHLORIDE 25 MG: 50 INJECTION, SOLUTION INTRAMUSCULAR; INTRAVENOUS at 08:17

## 2018-05-20 RX ADMIN — DIPHENHYDRAMINE HYDROCHLORIDE 25 MG: 50 INJECTION, SOLUTION INTRAMUSCULAR; INTRAVENOUS at 23:44

## 2018-05-20 RX ADMIN — PROCHLORPERAZINE EDISYLATE 10 MG: 5 INJECTION INTRAMUSCULAR; INTRAVENOUS at 10:35

## 2018-05-20 RX ADMIN — PROCHLORPERAZINE EDISYLATE 10 MG: 5 INJECTION INTRAMUSCULAR; INTRAVENOUS at 20:02

## 2018-05-20 RX ADMIN — HEPARIN SODIUM 5000 UNITS: 5000 INJECTION, SOLUTION INTRAVENOUS; SUBCUTANEOUS at 15:53

## 2018-05-20 RX ADMIN — ALPRAZOLAM 1 MG: 0.5 TABLET ORAL at 00:00

## 2018-05-20 RX ADMIN — SODIUM CHLORIDE: 9 INJECTION, SOLUTION INTRAVENOUS at 15:53

## 2018-05-20 RX ADMIN — HALOPERIDOL LACTATE 5 MG: 5 INJECTION, SOLUTION INTRAMUSCULAR at 10:33

## 2018-05-20 RX ADMIN — HEPARIN SODIUM 5000 UNITS: 5000 INJECTION, SOLUTION INTRAVENOUS; SUBCUTANEOUS at 20:02

## 2018-05-20 RX ADMIN — FLUOXETINE 20 MG: 20 CAPSULE ORAL at 08:17

## 2018-05-20 RX ADMIN — SODIUM BICARBONATE 650 MG TABLET 650 MG: at 08:17

## 2018-05-20 RX ADMIN — SODIUM CHLORIDE: 9 INJECTION, SOLUTION INTRAVENOUS at 00:17

## 2018-05-20 RX ADMIN — ALPRAZOLAM 1 MG: 0.5 TABLET ORAL at 20:02

## 2018-05-20 RX ADMIN — HEPARIN SODIUM 5000 UNITS: 5000 INJECTION, SOLUTION INTRAVENOUS; SUBCUTANEOUS at 05:06

## 2018-05-20 RX ADMIN — PROCHLORPERAZINE EDISYLATE 10 MG: 5 INJECTION INTRAMUSCULAR; INTRAVENOUS at 05:37

## 2018-05-20 RX ADMIN — PROCHLORPERAZINE EDISYLATE 10 MG: 5 INJECTION INTRAMUSCULAR; INTRAVENOUS at 00:23

## 2018-05-20 RX ADMIN — ALPRAZOLAM 1 MG: 0.5 TABLET ORAL at 15:53

## 2018-05-20 ASSESSMENT — ENCOUNTER SYMPTOMS
SORE THROAT: 0
ABDOMINAL PAIN: 0
VOMITING: 1
SHORTNESS OF BREATH: 0
FEVER: 0
CHILLS: 0
BLURRED VISION: 0
NERVOUS/ANXIOUS: 1
DIZZINESS: 0
PALPITATIONS: 0
BACK PAIN: 0
NECK PAIN: 0
EYE PAIN: 0
TINGLING: 0
DEPRESSION: 0
COUGH: 0
NAUSEA: 1
HEADACHES: 0
INSOMNIA: 0

## 2018-05-20 ASSESSMENT — PAIN SCALES - GENERAL
PAINLEVEL_OUTOF10: 4
PAINLEVEL_OUTOF10: 2

## 2018-05-20 ASSESSMENT — PATIENT HEALTH QUESTIONNAIRE - PHQ9
1. LITTLE INTEREST OR PLEASURE IN DOING THINGS: NOT AT ALL
SUM OF ALL RESPONSES TO PHQ9 QUESTIONS 1 AND 2: 0
2. FEELING DOWN, DEPRESSED, IRRITABLE, OR HOPELESS: NOT AT ALL

## 2018-05-20 NOTE — CARE PLAN
Problem: Infection  Goal: Will remain free from infection  Outcome: PROGRESSING AS EXPECTED    Intervention: Implement standard precautions and perform hand washing before and after patient contact  Standard precautions and hand hygiene practices implemented before and after patient room entry. Gloves worn during times of patient contact.        Problem: Psychosocial Needs:  Goal: Level of anxiety will decrease  Outcome: PROGRESSING AS EXPECTED    Intervention: Identify and develop with patient strategies to cope with anxiety triggers  Pt's anxiety regularly assessed, pharm (xanax) and non-pharm (heat packs) measures in place to reduce anxiety.

## 2018-05-20 NOTE — PROGRESS NOTES
1900 Received report from day shift ELMO Guthrie. Plan of care discussed at bedside. Patient resting in bed anxious, family present, medicated with Xanax and Tylenol.    1944 Patient given zofran for nausea.    2100 Assessment completed and due medications given.    2133 Patient given benadryl to help sleep.    0000 Xanax given; patient anxious and restless.    0023 Compazine given for nausea.    0320 Patient up to bathroom, steady gait.    0500 Heparin administered.    0700 Report given to Ward BORREGO, POC discussed.

## 2018-05-20 NOTE — CARE PLAN
Problem: Psychosocial Needs:  Goal: Level of anxiety will decrease  Outcome: PROGRESSING AS EXPECTED  Patient appears calm this morning

## 2018-05-20 NOTE — PROGRESS NOTES
[facility] Hospitalist Progress Note    Date of Service: 2018    Chief Complaint  42 y.o. female admitted 2018 with a history of marijuana related vomiting here wiht a repeat episode of this complicated by severe dehydration and related acidosis and renal failure.     Interval Problem Update  Very anxious this am about going home.   Vomiting on occasion but improving  Still not eating    Consultants/Specialty  none    Disposition  Home once improved.         Review of Systems   Constitutional: Negative for chills and fever.   HENT: Negative for sore throat.    Eyes: Negative for blurred vision and pain.   Respiratory: Negative for cough and shortness of breath.    Cardiovascular: Negative for chest pain and palpitations.   Gastrointestinal: Positive for nausea and vomiting. Negative for abdominal pain.   Genitourinary: Negative for dysuria and urgency.   Musculoskeletal: Negative for back pain and neck pain.   Skin: Negative for itching and rash.   Neurological: Negative for dizziness, tingling and headaches.   Psychiatric/Behavioral: Negative for depression. The patient is nervous/anxious. The patient does not have insomnia.    All other systems reviewed and are negative.     Physical Exam  Laboratory/Imaging   Hemodynamics  Temp (24hrs), Av.6 °C (97.8 °F), Min:36.3 °C (97.4 °F), Max:36.8 °C (98.3 °F)   Temperature: 36.7 °C (98.1 °F)  Pulse  Av.5  Min: 63  Max: 87    Blood Pressure: 145/81      Respiratory      Respiration: 18, Pulse Oximetry: 95 %     Work Of Breathing / Effort: Mild  RUL Breath Sounds: Clear, RML Breath Sounds: Diminished, RLL Breath Sounds: Diminished, MADDY Breath Sounds: Clear, LLL Breath Sounds: Diminished    Fluids    Intake/Output Summary (Last 24 hours) at 18 1011  Last data filed at 18 0600   Gross per 24 hour   Intake             2370 ml   Output              800 ml   Net             1570 ml       Nutrition  Orders Placed This Encounter   Procedures   • Diet  Order     Standing Status:   Standing     Number of Occurrences:   1     Order Specific Question:   Diet:     Answer:   Regular [1]     Physical Exam   Constitutional: She is oriented to person, place, and time. She appears well-developed and well-nourished. No distress.   HENT:   Right Ear: External ear normal.   Left Ear: External ear normal.   Nose: Nose normal.   Eyes: Conjunctivae are normal. Right eye exhibits no discharge. Left eye exhibits no discharge.   Neck: No JVD present.   Cardiovascular: Regular rhythm and normal heart sounds.    No murmur heard.  Cap refill 2sec  Pulses 2+ throughout  Normal skin  Color.    Pulmonary/Chest: Effort normal and breath sounds normal. No stridor. No respiratory distress. She has no wheezes. She has no rales.   Abdominal: Soft. Bowel sounds are normal. She exhibits no distension. There is no tenderness.   Musculoskeletal: She exhibits no edema or tenderness.   Neurological: She is alert and oriented to person, place, and time.   Skin: Skin is warm and dry. She is not diaphoretic. No erythema.   Psychiatric: She has a normal mood and affect. Her behavior is normal.   Nursing note and vitals reviewed.      Recent Labs      05/19/18   0440  05/19/18   0830  05/20/18   0506   WBC  21.4*  21.1*  10.6   RBC  4.42  3.99*  3.21*   HEMOGLOBIN  14.4  13.0  10.4*   HEMATOCRIT  41.5  37.3  30.7*   MCV  93.9  93.5  95.0   MCH  32.6  32.6  32.1   MCHC  34.7  34.9  33.8   RDW  46.6  46.2  48.6   PLATELETCT  412  316  286   MPV  9.5  10.6  10.0     Recent Labs      05/19/18   0245  05/19/18   0830  05/20/18   0506   SODIUM  131*  130*  138   POTASSIUM  4.1  3.3*  3.3*   CHLORIDE  96  100  113*   CO2  9*  17*  20   GLUCOSE  252*  125*  93   BUN  22  24*  10   CREATININE  3.71*  3.11*  1.04   CALCIUM  12.5*  9.5  8.2*                      Assessment/Plan     Anxiety   Assessment & Plan    Chronic. Avoid THC  Cont meds  Add prn haldol martina with nausea          Hyponatremia   Assessment & Plan     Hypovolemic  Iv fluids          Hyperglycemia   Assessment & Plan    a1c 6. Glucose intolerance vs recurrent stress reaction  Pcp follow up ok        Metabolic acidosis   Assessment & Plan    Resolving stop bicarb and decrease but continue  iv fluids              Hypercalcemia   Assessment & Plan    2/2 dehydration  Iv fluids ongoing        Leukocytosis   Assessment & Plan    Suspect reactive. Will trend.         Addiction, marijuana (HCC)   Assessment & Plan    Leading to vomiting. We discussed this in detail today.         Dehydration   Assessment & Plan    Decrease iv fluids and control vomiting.   resolving        Acute renal failure (ARF) (HCC)   Assessment & Plan    Resolving  Decrease fluids  Stop bicarb            Quality-Core Measures   Reviewed items::  EKG reviewed, Radiology images reviewed, Labs reviewed and Medications reviewed  Saldana catheter::  No Saldana  DVT prophylaxis - mechanical:  SCDs  Ulcer Prophylaxis::  Not indicated  Assessed for rehabilitation services:  Patient was assess for and/or received rehabilitation services during this hospitalization

## 2018-05-20 NOTE — CARE PLAN
Problem: Bowel/Gastric:  Goal: Normal bowel function is maintained or improved  Outcome: PROGRESSING SLOWER THAN EXPECTED  Small BM this morning, poor appetite

## 2018-05-20 NOTE — PROGRESS NOTES
EKG Rhythm Strip    N[place] Interval: 0.14  QRS Interval: 0.10  QT Interval: 0.36  Rhythm Interpretation: SR.

## 2018-05-20 NOTE — CARE PLAN
Problem: Communication  Goal: The ability to communicate needs accurately and effectively will improve    Intervention: [place] patient and significant other/support system to call light to alert staff of needs  Patient oriented about plan of care and today's goals she agrees and understands.       Problem: Safety  Goal: Will remain free from falls    Intervention: Assess risk factors for falls  Patient will remain from falls, bed alarm on, educated to use call light.

## 2018-05-21 VITALS
OXYGEN SATURATION: 97 % | TEMPERATURE: 98.2 F | HEART RATE: 62 BPM | WEIGHT: 138.23 LBS | DIASTOLIC BLOOD PRESSURE: 68 MMHG | RESPIRATION RATE: 16 BRPM | SYSTOLIC BLOOD PRESSURE: 115 MMHG | HEIGHT: 65 IN | BODY MASS INDEX: 23.03 KG/M2

## 2018-05-21 PROCEDURE — 700111 HCHG RX REV CODE 636 W/ 250 OVERRIDE (IP): Performed by: FAMILY MEDICINE

## 2018-05-21 PROCEDURE — 99239 HOSP IP/OBS DSCHRG MGMT >30: CPT | Performed by: HOSPITALIST

## 2018-05-21 PROCEDURE — 700102 HCHG RX REV CODE 250 W/ 637 OVERRIDE(OP): Performed by: FAMILY MEDICINE

## 2018-05-21 PROCEDURE — A9270 NON-COVERED ITEM OR SERVICE: HCPCS | Performed by: FAMILY MEDICINE

## 2018-05-21 PROCEDURE — 700105 HCHG RX REV CODE 258: Performed by: HOSPITALIST

## 2018-05-21 RX ADMIN — SODIUM CHLORIDE: 9 INJECTION, SOLUTION INTRAVENOUS at 02:56

## 2018-05-21 RX ADMIN — ALPRAZOLAM 1 MG: 0.5 TABLET ORAL at 07:36

## 2018-05-21 RX ADMIN — PROCHLORPERAZINE EDISYLATE 10 MG: 5 INJECTION INTRAMUSCULAR; INTRAVENOUS at 07:39

## 2018-05-21 RX ADMIN — FLUOXETINE 20 MG: 20 CAPSULE ORAL at 07:37

## 2018-05-21 RX ADMIN — ALPRAZOLAM 1 MG: 0.5 TABLET ORAL at 02:45

## 2018-05-21 RX ADMIN — ACETAMINOPHEN 650 MG: 325 TABLET, FILM COATED ORAL at 02:54

## 2018-05-21 ASSESSMENT — PAIN SCALES - GENERAL
PAINLEVEL_OUTOF10: 0
PAINLEVEL_OUTOF10: 3

## 2018-05-21 NOTE — DISCHARGE INSTRUCTIONS
Discharge Instructions    Discharged to home by car with relative. Discharged via walking, hospital escort: Yes.  Special equipment needed: Not Applicable    Be sure to schedule a follow-up appointment with your primary care doctor or any specialists as instructed.     Discharge Plan:   Diet Plan: Discussed  Activity Level: Discussed  Smoking Cessation Offered: Patient Refused  Confirmed Follow up Appointment: Appointment Scheduled  Confirmed Symptoms Management: Discussed  Medication Reconciliation Updated: Yes  Influenza Vaccine Indication: Patient Refuses    I understand that a diet low in cholesterol, fat, and sodium is recommended for good health. Unless I have been given specific instructions below for another diet, I accept this instruction as my diet prescription.   Other diet: general    Special Instructions: None    · Is patient discharged on Warfarin / Coumadin?   No Generalized Anxiety Disorder  Generalized anxiety disorder (ESTELA) is a mental disorder. It interferes with life functions, including relationships, work, and school.  ESTELA is different from normal anxiety, which everyone experiences at some point in their lives in response to specific life events and activities. Normal anxiety actually helps us prepare for and get through these life events and activities. Normal anxiety goes away after the event or activity is over.   ESTELA causes anxiety that is not necessarily related to specific events or activities. It also causes excess anxiety in proportion to specific events or activities. The anxiety associated with ESTELA is also difficult to control. ESTELA can vary from mild to severe. People with severe ESTELA can have intense waves of anxiety with physical symptoms (panic attacks).   SYMPTOMS  The anxiety and worry associated with ESTELA are difficult to control. This anxiety and worry are related to many life events and activities and also occur more days than not for 6 months or longer. People with ESTELA also  have three or more of the following symptoms (one or more in children):  Restlessness.    Fatigue.  Difficulty concentrating.    Irritability.  Muscle tension.  Difficulty sleeping or unsatisfying sleep.  DIAGNOSIS  ESTELA is diagnosed through an assessment by your health care provider. Your health care provider will ask you questions about your mood, physical symptoms, and events in your life. Your health care provider may ask you about your medical history and use of alcohol or drugs, including prescription medicines. Your health care provider may also do a physical exam and blood tests. Certain medical conditions and the use of certain substances can cause symptoms similar to those associated with ESTELA. Your health care provider may refer you to a mental health specialist for further evaluation.  TREATMENT  The following therapies are usually used to treat ESTELA:   Medication. Antidepressant medication usually is prescribed for long-term daily control. Antianxiety medicines may be added in severe cases, especially when panic attacks occur.    Talk therapy (psychotherapy). Certain types of talk therapy can be helpful in treating ESTELA by providing support, education, and guidance. A form of talk therapy called cognitive behavioral therapy can teach you healthy ways to think about and react to daily life events and activities.  Stress management techniques. These include yoga, meditation, and exercise and can be very helpful when they are practiced regularly.  A mental health specialist can help determine which treatment is best for you. Some people see improvement with one therapy. However, other people require a combination of therapies.  This information is not intended to replace advice given to you by your health care provider. Make sure you discuss any questions you have with your health care provider.  Document Released: 04/14/2014 Document Revised: 01/08/2016 Document Reviewed: 04/14/2014  [employer]  Patient Education © 2017 [employer]      Depression / Suicide Risk    As you are discharged from this [facility] facility, it is important to learn how to keep safe from harming yourself.    Recognize the warning signs:  · Abrupt changes in personality, positive or negative- including increase in energy   · Giving away possessions  · Change in eating patterns- significant weight changes-  positive or negative  · Change in sleeping patterns- unable to sleep or sleeping all the time   · Unwillingness or inability to communicate  · Depression  · Unusual sadness, discouragement and loneliness  · Talk of wanting to die  · Neglect of personal appearance   · Rebelliousness- reckless behavior  · Withdrawal from people/activities they love  · Confusion- inability to concentrate     If you or a loved one observes any of these behaviors or has concerns about self-harm, here's what you can do:  · Talk about it- your feelings and reasons for harming yourself  · Remove any means that you might use to hurt yourself (examples: pills, rope, extension cords, firearm)  · Get professional help from the community (Mental Health, Substance Abuse, psychological counseling)  · Do not be alone:Call your Safe Contact- someone whom you trust who will be there for you.  · Call your local CRISIS HOTLINE 176-8466 or 110-077-9390  · Call your local Children's Mobile Crisis Response Team [place] (047) 572-2583 or www.[employer]ir[employer]  · Call the toll free National Suicide Prevention Hotlines   · National Suicide Prevention Lifeline 889-015-WDJQ (0931)  · National Hope Line Network 800-SUICIDE (726-3500)      Nausea and Vomiting, Adult  Introduction  Feeling sick to your stomach (nausea) means that your stomach is upset or you feel like you have to throw up (vomit). Feeling more and more sick to your stomach can lead to throwing up. Throwing up happens when food and liquid from your stomach are thrown up and out the mouth. Throwing up  can make you feel weak and cause you to get dehydrated. Dehydration can make you tired and thirsty, make you have a dry mouth, and make it so you pee (urinate) less often. Older adults and people with other diseases or a weak defense system (immune system) are at higher risk for dehydration. If you feel sick to your stomach or if you throw up, it is important to follow instructions from your doctor about how to take care of yourself.  Follow these instructions at home:  Eating and drinking  Follow these instructions as told by your doctor:  · Take an oral rehydration solution (ORS). This is a drink that is sold at pharmacies and stores.  · Drink clear fluids in small amounts as you are able, such as:  ¨ Water.  ¨ Ice chips.  ¨ Diluted fruit juice.  ¨ Low-calorie sports drinks.  · Eat bland, easy-to-digest foods in small amounts as you are able, such as:  ¨ Bananas.  ¨ Applesauce.  ¨ Rice.  ¨ Low-fat (lean) meats.  ¨ Toast.  ¨ Crackers.  · Avoid fluids that have a lot of sugar or caffeine in them.  · Avoid alcohol.  · Avoid spicy or fatty foods.  General instructions  · Drink enough fluid to keep your pee (urine) clear or pale yellow.  · Wash your hands often. If you cannot use soap and water, use hand .  · Make sure that all people in your home wash their hands well and often.  · Take over-the-counter and prescription medicines only as told by your doctor.  · Rest at home while you get better.  · Watch your condition for any changes.  · Breathe slowly and deeply when you feel sick to your stomach.  · Keep all follow-up visits as told by your doctor. This is important.  Contact a doctor if:  · You have a fever.  · You cannot keep fluids down.  · Your symptoms get worse.  · You have new symptoms.  · You feel sick to your stomach for more than two days.  · You feel light-headed or dizzy.  · You have a headache.  · You have muscle cramps.  Get help right away if:  · You have pain in your chest, neck, arm, or  jaw.  · You feel very weak or you pass out (faint).  · You throw up again and again.  · You see blood in your throw-up.  · Your throw-up looks like black coffee grounds.  · You have bloody or black poop (stools) or poop that look like tar.  · You have a very bad headache, a stiff neck, or both.  · You have a rash.  · You have very bad pain, cramping, or bloating in your belly (abdomen).  · You have trouble breathing.  · You are breathing very quickly.  · Your heart is beating very quickly.  · Your skin feels cold and clammy.  · You feel confused.  · You have pain when you pee.  · You have signs of dehydration, such as:  ¨ Dark pee, hardly any pee, or no pee.  ¨ Cracked lips.  ¨ Dry mouth.  ¨ Sunken eyes.  ¨ Sleepiness.  ¨ Weakness.  These symptoms may be an emergency. Do not wait to see if the symptoms will go away. Get medical help right away. Call your local emergency services (911 in the [place] Do not drive yourself to the hospital.   This information is not intended to replace advice given to you by your health care provider. Make sure you discuss any questions you have with your health care provider.  Document Released: 06/05/2009 Document Revised: 07/07/2017 Document Reviewed: 08/23/2016  © 2017 Elsevier

## 2018-05-21 NOTE — PROGRESS NOTES
1900 Received report from day shift ELMO Hopper. Plan of care discussed at bedside. Patient resting comfortably in bed at this time with no complaints. Safety precautions and hourly rounding in place.    2000 Assessment completed and due medications given, family present at bedside. Xanax and compazine provided for anxiety/nausea. Hot packs provided for comfort.    2200 Patient complains of not being able to sleep, is highly anxious, haldol administered per her request.    2345 Patient having a difficult time sleeping, given benadryl per mar.     0245 Patient requests Xanax due to anxiety/inability to fall asleep. Medicated per mar, heat pack provided.    0254 Tylenol requested for headache. New bag of IV fluids hung.    0600 Patient sleeping.    0700 Report to Ward BORREGO, POC discussed.

## 2018-05-21 NOTE — DISCHARGE SUMMARY
CHIEF COMPLAINT ON ADMISSION  Chief Complaint   Patient presents with   • Cramping     left foot cramping x2 days       CODE STATUS  Prior    HPI & HOSPITAL COURSE  This is a 42 y.o. female here with recurrent admissions for vomiting 2/2 marijuana abuse here wiht the same on this admission. She was found to have severe renal failure and dehydration as well. She was admitted, hydrated and her symptoms and complications resolved. She was very anxious and seemed to have very poor insight into this issue despite extensive discussion regarding this. She was encouraged not to use marijuana.      The patient met 2-midnight criteria for an inpatient stay at the time of discharge.    Therefore, she is discharged in good and stable condition with close outpatient follow-up.    SPECIFIC OUTPATIENT FOLLOW-UP  pcp    DISCHARGE PROBLEM LIST  Active Problems:    Acute renal failure (ARF) (HCC) POA: Unknown    Dehydration POA: Unknown    Addiction, marijuana (HCC) POA: Unknown    Leukocytosis POA: Unknown    Hypercalcemia POA: Unknown    Metabolic acidosis POA: Unknown    Hyperglycemia POA: Unknown    Hyponatremia POA: Unknown    Anxiety POA: Unknown    Depression POA: Unknown  Resolved Problems:    * No resolved hospital problems. *      FOLLOW UP  No future appointments.  Isa Rodriguez M.D.  [address] [place] [id number]  [phone]            MEDICATIONS ON DISCHARGE   Diana Snyder   Home Medication Instructions JOSUÉ:[id number]    Printed on:05/21/18 8474   Medication Information                      busPIRone (BUSPAR) 10 MG Tab  Take 10 mg by mouth 2 times a day.             FLUoxetine (PROZAC) 20 MG Cap  Take 20 mg by mouth every day.             LORazepam (ATIVAN) 1 MG Tab  Take 1 mg by mouth 2 times a day as needed for Anxiety.             sertraline (ZOLOFT) 50 MG Tab  Take 50 mg by mouth every evening.                 DIET  No orders of the defined types were placed in this encounter.      ACTIVITY  As  tolerated.  Weight bearing as tolerated      CONSULTATIONS  none    PROCEDURES  none    LABORATORY  Lab Results   Component Value Date/Time    SODIUM 138 05/20/2018 05:06 AM    POTASSIUM 3.3 (L) 05/20/2018 05:06 AM    CHLORIDE 113 (H) 05/20/2018 05:06 AM    CO2 20 05/20/2018 05:06 AM    GLUCOSE 93 05/20/2018 05:06 AM    BUN 10 05/20/2018 05:06 AM    CREATININE 1.04 05/20/2018 05:06 AM    CREATININE 0.9 05/18/2009 08:53 AM        Lab Results   Component Value Date/Time    WBC 10.6 05/20/2018 05:06 AM    HEMOGLOBIN 10.4 (L) 05/20/2018 05:06 AM    HEMATOCRIT 30.7 (L) 05/20/2018 05:06 AM    PLATELETCT 286 05/20/2018 05:06 AM        Total time of the discharge process exceeds 40 minutes

## 2018-05-21 NOTE — CARE PLAN
Problem: Pain Management  Goal: Pain level will decrease to patient's comfort goal  Outcome: PROGRESSING AS EXPECTED    Intervention: Follow pain managment plan developed in collaboration with patient and Interdisciplinary Team  Patient educated on 0-10 pain scale, verbalizes understanding of teaching. Uses appropriately. Pharm and non-pharm measures implemented. Pain regularly assessed including its location, severity, etc.        Problem: Mobility  Goal: Risk for activity intolerance will decrease  Outcome: PROGRESSING AS EXPECTED    Intervention: Assess and monitor signs of activity intolerance  Patient's mobility assessed regularly when getting up to bathroom, steady gait with no issues. Encouraged to use call light when in need of assistance.

## 2018-05-29 ENCOUNTER — HOSPITAL ENCOUNTER (OUTPATIENT)
Facility: MEDICAL CENTER | Age: 43
End: 2018-06-01
Attending: EMERGENCY MEDICINE | Admitting: INTERNAL MEDICINE
Payer: COMMERCIAL

## 2018-05-29 DIAGNOSIS — E87.6 HYPOKALEMIA: ICD-10-CM

## 2018-05-29 DIAGNOSIS — R11.15 INTRACTABLE CYCLICAL VOMITING WITH NAUSEA: ICD-10-CM

## 2018-05-29 PROBLEM — E86.0 DEHYDRATION: Status: RESOLVED | Noted: 2018-05-19 | Resolved: 2018-05-29

## 2018-05-29 PROBLEM — F41.9 ANXIETY: Status: RESOLVED | Noted: 2018-05-19 | Resolved: 2018-05-29

## 2018-05-29 PROBLEM — F41.9 ANXIETY: Status: RESOLVED | Noted: 2017-01-08 | Resolved: 2018-05-29

## 2018-05-29 LAB
ALBUMIN SERPL BCP-MCNC: 4.8 G/DL (ref 3.2–4.9)
ALBUMIN/GLOB SERPL: 1.3 G/DL
ALP SERPL-CCNC: 77 U/L (ref 30–99)
ALT SERPL-CCNC: 21 U/L (ref 2–50)
ANION GAP SERPL CALC-SCNC: 10 MMOL/L (ref 0–11.9)
ANION GAP SERPL CALC-SCNC: 11 MMOL/L (ref 0–11.9)
ANION GAP SERPL CALC-SCNC: 21 MMOL/L (ref 0–11.9)
APPEARANCE UR: CLEAR
AST SERPL-CCNC: 16 U/L (ref 12–45)
BASOPHILS # BLD AUTO: 0.6 % (ref 0–1.8)
BASOPHILS # BLD: 0.05 K/UL (ref 0–0.12)
BILIRUB SERPL-MCNC: 1.8 MG/DL (ref 0.1–1.5)
BILIRUB UR QL STRIP.AUTO: NEGATIVE
BUN SERPL-MCNC: 4 MG/DL (ref 8–22)
BUN SERPL-MCNC: 4 MG/DL (ref 8–22)
BUN SERPL-MCNC: 5 MG/DL (ref 8–22)
CALCIUM SERPL-MCNC: 8.4 MG/DL (ref 8.5–10.5)
CALCIUM SERPL-MCNC: 8.7 MG/DL (ref 8.5–10.5)
CALCIUM SERPL-MCNC: 9.9 MG/DL (ref 8.5–10.5)
CHLORIDE SERPL-SCNC: 105 MMOL/L (ref 96–112)
CHLORIDE SERPL-SCNC: 106 MMOL/L (ref 96–112)
CHLORIDE SERPL-SCNC: 95 MMOL/L (ref 96–112)
CO2 SERPL-SCNC: 18 MMOL/L (ref 20–33)
CO2 SERPL-SCNC: 18 MMOL/L (ref 20–33)
CO2 SERPL-SCNC: 20 MMOL/L (ref 20–33)
COLOR UR: YELLOW
CREAT SERPL-MCNC: 0.6 MG/DL (ref 0.5–1.4)
CREAT SERPL-MCNC: 0.89 MG/DL (ref 0.5–1.4)
CREAT SERPL-MCNC: 0.9 MG/DL (ref 0.5–1.4)
EKG IMPRESSION: NORMAL
EOSINOPHIL # BLD AUTO: 0 K/UL (ref 0–0.51)
EOSINOPHIL NFR BLD: 0 % (ref 0–6.9)
ERYTHROCYTE [DISTWIDTH] IN BLOOD BY AUTOMATED COUNT: 46.4 FL (ref 35.9–50)
GLOBULIN SER CALC-MCNC: 3.7 G/DL (ref 1.9–3.5)
GLUCOSE SERPL-MCNC: 104 MG/DL (ref 65–99)
GLUCOSE SERPL-MCNC: 107 MG/DL (ref 65–99)
GLUCOSE SERPL-MCNC: 94 MG/DL (ref 65–99)
GLUCOSE UR STRIP.AUTO-MCNC: NEGATIVE MG/DL
HCT VFR BLD AUTO: 39.1 % (ref 37–47)
HGB BLD-MCNC: 13.4 G/DL (ref 12–16)
IMM GRANULOCYTES # BLD AUTO: 0.05 K/UL (ref 0–0.11)
IMM GRANULOCYTES NFR BLD AUTO: 0.6 % (ref 0–0.9)
KETONES UR STRIP.AUTO-MCNC: 15 MG/DL
LEUKOCYTE ESTERASE UR QL STRIP.AUTO: NEGATIVE
LIPASE SERPL-CCNC: 14 U/L (ref 11–82)
LYMPHOCYTES # BLD AUTO: 1.08 K/UL (ref 1–4.8)
LYMPHOCYTES NFR BLD: 11.9 % (ref 22–41)
MCH RBC QN AUTO: 31.5 PG (ref 27–33)
MCHC RBC AUTO-ENTMCNC: 34.3 G/DL (ref 33.6–35)
MCV RBC AUTO: 91.8 FL (ref 81.4–97.8)
MICRO URNS: ABNORMAL
MONOCYTES # BLD AUTO: 1.18 K/UL (ref 0–0.85)
MONOCYTES NFR BLD AUTO: 13 % (ref 0–13.4)
NEUTROPHILS # BLD AUTO: 6.69 K/UL (ref 2–7.15)
NEUTROPHILS NFR BLD: 73.9 % (ref 44–72)
NITRITE UR QL STRIP.AUTO: NEGATIVE
NRBC # BLD AUTO: 0 K/UL
NRBC BLD-RTO: 0 /100 WBC
PH UR STRIP.AUTO: 6 [PH]
PLATELET # BLD AUTO: 424 K/UL (ref 164–446)
PMV BLD AUTO: 9.5 FL (ref 9–12.9)
POTASSIUM SERPL-SCNC: 2.6 MMOL/L (ref 3.6–5.5)
POTASSIUM SERPL-SCNC: 2.6 MMOL/L (ref 3.6–5.5)
POTASSIUM SERPL-SCNC: 3.1 MMOL/L (ref 3.6–5.5)
PROT SERPL-MCNC: 8.5 G/DL (ref 6–8.2)
PROT UR QL STRIP: NEGATIVE MG/DL
RBC # BLD AUTO: 4.26 M/UL (ref 4.2–5.4)
RBC UR QL AUTO: NEGATIVE
SODIUM SERPL-SCNC: 133 MMOL/L (ref 135–145)
SODIUM SERPL-SCNC: 134 MMOL/L (ref 135–145)
SODIUM SERPL-SCNC: 137 MMOL/L (ref 135–145)
SP GR UR STRIP.AUTO: <=1.005
TSH SERPL DL<=0.005 MIU/L-ACNC: 0.97 UIU/ML (ref 0.38–5.33)
UROBILINOGEN UR STRIP.AUTO-MCNC: 0.2 MG/DL
WBC # BLD AUTO: 9.1 K/UL (ref 4.8–10.8)

## 2018-05-29 PROCEDURE — 96375 TX/PRO/DX INJ NEW DRUG ADDON: CPT

## 2018-05-29 PROCEDURE — 84443 ASSAY THYROID STIM HORMONE: CPT

## 2018-05-29 PROCEDURE — 96376 TX/PRO/DX INJ SAME DRUG ADON: CPT

## 2018-05-29 PROCEDURE — 96365 THER/PROPH/DIAG IV INF INIT: CPT

## 2018-05-29 PROCEDURE — 700111 HCHG RX REV CODE 636 W/ 250 OVERRIDE (IP): Performed by: INTERNAL MEDICINE

## 2018-05-29 PROCEDURE — 700111 HCHG RX REV CODE 636 W/ 250 OVERRIDE (IP): Performed by: EMERGENCY MEDICINE

## 2018-05-29 PROCEDURE — A9270 NON-COVERED ITEM OR SERVICE: HCPCS | Performed by: EMERGENCY MEDICINE

## 2018-05-29 PROCEDURE — 81003 URINALYSIS AUTO W/O SCOPE: CPT

## 2018-05-29 PROCEDURE — 80053 COMPREHEN METABOLIC PANEL: CPT

## 2018-05-29 PROCEDURE — A9270 NON-COVERED ITEM OR SERVICE: HCPCS | Performed by: INTERNAL MEDICINE

## 2018-05-29 PROCEDURE — 36415 COLL VENOUS BLD VENIPUNCTURE: CPT

## 2018-05-29 PROCEDURE — 700102 HCHG RX REV CODE 250 W/ 637 OVERRIDE(OP): Performed by: EMERGENCY MEDICINE

## 2018-05-29 PROCEDURE — 99285 EMERGENCY DEPT VISIT HI MDM: CPT

## 2018-05-29 PROCEDURE — 80048 BASIC METABOLIC PNL TOTAL CA: CPT

## 2018-05-29 PROCEDURE — 85025 COMPLETE CBC W/AUTO DIFF WBC: CPT

## 2018-05-29 PROCEDURE — 93005 ELECTROCARDIOGRAM TRACING: CPT | Performed by: EMERGENCY MEDICINE

## 2018-05-29 PROCEDURE — 94760 N-INVAS EAR/PLS OXIMETRY 1: CPT

## 2018-05-29 PROCEDURE — 700105 HCHG RX REV CODE 258: Performed by: EMERGENCY MEDICINE

## 2018-05-29 PROCEDURE — 700102 HCHG RX REV CODE 250 W/ 637 OVERRIDE(OP): Performed by: INTERNAL MEDICINE

## 2018-05-29 PROCEDURE — 83690 ASSAY OF LIPASE: CPT

## 2018-05-29 PROCEDURE — 700101 HCHG RX REV CODE 250: Performed by: INTERNAL MEDICINE

## 2018-05-29 PROCEDURE — G0378 HOSPITAL OBSERVATION PER HR: HCPCS

## 2018-05-29 PROCEDURE — 99220 PR INITIAL OBSERVATION CARE,LEVL III: CPT | Performed by: INTERNAL MEDICINE

## 2018-05-29 RX ORDER — SODIUM CHLORIDE AND POTASSIUM CHLORIDE 150; 900 MG/100ML; MG/100ML
INJECTION, SOLUTION INTRAVENOUS CONTINUOUS
Status: DISCONTINUED | OUTPATIENT
Start: 2018-05-29 | End: 2018-06-01 | Stop reason: HOSPADM

## 2018-05-29 RX ORDER — PROMETHAZINE HYDROCHLORIDE 25 MG/1
12.5-25 TABLET ORAL EVERY 4 HOURS PRN
Status: DISCONTINUED | OUTPATIENT
Start: 2018-05-29 | End: 2018-05-29

## 2018-05-29 RX ORDER — ONDANSETRON 4 MG/1
4 TABLET, ORALLY DISINTEGRATING ORAL EVERY 4 HOURS PRN
Status: DISCONTINUED | OUTPATIENT
Start: 2018-05-29 | End: 2018-06-01 | Stop reason: HOSPADM

## 2018-05-29 RX ORDER — ONDANSETRON 2 MG/ML
4 INJECTION INTRAMUSCULAR; INTRAVENOUS EVERY 4 HOURS PRN
Status: DISCONTINUED | OUTPATIENT
Start: 2018-05-29 | End: 2018-06-01 | Stop reason: HOSPADM

## 2018-05-29 RX ORDER — PROMETHAZINE HYDROCHLORIDE 25 MG/1
25 TABLET ORAL EVERY 6 HOURS PRN
Status: DISCONTINUED | OUTPATIENT
Start: 2018-05-29 | End: 2018-06-01 | Stop reason: HOSPADM

## 2018-05-29 RX ORDER — LORAZEPAM 1 MG/1
0.5 TABLET ORAL EVERY 6 HOURS PRN
Status: DISCONTINUED | OUTPATIENT
Start: 2018-05-29 | End: 2018-05-30

## 2018-05-29 RX ORDER — LORAZEPAM 2 MG/ML
1 INJECTION INTRAMUSCULAR ONCE
Status: COMPLETED | OUTPATIENT
Start: 2018-05-29 | End: 2018-05-29

## 2018-05-29 RX ORDER — ONDANSETRON 4 MG/1
4 TABLET, ORALLY DISINTEGRATING ORAL EVERY 4 HOURS PRN
Status: DISCONTINUED | OUTPATIENT
Start: 2018-05-29 | End: 2018-05-29

## 2018-05-29 RX ORDER — ONDANSETRON 2 MG/ML
4 INJECTION INTRAMUSCULAR; INTRAVENOUS ONCE
Status: COMPLETED | OUTPATIENT
Start: 2018-05-29 | End: 2018-05-29

## 2018-05-29 RX ORDER — POTASSIUM CHLORIDE 20 MEQ/1
20 TABLET, EXTENDED RELEASE ORAL ONCE
Status: COMPLETED | OUTPATIENT
Start: 2018-05-29 | End: 2018-05-29

## 2018-05-29 RX ORDER — POLYETHYLENE GLYCOL 3350 17 G/17G
1 POWDER, FOR SOLUTION ORAL
Status: DISCONTINUED | OUTPATIENT
Start: 2018-05-29 | End: 2018-06-01 | Stop reason: HOSPADM

## 2018-05-29 RX ORDER — BUSPIRONE HYDROCHLORIDE 5 MG/1
15 TABLET ORAL 2 TIMES DAILY
Status: DISCONTINUED | OUTPATIENT
Start: 2018-05-29 | End: 2018-06-01 | Stop reason: HOSPADM

## 2018-05-29 RX ORDER — DIPHENHYDRAMINE HYDROCHLORIDE 50 MG/ML
25 INJECTION INTRAMUSCULAR; INTRAVENOUS ONCE
Status: COMPLETED | OUTPATIENT
Start: 2018-05-29 | End: 2018-05-29

## 2018-05-29 RX ORDER — BISACODYL 10 MG
10 SUPPOSITORY, RECTAL RECTAL
Status: DISCONTINUED | OUTPATIENT
Start: 2018-05-29 | End: 2018-06-01 | Stop reason: HOSPADM

## 2018-05-29 RX ORDER — LORAZEPAM 1 MG/1
1 TABLET ORAL ONCE
Status: DISCONTINUED | OUTPATIENT
Start: 2018-05-29 | End: 2018-05-29

## 2018-05-29 RX ORDER — LORAZEPAM 2 MG/ML
0.5 INJECTION INTRAMUSCULAR EVERY 4 HOURS PRN
Status: DISCONTINUED | OUTPATIENT
Start: 2018-05-29 | End: 2018-05-29

## 2018-05-29 RX ORDER — AMOXICILLIN 250 MG
2 CAPSULE ORAL 2 TIMES DAILY
Status: DISCONTINUED | OUTPATIENT
Start: 2018-05-29 | End: 2018-06-01 | Stop reason: HOSPADM

## 2018-05-29 RX ORDER — MAGNESIUM SULFATE 1 G/100ML
1 INJECTION INTRAVENOUS ONCE
Status: COMPLETED | OUTPATIENT
Start: 2018-05-29 | End: 2018-05-29

## 2018-05-29 RX ORDER — POTASSIUM CHLORIDE 20 MEQ/1
40 TABLET, EXTENDED RELEASE ORAL DAILY
Status: DISCONTINUED | OUTPATIENT
Start: 2018-05-29 | End: 2018-06-01 | Stop reason: HOSPADM

## 2018-05-29 RX ORDER — SODIUM CHLORIDE 9 MG/ML
1000 INJECTION, SOLUTION INTRAVENOUS ONCE
Status: COMPLETED | OUTPATIENT
Start: 2018-05-29 | End: 2018-05-29

## 2018-05-29 RX ORDER — DIPHENHYDRAMINE HCL 25 MG
50 TABLET ORAL EVERY 6 HOURS PRN
Status: DISCONTINUED | OUTPATIENT
Start: 2018-05-29 | End: 2018-06-01 | Stop reason: HOSPADM

## 2018-05-29 RX ORDER — PROMETHAZINE HYDROCHLORIDE 25 MG/1
12.5-25 SUPPOSITORY RECTAL EVERY 4 HOURS PRN
Status: DISCONTINUED | OUTPATIENT
Start: 2018-05-29 | End: 2018-06-01 | Stop reason: HOSPADM

## 2018-05-29 RX ORDER — POTASSIUM CHLORIDE 7.45 MG/ML
10 INJECTION INTRAVENOUS ONCE
Status: COMPLETED | OUTPATIENT
Start: 2018-05-29 | End: 2018-05-29

## 2018-05-29 RX ORDER — PROMETHAZINE HYDROCHLORIDE 25 MG/1
25 SUPPOSITORY RECTAL EVERY 6 HOURS PRN
Status: DISCONTINUED | OUTPATIENT
Start: 2018-05-29 | End: 2018-05-29

## 2018-05-29 RX ORDER — LORAZEPAM 2 MG/ML
0.5 INJECTION INTRAMUSCULAR EVERY 6 HOURS PRN
Status: DISCONTINUED | OUTPATIENT
Start: 2018-05-29 | End: 2018-05-30

## 2018-05-29 RX ORDER — KETOROLAC TROMETHAMINE 30 MG/ML
30 INJECTION, SOLUTION INTRAMUSCULAR; INTRAVENOUS ONCE
Status: COMPLETED | OUTPATIENT
Start: 2018-05-29 | End: 2018-05-29

## 2018-05-29 RX ORDER — ACETAMINOPHEN 325 MG/1
650 TABLET ORAL EVERY 6 HOURS PRN
Status: DISCONTINUED | OUTPATIENT
Start: 2018-05-29 | End: 2018-06-01 | Stop reason: HOSPADM

## 2018-05-29 RX ADMIN — SODIUM CHLORIDE 1000 ML: 9 INJECTION, SOLUTION INTRAVENOUS at 08:52

## 2018-05-29 RX ADMIN — DIPHENHYDRAMINE HYDROCHLORIDE 25 MG: 50 INJECTION, SOLUTION INTRAMUSCULAR; INTRAVENOUS at 10:10

## 2018-05-29 RX ADMIN — SERTRALINE 50 MG: 50 TABLET, FILM COATED ORAL at 17:33

## 2018-05-29 RX ADMIN — ACETAMINOPHEN 650 MG: 325 TABLET, FILM COATED ORAL at 17:41

## 2018-05-29 RX ADMIN — LORAZEPAM 1 MG: 2 INJECTION INTRAMUSCULAR; INTRAVENOUS at 12:57

## 2018-05-29 RX ADMIN — POTASSIUM CHLORIDE AND SODIUM CHLORIDE: 900; 150 INJECTION, SOLUTION INTRAVENOUS at 17:59

## 2018-05-29 RX ADMIN — KETOROLAC TROMETHAMINE 30 MG: 30 INJECTION, SOLUTION INTRAMUSCULAR at 18:39

## 2018-05-29 RX ADMIN — MAGNESIUM SULFATE IN DEXTROSE 1 G: 10 INJECTION, SOLUTION INTRAVENOUS at 17:59

## 2018-05-29 RX ADMIN — LORAZEPAM 1 MG: 2 INJECTION INTRAMUSCULAR; INTRAVENOUS at 08:53

## 2018-05-29 RX ADMIN — PROCHLORPERAZINE EDISYLATE 10 MG: 5 INJECTION INTRAMUSCULAR; INTRAVENOUS at 20:33

## 2018-05-29 RX ADMIN — POTASSIUM CHLORIDE 20 MEQ: 1500 TABLET, EXTENDED RELEASE ORAL at 09:41

## 2018-05-29 RX ADMIN — LORAZEPAM 0.5 MG: 2 INJECTION INTRAMUSCULAR; INTRAVENOUS at 18:47

## 2018-05-29 RX ADMIN — POTASSIUM CHLORIDE 10 MEQ: 10 INJECTION, SOLUTION INTRAVENOUS at 09:41

## 2018-05-29 RX ADMIN — ONDANSETRON 4 MG: 2 INJECTION INTRAMUSCULAR; INTRAVENOUS at 17:26

## 2018-05-29 RX ADMIN — LORAZEPAM 0.5 MG: 2 INJECTION INTRAMUSCULAR; INTRAVENOUS at 23:55

## 2018-05-29 RX ADMIN — POTASSIUM CHLORIDE 40 MEQ: 1500 TABLET, EXTENDED RELEASE ORAL at 17:31

## 2018-05-29 RX ADMIN — BUSPIRONE HYDROCHLORIDE 15 MG: 5 TABLET ORAL at 20:50

## 2018-05-29 RX ADMIN — ONDANSETRON 4 MG: 2 INJECTION INTRAMUSCULAR; INTRAVENOUS at 08:53

## 2018-05-29 ASSESSMENT — PATIENT HEALTH QUESTIONNAIRE - PHQ9
5. POOR APPETITE OR OVEREATING: MORE THAN HALF THE DAYS
SUM OF ALL RESPONSES TO PHQ QUESTIONS 1-9: 11
6. FEELING BAD ABOUT YOURSELF - OR THAT YOU ARE A FAILURE OR HAVE LET YOURSELF OR YOUR FAMILY DOWN: MORE THAN HALF THE DAYS
7. TROUBLE CONCENTRATING ON THINGS, SUCH AS READING THE NEWSPAPER OR WATCHING TELEVISION: SEVERAL DAYS
1. LITTLE INTEREST OR PLEASURE IN DOING THINGS: NOT AT ALL
3. TROUBLE FALLING OR STAYING ASLEEP OR SLEEPING TOO MUCH: NEARLY EVERY DAY
4. FEELING TIRED OR HAVING LITTLE ENERGY: MORE THAN HALF THE DAYS
8. MOVING OR SPEAKING SO SLOWLY THAT OTHER PEOPLE COULD HAVE NOTICED. OR THE OPPOSITE, BEING SO FIGETY OR RESTLESS THAT YOU HAVE BEEN MOVING AROUND A LOT MORE THAN USUAL: NOT AT ALL
9. THOUGHTS THAT YOU WOULD BE BETTER OFF DEAD, OR OF HURTING YOURSELF: NOT AT ALL
SUM OF ALL RESPONSES TO PHQ9 QUESTIONS 1 AND 2: 1
2. FEELING DOWN, DEPRESSED, IRRITABLE, OR HOPELESS: SEVERAL DAYS

## 2018-05-29 ASSESSMENT — ENCOUNTER SYMPTOMS
NERVOUS/ANXIOUS: 1
BACK PAIN: 0
MYALGIAS: 0
CHILLS: 0
MEMORY LOSS: 0
DEPRESSION: 1
FOCAL WEAKNESS: 0
TREMORS: 1
WEAKNESS: 1
SPEECH CHANGE: 0
FEVER: 0
VOMITING: 1
HEADACHES: 0
DIZZINESS: 0
SENSORY CHANGE: 0
NAUSEA: 1
SHORTNESS OF BREATH: 0
FLANK PAIN: 0
DIAPHORESIS: 0
HALLUCINATIONS: 0
PALPITATIONS: 0
COUGH: 0
DIARRHEA: 1
ABDOMINAL PAIN: 0

## 2018-05-29 ASSESSMENT — PAIN SCALES - GENERAL
PAINLEVEL_OUTOF10: 9
PAINLEVEL_OUTOF10: 5
PAINLEVEL_OUTOF10: 6

## 2018-05-29 ASSESSMENT — LIFESTYLE VARIABLES
SUBSTANCE_ABUSE: 1
EVER_SMOKED: YES

## 2018-05-29 NOTE — ED TRIAGE NOTES
.  Chief Complaint   Patient presents with   • N/V     x 2 days   • Back Pain     redness to back d/t heat bottle     biba from home. Pt has chronic cyclic vomiting. Taking zofran without relief. Pt reports pain x 2 days along with upper back pain. Using heat bottles to alleviate pain, has redness burn like marks to back.  Received Phenergan 25 mg  ml pta. fsbs 85

## 2018-05-29 NOTE — ED PROVIDER NOTES
ED Provider Note    CHIEF COMPLAINT  Chief Complaint   Patient presents with   • N/V     x 2 days   • Back Pain     redness to back d/t heat bottle        HPI  Diana Hernandez is a 43 y.o. female who presents with vomiting, lower abdominal cramping.  Onset of discomfort and vomiting 2 days ago.  She states she's vomited 3 times today.  Pain is both midline lower and upper abdomen radiating to her mid back.  No bloody stool or emesis.  Patient states she has not had marijuana in 1 week.  Review of chart shows cyclic vomiting secondary to marijuana abuse, anxiety.  She was admitted twice in the past month for vomiting with resultant renal failure.  Patient asked during my exam for medicine for pain and anxiety.    REVIEW OF SYSTEMS    Constitutional: No fever  Respiratory: No shortness of breath  Cardiac: No chest pain  Gastrointestinal: Vomiting, history of similar.  Pain.  Musculoskeletal: Radiation of pain to the back  Neurologic: Denies headache  Like after colon anxiety       All other systems are negative.       PAST MEDICAL HISTORY  Past Medical History:   Diagnosis Date   • Anxiety     Followed by [facility]   • Anxiety disorder    • Backpain    • CLOSTRIDIUM DIFFICILE INFECTION 4/14/2010   • CVS disease    • Cyclic vomiting syndrome    • Dental disorder    • Drug-seeking behavior    • Dyspepsia 7/12/2009   • Nephrolithiasis 6/20/2009    kidney stones,post lithotrypsy   • Pain     abd and back   • Snoring    • Superior mesenteric artery syndrome (HCC) 7/12/2009   • Superior mesenteric artery syndrome (HCC)    • Tobacco abuse 6/20/2009       FAMILY HISTORY  Family History   Problem Relation Age of Onset   • Cancer Mother 50     Colon cancer   • Hypertension Mother    • Allergies Father    • Hypertension Father    • Heart Disease Maternal Grandmother        SOCIAL HISTORY  Social History     Social History   • Marital status:      Spouse name: N/A   • Number of children: N/A   • Years of education: N/A      Social History Main Topics   • Smoking status: Never Smoker   • Smokeless tobacco: Never Used   • Alcohol use No   • Drug use: Yes     Types: Marijuana, Inhaled      Comment: marijuana   • Sexual activity: Not on file     Other Topics Concern   • Not on file     Social History Narrative    ** Merged History Encounter **            SURGICAL HISTORY  Past Surgical History:   Procedure Laterality Date   • GASTROSCOPY-ENDO  4/28/2016    Procedure: GASTROSCOPY-ENDO;  Surgeon: Blayne Blackburn M.D.;  Location: [facility];  Service:    • EXPLORATORY LAPAROTOMY  1/12/2016    Procedure: EXPLORATORY LAPAROTOMY;  Surgeon: Maciel Quintero M.D.;  Location: SURGERY [facility];  Service:    • BOWEL RESECTION  1/12/2016    Procedure: BOWEL RESECTION;  Surgeon: Maciel Quintero M.D.;  Location: SURGERY [facility];  Service:    • GASTROSCOPY WITH BIOPSY  3/9/2010    Performed by AMANDA MEJIA at ENDOSCOPY [facility]   • PYLOROPLASTY  7/27/2009    Performed by MACIEL QUINTERO at SURGERY [facility]   • EXPLORATORY LAPAROTOMY  7/27/2009    Performed by MACIEL QUINTERO at SURGERY [facility]   • APPENDECTOMY  7/27/2009    Performed by MACIEL QUINTERO at SURGERY [facility]   • CHOLECYSTECTOMY  7/27/2009    Performed by MACIEL QUINTERO at SURGERY [facility]   • GASTROSCOPY-ENDO  7/8/2009    Performed by ROSANNA WRIGHT at SURGERY [facility]   • LITHOTRIPSY     • OTHER      superior mesenteric artery correction        CURRENT MEDICATIONS  Home Medications     Reviewed by Elisa Mcdaniel R.N. (Registered Nurse) on 05/29/18 at 0825  Med List Status: Partial   Medication Last Dose Status   busPIRone (BUSPAR) 15 MG tablet 5/28/2018 Active   diphenhydrAMINE (BENADRYL) 25 MG Tab prn Active   FLUoxetine (PROZAC) 20 MG Cap 5/18/2018 Active   ondansetron (ZOFRAN ODT) 4 MG TABLET DISPERSIBLE 5/29/2018 Active   promethazine  "(PHENERGAN) 25 MG Tab 5/28/2018 Active   sertraline (ZOLOFT) 50 MG Tab 5/28/2018 Active                ALLERGIES  Allergies   Allergen Reactions   • Metoclopramide Hives     Told by MD; pt suspects possible hives.   • Bactrim [Sulfamethoxazole W-Trimethoprim] Unspecified     Someone said I had a rash or something.      • Seroquel [Quetiapine] Unspecified     MD told her she was allergic- the room was spinning and eye eyes shook when I was in a coma     • Bactrim [Sulfamethoxazole W-Trimethoprim] Unspecified     RXN=unknown   • Reglan [Metoclopramide] Unspecified     RXN=unknown   • Seroquel [Quetiapine] Unspecified     \"Seizures\"  RXN=unknown       PHYSICAL EXAM  VITAL SIGNS: /60   Pulse 69   Resp 18   Ht 1.651 m (5' 5\")   Wt 63 kg (139 lb)   LMP 05/12/2018 (Exact Date)   SpO2 100%   BMI 23.13 kg/m²   Constitutional:  Non-toxic appearance.   HENT: No facial swelling.  Mucous membranes dry  Eyes: Anicteric, no conjunctivitis.     Cardiovascular: Normal heart rate, Normal rhythm  Pulmonary:  No wheezing, No rales.   Gastrointestinal: Soft, mild epigastric abdominal tenderness, No masses.    Skin: Warm, Dry, No cyanosis.  No jaundice  Neurologic: Speech is clear, follows commands, facial expression is symmetrical.  Psychiatric: Anxious.   Musculoskeletal: No flank tenderness    EKG/Labs  Results for orders placed or performed during the hospital encounter of 05/29/18   CBC WITH DIFFERENTIAL   Result Value Ref Range    WBC 9.1 4.8 - 10.8 K/uL    RBC 4.26 4.20 - 5.40 M/uL    Hemoglobin 13.4 12.0 - 16.0 g/dL    Hematocrit 39.1 37.0 - 47.0 %    MCV 91.8 81.4 - 97.8 fL    MCH 31.5 27.0 - 33.0 pg    MCHC 34.3 33.6 - 35.0 g/dL    RDW 46.4 35.9 - 50.0 fL    Platelet Count 424 164 - 446 K/uL    MPV 9.5 9.0 - 12.9 fL    Neutrophils-Polys 73.90 (H) 44.00 - 72.00 %    Lymphocytes 11.90 (L) 22.00 - 41.00 %    Monocytes 13.00 0.00 - 13.40 %    Eosinophils 0.00 0.00 - 6.90 %    Basophils 0.60 0.00 - 1.80 %    Immature " Granulocytes 0.60 0.00 - 0.90 %    Nucleated RBC 0.00 /100 WBC    Neutrophils (Absolute) 6.69 2.00 - 7.15 K/uL    Lymphs (Absolute) 1.08 1.00 - 4.80 K/uL    Monos (Absolute) 1.18 (H) 0.00 - 0.85 K/uL    Eos (Absolute) 0.00 0.00 - 0.51 K/uL    Baso (Absolute) 0.05 0.00 - 0.12 K/uL    Immature Granulocytes (abs) 0.05 0.00 - 0.11 K/uL    NRBC (Absolute) 0.00 K/uL   COMP METABOLIC PANEL   Result Value Ref Range    Sodium 134 (L) 135 - 145 mmol/L    Potassium 2.6 (LL) 3.6 - 5.5 mmol/L    Chloride 95 (L) 96 - 112 mmol/L    Co2 18 (L) 20 - 33 mmol/L    Anion Gap 21.0 (H) 0.0 - 11.9    Glucose 107 (H) 65 - 99 mg/dL    Bun 5 (L) 8 - 22 mg/dL    Creatinine 0.90 0.50 - 1.40 mg/dL    Calcium 9.9 8.5 - 10.5 mg/dL    AST(SGOT) 16 12 - 45 U/L    ALT(SGPT) 21 2 - 50 U/L    Alkaline Phosphatase 77 30 - 99 U/L    Total Bilirubin 1.8 (H) 0.1 - 1.5 mg/dL    Albumin 4.8 3.2 - 4.9 g/dL    Total Protein 8.5 (H) 6.0 - 8.2 g/dL    Globulin 3.7 (H) 1.9 - 3.5 g/dL    A-G Ratio 1.3 g/dL   LIPASE   Result Value Ref Range    Lipase 14 11 - 82 U/L   URINALYSIS,CULTURE IF INDICATED   Result Value Ref Range    Color Yellow     Character Clear     Specific Gravity <=1.005 <1.035    Ph 6.0 5.0 - 8.0    Glucose Negative Negative mg/dL    Ketones 15 (A) Negative mg/dL    Protein Negative Negative mg/dL    Bilirubin Negative Negative    Urobilinogen, Urine 0.2 Negative    Nitrite Negative Negative    Leukocyte Esterase Negative Negative    Occult Blood Negative Negative    Micro Urine Req see below    ESTIMATED GFR   Result Value Ref Range    GFR If African American >60 >60 mL/min/1.73 m 2    GFR If Non African American >60 >60 mL/min/1.73 m 2   BASIC METABOLIC PANEL   Result Value Ref Range    Sodium 137 135 - 145 mmol/L    Potassium 2.6 (LL) 3.6 - 5.5 mmol/L    Chloride 106 96 - 112 mmol/L    Co2 20 20 - 33 mmol/L    Glucose 94 65 - 99 mg/dL    Bun 4 (L) 8 - 22 mg/dL    Creatinine 0.60 0.50 - 1.40 mg/dL    Calcium 8.4 (L) 8.5 - 10.5 mg/dL    Anion Gap  11.0 0.0 - 11.9   ESTIMATED GFR   Result Value Ref Range    GFR If African American >60 >60 mL/min/1.73 m 2    GFR If Non African American >60 >60 mL/min/1.73 m 2   EKG (ER)   Result Value Ref Range    Report       [facility]    Test Date:  2018  Pt Name:    ALIYA MURRAY                 Department: ER  MRN:        [id number]                      Room:       RD 11  Gender:     Female                       Technician: [id number]  :        [id number]                   Requested By:ROBERT FERRARI  Order #:    [id number]                    Reading MD: ROBERT FERRARI MD    Measurements  Intervals                                Axis  Rate:       66                           P:  PA:                                      QRS:        75  QRSD:       124                          T:          15  QT:         460  QTc:        482    Interpretive Statements  sinus rhythm  Compared to ECG 04/15/2018 19:05:52  no evidence of acute ischemia or arrhythmia    Electronically Signed On 2018 15:44:34 PDT by ROBERT FERRARI MD           COURSE & MEDICAL DECISION MAKING  Pertinent Labs & Imaging studies reviewed. (See chart for details)  Review of chart shows previous admissions for low potassium and renal failure.  Today her kidney function is normal however potassium critically low at 2.6.  I did not find severe changes on the EKG, possibly some T-wave flattening.  Patient requested IV Ativan, she did appear anxious and this was given.  Her request for pain medication was refused.  Patient has been seen resting quietly in no apparent discomfort by myself multiple times during her stay.  Patient took oral potassium and IV potassium, levels were repeated without improvement.  Patient vomited once in the ER.  She's been treated with antibiotics.  She'll be admitted for ongoing workup, and potassium replacement.    FINAL IMPRESSION     1. Intractable cyclical vomiting with nausea    2.  Hypokalemia                    Electronically signed by: Yaakov Oviedo, 5/29/2018 9:09 AM

## 2018-05-29 NOTE — ED NOTES
Lab called with critical result of K+ 2.6 at 0927. Critical lab result read back.   Dr. Trivedi notified of critical lab result at 0928.  Critical lab result read back by

## 2018-05-29 NOTE — ASSESSMENT & PLAN NOTE
Cont antidepressants/anxiolytics  Limit iv rx use  f/u with psychiatry as outpatient  Psychiatrist Dr. Munoz consulted

## 2018-05-29 NOTE — H&P
Hospital Medicine History and Physical    Date of Service  5/29/2018    Chief Complaint  Chief Complaint   Patient presents with   • N/V     x 2 days   • Back Pain     redness to back d/t heat bottle       History of Presenting Illness  43 y.o. female who presented 5/29/2018 with h/o cyclical n/v 2nd to cannabis dependence, anxiety and depression.  Admitted for hypokalemia 2nd to n/v with one episode of diarrhea today.  She reports intractable nausea/vomiting x 3 days.  Pt reports minimal abdominal discomfort.  She is very anxious in the emergency room.  She is noted to have one episode of vomiting in the ER with K 2.6.  She has been started on oral and iv repletion.    Denies any sick contacts.  Lives with her .  Reports last cannabis use x 3 days ago.  Reports ongoing severe anxiety in which she has recently been started on zoloft x 1 week.  Denies any Chest pain or palpitaitons now.  No history of alcohol or tobacco abuse.    Primary Care Physician  Isa Rodriguez M.D.    Consultants  none    Code Status  Full code    Review of Systems  Review of Systems   Constitutional: Negative for chills, diaphoresis, fever and malaise/fatigue.   HENT: Negative for congestion and hearing loss.    Respiratory: Negative for cough and shortness of breath.    Cardiovascular: Negative for chest pain, palpitations and leg swelling.   Gastrointestinal: Positive for diarrhea, nausea and vomiting. Negative for abdominal pain.   Genitourinary: Negative for dysuria, flank pain and urgency.   Musculoskeletal: Negative for back pain, joint pain and myalgias.   Neurological: Positive for tremors and weakness. Negative for dizziness, sensory change, speech change, focal weakness and headaches.   Psychiatric/Behavioral: Positive for depression and substance abuse. Negative for hallucinations and memory loss. The patient is nervous/anxious.         Past Medical History  Past Medical History:   Diagnosis Date   • CLOSTRIDIUM  DIFFICILE INFECTION 4/14/2010   • Superior mesenteric artery syndrome (HCC) 7/12/2009   • Dyspepsia 7/12/2009   • Tobacco abuse 6/20/2009   • Nephrolithiasis 6/20/2009    kidney stones,post lithotrypsy   • Anxiety     Followed by [facility]   • Anxiety disorder    • Backpain    • CVS disease    • Cyclic vomiting syndrome    • Dental disorder    • Drug-seeking behavior    • Pain     abd and back   • Snoring    • Superior mesenteric artery syndrome (HCC)        Surgical History  Past Surgical History:   Procedure Laterality Date   • GASTROSCOPY-ENDO  4/28/2016    Procedure: GASTROSCOPY-ENDO;  Surgeon: Blayne Blackburn M.D.;  Location: ENDOSCOPY [facility];  Service:    • EXPLORATORY LAPAROTOMY  1/12/2016    Procedure: EXPLORATORY LAPAROTOMY;  Surgeon: Maciel Quintero M.D.;  Location: SURGERY [facility];  Service:    • BOWEL RESECTION  1/12/2016    Procedure: BOWEL RESECTION;  Surgeon: Maciel Quintero M.D.;  Location: SURGERY [facility];  Service:    • GASTROSCOPY WITH BIOPSY  3/9/2010    Performed by AMANDA MEJIA at ENDOSCOPY [facility]   • PYLOROPLASTY  7/27/2009    Performed by MACIEL QUINTERO at SURGERY [facility]   • EXPLORATORY LAPAROTOMY  7/27/2009    Performed by MACIEL QUINTERO at SURGERY [facility]   • APPENDECTOMY  7/27/2009    Performed by MACIEL QUINTERO at SURGERY [facility]   • CHOLECYSTECTOMY  7/27/2009    Performed by MACIEL QUINTERO at SURGERY [facility]   • GASTROSCOPY-ENDO  7/8/2009    Performed by ROSANNA WRIGHT at SURGERY [facility]   • LITHOTRIPSY     • OTHER      superior mesenteric artery correction        Medications  No current facility-administered medications on file prior to encounter.      Current Outpatient Prescriptions on File Prior to Encounter   Medication Sig Dispense Refill   • FLUoxetine (PROZAC) 20 MG Cap Take 20 mg by mouth every day.     • sertraline (ZOLOFT) 50 MG Tab Take  "50 mg by mouth every evening.     • ondansetron (ZOFRAN ODT) 4 MG TABLET DISPERSIBLE Take 4 mg by mouth every four hours as needed for Nausea.     • busPIRone (BUSPAR) 15 MG tablet Take 1 Tab by mouth 2 times a day. 90 Tab 1   • promethazine (PHENERGAN) 25 MG Tab Take 1 Tab by mouth every 6 hours as needed for Nausea/Vomiting. 30 Tab 0   • diphenhydrAMINE (BENADRYL) 25 MG Tab Take 50 mg by mouth every 6 hours as needed for Sleep.         Family History  Family History   Problem Relation Age of Onset   • Cancer Mother 50     Colon cancer   • Hypertension Mother    • Allergies Father    • Hypertension Father    • Heart Disease Maternal Grandmother        Social History  Social History   Substance Use Topics   • Smoking status: Never Smoker   • Smokeless tobacco: Never Used   • Alcohol use No       Allergies  Allergies   Allergen Reactions   • Metoclopramide Hives     Told by MD; pt suspects possible hives.   • Bactrim [Sulfamethoxazole W-Trimethoprim] Unspecified     Someone said I had a rash or something.      • Seroquel [Quetiapine] Unspecified     MD told her she was allergic- the room was spinning and eye eyes shook when I was in a coma     • Bactrim [Sulfamethoxazole W-Trimethoprim] Unspecified     RXN=unknown   • Reglan [Metoclopramide] Unspecified     RXN=unknown   • Seroquel [Quetiapine] Unspecified     \"Seizures\"  RXN=unknown        Physical Exam  Laboratory   Hemodynamics  No data recorded.      Pulse  Av.3  Min: 66  Max: 73 Heart Rate (Monitored): 69  Blood Pressure: 131/60, NIBP: 111/55      Respiratory      Respiration: 18, Pulse Oximetry: 99 %             Physical Exam   Constitutional: She is oriented to person, place, and time. She appears well-nourished. No distress.   HENT:   Head: Normocephalic and atraumatic.   Nose: Nose normal.   mmd   Eyes: EOM are normal. Pupils are equal, round, and reactive to light. Right eye exhibits no discharge. Left eye exhibits no discharge. No scleral icterus. "   Neck: Normal range of motion. Neck supple. No JVD present. No thyromegaly present.   Cardiovascular: Normal rate and intact distal pulses.    No murmur heard.  Pulmonary/Chest: Effort normal and breath sounds normal. No respiratory distress. She has no wheezes. She has no rales.   Abdominal: Soft. Bowel sounds are normal. She exhibits no distension. There is no tenderness.   Musculoskeletal: She exhibits no edema or tenderness.   Lymphadenopathy:     She has no cervical adenopathy.   Neurological: She is alert and oriented to person, place, and time. No cranial nerve deficit. She exhibits normal muscle tone. Coordination normal.   Skin: Skin is warm and dry. No rash noted. She is not diaphoretic. No erythema. No pallor.   Psychiatric: Her behavior is normal. Thought content normal.   anxious   Nursing note and vitals reviewed.      Recent Labs      05/29/18   0840   WBC  9.1   RBC  4.26   HEMOGLOBIN  13.4   HEMATOCRIT  39.1   MCV  91.8   MCH  31.5   MCHC  34.3   RDW  46.4   PLATELETCT  424   MPV  9.5     Recent Labs      05/29/18   0840  05/29/18   1232   SODIUM  134*  137   POTASSIUM  2.6*  2.6*   CHLORIDE  95*  106   CO2  18*  20   GLUCOSE  107*  94   BUN  5*  4*   CREATININE  0.90  0.60   CALCIUM  9.9  8.4*     Recent Labs      05/29/18   0840  05/29/18   1232   ALTSGPT  21   --    ASTSGOT  16   --    ALKPHOSPHAT  77   --    TBILIRUBIN  1.8*   --    LIPASE  14   --    GLUCOSE  107*  94                 Lab Results   Component Value Date    TROPONINI <0.01 05/11/2016     Urinalysis:    Lab Results  Component Value Date/Time   SPECGRAVITY <=1.005 05/29/2018 0901   GLUCOSEUR Negative 05/29/2018 0901   KETONES 15 (A) 05/29/2018 0901   NITRITE Negative 05/29/2018 0901   WBCURINE 2-5 08/29/2017 0605   RBCURINE Rare 08/29/2017 0605   BACTERIA Many (A) 08/29/2017 0605   EPITHELCELL Moderate (A) 08/29/2017 0605        Imaging  No orders to display        Assessment/Plan     I anticipate this patient is appropriate for  observation status at this time.    * Cyclic vomiting syndrome- (present on admission)   Assessment & Plan    Admit to the telemetry observation unit  2nd to cannabis use and severe anxiety  Associated with one episode of diarrhea, may be complicated with gastroenteritis  f/u UDS  Phenergan rectally  Cont ivf hydration  Antiemetics prn  CLD  Advise cannabis cessation          Hypokalemia- (present on admission)   Assessment & Plan    Severe  s/p oral and iv repletion  Cont iv and oral repletion  Magnesium now  f/u magnesium level        Dehydration- (present on admission)   Assessment & Plan    Cont ivf        Anxiety- (present on admission)   Assessment & Plan    Cont antidepressants/anxiolytics  Limit iv rx use  f/u with psychiatry as outpatient        Substance abuse- (present on admission)   Assessment & Plan    h/o marijuana dependence with chronic use  Advise cessation          Depression- (present on admission)   Assessment & Plan    As above            VTE prophylaxis: lovenox.

## 2018-05-29 NOTE — ED NOTES
Right upper arm iv infiltrated, area slightly swollen, no redness or pain. Pt remains anxious, becoming aggitated.

## 2018-05-29 NOTE — ED NOTES
"Pt reports that she is having a \"panic attack\" erp notified and orders received. On arrival to pt room pt has drank 2 cups of water and now has 100 ml clear emesis. refuses oral medication. erp notified and IV ativan ordered pt medicated as ordered.   "

## 2018-05-29 NOTE — ASSESSMENT & PLAN NOTE
Admit to the telemetry observation unit  2nd to cannabis use and severe anxiety  Associated with one episode of diarrhea, may be complicated with gastroenteritis  f/u UDS  Phenergan rectally  Cont ivf hydration  Antiemetics prn  CLD  Advise cannabis cessation

## 2018-05-30 LAB
ANION GAP SERPL CALC-SCNC: 7 MMOL/L (ref 0–11.9)
BASOPHILS # BLD AUTO: 0.7 % (ref 0–1.8)
BASOPHILS # BLD: 0.05 K/UL (ref 0–0.12)
BUN SERPL-MCNC: 4 MG/DL (ref 8–22)
CALCIUM SERPL-MCNC: 8.4 MG/DL (ref 8.5–10.5)
CHLORIDE SERPL-SCNC: 110 MMOL/L (ref 96–112)
CO2 SERPL-SCNC: 21 MMOL/L (ref 20–33)
CREAT SERPL-MCNC: 0.69 MG/DL (ref 0.5–1.4)
EOSINOPHIL # BLD AUTO: 0.05 K/UL (ref 0–0.51)
EOSINOPHIL NFR BLD: 0.7 % (ref 0–6.9)
ERYTHROCYTE [DISTWIDTH] IN BLOOD BY AUTOMATED COUNT: 48.8 FL (ref 35.9–50)
GLUCOSE SERPL-MCNC: 88 MG/DL (ref 65–99)
HCT VFR BLD AUTO: 32.6 % (ref 37–47)
HGB BLD-MCNC: 11.1 G/DL (ref 12–16)
IMM GRANULOCYTES # BLD AUTO: 0.03 K/UL (ref 0–0.11)
IMM GRANULOCYTES NFR BLD AUTO: 0.4 % (ref 0–0.9)
LYMPHOCYTES # BLD AUTO: 1.8 K/UL (ref 1–4.8)
LYMPHOCYTES NFR BLD: 25.7 % (ref 22–41)
MAGNESIUM SERPL-MCNC: 2.3 MG/DL (ref 1.5–2.5)
MCH RBC QN AUTO: 32.3 PG (ref 27–33)
MCHC RBC AUTO-ENTMCNC: 34 G/DL (ref 33.6–35)
MCV RBC AUTO: 94.8 FL (ref 81.4–97.8)
MONOCYTES # BLD AUTO: 0.88 K/UL (ref 0–0.85)
MONOCYTES NFR BLD AUTO: 12.6 % (ref 0–13.4)
NEUTROPHILS # BLD AUTO: 4.19 K/UL (ref 2–7.15)
NEUTROPHILS NFR BLD: 59.9 % (ref 44–72)
NRBC # BLD AUTO: 0 K/UL
NRBC BLD-RTO: 0 /100 WBC
PLATELET # BLD AUTO: 319 K/UL (ref 164–446)
PMV BLD AUTO: 9.6 FL (ref 9–12.9)
POTASSIUM SERPL-SCNC: 3.5 MMOL/L (ref 3.6–5.5)
RBC # BLD AUTO: 3.44 M/UL (ref 4.2–5.4)
SODIUM SERPL-SCNC: 138 MMOL/L (ref 135–145)
WBC # BLD AUTO: 7 K/UL (ref 4.8–10.8)

## 2018-05-30 PROCEDURE — 83735 ASSAY OF MAGNESIUM: CPT

## 2018-05-30 PROCEDURE — 99225 PR SUBSEQUENT OBSERVATION CARE,LEVEL II: CPT | Performed by: HOSPITALIST

## 2018-05-30 PROCEDURE — A9270 NON-COVERED ITEM OR SERVICE: HCPCS | Performed by: INTERNAL MEDICINE

## 2018-05-30 PROCEDURE — 700101 HCHG RX REV CODE 250: Performed by: INTERNAL MEDICINE

## 2018-05-30 PROCEDURE — 700111 HCHG RX REV CODE 636 W/ 250 OVERRIDE (IP): Performed by: HOSPITALIST

## 2018-05-30 PROCEDURE — 96376 TX/PRO/DX INJ SAME DRUG ADON: CPT

## 2018-05-30 PROCEDURE — G0378 HOSPITAL OBSERVATION PER HR: HCPCS

## 2018-05-30 PROCEDURE — 36415 COLL VENOUS BLD VENIPUNCTURE: CPT

## 2018-05-30 PROCEDURE — 85025 COMPLETE CBC W/AUTO DIFF WBC: CPT

## 2018-05-30 PROCEDURE — 700102 HCHG RX REV CODE 250 W/ 637 OVERRIDE(OP): Performed by: INTERNAL MEDICINE

## 2018-05-30 PROCEDURE — 80048 BASIC METABOLIC PNL TOTAL CA: CPT

## 2018-05-30 PROCEDURE — 700111 HCHG RX REV CODE 636 W/ 250 OVERRIDE (IP): Performed by: INTERNAL MEDICINE

## 2018-05-30 RX ORDER — LORAZEPAM 2 MG/ML
0.5 INJECTION INTRAMUSCULAR EVERY 6 HOURS PRN
Status: DISCONTINUED | OUTPATIENT
Start: 2018-05-30 | End: 2018-05-30

## 2018-05-30 RX ORDER — LORAZEPAM 2 MG/ML
1 INJECTION INTRAMUSCULAR EVERY 6 HOURS PRN
Status: DISCONTINUED | OUTPATIENT
Start: 2018-05-30 | End: 2018-05-31

## 2018-05-30 RX ORDER — TRAZODONE HYDROCHLORIDE 50 MG/1
50 TABLET ORAL
Status: COMPLETED | OUTPATIENT
Start: 2018-05-30 | End: 2018-05-30

## 2018-05-30 RX ORDER — LORAZEPAM 1 MG/1
1 TABLET ORAL EVERY 6 HOURS PRN
Status: DISCONTINUED | OUTPATIENT
Start: 2018-05-30 | End: 2018-05-30

## 2018-05-30 RX ORDER — LORAZEPAM 1 MG/1
1 TABLET ORAL EVERY 6 HOURS PRN
Status: DISCONTINUED | OUTPATIENT
Start: 2018-05-30 | End: 2018-05-31

## 2018-05-30 RX ADMIN — POTASSIUM CHLORIDE AND SODIUM CHLORIDE: 900; 150 INJECTION, SOLUTION INTRAVENOUS at 08:16

## 2018-05-30 RX ADMIN — BUSPIRONE HYDROCHLORIDE 15 MG: 5 TABLET ORAL at 17:53

## 2018-05-30 RX ADMIN — ONDANSETRON 4 MG: 2 INJECTION INTRAMUSCULAR; INTRAVENOUS at 12:59

## 2018-05-30 RX ADMIN — LORAZEPAM 1 MG: 2 INJECTION INTRAMUSCULAR; INTRAVENOUS at 18:59

## 2018-05-30 RX ADMIN — ENOXAPARIN SODIUM 40 MG: 100 INJECTION SUBCUTANEOUS at 10:44

## 2018-05-30 RX ADMIN — POTASSIUM CHLORIDE AND SODIUM CHLORIDE: 900; 150 INJECTION, SOLUTION INTRAVENOUS at 17:51

## 2018-05-30 RX ADMIN — LORAZEPAM 0.5 MG: 2 INJECTION INTRAMUSCULAR; INTRAVENOUS at 08:12

## 2018-05-30 RX ADMIN — SERTRALINE 50 MG: 50 TABLET, FILM COATED ORAL at 22:31

## 2018-05-30 RX ADMIN — BUSPIRONE HYDROCHLORIDE 15 MG: 5 TABLET ORAL at 10:43

## 2018-05-30 RX ADMIN — LORAZEPAM 1 MG: 2 INJECTION INTRAMUSCULAR; INTRAVENOUS at 12:59

## 2018-05-30 RX ADMIN — POTASSIUM CHLORIDE AND SODIUM CHLORIDE: 900; 150 INJECTION, SOLUTION INTRAVENOUS at 02:16

## 2018-05-30 RX ADMIN — MAGNESIUM GLUCONATE 500 MG ORAL TABLET 400 MG: 500 TABLET ORAL at 17:53

## 2018-05-30 RX ADMIN — TRAZODONE HYDROCHLORIDE 50 MG: 50 TABLET ORAL at 22:03

## 2018-05-30 ASSESSMENT — ENCOUNTER SYMPTOMS
CONSTIPATION: 0
WHEEZING: 0
DIARRHEA: 0
NERVOUS/ANXIOUS: 1
FEVER: 0
SHORTNESS OF BREATH: 0
VOMITING: 1
CHILLS: 1
COUGH: 0
NAUSEA: 1
HEADACHES: 0

## 2018-05-30 ASSESSMENT — PAIN SCALES - GENERAL
PAINLEVEL_OUTOF10: 4
PAINLEVEL_OUTOF10: 0
PAINLEVEL_OUTOF10: 4
PAINLEVEL_OUTOF10: 4

## 2018-05-30 NOTE — PROGRESS NOTES
[facility] Hospitalist Progress Note    Date of Service: 2018    Chief Complaint  43 y.o. female admitted 2018 with cyclic vomiting    Interval Problem Update  : Still having significant nausea vomiting. Increased Ativan. Continuing to replete potassium. Continuing clear liquids for now.    Disposition  Pending improvement of nausea vomiting        Review of Systems   Constitutional: Positive for chills. Negative for fever.   Respiratory: Negative for cough, shortness of breath and wheezing.    Cardiovascular: Negative for chest pain.   Gastrointestinal: Positive for nausea and vomiting. Negative for constipation and diarrhea.   Genitourinary: Negative for dysuria.   Neurological: Negative for headaches.   Psychiatric/Behavioral: The patient is nervous/anxious.       Physical Exam  Laboratory/Imaging   Hemodynamics  Temp (24hrs), Av.3 °C (99.2 °F), Min:36.9 °C (98.5 °F), Max:38 °C (100.4 °F)   Temperature: 37.1 °C (98.8 °F)  Pulse  Av.7  Min: 62  Max: 91    Blood Pressure: 118/74, NIBP: 105/56      Respiratory      Respiration: 15, Pulse Oximetry: 100 %, O2 Daily Delivery Respiratory : Room Air with O2 Available             Fluids    Intake/Output Summary (Last 24 hours) at 18 1426  Last data filed at 18 0800   Gross per 24 hour   Intake                0 ml   Output              200 ml   Net             -200 ml       Nutrition  Orders Placed This Encounter   Procedures   • Diet Order     Standing Status:   Standing     Number of Occurrences:   1     Order Specific Question:   Diet:     Answer:   Clear Liquids - No Red Foods [12]     Physical Exam   Constitutional: She appears well-developed.   HENT:   Head: Normocephalic.   Cardiovascular: Normal rate.  Exam reveals no gallop.    Pulmonary/Chest: No respiratory distress. She has no wheezes.   Abdominal: She exhibits no distension. There is tenderness. There is no rebound and no guarding.   Neurological:   Sleepy       Recent Labs       05/29/18   0840  05/30/18   0357   WBC  9.1  7.0   RBC  4.26  3.44*   HEMOGLOBIN  13.4  11.1*   HEMATOCRIT  39.1  32.6*   MCV  91.8  94.8   MCH  31.5  32.3   MCHC  34.3  34.0   RDW  46.4  48.8   PLATELETCT  424  319   MPV  9.5  9.6     Recent Labs      05/29/18   1232  05/29/18   2128  05/30/18   0357   SODIUM  137  133*  138   POTASSIUM  2.6*  3.1*  3.5*   CHLORIDE  106  105  110   CO2  20  18*  21   GLUCOSE  94  104*  88   BUN  4*  4*  4*   CREATININE  0.60  0.89  0.69   CALCIUM  8.4*  8.7  8.4*                      Assessment/Plan     * Cyclic vomiting syndrome- (present on admission)   Assessment & Plan    Admit to the telemetry observation unit  2nd to cannabis use and severe anxiety  Associated with one episode of diarrhea, may be complicated with gastroenteritis  f/u UDS  Phenergan rectally  Cont ivf hydration  Antiemetics prn  CLD  Advise cannabis cessation          Hypokalemia- (present on admission)   Assessment & Plan    Repleting as needed  Magnesium repleted        Dehydration- (present on admission)   Assessment & Plan    Cont ivf        Anxiety- (present on admission)   Assessment & Plan    Cont antidepressants/anxiolytics  Limit iv rx use  f/u with psychiatry as outpatient        Substance abuse- (present on admission)   Assessment & Plan    h/o marijuana dependence with chronic use  Advise cessation          Depression- (present on admission)   Assessment & Plan    As above          Quality-Core Measures   Reviewed items::  Labs reviewed and Medications reviewed  Saldana catheter::  No Saldana  DVT prophylaxis pharmacological::  Enoxaparin (Lovenox)

## 2018-05-30 NOTE — PROGRESS NOTES
Pt arrived from ER with RN, on zoll, placed on tele monitor.  Verified with monitor tech. C/O severe anxiety, pain and nausea at this time.  Plan of care and current orders explained to patient and family.

## 2018-05-30 NOTE — PROGRESS NOTES
2 RN skin check done with Mer RN   Skin intact, back reddened pt states from hot water bottle.  No other sign of skin breakdown or wounds

## 2018-05-30 NOTE — PROGRESS NOTES
Assumed care at 0700. Bedside report received from Kumar. Patient's chart and MAR reviewed. Pt sleeping at this time.  White board updated. Call light, phone and personal belongings within reach.

## 2018-05-30 NOTE — CARE PLAN
Problem: Safety  Goal: Will remain free from injury    Intervention: Provide assistance with mobility  Ambulated pt to bathroom with SBA with IV pool. Pt instructed to call for assistance. Oriented to call system.      Problem: Knowledge Deficit  Goal: Knowledge of disease process/condition, treatment plan, diagnostic tests, and medications will improve    Intervention: Explain information regarding disease process/condition, treatment plan, diagnostic tests, and medications and document in education  Discussed POC with pt r/t electrolyte repletion. Pt verbalized understanding.

## 2018-05-30 NOTE — CARE PLAN
Problem: Safety  Goal: Will remain free from falls  Outcome: PROGRESSING AS EXPECTED  Fall precautions in place. Bed in lowest position. Non-skid socks in place. Personal possessions within reach. Mobility sign on door. Bed-alarm on. Call light within reach. Pt educated regarding fall prevention and states understanding.     Problem: Bowel/Gastric:  Goal: Normal bowel function is maintained or improved  Outcome: PROGRESSING SLOWER THAN EXPECTED

## 2018-05-30 NOTE — PROGRESS NOTES
Report received by day shift RN. Pt sitting at bedside with  awake alert and oriented x 4 with c/o back pain. Heat and ice offered. Pt updated to POC and verbalized understanding. Bed locked in low position with fall precautions in place and call light in reach.

## 2018-05-31 LAB
ALBUMIN SERPL BCP-MCNC: 2.9 G/DL (ref 3.2–4.9)
AMPHET UR QL SCN: NEGATIVE
BARBITURATES UR QL SCN: NEGATIVE
BENZODIAZ UR QL SCN: NEGATIVE
BUN SERPL-MCNC: 4 MG/DL (ref 8–22)
BZE UR QL SCN: NEGATIVE
CALCIUM SERPL-MCNC: 8.2 MG/DL (ref 8.5–10.5)
CANNABINOIDS UR QL SCN: POSITIVE
CHLORIDE SERPL-SCNC: 115 MMOL/L (ref 96–112)
CO2 SERPL-SCNC: 20 MMOL/L (ref 20–33)
CREAT SERPL-MCNC: 0.63 MG/DL (ref 0.5–1.4)
ERYTHROCYTE [DISTWIDTH] IN BLOOD BY AUTOMATED COUNT: 51.6 FL (ref 35.9–50)
GLUCOSE SERPL-MCNC: 80 MG/DL (ref 65–99)
HCT VFR BLD AUTO: 30.9 % (ref 37–47)
HGB BLD-MCNC: 10.1 G/DL (ref 12–16)
MCH RBC QN AUTO: 32.1 PG (ref 27–33)
MCHC RBC AUTO-ENTMCNC: 32.7 G/DL (ref 33.6–35)
MCV RBC AUTO: 98.1 FL (ref 81.4–97.8)
METHADONE UR QL SCN: NEGATIVE
OPIATES UR QL SCN: NEGATIVE
OXYCODONE UR QL SCN: NEGATIVE
PCP UR QL SCN: NEGATIVE
PHOSPHATE SERPL-MCNC: 3.1 MG/DL (ref 2.5–4.5)
PLATELET # BLD AUTO: 266 K/UL (ref 164–446)
PMV BLD AUTO: 9.5 FL (ref 9–12.9)
POTASSIUM SERPL-SCNC: 3.9 MMOL/L (ref 3.6–5.5)
PROPOXYPH UR QL SCN: NEGATIVE
RBC # BLD AUTO: 3.15 M/UL (ref 4.2–5.4)
SODIUM SERPL-SCNC: 140 MMOL/L (ref 135–145)
WBC # BLD AUTO: 5.5 K/UL (ref 4.8–10.8)

## 2018-05-31 PROCEDURE — A9270 NON-COVERED ITEM OR SERVICE: HCPCS | Performed by: HOSPITALIST

## 2018-05-31 PROCEDURE — 96376 TX/PRO/DX INJ SAME DRUG ADON: CPT

## 2018-05-31 PROCEDURE — 99225 PR SUBSEQUENT OBSERVATION CARE,LEVEL II: CPT | Performed by: HOSPITALIST

## 2018-05-31 PROCEDURE — 700111 HCHG RX REV CODE 636 W/ 250 OVERRIDE (IP): Performed by: HOSPITALIST

## 2018-05-31 PROCEDURE — 700111 HCHG RX REV CODE 636 W/ 250 OVERRIDE (IP): Performed by: INTERNAL MEDICINE

## 2018-05-31 PROCEDURE — 80307 DRUG TEST PRSMV CHEM ANLYZR: CPT

## 2018-05-31 PROCEDURE — 700102 HCHG RX REV CODE 250 W/ 637 OVERRIDE(OP): Performed by: INTERNAL MEDICINE

## 2018-05-31 PROCEDURE — 36415 COLL VENOUS BLD VENIPUNCTURE: CPT

## 2018-05-31 PROCEDURE — A9270 NON-COVERED ITEM OR SERVICE: HCPCS | Performed by: INTERNAL MEDICINE

## 2018-05-31 PROCEDURE — A9270 NON-COVERED ITEM OR SERVICE: HCPCS | Performed by: PSYCHIATRY & NEUROLOGY

## 2018-05-31 PROCEDURE — 85027 COMPLETE CBC AUTOMATED: CPT

## 2018-05-31 PROCEDURE — 700102 HCHG RX REV CODE 250 W/ 637 OVERRIDE(OP): Performed by: PSYCHIATRY & NEUROLOGY

## 2018-05-31 PROCEDURE — 80069 RENAL FUNCTION PANEL: CPT

## 2018-05-31 PROCEDURE — 700101 HCHG RX REV CODE 250: Performed by: INTERNAL MEDICINE

## 2018-05-31 PROCEDURE — 700102 HCHG RX REV CODE 250 W/ 637 OVERRIDE(OP): Performed by: HOSPITALIST

## 2018-05-31 PROCEDURE — G0378 HOSPITAL OBSERVATION PER HR: HCPCS

## 2018-05-31 RX ORDER — LORAZEPAM 2 MG/ML
1 INJECTION INTRAMUSCULAR ONCE
Status: COMPLETED | OUTPATIENT
Start: 2018-05-31 | End: 2018-05-31

## 2018-05-31 RX ORDER — TRAZODONE HYDROCHLORIDE 50 MG/1
50 TABLET ORAL ONCE
Status: COMPLETED | OUTPATIENT
Start: 2018-05-31 | End: 2018-05-31

## 2018-05-31 RX ORDER — CLONAZEPAM 0.5 MG/1
0.5 TABLET ORAL 2 TIMES DAILY
Status: DISCONTINUED | OUTPATIENT
Start: 2018-05-31 | End: 2018-06-01 | Stop reason: HOSPADM

## 2018-05-31 RX ORDER — CHLORPROMAZINE HYDROCHLORIDE 25 MG/1
25 TABLET, FILM COATED ORAL 3 TIMES DAILY
Status: DISCONTINUED | OUTPATIENT
Start: 2018-05-31 | End: 2018-06-01 | Stop reason: HOSPADM

## 2018-05-31 RX ADMIN — TRAZODONE HYDROCHLORIDE 50 MG: 50 TABLET ORAL at 22:28

## 2018-05-31 RX ADMIN — CLONAZEPAM 0.5 MG: 0.5 TABLET ORAL at 19:40

## 2018-05-31 RX ADMIN — ONDANSETRON 4 MG: 2 INJECTION INTRAMUSCULAR; INTRAVENOUS at 09:26

## 2018-05-31 RX ADMIN — POTASSIUM CHLORIDE AND SODIUM CHLORIDE: 900; 150 INJECTION, SOLUTION INTRAVENOUS at 17:13

## 2018-05-31 RX ADMIN — CLONAZEPAM 0.5 MG: 0.5 TABLET ORAL at 10:04

## 2018-05-31 RX ADMIN — BUSPIRONE HYDROCHLORIDE 15 MG: 5 TABLET ORAL at 05:16

## 2018-05-31 RX ADMIN — PROMETHAZINE HYDROCHLORIDE 25 MG: 25 SUPPOSITORY RECTAL at 10:04

## 2018-05-31 RX ADMIN — POTASSIUM CHLORIDE 40 MEQ: 1500 TABLET, EXTENDED RELEASE ORAL at 05:15

## 2018-05-31 RX ADMIN — MAGNESIUM GLUCONATE 500 MG ORAL TABLET 400 MG: 500 TABLET ORAL at 05:15

## 2018-05-31 RX ADMIN — CHLORPROMAZINE HYDROCHLORIDE 25 MG: 25 TABLET, SUGAR COATED ORAL at 21:05

## 2018-05-31 RX ADMIN — BUSPIRONE HYDROCHLORIDE 15 MG: 5 TABLET ORAL at 17:11

## 2018-05-31 RX ADMIN — STANDARDIZED SENNA CONCENTRATE AND DOCUSATE SODIUM 2 TABLET: 8.6; 5 TABLET, FILM COATED ORAL at 08:10

## 2018-05-31 RX ADMIN — LORAZEPAM 1 MG: 2 INJECTION INTRAMUSCULAR; INTRAVENOUS at 08:07

## 2018-05-31 RX ADMIN — POTASSIUM CHLORIDE AND SODIUM CHLORIDE: 900; 150 INJECTION, SOLUTION INTRAVENOUS at 01:51

## 2018-05-31 RX ADMIN — CHLORPROMAZINE HYDROCHLORIDE 25 MG: 25 TABLET, SUGAR COATED ORAL at 17:10

## 2018-05-31 RX ADMIN — ENOXAPARIN SODIUM 40 MG: 100 INJECTION SUBCUTANEOUS at 08:07

## 2018-05-31 RX ADMIN — LORAZEPAM 1 MG: 2 INJECTION INTRAMUSCULAR; INTRAVENOUS at 13:06

## 2018-05-31 RX ADMIN — SERTRALINE 50 MG: 50 TABLET, FILM COATED ORAL at 23:24

## 2018-05-31 ASSESSMENT — COGNITIVE AND FUNCTIONAL STATUS - GENERAL
MOBILITY SCORE: 23
DAILY ACTIVITIY SCORE: 24
SUGGESTED CMS G CODE MODIFIER MOBILITY: CI
CLIMB 3 TO 5 STEPS WITH RAILING: A LITTLE
SUGGESTED CMS G CODE MODIFIER DAILY ACTIVITY: CH

## 2018-05-31 ASSESSMENT — ENCOUNTER SYMPTOMS
VOMITING: 1
CHILLS: 0
FEVER: 0
SHORTNESS OF BREATH: 0
NAUSEA: 1
COUGH: 0
DEPRESSION: 1
NERVOUS/ANXIOUS: 1
HEADACHES: 0
DIARRHEA: 0
CONSTIPATION: 0

## 2018-05-31 ASSESSMENT — PAIN SCALES - GENERAL
PAINLEVEL_OUTOF10: 0
PAINLEVEL_OUTOF10: 2

## 2018-05-31 NOTE — PSYCHIATRY
"PSYCHIATRIC CONSULTATION:  Reason for admission:   Vomiting   Reason for consult: vomiting, mood lability   Requesting Physician: Antoine    Legal status:  No hold     Chief Complaint:    HPI:     42 y/o woman with a history of cyclic vomiting, also uses cannabis. She was admitted for vomiting again. Vomiting noted to be worse with anxiety. She is very, very labile. Speech is pressured. She is tearful. Has been this way with other staff. She reports she can't stand the symptoms she is having. She states she has a headche and just wants to sleep. She reports she stopped taking marijuana recently to see if it helped make her n/v go away. She states \"it did at first but now it is all back\".       Psychiatric Review of Systems:current symptoms as reported by pt.  Depression:      Depressed   Lashae:labile   Anxiety/Panic Attacks very anxious   PTSD symptom:   Psychosis:No signs or symptoms     Other:       Medical Review of Systems: as reported by pt. All systems reviewed. Only those found to be + are noted below. All others are negative.   Nausea  Irritability  Poor sleep  Fatigue  Abdominal pain   Back pain   All other systems reviewed and are negative.     Psychiatric Examination: observed phenomenon:  Vitals:Hemodynamics  Temp (24hrs), Av.1 °C (98.8 °F), Min:36.8 °C (98.2 °F), Max:37.4 °C (99.4 °F)   Temperature: 37.2 °C (98.9 °F)  Pulse  Av.8  Min: 62  Max: 91    Blood Pressure: 115/74    Musculoskeletal:psychomotor retardation   Appearance:grooming fair   Thoughts:circumstantial at times, evasive at times   Speech: irritable tone.   Mood:          Irritable   Affect:         constricted  SI/HI:   Denies   Attention/Alertness:   Attention poor   Memory:    Grossly intact.   Orientation:    Grooming wnl   Fund of Knowledge:    Grossly intact.   Insight/Judgement into symptoms: decreased   Neurological Testing:          Past Psychiatric Hx:   The patient reports starting drinking at age 18 and was a frequent " "\"blackout drunk from the beginning\".  Nine years ago, the patient woke up in the [employer] drunk tank and decided it was time to make major changes.  She has since been sober and attends AA meetings regularly.  She however does not have a sponsor.  She reports also smoking marijuana at age 18 and since her surgery in 2009 the patient has smoked marijuana twice daily; once in the morning to stimulate her appetite and once in the evening to help her sleep.  She also reports that if she is particularly anxious she will smoke again to help with her anxiety.  She reports experimenting with LSD, mushrooms, cocaine, and meth in her 20's.  Family Psychiatric Hx:  The patient's biological father and mother were both alcoholics. Her mother drank with the patient in utero for the the first three months of the gestation.  The patient believes that her biological father had at least one psychiatric hospitalization, but knows little about him.  Social Hx:   The patient has not worked since her surgery in 2009.  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  She states that she has lost a major part of her identity after being fired and feels guilty that she has not been healthy enough to return to work.  She is able to perform basic ADL's but her fiancee does the cooking, bill paying, and major cleaning around the house.    Drug/Alcohol/Tobacco Hx:   Drugs:   Alcohol:   Tobacco:    Medical Hx: labs, MARS, medications, etc were reviewed. Only those findings of potential interest to psychiatry are noted below:  -Shruti-en-Y duodenojejunostomy, cholecystectomy, appendectomy, and right colonic resection 2009  -Celiac Disease (anti-TTG and anti-gliadin antibody positive)  -Cyclic Vomiting  -Non-convulsive status epilepticus, metabolic encephalopathy, and right hemispheric seizures  -Chronic nephrolithiasis  -Lower GI hemorrhage leading to ICU admission and " hypovolemic shock  Past Medical History:   Diagnosis Date   • Anxiety     Followed by [facility]   • Anxiety disorder    • Backpain    • CLOSTRIDIUM DIFFICILE INFECTION 4/14/2010   • CVS disease    • Cyclic vomiting syndrome    • Dental disorder    • Drug-seeking behavior    • Dyspepsia 7/12/2009   • Nephrolithiasis 6/20/2009    kidney stones,post lithotrypsy   • Pain     abd and back   • Snoring    • Superior mesenteric artery syndrome (HCC) 7/12/2009   • Superior mesenteric artery syndrome (HCC)    • Tobacco abuse 6/20/2009       Allergies: Metoclopramide; Bactrim [sulfamethoxazole w-trimethoprim]; and Seroquel [quetiapine]    Medications:  Current Facility-Administered Medications   Medication Dose Route Frequency Provider Last Rate Last Dose   • clonazePAM (KLONOPIN) tablet 0.5 mg  0.5 mg Oral BID Yohan Schultz M.D.   0.5 mg at 06/01/18 0616   • chlorproMAZINE (THORAZINE) tablet 25 mg  25 mg Oral TID Merary Abbasi M.D.   25 mg at 06/01/18 1250   • potassium chloride SA (Kdur) tablet 40 mEq  40 mEq Oral DAILY CHARLES Wan.OJaxson   40 mEq at 06/01/18 0616   • magnesium oxide (MAG-OX) tablet 400 mg  400 mg Oral DAILY CHARLES Wan.O.   400 mg at 06/01/18 0616   • busPIRone (BUSPAR) tablet 15 mg  15 mg Oral BID CHARLES Wan.O.   15 mg at 06/01/18 0616   • diphenhydrAMINE (BENADRYL) tablet/capsule 50 mg  50 mg Oral Q6HRS PRN CHARLES Wan.O.       • ondansetron (ZOFRAN ODT) dispertab 4 mg  4 mg Oral Q4HRS PRN Nadja Estrella D.O.       • promethazine (PHENERGAN) tablet 25 mg  25 mg Oral Q6HRS PRN CHARLES Wan.O.       • senna-docusate (PERICOLACE or SENOKOT S) 8.6-50 MG per tablet 2 Tab  2 Tab Oral BID CHARLES Wan.O.   2 Tab at 06/01/18 0617    And   • polyethylene glycol/lytes (MIRALAX) PACKET 1 Packet  1 Packet Oral QDAY PRN Nadja Estrella D.O.        And   • magnesium hydroxide (MILK OF MAGNESIA) suspension 30 mL  30 mL Oral QDAY PRN EMILEE WanO.        And   • bisacodyl (DULCOLAX)  suppository 10 mg  10 mg Rectal QDAY PRN CHARLES Wan.O.       • Respiratory Care per Protocol   Nebulization Continuous RT Nadja Estrella D.O.       • 0.9 % NaCl with KCl 20 mEq infusion   Intravenous Continuous CHARLES Wan.O.   Stopped at 06/01/18 1402   • enoxaparin (LOVENOX) inj 40 mg  40 mg Subcutaneous DAILY CHARLES Wan.O.   40 mg at 06/01/18 0616   • ondansetron (ZOFRAN) syringe/vial injection 4 mg  4 mg Intravenous Q4HRS PRN CHARLES Wan.O.   4 mg at 06/01/18 0622   • promethazine (PHENERGAN) suppository 12.5-25 mg  12.5-25 mg Rectal Q4HRS PRN CHARLES Wan.O.   25 mg at 05/31/18 1004   • acetaminophen (TYLENOL) tablet 650 mg  650 mg Oral Q6HRS PRN CHARLES Wan.O.   650 mg at 05/29/18 1741       Labs:  Recent Results (from the past 48 hour(s))   URINE DRUG SCREEN    Collection Time: 05/31/18 12:45 AM   Result Value Ref Range    Amphetamines Urine Negative Negative    Barbiturates Negative Negative    Benzodiazepines Negative Negative    Cocaine Metabolite Negative Negative    Methadone Negative Negative    Opiates Negative Negative    Oxycodone Negative Negative    Phencyclidine -Pcp Negative Negative    Propoxyphene Negative Negative    Cannabinoid Metab Positive (A) Negative   RENAL FUNCTION PANEL    Collection Time: 05/31/18  5:17 AM   Result Value Ref Range    Sodium 140 135 - 145 mmol/L    Potassium 3.9 3.6 - 5.5 mmol/L    Chloride 115 (H) 96 - 112 mmol/L    Co2 20 20 - 33 mmol/L    Glucose 80 65 - 99 mg/dL    Creatinine 0.63 0.50 - 1.40 mg/dL    Bun 4 (L) 8 - 22 mg/dL    Calcium 8.2 (L) 8.5 - 10.5 mg/dL    Phosphorus 3.1 2.5 - 4.5 mg/dL    Albumin 2.9 (L) 3.2 - 4.9 g/dL   CBC WITHOUT DIFFERENTIAL    Collection Time: 05/31/18  5:17 AM   Result Value Ref Range    WBC 5.5 4.8 - 10.8 K/uL    RBC 3.15 (L) 4.20 - 5.40 M/uL    Hemoglobin 10.1 (L) 12.0 - 16.0 g/dL    Hematocrit 30.9 (L) 37.0 - 47.0 %    MCV 98.1 (H) 81.4 - 97.8 fL    MCH 32.1 27.0 - 33.0 pg    MCHC 32.7 (L) 33.6 - 35.0 g/dL     RDW 51.6 (H) 35.9 - 50.0 fL    Platelet Count 266 164 - 446 K/uL    MPV 9.5 9.0 - 12.9 fL   ESTIMATED GFR    Collection Time: 05/31/18  5:17 AM   Result Value Ref Range    GFR If African American >60 >60 mL/min/1.73 m 2    GFR If Non African American >60 >60 mL/min/1.73 m 2   RENAL FUNCTION PANEL    Collection Time: 06/01/18  3:59 AM   Result Value Ref Range    Sodium 139 135 - 145 mmol/L    Potassium 4.0 3.6 - 5.5 mmol/L    Chloride 110 96 - 112 mmol/L    Co2 22 20 - 33 mmol/L    Glucose 83 65 - 99 mg/dL    Creatinine 0.70 0.50 - 1.40 mg/dL    Bun 5 (L) 8 - 22 mg/dL    Calcium 8.7 8.5 - 10.5 mg/dL    Phosphorus 4.0 2.5 - 4.5 mg/dL    Albumin 3.2 3.2 - 4.9 g/dL   CBC WITHOUT DIFFERENTIAL    Collection Time: 06/01/18  3:59 AM   Result Value Ref Range    WBC 5.5 4.8 - 10.8 K/uL    RBC 3.34 (L) 4.20 - 5.40 M/uL    Hemoglobin 10.7 (L) 12.0 - 16.0 g/dL    Hematocrit 32.6 (L) 37.0 - 47.0 %    MCV 97.6 81.4 - 97.8 fL    MCH 32.0 27.0 - 33.0 pg    MCHC 32.8 (L) 33.6 - 35.0 g/dL    RDW 50.4 (H) 35.9 - 50.0 fL    Platelet Count 250 164 - 446 K/uL    MPV 10.2 9.0 - 12.9 fL   ESTIMATED GFR    Collection Time: 06/01/18  3:59 AM   Result Value Ref Range    GFR If African American >60 >60 mL/min/1.73 m 2    GFR If Non African American >60 >60 mL/min/1.73 m 2       No orders to display       ASSESSMENT:   Mood disorder unspecified. r/o bipolar disorder.   Cluster B traits  Marijuana use disorder  Anxiety disorder unspecified      PLAN:    Failed mirtazapine in the past.   D/c zoloft .   Starting thorazine for mood stabilization/ nausea     She has had behaviors in the past for secondary gain, martina related to benzos, please limit prn benzos.      If she becomes unmanageable due to extreme emotional imbalance, inability to care for self, team may start legal hold

## 2018-05-31 NOTE — PROGRESS NOTES
[facility] Hospitalist Progress Note    Date of Service: 2018    Chief Complaint  43 y.o. female admitted 2018 with cyclic vomiting    Interval Problem Update  : Still having significant nausea vomiting. Increased Ativan. Continuing to replete potassium. Continuing clear liquids for now.  : Anxious in the morning. Psych consulted. Medications now changed to per psych and avoiding IV benzodiazepines.    Disposition  Pending improvement of nausea vomiting and anxiety        Review of Systems   Constitutional: Negative for chills and fever.   Respiratory: Negative for cough and shortness of breath.    Cardiovascular: Negative for chest pain.   Gastrointestinal: Positive for nausea and vomiting. Negative for constipation and diarrhea.   Genitourinary: Negative for dysuria.   Neurological: Negative for headaches.   Psychiatric/Behavioral: Positive for depression. The patient is nervous/anxious.       Physical Exam  Laboratory/Imaging   Hemodynamics  Temp (24hrs), Av.1 °C (98.8 °F), Min:36.8 °C (98.2 °F), Max:37.4 °C (99.4 °F)   Temperature: 37.2 °C (98.9 °F)  Pulse  Av.8  Min: 62  Max: 91    Blood Pressure: 115/74      Respiratory      Respiration: 14, Pulse Oximetry: 100 %             Fluids    Intake/Output Summary (Last 24 hours) at 18 1628  Last data filed at 18 0800   Gross per 24 hour   Intake              480 ml   Output              300 ml   Net              180 ml       Nutrition  Orders Placed This Encounter   Procedures   • DIET ORDER     Standing Status:   Standing     Number of Occurrences:   1     Order Specific Question:   Diet:     Answer:   Regular [1]     Physical Exam   Constitutional: She appears well-developed.   HENT:   Head: Normocephalic.   Eyes: Conjunctivae are normal.   Cardiovascular: Normal rate.  Exam reveals no gallop.    Pulmonary/Chest: No respiratory distress. She has no wheezes. She has no rales.   Abdominal: She exhibits no distension. There is  tenderness. There is no rebound and no guarding.   Skin: Skin is warm.   Psychiatric: Her mood appears anxious.       Recent Labs      05/29/18   0840  05/30/18   0357  05/31/18   0517   WBC  9.1  7.0  5.5   RBC  4.26  3.44*  3.15*   HEMOGLOBIN  13.4  11.1*  10.1*   HEMATOCRIT  39.1  32.6*  30.9*   MCV  91.8  94.8  98.1*   MCH  31.5  32.3  32.1   MCHC  34.3  34.0  32.7*   RDW  46.4  48.8  51.6*   PLATELETCT  424  319  266   MPV  9.5  9.6  9.5     Recent Labs      05/29/18 2128  05/30/18 0357  05/31/18   0517   SODIUM  133*  138  140   POTASSIUM  3.1*  3.5*  3.9   CHLORIDE  105  110  115*   CO2  18*  21  20   GLUCOSE  104*  88  80   BUN  4*  4*  4*   CREATININE  0.89  0.69  0.63   CALCIUM  8.7  8.4*  8.2*                      Assessment/Plan     * Cyclic vomiting syndrome- (present on admission)   Assessment & Plan    Admit to the telemetry observation unit  2nd to cannabis use and severe anxiety  Associated with one episode of diarrhea, may be complicated with gastroenteritis  f/u UDS  Phenergan rectally  Cont ivf hydration  Antiemetics prn  CLD  Advise cannabis cessation          Hypokalemia- (present on admission)   Assessment & Plan    Repleting as needed  Magnesium repleted        Dehydration- (present on admission)   Assessment & Plan    Cont ivf        Drug-seeking behavior- (present on admission)   Assessment & Plan    - History of seeking benzodiazepines  - Will limit further usage per psych        Anxiety- (present on admission)   Assessment & Plan    Cont antidepressants/anxiolytics  Limit iv rx use  f/u with psychiatry as outpatient  Psychiatrist Dr. Munoz consulted        Substance abuse- (present on admission)   Assessment & Plan    h/o marijuana dependence with chronic use  Advise cessation          Depression- (present on admission)   Assessment & Plan    As above          Quality-Core Measures   Reviewed items::  Labs reviewed and Medications reviewed  Saldana catheter::  No Saldana  DVT  prophylaxis pharmacological::  Enoxaparin (Lovenox)

## 2018-05-31 NOTE — PROGRESS NOTES
Assumed pt care. Pt is sitting up in bed. Pt is A&Ox4. Plan of care discussed with pt and verbalizes understanding. Fall precautions in place, call light within reach, non skid socks on.

## 2018-05-31 NOTE — PSYCHIATRY
BRIEF PSYCHIATRIC CONSULT NOTE: patient seen, full note to follow.  -Legal Hold:not indicated  -Sitter:no  -Restrictions:  No restrictions  -Orders Placed: routine  -Plan:continue to follow     Starting thorazine for mood stabilization/ nausea    She has had behaviors in the past for secondary gain, martina related to benzos, please limit prn benzos.     If she becomes unmanageable due to extreme emotional imbalance, inability to care for self, team may start legal hold

## 2018-05-31 NOTE — CARE PLAN
Problem: Safety  Goal: Will remain free from injury  Outcome: PROGRESSING AS EXPECTED  Educated pt to use call light for assistance. Pt verbalizes understanding.     Problem: Knowledge Deficit  Goal: Knowledge of disease process/condition, treatment plan, diagnostic tests, and medications will improve  Outcome: PROGRESSING AS EXPECTED  Discussed POC for tonight. Explained reason for fluids receiving. All concerns addressed.

## 2018-06-01 VITALS
HEIGHT: 65 IN | HEART RATE: 63 BPM | TEMPERATURE: 98.5 F | SYSTOLIC BLOOD PRESSURE: 99 MMHG | BODY MASS INDEX: 22.08 KG/M2 | DIASTOLIC BLOOD PRESSURE: 68 MMHG | OXYGEN SATURATION: 97 % | WEIGHT: 132.5 LBS | RESPIRATION RATE: 20 BRPM

## 2018-06-01 LAB
ALBUMIN SERPL BCP-MCNC: 3.2 G/DL (ref 3.2–4.9)
BUN SERPL-MCNC: 5 MG/DL (ref 8–22)
CALCIUM SERPL-MCNC: 8.7 MG/DL (ref 8.5–10.5)
CHLORIDE SERPL-SCNC: 110 MMOL/L (ref 96–112)
CO2 SERPL-SCNC: 22 MMOL/L (ref 20–33)
CREAT SERPL-MCNC: 0.7 MG/DL (ref 0.5–1.4)
ERYTHROCYTE [DISTWIDTH] IN BLOOD BY AUTOMATED COUNT: 50.4 FL (ref 35.9–50)
GLUCOSE SERPL-MCNC: 83 MG/DL (ref 65–99)
HCT VFR BLD AUTO: 32.6 % (ref 37–47)
HGB BLD-MCNC: 10.7 G/DL (ref 12–16)
MCH RBC QN AUTO: 32 PG (ref 27–33)
MCHC RBC AUTO-ENTMCNC: 32.8 G/DL (ref 33.6–35)
MCV RBC AUTO: 97.6 FL (ref 81.4–97.8)
PHOSPHATE SERPL-MCNC: 4 MG/DL (ref 2.5–4.5)
PLATELET # BLD AUTO: 250 K/UL (ref 164–446)
PMV BLD AUTO: 10.2 FL (ref 9–12.9)
POTASSIUM SERPL-SCNC: 4 MMOL/L (ref 3.6–5.5)
RBC # BLD AUTO: 3.34 M/UL (ref 4.2–5.4)
SODIUM SERPL-SCNC: 139 MMOL/L (ref 135–145)
WBC # BLD AUTO: 5.5 K/UL (ref 4.8–10.8)

## 2018-06-01 PROCEDURE — 36415 COLL VENOUS BLD VENIPUNCTURE: CPT

## 2018-06-01 PROCEDURE — A9270 NON-COVERED ITEM OR SERVICE: HCPCS | Performed by: HOSPITALIST

## 2018-06-01 PROCEDURE — 96376 TX/PRO/DX INJ SAME DRUG ADON: CPT

## 2018-06-01 PROCEDURE — 85027 COMPLETE CBC AUTOMATED: CPT

## 2018-06-01 PROCEDURE — 700102 HCHG RX REV CODE 250 W/ 637 OVERRIDE(OP): Performed by: PSYCHIATRY & NEUROLOGY

## 2018-06-01 PROCEDURE — 700102 HCHG RX REV CODE 250 W/ 637 OVERRIDE(OP): Performed by: HOSPITALIST

## 2018-06-01 PROCEDURE — G0378 HOSPITAL OBSERVATION PER HR: HCPCS

## 2018-06-01 PROCEDURE — A9270 NON-COVERED ITEM OR SERVICE: HCPCS | Performed by: INTERNAL MEDICINE

## 2018-06-01 PROCEDURE — 80069 RENAL FUNCTION PANEL: CPT

## 2018-06-01 PROCEDURE — 700102 HCHG RX REV CODE 250 W/ 637 OVERRIDE(OP): Performed by: INTERNAL MEDICINE

## 2018-06-01 PROCEDURE — A9270 NON-COVERED ITEM OR SERVICE: HCPCS | Performed by: PSYCHIATRY & NEUROLOGY

## 2018-06-01 PROCEDURE — 700111 HCHG RX REV CODE 636 W/ 250 OVERRIDE (IP): Performed by: INTERNAL MEDICINE

## 2018-06-01 PROCEDURE — 700101 HCHG RX REV CODE 250: Performed by: INTERNAL MEDICINE

## 2018-06-01 PROCEDURE — 99217 PR OBSERVATION CARE DISCHARGE: CPT | Performed by: HOSPITALIST

## 2018-06-01 RX ORDER — CHLORPROMAZINE HYDROCHLORIDE 25 MG/1
25 TABLET, FILM COATED ORAL 3 TIMES DAILY
Qty: 90 TAB | Refills: 1 | Status: SHIPPED | OUTPATIENT
Start: 2018-06-01 | End: 2018-06-01

## 2018-06-01 RX ORDER — CLONAZEPAM 0.5 MG/1
0.5 TABLET ORAL 2 TIMES DAILY
Qty: 60 TAB | Refills: 0 | Status: SHIPPED | OUTPATIENT
Start: 2018-06-01 | End: 2018-07-01

## 2018-06-01 RX ORDER — CHLORPROMAZINE HYDROCHLORIDE 25 MG/1
25 TABLET, FILM COATED ORAL 3 TIMES DAILY
Qty: 90 TAB | Refills: 1 | Status: ON HOLD | OUTPATIENT
Start: 2018-06-01 | End: 2019-03-31

## 2018-06-01 RX ADMIN — STANDARDIZED SENNA CONCENTRATE AND DOCUSATE SODIUM 2 TABLET: 8.6; 5 TABLET, FILM COATED ORAL at 06:17

## 2018-06-01 RX ADMIN — CLONAZEPAM 0.5 MG: 0.5 TABLET ORAL at 06:16

## 2018-06-01 RX ADMIN — MAGNESIUM GLUCONATE 500 MG ORAL TABLET 400 MG: 500 TABLET ORAL at 06:16

## 2018-06-01 RX ADMIN — ENOXAPARIN SODIUM 40 MG: 100 INJECTION SUBCUTANEOUS at 06:16

## 2018-06-01 RX ADMIN — CHLORPROMAZINE HYDROCHLORIDE 25 MG: 25 TABLET, SUGAR COATED ORAL at 12:50

## 2018-06-01 RX ADMIN — ONDANSETRON 4 MG: 2 INJECTION INTRAMUSCULAR; INTRAVENOUS at 06:22

## 2018-06-01 RX ADMIN — BUSPIRONE HYDROCHLORIDE 15 MG: 5 TABLET ORAL at 06:16

## 2018-06-01 RX ADMIN — POTASSIUM CHLORIDE 40 MEQ: 1500 TABLET, EXTENDED RELEASE ORAL at 06:16

## 2018-06-01 RX ADMIN — CHLORPROMAZINE HYDROCHLORIDE 25 MG: 25 TABLET, SUGAR COATED ORAL at 06:16

## 2018-06-01 RX ADMIN — POTASSIUM CHLORIDE AND SODIUM CHLORIDE: 900; 150 INJECTION, SOLUTION INTRAVENOUS at 12:51

## 2018-06-01 NOTE — DISCHARGE INSTRUCTIONS
Discharge Instructions    Discharged to home by car with friend. Discharged via walking, hospital escort: Yes.  Special equipment needed: Not Applicable    Be sure to schedule a follow-up appointment with your primary care doctor or any specialists as instructed.     Discharge Plan:   Diet Plan: Discussed  Confirmed Follow up Appointment: Appointment Scheduled  Confirmed Symptoms Management: Discussed  Medication Reconciliation Updated: Yes  Influenza Vaccine Indication: Patient Refuses    I understand that a diet low in cholesterol, fat, and sodium is recommended for good health. Unless I have been given specific instructions below for another diet, I accept this instruction as my diet prescription.   Other diet:       Special Instructions: None    · Is patient discharged on Warfarin / Coumadin?   No       Panic Attacks  Panic attacks are sudden, short feelings of great fear or discomfort. You may have them for no reason when you are relaxed, when you are uneasy (anxious), or when you are sleeping.  Follow these instructions at home:  · Take all your medicines as told.  · Check with your doctor before starting new medicines.  · Keep all doctor visits.  Contact a doctor if:  · You are not able to take your medicines as told.  · Your symptoms do not get better.  · Your symptoms get worse.  Get help right away if:  · Your attacks seem different than your normal attacks.  · You have thoughts about hurting yourself or others.  · You take panic attack medicine and you have a side effect.  This information is not intended to replace advice given to you by your health care provider. Make sure you discuss any questions you have with your health care provider.  Document Released: 01/20/2012 Document Revised: 05/25/2017 Document Reviewed: 08/01/2014  [employer] Interactive Patient Education © 2017 [employer]      Depression / Suicide Risk    As you are discharged from this [facility], it is important to learn how  to keep safe from harming yourself.    Recognize the warning signs:  · Abrupt changes in personality, positive or negative- including increase in energy   · Giving away possessions  · Change in eating patterns- significant weight changes-  positive or negative  · Change in sleeping patterns- unable to sleep or sleeping all the time   · Unwillingness or inability to communicate  · Depression  · Unusual sadness, discouragement and loneliness  · Talk of wanting to die  · Neglect of personal appearance   · Rebelliousness- reckless behavior  · Withdrawal from people/activities they love  · Confusion- inability to concentrate     If you or a loved one observes any of these behaviors or has concerns about self-harm, here's what you can do:  · Talk about it- your feelings and reasons for harming yourself  · Remove any means that you might use to hurt yourself (examples: pills, rope, extension cords, firearm)  · Get professional help from the community (Mental Health, Substance Abuse, psychological counseling)  · Do not be alone:Call your Safe Contact- someone whom you trust who will be there for you.  · Call your local CRISIS HOTLINE 319-8341 or 194-834-8318  · Call your local Children's Mobile Crisis Response Team [place] (837) 050-4584 or www.MediCard  · Call the toll free National Suicide Prevention Hotlines   · National Suicide Prevention Lifeline 169-843-XLCJ (0532)  · National Hope Line Network 800-SUICIDE (707-6226)

## 2018-06-01 NOTE — PROGRESS NOTES
Pt dc'd at 1630. IV and monitor removed; monitor room notified. Pt left unit via walking with relative. Personal belongings with pt when leaving unit. Pt given discharge instructions prior to leaving unit including prescription and when to visit with physician; verbalizes understanding. Copy of discharge instructions with pt and in the chart.

## 2018-06-01 NOTE — PROGRESS NOTES
Assumed care at 0700. Bedside report received from [facility]. Patient's chart and MAR reviewed. Pt sleeping at this time. White board updated. Call light, phone and personal belongings within reach.

## 2018-06-02 NOTE — DISCHARGE SUMMARY
Hospital Medicine Discharge Note     Admit Date:  5/29/2018       Discharge Date:   6/1/2018    Attending Physician:  Yohan Schultz     Diagnoses (includes active and resolved):   Principal Problem:    Cyclic vomiting syndrome (Chronic) POA: Yes  Active Problems:    Drug-seeking behavior POA: Yes    Dehydration POA: Yes    Hypokalemia POA: Yes    Substance abuse POA: Yes    Anxiety (Chronic) POA: Yes    Depression POA: Yes  Resolved Problems:    * No resolved hospital problems. *      Hospital Summary (Brief Narrative):       43 y.o. female who presented 5/29/2018 with h/o cyclical n/v 2nd to cannabis dependence, anxiety and depression.  Admitted for hypokalemia 2/2 n/v with one episode of diarrhea.  Reports last cannabis use x 3 days ago.  Reports ongoing severe anxiety in which she has recently been started on zoloft x 1 week.  She was admitted for workup and treatment. She was treated with antiemetics. She was counseled on quitting and water. She has significant anxiety and was seen by psych. She was changed from IV benzodiazepines over to oral Thorazine. She improved with this. Sertraline was initially considered to be held but patient had just started this two weeks ago and wants to see how well this does so she was continued on this. She will follow up with psychiatry outpatient. The pharmacy that she went to ran out of Thorazine so it was represcribed to a different pharmacy.      Consultants:      None    Procedures:        None    Discharge Medications:           Medication List      START taking these medications      Instructions   chlorproMAZINE 25 MG Tabs  Commonly known as:  THORAZINE   Take 1 Tab by mouth 3 times a day.  Dose:  25 mg     clonazePAM 0.5 MG Tabs  Commonly known as:  KLONOPIN   Take 1 Tab by mouth 2 Times a Day for 30 days.  Dose:  0.5 mg        CONTINUE taking these medications      Instructions   busPIRone 15 MG tablet  Commonly known as:  BUSPAR   Take 1 Tab by mouth 2 times a  day.  Dose:  15 mg     diphenhydrAMINE 25 MG Tabs  Commonly known as:  BENADRYL   Take 50 mg by mouth every 6 hours as needed for Sleep.  Dose:  50 mg     ondansetron 4 MG Tbdp  Commonly known as:  ZOFRAN ODT   Take 4 mg by mouth every four hours as needed for Nausea.  Dose:  4 mg     promethazine 25 MG Tabs  Commonly known as:  PHENERGAN   Take 1 Tab by mouth every 6 hours as needed for Nausea/Vomiting.  Dose:  25 mg     sertraline 50 MG Tabs  Commonly known as:  ZOLOFT   Take 50 mg by mouth every evening.  Dose:  50 mg          Disposition:   Discharge home    Diet:   Regular    Activity:   As tolerated    Code status:   Full code    Primary Care Provider:    Isa Rodriguez M.D.    Follow up appointment details :      Psychiatry outpatient  No follow-up provider specified.  Future Appointments  Date Time Provider Department Center   6/4/2018 1:00 PM CARE MANAGER [facility]   6/6/2018 2:20 PM ALMA Thomas [facility]   6/14/2018 2:15 PM Jasper Alicea M.D. [facility] None       Pending Studies:        None    Time spent on discharge day patient visit: 47 minutes    #################################################    Interval history/exam for day of discharge:    Vitals:    06/01/18 0000 06/01/18 0500 06/01/18 0600 06/01/18 1149   BP: 104/71 (!) 83/49 (!) 98/71 (!) 99/68   Pulse: 87 70  63   Resp: 20 20  20   Temp: 36.4 °C (97.5 °F) 36.3 °C (97.4 °F)  36.9 °C (98.5 °F)   SpO2: 98% 91%  97%   Weight:       Height:         Weight/BMI: Body mass index is 22.05 kg/m².  Pulse Oximetry: 97 %, O2 (LPM): 0, O2 Delivery: None (Room Air)    General: Anxious, ambulatory  CVS:  RRR  PULM:  CTAB, no respiratory distress    Most Recent Labs:    Lab Results   Component Value Date/Time    WBC 5.5 06/01/2018 03:59 AM    RBC 3.34 (L) 06/01/2018 03:59 AM    HEMOGLOBIN 10.7 (L) 06/01/2018 03:59 AM    HEMATOCRIT 32.6 (L) 06/01/2018 03:59 AM    MCV 97.6 06/01/2018 03:59 AM    MCH 32.0 06/01/2018 03:59 AM    MCHC 32.8 (L)  06/01/2018 03:59 AM    MPV 10.2 06/01/2018 03:59 AM    NEUTSPOLYS 59.90 05/30/2018 03:57 AM    LYMPHOCYTES 25.70 05/30/2018 03:57 AM    MONOCYTES 12.60 05/30/2018 03:57 AM    EOSINOPHILS 0.70 05/30/2018 03:57 AM    BASOPHILS 0.70 05/30/2018 03:57 AM    HYPOCHROMIA 1+ 04/27/2016 12:49 PM    ANISOCYTOSIS 1+ 09/13/2016 12:29 AM      Lab Results   Component Value Date/Time    SODIUM 139 06/01/2018 03:59 AM    POTASSIUM 4.0 06/01/2018 03:59 AM    CHLORIDE 110 06/01/2018 03:59 AM    CO2 22 06/01/2018 03:59 AM    GLUCOSE 83 06/01/2018 03:59 AM    BUN 5 (L) 06/01/2018 03:59 AM    CREATININE 0.70 06/01/2018 03:59 AM    CREATININE 0.9 05/18/2009 08:53 AM      Lab Results   Component Value Date/Time    ALTSGPT 21 05/29/2018 08:40 AM    ASTSGOT 16 05/29/2018 08:40 AM    ALKPHOSPHAT 77 05/29/2018 08:40 AM    TBILIRUBIN 1.8 (H) 05/29/2018 08:40 AM    LIPASE 14 05/29/2018 08:40 AM    ALBUMIN 3.2 06/01/2018 03:59 AM    GLOBULIN 3.7 (H) 05/29/2018 08:40 AM    PREALBUMIN 23.0 05/09/2016 12:00 AM    INR 0.98 04/24/2017 09:39 AM    MACROCYTOSIS 1+ 09/13/2016 12:29 AM     Lab Results   Component Value Date/Time    PROTHROMBTM 13.3 04/24/2017 09:39 AM    INR 0.98 04/24/2017 09:39 AM        Imaging/ Testing:      No orders to display       Instructions:      The patient was instructed to return to the ER in the event of worsening symptoms. I have counseled the patient on the importance of compliance and the patient has agreed to proceed with all medical recommendations and follow up plan indicated above.   The patient understands that all medications come with benefits and risks. Risks may include permanent injury or death and these risks can be minimized with close reassessment and monitoring.

## 2018-06-04 ENCOUNTER — PATIENT OUTREACH (OUTPATIENT)
Dept: HEALTH INFORMATION MANAGEMENT | Facility: OTHER | Age: 43
End: 2018-06-04

## 2018-06-11 NOTE — ADDENDUM NOTE
Encounter addended by: Merary Abbasi M.D. on: 6/10/2018  6:18 PM<BR>    Actions taken: Sign clinical note

## 2018-07-12 ENCOUNTER — APPOINTMENT (OUTPATIENT)
Dept: RADIOLOGY | Facility: MEDICAL CENTER | Age: 43
End: 2018-07-12
Attending: EMERGENCY MEDICINE
Payer: COMMERCIAL

## 2018-07-12 ENCOUNTER — HOSPITAL ENCOUNTER (OUTPATIENT)
Facility: MEDICAL CENTER | Age: 43
End: 2018-07-13
Attending: EMERGENCY MEDICINE | Admitting: FAMILY MEDICINE
Payer: COMMERCIAL

## 2018-07-12 DIAGNOSIS — G43.A0 CYCLIC VOMITING SYNDROME, INTRACTABILITY OF VOMITING NOT SPECIFIED, PRESENCE OF NAUSEA NOT SPECIFIED: Chronic | ICD-10-CM

## 2018-07-12 DIAGNOSIS — R94.31 EKG ABNORMALITIES: ICD-10-CM

## 2018-07-12 DIAGNOSIS — E87.6 HYPOKALEMIA: ICD-10-CM

## 2018-07-12 DIAGNOSIS — E86.0 DEHYDRATION: ICD-10-CM

## 2018-07-12 PROBLEM — E83.42 HYPOMAGNESEMIA: Status: ACTIVE | Noted: 2018-07-12

## 2018-07-12 PROBLEM — R11.2 INTRACTABLE NAUSEA AND VOMITING: Status: ACTIVE | Noted: 2018-07-12

## 2018-07-12 LAB
ALBUMIN SERPL BCP-MCNC: 4.5 G/DL (ref 3.2–4.9)
ALBUMIN/GLOB SERPL: 1.3 G/DL
ALP SERPL-CCNC: 105 U/L (ref 30–99)
ALT SERPL-CCNC: 7 U/L (ref 2–50)
ANION GAP SERPL CALC-SCNC: 18 MMOL/L (ref 0–11.9)
ANION GAP SERPL CALC-SCNC: 9 MMOL/L (ref 0–11.9)
APPEARANCE UR: CLEAR
AST SERPL-CCNC: 12 U/L (ref 12–45)
B-OH-BUTYR SERPL-MCNC: 0.25 MMOL/L (ref 0.02–0.27)
BASOPHILS # BLD AUTO: 0.7 % (ref 0–1.8)
BASOPHILS # BLD: 0.09 K/UL (ref 0–0.12)
BILIRUB SERPL-MCNC: 1.2 MG/DL (ref 0.1–1.5)
BILIRUB UR QL STRIP.AUTO: NEGATIVE
BUN SERPL-MCNC: 11 MG/DL (ref 8–22)
BUN SERPL-MCNC: 8 MG/DL (ref 8–22)
CALCIUM SERPL-MCNC: 10.4 MG/DL (ref 8.5–10.5)
CALCIUM SERPL-MCNC: 7.1 MG/DL (ref 8.5–10.5)
CHLORIDE SERPL-SCNC: 100 MMOL/L (ref 96–112)
CHLORIDE SERPL-SCNC: 114 MMOL/L (ref 96–112)
CO2 SERPL-SCNC: 14 MMOL/L (ref 20–33)
CO2 SERPL-SCNC: 16 MMOL/L (ref 20–33)
COLOR UR: YELLOW
CREAT SERPL-MCNC: 0.57 MG/DL (ref 0.5–1.4)
CREAT SERPL-MCNC: 0.98 MG/DL (ref 0.5–1.4)
EKG IMPRESSION: NORMAL
EOSINOPHIL # BLD AUTO: 0 K/UL (ref 0–0.51)
EOSINOPHIL NFR BLD: 0 % (ref 0–6.9)
ERYTHROCYTE [DISTWIDTH] IN BLOOD BY AUTOMATED COUNT: 45.3 FL (ref 35.9–50)
GLOBULIN SER CALC-MCNC: 3.6 G/DL (ref 1.9–3.5)
GLUCOSE BLD-MCNC: 125 MG/DL (ref 65–99)
GLUCOSE SERPL-MCNC: 268 MG/DL (ref 65–99)
GLUCOSE SERPL-MCNC: 97 MG/DL (ref 65–99)
GLUCOSE UR STRIP.AUTO-MCNC: >=1000 MG/DL
HCG SERPL QL: NEGATIVE
HCT VFR BLD AUTO: 38.7 % (ref 37–47)
HGB BLD-MCNC: 13.4 G/DL (ref 12–16)
IMM GRANULOCYTES # BLD AUTO: 0.09 K/UL (ref 0–0.11)
IMM GRANULOCYTES NFR BLD AUTO: 0.7 % (ref 0–0.9)
KETONES UR STRIP.AUTO-MCNC: >=160 MG/DL
LEUKOCYTE ESTERASE UR QL STRIP.AUTO: NEGATIVE
LIPASE SERPL-CCNC: 3 U/L (ref 11–82)
LYMPHOCYTES # BLD AUTO: 0.53 K/UL (ref 1–4.8)
LYMPHOCYTES NFR BLD: 4.1 % (ref 22–41)
MAGNESIUM SERPL-MCNC: 1.5 MG/DL (ref 1.5–2.5)
MCH RBC QN AUTO: 30.8 PG (ref 27–33)
MCHC RBC AUTO-ENTMCNC: 34.6 G/DL (ref 33.6–35)
MCV RBC AUTO: 89 FL (ref 81.4–97.8)
MICRO URNS: ABNORMAL
MONOCYTES # BLD AUTO: 0.97 K/UL (ref 0–0.85)
MONOCYTES NFR BLD AUTO: 7.5 % (ref 0–13.4)
NEUTROPHILS # BLD AUTO: 11.34 K/UL (ref 2–7.15)
NEUTROPHILS NFR BLD: 87 % (ref 44–72)
NITRITE UR QL STRIP.AUTO: NEGATIVE
NRBC # BLD AUTO: 0 K/UL
NRBC BLD-RTO: 0 /100 WBC
PH UR STRIP.AUTO: 5 [PH]
PLATELET # BLD AUTO: 341 K/UL (ref 164–446)
PMV BLD AUTO: 10 FL (ref 9–12.9)
POTASSIUM SERPL-SCNC: 2.9 MMOL/L (ref 3.6–5.5)
POTASSIUM SERPL-SCNC: 3.2 MMOL/L (ref 3.6–5.5)
PROT SERPL-MCNC: 8.1 G/DL (ref 6–8.2)
PROT UR QL STRIP: NEGATIVE MG/DL
RBC # BLD AUTO: 4.35 M/UL (ref 4.2–5.4)
RBC UR QL AUTO: NEGATIVE
SODIUM SERPL-SCNC: 132 MMOL/L (ref 135–145)
SODIUM SERPL-SCNC: 139 MMOL/L (ref 135–145)
SP GR UR STRIP.AUTO: 1.03
TROPONIN I SERPL-MCNC: <0.01 NG/ML (ref 0–0.04)
TROPONIN I SERPL-MCNC: <0.01 NG/ML (ref 0–0.04)
TSH SERPL DL<=0.005 MIU/L-ACNC: 2.07 UIU/ML (ref 0.38–5.33)
UROBILINOGEN UR STRIP.AUTO-MCNC: 0.2 MG/DL
WBC # BLD AUTO: 13 K/UL (ref 4.8–10.8)

## 2018-07-12 PROCEDURE — 96375 TX/PRO/DX INJ NEW DRUG ADDON: CPT

## 2018-07-12 PROCEDURE — 700102 HCHG RX REV CODE 250 W/ 637 OVERRIDE(OP): Performed by: FAMILY MEDICINE

## 2018-07-12 PROCEDURE — 85025 COMPLETE CBC W/AUTO DIFF WBC: CPT

## 2018-07-12 PROCEDURE — 303105 HCHG CATHETER EXTRA

## 2018-07-12 PROCEDURE — 82010 KETONE BODYS QUAN: CPT

## 2018-07-12 PROCEDURE — 84484 ASSAY OF TROPONIN QUANT: CPT

## 2018-07-12 PROCEDURE — 700101 HCHG RX REV CODE 250: Performed by: FAMILY MEDICINE

## 2018-07-12 PROCEDURE — 84443 ASSAY THYROID STIM HORMONE: CPT

## 2018-07-12 PROCEDURE — 80053 COMPREHEN METABOLIC PANEL: CPT

## 2018-07-12 PROCEDURE — 81003 URINALYSIS AUTO W/O SCOPE: CPT

## 2018-07-12 PROCEDURE — 99285 EMERGENCY DEPT VISIT HI MDM: CPT

## 2018-07-12 PROCEDURE — 51702 INSERT TEMP BLADDER CATH: CPT

## 2018-07-12 PROCEDURE — 80048 BASIC METABOLIC PNL TOTAL CA: CPT

## 2018-07-12 PROCEDURE — 74176 CT ABD & PELVIS W/O CONTRAST: CPT

## 2018-07-12 PROCEDURE — 83735 ASSAY OF MAGNESIUM: CPT

## 2018-07-12 PROCEDURE — 99220 PR INITIAL OBSERVATION CARE,LEVL III: CPT | Performed by: FAMILY MEDICINE

## 2018-07-12 PROCEDURE — 700111 HCHG RX REV CODE 636 W/ 250 OVERRIDE (IP): Performed by: EMERGENCY MEDICINE

## 2018-07-12 PROCEDURE — 96374 THER/PROPH/DIAG INJ IV PUSH: CPT

## 2018-07-12 PROCEDURE — 96365 THER/PROPH/DIAG IV INF INIT: CPT

## 2018-07-12 PROCEDURE — G0378 HOSPITAL OBSERVATION PER HR: HCPCS

## 2018-07-12 PROCEDURE — 96361 HYDRATE IV INFUSION ADD-ON: CPT

## 2018-07-12 PROCEDURE — 93005 ELECTROCARDIOGRAM TRACING: CPT | Performed by: EMERGENCY MEDICINE

## 2018-07-12 PROCEDURE — 82962 GLUCOSE BLOOD TEST: CPT

## 2018-07-12 PROCEDURE — 700111 HCHG RX REV CODE 636 W/ 250 OVERRIDE (IP): Performed by: FAMILY MEDICINE

## 2018-07-12 PROCEDURE — 83690 ASSAY OF LIPASE: CPT

## 2018-07-12 PROCEDURE — 700105 HCHG RX REV CODE 258: Performed by: EMERGENCY MEDICINE

## 2018-07-12 PROCEDURE — 96376 TX/PRO/DX INJ SAME DRUG ADON: CPT

## 2018-07-12 PROCEDURE — 84703 CHORIONIC GONADOTROPIN ASSAY: CPT

## 2018-07-12 PROCEDURE — A9270 NON-COVERED ITEM OR SERVICE: HCPCS | Performed by: FAMILY MEDICINE

## 2018-07-12 RX ORDER — OXYCODONE HYDROCHLORIDE 5 MG/1
2.5 TABLET ORAL
Status: DISCONTINUED | OUTPATIENT
Start: 2018-07-12 | End: 2018-07-13 | Stop reason: HOSPADM

## 2018-07-12 RX ORDER — BUSPIRONE HYDROCHLORIDE 30 MG/1
30 TABLET ORAL 2 TIMES DAILY
COMMUNITY
End: 2018-09-28 | Stop reason: SDUPTHER

## 2018-07-12 RX ORDER — LORAZEPAM 2 MG/ML
0.5 INJECTION INTRAMUSCULAR ONCE
Status: COMPLETED | OUTPATIENT
Start: 2018-07-12 | End: 2018-07-12

## 2018-07-12 RX ORDER — BISACODYL 10 MG
10 SUPPOSITORY, RECTAL RECTAL
Status: DISCONTINUED | OUTPATIENT
Start: 2018-07-12 | End: 2018-07-13 | Stop reason: HOSPADM

## 2018-07-12 RX ORDER — SERTRALINE HYDROCHLORIDE 100 MG/1
100 TABLET, FILM COATED ORAL
COMMUNITY
End: 2018-09-28 | Stop reason: SDUPTHER

## 2018-07-12 RX ORDER — ONDANSETRON 2 MG/ML
4 INJECTION INTRAMUSCULAR; INTRAVENOUS ONCE
Status: COMPLETED | OUTPATIENT
Start: 2018-07-12 | End: 2018-07-12

## 2018-07-12 RX ORDER — CLONAZEPAM 0.5 MG/1
0.5 TABLET ORAL 2 TIMES DAILY
Status: DISCONTINUED | OUTPATIENT
Start: 2018-07-12 | End: 2018-07-13 | Stop reason: HOSPADM

## 2018-07-12 RX ORDER — ONDANSETRON 4 MG/1
4 TABLET, ORALLY DISINTEGRATING ORAL EVERY 4 HOURS PRN
Status: DISCONTINUED | OUTPATIENT
Start: 2018-07-12 | End: 2018-07-13 | Stop reason: HOSPADM

## 2018-07-12 RX ORDER — MORPHINE SULFATE 4 MG/ML
2 INJECTION, SOLUTION INTRAMUSCULAR; INTRAVENOUS
Status: DISCONTINUED | OUTPATIENT
Start: 2018-07-12 | End: 2018-07-13

## 2018-07-12 RX ORDER — SODIUM CHLORIDE AND POTASSIUM CHLORIDE 150; 900 MG/100ML; MG/100ML
INJECTION, SOLUTION INTRAVENOUS CONTINUOUS
Status: DISCONTINUED | OUTPATIENT
Start: 2018-07-12 | End: 2018-07-13

## 2018-07-12 RX ORDER — DIPHENHYDRAMINE HYDROCHLORIDE 50 MG/ML
12.5 INJECTION INTRAMUSCULAR; INTRAVENOUS ONCE
Status: COMPLETED | OUTPATIENT
Start: 2018-07-12 | End: 2018-07-12

## 2018-07-12 RX ORDER — MAGNESIUM SULFATE 1 G/100ML
1 INJECTION INTRAVENOUS
Status: COMPLETED | OUTPATIENT
Start: 2018-07-12 | End: 2018-07-13

## 2018-07-12 RX ORDER — LORAZEPAM 2 MG/ML
1 INJECTION INTRAMUSCULAR ONCE
Status: COMPLETED | OUTPATIENT
Start: 2018-07-12 | End: 2018-07-12

## 2018-07-12 RX ORDER — CLONAZEPAM 0.5 MG/1
0.5 TABLET ORAL 3 TIMES DAILY
Status: ON HOLD | COMMUNITY
End: 2019-03-31

## 2018-07-12 RX ORDER — AMOXICILLIN 250 MG
2 CAPSULE ORAL 2 TIMES DAILY
Status: DISCONTINUED | OUTPATIENT
Start: 2018-07-12 | End: 2018-07-13 | Stop reason: HOSPADM

## 2018-07-12 RX ORDER — DIPHENHYDRAMINE HCL 12.5MG/5ML
12.5 LIQUID (ML) ORAL ONCE
Status: DISCONTINUED | OUTPATIENT
Start: 2018-07-12 | End: 2018-07-13

## 2018-07-12 RX ORDER — CHLORPROMAZINE HYDROCHLORIDE 25 MG/1
25 TABLET, FILM COATED ORAL 3 TIMES DAILY
Status: DISCONTINUED | OUTPATIENT
Start: 2018-07-12 | End: 2018-07-13 | Stop reason: HOSPADM

## 2018-07-12 RX ORDER — SERTRALINE HYDROCHLORIDE 100 MG/1
100 TABLET, FILM COATED ORAL
Status: DISCONTINUED | OUTPATIENT
Start: 2018-07-12 | End: 2018-07-13 | Stop reason: HOSPADM

## 2018-07-12 RX ORDER — BUSPIRONE HYDROCHLORIDE 10 MG/1
30 TABLET ORAL 2 TIMES DAILY
Status: DISCONTINUED | OUTPATIENT
Start: 2018-07-12 | End: 2018-07-13 | Stop reason: HOSPADM

## 2018-07-12 RX ORDER — DIPHENHYDRAMINE HCL 25 MG
50 TABLET ORAL NIGHTLY PRN
Status: DISCONTINUED | OUTPATIENT
Start: 2018-07-12 | End: 2018-07-13 | Stop reason: HOSPADM

## 2018-07-12 RX ORDER — SODIUM CHLORIDE 9 MG/ML
1000 INJECTION, SOLUTION INTRAVENOUS ONCE
Status: COMPLETED | OUTPATIENT
Start: 2018-07-12 | End: 2018-07-12

## 2018-07-12 RX ORDER — POLYETHYLENE GLYCOL 3350 17 G/17G
1 POWDER, FOR SOLUTION ORAL
Status: DISCONTINUED | OUTPATIENT
Start: 2018-07-12 | End: 2018-07-13 | Stop reason: HOSPADM

## 2018-07-12 RX ORDER — POTASSIUM CHLORIDE 7.45 MG/ML
10 INJECTION INTRAVENOUS
Status: COMPLETED | OUTPATIENT
Start: 2018-07-12 | End: 2018-07-13

## 2018-07-12 RX ORDER — PROMETHAZINE HYDROCHLORIDE 25 MG/1
12.5-25 SUPPOSITORY RECTAL EVERY 4 HOURS PRN
Status: DISCONTINUED | OUTPATIENT
Start: 2018-07-12 | End: 2018-07-13 | Stop reason: HOSPADM

## 2018-07-12 RX ORDER — DIPHENHYDRAMINE HCL 25 MG
50 TABLET ORAL NIGHTLY PRN
COMMUNITY
End: 2018-12-27

## 2018-07-12 RX ORDER — OXYCODONE HYDROCHLORIDE 5 MG/1
5 TABLET ORAL
Status: DISCONTINUED | OUTPATIENT
Start: 2018-07-12 | End: 2018-07-13 | Stop reason: HOSPADM

## 2018-07-12 RX ORDER — PROMETHAZINE HYDROCHLORIDE 25 MG/1
12.5-25 TABLET ORAL EVERY 4 HOURS PRN
Status: DISCONTINUED | OUTPATIENT
Start: 2018-07-12 | End: 2018-07-13 | Stop reason: HOSPADM

## 2018-07-12 RX ORDER — ONDANSETRON 2 MG/ML
4 INJECTION INTRAMUSCULAR; INTRAVENOUS EVERY 4 HOURS PRN
Status: DISCONTINUED | OUTPATIENT
Start: 2018-07-12 | End: 2018-07-13 | Stop reason: HOSPADM

## 2018-07-12 RX ORDER — ACETAMINOPHEN 325 MG/1
650 TABLET ORAL EVERY 6 HOURS PRN
Status: DISCONTINUED | OUTPATIENT
Start: 2018-07-12 | End: 2018-07-13 | Stop reason: HOSPADM

## 2018-07-12 RX ADMIN — LORAZEPAM 0.5 MG: 2 INJECTION INTRAMUSCULAR; INTRAVENOUS at 14:27

## 2018-07-12 RX ADMIN — MAGNESIUM SULFATE IN DEXTROSE 1 G: 10 INJECTION, SOLUTION INTRAVENOUS at 23:50

## 2018-07-12 RX ADMIN — BUSPIRONE HYDROCHLORIDE 30 MG: 10 TABLET ORAL at 22:00

## 2018-07-12 RX ADMIN — SODIUM CHLORIDE 1000 ML: 9 INJECTION, SOLUTION INTRAVENOUS at 12:00

## 2018-07-12 RX ADMIN — POTASSIUM CHLORIDE 10 MEQ: 7.46 INJECTION, SOLUTION INTRAVENOUS at 21:37

## 2018-07-12 RX ADMIN — CLONAZEPAM 0.5 MG: 0.5 TABLET ORAL at 21:59

## 2018-07-12 RX ADMIN — LORAZEPAM 1 MG: 2 INJECTION INTRAMUSCULAR; INTRAVENOUS at 12:00

## 2018-07-12 RX ADMIN — LORAZEPAM 0.5 MG: 2 INJECTION INTRAMUSCULAR; INTRAVENOUS at 17:05

## 2018-07-12 RX ADMIN — SODIUM CHLORIDE 1000 ML: 9 INJECTION, SOLUTION INTRAVENOUS at 14:26

## 2018-07-12 RX ADMIN — SERTRALINE 100 MG: 100 TABLET, FILM COATED ORAL at 22:00

## 2018-07-12 RX ADMIN — CHLORPROMAZINE HYDROCHLORIDE 25 MG: 25 TABLET, SUGAR COATED ORAL at 22:00

## 2018-07-12 RX ADMIN — DIPHENHYDRAMINE HYDROCHLORIDE 12.5 MG: 50 INJECTION, SOLUTION INTRAMUSCULAR; INTRAVENOUS at 17:05

## 2018-07-12 RX ADMIN — ONDANSETRON 4 MG: 2 INJECTION, SOLUTION INTRAMUSCULAR; INTRAVENOUS at 14:26

## 2018-07-12 RX ADMIN — DIPHENHYDRAMINE HCL 50 MG: 25 TABLET ORAL at 22:01

## 2018-07-12 RX ADMIN — ONDANSETRON 4 MG: 2 INJECTION, SOLUTION INTRAMUSCULAR; INTRAVENOUS at 18:58

## 2018-07-12 RX ADMIN — POTASSIUM CHLORIDE AND SODIUM CHLORIDE: 900; 150 INJECTION, SOLUTION INTRAVENOUS at 21:37

## 2018-07-12 RX ADMIN — MORPHINE SULFATE 2 MG: 4 INJECTION INTRAVENOUS at 18:57

## 2018-07-12 RX ADMIN — PROCHLORPERAZINE EDISYLATE 10 MG: 5 INJECTION INTRAMUSCULAR; INTRAVENOUS at 21:35

## 2018-07-12 ASSESSMENT — ENCOUNTER SYMPTOMS
DIZZINESS: 0
NAUSEA: 1
VOMITING: 1
SORE THROAT: 0
WHEEZING: 0
NERVOUS/ANXIOUS: 0
FEVER: 0
COUGH: 0
HEARTBURN: 0
ABDOMINAL PAIN: 0
SHORTNESS OF BREATH: 0
BLURRED VISION: 0
WEAKNESS: 1
DIARRHEA: 0
HEADACHES: 0
NECK PAIN: 0
CHILLS: 0
BACK PAIN: 0

## 2018-07-12 ASSESSMENT — PATIENT HEALTH QUESTIONNAIRE - PHQ9
SUM OF ALL RESPONSES TO PHQ9 QUESTIONS 1 AND 2: 0
1. LITTLE INTEREST OR PLEASURE IN DOING THINGS: NOT AT ALL
2. FEELING DOWN, DEPRESSED, IRRITABLE, OR HOPELESS: NOT AT ALL

## 2018-07-12 ASSESSMENT — PAIN SCALES - GENERAL: PAINLEVEL_OUTOF10: 0

## 2018-07-12 NOTE — ED NOTES
Med Rec completed per family at bedside with medication bottles (returned)  Allergies reviewed  No ORAL antibiotics in last 30 days

## 2018-07-12 NOTE — ED NOTES
Pt constantly pressing call bell. Pt educated on proper use of call bell. Pt connected to cardiac monitor.

## 2018-07-12 NOTE — ED TRIAGE NOTES
".  Chief Complaint   Patient presents with   • Nausea/Vomiting/Diarrhea     Since 0300 this morning, unable to tolerate PO     ./76   Pulse 85   Temp 36.4 °C (97.5 °F)   Resp 18   Ht 1.651 m (5' 5\")   Wt 55.5 kg (122 lb 5.7 oz)   SpO2 100%   BMI 20.36 kg/m²     BIB EMS with above complaints, given 4 mg PO Zofran PTA, patient ambulatory in triage, 12th visit to [facility] ED this year with similar complaints, educated on triage process, placed in lobby, told to inform staff of any changes in condition.    "

## 2018-07-12 NOTE — ED PROVIDER NOTES
ED Provider Note    CHIEF COMPLAINT  Chief Complaint   Patient presents with   • Nausea/Vomiting/Diarrhea     Since 0300 this morning, unable to tolerate PO       HPI  Diana Hernandez is a 43 y.o. female who presents to the emergency department with acute nausea vomiting, diarrhea and abdominal discomfort.  Symptoms started yesterday but worsened today.  She has had nausea and vomiting.  She states her emesis is green and bilious appearing.  She is diffuse nonspecific abdominal pain nothing specifically makes it better or worse.  She has been unable to tolerate food or fluids.  Denies any melena or hematochezia.  Denies any dysuria hematuria concerning frequency.  No chest pain cough or shortness of breath.  She does have a history of cyclic vomiting syndrome.  In the past this was associated with marijuana and it did get better when she stopped doing drugs.  She recently found out she could not have a child so she started using marijuana again last use was 2 days ago and her symptoms recurred.  Denies any fevers or chills.  Pain is moderate to severe.  Also complains of worsening of her anxiety.  States her anxiety is out of control with this and she is out of her Ativan.  Denies any suicidal ideation.  Denies any ingestion or suicidal ideation.    REVIEW OF SYSTEMS  See HPI for further details. All other systems are negative.    PAST MEDICAL HISTORY  Past Medical History:   Diagnosis Date   • Anxiety     Followed by [facility]   • Anxiety disorder    • Backpain    • CLOSTRIDIUM DIFFICILE INFECTION 4/14/2010   • CVS disease    • Cyclic vomiting syndrome    • Dental disorder    • Drug-seeking behavior    • Dyspepsia 7/12/2009   • Nephrolithiasis 6/20/2009    kidney stones,post lithotrypsy   • Pain     abd and back   • Snoring    • Superior mesenteric artery syndrome (HCC) 7/12/2009   • Superior mesenteric artery syndrome (HCC)    • Tobacco abuse 6/20/2009       FAMILY HISTORY  Family History   Problem Relation Age of  Onset   • Cancer Mother 50     Colon cancer   • Hypertension Mother    • Allergies Father    • Hypertension Father    • Heart Disease Maternal Grandmother        SOCIAL HISTORY  Social History     Social History   • Marital status:      Spouse name: N/A   • Number of children: N/A   • Years of education: N/A     Social History Main Topics   • Smoking status: Never Smoker   • Smokeless tobacco: Never Used   • Alcohol use No   • Drug use: Yes     Types: Marijuana, Inhaled      Comment: marijuana   • Sexual activity: Not on file     Other Topics Concern   • Not on file     Social History Narrative    ** Merged History Encounter **            SURGICAL HISTORY  Past Surgical History:   Procedure Laterality Date   • GASTROSCOPY-ENDO  4/28/2016    Procedure: GASTROSCOPY-ENDO;  Surgeon: Blayne Blackburn M.D.;  Location: ENDOSCOPY [facility];  Service:    • EXPLORATORY LAPAROTOMY  1/12/2016    Procedure: EXPLORATORY LAPAROTOMY;  Surgeon: Maciel Quintero M.D.;  Location: SURGERY [facility];  Service:    • BOWEL RESECTION  1/12/2016    Procedure: BOWEL RESECTION;  Surgeon: Maciel Quintero M.D.;  Location: SURGERY [facility];  Service:    • GASTROSCOPY WITH BIOPSY  3/9/2010    Performed by AMANDA MEJIA at ENDOSCOPY [facility]   • PYLOROPLASTY  7/27/2009    Performed by MACIEL QUINTERO at SURGERY [facility]   • EXPLORATORY LAPAROTOMY  7/27/2009    Performed by MACIEL QUINTERO at SURGERY [facility]   • APPENDECTOMY  7/27/2009    Performed by MACIEL QUINTERO at SURGERY [facility]   • CHOLECYSTECTOMY  7/27/2009    Performed by MACIEL QUINTERO at SURGERY [facility]   • GASTROSCOPY-ENDO  7/8/2009    Performed by ROSANNA WRIGHT at SURGERY [facility]   • LITHOTRIPSY     • OTHER      superior mesenteric artery correction        CURRENT MEDICATIONS  Home Medications     Reviewed by Anish Jaimes (Pharmacy Tech) on  "07/12/18 at 1337  Med List Status: Complete   Medication Last Dose Status   busPIRone (BUSPAR) 30 MG tablet 7/12/2018 Active   chlorproMAZINE (THORAZINE) 25 MG Tab 7/12/2018 Active   clonazePAM (KLONOPIN) 0.5 MG Tab 7/12/2018 Active   diphenhydrAMINE (BENADRYL) 25 MG Tab 7/9/2018 Active   sertraline (ZOLOFT) 100 MG Tab 7/11/2018 Active                ALLERGIES  Allergies   Allergen Reactions   • Metoclopramide Hives     Told by MD; pt suspects possible hives.   • Bactrim [Sulfamethoxazole W-Trimethoprim] Unspecified     Someone said I had a rash or something.      • Seroquel [Quetiapine] Unspecified     \"Seizures\"  RXN=unknown       PHYSICAL EXAM  VITAL SIGNS: /76   Pulse 85   Temp 36.4 °C (97.5 °F)   Resp 18   Ht 1.651 m (5' 5\")   Wt 55.5 kg (122 lb 5.7 oz)   SpO2 100%   BMI 20.36 kg/m²    Constitutional: Awake alert anxious moderate distress.  HENT: Normocephalic, Atraumatic, Bilateral external ears normal, Oropharynx dry,  No oral exudates, Nose normal.   Eyes: PERRL, EOMI, Conjunctiva normal, No discharge.   Neck: Normal range of motion, No tenderness, Supple, No stridor.   Lymphatic: No lymphadenopathy noted.   Cardiovascular: Tachycardic normal rhythm, No murmurs, No rubs, No gallops.   Thorax & Lungs: Tachypneic no respiratory distress, No wheezing, No chest tenderness.   Abdomen: Bowel sounds normal, Soft, diffusely tender without peritonitis   skin: Warm, Dry, No erythema, No rash.     Musculoskeletal: Good range of motion in all major joints.   Neurologic: Alert & oriented x 3, No focal deficits noted.   Psychiatric: Affec anxious  Results for orders placed or performed during the hospital encounter of 07/12/18   CBC WITH DIFFERENTIAL   Result Value Ref Range    WBC 13.0 (H) 4.8 - 10.8 K/uL    RBC 4.35 4.20 - 5.40 M/uL    Hemoglobin 13.4 12.0 - 16.0 g/dL    Hematocrit 38.7 37.0 - 47.0 %    MCV 89.0 81.4 - 97.8 fL    MCH 30.8 27.0 - 33.0 pg    MCHC 34.6 33.6 - 35.0 g/dL    RDW 45.3 35.9 - 50.0 " fL    Platelet Count 341 164 - 446 K/uL    MPV 10.0 9.0 - 12.9 fL    Neutrophils-Polys 87.00 (H) 44.00 - 72.00 %    Lymphocytes 4.10 (L) 22.00 - 41.00 %    Monocytes 7.50 0.00 - 13.40 %    Eosinophils 0.00 0.00 - 6.90 %    Basophils 0.70 0.00 - 1.80 %    Immature Granulocytes 0.70 0.00 - 0.90 %    Nucleated RBC 0.00 /100 WBC    Neutrophils (Absolute) 11.34 (H) 2.00 - 7.15 K/uL    Lymphs (Absolute) 0.53 (L) 1.00 - 4.80 K/uL    Monos (Absolute) 0.97 (H) 0.00 - 0.85 K/uL    Eos (Absolute) 0.00 0.00 - 0.51 K/uL    Baso (Absolute) 0.09 0.00 - 0.12 K/uL    Immature Granulocytes (abs) 0.09 0.00 - 0.11 K/uL    NRBC (Absolute) 0.00 K/uL   COMP METABOLIC PANEL   Result Value Ref Range    Sodium 132 (L) 135 - 145 mmol/L    Potassium 3.2 (L) 3.6 - 5.5 mmol/L    Chloride 100 96 - 112 mmol/L    Co2 14 (L) 20 - 33 mmol/L    Anion Gap 18.0 (H) 0.0 - 11.9    Glucose 268 (H) 65 - 99 mg/dL    Bun 11 8 - 22 mg/dL    Creatinine 0.98 0.50 - 1.40 mg/dL    Calcium 10.4 8.5 - 10.5 mg/dL    AST(SGOT) 12 12 - 45 U/L    ALT(SGPT) 7 2 - 50 U/L    Alkaline Phosphatase 105 (H) 30 - 99 U/L    Total Bilirubin 1.2 0.1 - 1.5 mg/dL    Albumin 4.5 3.2 - 4.9 g/dL    Total Protein 8.1 6.0 - 8.2 g/dL    Globulin 3.6 (H) 1.9 - 3.5 g/dL    A-G Ratio 1.3 g/dL   LIPASE   Result Value Ref Range    Lipase 3 (L) 11 - 82 U/L   URINALYSIS CULTURE, IF INDICATED   Result Value Ref Range    Color Yellow     Character Clear     Specific Gravity 1.028 <1.035    Ph 5.0 5.0 - 8.0    Glucose >=1000 (A) Negative mg/dL    Ketones >=160 Negative mg/dL    Protein Negative Negative mg/dL    Bilirubin Negative Negative    Urobilinogen, Urine 0.2 Negative    Nitrite Negative Negative    Leukocyte Esterase Negative Negative    Occult Blood Negative Negative    Micro Urine Req see below    HCG QUAL SERUM   Result Value Ref Range    Beta-Hcg Qualitative Serum Negative Negative   ESTIMATED GFR   Result Value Ref Range    GFR If African American >60 >60 mL/min/1.73 m 2    GFR If Non  African American >60 >60 mL/min/1.73 m 2   BETA-HYDROXYBUTYRIC ACID   Result Value Ref Range    beta-Hydroxybutyric Acid 0.25 0.02 - 0.27 mmol/L   TROPONIN   Result Value Ref Range    Troponin I <0.01 0.00 - 0.04 ng/mL   BASIC METABOLIC PANEL   Result Value Ref Range    Sodium 139 135 - 145 mmol/L    Potassium 2.9 (L) 3.6 - 5.5 mmol/L    Chloride 114 (H) 96 - 112 mmol/L    Co2 16 (L) 20 - 33 mmol/L    Glucose 97 65 - 99 mg/dL    Bun 8 8 - 22 mg/dL    Creatinine 0.57 0.50 - 1.40 mg/dL    Calcium 7.1 (L) 8.5 - 10.5 mg/dL    Anion Gap 9.0 0.0 - 11.9   MAGNESIUM   Result Value Ref Range    Magnesium 1.5 1.5 - 2.5 mg/dL   TSH   Result Value Ref Range    TSH 2.070 0.380 - 5.330 uIU/mL   TROPONIN   Result Value Ref Range    Troponin I <0.01 0.00 - 0.04 ng/mL   ESTIMATED GFR   Result Value Ref Range    GFR If African American >60 >60 mL/min/1.73 m 2    GFR If Non African American >60 >60 mL/min/1.73 m 2   CBC with Differential   Result Value Ref Range    WBC 10.3 4.8 - 10.8 K/uL    RBC 3.35 (L) 4.20 - 5.40 M/uL    Hemoglobin 10.6 (L) 12.0 - 16.0 g/dL    Hematocrit 31.2 (L) 37.0 - 47.0 %    MCV 91.0 81.4 - 97.8 fL    MCH 31.3 27.0 - 33.0 pg    MCHC 34.4 33.6 - 35.0 g/dL    RDW 48.8 35.9 - 50.0 fL    Platelet Count 262 164 - 446 K/uL    MPV 9.9 9.0 - 12.9 fL    Neutrophils-Polys 74.60 (H) 44.00 - 72.00 %    Lymphocytes 13.50 (L) 22.00 - 41.00 %    Monocytes 10.80 0.00 - 13.40 %    Eosinophils 0.10 0.00 - 6.90 %    Basophils 0.50 0.00 - 1.80 %    Immature Granulocytes 0.50 0.00 - 0.90 %    Nucleated RBC 0.00 /100 WBC    Neutrophils (Absolute) 7.71 (H) 2.00 - 7.15 K/uL    Lymphs (Absolute) 1.39 1.00 - 4.80 K/uL    Monos (Absolute) 1.11 (H) 0.00 - 0.85 K/uL    Eos (Absolute) 0.01 0.00 - 0.51 K/uL    Baso (Absolute) 0.05 0.00 - 0.12 K/uL    Immature Granulocytes (abs) 0.05 0.00 - 0.11 K/uL    NRBC (Absolute) 0.00 K/uL   Basic Metabolic Panel (BMP)   Result Value Ref Range    Sodium 139 135 - 145 mmol/L    Potassium 4.0 3.6 - 5.5  mmol/L    Chloride 111 96 - 112 mmol/L    Co2 19 (L) 20 - 33 mmol/L    Glucose 87 65 - 99 mg/dL    Bun 6 (L) 8 - 22 mg/dL    Creatinine 0.65 0.50 - 1.40 mg/dL    Calcium 8.6 8.5 - 10.5 mg/dL    Anion Gap 9.0 0.0 - 11.9   PHOSPHORUS   Result Value Ref Range    Phosphorus 3.0 2.5 - 4.5 mg/dL   MAGNESIUM   Result Value Ref Range    Magnesium 2.5 1.5 - 2.5 mg/dL   TROPONIN   Result Value Ref Range    Troponin I <0.01 0.00 - 0.04 ng/mL   ESTIMATED GFR   Result Value Ref Range    GFR If African American >60 >60 mL/min/1.73 m 2    GFR If Non African American >60 >60 mL/min/1.73 m 2   ACCU-CHEK GLUCOSE   Result Value Ref Range    Glucose - Accu-Ck 125 (H) 65 - 99 mg/dL   EKG (NOW)   Result Value Ref Range    Report       [facility]    Test Date:  2018  Pt Name:    ALIYA MURRAY                 Department: ER  MRN:        [id number]                      Room:       RD 11  Gender:     Female                       Technician: [id number]  :        [id number]                   Requested By:REMINGTON BAJWA  Order #:    [id number]                    Reading MD: REMINGTON BAJWA. [facility]    Measurements  Intervals                                Axis  Rate:       61                           P:          5  NY:         121                          QRS:        61  QRSD:       100                          T:          269  QT:         432  QTc:        435    Interpretive Statements  SINUS RHYTHM  RSR' IN V1 OR V2, PROBABLY NORMAL VARIANT  ABNORMAL T, CONSIDER ISCHEMIA, DIFFUSE LEADS  Compared to ECG 2018 13:36:49  RSR' in V1 or V2 now present  T-wave abnormality now present  Possible ischemia now present    Electronically Signed On 2018 16:54:34 PDT by REMINGTON BAJWA. [facility]      EKG   Result Value Ref Range    Report       [facility]    Test Date:  2018  Pt Name:    ALIYA MURRAY                 Department: CPU  MRN:        [id number]                      Room:       T212  Gender:      Female                       Technician: [email or web address]  :        1975                   Requested By:YAYA LAMA  Order #:    [id number]                    Reading MD:    Measurements  Intervals                                Axis  Rate:       88                           P:          52  AL:         132                          QRS:        60  QRSD:       96                           T:          10  QT:         404  QTc:        489    Interpretive Statements  SINUS RHYTHM  BORDERLINE T WAVE ABNORMALITIES  BORDERLINE PROLONGED QT INTERVAL  Compared to ECG 2018 11:42:03  Possible ischemia no longer present  T-wave abnormality still present     ECHOCARDIOGRAM COMP W/O CONT   Result Value Ref Range    Eject.Frac. MOD BP 65.18     Eject.Frac. MOD 4C 65.53     Eject.Frac. MOD 2C 66.68     Left Ventrical Ejection Fraction 65         RADIOLOGY/PROCEDURES  ECHOCARDIOGRAM COMP W/O CONT   Final Result      CT-RENAL COLIC EVALUATION(A/P W/O)   Final Result      No acute abnormality in the abdomen or pelvis. No nephrolithiasis or hydronephrosis.          COURSE & MEDICAL DECISION MAKING  Pertinent Labs & Imaging studies reviewed. (See chart for details)        I patient was given IV fluid hydration because she is clinically dehydrated.  She has had nausea, and vomiting as well as poor oral intake.  Dehydration is supported by abnormal labs and ketosis and abnormal electro lites.  She is given 2 L of fluid and her symptoms have improved.  She was initially tachycardic on my examination no longer tachycardic on repeat examination.    The patient's chart is reviewed she has had multiple visits for cyclic vomiting and similar complaints.  She started today with IV fluids, Ativan because she is anxious and some Zofran.  IV was established by me.    The patient has significant electrolyte abnormalities check she has an anion gap acidosis with ketones in the urine.  I considered diabetic ketoacidosis with a beta  hydroxybutyrate hydration but she does not really have a history of diabetes.  She is a fairly recent hemoglobin A1c which is normal and I think this is all starvation ketosis.  This is treated with IV fluids.    I had ordered a CT of my initial evaluation there was some significant delays with getting this done because a number of traumas.  Fortunately CT was normal.  No acute pathology her abdomen was benign without peritonitis.  Because of her history wanted to make sure she did not have an obstruction because of her bilious vomiting.    Ultimately the patient has improved, however she still has significant lab abnormalities which will require admission ongoing fluid hydration.  Care chest to the hospitalist, and she is admitted in guarded condition.    FINAL IMPRESSION  1. Dehydration    2. Hypokalemia    3. Cyclic vomiting syndrome, intractability of vomiting not specified, presence of nausea not specified    4. EKG abnormalities        2.   3.         Electronically signed by: Christian Bee, 7/12/2018 11:46 AM

## 2018-07-13 ENCOUNTER — PATIENT OUTREACH (OUTPATIENT)
Dept: HEALTH INFORMATION MANAGEMENT | Facility: OTHER | Age: 43
End: 2018-07-13

## 2018-07-13 VITALS
WEIGHT: 129.85 LBS | HEART RATE: 75 BPM | HEIGHT: 65 IN | SYSTOLIC BLOOD PRESSURE: 96 MMHG | RESPIRATION RATE: 15 BRPM | BODY MASS INDEX: 21.63 KG/M2 | TEMPERATURE: 99 F | DIASTOLIC BLOOD PRESSURE: 58 MMHG | OXYGEN SATURATION: 97 %

## 2018-07-13 PROBLEM — R11.2 INTRACTABLE NAUSEA AND VOMITING: Status: RESOLVED | Noted: 2018-07-12 | Resolved: 2018-07-13

## 2018-07-13 PROBLEM — E87.6 HYPOKALEMIA: Status: RESOLVED | Noted: 2018-02-05 | Resolved: 2018-07-13

## 2018-07-13 PROBLEM — D72.829 LEUKOCYTOSIS: Status: RESOLVED | Noted: 2018-05-19 | Resolved: 2018-07-13

## 2018-07-13 LAB
ANION GAP SERPL CALC-SCNC: 9 MMOL/L (ref 0–11.9)
BASOPHILS # BLD AUTO: 0.5 % (ref 0–1.8)
BASOPHILS # BLD: 0.05 K/UL (ref 0–0.12)
BUN SERPL-MCNC: 6 MG/DL (ref 8–22)
CALCIUM SERPL-MCNC: 8.6 MG/DL (ref 8.5–10.5)
CHLORIDE SERPL-SCNC: 111 MMOL/L (ref 96–112)
CO2 SERPL-SCNC: 19 MMOL/L (ref 20–33)
CREAT SERPL-MCNC: 0.65 MG/DL (ref 0.5–1.4)
EKG IMPRESSION: NORMAL
EOSINOPHIL # BLD AUTO: 0.01 K/UL (ref 0–0.51)
EOSINOPHIL NFR BLD: 0.1 % (ref 0–6.9)
ERYTHROCYTE [DISTWIDTH] IN BLOOD BY AUTOMATED COUNT: 48.8 FL (ref 35.9–50)
GLUCOSE SERPL-MCNC: 87 MG/DL (ref 65–99)
HCT VFR BLD AUTO: 31.2 % (ref 37–47)
HGB BLD-MCNC: 10.6 G/DL (ref 12–16)
IMM GRANULOCYTES # BLD AUTO: 0.05 K/UL (ref 0–0.11)
IMM GRANULOCYTES NFR BLD AUTO: 0.5 % (ref 0–0.9)
LV EJECT FRACT  99904: 65
LV EJECT FRACT MOD 2C 99903: 66.68
LV EJECT FRACT MOD 4C 99902: 65.53
LV EJECT FRACT MOD BP 99901: 65.18
LYMPHOCYTES # BLD AUTO: 1.39 K/UL (ref 1–4.8)
LYMPHOCYTES NFR BLD: 13.5 % (ref 22–41)
MAGNESIUM SERPL-MCNC: 2.5 MG/DL (ref 1.5–2.5)
MCH RBC QN AUTO: 31.3 PG (ref 27–33)
MCHC RBC AUTO-ENTMCNC: 34.4 G/DL (ref 33.6–35)
MCV RBC AUTO: 91 FL (ref 81.4–97.8)
MONOCYTES # BLD AUTO: 1.11 K/UL (ref 0–0.85)
MONOCYTES NFR BLD AUTO: 10.8 % (ref 0–13.4)
NEUTROPHILS # BLD AUTO: 7.71 K/UL (ref 2–7.15)
NEUTROPHILS NFR BLD: 74.6 % (ref 44–72)
NRBC # BLD AUTO: 0 K/UL
NRBC BLD-RTO: 0 /100 WBC
PHOSPHATE SERPL-MCNC: 3 MG/DL (ref 2.5–4.5)
PLATELET # BLD AUTO: 262 K/UL (ref 164–446)
PMV BLD AUTO: 9.9 FL (ref 9–12.9)
POTASSIUM SERPL-SCNC: 4 MMOL/L (ref 3.6–5.5)
RBC # BLD AUTO: 3.35 M/UL (ref 4.2–5.4)
SODIUM SERPL-SCNC: 139 MMOL/L (ref 135–145)
TROPONIN I SERPL-MCNC: <0.01 NG/ML (ref 0–0.04)
WBC # BLD AUTO: 10.3 K/UL (ref 4.8–10.8)

## 2018-07-13 PROCEDURE — G0378 HOSPITAL OBSERVATION PER HR: HCPCS

## 2018-07-13 PROCEDURE — A9270 NON-COVERED ITEM OR SERVICE: HCPCS | Performed by: FAMILY MEDICINE

## 2018-07-13 PROCEDURE — 85025 COMPLETE CBC W/AUTO DIFF WBC: CPT

## 2018-07-13 PROCEDURE — 93306 TTE W/DOPPLER COMPLETE: CPT | Mod: 26 | Performed by: INTERNAL MEDICINE

## 2018-07-13 PROCEDURE — 99217 PR OBSERVATION CARE DISCHARGE: CPT | Performed by: INTERNAL MEDICINE

## 2018-07-13 PROCEDURE — 84100 ASSAY OF PHOSPHORUS: CPT

## 2018-07-13 PROCEDURE — 93010 ELECTROCARDIOGRAM REPORT: CPT | Performed by: INTERNAL MEDICINE

## 2018-07-13 PROCEDURE — 36415 COLL VENOUS BLD VENIPUNCTURE: CPT

## 2018-07-13 PROCEDURE — 96367 TX/PROPH/DG ADDL SEQ IV INF: CPT

## 2018-07-13 PROCEDURE — 83735 ASSAY OF MAGNESIUM: CPT

## 2018-07-13 PROCEDURE — 96366 THER/PROPH/DIAG IV INF ADDON: CPT

## 2018-07-13 PROCEDURE — 700111 HCHG RX REV CODE 636 W/ 250 OVERRIDE (IP): Performed by: FAMILY MEDICINE

## 2018-07-13 PROCEDURE — 700101 HCHG RX REV CODE 250: Performed by: FAMILY MEDICINE

## 2018-07-13 PROCEDURE — 84484 ASSAY OF TROPONIN QUANT: CPT

## 2018-07-13 PROCEDURE — 700102 HCHG RX REV CODE 250 W/ 637 OVERRIDE(OP): Performed by: FAMILY MEDICINE

## 2018-07-13 PROCEDURE — 93005 ELECTROCARDIOGRAM TRACING: CPT | Performed by: FAMILY MEDICINE

## 2018-07-13 PROCEDURE — 80048 BASIC METABOLIC PNL TOTAL CA: CPT

## 2018-07-13 PROCEDURE — 93306 TTE W/DOPPLER COMPLETE: CPT

## 2018-07-13 RX ADMIN — CLONAZEPAM 0.5 MG: 0.5 TABLET ORAL at 06:26

## 2018-07-13 RX ADMIN — BUSPIRONE HYDROCHLORIDE 30 MG: 10 TABLET ORAL at 06:26

## 2018-07-13 RX ADMIN — ASPIRIN 81 MG: 81 TABLET, COATED ORAL at 06:26

## 2018-07-13 RX ADMIN — CHLORPROMAZINE HYDROCHLORIDE 25 MG: 25 TABLET, SUGAR COATED ORAL at 06:26

## 2018-07-13 RX ADMIN — MAGNESIUM SULFATE IN DEXTROSE 1 G: 10 INJECTION, SOLUTION INTRAVENOUS at 00:55

## 2018-07-13 RX ADMIN — POTASSIUM CHLORIDE AND SODIUM CHLORIDE: 900; 150 INJECTION, SOLUTION INTRAVENOUS at 06:26

## 2018-07-13 RX ADMIN — POTASSIUM CHLORIDE 10 MEQ: 7.46 INJECTION, SOLUTION INTRAVENOUS at 00:51

## 2018-07-13 ASSESSMENT — PAIN SCALES - GENERAL: PAINLEVEL_OUTOF10: 0

## 2018-07-13 NOTE — DISCHARGE INSTRUCTIONS
Nausea, Adult  Nausea is the feeling of an upset stomach or having to vomit. Nausea on its own is not usually a serious concern, but it may be an early sign of a more serious medical problem. As nausea gets worse, it can lead to vomiting. If vomiting develops, or if you are not able to drink enough fluids, you are at risk of becoming dehydrated. Dehydration can make you tired and thirsty, cause you to have a dry mouth, and decrease how often you urinate. Older adults and people with other diseases or a weak immune system are at higher risk for dehydration. The main goals of treating your nausea are:  · To limit repeated nausea episodes.  · To prevent vomiting and dehydration.  Follow these instructions at home:  Follow instructions from your health care provider about how to care for yourself at home.  Eating and drinking  Follow these recommendations as told by your health care provider:  · Take an oral rehydration solution (ORS). This is a drink that is sold at pharmacies and retail stores.  · Drink clear fluids in small amounts as you are able. Clear fluids include water, ice chips, diluted fruit juice, and low-calorie sports drinks.  · Eat bland, easy-to-digest foods in small amounts as you are able. These foods include bananas, applesauce, rice, lean meats, toast, and crackers.  · Avoid drinking fluids that contain a lot of sugar or caffeine, such as energy drinks, sports drinks, and soda.  · Avoid alcohol.  · Avoid spicy or fatty foods.  General instructions  · Drink enough fluid to keep your urine clear or pale yellow.  · Wash your hands often. If soap and water are not available, use hand .  · Make sure that all people in your household wash their hands well and often.  · Rest at home while you recover.  · Take over-the-counter and prescription medicines only as told by your health care provider.  · Breathe slowly and deeply when you feel nauseous.  · Watch your condition for any changes.  · Keep  all follow-up visits as told by your health care provider. This is important.  Contact a health care provider if:  · You have a headache.  · You have new symptoms.  · Your nausea gets worse.  · You have a fever.  · You feel light-headed or dizzy.  · You vomit.  · You cannot keep fluids down.  Get help right away if:  · You have pain in your chest, neck, arm, or jaw.  · You feel extremely weak or you faint.  · You have vomit that is bright red or looks like coffee grounds.  · You have bloody or black stools or stools that look like tar.  · You have a severe headache, a stiff neck, or both.  · You have severe pain, cramping, or bloating in your abdomen.  · You have a rash.  · You have difficulty breathing or are breathing very quickly.  · Your heart is beating very quickly.  · Your skin feels cold and clammy.  · You feel confused.  · You have pain when you urinate.  · You have signs of dehydration, such as:  ¨ Dark urine, very little, or no urine.  ¨ Cracked lips.  ¨ Dry mouth.  ¨ Sunken eyes.  ¨ Sleepiness.  ¨ Weakness.  These symptoms may represent a serious problem that is an emergency. Do not wait to see if the symptoms will go away. Get medical help right away. Call your local emergency services (911 in the [place] Do not drive yourself to the hospital.   This information is not intended to replace advice given to you by your health care provider. Make sure you discuss any questions you have with your health care provider.  Document Released: 01/25/2006 Document Revised: 05/22/2017 Document Reviewed: 08/23/2016  [employer] Patient Education © 2017 [employer]    Discharge Instructions    Discharged to home by car with relative. Discharged via walking, hospital escort: Yes.  Special equipment needed: Not Applicable    Be sure to schedule a follow-up appointment with your primary care doctor or any specialists as instructed.     Discharge Plan:   Diet Plan: Discussed  Activity Level: Discussed  Confirmed  Follow up Appointment: Patient to Call and Schedule Appointment  Confirmed Symptoms Management: Discussed  Medication Reconciliation Updated: Yes  Influenza Vaccine Indication: Patient Refuses    I understand that a diet low in cholesterol, fat, and sodium is recommended for good health. Unless I have been given specific instructions below for another diet, I accept this instruction as my diet prescription.   Other diet: Regular    Special Instructions: None    · Is patient discharged on Warfarin / Coumadin?   No     Depression / Suicide Risk    As you are discharged from this [facility] facility, it is important to learn how to keep safe from harming yourself.    Recognize the warning signs:  · Abrupt changes in personality, positive or negative- including increase in energy   · Giving away possessions  · Change in eating patterns- significant weight changes-  positive or negative  · Change in sleeping patterns- unable to sleep or sleeping all the time   · Unwillingness or inability to communicate  · Depression  · Unusual sadness, discouragement and loneliness  · Talk of wanting to die  · Neglect of personal appearance   · Rebelliousness- reckless behavior  · Withdrawal from people/activities they love  · Confusion- inability to concentrate     If you or a loved one observes any of these behaviors or has concerns about self-harm, here's what you can do:  · Talk about it- your feelings and reasons for harming yourself  · Remove any means that you might use to hurt yourself (examples: pills, rope, extension cords, firearm)  · Get professional help from the community (Mental Health, Substance Abuse, psychological counseling)  · Do not be alone:Call your Safe Contact- someone whom you trust who will be there for you.  · Call your local CRISIS HOTLINE 502-3459 or 242-522-8398  · Call your local Children's Mobile Crisis Response Team [place] (993) 758-6865 or www.Yakaz  · Call the toll free [place]  Suicide Prevention Hotlines   · National Suicide Prevention Lifeline 464-173-LTTS (0163)  · National Hope Line Network 800-SUICIDE (027-1846)             d

## 2018-07-13 NOTE — PROGRESS NOTES
Pt given and understands discharge instructions; PIV removed, covered and wrapped with coban.  Pt waiting for ride home with .

## 2018-07-13 NOTE — PROGRESS NOTES
Charge RN tried twice to establish an IV but was unsuccessful.  ED Charge Sekou paged and will get someone to come place an ultrasound IV.

## 2018-07-13 NOTE — PROGRESS NOTES
Pt left unit walking with . Denied need for WC or hospital escort. All belongings and discharge paperwork in posession

## 2018-07-13 NOTE — PROGRESS NOTES
Assumed care at 0700 with Moraima BROREGO. Received report from Ruel BORREGO. A&O x4. Pt denies pain/nausea at this time. Requesting to try regular diet for breakfast, will place order and notify kitchen. Respirations even and unlabored on room air.   Updated on POC, communication board updated. Bed locked and in lowest position. Call light and belongings within reach. Non-skid socks in place. Needs met, will continue to monitor.

## 2018-07-13 NOTE — DISCHARGE SUMMARY
Discharge Summary    CHIEF COMPLAINT ON ADMISSION  Chief Complaint   Patient presents with   • Nausea/Vomiting/Diarrhea     Since 0300 this morning, unable to tolerate PO       Reason for Admission  [place]     Admission Date  7/12/2018  Discharge Date  07/13/18    CODE STATUS  Full Code    HPI & HOSPITAL COURSE  This is a 43 y.o. female with PMH narcotic dependence, marijuana use and anxiety here with nausea and vomiting.  She is a recovering alcoholic who has been sober for over 11 years now.  She has smoked marijuana in the past and it caused her nausea and vomiting so she has been abstinent.  Today she felt like she was having a panic attack and smoked some marijuana so which caused her to have intractable vomiting.  She presented with hypokalemia and this was replaced.  She was given IV fluid hydration as well as antiemetics.  Twelve-lead EKG did show some diffuse T-wave inversions.  Her troponins were negative and she denied any chest pain or shortness of breath.  CT showed no abnormalities in the abdomen or pelvis.  She is very tearful this morning states she just wants to go home and be with her .  Recently they have decided to stop trying to have children as they have been unsuccessful in their attempts.  She states this is why she smoked marijuana and likely what made her sick.  She has had no further episodes of vomiting and currently denies nausea.  She tolerated a diet this morning.    Therefore, she is discharged in good and stable condition to home with close outpatient follow-up.    FOLLOW UP ITEMS POST DISCHARGE  PCP  Follow up with psychiatry as previously scheduled    DISCHARGE DIAGNOSES  Principal Problem:    Dehydration POA: Yes  Active Problems:    Abnormal EKG POA: Yes    Anxiety (Chronic) POA: Yes    Metabolic acidosis POA: Yes    Hyperglycemia POA: Yes    Hypomagnesemia POA: Yes    Metabolic acidosis POA: Yes  Resolved Problems:    Hypokalemia POA: Yes    Intractable nausea and vomiting  "POA: Yes    Hyponatremia POA: Yes    Leukocytosis POA: Yes      FOLLOW UP   was orchestrating follow-up for patient at time of dictation of this note.    MEDICATIONS ON DISCHARGE     Medication List      CONTINUE taking these medications      Instructions   busPIRone 30 MG tablet  Commonly known as:  BUSPAR   Take 30 mg by mouth 2 Times a Day.  Dose:  30 mg     chlorproMAZINE 25 MG Tabs  Commonly known as:  THORAZINE   Take 1 Tab by mouth 3 times a day.  Dose:  25 mg     clonazePAM 0.5 MG Tabs  Commonly known as:  KLONOPIN   Take 0.5 mg by mouth 2 times a day.  Dose:  0.5 mg     diphenhydrAMINE 25 MG Tabs  Commonly known as:  BENADRYL   Take 50 mg by mouth at bedtime as needed for Sleep or Itching.  Dose:  50 mg     sertraline 100 MG Tabs  Commonly known as:  ZOLOFT   Take 100 mg by mouth every bedtime.  Dose:  100 mg            Allergies  Allergies   Allergen Reactions   • Metoclopramide Hives     Told by MD; pt suspects possible hives.   • Bactrim [Sulfamethoxazole W-Trimethoprim] Unspecified     Someone said I had a rash or something.      • Seroquel [Quetiapine] Unspecified     \"Seizures\"  RXN=unknown       DIET  Orders Placed This Encounter   Procedures   • Diet Order Regular     Standing Status:   Standing     Number of Occurrences:   1     Order Specific Question:   Diet:     Answer:   Regular [1]       ACTIVITY  As tolerated.  Weight bearing as tolerated    CONSULTATIONS  NA    PROCEDURES  NA    LABORATORY  Lab Results   Component Value Date    SODIUM 139 07/13/2018    POTASSIUM 4.0 07/13/2018    CHLORIDE 111 07/13/2018    CO2 19 (L) 07/13/2018    GLUCOSE 87 07/13/2018    BUN 6 (L) 07/13/2018    CREATININE 0.65 07/13/2018    CREATININE 0.9 05/18/2009        Lab Results   Component Value Date    WBC 10.3 07/13/2018    HEMOGLOBIN 10.6 (L) 07/13/2018    HEMATOCRIT 31.2 (L) 07/13/2018    PLATELETCT 262 07/13/2018        Cathy Osorio A.P.R.N.  "

## 2018-07-13 NOTE — H&P
Hospital Medicine History & Physical Note    Date of Service  7/12/2018    Primary Care Physician  Pcp Pt States None    Consultants  None    Code Status  Full    Chief Complaint  Nausea and vomiting    History of Presenting Illness  43 y.o. female who presented 7/12/2018 with nausea and vomiting started today.  Patient states she was in the car that she had a sudden onset of panic attack.  She did start having intractable nausea vomiting which would occur whenever she would get anxiety attacks.  Here in the emergency room shows she she was found to have low potassium and magnesium, dehydration with an anion gap.  Also shows abnormal EKG with diffuse T-wave inversions, she denies having any chest pain palpitations shortness of breath.  Her troponin so to be negative.  CT scan abdomen and pelvis is negative.    Review of Systems  Review of Systems   Constitutional: Positive for malaise/fatigue. Negative for chills and fever.   HENT: Negative for hearing loss and sore throat.    Eyes: Negative for blurred vision.   Respiratory: Negative for cough, shortness of breath and wheezing.    Cardiovascular: Negative for chest pain and leg swelling.   Gastrointestinal: Positive for nausea and vomiting. Negative for abdominal pain, diarrhea and heartburn.   Genitourinary: Negative for dysuria.   Musculoskeletal: Negative for back pain and neck pain.   Skin: Negative for rash.   Neurological: Positive for weakness. Negative for dizziness and headaches.   Psychiatric/Behavioral: The patient is not nervous/anxious.        Past Medical History   has a past medical history of Anxiety; Anxiety disorder; Backpain; CLOSTRIDIUM DIFFICILE INFECTION (4/14/2010); CVS disease; Cyclic vomiting syndrome; Dental disorder; Drug-seeking behavior; Dyspepsia (7/12/2009); Nephrolithiasis (6/20/2009); Pain; Snoring; Superior mesenteric artery syndrome (HCC) (7/12/2009); Superior mesenteric artery syndrome (HCC); and Tobacco abuse  "(6/20/2009).    Surgical History   has a past surgical history that includes gastroscopy-endo (7/8/2009); lithotripsy; pyloroplasty (7/27/2009); gastroscopy with biopsy (3/9/2010); exploratory laparotomy (7/27/2009); appendectomy (7/27/2009); cholecystectomy (7/27/2009); other; exploratory laparotomy (1/12/2016); bowel resection (1/12/2016); and gastroscopy-endo (4/28/2016).     Family History  family history includes Allergies in her father; Cancer (age of onset: 50) in her mother; Heart Disease in her maternal grandmother; Hypertension in her father and mother.     Social History   reports that she has never smoked. She has never used smokeless tobacco. She reports that she uses drugs, including Marijuana and Inhaled. She reports that she does not drink alcohol.    Allergies  Allergies   Allergen Reactions   • Metoclopramide Hives     Told by MD; pt suspects possible hives.   • Bactrim [Sulfamethoxazole W-Trimethoprim] Unspecified     Someone said I had a rash or something.      • Seroquel [Quetiapine] Unspecified     \"Seizures\"  RXN=unknown       Medications  Prior to Admission Medications   Prescriptions Last Dose Informant Patient Reported? Taking?   busPIRone (BUSPAR) 30 MG tablet 7/12/2018 at AM Rx Bottle (For Med Information) Yes Yes   Sig: Take 30 mg by mouth 2 Times a Day.   chlorproMAZINE (THORAZINE) 25 MG Tab 7/12/2018 at AM Rx Bottle (For Med Information) No No   Sig: Take 1 Tab by mouth 3 times a day.   clonazePAM (KLONOPIN) 0.5 MG Tab 7/12/2018 at AM Rx Bottle (For Med Information) Yes Yes   Sig: Take 0.5 mg by mouth 2 times a day.   diphenhydrAMINE (BENADRYL) 25 MG Tab 7/9/2018 at PRN Patient Yes Yes   Sig: Take 50 mg by mouth at bedtime as needed for Sleep or Itching.   sertraline (ZOLOFT) 100 MG Tab 7/11/2018 at HS Rx Bottle (For Med Information) Yes Yes   Sig: Take 100 mg by mouth every bedtime.      Facility-Administered Medications: None       Physical Exam  Blood Pressure: 105/62   Temperature: " 36.9 °C (98.4 °F)   Pulse: 67   Respiration: 16   Pulse Oximetry: 98 %     Physical Exam   Constitutional: She is oriented to person, place, and time. She appears well-developed and well-nourished.   HENT:   Head: Normocephalic and atraumatic.   Eyes: Conjunctivae are normal. Pupils are equal, round, and reactive to light.   Neck: No tracheal deviation present. No thyromegaly present.   Cardiovascular: Normal rate and regular rhythm.    Pulmonary/Chest: Effort normal and breath sounds normal.   Abdominal: Soft. Bowel sounds are normal. She exhibits no distension. There is no tenderness.   Musculoskeletal: She exhibits no edema.   Lymphadenopathy:     She has no cervical adenopathy.   Neurological: She is alert and oriented to person, place, and time.   Skin: Skin is warm and dry.   Nursing note and vitals reviewed.      Laboratory:  Recent Labs      07/12/18   1201   WBC  13.0*   RBC  4.35   HEMOGLOBIN  13.4   HEMATOCRIT  38.7   MCV  89.0   MCH  30.8   MCHC  34.6   RDW  45.3   PLATELETCT  341   MPV  10.0     Recent Labs      07/12/18   1201  07/12/18   1930   SODIUM  132*  139   POTASSIUM  3.2*  2.9*   CHLORIDE  100  114*   CO2  14*  16*   GLUCOSE  268*  97   BUN  11  8   CREATININE  0.98  0.57   CALCIUM  10.4  7.1*     Recent Labs      07/12/18   1201  07/12/18   1930   ALTSGPT  7   --    ASTSGOT  12   --    ALKPHOSPHAT  105*   --    TBILIRUBIN  1.2   --    LIPASE  3*   --    GLUCOSE  268*  97                 Lab Results   Component Value Date    TROPONINI <0.01 07/12/2018       Urinalysis:    Lab Results   Component Value Date    SPECGRAVITY 1.028 07/12/2018    GLUCOSEUR >=1000 (A) 07/12/2018    KETONES >=160 07/12/2018    NITRITE Negative 07/12/2018    WBCURINE 2-5 08/29/2017    RBCURINE Rare 08/29/2017    BACTERIA Many (A) 08/29/2017    EPITHELCELL Moderate (A) 08/29/2017        Imaging:  CT-RENAL COLIC EVALUATION(A/P W/O)   Final Result      No acute abnormality in the abdomen or pelvis. No nephrolithiasis or  hydronephrosis.      ECHOCARDIOGRAM COMP W/O CONT    (Results Pending)         Assessment/Plan:  I anticipate this patient is appropriate for observation status at this time.    * Dehydration- (present on admission)   Assessment & Plan    IVF normal saline        Intractable nausea and vomiting- (present on admission)   Assessment & Plan    Symptomatic treatment  IVF normal saline  Trial of clear liquids        Abnormal EKG- (present on admission)   Assessment & Plan    ASA  Serial troponin, check echocardiogram  Repeat EKG in the morning        Hypokalemia- (present on admission)   Assessment & Plan    IV KCl, follow BMP        Hypomagnesemia- (present on admission)   Assessment & Plan    IV magnesium  Check level in the morning        Hyperglycemia- (present on admission)   Assessment & Plan    Likely reactive  Recent hemoglobin A1c 6.0        Metabolic acidosis- (present on admission)   Assessment & Plan    IVF normal saline  Follow BMP        Leukocytosis- (present on admission)   Assessment & Plan    Likely reactive  Follow CBC        Hyponatremia- (present on admission)   Assessment & Plan    IVF normal saline, follow BMP        Anxiety- (present on admission)   Assessment & Plan    Continue current medications            VTE prophylaxis: Lovenox

## 2018-07-13 NOTE — DISCHARGE PLANNING
Care Transition Team Assessment    Met with pt at bedside. According to pt she is independent at baseline and does not use assistive devices. Pt is tearful saying she has recently gotten  and is upset she is unable to have children. Provided support and attentive listening. Pt does not have PCP, offered to obtain apt with PCP. Pt declined saying that as she now has Commercial insurance she wants to find her own PCP, pt said prior PCP had been with [facility] but as the MD's were graduating she was seeing someone different frequently. No other needs identified. Pt confirmed her  will provide transport.    Information Source  Orientation : Oriented x 4  Information Given By: Patient    Readmission Evaluation  Is this a readmission?: No    Interdisciplinary Discharge Planning  Does Admitting Nurse Feel This Could be a Complex Discharge?: No  Primary Care Physician: None  Lives with - Patient's Self Care Capacity: Spouse  Patient or legal guardian wants to designate a caregiver (see row info): No  Support Systems: Spouse / Significant Other  Do You Take your Prescribed Medications Regularly: Yes  Able to Return to Previous ADL's: Yes  Mobility Issues: No  Prior Services: None  Patient Expects to be Discharged to:: Home  Assistance Needed: No  Durable Medical Equipment: Not Applicable    Discharge Preparedness  What are your discharge supports?: Spouse  Prior Functional Level: Independent with Activities of Daily Living, Independent with Medication Management, Ambulatory  Difficulity with ADLs: None  Difficulity with IADLs: None    Functional Assesment  Prior Functional Level: Independent with Activities of Daily Living, Independent with Medication Management, Ambulatory    Finances  Prescription Coverage: Yes     Discharge Risks or Barriers  Discharge risks or barriers?: No PCP    Anticipated Discharge Information  Anticipated discharge disposition: Home  Discharge Address: [address] [place] [address] , [place]  Discharge  Contact Phone Number: Spouse [place] [phone]

## 2018-07-13 NOTE — PROGRESS NOTES
Assessment and admit profile complete.  Patient was requesting ativan, MD paged and returned call. No new orders at this time. Unable to flush patients left AC IV at this time. Will attempt to place new IV. Patient understands plan of care at this time. One emesis at this time. Call light within reach.

## 2018-07-27 ENCOUNTER — APPOINTMENT (OUTPATIENT)
Dept: INTERNAL MEDICINE | Facility: MEDICAL CENTER | Age: 43
End: 2018-07-27
Payer: COMMERCIAL

## 2018-09-09 ENCOUNTER — HOSPITAL ENCOUNTER (INPATIENT)
Facility: MEDICAL CENTER | Age: 43
LOS: 6 days | DRG: 871 | End: 2018-09-15
Attending: EMERGENCY MEDICINE | Admitting: HOSPITALIST
Payer: COMMERCIAL

## 2018-09-09 PROBLEM — E87.6 HYPOKALEMIA: Status: ACTIVE | Noted: 2018-09-09

## 2018-09-09 PROBLEM — N17.9 AKI (ACUTE KIDNEY INJURY) (HCC): Status: ACTIVE | Noted: 2018-09-09

## 2018-09-09 LAB
ALBUMIN SERPL BCP-MCNC: 4.7 G/DL (ref 3.2–4.9)
ALBUMIN/GLOB SERPL: 1 G/DL
ALP SERPL-CCNC: 116 U/L (ref 30–99)
ALT SERPL-CCNC: 7 U/L (ref 2–50)
ANION GAP SERPL CALC-SCNC: 24 MMOL/L (ref 0–11.9)
AST SERPL-CCNC: 13 U/L (ref 12–45)
BASE EXCESS BLDV CALC-SCNC: -8 MMOL/L
BASOPHILS # BLD AUTO: 0.3 % (ref 0–1.8)
BASOPHILS # BLD: 0.05 K/UL (ref 0–0.12)
BILIRUB SERPL-MCNC: 0.8 MG/DL (ref 0.1–1.5)
BODY TEMPERATURE: ABNORMAL CENTIGRADE
BUN SERPL-MCNC: 11 MG/DL (ref 8–22)
CALCIUM SERPL-MCNC: 11.6 MG/DL (ref 8.5–10.5)
CHLORIDE SERPL-SCNC: 96 MMOL/L (ref 96–112)
CO2 SERPL-SCNC: 11 MMOL/L (ref 20–33)
CREAT SERPL-MCNC: 1.98 MG/DL (ref 0.5–1.4)
EOSINOPHIL # BLD AUTO: 0.03 K/UL (ref 0–0.51)
EOSINOPHIL NFR BLD: 0.2 % (ref 0–6.9)
ERYTHROCYTE [DISTWIDTH] IN BLOOD BY AUTOMATED COUNT: 50.8 FL (ref 35.9–50)
GLOBULIN SER CALC-MCNC: 4.9 G/DL (ref 1.9–3.5)
GLUCOSE SERPL-MCNC: 395 MG/DL (ref 65–99)
HCG SERPL QL: NEGATIVE
HCO3 BLDV-SCNC: 12 MMOL/L (ref 24–28)
HCT VFR BLD AUTO: 46.9 % (ref 37–47)
HGB BLD-MCNC: 16 G/DL (ref 12–16)
IMM GRANULOCYTES # BLD AUTO: 0.14 K/UL (ref 0–0.11)
IMM GRANULOCYTES NFR BLD AUTO: 0.9 % (ref 0–0.9)
LACTATE BLD-SCNC: 5.4 MMOL/L (ref 0.5–2)
LIPASE SERPL-CCNC: 12 U/L (ref 11–82)
LYMPHOCYTES # BLD AUTO: 0.95 K/UL (ref 1–4.8)
LYMPHOCYTES NFR BLD: 6 % (ref 22–41)
MCH RBC QN AUTO: 30.5 PG (ref 27–33)
MCHC RBC AUTO-ENTMCNC: 34.1 G/DL (ref 33.6–35)
MCV RBC AUTO: 89.5 FL (ref 81.4–97.8)
MONOCYTES # BLD AUTO: 1.35 K/UL (ref 0–0.85)
MONOCYTES NFR BLD AUTO: 8.5 % (ref 0–13.4)
NEUTROPHILS # BLD AUTO: 13.3 K/UL (ref 2–7.15)
NEUTROPHILS NFR BLD: 84.1 % (ref 44–72)
NRBC # BLD AUTO: 0 K/UL
NRBC BLD-RTO: 0 /100 WBC
PCO2 BLDV: 16.3 MMHG (ref 41–51)
PH BLDV: 7.5 [PH] (ref 7.31–7.45)
PLATELET # BLD AUTO: 437 K/UL (ref 164–446)
PMV BLD AUTO: 9.8 FL (ref 9–12.9)
PO2 BLDV: 21.3 MMHG (ref 25–40)
POTASSIUM SERPL-SCNC: 3.5 MMOL/L (ref 3.6–5.5)
PROT SERPL-MCNC: 9.6 G/DL (ref 6–8.2)
RBC # BLD AUTO: 5.24 M/UL (ref 4.2–5.4)
SAO2 % BLDV: 42.5 %
SODIUM SERPL-SCNC: 131 MMOL/L (ref 135–145)
WBC # BLD AUTO: 15.8 K/UL (ref 4.8–10.8)

## 2018-09-09 PROCEDURE — 83690 ASSAY OF LIPASE: CPT

## 2018-09-09 PROCEDURE — 700105 HCHG RX REV CODE 258: Performed by: HOSPITALIST

## 2018-09-09 PROCEDURE — 99291 CRITICAL CARE FIRST HOUR: CPT

## 2018-09-09 PROCEDURE — 82803 BLOOD GASES ANY COMBINATION: CPT

## 2018-09-09 PROCEDURE — 96361 HYDRATE IV INFUSION ADD-ON: CPT

## 2018-09-09 PROCEDURE — 80053 COMPREHEN METABOLIC PANEL: CPT

## 2018-09-09 PROCEDURE — 36415 COLL VENOUS BLD VENIPUNCTURE: CPT

## 2018-09-09 PROCEDURE — 96375 TX/PRO/DX INJ NEW DRUG ADDON: CPT

## 2018-09-09 PROCEDURE — 700111 HCHG RX REV CODE 636 W/ 250 OVERRIDE (IP): Performed by: EMERGENCY MEDICINE

## 2018-09-09 PROCEDURE — 85025 COMPLETE CBC W/AUTO DIFF WBC: CPT

## 2018-09-09 PROCEDURE — 99223 1ST HOSP IP/OBS HIGH 75: CPT | Mod: AI | Performed by: HOSPITALIST

## 2018-09-09 PROCEDURE — 83605 ASSAY OF LACTIC ACID: CPT

## 2018-09-09 PROCEDURE — 84703 CHORIONIC GONADOTROPIN ASSAY: CPT

## 2018-09-09 PROCEDURE — 83036 HEMOGLOBIN GLYCOSYLATED A1C: CPT

## 2018-09-09 PROCEDURE — G0378 HOSPITAL OBSERVATION PER HR: HCPCS

## 2018-09-09 PROCEDURE — 700105 HCHG RX REV CODE 258: Performed by: EMERGENCY MEDICINE

## 2018-09-09 PROCEDURE — 700111 HCHG RX REV CODE 636 W/ 250 OVERRIDE (IP): Performed by: HOSPITALIST

## 2018-09-09 PROCEDURE — 770022 HCHG ROOM/CARE - ICU (200)

## 2018-09-09 RX ORDER — ACETAMINOPHEN 325 MG/1
650 TABLET ORAL ONCE
Status: DISPENSED | OUTPATIENT
Start: 2018-09-09 | End: 2018-09-10

## 2018-09-09 RX ORDER — DEXTROSE MONOHYDRATE 25 G/50ML
25 INJECTION, SOLUTION INTRAVENOUS
Status: DISCONTINUED | OUTPATIENT
Start: 2018-09-09 | End: 2018-09-10

## 2018-09-09 RX ORDER — AMOXICILLIN 250 MG
2 CAPSULE ORAL 2 TIMES DAILY
Status: DISCONTINUED | OUTPATIENT
Start: 2018-09-10 | End: 2018-09-15 | Stop reason: HOSPADM

## 2018-09-09 RX ORDER — HEPARIN SODIUM 5000 [USP'U]/ML
5000 INJECTION, SOLUTION INTRAVENOUS; SUBCUTANEOUS EVERY 8 HOURS
Status: DISCONTINUED | OUTPATIENT
Start: 2018-09-10 | End: 2018-09-11

## 2018-09-09 RX ORDER — SODIUM CHLORIDE 9 MG/ML
1000 INJECTION, SOLUTION INTRAVENOUS ONCE
Status: COMPLETED | OUTPATIENT
Start: 2018-09-10 | End: 2018-09-10

## 2018-09-09 RX ORDER — SODIUM CHLORIDE 9 MG/ML
INJECTION, SOLUTION INTRAVENOUS CONTINUOUS
Status: DISCONTINUED | OUTPATIENT
Start: 2018-09-09 | End: 2018-09-10

## 2018-09-09 RX ORDER — PROMETHAZINE HYDROCHLORIDE 25 MG/1
12.5-25 TABLET ORAL EVERY 4 HOURS PRN
Status: DISCONTINUED | OUTPATIENT
Start: 2018-09-09 | End: 2018-09-15 | Stop reason: HOSPADM

## 2018-09-09 RX ORDER — OXYCODONE HYDROCHLORIDE 5 MG/1
5 TABLET ORAL
Status: DISCONTINUED | OUTPATIENT
Start: 2018-09-09 | End: 2018-09-15 | Stop reason: HOSPADM

## 2018-09-09 RX ORDER — BUSPIRONE HYDROCHLORIDE 10 MG/1
30 TABLET ORAL 2 TIMES DAILY
Status: DISCONTINUED | OUTPATIENT
Start: 2018-09-10 | End: 2018-09-15 | Stop reason: HOSPADM

## 2018-09-09 RX ORDER — SERTRALINE HYDROCHLORIDE 100 MG/1
100 TABLET, FILM COATED ORAL
Status: DISCONTINUED | OUTPATIENT
Start: 2018-09-10 | End: 2018-09-15 | Stop reason: HOSPADM

## 2018-09-09 RX ORDER — SODIUM CHLORIDE 9 MG/ML
1000 INJECTION, SOLUTION INTRAVENOUS ONCE
Status: COMPLETED | OUTPATIENT
Start: 2018-09-09 | End: 2018-09-09

## 2018-09-09 RX ORDER — HALOPERIDOL 5 MG/ML
3 INJECTION INTRAMUSCULAR ONCE
Status: COMPLETED | OUTPATIENT
Start: 2018-09-09 | End: 2018-09-09

## 2018-09-09 RX ORDER — ONDANSETRON 2 MG/ML
4 INJECTION INTRAMUSCULAR; INTRAVENOUS EVERY 4 HOURS PRN
Status: DISCONTINUED | OUTPATIENT
Start: 2018-09-09 | End: 2018-09-15 | Stop reason: HOSPADM

## 2018-09-09 RX ORDER — DIPHENHYDRAMINE HCL 25 MG
50 TABLET ORAL NIGHTLY PRN
Status: DISCONTINUED | OUTPATIENT
Start: 2018-09-09 | End: 2018-09-15 | Stop reason: HOSPADM

## 2018-09-09 RX ORDER — BISACODYL 10 MG
10 SUPPOSITORY, RECTAL RECTAL
Status: DISCONTINUED | OUTPATIENT
Start: 2018-09-09 | End: 2018-09-15 | Stop reason: HOSPADM

## 2018-09-09 RX ORDER — OXYCODONE HYDROCHLORIDE 10 MG/1
10 TABLET ORAL
Status: DISCONTINUED | OUTPATIENT
Start: 2018-09-09 | End: 2018-09-15 | Stop reason: HOSPADM

## 2018-09-09 RX ORDER — CLONAZEPAM 0.5 MG/1
0.5 TABLET ORAL 2 TIMES DAILY
Status: DISCONTINUED | OUTPATIENT
Start: 2018-09-10 | End: 2018-09-12

## 2018-09-09 RX ORDER — POTASSIUM CHLORIDE 7.45 MG/ML
10 INJECTION INTRAVENOUS
Status: DISPENSED | OUTPATIENT
Start: 2018-09-09 | End: 2018-09-10

## 2018-09-09 RX ORDER — CHLORPROMAZINE HYDROCHLORIDE 25 MG/1
25 TABLET, FILM COATED ORAL 3 TIMES DAILY
Status: DISCONTINUED | OUTPATIENT
Start: 2018-09-10 | End: 2018-09-13

## 2018-09-09 RX ORDER — ONDANSETRON 4 MG/1
4 TABLET, ORALLY DISINTEGRATING ORAL EVERY 4 HOURS PRN
Status: DISCONTINUED | OUTPATIENT
Start: 2018-09-09 | End: 2018-09-15 | Stop reason: HOSPADM

## 2018-09-09 RX ORDER — POLYETHYLENE GLYCOL 3350 17 G/17G
1 POWDER, FOR SOLUTION ORAL
Status: DISCONTINUED | OUTPATIENT
Start: 2018-09-09 | End: 2018-09-15 | Stop reason: HOSPADM

## 2018-09-09 RX ORDER — HYDROMORPHONE HYDROCHLORIDE 2 MG/ML
0.5 INJECTION, SOLUTION INTRAMUSCULAR; INTRAVENOUS; SUBCUTANEOUS
Status: DISCONTINUED | OUTPATIENT
Start: 2018-09-09 | End: 2018-09-15 | Stop reason: HOSPADM

## 2018-09-09 RX ORDER — LORAZEPAM 2 MG/ML
1 INJECTION INTRAMUSCULAR ONCE
Status: COMPLETED | OUTPATIENT
Start: 2018-09-09 | End: 2018-09-09

## 2018-09-09 RX ORDER — PROMETHAZINE HYDROCHLORIDE 12.5 MG/1
12.5-25 SUPPOSITORY RECTAL EVERY 4 HOURS PRN
Status: DISCONTINUED | OUTPATIENT
Start: 2018-09-09 | End: 2018-09-15 | Stop reason: HOSPADM

## 2018-09-09 RX ADMIN — HALOPERIDOL LACTATE 3 MG: 5 INJECTION, SOLUTION INTRAMUSCULAR at 21:15

## 2018-09-09 RX ADMIN — LORAZEPAM 1 MG: 2 INJECTION INTRAMUSCULAR; INTRAVENOUS at 21:15

## 2018-09-09 RX ADMIN — SODIUM CHLORIDE 1000 ML: 9 INJECTION, SOLUTION INTRAVENOUS at 23:59

## 2018-09-09 RX ADMIN — SODIUM CHLORIDE 1000 ML: 9 INJECTION, SOLUTION INTRAVENOUS at 22:56

## 2018-09-09 RX ADMIN — SODIUM CHLORIDE 1000 ML: 9 INJECTION, SOLUTION INTRAVENOUS at 21:15

## 2018-09-09 ASSESSMENT — ENCOUNTER SYMPTOMS
FLANK PAIN: 0
BRUISES/BLEEDS EASILY: 0
PALPITATIONS: 0
DIZZINESS: 0
FEVER: 0
FOCAL WEAKNESS: 0
HEMOPTYSIS: 0
NAUSEA: 1
WEAKNESS: 0
DEPRESSION: 0
BLURRED VISION: 0
CHILLS: 0
ABDOMINAL PAIN: 1
DOUBLE VISION: 0
COUGH: 0
SPEECH CHANGE: 0
VOMITING: 1
HALLUCINATIONS: 0
SENSORY CHANGE: 0
EYE DISCHARGE: 0
HEARTBURN: 0
SHORTNESS OF BREATH: 0
MYALGIAS: 0

## 2018-09-09 ASSESSMENT — PAIN SCALES - GENERAL: PAINLEVEL_OUTOF10: 10

## 2018-09-09 ASSESSMENT — LIFESTYLE VARIABLES: SUBSTANCE_ABUSE: 0

## 2018-09-10 ENCOUNTER — APPOINTMENT (OUTPATIENT)
Dept: RADIOLOGY | Facility: MEDICAL CENTER | Age: 43
DRG: 871 | End: 2018-09-10
Attending: HOSPITALIST
Payer: COMMERCIAL

## 2018-09-10 PROBLEM — G93.40 ENCEPHALOPATHY: Status: ACTIVE | Noted: 2018-09-10

## 2018-09-10 PROBLEM — R11.2 NAUSEA AND VOMITING: Status: ACTIVE | Noted: 2018-09-10

## 2018-09-10 PROBLEM — A41.9 SEPSIS (HCC): Status: ACTIVE | Noted: 2018-09-10

## 2018-09-10 PROBLEM — R10.9 ABDOMINAL PAIN: Status: ACTIVE | Noted: 2018-09-10

## 2018-09-10 LAB
ALBUMIN SERPL BCP-MCNC: 3.6 G/DL (ref 3.2–4.9)
ALBUMIN/GLOB SERPL: 1 G/DL
ALP SERPL-CCNC: 85 U/L (ref 30–99)
ALT SERPL-CCNC: 5 U/L (ref 2–50)
ANION GAP SERPL CALC-SCNC: 16 MMOL/L (ref 0–11.9)
ANION GAP SERPL CALC-SCNC: 16 MMOL/L (ref 0–11.9)
ANION GAP SERPL CALC-SCNC: 7 MMOL/L (ref 0–11.9)
APPEARANCE UR: CLEAR
AST SERPL-CCNC: 10 U/L (ref 12–45)
B-OH-BUTYR SERPL-MCNC: 0.1 MMOL/L (ref 0.02–0.27)
BASOPHILS # BLD AUTO: 0.3 % (ref 0–1.8)
BASOPHILS # BLD: 0.04 K/UL (ref 0–0.12)
BILIRUB SERPL-MCNC: 0.7 MG/DL (ref 0.1–1.5)
BILIRUB UR QL STRIP.AUTO: NEGATIVE
BUN SERPL-MCNC: 10 MG/DL (ref 8–22)
BUN SERPL-MCNC: 11 MG/DL (ref 8–22)
BUN SERPL-MCNC: 8 MG/DL (ref 8–22)
CALCIUM SERPL-MCNC: 7.7 MG/DL (ref 8.5–10.5)
CALCIUM SERPL-MCNC: 9.2 MG/DL (ref 8.5–10.5)
CALCIUM SERPL-MCNC: 9.3 MG/DL (ref 8.5–10.5)
CHLORIDE SERPL-SCNC: 110 MMOL/L (ref 96–112)
CHLORIDE SERPL-SCNC: 110 MMOL/L (ref 96–112)
CHLORIDE SERPL-SCNC: 117 MMOL/L (ref 96–112)
CHOLEST SERPL-MCNC: 160 MG/DL (ref 100–199)
CO2 SERPL-SCNC: 11 MMOL/L (ref 20–33)
CO2 SERPL-SCNC: 11 MMOL/L (ref 20–33)
CO2 SERPL-SCNC: 15 MMOL/L (ref 20–33)
COLOR UR: YELLOW
CREAT SERPL-MCNC: 0.89 MG/DL (ref 0.5–1.4)
CREAT SERPL-MCNC: 1.48 MG/DL (ref 0.5–1.4)
CREAT SERPL-MCNC: 1.51 MG/DL (ref 0.5–1.4)
EOSINOPHIL # BLD AUTO: 0.02 K/UL (ref 0–0.51)
EOSINOPHIL NFR BLD: 0.1 % (ref 0–6.9)
ERYTHROCYTE [DISTWIDTH] IN BLOOD BY AUTOMATED COUNT: 51 FL (ref 35.9–50)
EST. AVERAGE GLUCOSE BLD GHB EST-MCNC: 120 MG/DL
GLOBULIN SER CALC-MCNC: 3.6 G/DL (ref 1.9–3.5)
GLUCOSE BLD-MCNC: 104 MG/DL (ref 65–99)
GLUCOSE BLD-MCNC: 111 MG/DL (ref 65–99)
GLUCOSE BLD-MCNC: 74 MG/DL (ref 65–99)
GLUCOSE SERPL-MCNC: 101 MG/DL (ref 65–99)
GLUCOSE SERPL-MCNC: 225 MG/DL (ref 65–99)
GLUCOSE SERPL-MCNC: 226 MG/DL (ref 65–99)
GLUCOSE UR STRIP.AUTO-MCNC: 500 MG/DL
HBA1C MFR BLD: 5.8 % (ref 0–5.6)
HCT VFR BLD AUTO: 34.7 % (ref 37–47)
HDLC SERPL-MCNC: 26 MG/DL
HGB BLD-MCNC: 11.7 G/DL (ref 12–16)
IMM GRANULOCYTES # BLD AUTO: 0.08 K/UL (ref 0–0.11)
IMM GRANULOCYTES NFR BLD AUTO: 0.6 % (ref 0–0.9)
KETONES UR STRIP.AUTO-MCNC: ABNORMAL MG/DL
LACTATE BLD-SCNC: 0.8 MMOL/L (ref 0.5–2)
LACTATE BLD-SCNC: 5.3 MMOL/L (ref 0.5–2)
LACTATE BLD-SCNC: 6.4 MMOL/L (ref 0.5–2)
LDLC SERPL CALC-MCNC: 115 MG/DL
LEUKOCYTE ESTERASE UR QL STRIP.AUTO: NEGATIVE
LYMPHOCYTES # BLD AUTO: 0.8 K/UL (ref 1–4.8)
LYMPHOCYTES NFR BLD: 5.6 % (ref 22–41)
MCH RBC QN AUTO: 30.6 PG (ref 27–33)
MCHC RBC AUTO-ENTMCNC: 33.7 G/DL (ref 33.6–35)
MCV RBC AUTO: 90.8 FL (ref 81.4–97.8)
MICRO URNS: ABNORMAL
MONOCYTES # BLD AUTO: 0.98 K/UL (ref 0–0.85)
MONOCYTES NFR BLD AUTO: 6.9 % (ref 0–13.4)
NEUTROPHILS # BLD AUTO: 12.3 K/UL (ref 2–7.15)
NEUTROPHILS NFR BLD: 86.5 % (ref 44–72)
NITRITE UR QL STRIP.AUTO: NEGATIVE
NRBC # BLD AUTO: 0 K/UL
NRBC BLD-RTO: 0 /100 WBC
PH UR STRIP.AUTO: 5 [PH]
PLATELET # BLD AUTO: 375 K/UL (ref 164–446)
PMV BLD AUTO: 9.7 FL (ref 9–12.9)
POTASSIUM SERPL-SCNC: 2.9 MMOL/L (ref 3.6–5.5)
POTASSIUM SERPL-SCNC: 3 MMOL/L (ref 3.6–5.5)
POTASSIUM SERPL-SCNC: 3.6 MMOL/L (ref 3.6–5.5)
PROCALCITONIN SERPL-MCNC: 0.17 NG/ML
PROT SERPL-MCNC: 7.2 G/DL (ref 6–8.2)
PROT UR QL STRIP: NEGATIVE MG/DL
RBC # BLD AUTO: 3.82 M/UL (ref 4.2–5.4)
RBC UR QL AUTO: NEGATIVE
SODIUM SERPL-SCNC: 137 MMOL/L (ref 135–145)
SODIUM SERPL-SCNC: 137 MMOL/L (ref 135–145)
SODIUM SERPL-SCNC: 139 MMOL/L (ref 135–145)
SP GR UR STRIP.AUTO: 1.02
TRIGL SERPL-MCNC: 96 MG/DL (ref 0–149)
TSH SERPL DL<=0.005 MIU/L-ACNC: 2.38 UIU/ML (ref 0.38–5.33)
UROBILINOGEN UR STRIP.AUTO-MCNC: 0.2 MG/DL
WBC # BLD AUTO: 14.2 K/UL (ref 4.8–10.8)

## 2018-09-10 PROCEDURE — 80061 LIPID PANEL: CPT

## 2018-09-10 PROCEDURE — 82010 KETONE BODYS QUAN: CPT

## 2018-09-10 PROCEDURE — 96366 THER/PROPH/DIAG IV INF ADDON: CPT

## 2018-09-10 PROCEDURE — 96368 THER/DIAG CONCURRENT INF: CPT

## 2018-09-10 PROCEDURE — 770022 HCHG ROOM/CARE - ICU (200)

## 2018-09-10 PROCEDURE — 87077 CULTURE AEROBIC IDENTIFY: CPT

## 2018-09-10 PROCEDURE — 700111 HCHG RX REV CODE 636 W/ 250 OVERRIDE (IP): Performed by: HOSPITALIST

## 2018-09-10 PROCEDURE — 99233 SBSQ HOSP IP/OBS HIGH 50: CPT | Performed by: HOSPITALIST

## 2018-09-10 PROCEDURE — 700105 HCHG RX REV CODE 258: Performed by: HOSPITALIST

## 2018-09-10 PROCEDURE — 96375 TX/PRO/DX INJ NEW DRUG ADDON: CPT

## 2018-09-10 PROCEDURE — 85025 COMPLETE CBC W/AUTO DIFF WBC: CPT

## 2018-09-10 PROCEDURE — 84145 PROCALCITONIN (PCT): CPT

## 2018-09-10 PROCEDURE — 87181 SC STD AGAR DILUTION PER AGT: CPT

## 2018-09-10 PROCEDURE — 74176 CT ABD & PELVIS W/O CONTRAST: CPT

## 2018-09-10 PROCEDURE — 80048 BASIC METABOLIC PNL TOTAL CA: CPT | Mod: 91

## 2018-09-10 PROCEDURE — 96365 THER/PROPH/DIAG IV INF INIT: CPT

## 2018-09-10 PROCEDURE — 700102 HCHG RX REV CODE 250 W/ 637 OVERRIDE(OP): Performed by: HOSPITALIST

## 2018-09-10 PROCEDURE — 83605 ASSAY OF LACTIC ACID: CPT | Mod: 91

## 2018-09-10 PROCEDURE — A9270 NON-COVERED ITEM OR SERVICE: HCPCS | Performed by: HOSPITALIST

## 2018-09-10 PROCEDURE — 82962 GLUCOSE BLOOD TEST: CPT | Mod: 91

## 2018-09-10 PROCEDURE — 84443 ASSAY THYROID STIM HORMONE: CPT

## 2018-09-10 PROCEDURE — 81003 URINALYSIS AUTO W/O SCOPE: CPT

## 2018-09-10 PROCEDURE — 80053 COMPREHEN METABOLIC PANEL: CPT

## 2018-09-10 PROCEDURE — 51798 US URINE CAPACITY MEASURE: CPT

## 2018-09-10 PROCEDURE — 87040 BLOOD CULTURE FOR BACTERIA: CPT

## 2018-09-10 PROCEDURE — 700101 HCHG RX REV CODE 250: Performed by: HOSPITALIST

## 2018-09-10 RX ORDER — SODIUM CHLORIDE 9 MG/ML
500 INJECTION, SOLUTION INTRAVENOUS
Status: COMPLETED | OUTPATIENT
Start: 2018-09-10 | End: 2018-09-10

## 2018-09-10 RX ORDER — HALOPERIDOL 5 MG/ML
2-5 INJECTION INTRAMUSCULAR EVERY 4 HOURS PRN
Status: DISCONTINUED | OUTPATIENT
Start: 2018-09-10 | End: 2018-09-13

## 2018-09-10 RX ORDER — SODIUM CHLORIDE AND POTASSIUM CHLORIDE 150; 900 MG/100ML; MG/100ML
INJECTION, SOLUTION INTRAVENOUS CONTINUOUS
Status: DISCONTINUED | OUTPATIENT
Start: 2018-09-10 | End: 2018-09-12 | Stop reason: ALTCHOICE

## 2018-09-10 RX ORDER — SODIUM CHLORIDE 9 MG/ML
30 INJECTION, SOLUTION INTRAVENOUS
Status: COMPLETED | OUTPATIENT
Start: 2018-09-10 | End: 2018-09-10

## 2018-09-10 RX ADMIN — CHLORPROMAZINE HYDROCHLORIDE 25 MG: 25 TABLET, SUGAR COATED ORAL at 12:11

## 2018-09-10 RX ADMIN — PIPERACILLIN SODIUM AND TAZOBACTAM SODIUM 3.38 G: 3; .375 INJECTION, POWDER, FOR SOLUTION INTRAVENOUS at 12:14

## 2018-09-10 RX ADMIN — POTASSIUM CHLORIDE 10 MEQ: 10 INJECTION, SOLUTION INTRAVENOUS at 01:03

## 2018-09-10 RX ADMIN — DIPHENHYDRAMINE HCL 50 MG: 25 TABLET ORAL at 05:14

## 2018-09-10 RX ADMIN — DIPHENHYDRAMINE HCL 50 MG: 25 TABLET ORAL at 19:13

## 2018-09-10 RX ADMIN — BUSPIRONE HYDROCHLORIDE 30 MG: 10 TABLET ORAL at 05:14

## 2018-09-10 RX ADMIN — HEPARIN SODIUM 5000 UNITS: 5000 INJECTION, SOLUTION INTRAVENOUS; SUBCUTANEOUS at 14:07

## 2018-09-10 RX ADMIN — HEPARIN SODIUM 5000 UNITS: 5000 INJECTION, SOLUTION INTRAVENOUS; SUBCUTANEOUS at 21:22

## 2018-09-10 RX ADMIN — POTASSIUM CHLORIDE 10 MEQ: 10 INJECTION, SOLUTION INTRAVENOUS at 01:52

## 2018-09-10 RX ADMIN — PIPERACILLIN SODIUM AND TAZOBACTAM SODIUM 3.38 G: 3; .375 INJECTION, POWDER, FOR SOLUTION INTRAVENOUS at 21:22

## 2018-09-10 RX ADMIN — CLONAZEPAM 0.5 MG: 0.5 TABLET ORAL at 05:14

## 2018-09-10 RX ADMIN — PROCHLORPERAZINE EDISYLATE 10 MG: 5 INJECTION INTRAMUSCULAR; INTRAVENOUS at 19:13

## 2018-09-10 RX ADMIN — HEPARIN SODIUM 5000 UNITS: 5000 INJECTION, SOLUTION INTRAVENOUS; SUBCUTANEOUS at 05:15

## 2018-09-10 RX ADMIN — SODIUM CHLORIDE 500 ML: 9 INJECTION, SOLUTION INTRAVENOUS at 06:17

## 2018-09-10 RX ADMIN — POTASSIUM CHLORIDE AND SODIUM CHLORIDE: 900; 150 INJECTION, SOLUTION INTRAVENOUS at 10:23

## 2018-09-10 RX ADMIN — BUSPIRONE HYDROCHLORIDE 30 MG: 10 TABLET ORAL at 17:19

## 2018-09-10 RX ADMIN — SODIUM CHLORIDE: 9 INJECTION, SOLUTION INTRAVENOUS at 01:00

## 2018-09-10 RX ADMIN — SODIUM CHLORIDE: 9 INJECTION, SOLUTION INTRAVENOUS at 06:17

## 2018-09-10 RX ADMIN — PROMETHAZINE HYDROCHLORIDE 25 MG: 25 TABLET ORAL at 17:28

## 2018-09-10 RX ADMIN — SODIUM CHLORIDE 1701 ML: 9 INJECTION, SOLUTION INTRAVENOUS at 01:50

## 2018-09-10 RX ADMIN — POTASSIUM CHLORIDE 10 MEQ: 10 INJECTION, SOLUTION INTRAVENOUS at 02:54

## 2018-09-10 RX ADMIN — ONDANSETRON 4 MG: 2 INJECTION INTRAMUSCULAR; INTRAVENOUS at 12:55

## 2018-09-10 RX ADMIN — CHLORPROMAZINE HYDROCHLORIDE 25 MG: 25 TABLET, SUGAR COATED ORAL at 17:19

## 2018-09-10 RX ADMIN — PIPERACILLIN SODIUM AND TAZOBACTAM SODIUM 3.38 G: 3; .375 INJECTION, POWDER, FOR SOLUTION INTRAVENOUS at 02:46

## 2018-09-10 RX ADMIN — CHLORPROMAZINE HYDROCHLORIDE 25 MG: 25 TABLET, SUGAR COATED ORAL at 06:00

## 2018-09-10 RX ADMIN — PIPERACILLIN SODIUM AND TAZOBACTAM SODIUM 3.38 G: 3; .375 INJECTION, POWDER, FOR SOLUTION INTRAVENOUS at 06:00

## 2018-09-10 RX ADMIN — HYDROMORPHONE HYDROCHLORIDE 0.5 MG: 2 INJECTION INTRAMUSCULAR; INTRAVENOUS; SUBCUTANEOUS at 04:28

## 2018-09-10 RX ADMIN — CLONAZEPAM 0.5 MG: 0.5 TABLET ORAL at 17:19

## 2018-09-10 RX ADMIN — SERTRALINE 100 MG: 100 TABLET, FILM COATED ORAL at 19:18

## 2018-09-10 RX ADMIN — HYDROMORPHONE HYDROCHLORIDE 0.5 MG: 2 INJECTION INTRAMUSCULAR; INTRAVENOUS; SUBCUTANEOUS at 19:12

## 2018-09-10 RX ADMIN — HALOPERIDOL LACTATE 3 MG: 5 INJECTION, SOLUTION INTRAMUSCULAR at 15:39

## 2018-09-10 RX ADMIN — PROCHLORPERAZINE EDISYLATE 10 MG: 5 INJECTION INTRAMUSCULAR; INTRAVENOUS at 04:28

## 2018-09-10 RX ADMIN — ONDANSETRON 4 MG: 2 INJECTION INTRAMUSCULAR; INTRAVENOUS at 03:20

## 2018-09-10 ASSESSMENT — ENCOUNTER SYMPTOMS
CHILLS: 0
BACK PAIN: 1
DIZZINESS: 0
EYES NEGATIVE: 1
NECK PAIN: 0
PALPITATIONS: 0
DIAPHORESIS: 1
FEVER: 0
NAUSEA: 1
DEPRESSION: 0
POLYDIPSIA: 0
ABDOMINAL PAIN: 1
NERVOUS/ANXIOUS: 1
VOMITING: 1
FOCAL WEAKNESS: 0
RESPIRATORY NEGATIVE: 1
CARDIOVASCULAR NEGATIVE: 1
BRUISES/BLEEDS EASILY: 0
HEARTBURN: 0
COUGH: 0
WEAKNESS: 1
HEADACHES: 0

## 2018-09-10 ASSESSMENT — PAIN SCALES - GENERAL
PAINLEVEL_OUTOF10: 2
PAINLEVEL_OUTOF10: 0
PAINLEVEL_OUTOF10: 8
PAINLEVEL_OUTOF10: 0
PAINLEVEL_OUTOF10: 8
PAINLEVEL_OUTOF10: 0
PAINLEVEL_OUTOF10: 3
PAINLEVEL_OUTOF10: 8
PAINLEVEL_OUTOF10: 0
PAINLEVEL_OUTOF10: 5

## 2018-09-10 ASSESSMENT — LIFESTYLE VARIABLES: SUBSTANCE_ABUSE: 1

## 2018-09-10 ASSESSMENT — PATIENT HEALTH QUESTIONNAIRE - PHQ9
1. LITTLE INTEREST OR PLEASURE IN DOING THINGS: SEVERAL DAYS
9. THOUGHTS THAT YOU WOULD BE BETTER OFF DEAD, OR OF HURTING YOURSELF: NOT AT ALL
SUM OF ALL RESPONSES TO PHQ9 QUESTIONS 1 AND 2: 1

## 2018-09-10 NOTE — ED NOTES
Attempting IV access. Pt having confused speech, easily redirected. Hospitalist notified of pts state and changes.

## 2018-09-10 NOTE — CARE PLAN
Problem: Safety  Goal: Will remain free from falls  Outcome: PROGRESSING AS EXPECTED  Assess risk factors for falls, call light within reach, provide education to use call light and about fall risk, bed alarm in use    Problem: Pain Management  Goal: Pain level will decrease to patient's comfort goal  Outcome: PROGRESSING AS EXPECTED  Assess pain using nursing 0-10 pain scale, use of pharmacological and non-pharmacologic adjuncts to pain management

## 2018-09-10 NOTE — PROGRESS NOTES
[facility] Hospitalist Progress Note    Date of Service: 9/10/2018    Chief Complaint  43 y.o. female admitted 2018 with nausea vomiting, abdominal pain, chronic marijuana abuse, abdominal pain.  Question of sepsis.    Interval Problem Update  Patient seen and examined today. ICU Care  Care and plan discussed in IDT/Hot rounds.  Lines and assistive devices reviewed.    Patient tolerating treatment and therapies.  All Data, Medication data reviewed.  Case discussed with nursing as available.  Plan of Care reviewed with patient and notified of changes.  9/10 the patient is improved, IV fluids, asking for hot showers repeatedly, very anxious, complains of back pain, no evidence of bacterial infection.  Consultants/Specialty  None    Disposition  TBD        Review of Systems   Constitutional: Positive for diaphoresis and malaise/fatigue. Negative for chills and fever.   HENT: Negative.    Eyes: Negative.    Respiratory: Negative.  Negative for cough.    Cardiovascular: Negative.  Negative for chest pain and palpitations.   Gastrointestinal: Positive for abdominal pain, nausea and vomiting. Negative for heartburn.   Genitourinary: Negative.  Negative for dysuria and frequency.   Musculoskeletal: Positive for back pain. Negative for neck pain.   Skin: Negative.  Negative for itching and rash.   Neurological: Positive for weakness. Negative for dizziness, focal weakness and headaches.   Endo/Heme/Allergies: Negative.  Negative for polydipsia. Does not bruise/bleed easily.   Psychiatric/Behavioral: Positive for substance abuse. Negative for depression. The patient is nervous/anxious.       Physical Exam  Laboratory/Imaging   Hemodynamics  Temp (24hrs), Av.9 °C (98.5 °F), Min:36.7 °C (98 °F), Max:37.2 °C (99 °F)   Temperature: 37.2 °C (99 °F)  Pulse  Av.2  Min: 69  Max: 95 Heart Rate (Monitored): 70  Blood Pressure: 130/88, NIBP: (!) 90/48      Respiratory      Respiration: 16, Pulse Oximetry: 92 %     Work Of  Breathing / Effort: Mild  RUL Breath Sounds: Clear, RML Breath Sounds: Clear, RLL Breath Sounds: Clear, MADDY Breath Sounds: Clear, LLL Breath Sounds: Clear    Fluids    Intake/Output Summary (Last 24 hours) at 09/10/18 0832  Last data filed at 09/10/18 0800   Gross per 24 hour   Intake              200 ml   Output             1025 ml   Net             -825 ml       Nutrition  Orders Placed This Encounter   Procedures   • Diet NPO     Standing Status:   Standing     Number of Occurrences:   1     Order Specific Question:   Restrict to:     Answer:   Strict [1]     Physical Exam   Constitutional: She is oriented to person, place, and time. She appears well-developed and well-nourished. She has a sickly appearance. Nasal cannula in place.   HENT:   Head: Normocephalic and atraumatic.   Nose: Nose normal.   Mouth/Throat: Oropharynx is clear and moist.   Eyes: Pupils are equal, round, and reactive to light. Conjunctivae and EOM are normal.   Neck: Normal range of motion. Neck supple. No JVD present. No thyromegaly present.   Cardiovascular: Normal rate, regular rhythm and normal heart sounds.  Exam reveals no gallop and no friction rub.    Pulses:       Dorsalis pedis pulses are 2+ on the right side, and 2+ on the left side.   Capillary refill <3 secs   Pulmonary/Chest: Effort normal and breath sounds normal. She has no wheezes. She has no rales.   Abdominal: Soft. Bowel sounds are normal. She exhibits no distension and no mass. There is no tenderness. There is no rebound and no guarding.   Musculoskeletal: Normal range of motion. She exhibits no edema or tenderness.   Lymphadenopathy:     She has no cervical adenopathy.   Neurological: She is alert and oriented to person, place, and time. No cranial nerve deficit.   Skin: Skin is warm and dry. She is not diaphoretic. No cyanosis.   Psychiatric: Her mood appears anxious. Her speech is rapid and/or pressured. She is agitated. Cognition and memory are impaired. She  expresses impulsivity.   Nursing note and vitals reviewed.      Recent Labs      09/09/18   2150  09/10/18   0101   WBC  15.8*  14.2*   RBC  5.24  3.82*   HEMOGLOBIN  16.0  11.7*   HEMATOCRIT  46.9  34.7*   MCV  89.5  90.8   MCH  30.5  30.6   MCHC  34.1  33.7   RDW  50.8*  51.0*   PLATELETCT  437  375   MPV  9.8  9.7     Recent Labs      09/09/18   2150  09/10/18   0101   SODIUM  131*  137  137   POTASSIUM  3.5*  3.0*  2.9*   CHLORIDE  96  110  110   CO2  11*  11*  11*   GLUCOSE  395*  226*  225*   BUN  11  11  10   CREATININE  1.98*  1.48*  1.51*   CALCIUM  11.6*  9.3  9.2             Recent Labs      09/10/18   0101   TRIGLYCERIDE  96   HDL  26*   LDL  115*          Assessment/Plan     * AP (abdominal pain)- (present on admission)   Assessment & Plan    Likely acute on chronic  Multiple prior procedures  CT here negative for significant pathology        Sepsis (HCC)   Assessment & Plan    This is sepsis (without associated acute organ dysfunction).   Presented with severe dehydration, doubt true sepsis          JILL (acute kidney injury) ([facility])   Assessment & Plan    Improved with hydration        Lactic acidosis- (present on admission)   Assessment & Plan    Likely due to severe dehydration however with uptrending lactic despite 3 L IVF difficult to r/o infectious etiology vs ischemia  Will need to continue to trend lactic  Continue with IVF and monitor         Dehydration- (present on admission)   Assessment & Plan    Severe continue with aggressive ivf        Anxiety- (present on admission)   Assessment & Plan    Resume home medications        Tobacco abuse disorder- (present on admission)   Assessment & Plan    Encouraged cessation, greater than 10 minutes spent with the patient in smoking cessation counseling. Nicotine patch ordered.        Encephalopathy   Assessment & Plan    Metabolic vs septic   Cont to monitor         Nausea and vomiting   Assessment & Plan    Continue with IV fluids and  antiemetics        Hypokalemia   Assessment & Plan    Replace and recheck        Addiction, marijuana (HCC)- (present on admission)   Assessment & Plan    Encouraged cessation         Cyclic vomiting syndrome- (present on admission)   Assessment & Plan    Secondary to cannabis abuse          Quality-Core Measures   Reviewed items::  Radiology images reviewed, Labs reviewed and Medications reviewed  Saldana catheter::  No Saldana  DVT prophylaxis pharmacological::  Heparin  Ulcer Prophylaxis::  Yes  Assessed for rehabilitation services:  Patient was assess for and/or received rehabilitation services during this hospitalization      Plan  Continue with supportive care  Continue with antibiotics for now  He fails to further improve what rechallenge surgical evaluation which was done apparently in the emergency room  KUB in a.m.  Replace electrolytes  Supportive care  Critically ill  See orders  High risk

## 2018-09-10 NOTE — ASSESSMENT & PLAN NOTE
Resume home Buspar and Zoloft  This has been incapacitating for her.  Psychiatry consulted today.  Appreciate input.

## 2018-09-10 NOTE — ED NOTES
Spoke with dr winston about critical lab results and pt condition. New orders received. Pt continues to c/o pain and nausea. Pt in nad otherwise. Will continue to monitor.

## 2018-09-10 NOTE — PROGRESS NOTES
Report received from ED RN.  Pt transported to unit via gurney, attached to monitor, chart, meds, and personal belongings w/ pt.

## 2018-09-10 NOTE — ED TRIAGE NOTES
"Chief Complaint   Patient presents with   • N/V     x2 days. Hx cyclic vomiting syndrome.   • Anxiety     /88   Pulse 95   Temp 36.7 °C (98 °F)   Resp 18   Ht 1.626 m (5' 4\")   Wt 56.7 kg (125 lb)   SpO2 97%   BMI 21.46 kg/m²     Pt brought in by KIERSTEN from home, placed in room trauma 1. Complaints and vitals as above. Pt on monitors, all alarms audible. Chart flagged for ERP to see.  "

## 2018-09-10 NOTE — ED NOTES
Pt medicated per MAR. IVFs infusing. Pt observed sticking her fingers down her throat attempting to make herself vomit, pt told to stop and would not give nurse reason for doing so. Will continue to monitor. ERP aware.

## 2018-09-10 NOTE — ED PROVIDER NOTES
ED Provider Note    Scribed for Devin Santos M.D. by Bernardino Overton. 9/9/2018, 9:05 PM.    Primary care provider: Isa Rodriguez M.D.  Means of arrival: Ambulance   History obtained from: Patient  History limited by: None    CHIEF COMPLAINT  Chief Complaint   Patient presents with   • N/V     x2 days. Hx cyclic vomiting syndrome.   • Anxiety       HPI  Diana Hernandez is a 43 y.o. Female with a history of cyclical vomiting syndrome previously diagnosed with cannabinoid hyperemesis syndrome who presents to the Emergency Department for evaluation of anxiety with associated shortness of breath and abdominal pain onset this morning. The patient reports that her anxiety has been constant since it began this morning. She has also had multiple episodes of non-bloody, non-bilious emesis throughout the day and has been unable to keep any food or liquids down. Her abdominal pain is diffuse and does not radiate. She has not tried any medication for her symptoms which are consistent with prior episodes of cyclical vomiting. En route to the hospital, she was given Versed and Zofran by EMS with no alleviation of her symptoms. The patient confirms that she regularly smokes marijuana but she denies other drug use, alcohol use, and suicidal ideations.     REVIEW OF SYSTEMS  Pertinent positives include anxiety, shortness of breath, marijuana use, nausea, vomiting, abdominal pain. Pertinent negatives include no alcohol use, suicidal ideations, other drug use . As above, all other systems reviewed and are negative.   See HPI for further details.     PAST MEDICAL HISTORY   has a past medical history of Anxiety; Anxiety disorder; Backpain; CLOSTRIDIUM DIFFICILE INFECTION (4/14/2010); CVS disease; Cyclic vomiting syndrome; Dental disorder; Drug-seeking behavior; Dyspepsia (7/12/2009); Nephrolithiasis (6/20/2009); Pain; Snoring; Superior mesenteric artery syndrome (HCC) (7/12/2009); Superior mesenteric artery syndrome (HCC);  "and Tobacco abuse (6/20/2009).    SURGICAL HISTORY   has a past surgical history that includes gastroscopy-endo (7/8/2009); lithotripsy; pyloroplasty (7/27/2009); gastroscopy with biopsy (3/9/2010); exploratory laparotomy (7/27/2009); appendectomy (7/27/2009); cholecystectomy (7/27/2009); other; exploratory laparotomy (1/12/2016); bowel resection (1/12/2016); and gastroscopy-endo (4/28/2016).    SOCIAL HISTORY  Social History   Substance Use Topics   • Smoking status: Never Smoker   • Smokeless tobacco: Never Used   • Alcohol use No      Comment: hx      History   Drug Use   • Types: Marijuana, Inhaled     Comment: marijuana       FAMILY HISTORY  Family History   Problem Relation Age of Onset   • Cancer Mother 50        Colon cancer   • Hypertension Mother    • Allergies Father    • Hypertension Father    • Heart Disease Maternal Grandmother        CURRENT MEDICATIONS  Home Medications     Reviewed by Nara Lopes, Pharmacy Intern (Pharmacy Intern) on 09/09/18 at 2250  Med List Status: Complete   Medication Last Dose Status   busPIRone (BUSPAR) 30 MG tablet 9/9/2018 Active   chlorproMAZINE (THORAZINE) 25 MG Tab 9/9/2018 Active   clonazePAM (KLONOPIN) 0.5 MG Tab 9/9/2018 Active   diphenhydrAMINE (BENADRYL) 25 MG Tab 9/8/2018 Active   sertraline (ZOLOFT) 100 MG Tab 9/9/2018 Active                ALLERGIES  Allergies   Allergen Reactions   • Metoclopramide Hives     Told by MD; pt suspects possible hives.   • Bactrim [Sulfamethoxazole W-Trimethoprim] Unspecified     Someone said I had a rash or something.      • Seroquel [Quetiapine] Unspecified     \"Seizures\"  RXN=unknown       PHYSICAL EXAM  VITAL SIGNS: /88   Pulse 95   Temp 36.7 °C (98 °F)   Resp 18   Ht 1.626 m (5' 4\")   Wt 56.7 kg (125 lb)   SpO2 97%   BMI 21.46 kg/m²   Vitals reviewed.  Constitutional: Alert in moderate distress.  HENT: No signs of trauma, Bilateral external ears normal, Nose normal. Dry mucous membranes.  Eyes: Pupils are equal " and reactive, Conjunctiva normal, Non-icteric.   Lymphatic: No lymphadenopathy noted.   Cardiovascular: Regular rate and rhythm, no murmurs.   Thorax & Lungs: Normal breath sounds, No respiratory distress, No wheezing, No chest tenderness.   Abdomen: Bowel sounds normal, Soft, Diffuse but mild abdominal tenderness to palpation. No peritoneal signs, No masses, No pulsatile masses.   Skin: Warm, Dry, No erythema, No rash.   Extremities: Intact distal pulses, No edema, No tenderness, No cyanosis  Neurologic: Alert, Normal motor function, Normal sensory function, No focal deficits noted.   Psychiatric: Anxious, tearful, uncomfortable appearing.     DIAGNOSTIC STUDIES / PROCEDURES    LABS  Labs Reviewed   CBC WITH DIFFERENTIAL - Abnormal; Notable for the following:        Result Value    WBC 15.8 (*)     RDW 50.8 (*)     Neutrophils-Polys 84.10 (*)     Lymphocytes 6.00 (*)     Neutrophils (Absolute) 13.30 (*)     Lymphs (Absolute) 0.95 (*)     Monos (Absolute) 1.35 (*)     Immature Granulocytes (abs) 0.14 (*)     All other components within normal limits   COMP METABOLIC PANEL - Abnormal; Notable for the following:     Sodium 131 (*)     Potassium 3.5 (*)     Co2 11 (*)     Anion Gap 24.0 (*)     Glucose 395 (*)     Creatinine 1.98 (*)     Calcium 11.6 (*)     Alkaline Phosphatase 116 (*)     Total Protein 9.6 (*)     Globulin 4.9 (*)     All other components within normal limits   ESTIMATED GFR - Abnormal; Notable for the following:     GFR If  34 (*)     GFR If Non  28 (*)     All other components within normal limits   LACTIC ACID - Abnormal; Notable for the following:     Lactic Acid 5.4 (*)     All other components within normal limits   LIPASE   HCG QUAL SERUM   HEMOGLOBIN A1C   VENOUS BLOOD GAS   URINALYSIS   CBC WITH DIFFERENTIAL   COMP METABOLIC PANEL   LIPID PROFILE      All labs reviewed by me.    COURSE & MEDICAL DECISION MAKING  Nursing notes, VS, PMSFHx reviewed in  chart.    Differential diagnoses include but not limited to:  SMA syndrome, cyclical vomiting exacerbation, cannabinoid hyperemesis, intraabdominal infection, pancreatitis, perforated peptic ulcer, drug ingestion    9:05 PM Patient seen and examined at bedside. Patient arrives afebrile with normal vital signs. Patient appears dehydrated but non-toxic. The physical exam is remarkable for a very thin, bordering on cachectic young woman lying in bed in moderate distress secondary to panic, abdominal pain, and nausea. She has very dry mucous membranes consistent with her reports of continued emesis. Her lungs are clear and her heart rate is regular without murmurs. Her abdomen is diffusely tender but without rigidity or peritoneal signs. Ordered for CBC, CMP, Lipase, Beta-HCG Qualitative Serum to evaluate. Patient will be treated with Haldol 3 mg, Ativan 1 mg, NS infusion 1000 mL for dry mucous membranes.      Her labs reveal a leukocytosis but looking back through her recent results she appears to be quite hemoconcentrated. This is also consistent with the clinical picture of dehydration. Further, she likely has some component of reactive demargination given the continued emesis. No bands or left shift. Metabolic panel reveals multiple abnormalities including mild hypokalemia and hyponatremia with a significant anion gap and hyperglycemia - no history of diabetes but history of significant hyperglycemia in the past during these episodes. She further has an JILL likely 2/2 dehydration and a hypercalcemia. HCG is negative.    Patient given continued IV fluids with improvement in mucous membranes.    10:26 PM I reviewed the patient's diagnostic results as above. Despite the hyperglycemia this is likely still a result of her hyperemesis as has been seen in the past. She does require admission to the hospital.    10:31 PM Consult with Dr. Hendrix (Hospitalist) who agrees to admit the patient.     10:35 PM Patient reevaluated  at bedside. She reports feeling improvement secondary to IV fluid administration. I updated her on the plan of care including hospital admission. The patient understands and agrees to plan of care including admission.     DISPOSITION:  Patient will be admitted to Dr. Hendrix (Hospitalist) in guarded condition.    FINAL IMPRESSION  1. JILL  2. Abdominal pain  3. Cyclical vomiting  4. Hyperglycemia     Bernardino HUGHES (Scribe), am scribing for, and in the presence of, Devin Santos M.D..    Electronically signed by: Bernardino Overton (Scribe), 9/9/2018    Devin HUGHES M.D. personally performed the services described in this documentation, as scribed by Bernardino Overton in my presence, and it is both accurate and complete. C.    The note accurately reflects work and decisions made by me.  Devin Santos  9/10/2018  4:13 PM

## 2018-09-10 NOTE — ED NOTES
from Lab called with critical result of lactic 5.3 at 0251. Critical lab result read back to .   Improved from previous critical results. Will notify dr winston.

## 2018-09-10 NOTE — H&P
Hospital Medicine History & Physical Note    Date of Service  9/9/2018    Primary Care Physician  Ias Rodriguez M.D.    Consultants  none    Code Status  Full    Chief Complaint  N/v abd pain     History of Presenting Illness  43 y.o. female who presented 9/9/2018 with stress cyclic vomiting syndrome as she has a chronic history of marijuana abuse who presents with nausea vomiting abdominal pain.  She states this is similar to her previous episodes pain is relieved with hot showers or bath.  She is also had persistent nausea vomiting that has been intractable to her home medication she has tried ondansetron at home.  She states vomiting helps with the abdominal pain.  And she is also had some anxiety as well.  Abdominal pain is severe 10 out of 10.  Vomiting is not improved with any medications given in the emergency department.  She is been unable to tolerate any oral intake since yesterday morning.  No alleviating or exacerbating factors that she can think of.    Review of Systems  Review of Systems   Constitutional: Negative for chills and fever.   HENT: Negative for congestion, hearing loss and tinnitus.    Eyes: Negative for blurred vision, double vision and discharge.   Respiratory: Negative for cough, hemoptysis and shortness of breath.    Cardiovascular: Negative for chest pain, palpitations and leg swelling.   Gastrointestinal: Positive for abdominal pain, nausea and vomiting. Negative for heartburn.   Genitourinary: Negative for dysuria and flank pain.   Musculoskeletal: Negative for joint pain and myalgias.   Skin: Negative for rash.   Neurological: Negative for dizziness, sensory change, speech change, focal weakness and weakness.   Endo/Heme/Allergies: Negative for environmental allergies. Does not bruise/bleed easily.   Psychiatric/Behavioral: Negative for depression, hallucinations and substance abuse.       Past Medical History   has a past medical history of Anxiety; Anxiety disorder; Backpain;  "CLOSTRIDIUM DIFFICILE INFECTION (4/14/2010); CVS disease; Cyclic vomiting syndrome; Dental disorder; Drug-seeking behavior; Dyspepsia (7/12/2009); Nephrolithiasis (6/20/2009); Pain; Snoring; Superior mesenteric artery syndrome (HCC) (7/12/2009); Superior mesenteric artery syndrome (HCC); and Tobacco abuse (6/20/2009).    Surgical History   has a past surgical history that includes gastroscopy-endo (7/8/2009); lithotripsy; pyloroplasty (7/27/2009); gastroscopy with biopsy (3/9/2010); exploratory laparotomy (7/27/2009); appendectomy (7/27/2009); cholecystectomy (7/27/2009); other; exploratory laparotomy (1/12/2016); bowel resection (1/12/2016); and gastroscopy-endo (4/28/2016).     Family History  family history includes Allergies in her father; Cancer (age of onset: 50) in her mother; Heart Disease in her maternal grandmother; Hypertension in her father and mother.     Social History   reports that she has never smoked. She has never used smokeless tobacco. She reports that she uses drugs, including Marijuana and Inhaled. She reports that she does not drink alcohol.    Allergies  Allergies   Allergen Reactions   • Metoclopramide Hives     Told by MD; pt suspects possible hives.   • Bactrim [Sulfamethoxazole W-Trimethoprim] Unspecified     Someone said I had a rash or something.      • Seroquel [Quetiapine] Unspecified     \"Seizures\"  RXN=unknown       Medications  Prior to Admission Medications   Prescriptions Last Dose Informant Patient Reported? Taking?   busPIRone (BUSPAR) 30 MG tablet 9/9/2018 at AM Rx Bottle (For Med Information) Yes No   Sig: Take 30 mg by mouth 2 Times a Day.   chlorproMAZINE (THORAZINE) 25 MG Tab 9/9/2018 at AM Rx Bottle (For Med Information) No No   Sig: Take 1 Tab by mouth 3 times a day.   clonazePAM (KLONOPIN) 0.5 MG Tab 9/9/2018 at AM Rx Bottle (For Med Information) Yes No   Sig: Take 0.5 mg by mouth 2 times a day.   diphenhydrAMINE (BENADRYL) 25 MG Tab 9/8/2018 at PRN Patient Yes No "   Sig: Take 50 mg by mouth at bedtime as needed for Sleep or Itching.   sertraline (ZOLOFT) 100 MG Tab 9/9/2018 at HS Rx Bottle (For Med Information) Yes No   Sig: Take 100 mg by mouth every bedtime.      Facility-Administered Medications: None       Physical Exam  Blood Pressure: 130/88   Temperature: 36.7 °C (98 °F)   Pulse: 83   Respiration: 18   Pulse Oximetry: 100 %     Physical Exam   Constitutional: She is oriented to person, place, and time. She appears well-developed and well-nourished. No distress.   HENT:   Head: Normocephalic and atraumatic.   Dry oral mucosa   Eyes: Pupils are equal, round, and reactive to light. Conjunctivae and EOM are normal.   Neck: Normal range of motion. Neck supple. No JVD present.   Cardiovascular: Regular rhythm, normal heart sounds and intact distal pulses.    No murmur heard.  tachycardic   Pulmonary/Chest: Effort normal and breath sounds normal. No respiratory distress. She has no wheezes.   Abdominal: Soft. Bowel sounds are normal. She exhibits no distension. There is tenderness.   Diffuse ttp   Musculoskeletal: Normal range of motion. She exhibits no edema.   Neurological: She is alert and oriented to person, place, and time. She exhibits normal muscle tone.   Skin: Skin is warm and dry.   Psychiatric: She has a normal mood and affect. Her behavior is normal. Judgment and thought content normal.   Nursing note and vitals reviewed.      Laboratory:  Recent Labs      09/09/18 2150   WBC  15.8*   RBC  5.24   HEMOGLOBIN  16.0   HEMATOCRIT  46.9   MCV  89.5   MCH  30.5   MCHC  34.1   RDW  50.8*   PLATELETCT  437   MPV  9.8     Recent Labs      09/09/18 2150   SODIUM  131*   POTASSIUM  3.5*   CHLORIDE  96   CO2  11*   GLUCOSE  395*   BUN  11   CREATININE  1.98*   CALCIUM  11.6*     Recent Labs      09/09/18 2150   ALTSGPT  7   ASTSGOT  13   ALKPHOSPHAT  116*   TBILIRUBIN  0.8   LIPASE  12   GLUCOSE  395*                 Lab Results   Component Value Date    TROPONINI <0.01  07/13/2018       Urinalysis:    No results found     Imaging:  No orders to display         Assessment/Plan:  I anticipate this patient will require at least two midnights for appropriate medical management, necessitating inpatient admission.    * AP (abdominal pain)- (present on admission)   Assessment & Plan    Severe abd pain diffuse, CT scan with evidence of intussusception.  I discussed this with general surgery on-call who reviewed the film and stated that the symptoms are unlikely to be secondary to the findings on the CT scan.  Does have hx of SMA syndrome s/p vlad Y duodenojejunostomy 2009, Small bowel resection 01/2016  Continues to have uptrending lactic may need a CT angiogram  Continue with IV fluids  Continue with pain medications   Serial abdominal exams        Sepsis (HCC)   Assessment & Plan    This is sepsis (without associated acute organ dysfunction).   Meet sepsis criteria source GI?  Versus infiltrates noted on CT scan   will check a pro calcitonin check urinalysis follow cultures  IV antibiotics  Trend lactic  De-escalate therapy as clinically appropriate          JILL (acute kidney injury) ([facility])   Assessment & Plan    Likely due to severe dehydration   Continue with IVF   montior renal function closely   If no improvement may need renal lytes, renal ultrasound and nephro consult        Lactic acidosis- (present on admission)   Assessment & Plan    Likely due to severe dehydration however with uptrending lactic despite 3 L IVF difficult to r/o infectious etiology vs ischemia  Will need to continue to trend lactic  Continue with IVF and monitor         Dehydration- (present on admission)   Assessment & Plan    Severe continue with aggressive ivf        Anxiety- (present on admission)   Assessment & Plan    Resume home medications        Tobacco abuse disorder- (present on admission)   Assessment & Plan    Encouraged cessation, greater than 10 minutes spent with the patient in smoking cessation  counseling. Nicotine patch ordered.        Encephalopathy   Assessment & Plan    Metabolic vs septic   Cont to monitor         Nausea and vomiting   Assessment & Plan    Continue with IV fluids and antiemetics        Hypokalemia   Assessment & Plan    Replace and recheck        Addiction, marijuana (HCC)- (present on admission)   Assessment & Plan    Encouraged cessation             VTE prophylaxis: heparin

## 2018-09-10 NOTE — ED NOTES
"Pt trying to drink from emesis bag and witnessed sticking her fingers down her throat again. When asked pt states \"I don't know why, I'm just in so much pain.\" Pt has not had an episode of vomiting since arriving. Pt continues to request shower/bath for back pain. Friend at bedside. Monitoring in place.  "

## 2018-09-10 NOTE — ASSESSMENT & PLAN NOTE
History of superior mesenteric artery syndrome with a Shruti-en-Y with  duodenojejunostomy and pyloroplasty  IV fluids and IV anti-emetics.  Likely exacerbated by severe anxiety and perhaps chronic cannabis use.  -- conservative therapy.     CT abd/pelvis:  1.  Subtle hazy left lower lobe opacity, could represent subtle infiltrates.  2.  Target sign in the left lower abdomen, appearance suggests short segment of intussusception, no obstruction is seen, may represent physiologic intussusception. Radiologic follow-up to exclude progression to obstructive changes as clinically   appropriate.  3.  Hepatomegaly

## 2018-09-10 NOTE — ASSESSMENT & PLAN NOTE
Likely due to severe dehydration however with uptrending lactic despite 3 L IVF and lactic acid went up to 6.4  One of two blood culture was positive for Viridans Streptococcus (A).  Discussed with ID specialist today.  continue iv zosyn.

## 2018-09-10 NOTE — ED NOTES
Med rec complete per pt and S/O at bedside with RX bottles (returned)  Allergies reviewed  No oral ABX within last 30 days

## 2018-09-10 NOTE — ED NOTES
Pt reporting diarrhea. Pt walked to the bathroom with stand-by assist, gait steady. Pt did not use the restroom. Returned to bed and placed back on the monitor.

## 2018-09-10 NOTE — ASSESSMENT & PLAN NOTE
This is sepsis (without associated acute organ dysfunction).   Presented with severe dehydration, doubt true sepsis  But needs to repeat blood culture to rule out septicemia.

## 2018-09-10 NOTE — ED NOTES
Pt updated with plan of care. Pt currently a &o x3. Pt reports improvement in symptoms at this time. Vss. Pt denies any current needs. Pt still has been unable to urinate or provide urine sample. Will continue to monitor.

## 2018-09-10 NOTE — ED NOTES
Blood gas drawn and sent to lab. Potassium infusing on pump, see MAR. Pt repeatedly asking to go in the bath.

## 2018-09-10 NOTE — ED NOTES
from Lab called with critical result of lactic 6.4 at 0125. Critical lab result read back to .   Dr. winston notified of critical lab result at 0130.  Critical lab result read back by Dr. winston.

## 2018-09-10 NOTE — ED NOTES
from Lab called with critical result of lactic acid 5.4 at 2340. Critical lab result read back to .   Dr. Hendrix notified of critical lab result at 2348.  Critical lab result read back by Dr. Hendrix.

## 2018-09-10 NOTE — ASSESSMENT & PLAN NOTE
Continue with IV fluids and antiemetics  -- less than previous  -- advised the patient to eat small frequent meals.  Given she has history of gastric bypass.

## 2018-09-10 NOTE — ED NOTES
Phlebotomist called for blood cxs at this time, will start iv abx after obtained. Pt and  updated with plan of care. Pt confused. Pt continues to request water to drink and wanting to bathe. Pt frequently reoriented by rn and . Awaiting icu admit bed assignment.

## 2018-09-11 ENCOUNTER — APPOINTMENT (OUTPATIENT)
Dept: RADIOLOGY | Facility: MEDICAL CENTER | Age: 43
DRG: 871 | End: 2018-09-11
Attending: HOSPITALIST
Payer: COMMERCIAL

## 2018-09-11 LAB
ALBUMIN SERPL BCP-MCNC: 2.8 G/DL (ref 3.2–4.9)
ALBUMIN/GLOB SERPL: 1 G/DL
ALP SERPL-CCNC: 63 U/L (ref 30–99)
ALT SERPL-CCNC: 6 U/L (ref 2–50)
ANION GAP SERPL CALC-SCNC: 7 MMOL/L (ref 0–11.9)
AST SERPL-CCNC: 18 U/L (ref 12–45)
BASOPHILS # BLD AUTO: 0.6 % (ref 0–1.8)
BASOPHILS # BLD: 0.06 K/UL (ref 0–0.12)
BILIRUB SERPL-MCNC: 0.4 MG/DL (ref 0.1–1.5)
BUN SERPL-MCNC: 3 MG/DL (ref 8–22)
CALCIUM SERPL-MCNC: 8.6 MG/DL (ref 8.5–10.5)
CHLORIDE SERPL-SCNC: 115 MMOL/L (ref 96–112)
CO2 SERPL-SCNC: 19 MMOL/L (ref 20–33)
CREAT SERPL-MCNC: 0.75 MG/DL (ref 0.5–1.4)
EOSINOPHIL # BLD AUTO: 0.02 K/UL (ref 0–0.51)
EOSINOPHIL NFR BLD: 0.2 % (ref 0–6.9)
ERYTHROCYTE [DISTWIDTH] IN BLOOD BY AUTOMATED COUNT: 56.1 FL (ref 35.9–50)
GLOBULIN SER CALC-MCNC: 2.9 G/DL (ref 1.9–3.5)
GLUCOSE SERPL-MCNC: 86 MG/DL (ref 65–99)
HCT VFR BLD AUTO: 33.9 % (ref 37–47)
HGB BLD-MCNC: 11.3 G/DL (ref 12–16)
IMM GRANULOCYTES # BLD AUTO: 0.05 K/UL (ref 0–0.11)
IMM GRANULOCYTES NFR BLD AUTO: 0.5 % (ref 0–0.9)
LIPASE SERPL-CCNC: 19 U/L (ref 11–82)
LYMPHOCYTES # BLD AUTO: 2.21 K/UL (ref 1–4.8)
LYMPHOCYTES NFR BLD: 21.6 % (ref 22–41)
MAGNESIUM SERPL-MCNC: 1.6 MG/DL (ref 1.5–2.5)
MCH RBC QN AUTO: 31.1 PG (ref 27–33)
MCHC RBC AUTO-ENTMCNC: 33.3 G/DL (ref 33.6–35)
MCV RBC AUTO: 93.4 FL (ref 81.4–97.8)
MONOCYTES # BLD AUTO: 1.23 K/UL (ref 0–0.85)
MONOCYTES NFR BLD AUTO: 12 % (ref 0–13.4)
NEUTROPHILS # BLD AUTO: 6.64 K/UL (ref 2–7.15)
NEUTROPHILS NFR BLD: 65.1 % (ref 44–72)
NRBC # BLD AUTO: 0 K/UL
NRBC BLD-RTO: 0 /100 WBC
PHOSPHATE SERPL-MCNC: 1.9 MG/DL (ref 2.5–4.5)
PLATELET # BLD AUTO: 236 K/UL (ref 164–446)
PMV BLD AUTO: 10.5 FL (ref 9–12.9)
POTASSIUM SERPL-SCNC: 3.4 MMOL/L (ref 3.6–5.5)
PROT SERPL-MCNC: 5.7 G/DL (ref 6–8.2)
RBC # BLD AUTO: 3.63 M/UL (ref 4.2–5.4)
SODIUM SERPL-SCNC: 141 MMOL/L (ref 135–145)
T4 FREE SERPL-MCNC: 0.98 NG/DL (ref 0.53–1.43)
TSH SERPL DL<=0.005 MIU/L-ACNC: 1.86 UIU/ML (ref 0.38–5.33)
WBC # BLD AUTO: 10.2 K/UL (ref 4.8–10.8)

## 2018-09-11 PROCEDURE — 700111 HCHG RX REV CODE 636 W/ 250 OVERRIDE (IP): Performed by: HOSPITALIST

## 2018-09-11 PROCEDURE — 700102 HCHG RX REV CODE 250 W/ 637 OVERRIDE(OP): Performed by: HOSPITALIST

## 2018-09-11 PROCEDURE — 83735 ASSAY OF MAGNESIUM: CPT

## 2018-09-11 PROCEDURE — 700105 HCHG RX REV CODE 258: Performed by: HOSPITALIST

## 2018-09-11 PROCEDURE — 80053 COMPREHEN METABOLIC PANEL: CPT

## 2018-09-11 PROCEDURE — 85025 COMPLETE CBC W/AUTO DIFF WBC: CPT

## 2018-09-11 PROCEDURE — 83690 ASSAY OF LIPASE: CPT

## 2018-09-11 PROCEDURE — A9270 NON-COVERED ITEM OR SERVICE: HCPCS | Performed by: HOSPITALIST

## 2018-09-11 PROCEDURE — 84100 ASSAY OF PHOSPHORUS: CPT

## 2018-09-11 PROCEDURE — 700101 HCHG RX REV CODE 250: Performed by: HOSPITALIST

## 2018-09-11 PROCEDURE — 770001 HCHG ROOM/CARE - MED/SURG/GYN PRIV*

## 2018-09-11 PROCEDURE — 84439 ASSAY OF FREE THYROXINE: CPT

## 2018-09-11 PROCEDURE — 74018 RADEX ABDOMEN 1 VIEW: CPT

## 2018-09-11 PROCEDURE — 84443 ASSAY THYROID STIM HORMONE: CPT

## 2018-09-11 PROCEDURE — 99233 SBSQ HOSP IP/OBS HIGH 50: CPT | Performed by: HOSPITALIST

## 2018-09-11 RX ORDER — ZOLPIDEM TARTRATE 5 MG/1
5 TABLET ORAL
Status: DISCONTINUED | OUTPATIENT
Start: 2018-09-11 | End: 2018-09-15 | Stop reason: HOSPADM

## 2018-09-11 RX ORDER — MAGNESIUM SULFATE HEPTAHYDRATE 40 MG/ML
2 INJECTION, SOLUTION INTRAVENOUS ONCE
Status: COMPLETED | OUTPATIENT
Start: 2018-09-11 | End: 2018-09-11

## 2018-09-11 RX ADMIN — CLONAZEPAM 0.5 MG: 0.5 TABLET ORAL at 17:52

## 2018-09-11 RX ADMIN — PIPERACILLIN SODIUM AND TAZOBACTAM SODIUM 3.38 G: 3; .375 INJECTION, POWDER, FOR SOLUTION INTRAVENOUS at 13:06

## 2018-09-11 RX ADMIN — CHLORPROMAZINE HYDROCHLORIDE 25 MG: 25 TABLET, SUGAR COATED ORAL at 05:15

## 2018-09-11 RX ADMIN — HEPARIN SODIUM 5000 UNITS: 5000 INJECTION, SOLUTION INTRAVENOUS; SUBCUTANEOUS at 05:16

## 2018-09-11 RX ADMIN — BUSPIRONE HYDROCHLORIDE 30 MG: 10 TABLET ORAL at 05:15

## 2018-09-11 RX ADMIN — POTASSIUM CHLORIDE AND SODIUM CHLORIDE: 900; 150 INJECTION, SOLUTION INTRAVENOUS at 13:06

## 2018-09-11 RX ADMIN — CHLORPROMAZINE HYDROCHLORIDE 25 MG: 25 TABLET, SUGAR COATED ORAL at 17:52

## 2018-09-11 RX ADMIN — BUSPIRONE HYDROCHLORIDE 30 MG: 10 TABLET ORAL at 17:52

## 2018-09-11 RX ADMIN — HYDROMORPHONE HYDROCHLORIDE 0.5 MG: 2 INJECTION INTRAMUSCULAR; INTRAVENOUS; SUBCUTANEOUS at 13:12

## 2018-09-11 RX ADMIN — CLONAZEPAM 0.5 MG: 0.5 TABLET ORAL at 05:15

## 2018-09-11 RX ADMIN — PROCHLORPERAZINE EDISYLATE 10 MG: 5 INJECTION INTRAMUSCULAR; INTRAVENOUS at 13:12

## 2018-09-11 RX ADMIN — HALOPERIDOL LACTATE 5 MG: 5 INJECTION, SOLUTION INTRAMUSCULAR at 10:13

## 2018-09-11 RX ADMIN — CHLORPROMAZINE HYDROCHLORIDE 25 MG: 25 TABLET, SUGAR COATED ORAL at 13:05

## 2018-09-11 RX ADMIN — HYDROMORPHONE HYDROCHLORIDE 0.5 MG: 2 INJECTION INTRAMUSCULAR; INTRAVENOUS; SUBCUTANEOUS at 04:15

## 2018-09-11 RX ADMIN — HEPARIN SODIUM 5000 UNITS: 5000 INJECTION, SOLUTION INTRAVENOUS; SUBCUTANEOUS at 13:05

## 2018-09-11 RX ADMIN — PROCHLORPERAZINE EDISYLATE 10 MG: 5 INJECTION INTRAMUSCULAR; INTRAVENOUS at 03:43

## 2018-09-11 RX ADMIN — ZOLPIDEM TARTRATE 5 MG: 5 TABLET ORAL at 21:48

## 2018-09-11 RX ADMIN — MAGNESIUM SULFATE HEPTAHYDRATE 2 G: 40 INJECTION, SOLUTION INTRAVENOUS at 13:06

## 2018-09-11 RX ADMIN — POTASSIUM PHOSPHATE, MONOBASIC AND POTASSIUM PHOSPHATE, DIBASIC 30 MMOL: 224; 236 INJECTION, SOLUTION INTRAVENOUS at 14:36

## 2018-09-11 RX ADMIN — SERTRALINE 100 MG: 100 TABLET, FILM COATED ORAL at 21:48

## 2018-09-11 RX ADMIN — PIPERACILLIN SODIUM AND TAZOBACTAM SODIUM 3.38 G: 3; .375 INJECTION, POWDER, FOR SOLUTION INTRAVENOUS at 05:15

## 2018-09-11 RX ADMIN — POTASSIUM CHLORIDE AND SODIUM CHLORIDE: 900; 150 INJECTION, SOLUTION INTRAVENOUS at 00:00

## 2018-09-11 ASSESSMENT — PAIN SCALES - GENERAL
PAINLEVEL_OUTOF10: 2
PAINLEVEL_OUTOF10: 0
PAINLEVEL_OUTOF10: 7
PAINLEVEL_OUTOF10: 0
PAINLEVEL_OUTOF10: 8
PAINLEVEL_OUTOF10: 0
PAINLEVEL_OUTOF10: 6
PAINLEVEL_OUTOF10: 0
PAINLEVEL_OUTOF10: 0

## 2018-09-11 ASSESSMENT — ENCOUNTER SYMPTOMS
NERVOUS/ANXIOUS: 1
NAUSEA: 1
CHILLS: 0
ABDOMINAL PAIN: 1
FEVER: 0
DEPRESSION: 1
INSOMNIA: 1

## 2018-09-11 NOTE — CARE PLAN
Problem: Bowel/Gastric:  Goal: Normal bowel function is maintained or improved  Outcome: PROGRESSING SLOWER THAN EXPECTED  Pt experienced episodes of nausea, diarrhea x2.    Problem: Pain Management  Goal: Pain level will decrease to patient's comfort goal  Outcome: PROGRESSING SLOWER THAN EXPECTED  Distraction, heat packs, PRN meds, given for abdominal pain.

## 2018-09-11 NOTE — ASSESSMENT & PLAN NOTE
-- advised the patient to stop abuse marijuana  -- continue with conservative therapy. Antiemetic and PPI

## 2018-09-11 NOTE — CARE PLAN
Problem: Safety  Goal: Will remain free from falls  Outcome: PROGRESSING AS EXPECTED  Assess for fall risk factors, call light within reach, bed alarm in use, educate pt to call for assistance    Problem: Psychosocial Needs:  Goal: Level of anxiety will decrease  Outcome: PROGRESSING AS EXPECTED  Develop strategies to cope anxiety triggers, hot showers as needed, prn medications as needed

## 2018-09-11 NOTE — PROGRESS NOTES
[facility] Hospitalist Progress Note    Date of Service: 2018    Chief Complaint  43 y.o. female admitted 2018 with vomiting and abdominal pain.    Interval Problem Update  Ms. Hernandez has a history ofsuperior mesenteric artery syndrome with a Shruti-en-Y with  duodenojejunostomy and pyloroplasty, cholecystectomy  recurrent episodes of vomiting as well as chronic marijuana use that presented to the ER with severe abdominal pain and vomiting for 2 days. Her lactic acid was found to be elevated and remained elevated despite IV fluids thus she has been admitted to the ICU.  RN notes that she had low BP last night while sleeping. Compazine has been helping her nausea.  Ms. Hernandez has requested Ambien tonight due to insomnia.   We had a long discussion about her severe anxiety which has been limiting her ability to function. She has not been leaving the house. Stressors include the death of her father as well as her infertility. Exercise, psychotherapy, and medications discussed. Cessation of marijuana and tobacco discussed. 35 minutes spent that of which over 50% of the time was spent in counseling.   Consultants/Specialty      Disposition  medical        Review of Systems   Constitutional: Negative for chills and fever.   Gastrointestinal: Positive for abdominal pain and nausea.   Psychiatric/Behavioral: Positive for depression. The patient is nervous/anxious and has insomnia.    All other systems reviewed and are negative.     Physical Exam  Laboratory/Imaging   Hemodynamics  Temp (24hrs), Av.9 °C (98.4 °F), Min:36.4 °C (97.6 °F), Max:37.2 °C (99 °F)   Temperature: 37.2 °C (99 °F)  Pulse  Av.9  Min: 59  Max: 95 Heart Rate (Monitored): 62  NIBP: (!) 87/54      Respiratory      Respiration: (!) 21, Pulse Oximetry: 94 %        RUL Breath Sounds: Clear, RML Breath Sounds: Clear, RLL Breath Sounds: Clear, MADDY Breath Sounds: Clear, LLL Breath Sounds: Clear    Fluids    Intake/Output Summary (Last 24 hours) at  09/11/18 0730  Last data filed at 09/11/18 0600   Gross per 24 hour   Intake             3660 ml   Output              945 ml   Net             2715 ml       Nutrition  Orders Placed This Encounter   Procedures   • Diet Order Regular, Full Liquid     Standing Status:   Standing     Number of Occurrences:   1     Order Specific Question:   Diet:     Answer:   Regular [1]     Order Specific Question:   Diet:     Answer:   Full Liquid [11]     Physical Exam   Constitutional: She is oriented to person, place, and time.   Neck: Neck supple.   Cardiovascular: Normal rate and regular rhythm.    Pulmonary/Chest: Effort normal.   Abdominal: Soft. She exhibits no distension.   Musculoskeletal: She exhibits no edema or tenderness.   Neurological: She is alert and oriented to person, place, and time.   Skin: Skin is warm and dry.   Psychiatric: She has a normal mood and affect. Her behavior is normal.   tearful   Nursing note and vitals reviewed.      Recent Labs      09/09/18   2150  09/10/18   0101  09/11/18   0403   WBC  15.8*  14.2*  10.2   RBC  5.24  3.82*  3.63*   HEMOGLOBIN  16.0  11.7*  11.3*   HEMATOCRIT  46.9  34.7*  33.9*   MCV  89.5  90.8  93.4   MCH  30.5  30.6  31.1   MCHC  34.1  33.7  33.3*   RDW  50.8*  51.0*  56.1*   PLATELETCT  437  375  236   MPV  9.8  9.7  10.5     Recent Labs      09/10/18   0101  09/10/18   0645  09/11/18   0520   SODIUM  137  137  139  141   POTASSIUM  3.0*  2.9*  3.6  3.4*   CHLORIDE  110  110  117*  115*   CO2  11*  11*  15*  19*   GLUCOSE  226*  225*  101*  86   BUN  11  10  8  3*   CREATININE  1.48*  1.51*  0.89  0.75   CALCIUM  9.3  9.2  7.7*  8.6             Recent Labs      09/10/18   0101   TRIGLYCERIDE  96   HDL  26*   LDL  115*          Assessment/Plan     * AP (abdominal pain)- (present on admission)   Assessment & Plan    Likely acute on chronic  Multiple prior procedures  IV fluids  CT abd/pelvis:  1.  Subtle hazy left lower lobe opacity, could represent subtle  infiltrates.  2.  Target sign in the left lower abdomen, appearance suggests short segment of intussusception, no obstruction is seen, may represent physiologic intussusception. Radiologic follow-up to exclude progression to obstructive changes as clinically   appropriate.  3.  Hepatomegaly        Sepsis (HCC)   Assessment & Plan    This is sepsis (without associated acute organ dysfunction).   Presented with severe dehydration, doubt true sepsis          JILL (acute kidney injury) (HCC)   Assessment & Plan    Cr normalized with IV hydration        Lactic acidosis- (present on admission)   Assessment & Plan    Likely due to severe dehydration however with uptrending lactic despite 3 L IVF and lactic acid went up to 6.4  No apparent source of infections.    Blood culture is consistent with a contaminant thus stop IV Zosyn.         Dehydration- (present on admission)   Assessment & Plan    Treated with IV fluids        Anxiety- (present on admission)   Assessment & Plan    Resume home Buspar and Zoloft        Tobacco abuse disorder- (present on admission)   Assessment & Plan    Cessation encouraged.   Nicotine patch for withdrawal symptoms.        Encephalopathy   Assessment & Plan    Metabolic vs septic   Cont to monitor         Nausea and vomiting   Assessment & Plan    Continue with IV fluids and antiemetics        Hypokalemia   Assessment & Plan    K went down to 2.9 thus IV replacement        Addiction, marijuana (HCC)- (present on admission)   Assessment & Plan    Encouraged cessation         Cyclic vomiting syndrome- (present on admission)   Assessment & Plan    Secondary to cannabis abuse          Quality-Core Measures   Reviewed items::  Labs reviewed and Medications reviewed  DVT prophylaxis pharmacological::  Enoxaparin (Lovenox)

## 2018-09-12 PROBLEM — G93.40 ENCEPHALOPATHY: Status: RESOLVED | Noted: 2018-09-10 | Resolved: 2018-09-12

## 2018-09-12 PROCEDURE — 700111 HCHG RX REV CODE 636 W/ 250 OVERRIDE (IP): Performed by: HOSPITALIST

## 2018-09-12 PROCEDURE — 700101 HCHG RX REV CODE 250: Performed by: HOSPITALIST

## 2018-09-12 PROCEDURE — 700102 HCHG RX REV CODE 250 W/ 637 OVERRIDE(OP): Performed by: INTERNAL MEDICINE

## 2018-09-12 PROCEDURE — A9270 NON-COVERED ITEM OR SERVICE: HCPCS | Performed by: HOSPITALIST

## 2018-09-12 PROCEDURE — 770006 HCHG ROOM/CARE - MED/SURG/GYN SEMI*

## 2018-09-12 PROCEDURE — 700102 HCHG RX REV CODE 250 W/ 637 OVERRIDE(OP): Performed by: HOSPITALIST

## 2018-09-12 PROCEDURE — A9270 NON-COVERED ITEM OR SERVICE: HCPCS | Performed by: INTERNAL MEDICINE

## 2018-09-12 PROCEDURE — 99233 SBSQ HOSP IP/OBS HIGH 50: CPT | Performed by: HOSPITALIST

## 2018-09-12 RX ORDER — CLONAZEPAM 0.5 MG/1
0.5 TABLET ORAL 3 TIMES DAILY
Status: DISCONTINUED | OUTPATIENT
Start: 2018-09-12 | End: 2018-09-15 | Stop reason: HOSPADM

## 2018-09-12 RX ORDER — LORAZEPAM 2 MG/ML
1 INJECTION INTRAMUSCULAR ONCE
Status: COMPLETED | OUTPATIENT
Start: 2018-09-12 | End: 2018-09-12

## 2018-09-12 RX ORDER — BENZOCAINE/MENTHOL 6 MG-10 MG
LOZENGE MUCOUS MEMBRANE 2 TIMES DAILY
Status: DISCONTINUED | OUTPATIENT
Start: 2018-09-12 | End: 2018-09-15 | Stop reason: HOSPADM

## 2018-09-12 RX ADMIN — PROCHLORPERAZINE EDISYLATE 5 MG: 5 INJECTION INTRAMUSCULAR; INTRAVENOUS at 20:24

## 2018-09-12 RX ADMIN — HYDROCORTISONE: 1 CREAM TOPICAL at 10:42

## 2018-09-12 RX ADMIN — CLONAZEPAM 0.5 MG: 0.5 TABLET ORAL at 18:04

## 2018-09-12 RX ADMIN — PROCHLORPERAZINE EDISYLATE 10 MG: 5 INJECTION INTRAMUSCULAR; INTRAVENOUS at 14:33

## 2018-09-12 RX ADMIN — OXYCODONE HYDROCHLORIDE 10 MG: 10 TABLET ORAL at 14:33

## 2018-09-12 RX ADMIN — CHLORPROMAZINE HYDROCHLORIDE 25 MG: 25 TABLET, SUGAR COATED ORAL at 18:04

## 2018-09-12 RX ADMIN — CLONAZEPAM 0.5 MG: 0.5 TABLET ORAL at 06:38

## 2018-09-12 RX ADMIN — CHLORPROMAZINE HYDROCHLORIDE 25 MG: 25 TABLET, SUGAR COATED ORAL at 06:40

## 2018-09-12 RX ADMIN — CHLORPROMAZINE HYDROCHLORIDE 25 MG: 25 TABLET, SUGAR COATED ORAL at 12:27

## 2018-09-12 RX ADMIN — PROCHLORPERAZINE EDISYLATE 10 MG: 5 INJECTION INTRAMUSCULAR; INTRAVENOUS at 07:42

## 2018-09-12 RX ADMIN — CLONAZEPAM 0.5 MG: 0.5 TABLET ORAL at 12:28

## 2018-09-12 RX ADMIN — BUSPIRONE HYDROCHLORIDE 30 MG: 10 TABLET ORAL at 06:38

## 2018-09-12 RX ADMIN — BUSPIRONE HYDROCHLORIDE 30 MG: 10 TABLET ORAL at 18:04

## 2018-09-12 RX ADMIN — ENOXAPARIN SODIUM 40 MG: 40 INJECTION SUBCUTANEOUS at 06:38

## 2018-09-12 RX ADMIN — LORAZEPAM 1 MG: 2 INJECTION INTRAMUSCULAR; INTRAVENOUS at 22:10

## 2018-09-12 RX ADMIN — HALOPERIDOL LACTATE 3 MG: 5 INJECTION, SOLUTION INTRAMUSCULAR at 10:42

## 2018-09-12 RX ADMIN — POTASSIUM CHLORIDE AND SODIUM CHLORIDE: 900; 150 INJECTION, SOLUTION INTRAVENOUS at 01:07

## 2018-09-12 RX ADMIN — HALOPERIDOL LACTATE 5 MG: 5 INJECTION, SOLUTION INTRAMUSCULAR at 15:53

## 2018-09-12 ASSESSMENT — ENCOUNTER SYMPTOMS
VOMITING: 1
ABDOMINAL PAIN: 1
INSOMNIA: 1
NERVOUS/ANXIOUS: 1
FEVER: 0
NAUSEA: 1

## 2018-09-12 ASSESSMENT — PAIN SCALES - GENERAL
PAINLEVEL_OUTOF10: 2
PAINLEVEL_OUTOF10: 4
PAINLEVEL_OUTOF10: 4
PAINLEVEL_OUTOF10: 0
PAINLEVEL_OUTOF10: 0
PAINLEVEL_OUTOF10: 2
PAINLEVEL_OUTOF10: 0
PAINLEVEL_OUTOF10: 8

## 2018-09-12 ASSESSMENT — COGNITIVE AND FUNCTIONAL STATUS - GENERAL
MOBILITY SCORE: 24
SUGGESTED CMS G CODE MODIFIER DAILY ACTIVITY: CH
DAILY ACTIVITIY SCORE: 24
SUGGESTED CMS G CODE MODIFIER MOBILITY: CH

## 2018-09-12 NOTE — CARE PLAN
Problem: Safety  Goal: Will remain free from falls  Outcome: PROGRESSING AS EXPECTED  Assess risk factors for falls, call light within reach, bed alarm in use    Problem: Psychosocial Needs:  Goal: Level of anxiety will decrease  Outcome: PROGRESSING AS EXPECTED  Develop strategies to cope with anxiety, prn medications as needed, scheduled meds per md order

## 2018-09-12 NOTE — PROGRESS NOTES
Report given to ELMO Day on Tanya 6. Pt transported via w/c. Belongings accounted for and sent with patient.  accompanied pt to new room.

## 2018-09-12 NOTE — PSYCHIATRY
PSYCHIATRIC CONSULTATION:not seen. Consult requested for severe anxiety and not eating as a result but no order in EPIC. The consult cannot be done without the treatment team's physician ordering it in EPIC.

## 2018-09-12 NOTE — PROGRESS NOTES
Patient transferred into room 632-1 from ICU at 1330. She was accompanied by  & RN transport. Patient acclimated to room & made comfortable. Skin check with ELMO Tovar completed. Patient's skin is intact with scattered bruising on extremities; healed incision on midline of abdomen; small red spot on top of left ear. Patient given pain & nausea meds. Will continue to monitor condition.

## 2018-09-13 ENCOUNTER — APPOINTMENT (OUTPATIENT)
Dept: RADIOLOGY | Facility: MEDICAL CENTER | Age: 43
DRG: 871 | End: 2018-09-13
Attending: INTERNAL MEDICINE
Payer: COMMERCIAL

## 2018-09-13 PROBLEM — G47.00 INSOMNIA: Status: ACTIVE | Noted: 2018-09-13

## 2018-09-13 PROBLEM — R78.81 BACTEREMIA: Status: ACTIVE | Noted: 2018-09-13

## 2018-09-13 LAB
ALBUMIN SERPL BCP-MCNC: 3.2 G/DL (ref 3.2–4.9)
ALBUMIN/GLOB SERPL: 1 G/DL
ALP SERPL-CCNC: 72 U/L (ref 30–99)
ALT SERPL-CCNC: 8 U/L (ref 2–50)
ANION GAP SERPL CALC-SCNC: 11 MMOL/L (ref 0–11.9)
AST SERPL-CCNC: 18 U/L (ref 12–45)
BASOPHILS # BLD AUTO: 1 % (ref 0–1.8)
BASOPHILS # BLD: 0.06 K/UL (ref 0–0.12)
BILIRUB SERPL-MCNC: 0.5 MG/DL (ref 0.1–1.5)
BUN SERPL-MCNC: <3 MG/DL (ref 8–22)
CALCIUM SERPL-MCNC: 8.5 MG/DL (ref 8.5–10.5)
CHLORIDE SERPL-SCNC: 105 MMOL/L (ref 96–112)
CO2 SERPL-SCNC: 23 MMOL/L (ref 20–33)
CREAT SERPL-MCNC: 0.63 MG/DL (ref 0.5–1.4)
EOSINOPHIL # BLD AUTO: 0.03 K/UL (ref 0–0.51)
EOSINOPHIL NFR BLD: 0.5 % (ref 0–6.9)
ERYTHROCYTE [DISTWIDTH] IN BLOOD BY AUTOMATED COUNT: 55.1 FL (ref 35.9–50)
GLOBULIN SER CALC-MCNC: 3.2 G/DL (ref 1.9–3.5)
GLUCOSE SERPL-MCNC: 97 MG/DL (ref 65–99)
HCT VFR BLD AUTO: 34.5 % (ref 37–47)
HGB BLD-MCNC: 11.6 G/DL (ref 12–16)
IMM GRANULOCYTES # BLD AUTO: 0.04 K/UL (ref 0–0.11)
IMM GRANULOCYTES NFR BLD AUTO: 0.7 % (ref 0–0.9)
LYMPHOCYTES # BLD AUTO: 1.58 K/UL (ref 1–4.8)
LYMPHOCYTES NFR BLD: 26.2 % (ref 22–41)
MAGNESIUM SERPL-MCNC: 1.8 MG/DL (ref 1.5–2.5)
MCH RBC QN AUTO: 30.9 PG (ref 27–33)
MCHC RBC AUTO-ENTMCNC: 33.6 G/DL (ref 33.6–35)
MCV RBC AUTO: 91.8 FL (ref 81.4–97.8)
MONOCYTES # BLD AUTO: 0.57 K/UL (ref 0–0.85)
MONOCYTES NFR BLD AUTO: 9.4 % (ref 0–13.4)
NEUTROPHILS # BLD AUTO: 3.76 K/UL (ref 2–7.15)
NEUTROPHILS NFR BLD: 62.2 % (ref 44–72)
NRBC # BLD AUTO: 0 K/UL
NRBC BLD-RTO: 0 /100 WBC
PLATELET # BLD AUTO: 318 K/UL (ref 164–446)
PMV BLD AUTO: 10.3 FL (ref 9–12.9)
POTASSIUM SERPL-SCNC: 3.6 MMOL/L (ref 3.6–5.5)
PROT SERPL-MCNC: 6.4 G/DL (ref 6–8.2)
RBC # BLD AUTO: 3.76 M/UL (ref 4.2–5.4)
SODIUM SERPL-SCNC: 139 MMOL/L (ref 135–145)
WBC # BLD AUTO: 6 K/UL (ref 4.8–10.8)

## 2018-09-13 PROCEDURE — 770006 HCHG ROOM/CARE - MED/SURG/GYN SEMI*

## 2018-09-13 PROCEDURE — 700102 HCHG RX REV CODE 250 W/ 637 OVERRIDE(OP): Performed by: INTERNAL MEDICINE

## 2018-09-13 PROCEDURE — 99223 1ST HOSP IP/OBS HIGH 75: CPT | Performed by: INTERNAL MEDICINE

## 2018-09-13 PROCEDURE — 700105 HCHG RX REV CODE 258: Performed by: INTERNAL MEDICINE

## 2018-09-13 PROCEDURE — A9270 NON-COVERED ITEM OR SERVICE: HCPCS | Performed by: HOSPITALIST

## 2018-09-13 PROCEDURE — 700102 HCHG RX REV CODE 250 W/ 637 OVERRIDE(OP): Performed by: HOSPITALIST

## 2018-09-13 PROCEDURE — A9270 NON-COVERED ITEM OR SERVICE: HCPCS | Performed by: INTERNAL MEDICINE

## 2018-09-13 PROCEDURE — 700111 HCHG RX REV CODE 636 W/ 250 OVERRIDE (IP): Performed by: HOSPITALIST

## 2018-09-13 PROCEDURE — 05HY33Z INSERTION OF INFUSION DEVICE INTO UPPER VEIN, PERCUTANEOUS APPROACH: ICD-10-PCS | Performed by: INTERNAL MEDICINE

## 2018-09-13 PROCEDURE — B54MZZA ULTRASONOGRAPHY OF RIGHT UPPER EXTREMITY VEINS, GUIDANCE: ICD-10-PCS | Performed by: INTERNAL MEDICINE

## 2018-09-13 PROCEDURE — 700111 HCHG RX REV CODE 636 W/ 250 OVERRIDE (IP): Performed by: INTERNAL MEDICINE

## 2018-09-13 PROCEDURE — 83735 ASSAY OF MAGNESIUM: CPT

## 2018-09-13 PROCEDURE — 99223 1ST HOSP IP/OBS HIGH 75: CPT | Performed by: PSYCHIATRY & NEUROLOGY

## 2018-09-13 PROCEDURE — 36569 INSJ PICC 5 YR+ W/O IMAGING: CPT

## 2018-09-13 PROCEDURE — 87040 BLOOD CULTURE FOR BACTERIA: CPT | Mod: 91

## 2018-09-13 PROCEDURE — 36415 COLL VENOUS BLD VENIPUNCTURE: CPT

## 2018-09-13 PROCEDURE — 85025 COMPLETE CBC W/AUTO DIFF WBC: CPT

## 2018-09-13 PROCEDURE — 80053 COMPREHEN METABOLIC PANEL: CPT

## 2018-09-13 PROCEDURE — 99233 SBSQ HOSP IP/OBS HIGH 50: CPT | Performed by: INTERNAL MEDICINE

## 2018-09-13 RX ORDER — CHLORPROMAZINE HYDROCHLORIDE 25 MG/ML
25 INJECTION INTRAMUSCULAR EVERY 6 HOURS PRN
Status: DISCONTINUED | OUTPATIENT
Start: 2018-09-13 | End: 2018-09-15 | Stop reason: HOSPADM

## 2018-09-13 RX ORDER — ZOLPIDEM TARTRATE 5 MG/1
5 TABLET ORAL ONCE
Status: COMPLETED | OUTPATIENT
Start: 2018-09-13 | End: 2018-09-13

## 2018-09-13 RX ORDER — CHLORPROMAZINE HYDROCHLORIDE 50 MG/1
50 TABLET, FILM COATED ORAL 3 TIMES DAILY
Status: DISCONTINUED | OUTPATIENT
Start: 2018-09-14 | End: 2018-09-15 | Stop reason: HOSPADM

## 2018-09-13 RX ORDER — HALOPERIDOL 5 MG/ML
2-5 INJECTION INTRAMUSCULAR EVERY 4 HOURS PRN
Status: DISCONTINUED | OUTPATIENT
Start: 2018-09-13 | End: 2018-09-13

## 2018-09-13 RX ORDER — LORAZEPAM 0.5 MG/1
0.5 TABLET ORAL EVERY 8 HOURS PRN
Status: DISCONTINUED | OUTPATIENT
Start: 2018-09-13 | End: 2018-09-15 | Stop reason: HOSPADM

## 2018-09-13 RX ADMIN — CLONAZEPAM 0.5 MG: 0.5 TABLET ORAL at 12:14

## 2018-09-13 RX ADMIN — CLONAZEPAM 0.5 MG: 0.5 TABLET ORAL at 08:47

## 2018-09-13 RX ADMIN — LORAZEPAM 0.5 MG: 0.5 TABLET ORAL at 10:46

## 2018-09-13 RX ADMIN — PIPERACILLIN AND TAZOBACTAM 3.38 G: 3; .375 INJECTION, POWDER, LYOPHILIZED, FOR SOLUTION INTRAVENOUS; PARENTERAL at 20:53

## 2018-09-13 RX ADMIN — CHLORPROMAZINE HYDROCHLORIDE 25 MG: 25 TABLET, SUGAR COATED ORAL at 17:45

## 2018-09-13 RX ADMIN — PIPERACILLIN AND TAZOBACTAM 3.38 G: 3; .375 INJECTION, POWDER, LYOPHILIZED, FOR SOLUTION INTRAVENOUS; PARENTERAL at 14:06

## 2018-09-13 RX ADMIN — PIPERACILLIN AND TAZOBACTAM 3.38 G: 3; .375 INJECTION, POWDER, LYOPHILIZED, FOR SOLUTION INTRAVENOUS; PARENTERAL at 12:15

## 2018-09-13 RX ADMIN — OXYCODONE HYDROCHLORIDE 10 MG: 10 TABLET ORAL at 19:40

## 2018-09-13 RX ADMIN — SERTRALINE 100 MG: 100 TABLET, FILM COATED ORAL at 20:53

## 2018-09-13 RX ADMIN — CHLORPROMAZINE HYDROCHLORIDE 25 MG: 25 TABLET, SUGAR COATED ORAL at 12:14

## 2018-09-13 RX ADMIN — CLONAZEPAM 0.5 MG: 0.5 TABLET ORAL at 17:44

## 2018-09-13 RX ADMIN — ZOLPIDEM TARTRATE 5 MG: 5 TABLET ORAL at 00:25

## 2018-09-13 RX ADMIN — ENOXAPARIN SODIUM 40 MG: 40 INJECTION SUBCUTANEOUS at 08:48

## 2018-09-13 RX ADMIN — ONDANSETRON 4 MG: 4 TABLET, ORALLY DISINTEGRATING ORAL at 11:23

## 2018-09-13 RX ADMIN — ZOLPIDEM TARTRATE 5 MG: 5 TABLET ORAL at 21:59

## 2018-09-13 RX ADMIN — OXYCODONE HYDROCHLORIDE 10 MG: 10 TABLET ORAL at 12:00

## 2018-09-13 RX ADMIN — PROCHLORPERAZINE EDISYLATE 10 MG: 5 INJECTION INTRAMUSCULAR; INTRAVENOUS at 10:15

## 2018-09-13 RX ADMIN — CHLORPROMAZINE HYDROCHLORIDE 25 MG: 25 TABLET, SUGAR COATED ORAL at 08:46

## 2018-09-13 RX ADMIN — BUSPIRONE HYDROCHLORIDE 30 MG: 10 TABLET ORAL at 08:46

## 2018-09-13 RX ADMIN — BUSPIRONE HYDROCHLORIDE 30 MG: 10 TABLET ORAL at 17:44

## 2018-09-13 ASSESSMENT — ENCOUNTER SYMPTOMS
ABDOMINAL PAIN: 1
INSOMNIA: 1
VOMITING: 1
FEVER: 0
NAUSEA: 1
NERVOUS/ANXIOUS: 1

## 2018-09-13 ASSESSMENT — PAIN SCALES - GENERAL
PAINLEVEL_OUTOF10: 8
PAINLEVEL_OUTOF10: 0
PAINLEVEL_OUTOF10: 3

## 2018-09-13 NOTE — CARE PLAN
Problem: Knowledge Deficit  Goal: Knowledge of disease process/condition, treatment plan, diagnostic tests, and medications will improve  Outcome: PROGRESSING AS EXPECTED  Pt oriented to unit and plan of care discussed.     Problem: Psychosocial Needs:  Goal: Level of anxiety will decrease  Outcome: PROGRESSING AS EXPECTED  Pt medicated per mar to aid in anxiety reduction.

## 2018-09-13 NOTE — PROGRESS NOTES
Pt awake and alert. Pt unable to take po medications due to nausea and vomiting. Pt medicated for vomiting. No complaint of pain. Pt reported increased anxiety and insomnia, medicated per mar. Pt denies further needs at this time.

## 2018-09-13 NOTE — PROGRESS NOTES
[facility] Hospitalist Progress Note    Date of Service: 2018    Chief Complaint  43 y.o. female admitted 2018 with vomiting and abdominal pain.    Interval Problem Update  Ms. Hernandez has a history ofsuperior mesenteric artery syndrome with a Shruti-en-Y with  duodenojejunostomy and pyloroplasty, cholecystectomy  recurrent episodes of vomiting as well as chronic marijuana use that presented to the ER with severe abdominal pain and vomiting for 2 days. Her lactic acid was found to be elevated and remained elevated despite IV fluids thus she has been admitted to the ICU.       We had a long discussion about her severe anxiety which has been limiting her ability to function. She has not been leaving the house. Stressors include the death of her father as well as her infertility.   Ms. Hernandez is open to speaking with psychiatry about her anxiety. She began vomiting again this morning.   Consultants/Specialty      Disposition  medical        Review of Systems   Constitutional: Negative for fever.   Gastrointestinal: Positive for abdominal pain, nausea and vomiting.   Psychiatric/Behavioral: The patient is nervous/anxious and has insomnia.    All other systems reviewed and are negative.     Physical Exam  Laboratory/Imaging   Hemodynamics  Temp (24hrs), Av.9 °C (98.4 °F), Min:36.6 °C (97.8 °F), Max:37.3 °C (99.1 °F)   Temperature: 36.6 °C (97.8 °F)  Pulse  Av  Min: 58  Max: 103 Heart Rate (Monitored): 95  Blood Pressure: 137/85, NIBP: 142/85      Respiratory      Respiration: 18, Pulse Oximetry: 100 %     Work Of Breathing / Effort: Mild  RUL Breath Sounds: Clear, RML Breath Sounds: Clear, RLL Breath Sounds: Clear, MADDY Breath Sounds: Clear, LLL Breath Sounds: Clear    Fluids    Intake/Output Summary (Last 24 hours) at 18 1728  Last data filed at 18 1200   Gross per 24 hour   Intake             1985 ml   Output                0 ml   Net             1985 ml       Nutrition  Orders Placed This  Encounter   Procedures   • Diet Order Regular, Full Liquid     Standing Status:   Standing     Number of Occurrences:   1     Order Specific Question:   Diet:     Answer:   Regular [1]     Order Specific Question:   Diet:     Answer:   Full Liquid [11]     Physical Exam   Constitutional: She is oriented to person, place, and time. She appears distressed.   Neck: Neck supple.   Cardiovascular: Normal rate and regular rhythm.    Pulmonary/Chest: Effort normal.   Abdominal: Soft. She exhibits no distension.   Musculoskeletal: She exhibits no edema.   Neurological: She is alert and oriented to person, place, and time.   Skin: Skin is warm and dry.   Psychiatric:   Anxious and shaking at times   Nursing note and vitals reviewed.      Recent Labs      09/09/18   2150  09/10/18   0101  09/11/18   0403   WBC  15.8*  14.2*  10.2   RBC  5.24  3.82*  3.63*   HEMOGLOBIN  16.0  11.7*  11.3*   HEMATOCRIT  46.9  34.7*  33.9*   MCV  89.5  90.8  93.4   MCH  30.5  30.6  31.1   MCHC  34.1  33.7  33.3*   RDW  50.8*  51.0*  56.1*   PLATELETCT  437  375  236   MPV  9.8  9.7  10.5     Recent Labs      09/10/18   0101  09/10/18   0645  09/11/18   0520   SODIUM  137  137  139  141   POTASSIUM  3.0*  2.9*  3.6  3.4*   CHLORIDE  110  110  117*  115*   CO2  11*  11*  15*  19*   GLUCOSE  226*  225*  101*  86   BUN  11  10  8  3*   CREATININE  1.48*  1.51*  0.89  0.75   CALCIUM  9.3  9.2  7.7*  8.6             Recent Labs      09/10/18   0101   TRIGLYCERIDE  96   HDL  26*   LDL  115*          Assessment/Plan     * AP (abdominal pain)- (present on admission)   Assessment & Plan    History of superior mesenteric artery syndrome with a Shruti-en-Y with  duodenojejunostomy and pyloroplasty  IV fluids and IV anti-emetics.  Likely exacerbated by severe anxiety and perhaps chronic cannabis use.     CT abd/pelvis:  1.  Subtle hazy left lower lobe opacity, could represent subtle infiltrates.  2.  Target sign in the left lower abdomen, appearance  suggests short segment of intussusception, no obstruction is seen, may represent physiologic intussusception. Radiologic follow-up to exclude progression to obstructive changes as clinically   appropriate.  3.  Hepatomegaly        Sepsis (HCC)   Assessment & Plan    This is sepsis (without associated acute organ dysfunction).   Presented with severe dehydration, doubt true sepsis          JILL (acute kidney injury) ([facility])   Assessment & Plan    Cr normalized with IV hydration        Lactic acidosis- (present on admission)   Assessment & Plan    Likely due to severe dehydration however with uptrending lactic despite 3 L IVF and lactic acid went up to 6.4  No apparent source of infections.    Blood culture is consistent with a contaminant thus stopped IV Zosyn.         Dehydration- (present on admission)   Assessment & Plan    Treated with IV fluids        Anxiety- (present on admission)   Assessment & Plan    Resume home Buspar and Zoloft  This has been incapacitating for her.  Psychiatry consulted.         Tobacco abuse disorder- (present on admission)   Assessment & Plan    Cessation encouraged.   Nicotine patch for withdrawal symptoms.        Encephalopathy   Assessment & Plan    Metabolic vs septic   Cont to monitor         Nausea and vomiting   Assessment & Plan    Continue with IV fluids and antiemetics        Hypokalemia   Assessment & Plan    K went down to 2.9 thus IV replacement        Addiction, marijuana (HCC)- (present on admission)   Assessment & Plan    Encouraged cessation         Cyclic vomiting syndrome- (present on admission)   Assessment & Plan    Secondary to cannabis abuse          Quality-Core Measures   Reviewed items::  Medications reviewed  DVT prophylaxis pharmacological::  Enoxaparin (Lovenox)

## 2018-09-13 NOTE — PSYCHIATRY
"PSYCHIATRIC CONSULTATION:  Reason for admission:   Nausea/vomiting and abdominal pain  Reason for consult: \"Severe anxiety\"  Requesting Physician: Dr. Mohan Mayes    Legal status:  Voluntary     Chief Complaint: \"I've just been feeling really crappy\"    HPI: 44yo female with history of \"anxiety,\" cyclical vomiting syndrome, cannabis use presented the the hospital on 09/09/2018 after her  called 911 when he found her \"disoriented and acting strangely\" while she was in the bathtub. Pt reports having cyclical vomiting syndrome for the last 10 years and that this is relieved by warm baths or showers. Pt was taking a hot bath at the time her  found her disoriented. On presentation to the hospital, pt was hypovolemic,  exhibited lactic acidosis and was septic from an unknown source (possible pneumonia vs GI). Pt was resuscitated with fluids and has been treated with IV Zosyn. Pt was also restarted on her outpatient medications of buspirone, sertraline, chlorpromazine, clonazepam and diphenhydramine.    For about 1 month prior to admission, pt reports being unable to leave her house secondary to her anxiety. Pt reports having fears that something will happen to her, like she will be hit by a car or will become injured and unable to get help if she leaves her house. She acknowledges that these fears are irrational but says that she cannot control them. Pt reported that years ago, she used to use cannabis as a way to cope with her anxiety which has lead to her using it in larger quantities and more frequently. Pt also reports that it was recently explained to her that her heavy cannabis use is likely linked to her cyclical vomiting. Pt reports that she typically uses cannabis (from a local dispensary with over 20% THC concentration) every 1-2 hours throughout the day, every day prior to admission. Pt does report having a period of sobriety last year around the time of her wedding in which many of her mood and " "anxiety symptoms improved.   Pt reports that she sees MISSY Enriquez in the community for her primary psychiatric care. A few months ago, she was started on sertraline which has improved her mood and decreased her episodes of nausea and vomiting.     Psychiatric Review of Systems:current symptoms as reported by pt.  Depression: Denies changes in sleep, appetite, concentration, feelings of guilt.  Lashae: Denies insomnia, changes in energy, impulsive behavior.  Anxiety/Panic Attacks: Endorses feelings of anxiety that come and go - reports episodes of panic attacks which have been occurring less frequently. No identified triggers. Feels \"scared and my heart races and that my body is turned into a pretzel\" during these attacks.  PTSD symptoms: Denies   Psychosis: Denies A/V hallucinations       Medical Review of Systems: as reported by pt. All systems reviewed. Only those found to be + are noted below. All others are negative.   Neurological:    TBIs: None   SZs: Reported remote history of \"stress seizures\" but nothing organically diagnosed    Strokes: None  Other medical symptoms:   Thyroid: None   Diabetes: None   Cardiovascular disease: None    Psychiatric Examination: observed phenomenon:  Appearance: 42yo white female who appears stated age. Dressed in hospital scrubs. Multiple tattoos. Hygiene and grooming poor.  Thoughts: Denies auditory or visual hallucinations. Does not endorse delusions.  Speech: Appropriate volume, regular rate and rhythm.  Mood: \"Crappy.\"  Affect:  Anxious         SI/HI:   Denies.  Attention/Alertness:  Alert and cooperative   Memory:   Appears intact but not formally tested.  Orientation:    A&Ox4  Fund of Knowledge:    Appropriate for age and education  Insight/Judgement into symptoms: Fair    Past Psychiatric Hx:   \"Anxiety\" and \"Agoraphobia\"    Family Psychiatric Hx:  None    Social Hx:  Lives with  at home.  Homemaker    Drug/Alcohol/Tobacco Hx:   Drugs: Daily cannabis use, " of high THC concentration, every 1-2 hours each day for years. Denies any other illicit/recreational drug use.   Alcohol: Denies use    Tobacco: Denies     Medical Hx: labs, MARS, medications, etc were reviewed. Only those findings of potential interest to psychiatry are noted below:    Allergies: Metoclopramide, quetiapine, bactrim  Medications:  Current Facility-Administered Medications:   •  [COMPLETED] piperacillin-tazobactam (ZOSYN) 3.375 g in  mL IVPB, 3.375 g, Intravenous, Once, Stopped at 09/13/18 1245 **AND** piperacillin-tazobactam (ZOSYN) 3.375 g in  mL IVPB, 3.375 g, Intravenous, Q8HRS, America Sanchez M.D., Stopped at 09/13/18 1806  •  LORazepam (ATIVAN) tablet 0.5 mg, 0.5 mg, Oral, Q8HRS PRN, Mohan Mayes D.O., 0.5 mg at 09/13/18 1046  •  haloperidol lactate (HALDOL) injection 2-5 mg, 2-5 mg, Intramuscular, Q4HRS PRN, Mohan Myaes D.O.  •  clonazePAM (KLONOPIN) tablet 0.5 mg, 0.5 mg, Oral, TID, Rey Shah Jr., D.O., 0.5 mg at 09/13/18 1744  •  hydrocortisone 1 % cream, , Topical, BID, Rey Shah Jr., D.O., Stopped at 09/13/18 0900  •  zolpidem (AMBIEN) tablet 5 mg, 5 mg, Oral, HS PRN - MR X 1, Francois Vickers M.D., 5 mg at 09/13/18 0025  •  enoxaparin (LOVENOX) inj 40 mg, 40 mg, Subcutaneous, DAILY, Francois Vickers M.D., 40 mg at 09/13/18 0848  •  senna-docusate (PERICOLACE or SENOKOT S) 8.6-50 MG per tablet 2 Tab, 2 Tab, Oral, BID, Stopped at 09/10/18 0600 **AND** polyethylene glycol/lytes (MIRALAX) PACKET 1 Packet, 1 Packet, Oral, QDAY PRN **AND** magnesium hydroxide (MILK OF MAGNESIA) suspension 30 mL, 30 mL, Oral, QDAY PRN **AND** bisacodyl (DULCOLAX) suppository 10 mg, 10 mg, Rectal, QDAY PRN, Lew Hendrix M.D.  •  ondansetron (ZOFRAN) syringe/vial injection 4 mg, 4 mg, Intravenous, Q4HRS PRN, Lew Hendrix M.D., 4 mg at 09/10/18 1255  •  ondansetron (ZOFRAN ODT) dispertab 4 mg, 4 mg, Oral, Q4HRS PRN, Lew Hendrix M.D., 4 mg at 09/13/18 1123  •  promethazine  (PHENERGAN) tablet 12.5-25 mg, 12.5-25 mg, Oral, Q4HRS PRN, Lew Hendrix M.D., Stopped at 09/10/18 1729  •  promethazine (PHENERGAN) suppository 12.5-25 mg, 12.5-25 mg, Rectal, Q4HRS PRN, Lew Hendrix M.D.  •  prochlorperazine (COMPAZINE) injection 5-10 mg, 5-10 mg, Intravenous, Q4HRS PRN, Lew Hendrix M.D., 10 mg at 09/13/18 1015  •  Notify provider if pain remains uncontrolled, , , CONTINUOUS **AND** Use the numeric rating scale (NRS-11) on regular floors and Critical-Care Pain Observation Tool (CPOT) on ICUs/Trauma to assess pain, , , CONTINUOUS **AND** Pulse Ox (Oximetry), , , CONTINUOUS **AND** Pharmacy Consult Request ...Pain Management Review, , Other, PRN **AND** If patient difficult to arouse and/or has respiratory depression, stop any opiates that are currently infusing and call a Rapid Response., , , CONTINUOUS **AND** oxyCODONE immediate-release (ROXICODONE) tablet 5 mg, 5 mg, Oral, Q3HRS PRN **AND** oxyCODONE immediate-release (ROXICODONE) tablet 10 mg, 10 mg, Oral, Q3HRS PRN, 10 mg at 09/13/18 1200 **AND** HYDROmorphone (DILAUDID) injection 0.5 mg, 0.5 mg, Intravenous, Q3HRS PRN, Lew Hendrix M.D., 0.5 mg at 09/11/18 1312  •  busPIRone (BUSPAR) tablet 30 mg, 30 mg, Oral, BID, Lew Hendrix M.D., 30 mg at 09/13/18 1744  •  chlorproMAZINE (THORAZINE) tablet 25 mg, 25 mg, Oral, TID, Lew Hendrix M.D., 25 mg at 09/13/18 1745  •  diphenhydrAMINE (BENADRYL) tablet/capsule 50 mg, 50 mg, Oral, HS PRN, Lew Hendrix M.D., 50 mg at 09/10/18 1913  •  sertraline (ZOLOFT) tablet 100 mg, 100 mg, Oral, QHS, Lew Hendrix M.D., Stopped at 09/12/18 2100    Labs:  Recent Labs      09/11/18   0403  09/13/18   1056   WBC  10.2  6.0   RBC  3.63*  3.76*   HEMOGLOBIN  11.3*  11.6*   HEMATOCRIT  33.9*  34.5*   MCV  93.4  91.8   MCH  31.1  30.9   RDW  56.1*  55.1*   PLATELETCT  236  318   MPV  10.5  10.3   NEUTSPOLYS  65.10  62.20   LYMPHOCYTES  21.60*  26.20   MONOCYTES  12.00  9.40    EOSINOPHILS  0.20  0.50   BASOPHILS  0.60  1.00     Recent Labs      09/11/18   0520  09/13/18   1208   SODIUM  141  139   POTASSIUM  3.4*  3.6   CHLORIDE  115*  105   CO2  19*  23   GLUCOSE  86  97   BUN  3*  <3*     ECG: Most recent ECG apparently not uploaded to Epic yet - ECG performed on 09/11/2018 in pt's paper chart and reviewed by me - QT approximately 350    Cranial Imaging: reviewed    ASSESSMENT:   Cannabis withdrawal - likely the cause for her recent increased anxiety symptoms  Cannabis use disorder, severe   Cannabinoid hyperemesis syndrome    PLAN:  Increase thorazine from 25 to 50mg PO TID for agitation, anxiety, nausea/vomiting  Discontinue PRN haldol and switch to PRN thorazine 25mg IM Q6H PRN for agitation - as being on 1 vs 2 antipsychotics decreases side effect risks  Discontinue compazine   Pt extensively counseled on her cannabis use and the likely correlation to her cyclical vomiting as well as her anxiety symptoms    Legal status: Voluntary   Will follow   Thank you for the consult.

## 2018-09-13 NOTE — CONSULTS
INFECTIOUS DISEASES INPATIENT CONSULT NOTE     Date of Service: 9/13/2018    Consult Requested By: Mohan Mayes D.O.    Reason for Consultation: Streptococcal bacteremia    History of Present Illness:   Diana Hernandez is a 43 y.o. woman with a history of prior abdominal surgery, anxiety and chronic marijuana use complicated by cyclic vomiting syndrome secondary to marijuana use admitted 9/9/2018 for nausea, vomiting and abdominal pain.  Patient states she developed nausea, vomiting and abdominal pain several days prior to admission.  She typically takes ondansetron at home however this did not relieve her symptoms.  She has had prior hospitalizations for cyclic vomiting syndrome secondary to marijuana use.  She denies any fevers prior to admission however she notes chills which she attributes to her anxiety.  Patient denies any cough or shortness of breath.  No diarrhea.  Given persistent nausea, vomiting and abdominal pain, she presented to the ED for further evaluation.  On presentation, she was afebrile with a leukocytosis of 15.8.  She was also found to be hypotensive with an elevated lactate of 5.4.  She was admitted to the ICU for closer monitoring.  Blood cultures were obtained on admission and 1 out of 2 sets is now positive for Streptococcus species.  She was initially started on Zosyn for the possibility of pneumonia as her CT of the abdomen/pelvis revealed subtle left lower lung infiltrate.  She has now had significant clinical improvement and notes transferred out of the ICU yesterday.  Her antibiotics were also discontinued.  However, she did not have any repeat blood cultures.  Infectious disease service consulted for further antibiotic recommendations and management.    Review Of Systems:  All other ROS were reviewed and are negative except as noted above in the HPI.    PMH:   Past Medical History:   Diagnosis Date   • Anxiety     Followed by [facility]   • Anxiety disorder    • Backpain    •  CLOSTRIDIUM DIFFICILE INFECTION 4/14/2010   • CVS disease    • Cyclic vomiting syndrome    • Dental disorder    • Drug-seeking behavior    • Dyspepsia 7/12/2009   • Nephrolithiasis 6/20/2009    kidney stones,post lithotrypsy   • Pain     abd and back   • Snoring    • Superior mesenteric artery syndrome (HCC) 7/12/2009   • Superior mesenteric artery syndrome (HCC)    • Tobacco abuse 6/20/2009   Marijuana abuse    PSH:  Past Surgical History:   Procedure Laterality Date   • GASTROSCOPY-ENDO  4/28/2016    Procedure: GASTROSCOPY-ENDO;  Surgeon: Blayne Blackburn M.D.;  Location: ENDOSCOPY [facility];  Service:    • EXPLORATORY LAPAROTOMY  1/12/2016    Procedure: EXPLORATORY LAPAROTOMY;  Surgeon: Maciel Quintero M.D.;  Location: SURGERY [facility];  Service:    • BOWEL RESECTION  1/12/2016    Procedure: BOWEL RESECTION;  Surgeon: Maciel Quintero M.D.;  Location: SURGERY [facility];  Service:    • GASTROSCOPY WITH BIOPSY  3/9/2010    Performed by AMANDA MEJIA at ENDOSCOPY [facility]   • PYLOROPLASTY  7/27/2009    Performed by MACIEL QUINTERO at SURGERY [facility]   • EXPLORATORY LAPAROTOMY  7/27/2009    Performed by MACIEL QUINTERO at SURGERY [facility]   • APPENDECTOMY  7/27/2009    Performed by MACIEL QUINTERO at SURGERY [facility]   • CHOLECYSTECTOMY  7/27/2009    Performed by MACIEL QUINTERO at SURGERY [facility]   • GASTROSCOPY-ENDO  7/8/2009    Performed by ROSANNA WRIGHT at SURGERY [facility]   • LITHOTRIPSY     • OTHER      superior mesenteric artery correction        FAMILY HX:  Family History   Problem Relation Age of Onset   • Cancer Mother 50        Colon cancer   • Hypertension Mother    • Allergies Father    • Hypertension Father    • Heart Disease Maternal Grandmother        SOCIAL HX:  Social History     Social History   • Marital status:      Spouse name: N/A   • Number of children: N/A   •  "Years of education: N/A     Occupational History   • Not on file.     Social History Main Topics   • Smoking status: Never Smoker   • Smokeless tobacco: Never Used   • Alcohol use No      Comment: hx   • Drug use: Yes     Types: Marijuana, Inhaled      Comment: marijuana   • Sexual activity: Not on file     Other Topics Concern   • Not on file     Social History Narrative    ** Merged History Encounter **          History   Smoking Status   • Never Smoker   Smokeless Tobacco   • Never Used     History   Alcohol Use No     Comment: hx       Allergies/Intolerances:  Allergies   Allergen Reactions   • Metoclopramide Hives     Told by MD; pt suspects possible hives.   • Bactrim [Sulfamethoxazole W-Trimethoprim] Unspecified     Someone said I had a rash or something.      • Seroquel [Quetiapine] Unspecified     \"Seizures\"  RXN=unknown       History reviewed with the patient    Other Current Medications:    Current Facility-Administered Medications:   •  piperacillin-tazobactam (ZOSYN) 4.5 g in  mL IVPB, 4.5 g, Intravenous, Once **AND** piperacillin-tazobactam (ZOSYN) 4.5 g in  mL IVPB, 4.5 g, Intravenous, Q8HRS, America Sanchez M.D.  •  clonazePAM (KLONOPIN) tablet 0.5 mg, 0.5 mg, Oral, TID, Rey Shah Jr., D.O., 0.5 mg at 09/13/18 0847  •  hydrocortisone 1 % cream, , Topical, BID, Rey Shah Jr., D.O., Stopped at 09/13/18 0900  •  zolpidem (AMBIEN) tablet 5 mg, 5 mg, Oral, HS PRN - MR X 1, Francois Vickers M.D., 5 mg at 09/13/18 0025  •  enoxaparin (LOVENOX) inj 40 mg, 40 mg, Subcutaneous, DAILY, Francois Vickers M.D., 40 mg at 09/13/18 0848  •  haloperidol lactate (HALDOL) injection 2-5 mg, 2-5 mg, Intravenous, Q4HRS PRN, Isaac Chavez M.D., 5 mg at 09/12/18 1553  •  senna-docusate (PERICOLACE or SENOKOT S) 8.6-50 MG per tablet 2 Tab, 2 Tab, Oral, BID, Stopped at 09/10/18 0600 **AND** polyethylene glycol/lytes (MIRALAX) PACKET 1 Packet, 1 Packet, Oral, QDAY PRN **AND** magnesium hydroxide " (MILK OF MAGNESIA) suspension 30 mL, 30 mL, Oral, QDAY PRN **AND** bisacodyl (DULCOLAX) suppository 10 mg, 10 mg, Rectal, QDAY PRN, Lew Hendrix M.D.  •  ondansetron (ZOFRAN) syringe/vial injection 4 mg, 4 mg, Intravenous, Q4HRS PRN, Lew Hendrix M.D., 4 mg at 09/10/18 1255  •  ondansetron (ZOFRAN ODT) dispertab 4 mg, 4 mg, Oral, Q4HRS PRN, Lew Hendrix M.D.  •  promethazine (PHENERGAN) tablet 12.5-25 mg, 12.5-25 mg, Oral, Q4HRS PRN, Lew Hendrix M.D., Stopped at 09/10/18 1729  •  promethazine (PHENERGAN) suppository 12.5-25 mg, 12.5-25 mg, Rectal, Q4HRS PRN, Lew Hendrix M.D.  •  prochlorperazine (COMPAZINE) injection 5-10 mg, 5-10 mg, Intravenous, Q4HRS PRN, Lew Hendrix M.D., 5 mg at 09/12/18 2024  •  Notify provider if pain remains uncontrolled, , , CONTINUOUS **AND** Use the numeric rating scale (NRS-11) on regular floors and Critical-Care Pain Observation Tool (CPOT) on ICUs/Trauma to assess pain, , , CONTINUOUS **AND** Pulse Ox (Oximetry), , , CONTINUOUS **AND** Pharmacy Consult Request ...Pain Management Review, , Other, PRN **AND** If patient difficult to arouse and/or has respiratory depression, stop any opiates that are currently infusing and call a Rapid Response., , , CONTINUOUS **AND** oxyCODONE immediate-release (ROXICODONE) tablet 5 mg, 5 mg, Oral, Q3HRS PRN **AND** oxyCODONE immediate-release (ROXICODONE) tablet 10 mg, 10 mg, Oral, Q3HRS PRN, 10 mg at 09/12/18 1433 **AND** HYDROmorphone (DILAUDID) injection 0.5 mg, 0.5 mg, Intravenous, Q3HRS PRN, Lew Hendrix M.D., 0.5 mg at 09/11/18 1312  •  busPIRone (BUSPAR) tablet 30 mg, 30 mg, Oral, BID, Lew Hendrix M.D., 30 mg at 09/13/18 0846  •  chlorproMAZINE (THORAZINE) tablet 25 mg, 25 mg, Oral, TID, Lew Hendrix M.D., 25 mg at 09/13/18 0846  •  diphenhydrAMINE (BENADRYL) tablet/capsule 50 mg, 50 mg, Oral, HS PRN, Lew Hendrix M.D., 50 mg at 09/10/18 1913  •  sertraline (ZOLOFT) tablet 100 mg, 100 mg,  "Oral, QHS, Lew Hendrix M.D., Stopped at 18 2100  [unfilled]    Most Recent Vital Signs:  /76   Pulse 81   Temp 36.6 °C (97.9 °F)   Resp 16   Ht 1.626 m (5' 4.02\")   Wt 57.6 kg (126 lb 15.8 oz)   SpO2 97%   Breastfeeding? No   BMI 21.79 kg/m²   Temp  Av.9 °C (98.5 °F)  Min: 36.4 °C (97.6 °F)  Max: 37.6 °C (99.7 °F)    Physical Exam:  General: Thin, well-appearing, no acute distress, nontoxic  HEENT: sclera anicteric, PERRL, EOMI, MMM, no oral lesions  Neck: supple, no lymphadenopathy  Chest: CTAB, no r/r/w, normal work of breathing.  Cardiac: RRR, normal S1 S2, no m/r/g   Abdomen: + bowel sounds, soft, non-tender, non-distended, no HSM, midline abdominal surgical scar clean  Extremities: WWP, no edema, 2+ pulses  Skin: no rashes or worrisome lesions  Neuro: Alert and oriented times 3, non-focal exam, speech fluent, moves all extremities  Psych: Anxious    Pertinent Lab Results:  Recent Labs      18   0403   WBC  10.2      Recent Labs      18   0403   HEMOGLOBIN  11.3*   HEMATOCRIT  33.9*   MCV  93.4   MCH  31.1   PLATELETCT  236         Recent Labs      18   0520   SODIUM  141   POTASSIUM  3.4*   CHLORIDE  115*   CO2  19*   CREATININE  0.75        Recent Labs      18   0520   ALBUMIN  2.8*        Pertinent Micro:  Results     Procedure Component Value Units Date/Time    BLOOD CULTURE [[id number]]     Order Status:  Sent Specimen:  Blood from Peripheral     BLOOD CULTURE [[id number]]     Order Status:  Sent Specimen:  Blood from Peripheral     Blood Culture [[id number]]  (Abnormal) Collected:  09/10/18 0220    Order Status:  Completed Specimen:  Blood from Peripheral Updated:  18 1154     Significant Indicator POS (POS)     Source BLD     Site PERIPHERAL     Blood Culture Growth detected by Bactec instrument. 2018  01:08 (A)      Streptococcus species (A)    Narrative:       CALL  [place]  161 tel. [phone],  CALLED  161 tel. [phone] " "09/11/2018, 01:10, RB PERF. RESULTS CALLED TO: rn  [id number]  From different peripheral sites, if not done within the last  24 hours (Per Hospital Policy: Only change specimen source to  \"Line\" if specified by physician order)    Blood Culture [[id number]] Collected:  09/10/18 0220    Order Status:  Completed Specimen:  Blood from Peripheral Updated:  09/11/18 0854     Significant Indicator NEG     Source BLD     Site PERIPHERAL     Blood Culture No Growth    Note: Blood cultures are incubated for 5 days and  are monitored continuously.Positive blood cultures  are called to the RN and reported as soon as  they are identified.      Narrative:       From different peripheral sites, if not done within the last  24 hours (Per Hospital Policy: Only change specimen source to  \"Line\" if specified by physician order)    Urinalysis [[id number]]  (Abnormal) Collected:  09/10/18 0500    Order Status:  Completed Specimen:  Urine Updated:  09/10/18 0540     Color Yellow     Character Clear     Specific Gravity 1.018     Ph 5.0     Glucose 500 (A) mg/dL      Ketones Trace (A) mg/dL      Protein Negative mg/dL      Bilirubin Negative     Urobilinogen, Urine 0.2     Nitrite Negative     Leukocyte Esterase Negative     Occult Blood Negative     Micro Urine Req see below     Comment: Microscopic examination not performed when specimen is clear  and chemically negative for protein, blood, leukocyte esterase  and nitrite.         Narrative:       If not done within the last 24 hours    Culture Respiratory W/ GRM STN [[id number]]     Order Status:  Completed Specimen:  Respirate from Sputum     URINALYSIS [[id number]] Collected:  09/09/18 0000    Order Status:  Canceled Specimen:  Urine         Blood Culture   Date Value Ref Range Status   09/10/2018 (A)  Preliminary    Growth detected by Bactec instrument. 09/11/2018  01:08   09/10/2018 Streptococcus species (A)  Preliminary   09/10/2018   Preliminary    No Growth    Note: Blood cultures are " incubated for 5 days and  are monitored continuously.Positive blood cultures  are called to the RN and reported as soon as  they are identified.          Studies:  Ct-abdomen-pelvis W/o    Result Date: 9/10/2018  9/10/2018 12:30 AM HISTORY/REASON FOR EXAM: Pain TECHNIQUE/EXAM DESCRIPTION:  CT abdomen and pelvis without IV contrast. Sequential axial CT images were obtained from lung bases to the proximal femurs without contrast. Low dose optimization technique was utilized for this CT exam including automated exposure control and adjustment of the mA and/or kV according to patient size. COMPARISON:  July 12, 2018 CT ABDOMEN FINDINGS: Non contrast technique limits evaluation of the solid abdominal organs. Subtle hazy left lower lobe opacity is seen. The liver is normal in contour. Hepatomegaly is observed. No intrahepatic biliary ductal dilatation is noted. The gallbladder is surgically absent. The spleen is unremarkable. The pancreas is grossly normal. Bilateral adrenal glands appear within normal limits. The kidneys are unremarkable.  The ureters are normal in caliber along their visualized course. The colon appears within normal limits. The appendix is surgically absent by history. There is target sign in the lower pelvis. Otherwise the small bowel is unremarkable. Postsurgical changes of multiple bowel resection are seen. The bony structures are age appropriate. CT PELVIS FINDINGS: The bladder is within normal limits. The uterus and adnexal structures appear within physiologic limits.     1.  Subtle hazy left lower lobe opacity, could represent subtle infiltrates. 2.  Target sign in the left lower abdomen, appearance suggests short segment of intussusception, no obstruction is seen, may represent physiologic intussusception. Radiologic follow-up to exclude progression to obstructive changes as clinically appropriate. 3.  Hepatomegaly    [id number] View    Result Date: 9/11/2018 9/11/2018 4:10 AM HISTORY/REASON  FOR EXAM:  Gastrointestinal Complaint. TECHNIQUE/EXAM DESCRIPTION AND NUMBER OF VIEWS:   1 views of the abdomen. COMPARISON: 4/12/2017 FINDINGS: There is a nonspecific bowel gas pattern. There is no evidence of bowel obstruction. No free intraperitoneal air is identified though evaluation is limited on supine imaging.  Surgical clips are seen in the right upper quadrant.     No evidence of bowel obstruction. Nonspecific bowel gas pattern.      IMPRESSION:   1.  Streptococcal bacteremia. ?  True bacteremia versus contaminant   2.  Cyclical vomiting syndrome  3.  Chronic marijuana use  4.  Lactic acidosis, resolved      PLAN:   Diana Hernandez is a 43 y.o. woman with a history of marijuana use, anxiety and cyclic vomiting syndrome admitted for persistent nausea, vomiting and abdominal pain.  Patient found to be septic with hypotension, leukocytosis and lactic acidosis.  She initially required ICU admission for closer monitoring but has clinically improved and has been transferred out of the ICU.  She was initially started on Zosyn for the possibility of pneumonia and 1 out of 2 blood culture sets are now positive for Streptococcus species.  At this time, it is unclear if she has true bacteremia versus contaminant.  We will need to wait for the speciation.  Repeat blood cultures today to document clearance as this has not been done yet.  Will resume Zosyn pending further information from the blood cultures.  Low suspicion for pneumonia.  Further recommendations per clinical course and culture results.      Plan of care discussed with NANCY Mayes D.O.. Will continue to follow    America Sanchez M.D.

## 2018-09-13 NOTE — PROCEDURES
Vascular Access Team    Date of Insertion: 9/13/2018  Arm Circumference: 37.5  Internal length: 15  External Length: 0  Vein Occupancy %: 33  Reason for Midline: Access  Labs: WBC 6.0, , INR n/a, BUN <3, Cr 0.63, GFR >60    Orders confirmed, vessel patency confirmed with ultrasound. Risks and benefits of procedure explained to patient and education regarding line associated bloodstream infections provided. Questions answered.     Power Midline placed in RUE per MD order with ultrasound guidance, initial arm circumference 27.5cm. 4 Fr, singlw lumen Power Midline placed in right basilic vein after 1 attempt(s). 2 cc's of 1% lidocaine injected intradermally, 21 gauge microintroducer needle and modified Seldinger technique used. 15 cm catheter inserted with good blood return. Secured at 0 cm marker. Each lumen flushed without resistance with 10 mL 0.9% normal saline. Midline secured with Biopatch and Tegaderm.     Midline placement is confirmed by nurse using ultrasound and ability to flush and draw blood. Midline is appropriate for use at this time.  No X-ray is needed for placement confirmation. Pt tolerated procedure well.  Patient condition relayed to unit RN or ordering physician via this post procedure note in the EMR.      BARD Power Midline ref # [id number], Lot # [id number]

## 2018-09-13 NOTE — PROGRESS NOTES
Assumed care of patient @0700. Pt A&O x4, on RA. Pain is manageable at this time. Last BM 9/12/18. Continent of bowel & bladder. Up-self with steady gait. On full liquid diet, takes pills without difficulty. Fall precautions in place; bed in lowest position, treaded socks at bedside, call light within reach. All needs met at this time.

## 2018-09-14 LAB
ALBUMIN SERPL BCP-MCNC: 2.8 G/DL (ref 3.2–4.9)
ALBUMIN/GLOB SERPL: 0.9 G/DL
ALP SERPL-CCNC: 70 U/L (ref 30–99)
ALT SERPL-CCNC: 8 U/L (ref 2–50)
ANION GAP SERPL CALC-SCNC: 5 MMOL/L (ref 0–11.9)
AST SERPL-CCNC: 11 U/L (ref 12–45)
BACTERIA BLD CULT: ABNORMAL
BACTERIA BLD CULT: ABNORMAL
BASOPHILS # BLD AUTO: 1.5 % (ref 0–1.8)
BASOPHILS # BLD: 0.08 K/UL (ref 0–0.12)
BILIRUB SERPL-MCNC: 0.4 MG/DL (ref 0.1–1.5)
BUN SERPL-MCNC: 3 MG/DL (ref 8–22)
CALCIUM SERPL-MCNC: 8.3 MG/DL (ref 8.5–10.5)
CHLORIDE SERPL-SCNC: 108 MMOL/L (ref 96–112)
CO2 SERPL-SCNC: 27 MMOL/L (ref 20–33)
CREAT SERPL-MCNC: 0.7 MG/DL (ref 0.5–1.4)
EOSINOPHIL # BLD AUTO: 0.29 K/UL (ref 0–0.51)
EOSINOPHIL NFR BLD: 5.3 % (ref 0–6.9)
ERYTHROCYTE [DISTWIDTH] IN BLOOD BY AUTOMATED COUNT: 54.5 FL (ref 35.9–50)
ETEST SENSITIVITY ETEST: NORMAL
GLOBULIN SER CALC-MCNC: 3 G/DL (ref 1.9–3.5)
GLUCOSE SERPL-MCNC: 84 MG/DL (ref 65–99)
HCT VFR BLD AUTO: 32.6 % (ref 37–47)
HGB BLD-MCNC: 10.6 G/DL (ref 12–16)
IMM GRANULOCYTES # BLD AUTO: 0.04 K/UL (ref 0–0.11)
IMM GRANULOCYTES NFR BLD AUTO: 0.7 % (ref 0–0.9)
LYMPHOCYTES # BLD AUTO: 1.71 K/UL (ref 1–4.8)
LYMPHOCYTES NFR BLD: 31.2 % (ref 22–41)
MAGNESIUM SERPL-MCNC: 2 MG/DL (ref 1.5–2.5)
MCH RBC QN AUTO: 30.2 PG (ref 27–33)
MCHC RBC AUTO-ENTMCNC: 32.5 G/DL (ref 33.6–35)
MCV RBC AUTO: 92.9 FL (ref 81.4–97.8)
MONOCYTES # BLD AUTO: 0.57 K/UL (ref 0–0.85)
MONOCYTES NFR BLD AUTO: 10.4 % (ref 0–13.4)
NEUTROPHILS # BLD AUTO: 2.79 K/UL (ref 2–7.15)
NEUTROPHILS NFR BLD: 50.9 % (ref 44–72)
NRBC # BLD AUTO: 0 K/UL
NRBC BLD-RTO: 0 /100 WBC
PLATELET # BLD AUTO: 305 K/UL (ref 164–446)
PMV BLD AUTO: 10 FL (ref 9–12.9)
POTASSIUM SERPL-SCNC: 3.4 MMOL/L (ref 3.6–5.5)
PROT SERPL-MCNC: 5.8 G/DL (ref 6–8.2)
RBC # BLD AUTO: 3.51 M/UL (ref 4.2–5.4)
SIGNIFICANT IND 70042: ABNORMAL
SITE SITE: ABNORMAL
SODIUM SERPL-SCNC: 140 MMOL/L (ref 135–145)
SOURCE SOURCE: ABNORMAL
WBC # BLD AUTO: 5.5 K/UL (ref 4.8–10.8)

## 2018-09-14 PROCEDURE — 770006 HCHG ROOM/CARE - MED/SURG/GYN SEMI*

## 2018-09-14 PROCEDURE — 700111 HCHG RX REV CODE 636 W/ 250 OVERRIDE (IP): Performed by: HOSPITALIST

## 2018-09-14 PROCEDURE — A9270 NON-COVERED ITEM OR SERVICE: HCPCS | Performed by: HOSPITALIST

## 2018-09-14 PROCEDURE — 700102 HCHG RX REV CODE 250 W/ 637 OVERRIDE(OP): Performed by: PSYCHIATRY & NEUROLOGY

## 2018-09-14 PROCEDURE — 700105 HCHG RX REV CODE 258: Performed by: INTERNAL MEDICINE

## 2018-09-14 PROCEDURE — A9270 NON-COVERED ITEM OR SERVICE: HCPCS | Performed by: INTERNAL MEDICINE

## 2018-09-14 PROCEDURE — A9270 NON-COVERED ITEM OR SERVICE: HCPCS | Performed by: PSYCHIATRY & NEUROLOGY

## 2018-09-14 PROCEDURE — 85025 COMPLETE CBC W/AUTO DIFF WBC: CPT

## 2018-09-14 PROCEDURE — 700102 HCHG RX REV CODE 250 W/ 637 OVERRIDE(OP): Performed by: INTERNAL MEDICINE

## 2018-09-14 PROCEDURE — 700102 HCHG RX REV CODE 250 W/ 637 OVERRIDE(OP): Performed by: HOSPITALIST

## 2018-09-14 PROCEDURE — 80053 COMPREHEN METABOLIC PANEL: CPT

## 2018-09-14 PROCEDURE — 700111 HCHG RX REV CODE 636 W/ 250 OVERRIDE (IP): Performed by: INTERNAL MEDICINE

## 2018-09-14 PROCEDURE — 99232 SBSQ HOSP IP/OBS MODERATE 35: CPT | Performed by: INTERNAL MEDICINE

## 2018-09-14 PROCEDURE — 99233 SBSQ HOSP IP/OBS HIGH 50: CPT | Performed by: INTERNAL MEDICINE

## 2018-09-14 PROCEDURE — 83735 ASSAY OF MAGNESIUM: CPT

## 2018-09-14 RX ORDER — POTASSIUM CHLORIDE 20 MEQ/1
20 TABLET, EXTENDED RELEASE ORAL DAILY
Status: DISCONTINUED | OUTPATIENT
Start: 2018-09-14 | End: 2018-09-15 | Stop reason: HOSPADM

## 2018-09-14 RX ADMIN — CEFTRIAXONE SODIUM 2 G: 2 INJECTION, POWDER, FOR SOLUTION INTRAMUSCULAR; INTRAVENOUS at 13:41

## 2018-09-14 RX ADMIN — LORAZEPAM 0.5 MG: 0.5 TABLET ORAL at 10:03

## 2018-09-14 RX ADMIN — CHLORPROMAZINE HYDROCHLORIDE 50 MG: 25 TABLET, SUGAR COATED ORAL at 05:52

## 2018-09-14 RX ADMIN — SERTRALINE 100 MG: 100 TABLET, FILM COATED ORAL at 20:49

## 2018-09-14 RX ADMIN — STANDARDIZED SENNA CONCENTRATE AND DOCUSATE SODIUM 1 TABLET: 8.6; 5 TABLET, FILM COATED ORAL at 18:00

## 2018-09-14 RX ADMIN — BUSPIRONE HYDROCHLORIDE 30 MG: 10 TABLET ORAL at 05:51

## 2018-09-14 RX ADMIN — POTASSIUM CHLORIDE 20 MEQ: 1500 TABLET, EXTENDED RELEASE ORAL at 11:00

## 2018-09-14 RX ADMIN — CHLORPROMAZINE HYDROCHLORIDE 50 MG: 25 TABLET, SUGAR COATED ORAL at 17:33

## 2018-09-14 RX ADMIN — CLONAZEPAM 0.5 MG: 0.5 TABLET ORAL at 11:00

## 2018-09-14 RX ADMIN — ZOLPIDEM TARTRATE 5 MG: 5 TABLET ORAL at 22:14

## 2018-09-14 RX ADMIN — HYDROMORPHONE HYDROCHLORIDE 0.5 MG: 2 INJECTION INTRAMUSCULAR; INTRAVENOUS; SUBCUTANEOUS at 20:52

## 2018-09-14 RX ADMIN — ONDANSETRON 4 MG: 2 INJECTION INTRAMUSCULAR; INTRAVENOUS at 09:59

## 2018-09-14 RX ADMIN — CLONAZEPAM 0.5 MG: 0.5 TABLET ORAL at 17:33

## 2018-09-14 RX ADMIN — BUSPIRONE HYDROCHLORIDE 30 MG: 10 TABLET ORAL at 17:33

## 2018-09-14 RX ADMIN — HYDROMORPHONE HYDROCHLORIDE 0.5 MG: 2 INJECTION INTRAMUSCULAR; INTRAVENOUS; SUBCUTANEOUS at 14:36

## 2018-09-14 RX ADMIN — ONDANSETRON 4 MG: 2 INJECTION INTRAMUSCULAR; INTRAVENOUS at 14:28

## 2018-09-14 RX ADMIN — CLONAZEPAM 0.5 MG: 0.5 TABLET ORAL at 05:54

## 2018-09-14 RX ADMIN — CHLORPROMAZINE HYDROCHLORIDE 50 MG: 25 TABLET, SUGAR COATED ORAL at 11:00

## 2018-09-14 RX ADMIN — PIPERACILLIN AND TAZOBACTAM 3.38 G: 3; .375 INJECTION, POWDER, LYOPHILIZED, FOR SOLUTION INTRAVENOUS; PARENTERAL at 05:49

## 2018-09-14 RX ADMIN — OXYCODONE HYDROCHLORIDE 10 MG: 10 TABLET ORAL at 09:59

## 2018-09-14 RX ADMIN — ENOXAPARIN SODIUM 40 MG: 40 INJECTION SUBCUTANEOUS at 05:53

## 2018-09-14 RX ADMIN — HYDROMORPHONE HYDROCHLORIDE 0.5 MG: 2 INJECTION INTRAMUSCULAR; INTRAVENOUS; SUBCUTANEOUS at 17:34

## 2018-09-14 ASSESSMENT — ENCOUNTER SYMPTOMS
SHORTNESS OF BREATH: 0
VOMITING: 0
ABDOMINAL PAIN: 1
NAUSEA: 1
FEVER: 0
DIARRHEA: 0
VOMITING: 1
INSOMNIA: 1
COUGH: 0
ABDOMINAL PAIN: 0
NERVOUS/ANXIOUS: 1
NAUSEA: 0
CHILLS: 0

## 2018-09-14 ASSESSMENT — PAIN SCALES - GENERAL
PAINLEVEL_OUTOF10: 9
PAINLEVEL_OUTOF10: 7
PAINLEVEL_OUTOF10: 0
PAINLEVEL_OUTOF10: 7
PAINLEVEL_OUTOF10: 4

## 2018-09-14 NOTE — CARE PLAN
Problem: Communication  Goal: The ability to communicate needs accurately and effectively will improve  Outcome: PROGRESSING AS EXPECTED  Patient remains able to communicate effectively with staff without difficulty.

## 2018-09-14 NOTE — PROGRESS NOTES
Infectious Disease Progress Note    Author: America Sanchez M.D. Date & Time of service: 2018  10:14 AM    Chief Complaint:  FU gram positive bacteremia    Interval History:   AF WBC 5.5 patient sitting up in bed eating breakfast, feels well and spoke to a psychiatrist yesterday, blood culture positive for viridans Streptococcus most likely contaminant, repeat blood culture pending  Labs Reviewed, Medications Reviewed, Radiology Reviewed and Wound Reviewed.    Review of Systems:  Review of Systems   Constitutional: Negative for chills and fever.   Respiratory: Negative for cough and shortness of breath.    Gastrointestinal: Negative for abdominal pain, diarrhea, nausea and vomiting.       Hemodynamics:  Temp (24hrs), Av.6 °C (97.9 °F), Min:35.9 °C (96.7 °F), Max:37 °C (98.6 °F)  Temperature: 35.9 °C (96.7 °F)  Pulse  Av.2  Min: 57  Max: 103   Blood Pressure: 152/84 (nurse aware)       Physical Exam:  Physical Exam   Constitutional: She is oriented to person, place, and time. She appears well-developed.   HENT:   Head: Normocephalic and atraumatic.   Eyes: Pupils are equal, round, and reactive to light. EOM are normal.   Neck: Neck supple.   Cardiovascular: Normal rate and regular rhythm.    Pulmonary/Chest: Effort normal and breath sounds normal.   Musculoskeletal: She exhibits no edema.   RUE midline   Neurological: She is alert and oriented to person, place, and time.       Meds:    Current Facility-Administered Medications:   •  potassium chloride SA  •  [COMPLETED] piperacillin-tazobactam **AND** piperacillin-tazobactam  •  LORazepam  •  chlorproMAZINE  •  chlorproMAZINE  •  clonazePAM  •  hydrocortisone  •  zolpidem  •  enoxaparin (LOVENOX) injection  •  senna-docusate **AND** polyethylene glycol/lytes **AND** magnesium hydroxide **AND** bisacodyl  •  ondansetron  •  ondansetron  •  promethazine  •  promethazine  •  Notify provider if pain remains uncontrolled **AND** Use the numeric rating  scale (NRS-11) on regular floors and Critical-Care Pain Observation Tool (CPOT) on ICUs/Trauma to assess pain **AND** Pulse Ox (Oximetry) **AND** Pharmacy Consult Request **AND** If patient difficult to arouse and/or has respiratory depression, stop any opiates that are currently infusing and call a Rapid Response. **AND** oxyCODONE immediate-release **AND** oxyCODONE immediate-release **AND** HYDROmorphone  •  busPIRone  •  diphenhydrAMINE  •  sertraline    Labs:  Recent Labs      09/13/18   1056  09/14/18   0600   WBC  6.0  5.5   RBC  3.76*  3.51*   HEMOGLOBIN  11.6*  10.6*   HEMATOCRIT  34.5*  32.6*   MCV  91.8  92.9   MCH  30.9  30.2   RDW  55.1*  54.5*   PLATELETCT  318  305   MPV  10.3  10.0   NEUTSPOLYS  62.20  50.90   LYMPHOCYTES  26.20  31.20   MONOCYTES  9.40  10.40   EOSINOPHILS  0.50  5.30   BASOPHILS  1.00  1.50     Recent Labs      09/13/18   1208  09/14/18   0600   SODIUM  139  140   POTASSIUM  3.6  3.4*   CHLORIDE  105  108   CO2  23  27   GLUCOSE  97  84   BUN  <3*  3*     Recent Labs      09/13/18   1208  09/14/18   0600   ALBUMIN  3.2  2.8*   TBILIRUBIN  0.5  0.4   ALKPHOSPHAT  72  70   TOTPROTEIN  6.4  5.8*   ALTSGPT  8  8   ASTSGOT  18  11*   CREATININE  0.63  0.70       Imaging:  Ct-abdomen-pelvis W/o    Result Date: 9/10/2018  9/10/2018 12:30 AM HISTORY/REASON FOR EXAM: Pain TECHNIQUE/EXAM DESCRIPTION:  CT abdomen and pelvis without IV contrast. Sequential axial CT images were obtained from lung bases to the proximal femurs without contrast. Low dose optimization technique was utilized for this CT exam including automated exposure control and adjustment of the mA and/or kV according to patient size. COMPARISON:  July 12, 2018 CT ABDOMEN FINDINGS: Non contrast technique limits evaluation of the solid abdominal organs. Subtle hazy left lower lobe opacity is seen. The liver is normal in contour. Hepatomegaly is observed. No intrahepatic biliary ductal dilatation is noted. The gallbladder is  surgically absent. The spleen is unremarkable. The pancreas is grossly normal. Bilateral adrenal glands appear within normal limits. The kidneys are unremarkable.  The ureters are normal in caliber along their visualized course. The colon appears within normal limits. The appendix is surgically absent by history. There is target sign in the lower pelvis. Otherwise the small bowel is unremarkable. Postsurgical changes of multiple bowel resection are seen. The bony structures are age appropriate. CT PELVIS FINDINGS: The bladder is within normal limits. The uterus and adnexal structures appear within physiologic limits.     1.  Subtle hazy left lower lobe opacity, could represent subtle infiltrates. 2.  Target sign in the left lower abdomen, appearance suggests short segment of intussusception, no obstruction is seen, may represent physiologic intussusception. Radiologic follow-up to exclude progression to obstructive changes as clinically appropriate. 3.  Hepatomegaly    Mr-ofyleje-8 View    Result Date: 9/11/2018 9/11/2018 4:10 AM HISTORY/REASON FOR EXAM:  Gastrointestinal Complaint. TECHNIQUE/EXAM DESCRIPTION AND NUMBER OF VIEWS:   1 views of the abdomen. COMPARISON: 4/12/2017 FINDINGS: There is a nonspecific bowel gas pattern. There is no evidence of bowel obstruction. No free intraperitoneal air is identified though evaluation is limited on supine imaging.  Surgical clips are seen in the right upper quadrant.     No evidence of bowel obstruction. Nonspecific bowel gas pattern.    Ir-picc Line Placement    Result Date: 9/13/2018  HISTORY/REASON FOR EXAM:   PICC placement. TECHNIQUE/EXAM DESCRIPTION AND NUMBER OF VIEWS:   PICC line insertion with ultrasound guidance.  The procedure was performed using maximal sterile barrier technique including sterile gown, mask, cap, and donning of sterile gloves following appropriate hand hygiene and/or sterile scrub. Patient skin site was prepped with 2% Chlorhexidine solution.  "FINDINGS:  PICC line insertion with Ultrasound Guidance was performed by qualified nursing staff without the assistance of a Radiologist. PICC positioning appropriateness confirmed by 3CG technology; chest xray only needed in the instance 3CG unable to confirm placement.              Ultrasound-guided PICC placement performed by qualified nursing staff as above.       Micro:  Results     Procedure Component Value Units Date/Time    SENSITIVITY, E-TEST [[id number]] Collected:  09/10/18 0220    Order Status:  Completed Specimen:  Blood Updated:  09/14/18 0926     ETEST Sensitivity FINAL    Narrative:       161 tel. [phone] 09/11/2018, 01:10, RB PERF. RESULTS CALLED TO: rn [id number]  From different peripheral sites, if not done within the last  24 hours (Per Hospital Policy: Only change specimen source to  \"Line\" if specified by physician order)    Blood Culture [[id number]]  (Abnormal)  (Susceptibility) Collected:  09/10/18 0220    Order Status:  Completed Specimen:  Blood from Peripheral Updated:  09/14/18 0925     Significant Indicator POS (POS)     Source BLD     Site PERIPHERAL     Blood Culture Growth detected by Bactec instrument. 09/11/2018  01:08 (A)      Viridans Streptococcus (A)    Narrative:       CALL  [place]  161 tel. [phone],  CALLED  161 tel. [phone] 09/11/2018, 01:10, RB PERF. RESULTS CALLED TO: rn  [id number]  From different peripheral sites, if not done within the last  24 hours (Per Hospital Policy: Only change specimen source to  \"Line\" if specified by physician order)    Culture & Susceptibility     VIRIDANS STREPTOCOCCUS     Antibiotic Sensitivity Microscan Unit Status    Cefotaxime Sensitive .125 mcg/mL Final    Method: SENSITIVITY, E-TEST    Penicillin Intermediate .38 mcg/mL Final    Method: SENSITIVITY, E-TEST                       BLOOD CULTURE [[id number]] Collected:  09/13/18 1208    Order Status:  Completed Specimen:  Blood from Peripheral Updated:  09/13/18 1215    Narrative:       Per " "Hospital Policy: Only change Specimen Src: to \"Line\" if  specified by physician order.    BLOOD CULTURE [[id number]] Collected:  09/13/18 1056    Order Status:  Completed Specimen:  Blood from Peripheral Updated:  09/13/18 1130    Narrative:       Per Hospital Policy: Only change Specimen Src: to \"Line\" if  specified by physician order.    Blood Culture [[id number]] Collected:  09/10/18 0220    Order Status:  Completed Specimen:  Blood from Peripheral Updated:  09/11/18 0854     Significant Indicator NEG     Source BLD     Site PERIPHERAL     Blood Culture No Growth    Note: Blood cultures are incubated for 5 days and  are monitored continuously.Positive blood cultures  are called to the RN and reported as soon as  they are identified.      Narrative:       From different peripheral sites, if not done within the last  24 hours (Per Hospital Policy: Only change specimen source to  \"Line\" if specified by physician order)    Urinalysis [[id number]]  (Abnormal) Collected:  09/10/18 0500    Order Status:  Completed Specimen:  Urine Updated:  09/10/18 0540     Color Yellow     Character Clear     Specific Gravity 1.018     Ph 5.0     Glucose 500 (A) mg/dL      Ketones Trace (A) mg/dL      Protein Negative mg/dL      Bilirubin Negative     Urobilinogen, Urine 0.2     Nitrite Negative     Leukocyte Esterase Negative     Occult Blood Negative     Micro Urine Req see below     Comment: Microscopic examination not performed when specimen is clear  and chemically negative for protein, blood, leukocyte esterase  and nitrite.         Narrative:       If not done within the last 24 hours    Culture Respiratory W/ GRM STN [[id number]] Collected:  09/10/18 0000    Order Status:  Canceled Specimen:  Sputum from Sputum     URINALYSIS [[id number]] Collected:  09/09/18 0000    Order Status:  Canceled Specimen:  Urine           Assessment:  Active Hospital Problems    Diagnosis   • *AP (abdominal pain) [R10.9]   • JILL (acute kidney injury) " ([facility]) [N17.9]   • Lactic acidosis [E87.2]   • Dehydration [E86.0]   • Anxiety [F41.9]   • Tobacco abuse disorder [Z72.0]   • Bacteremia [R78.81]   • Insomnia [G47.00]   • Nausea and vomiting [R11.2]   • Hypokalemia [E87.6]   • Addiction, marijuana ([facility]) [F12.20]   • Cannabis hyperemesis syndrome concurrent with and due to cannabis dependence ([facility]) [F12.288]       Plan:  Gram positive bacteremia versus pseudobacteremia - ongoing workup  Likely contaminant than true infection  Bcx 9/10 (1 out of 2 sets) + viridans strep (Intermediate to PCN)  Repeat Bcx 9/13 - pending  DC zosyn and transition to ceftriaxone for now pending further cx  Anticipate DC abx if repeat Bcx NGTD    Cyclical vomiting syndrome, improved  2/2 marijuana abuse    Anxiety  Seen by psych     Discussed with internal medicine/Dr Mayes

## 2018-09-14 NOTE — PROGRESS NOTES
[facility] Hospitalist Progress Note    Date of Service: 2018    Chief Complaint  43 y.o. female admitted 2018 with vomiting and abdominal pain.    Interval Problem Update  Ms. Hernandez has a history ofsuperior mesenteric artery syndrome with a Shruti-en-Y with  duodenojejunostomy and pyloroplasty, cholecystectomy  recurrent episodes of vomiting as well as chronic marijuana use that presented to the ER with severe abdominal pain and vomiting for 2 days. Her lactic acid was found to be elevated and remained elevated despite IV fluids thus she has been admitted to the ICU.       We had a long discussion about her severe anxiety which has been limiting her ability to function. She has not been leaving the house. Stressors include the death of her father as well as her infertility.   Ms. Hernandez is open to speaking with psychiatry about her anxiety. She began vomiting again this morning.   Consultants/Specialty      Disposition  medical        Review of Systems   Constitutional: Negative for fever.   Gastrointestinal: Positive for abdominal pain, nausea and vomiting.   Psychiatric/Behavioral: The patient is nervous/anxious and has insomnia.    All other systems reviewed and are negative.     Physical Exam  Laboratory/Imaging   Hemodynamics  Temp (24hrs), Av.7 °C (98.1 °F), Min:36.6 °C (97.9 °F), Max:36.8 °C (98.3 °F)   Temperature: 36.8 °C (98.3 °F)  Pulse  Av.3  Min: 58  Max: 103    Blood Pressure: 107/63      Respiratory      Respiration: 16, Pulse Oximetry: 98 %     Work Of Breathing / Effort: Mild  RUL Breath Sounds: Clear, RML Breath Sounds: Clear, RLL Breath Sounds: Clear, MADDY Breath Sounds: Clear, LLL Breath Sounds: Clear    Fluids    Intake/Output Summary (Last 24 hours) at 18 1937  Last data filed at 18 1542   Gross per 24 hour   Intake              472 ml   Output                0 ml   Net              472 ml       Nutrition  Orders Placed This Encounter   Procedures   • Diet Order  Regular     Standing Status:   Standing     Number of Occurrences:   1     Order Specific Question:   Diet:     Answer:   Regular [1]     Physical Exam   Constitutional: She is oriented to person, place, and time.   Neck: Neck supple.   Cardiovascular: Normal rate and regular rhythm.    Pulmonary/Chest: Effort normal. No respiratory distress. She has no wheezes.   Abdominal: Soft. She exhibits no distension. There is tenderness.   Musculoskeletal: She exhibits no edema.   Neurological: She is alert and oriented to person, place, and time.   Skin: Skin is warm and dry. She is not diaphoretic.   Psychiatric:   Very anxious and shaking at times   Nursing note and vitals reviewed.      Recent Labs      09/11/18   0403  09/13/18   1056   WBC  10.2  6.0   RBC  3.63*  3.76*   HEMOGLOBIN  11.3*  11.6*   HEMATOCRIT  33.9*  34.5*   MCV  93.4  91.8   MCH  31.1  30.9   MCHC  33.3*  33.6   RDW  56.1*  55.1*   PLATELETCT  236  318   MPV  10.5  10.3     Recent Labs      09/11/18   0520  09/13/18   1208   SODIUM  141  139   POTASSIUM  3.4*  3.6   CHLORIDE  115*  105   CO2  19*  23   GLUCOSE  86  97   BUN  3*  <3*   CREATININE  0.75  0.63   CALCIUM  8.6  8.5                      Assessment/Plan     * AP (abdominal pain)- (present on admission)   Assessment & Plan    History of superior mesenteric artery syndrome with a Shruti-en-Y with  duodenojejunostomy and pyloroplasty  IV fluids and IV anti-emetics.  Likely exacerbated by severe anxiety and perhaps chronic cannabis use.  -- conservative therapy.     CT abd/pelvis:  1.  Subtle hazy left lower lobe opacity, could represent subtle infiltrates.  2.  Target sign in the left lower abdomen, appearance suggests short segment of intussusception, no obstruction is seen, may represent physiologic intussusception. Radiologic follow-up to exclude progression to obstructive changes as clinically   appropriate.  3.  Hepatomegaly        Sepsis (HCC)   Assessment & Plan    This is sepsis (without  associated acute organ dysfunction).   Presented with severe dehydration, doubt true sepsis  But needs to repeat blood culture to rule out septicemia.        JILL (acute kidney injury) (HCC)   Assessment & Plan    Renal function normalized with IV hydration        Lactic acidosis- (present on admission)   Assessment & Plan    Likely due to severe dehydration however with uptrending lactic despite 3 L IVF and lactic acid went up to 6.4  One of two blood culture was positive for strep.  Discussed with ID specialist today.   Repeat blood culture and continue iv zosyn.        Dehydration- (present on admission)   Assessment & Plan    Treated with IV fluids        Anxiety- (present on admission)   Assessment & Plan    Resume home Buspar and Zoloft  This has been incapacitating for her.  Psychiatry consulted today.  Appreciate input.         Tobacco abuse disorder- (present on admission)   Assessment & Plan    Cessation encouraged.   Nicotine patch for withdrawal symptoms.        Insomnia   Assessment & Plan    Ambien as needed.        Bacteremia   Assessment & Plan    Uncertain whether the blood culture represent infection versus contamination.  Repeat blood culture and monitor.        Nausea and vomiting   Assessment & Plan    Continue with IV fluids and antiemetics  -- less than yesterday.        Hypokalemia   Assessment & Plan    Resolved with IV replacement.  Monitor.         Addiction, marijuana ([facility])- (present on admission)   Assessment & Plan    Encouraged cessation         Cannabis hyperemesis syndrome concurrent with and due to cannabis dependence ([facility])- (present on admission)   Assessment & Plan    -- advised the patient to stop abuse marijuana  -- continue with conservative therapy. Antiemetic and PPI          Quality-Core Measures   Reviewed items::  Medications reviewed  DVT prophylaxis pharmacological::  Enoxaparin (Lovenox)

## 2018-09-14 NOTE — PROGRESS NOTES
"Pt alert and oriented. Denies nausea at this time. Medicated for pain in back. Tolerated night time medications. Pt tolerated dinner and stated she \"feels much much better after finally eating.\" Denies further needs at this time.   "

## 2018-09-14 NOTE — CARE PLAN
Problem: Infection  Goal: Will remain free from infection  Outcome: PROGRESSING AS EXPECTED  Pt receiving iv antibiotics.     Problem: Pain Management  Goal: Pain level will decrease to patient's comfort goal  Outcome: PROGRESSING AS EXPECTED  Pt medicated for pain per MAR.

## 2018-09-15 VITALS
HEIGHT: 64 IN | RESPIRATION RATE: 16 BRPM | DIASTOLIC BLOOD PRESSURE: 84 MMHG | OXYGEN SATURATION: 99 % | TEMPERATURE: 97.4 F | WEIGHT: 126.98 LBS | HEART RATE: 86 BPM | SYSTOLIC BLOOD PRESSURE: 124 MMHG | BODY MASS INDEX: 21.68 KG/M2

## 2018-09-15 PROBLEM — N17.9 AKI (ACUTE KIDNEY INJURY) (HCC): Status: RESOLVED | Noted: 2018-09-09 | Resolved: 2018-09-15

## 2018-09-15 PROBLEM — R11.2 NAUSEA AND VOMITING: Status: RESOLVED | Noted: 2018-09-10 | Resolved: 2018-09-15

## 2018-09-15 PROBLEM — R78.81 BACTEREMIA: Status: RESOLVED | Noted: 2018-09-13 | Resolved: 2018-09-15

## 2018-09-15 PROBLEM — E87.20 LACTIC ACIDOSIS: Status: RESOLVED | Noted: 2018-05-19 | Resolved: 2018-09-15

## 2018-09-15 PROBLEM — G47.00 INSOMNIA: Status: RESOLVED | Noted: 2018-09-13 | Resolved: 2018-09-15

## 2018-09-15 PROBLEM — E86.0 DEHYDRATION: Status: RESOLVED | Noted: 2018-02-05 | Resolved: 2018-09-15

## 2018-09-15 LAB
BACTERIA BLD CULT: NORMAL
SIGNIFICANT IND 70042: NORMAL
SITE SITE: NORMAL
SOURCE SOURCE: NORMAL

## 2018-09-15 PROCEDURE — 700102 HCHG RX REV CODE 250 W/ 637 OVERRIDE(OP): Performed by: INTERNAL MEDICINE

## 2018-09-15 PROCEDURE — 700105 HCHG RX REV CODE 258: Performed by: INTERNAL MEDICINE

## 2018-09-15 PROCEDURE — A9270 NON-COVERED ITEM OR SERVICE: HCPCS | Performed by: INTERNAL MEDICINE

## 2018-09-15 PROCEDURE — 700102 HCHG RX REV CODE 250 W/ 637 OVERRIDE(OP): Performed by: PSYCHIATRY & NEUROLOGY

## 2018-09-15 PROCEDURE — 99239 HOSP IP/OBS DSCHRG MGMT >30: CPT | Performed by: INTERNAL MEDICINE

## 2018-09-15 PROCEDURE — A9270 NON-COVERED ITEM OR SERVICE: HCPCS | Performed by: PSYCHIATRY & NEUROLOGY

## 2018-09-15 PROCEDURE — A9270 NON-COVERED ITEM OR SERVICE: HCPCS | Performed by: HOSPITALIST

## 2018-09-15 PROCEDURE — 99232 SBSQ HOSP IP/OBS MODERATE 35: CPT | Performed by: INTERNAL MEDICINE

## 2018-09-15 PROCEDURE — 700102 HCHG RX REV CODE 250 W/ 637 OVERRIDE(OP): Performed by: HOSPITALIST

## 2018-09-15 PROCEDURE — 700111 HCHG RX REV CODE 636 W/ 250 OVERRIDE (IP): Performed by: HOSPITALIST

## 2018-09-15 PROCEDURE — 700111 HCHG RX REV CODE 636 W/ 250 OVERRIDE (IP): Performed by: INTERNAL MEDICINE

## 2018-09-15 RX ORDER — LEVOFLOXACIN 750 MG/1
750 TABLET, FILM COATED ORAL DAILY
Qty: 14 TAB | Refills: 0 | Status: SHIPPED | OUTPATIENT
Start: 2018-09-15 | End: 2018-09-29

## 2018-09-15 RX ORDER — LEVOFLOXACIN 750 MG/1
750 TABLET, FILM COATED ORAL DAILY
Status: DISCONTINUED | OUTPATIENT
Start: 2018-09-15 | End: 2018-09-15 | Stop reason: HOSPADM

## 2018-09-15 RX ADMIN — CLONAZEPAM 0.5 MG: 0.5 TABLET ORAL at 05:24

## 2018-09-15 RX ADMIN — LEVOFLOXACIN 750 MG: 750 TABLET, FILM COATED ORAL at 13:01

## 2018-09-15 RX ADMIN — CHLORPROMAZINE HYDROCHLORIDE 50 MG: 25 TABLET, SUGAR COATED ORAL at 05:24

## 2018-09-15 RX ADMIN — CEFTRIAXONE SODIUM 2 G: 2 INJECTION, POWDER, FOR SOLUTION INTRAMUSCULAR; INTRAVENOUS at 05:25

## 2018-09-15 RX ADMIN — HYDROMORPHONE HYDROCHLORIDE 0.5 MG: 2 INJECTION INTRAMUSCULAR; INTRAVENOUS; SUBCUTANEOUS at 11:37

## 2018-09-15 RX ADMIN — BUSPIRONE HYDROCHLORIDE 30 MG: 10 TABLET ORAL at 05:24

## 2018-09-15 RX ADMIN — HYDROMORPHONE HYDROCHLORIDE 0.5 MG: 2 INJECTION INTRAMUSCULAR; INTRAVENOUS; SUBCUTANEOUS at 05:22

## 2018-09-15 RX ADMIN — STANDARDIZED SENNA CONCENTRATE AND DOCUSATE SODIUM 1 TABLET: 8.6; 5 TABLET, FILM COATED ORAL at 06:00

## 2018-09-15 RX ADMIN — POTASSIUM CHLORIDE 20 MEQ: 1500 TABLET, EXTENDED RELEASE ORAL at 05:24

## 2018-09-15 RX ADMIN — LORAZEPAM 0.5 MG: 0.5 TABLET ORAL at 05:23

## 2018-09-15 RX ADMIN — HYDROMORPHONE HYDROCHLORIDE 0.5 MG: 2 INJECTION INTRAMUSCULAR; INTRAVENOUS; SUBCUTANEOUS at 08:34

## 2018-09-15 RX ADMIN — CHLORPROMAZINE HYDROCHLORIDE 50 MG: 25 TABLET, SUGAR COATED ORAL at 11:37

## 2018-09-15 RX ADMIN — CLONAZEPAM 0.5 MG: 0.5 TABLET ORAL at 11:37

## 2018-09-15 ASSESSMENT — ENCOUNTER SYMPTOMS
FEVER: 0
CHILLS: 0
VOMITING: 0
ABDOMINAL PAIN: 0
COUGH: 0
NAUSEA: 0
DIARRHEA: 0
SHORTNESS OF BREATH: 0

## 2018-09-15 ASSESSMENT — PAIN SCALES - GENERAL
PAINLEVEL_OUTOF10: 3
PAINLEVEL_OUTOF10: 0
PAINLEVEL_OUTOF10: 7

## 2018-09-15 NOTE — PROGRESS NOTES
Pt ambulated around the unit early on in shift. Gait is stable. Pt complained of pain and requested some pain medication after she returned to her room. Pain medication was given as per order, along with Ambien. Pt was able to rest throughout the night. Call light within reach and in working condition. Will continue to monitor

## 2018-09-15 NOTE — CARE PLAN
Problem: Safety  Goal: Will remain free from falls  Outcome: PROGRESSING AS EXPECTED  Patient continues to remain free from falls.

## 2018-09-15 NOTE — PROGRESS NOTES
Infectious Disease Progress Note    Author: Lui Medrano M.D. Date & Time of service: 9/15/2018  12:17 PM    Chief Complaint:  FU gram positive bacteremia    Interval History:   AF WBC 5.5 patient sitting up in bed eating breakfast, feels well and spoke to a psychiatrist yesterday, blood culture positive for viridans Streptococcus, repeat blood culture pending  9/15 afebrile, no leukocytosis.  Patient is anxious to go home.  Says that she is back to baseline.    Labs Reviewed, Medications Reviewed, Radiology Reviewed and Wound Reviewed.    Review of Systems:  Review of Systems   Constitutional: Negative for chills and fever.   Respiratory: Negative for cough and shortness of breath.    Gastrointestinal: Negative for abdominal pain, diarrhea, nausea and vomiting.   All other systems reviewed and are negative.    Hemodynamics:  Temp (24hrs), Av.6 °C (97.8 °F), Min:36.3 °C (97.4 °F), Max:36.7 °C (98 °F)  Temperature: 36.3 °C (97.4 °F)  Pulse  Av.4  Min: 57  Max: 103   Blood Pressure: 124/84       Physical Exam:  Physical Exam   Constitutional: She is oriented to person, place, and time. She appears well-developed and well-nourished. No distress.   HENT:   Head: Normocephalic and atraumatic.   Eyes: Pupils are equal, round, and reactive to light. EOM are normal. No scleral icterus.   Neck: Neck supple.   Cardiovascular: Normal rate, regular rhythm and normal heart sounds.  Exam reveals no gallop and no friction rub.    No murmur heard.  Pulmonary/Chest: Effort normal and breath sounds normal. No respiratory distress. She has no rales.   Abdominal: Soft. Bowel sounds are normal. She exhibits no distension. There is no tenderness. There is no rebound.   Musculoskeletal: She exhibits no edema.   RUE midline   Lymphadenopathy:     She has no cervical adenopathy.   Neurological: She is alert and oriented to person, place, and time.   Skin: Skin is warm and dry. No rash noted.   Psychiatric:   Anxious to go  home       Meds:    Current Facility-Administered Medications:   •  potassium chloride SA  •  cefTRIAXone (ROCEPHIN) IV  •  LORazepam  •  chlorproMAZINE  •  chlorproMAZINE  •  clonazePAM  •  hydrocortisone  •  zolpidem  •  enoxaparin (LOVENOX) injection  •  senna-docusate **AND** polyethylene glycol/lytes **AND** magnesium hydroxide **AND** bisacodyl  •  ondansetron  •  ondansetron  •  promethazine  •  promethazine  •  Notify provider if pain remains uncontrolled **AND** Use the numeric rating scale (NRS-11) on regular floors and Critical-Care Pain Observation Tool (CPOT) on ICUs/Trauma to assess pain **AND** Pulse Ox (Oximetry) **AND** Pharmacy Consult Request **AND** If patient difficult to arouse and/or has respiratory depression, stop any opiates that are currently infusing and call a Rapid Response. **AND** oxyCODONE immediate-release **AND** oxyCODONE immediate-release **AND** HYDROmorphone  •  busPIRone  •  diphenhydrAMINE  •  sertraline    Labs:  Recent Labs      09/13/18   1056  09/14/18   0600   WBC  6.0  5.5   RBC  3.76*  3.51*   HEMOGLOBIN  11.6*  10.6*   HEMATOCRIT  34.5*  32.6*   MCV  91.8  92.9   MCH  30.9  30.2   RDW  55.1*  54.5*   PLATELETCT  318  305   MPV  10.3  10.0   NEUTSPOLYS  62.20  50.90   LYMPHOCYTES  26.20  31.20   MONOCYTES  9.40  10.40   EOSINOPHILS  0.50  5.30   BASOPHILS  1.00  1.50     Recent Labs      09/13/18   1208  09/14/18   0600   SODIUM  139  140   POTASSIUM  3.6  3.4*   CHLORIDE  105  108   CO2  23  27   GLUCOSE  97  84   BUN  <3*  3*     Recent Labs      09/13/18   1208  09/14/18   0600   ALBUMIN  3.2  2.8*   TBILIRUBIN  0.5  0.4   ALKPHOSPHAT  72  70   TOTPROTEIN  6.4  5.8*   ALTSGPT  8  8   ASTSGOT  18  11*   CREATININE  0.63  0.70       Imaging:  Ct-abdomen-pelvis W/o    Result Date: 9/10/2018  9/10/2018 12:30 AM HISTORY/REASON FOR EXAM: Pain TECHNIQUE/EXAM DESCRIPTION:  CT abdomen and pelvis without IV contrast. Sequential axial CT images were obtained from lung bases to  the proximal femurs without contrast. Low dose optimization technique was utilized for this CT exam including automated exposure control and adjustment of the mA and/or kV according to patient size. COMPARISON:  July 12, 2018 CT ABDOMEN FINDINGS: Non contrast technique limits evaluation of the solid abdominal organs. Subtle hazy left lower lobe opacity is seen. The liver is normal in contour. Hepatomegaly is observed. No intrahepatic biliary ductal dilatation is noted. The gallbladder is surgically absent. The spleen is unremarkable. The pancreas is grossly normal. Bilateral adrenal glands appear within normal limits. The kidneys are unremarkable.  The ureters are normal in caliber along their visualized course. The colon appears within normal limits. The appendix is surgically absent by history. There is target sign in the lower pelvis. Otherwise the small bowel is unremarkable. Postsurgical changes of multiple bowel resection are seen. The bony structures are age appropriate. CT PELVIS FINDINGS: The bladder is within normal limits. The uterus and adnexal structures appear within physiologic limits.     1.  Subtle hazy left lower lobe opacity, could represent subtle infiltrates. 2.  Target sign in the left lower abdomen, appearance suggests short segment of intussusception, no obstruction is seen, may represent physiologic intussusception. Radiologic follow-up to exclude progression to obstructive changes as clinically appropriate. 3.  Hepatomegaly    [id number] View    Result Date: 9/11/2018 9/11/2018 4:10 AM HISTORY/REASON FOR EXAM:  Gastrointestinal Complaint. TECHNIQUE/EXAM DESCRIPTION AND NUMBER OF VIEWS:   1 views of the abdomen. COMPARISON: 4/12/2017 FINDINGS: There is a nonspecific bowel gas pattern. There is no evidence of bowel obstruction. No free intraperitoneal air is identified though evaluation is limited on supine imaging.  Surgical clips are seen in the right upper quadrant.     No evidence of  "bowel obstruction. Nonspecific bowel gas pattern.    Ir-picc Line Placement    Result Date: 9/13/2018  HISTORY/REASON FOR EXAM:   PICC placement. TECHNIQUE/EXAM DESCRIPTION AND NUMBER OF VIEWS:   PICC line insertion with ultrasound guidance.  The procedure was performed using maximal sterile barrier technique including sterile gown, mask, cap, and donning of sterile gloves following appropriate hand hygiene and/or sterile scrub. Patient skin site was prepped with 2% Chlorhexidine solution. FINDINGS:  PICC line insertion with Ultrasound Guidance was performed by qualified nursing staff without the assistance of a Radiologist. PICC positioning appropriateness confirmed by 3CG technology; chest xray only needed in the instance 3CG unable to confirm placement.              Ultrasound-guided PICC placement performed by qualified nursing staff as above.       Micro:  Results     Procedure Component Value Units Date/Time    BLOOD CULTURE [[id number]] Collected:  09/13/18 1056    Order Status:  Completed Specimen:  Blood from Peripheral Updated:  09/15/18 0817     Significant Indicator NEG     Source BLD     Site PERIPHERAL     Blood Culture No Growth    Note: Blood cultures are incubated for 5 days and  are monitored continuously.Positive blood cultures  are called to the RN and reported as soon as  they are identified.      Narrative:       Per Hospital Policy: Only change Specimen Src: to \"Line\" if  specified by physician order.    BLOOD CULTURE [[id number]] Collected:  09/13/18 1208    Order Status:  Completed Specimen:  Blood from Peripheral Updated:  09/15/18 0817     Significant Indicator NEG     Source BLD     Site PERIPHERAL     Blood Culture No Growth    Note: Blood cultures are incubated for 5 days and  are monitored continuously.Positive blood cultures  are called to the RN and reported as soon as  they are identified.      Narrative:       Per Hospital Policy: Only change Specimen Src: to \"Line\" if  specified by " "physician order.    Blood Culture [[id number]] Collected:  09/10/18 0220    Order Status:  Completed Specimen:  Blood from Peripheral Updated:  09/15/18 0700     Significant Indicator NEG     Source BLD     Site PERIPHERAL     Blood Culture No growth after 5 days of incubation.    Narrative:       From different peripheral sites, if not done within the last  24 hours (Per Hospital Policy: Only change specimen source to  \"Line\" if specified by physician order)    SENSITIVITY, E-TEST [[id number]] Collected:  09/10/18 0220    Order Status:  Completed Specimen:  Blood Updated:  09/14/18 0926     ETEST Sensitivity FINAL    Narrative:       161 tel. [phone] 09/11/2018, 01:10, RB PERF. RESULTS CALLED TO: rn [id number]  From different peripheral sites, if not done within the last  24 hours (Per Hospital Policy: Only change specimen source to  \"Line\" if specified by physician order)    Blood Culture [[id number]]  (Abnormal)  (Susceptibility) Collected:  09/10/18 0220    Order Status:  Completed Specimen:  Blood from Peripheral Updated:  09/14/18 0925     Significant Indicator POS (POS)     Source BLD     Site PERIPHERAL     Blood Culture Growth detected by Bactec instrument. 09/11/2018  01:08 (A)      Viridans Streptococcus (A)    Narrative:       CALL  [facility]  161 tel. [phone],  CALLED  161 tel. [phone] 09/11/2018, 01:10, RB PERF. RESULTS CALLED TO: rn  [id number]  From different peripheral sites, if not done within the last  24 hours (Per Hospital Policy: Only change specimen source to  \"Line\" if specified by physician order)    Culture & Susceptibility     VIRIDANS STREPTOCOCCUS     Antibiotic Sensitivity Microscan Unit Status    Cefotaxime Sensitive .125 mcg/mL Final    Method: SENSITIVITY, E-TEST    Penicillin Intermediate .38 mcg/mL Final    Method: SENSITIVITY, E-TEST                       Urinalysis [[id number]]  (Abnormal) Collected:  09/10/18 0500    Order Status:  Completed Specimen:  Urine Updated:  09/10/18 0540     " Color Yellow     Character Clear     Specific Gravity 1.018     Ph 5.0     Glucose 500 (A) mg/dL      Ketones Trace (A) mg/dL      Protein Negative mg/dL      Bilirubin Negative     Urobilinogen, Urine 0.2     Nitrite Negative     Leukocyte Esterase Negative     Occult Blood Negative     Micro Urine Req see below     Comment: Microscopic examination not performed when specimen is clear  and chemically negative for protein, blood, leukocyte esterase  and nitrite.         Narrative:       If not done within the last 24 hours    Culture Respiratory W/ GRM STN [[id number]] Collected:  09/10/18 0000    Order Status:  Canceled Specimen:  Sputum from Sputum     URINALYSIS [[id number]] Collected:  09/09/18 0000    Order Status:  Canceled Specimen:  Urine           Assessment:  Active Hospital Problems    Diagnosis   • *AP (abdominal pain) [R10.9]   • JILL (acute kidney injury) (HCC) [N17.9]   • Lactic acidosis [E87.2]   • Dehydration [E86.0]   • Anxiety [F41.9]   • Tobacco abuse disorder [Z72.0]   • Bacteremia [R78.81]   • Insomnia [G47.00]   • Nausea and vomiting [R11.2]   • Hypokalemia [E87.6]   • Addiction, marijuana ([facility]) [F12.20]   • Cannabis hyperemesis syndrome concurrent with and due to cannabis dependence ([facility]) [F12.288]       Plan:  Sepsis, lactic acidosis, resolved - Strep bacteremia  Bcx 9/10 (1 out of 2 sets) + viridans strep (Intermediate to PCN)  Repeat Bcx 9/13 -no growth to date  Patient on ceftriaxone   Source of initial sepsis and lactic acidosis not clear.  She does report multiple cavities in the past and new ones that she has been planning to see a dentist for. Will treat the viridans Strep as a real bacteremia, and complete a 14 day course of antibiotics  Patient is anxious to go home and wishes to use p.o. antibiotics.  Okay to discharge on p.o. Levaquin for 14 days from negative blood cultures  Patient also on QTC prolonging drugs like chlorpromazine and (to a lesser extent) Zoloft.  QTC is 480.   Patient educated regarding the risks and symptoms to watch out for, in addition to other complications such as tendinitis and diarrhea.  Patient agrees and also states that her  is on vacation and will be at home to watch over  Follow surveillance blood cultures to finalized    Cyclical vomiting syndrome, improved  2/2 marijuana abuse    Anxiety  Seen by psych -on multiple medications    Discussed with internal medicine/Dr Mayes

## 2018-09-15 NOTE — PROGRESS NOTES
Patient AAOX4 reports chronic low back pain. Administered pain medications as ordered. Denies any nausea/vomiting this morning. Patient expresses desire to go home today. Spoke with MD. Waiting for blood cultures results to be negative for 48 hours. Possibility patient will be discharges this afternoon.

## 2018-09-15 NOTE — CARE PLAN
Problem: Communication  Goal: The ability to communicate needs accurately and effectively will improve  Outcome: MET Date Met: 09/15/18  Patient continues to maintain ability to accurately communicate with staff.

## 2018-09-15 NOTE — PROGRESS NOTES
Patient ok for discharge. Midline removed, measured at 15. Tip is intact. PO Levaquin administered prior to discharge per MD orders. Discharge instructions and prescription reviewed with patient. Verbalizes understanding. Patient now waiting for  to come get her.

## 2018-09-15 NOTE — DISCHARGE INSTRUCTIONS
Discharge Instructions    Discharged to home by car with relative. Discharged via walking, hospital escort: Refused.  Special equipment needed: Not Applicable    Be sure to schedule a follow-up appointment with your primary care doctor or any specialists as instructed.     Discharge Plan:   Diet Plan: Discussed  Activity Level: Discussed  Confirmed Follow up Appointment: Patient to Call and Schedule Appointment  Confirmed Symptoms Management: Discussed  Medication Reconciliation Updated: Yes    I understand that a diet low in cholesterol, fat, and sodium is recommended for good health. Unless I have been given specific instructions below for another diet, I accept this instruction as my diet prescription.   Other diet: regular    Special Instructions: None    · Is patient discharged on Warfarin / Coumadin?   No     Depression / Suicide Risk    As you are discharged from this [facility] facility, it is important to learn how to keep safe from harming yourself.    Recognize the warning signs:  · Abrupt changes in personality, positive or negative- including increase in energy   · Giving away possessions  · Change in eating patterns- significant weight changes-  positive or negative  · Change in sleeping patterns- unable to sleep or sleeping all the time   · Unwillingness or inability to communicate  · Depression  · Unusual sadness, discouragement and loneliness  · Talk of wanting to die  · Neglect of personal appearance   · Rebelliousness- reckless behavior  · Withdrawal from people/activities they love  · Confusion- inability to concentrate     If you or a loved one observes any of these behaviors or has concerns about self-harm, here's what you can do:  · Talk about it- your feelings and reasons for harming yourself  · Remove any means that you might use to hurt yourself (examples: pills, rope, extension cords, firearm)  · Get professional help from the community (Mental Health, Substance Abuse, psychological  counseling)  · Do not be alone:Call your Safe Contact- someone whom you trust who will be there for you.  · Call your local CRISIS HOTLINE 851-5624 or 519-030-5563  · Call your local Children's Mobile Crisis Response Team [place] (429) 854-9239 or www.Onit  · Call the toll free National Suicide Prevention Hotlines   · National Suicide Prevention Lifeline 565-951-HWYT (2975)  · [employer] Line Network 800-SUICIDE (857-1399)      Dehydration, Adult  Dehydration is when there is not enough fluid or water in your body. This happens when you lose more fluids than you take in. Dehydration can range from mild to very bad. It should be treated right away to keep it from getting very bad.  Symptoms of mild dehydration may include:  · Thirst.  · Dry lips.  · Slightly dry mouth.  · Dry, warm skin.  · Dizziness.  Symptoms of moderate dehydration may include:  · Very dry mouth.  · Muscle cramps.  · Dark pee (urine). Pee may be the color of tea.  · Your body making less pee.  · Your eyes making fewer tears.  · Heartbeat that is uneven or faster than normal (palpitations).  · Headache.  · Light-headedness, especially when you stand up from sitting.  · Fainting (syncope).  Symptoms of very bad dehydration may include:  · Changes in skin, such as:  ¨ Cold and clammy skin.  ¨ Blotchy (mottled) or pale skin.  ¨ Skin that does not quickly return to normal after being lightly pinched and let go (poor skin turgor).  · Changes in body fluids, such as:  ¨ Feeling very thirsty.  ¨ Your eyes making fewer tears.  ¨ Not sweating when body temperature is high, such as in hot weather.  ¨ Your body making very little pee.  · Changes in vital signs, such as:  ¨ Weak pulse.  ¨ Pulse that is more than 100 beats a minute when you are sitting still.  ¨ Fast breathing.  ¨ Low blood pressure.  · Other changes, such as:  ¨ Sunken eyes.  ¨ Cold hands and feet.  ¨ Confusion.  ¨ Lack of energy (lethargy).  ¨ Trouble waking up from  sleep.  ¨ Short-term weight loss.  ¨ Unconsciousness.  Follow these instructions at home:  · If told by your doctor, drink an ORS:  ¨ Make an ORS by using instructions on the package.  ¨ Start by drinking small amounts, about ½ cup (120 mL) every 5-10 minutes.  ¨ Slowly drink more until you have had the amount that your doctor said to have.  · Drink enough clear fluid to keep your pee clear or pale yellow. If you were told to drink an ORS, finish the ORS first, then start slowly drinking clear fluids. Drink fluids such as:  ¨ Water. Do not drink only water by itself. Doing that can make the salt (sodium) level in your body get too low (hyponatremia).  ¨ Ice chips.  ¨ Fruit juice that you have added water to (diluted).  ¨ Low-calorie sports drinks.  · Avoid:  ¨ Alcohol.  ¨ Drinks that have a lot of sugar. These include high-calorie sports drinks, fruit juice that does not have water added, and soda.  ¨ Caffeine.  ¨ Foods that are greasy or have a lot of fat or sugar.  · Take over-the-counter and prescription medicines only as told by your doctor.  · Do not take salt tablets. Doing that can make the salt level in your body get too high (hypernatremia).  · Eat foods that have minerals (electrolytes). Examples include bananas, oranges, potatoes, tomatoes, and spinach.  · Keep all follow-up visits as told by your doctor. This is important.  Contact a doctor if:  · You have belly (abdominal) pain that:  ¨ Gets worse.  ¨ Stays in one area (localizes).  · You have a rash.  · You have a stiff neck.  · You get angry or annoyed more easily than normal (irritability).  · You are more sleepy than normal.  · You have a harder time waking up than normal.  · You feel:  ¨ Weak.  ¨ Dizzy.  ¨ Very thirsty.  · You have peed (urinated) only a small amount of very dark pee during 6-8 hours.  Get help right away if:  · You have symptoms of very bad dehydration.  · You cannot drink fluids without throwing up (vomiting).  · Your symptoms  get worse with treatment.  · You have a fever.  · You have a very bad headache.  · You are throwing up or having watery poop (diarrhea) and it:  ¨ Gets worse.  ¨ Does not go away.  · You have blood or something green (bile) in your throw-up.  · You have blood in your poop (stool). This may cause poop to look black and tarry.  · You have not peed in 6-8 hours.  · You pass out (faint).  · Your heart rate when you are sitting still is more than 100 beats a minute.  · You have trouble breathing.  This information is not intended to replace advice given to you by your health care provider. Make sure you discuss any questions you have with your health care provider.  Document Released: 10/14/2010 Document Revised: 07/07/2017 Document Reviewed: 02/10/2017  [employer] Interactive Patient Education © 2017 [employer]        Levofloxacin tablets  What is this medicine?  LEVOFLOXACIN (mignon voe FLOX a sin) is a quinolone antibiotic. It is used to treat certain kinds of bacterial infections. It will not work for colds, flu, or other viral infections.  This medicine may be used for other purposes; ask your health care provider or pharmacist if you have questions.  COMMON BRAND NAME(S): Levaquin, Levaquin Leva-Solitario  What should I tell my health care provider before I take this medicine?  They need to know if you have any of these conditions:  -bone problems  -diabetes  -history of low levels of potassium in the blood  -irregular heartbeat  -joint problems  -kidney disease  -liver disease  -myasthenia gravis  -seizures  -tendon problems  -tingling of the fingers or toes, or other nerve disorder  -an unusual or allergic reaction to levofloxacin, other quinolone antibiotics, foods, dyes, or preservatives  -pregnant or trying to get pregnant  -breast-feeding  How should I use this medicine?  Take this medicine by mouth with a full glass of water. Follow the directions on the prescription label. This medicine can be taken with or without  food. Take your medicine at regular intervals. Do not take your medicine more often than directed. Do not skip doses or stop your medicine early even if you feel better. Do not stop taking except on your doctor's advice.  A special MedGuide will be given to you by the pharmacist with each prescription and refill. Be sure to read this information carefully each time.  Talk to your pediatrician regarding the use of this medicine in children. While this drug may be prescribed for children as young as 6 months for selected conditions, precautions do apply.  Overdosage: If you think you have taken too much of this medicine contact a poison control center or emergency room at once.  NOTE: This medicine is only for you. Do not share this medicine with others.  What if I miss a dose?  If you miss a dose, take it as soon as you remember. If it is almost time for your next dose, take only that dose. Do not take double or extra doses.  What may interact with this medicine?  Do not take this medicine with any of the following medications:  -bepridil  -certain medicines for depression, anxiety, or psychotic disturbances like pimozide, thioridazine, and ziprasidone  -certain medicines for irregular heart beat like dofetilide and dronedarone  -cisapride  -halofantrine  This medicine may also interact with the following medications:  -antacids  -birth control pills  -certain medicines for diabetes, like glipizide, glyburide, or insulin  -didanosine buffered tablets or powder  -multivitamins  -NSAIDS, medicines for pain and inflammation, like ibuprofen or naproxen  -steroid medicines like prednisone or cortisone  -sucralfate  -theophylline  -warfarin  This list may not describe all possible interactions. Give your health care provider a list of all the medicines, herbs, non-prescription drugs, or dietary supplements you use. Also tell them if you smoke, drink alcohol, or use illegal drugs. Some items may interact with your  medicine.  What should I watch for while using this medicine?  Tell your doctor or healthcare professional if your symptoms do not start to get better or if they get worse.  Do not treat diarrhea with over the counter products. Contact your doctor if you have diarrhea that lasts more than 2 days or if it is severe and watery.  Check with your doctor or health care professional if you get an attack of severe diarrhea, nausea and vomiting, or if you sweat a lot. The loss of too much body fluid can make it dangerous for you to take this medicine.  You may get drowsy or dizzy. Do not drive, use machinery, or do anything that needs mental alertness until you know how this medicine affects you. Do not sit or stand up quickly, especially if you are an older patient. This reduces the risk of dizzy or fainting spells.  This medicine can make you more sensitive to the sun. Keep out of the sun. If you cannot avoid being in the sun, wear protective clothing and use a sunscreen. Do not use sun lamps or tanning beds/booths. Contact your doctor if you get a sunburn.  If you are a diabetic monitor your blood glucose carefully. If you get an unusual reading stop taking this medicine and call your doctor right away.  Avoid antacids, calcium, iron, and zinc products for 2 hours before and 2 hours after taking a dose of this medicine.  What side effects may I notice from receiving this medicine?  Side effects that you should report to your doctor or health care professional as soon as possible:  -allergic reactions like skin rash or hives, swelling of the face, lips, or tongue  -anxious  -breathing problems  -confusion  -depressed mood  -diarrhea  -dizziness  -fast, irregular heartbeat  -hallucination, loss of contact with reality  -joint, muscle, or tendon pain or swelling  -muscle weakness  -pain, tingling, numbness in the hands or feet  -seizures  -signs and symptoms of high blood sugar such as dizziness; dry mouth; dry skin; fruity  breath; nausea; stomach pain; increased hunger or thirst; increased urination  -signs and symptoms of liver injury like dark yellow or brown urine; general ill feeling or flu-like symptoms; light-colored stools; loss of appetite; nausea; right upper belly pain; unusually weak or tired; yellowing of the eyes or skin  -signs and symptoms of low blood sugar such as feeling anxious; confusion; dizziness; increased hunger; unusually weak or tired; sweating; shakiness; cold; irritable; headache; blurred vision; fast heartbeat; loss of consciousness  -suicidal thoughts or other mood changes  -sunburn  -unusually weak or tired  Side effects that usually do not require medical attention (report to your doctor or health care professional if they continue or are bothersome):  -constipation  -dry mouth  -headache  -nausea, vomiting  -trouble sleeping  This list may not describe all possible side effects. Call your doctor for medical advice about side effects. You may report side effects to FDA at 9-565-FDA-1531.  Where should I keep my medicine?  Keep out of the reach of children.  Store at room temperature between 15 and 30 degrees C (59 and 86 degrees F). Keep in a tightly closed container. Throw away any unused medicine after the expiration date.  NOTE: This sheet is a summary. It may not cover all possible information. If you have questions about this medicine, talk to your doctor, pharmacist, or health care provider.  © 2018 [employer]/Gold Standard (2017-06-27 12:38:27)        Marijuana Abuse  Your exam shows you have used marijuana or pot. There are many health problems related to marijuana abuse. These include:  · Bronchitis.  · Chronic cough.  · Emphysema.  · Lung and upper airway cancer.  Abusers also experience impairment in:  · Memory.  · Judgment.  · Ability to learn.  · Coordination.  Students who smoke marijuana:  · Get lower grades.  · Are less likely to graduate than those who do not.  Adults who abuse  marijuana:  · Have problems at work.  · May even lose their jobs due to:  · Poor work performance.  · Absenteeism.  Attention, memory, and learning skills have been shown to be diminished for up to 6 months after stopping regular use, and there is evidence that the effects can be cumulative over a lifetime.   Heavier use of marijuana also puts a strain on relationships with friends and loved ones and can lead to moodiness and loss of confidence. Acute intoxication can lead to:  · Increased anxiety.  · A panic episode.  It also increases the risk for having an automobile accident. This is especially true if the pot is combined with alcohol or other intoxicants. Treatment for acute intoxication is rarely needed. However, medicine to reduce anxiety may be helpful in some people.  Millions of people are considered to be dependent on marijuana. It is long-term regular use that leads to addiction and all of its complex problems. Information on the problem of addiction and the health problems of long-term abuse is posted at the National Powers for Drug Abuse website, www.drugabuse.gov. Consult with your doctor or counselor if you want further information and support in handling this common problem.  Document Released: 01/25/2006 Document Revised: 03/11/2013 Document Reviewed: 11/11/2008  ExitCare® Patient Information ©2014 [employer].

## 2018-09-15 NOTE — PROGRESS NOTES
[facility] Progress Note    Date of Service: 2018    Chief Complaint  43 y.o. female admitted 2018 with vomiting and abdominal pain.    Interval Problem Update  :  Patient reports that she vomited what she ate this morning because she was eating too fast.  Otherwise, no acute event overnight.    Consultants/Specialty  Infectious disease specialist.    Disposition  medical        Review of Systems   Constitutional: Negative for fever.   Gastrointestinal: Positive for abdominal pain, nausea and vomiting.   Psychiatric/Behavioral: The patient is nervous/anxious and has insomnia.    All other systems reviewed and are negative.     Physical Exam  Laboratory/Imaging   Hemodynamics  Temp (24hrs), Av.6 °C (97.8 °F), Min:35.9 °C (96.7 °F), Max:37 °C (98.6 °F)   Temperature: 36.7 °C (98 °F)  Pulse  Av.1  Min: 57  Max: 103    Blood Pressure: 136/86      Respiratory      Respiration: 16, Pulse Oximetry: 97 %             Fluids    Intake/Output Summary (Last 24 hours) at 18 1922  Last data filed at 18 1400   Gross per 24 hour   Intake              934 ml   Output                0 ml   Net              934 ml       Nutrition  Orders Placed This Encounter   Procedures   • Diet Order Regular     Standing Status:   Standing     Number of Occurrences:   1     Order Specific Question:   Diet:     Answer:   Regular [1]     Physical Exam   Constitutional: She is oriented to person, place, and time.   Neck: Neck supple.   Cardiovascular: Normal rate and regular rhythm.    Pulmonary/Chest: Effort normal. No respiratory distress. She has no wheezes.   Abdominal: Soft. She exhibits no distension. There is tenderness.   Musculoskeletal: She exhibits no edema.   Neurological: She is alert and oriented to person, place, and time.   Skin: Skin is warm and dry. She is not diaphoretic.   Psychiatric:   Very anxious and shaking at times   Nursing note and vitals reviewed.      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  Likely exacerbated by severe anxiety and perhaps chronic cannabis use.  -- conservative therapy.     CT abd/pelvis:  1.  Subtle hazy left lower lobe opacity, could represent subtle infiltrates.  2.  Target sign in the left lower abdomen, appearance suggests short segment of intussusception, no obstruction is seen, may represent physiologic intussusception. Radiologic follow-up to exclude progression to obstructive changes as clinically   appropriate.  3.  Hepatomegaly        Sepsis (HCC)   Assessment & Plan    This is sepsis (without associated acute organ dysfunction).   Presented with severe dehydration, doubt true sepsis  But needs to repeat blood culture to rule out septicemia.        JILL (acute kidney injury) (HCC)   Assessment & Plan    Renal function normalized with IV hydration        Lactic acidosis- (present on admission)   Assessment & Plan    Likely due to severe dehydration however with uptrending lactic despite 3 L IVF and lactic acid went up to 6.4  One of two blood culture was positive for Viridans Streptococcus (A).  Discussed with ID specialist today.  continue iv zosyn.        Dehydration- (present on admission)   Assessment & Plan    Treated with IV fluids        Anxiety- (present on admission)   Assessment & Plan     Resume home Buspar and Zoloft  This has been incapacitating for her.  Psychiatry consulted today.  Appreciate input.         Tobacco abuse disorder- (present on admission)   Assessment & Plan    Cessation encouraged.   Nicotine patch for withdrawal symptoms.        Insomnia   Assessment & Plan    Ambien as needed.        Bacteremia   Assessment & Plan    Uncertain whether the blood culture represent infection versus contamination.  Initial blood culture grew Viridans Streptococcus (A)  Repeated blood culture and monitor.        Nausea and vomiting   Assessment & Plan    Continue with IV fluids and antiemetics  -- less than previous  -- advised the patient to eat small frequent meals.  Given she has history of gastric bypass.        Hypokalemia   Assessment & Plan    Resolved with IV replacement.  Monitor.         Addiction, marijuana (HCC)- (present on admission)   Assessment & Plan    Encouraged cessation         Cannabis hyperemesis syndrome concurrent with and due to cannabis dependence (HCC)- (present on admission)   Assessment & Plan    -- advised the patient to stop abuse marijuana  -- continue with conservative therapy. Antiemetic and PPI          Quality-Core Measures   Reviewed items::  Medications reviewed  DVT prophylaxis pharmacological::  Enoxaparin (Lovenox)

## 2018-09-15 NOTE — CARE PLAN
Problem: Communication  Goal: The ability to communicate needs accurately and effectively will improve  Outcome: PROGRESSING AS EXPECTED  Educate the pt on the use of call light to call for assistance    Problem: Pain Management  Goal: Pain level will decrease to patient's comfort goal  Outcome: PROGRESSING AS EXPECTED  Pain medication will be given as needed per the pt's request

## 2018-09-18 LAB
BACTERIA BLD CULT: NORMAL
BACTERIA BLD CULT: NORMAL
SIGNIFICANT IND 70042: NORMAL
SIGNIFICANT IND 70042: NORMAL
SITE SITE: NORMAL
SITE SITE: NORMAL
SOURCE SOURCE: NORMAL
SOURCE SOURCE: NORMAL

## 2018-09-18 NOTE — DOCUMENTATION QUERY
DOCUMENTATION QUERY    PROVIDERS: Please select “Cosign w/ note”to reply to query.    To better represent the severity of illness of your patient, please review the following information and exercise your independent professional judgment in responding to this query.     Sepsis, acute kidney injury and encephalopathy are documented in the History and Physical and Progress notes. Based upon the clinical findings, risk factors, and treatment, can the relationship between these conditions be further specified?    • Severe sepsis with associated acute organ dysfunction: (Please specify acute organ dysfunction: Acute kidney injury and/or encephalopathy)  • Acute kidney injury and Encephalopathy are not related to sepsis  • Other explanation of clinical findings  • Unable to determine    The medical record reflects the following:   Clinical Findings H&P:  -Sepsis  -Encephalopathy: metabolic vs septic  9/15 ID note:  -Sepsis, resolved  -Treat viridans strep as a real bacteremia  -Multiple cavaties  Blood cx results:  -9/10: +viridans strep  -9/13: Negative  9/9 Admit lab results:  -WBC 15.8, Lactic acid 5.4  9/9 Admit vital signs:  -/88, P 95, T98.0, RR 18   Treatment Zosyn IVPB  Rocephin IVPB  PO Levaquin  Blood cx's  ID consult  NS IVF bolus and infusion   Risk Factors Sepsis  Encephalopathy  JILL  Cyclic vomiting syndrome  Cavities   Location within medical record History and Physical, Progress Notes, Lab Results  and MAR     Thank you,   Antoni Frey RN BSN  Clinical   [phone] [facility] office  [phone] Cell phone

## 2018-09-26 NOTE — DISCHARGE SUMMARY
Discharge Summary    CHIEF COMPLAINT ON ADMISSION  Chief Complaint   Patient presents with   • N/V     x2 days. Hx cyclic vomiting syndrome.   • Anxiety       Reason for Admission  Anxiety     Admission Date  9/9/2018    CODE STATUS  Prior    HPI & HOSPITAL COURSE  This is a 43 y.o. female here with stress cyclic vomiting syndrome as she has a chronic history of marijuana abuse who presents with nausea vomiting abdominal pain. She has a history ofsuperior mesenteric artery syndrome with a Shruti-en-Y with duodenojejunostomy, pyloroplasty, and cholecystectomy.  Her lactic acid was found to be elevated and remained elevated despite IV fluids thus she has been admitted to the ICU.  RN notes that she had low BP last night while sleeping. Compazine has been helping her nausea.  Eventually, the patient's hemodynamics improved and her nausea and vomiting also resolved..   She was downgraded to the medical floor. On arrival, she did have one of two blood culture growing VIRIDANS STREPTOCOCCUS.  Infectious disease specialist, Dr. Medrano came to evaluate the patient and determined that the patient should continue on antibiotic.  Per the ID specialist recommendation, the patient was discharged home to continue on oral levofloxacin.  We advised the patient to discontinue the use of marijuana.  Please note that the patient's repeat blood culture results are negative for any bacterial growth.  Patient's anxiety was treated by psychiatrist, Dr. Munoz, who recommends thorazine.         Therefore, she is discharged in good and stable condition to home with close outpatient follow-up.    The patient met 2-midnight criteria for an inpatient stay at the time of discharge.    Discharge Date  9/15/2018    FOLLOW UP ITEMS POST DISCHARGE  None.    DISCHARGE DIAGNOSES  Principal Problem (Resolved):    AP (abdominal pain) POA: Yes  Active Problems:    Tobacco abuse disorder POA: Yes    Anxiety (Chronic) POA: Yes    Cannabis hyperemesis  "syndrome concurrent with and due to cannabis dependence (HCC) POA: Yes    Addiction, marijuana (HCC) POA: Yes    Hypokalemia POA: Unknown  Resolved Problems:    Lactic acidosis POA: Yes    JILL (acute kidney injury) (HCC) POA: Unknown    Dehydration POA: Yes    Nausea and vomiting POA: Unknown    Encephalopathy POA: Unknown    Bacteremia POA: Unknown    Insomnia POA: Unknown      FOLLOW UP  Future Appointments  Date Time Provider Department Center   9/28/2018 3:15 PM Kian Arreola M.D. [facility] None     N/A          Isa Rodriguez M.D.  [address] [facility] [address]  [facility] [address]  [phone]            MEDICATIONS ON DISCHARGE     Medication List      START taking these medications      Instructions   levoFLOXacin 750 MG tablet  Commonly known as:  LEVAQUIN   Take 1 Tab by mouth every day for 14 days.  Dose:  750 mg        CONTINUE taking these medications      Instructions   busPIRone 30 MG tablet  Commonly known as:  BUSPAR   Take 30 mg by mouth 2 Times a Day.  Dose:  30 mg     chlorproMAZINE 25 MG Tabs  Commonly known as:  THORAZINE   Take 1 Tab by mouth 3 times a day.  Dose:  25 mg     clonazePAM 0.5 MG Tabs  Commonly known as:  KLONOPIN   Take 0.5 mg by mouth 3 times a day.  Dose:  0.5 mg     diphenhydrAMINE 25 MG Tabs  Commonly known as:  BENADRYL   Take 50 mg by mouth at bedtime as needed for Sleep.  Dose:  50 mg     sertraline 100 MG Tabs  Commonly known as:  ZOLOFT   Take 100 mg by mouth every bedtime.  Dose:  100 mg            Allergies  Allergies   Allergen Reactions   • Metoclopramide Hives     Told by MD; pt suspects possible hives.   • Bactrim [Sulfamethoxazole W-Trimethoprim] Unspecified     Someone said I had a rash or something.      • Seroquel [Quetiapine] Unspecified     \"Seizures\"  RXN=unknown       DIET  Regular diet.    ACTIVITY  As tolerated.      CONSULTATIONS  Infectious disease:  Dr. Lui Richardson  Psychiatrist:   Dr. Merary Munoz.    PROCEDURES  None.    LABORATORY  Lab Results "   Component Value Date    SODIUM 140 09/14/2018    POTASSIUM 3.4 (L) 09/14/2018    CHLORIDE 108 09/14/2018    CO2 27 09/14/2018    GLUCOSE 84 09/14/2018    BUN 3 (L) 09/14/2018    CREATININE 0.70 09/14/2018    CREATININE 0.9 05/18/2009        Lab Results   Component Value Date    WBC 5.5 09/14/2018    HEMOGLOBIN 10.6 (L) 09/14/2018    HEMATOCRIT 32.6 (L) 09/14/2018    PLATELETCT 305 09/14/2018      Physical Exam   Constitutional: She is oriented to person, place, and time.   Neck: Neck supple.   Cardiovascular: Normal rate and regular rhythm.    Pulmonary/Chest: Effort normal. No respiratory distress. She has no wheezes.   Abdominal: Soft. She exhibits no distension. There is no tenderness.  Musculoskeletal: She exhibits no edema.   Neurological: She is alert and oriented to person, place, and time.   Skin: Skin is warm and dry. She is not diaphoretic.   Psychiatric:   Very anxious and shaking at times     Total time of the discharge process exceeds 32 minutes.

## 2018-09-28 ENCOUNTER — OFFICE VISIT (OUTPATIENT)
Dept: INTERNAL MEDICINE | Facility: MEDICAL CENTER | Age: 43
End: 2018-09-28
Payer: COMMERCIAL

## 2018-09-28 VITALS
HEART RATE: 98 BPM | HEIGHT: 65 IN | WEIGHT: 120.38 LBS | OXYGEN SATURATION: 95 % | TEMPERATURE: 97.3 F | BODY MASS INDEX: 20.06 KG/M2 | SYSTOLIC BLOOD PRESSURE: 76 MMHG | DIASTOLIC BLOOD PRESSURE: 60 MMHG

## 2018-09-28 DIAGNOSIS — Z00.00 ANNUAL PHYSICAL EXAM: ICD-10-CM

## 2018-09-28 DIAGNOSIS — F33.1 MODERATE EPISODE OF RECURRENT MAJOR DEPRESSIVE DISORDER (HCC): ICD-10-CM

## 2018-09-28 DIAGNOSIS — Z00.00 HEALTH CARE MAINTENANCE: ICD-10-CM

## 2018-09-28 DIAGNOSIS — F41.9 ANXIETY: Chronic | ICD-10-CM

## 2018-09-28 PROCEDURE — 99396 PREV VISIT EST AGE 40-64: CPT | Mod: GC | Performed by: INTERNAL MEDICINE

## 2018-09-28 RX ORDER — BUSPIRONE HYDROCHLORIDE 30 MG/1
30 TABLET ORAL 2 TIMES DAILY
Qty: 30 TAB | Refills: 0 | Status: SHIPPED | OUTPATIENT
Start: 2018-09-28 | End: 2022-06-13

## 2018-09-28 RX ORDER — SERTRALINE HYDROCHLORIDE 100 MG/1
100 TABLET, FILM COATED ORAL
Qty: 30 TAB | Refills: 0 | Status: SHIPPED | OUTPATIENT
Start: 2018-09-28 | End: 2018-11-05 | Stop reason: SDUPTHER

## 2018-09-28 ASSESSMENT — ENCOUNTER SYMPTOMS
BLOOD IN STOOL: 0
PALPITATIONS: 0
VOMITING: 0
SEIZURES: 0
WEIGHT LOSS: 0
CONSTIPATION: 0
SPUTUM PRODUCTION: 0
SHORTNESS OF BREATH: 0
BLURRED VISION: 0
MYALGIAS: 0
TREMORS: 0
FEVER: 0
DOUBLE VISION: 0
FLANK PAIN: 0
WHEEZING: 0
CHILLS: 0
SENSORY CHANGE: 0
DIZZINESS: 0
TINGLING: 0
CLAUDICATION: 0
ORTHOPNEA: 0
HEADACHES: 0
FOCAL WEAKNESS: 0
SPEECH CHANGE: 0
HEMOPTYSIS: 0
COUGH: 0
SINUS PAIN: 0
ABDOMINAL PAIN: 0
WEAKNESS: 0
LOSS OF CONSCIOUSNESS: 0
SORE THROAT: 0
PND: 0
NAUSEA: 0
HEARTBURN: 0
STRIDOR: 0

## 2018-09-28 NOTE — PATIENT INSTRUCTIONS
- please bring annual physical paperwork next visit  - buspirone refilled  - zoloft refilled      Food Choices to Help Relieve Diarrhea, Adult  When you have diarrhea, the foods you eat and your eating habits are very important. Choosing the right foods and drinks can help relieve diarrhea. Also, because diarrhea can last up to 7 days, you need to replace lost fluids and electrolytes (such as sodium, potassium, and chloride) in order to help prevent dehydration.   WHAT GENERAL GUIDELINES DO I NEED TO FOLLOW?  · Slowly drink 1 cup (8 oz) of fluid for each episode of diarrhea. If you are getting enough fluid, your urine will be clear or pale yellow.  · Eat starchy foods. Some good choices include white rice, white toast, pasta, low-fiber cereal, baked potatoes (without the skin), saltine crackers, and bagels.  · Avoid large servings of any cooked vegetables.  · Limit fruit to two servings per day. A serving is ½ cup or 1 small piece.  · Choose foods with less than 2 g of fiber per serving.  · Limit fats to less than 8 tsp (38 g) per day.  · Avoid fried foods.  · Eat foods that have probiotics in them. Probiotics can be found in certain dairy products.  · Avoid foods and beverages that may increase the speed at which food moves through the stomach and intestines (gastrointestinal tract). Things to avoid include:  ¨ High-fiber foods, such as dried fruit, raw fruits and vegetables, nuts, seeds, and whole grain foods.  ¨ Spicy foods and high-fat foods.  ¨ Foods and beverages sweetened with high-fructose corn syrup, honey, or sugar alcohols such as xylitol, sorbitol, and mannitol.  WHAT FOODS ARE RECOMMENDED?  Grains  White rice. White, Kenyan, or javier breads (fresh or toasted), including plain rolls, buns, or bagels. White pasta. Saltine, soda, or ale crackers. Pretzels. Low-fiber cereal. Cooked cereals made with water (such as cornmeal, farina, or cream cereals). Plain muffins. Matzo. Nakia toast. Zwieback.    Vegetables  Potatoes (without the skin). Strained tomato and vegetable juices. Most well-cooked and canned vegetables without seeds. Tender lettuce.  Fruits  Cooked or canned applesauce, apricots, cherries, fruit cocktail, grapefruit, peaches, pears, or plums. Fresh bananas, apples without skin, cherries, grapes, cantaloupe, grapefruit, peaches, oranges, or plums.   Meat and Other Protein Products  Baked or boiled chicken. Eggs. Tofu. Fish. Seafood. Smooth peanut butter. Ground or well-cooked tender beef, ham, veal, lamb, pork, or poultry.   Dairy  Plain yogurt, kefir, and unsweetened liquid yogurt. Lactose-free milk, buttermilk, or soy milk. Plain hard cheese.  Beverages  Sport drinks. Clear broths. Diluted fruit juices (except prune). Regular, caffeine-free sodas such as ginger ale. Water. Decaffeinated teas. Oral rehydration solutions. Sugar-free beverages not sweetened with sugar alcohols.  Other  Bouillon, broth, or soups made from recommended foods.   The items listed above may not be a complete list of recommended foods or beverages. Contact your dietitian for more options.  WHAT FOODS ARE NOT RECOMMENDED?  Grains  Whole grain, whole wheat, bran, or rye breads, rolls, pastas, crackers, and cereals. Wild or brown rice. Cereals that contain more than 2 g of fiber per serving. Corn tortillas or taco shells. Cooked or dry oatmeal. Granola. Popcorn.  Vegetables  Raw vegetables. Cabbage, broccoli, [place] sprouts, artichokes, baked beans, beet greens, corn, kale, legumes, peas, sweet potatoes, and yams. Potato skins. Cooked spinach and cabbage.  Fruits  Dried fruit, including raisins and dates. Raw fruits. Stewed or dried prunes. Fresh apples with skin, apricots, mangoes, pears, raspberries, and strawberries.   Meat and Other Protein Products  Conrad peanut butter. Nuts and seeds. Beans and lentils. Harris.   Dairy  High-fat cheeses. Milk, chocolate milk, and beverages made with milk, such as milk shakes. Cream.  Ice cream.  Sweets and Desserts  Sweet rolls, doughnuts, and sweet breads. Pancakes and waffles.  Fats and Oils  Butter. Cream sauces. Margarine. Salad oils. Plain salad dressings. Olives. Avocados.   Beverages  Caffeinated beverages (such as coffee, tea, soda, or energy drinks). Alcoholic beverages. Fruit juices with pulp. Prune juice. Soft drinks sweetened with high-fructose corn syrup or sugar alcohols.  Other  Coconut. Hot sauce. Chili powder. Mayonnaise. Gravy. Cream-based or milk-based soups.   The items listed above may not be a complete list of foods and beverages to avoid. Contact your dietitian for more information.  WHAT SHOULD I DO IF I BECOME DEHYDRATED?  Diarrhea can sometimes lead to dehydration. Signs of dehydration include dark urine and dry mouth and skin. If you think you are dehydrated, you should rehydrate with an oral rehydration solution. These solutions can be purchased at pharmacies, retail stores, or online.   Drink ½-1 cup (120-240 mL) of oral rehydration solution each time you have an episode of diarrhea. If drinking this amount makes your diarrhea worse, try drinking smaller amounts more often. For example, drink 1-3 tsp (5-15 mL) every 5-10 minutes.   A general rule for staying hydrated is to drink 1½-2 L of fluid per day. Talk to your health care provider about the specific amount you should be drinking each day. Drink enough fluids to keep your urine clear or pale yellow.     This information is not intended to replace advice given to you by your health care provider. Make sure you discuss any questions you have with your health care provider.     Document Released: 03/09/2005 Document Revised: 01/08/2016 Document Reviewed: 11/10/2014  [employer] Patient Education ©2016 [employer]

## 2018-09-29 NOTE — PROGRESS NOTES
Established Patient    Diana presents today with the following:    CC: Annual physical    HPI: This is a 43-year-old female with past medical history of superior mesenteric artery syndrome with Shruti-en-Y with duodenal jejunostomy, pyloroplasty and cholecystectomy.  She was recently admitted on 9/9/2018 for exacerbation of her stress cyclic vomiting syndrome, sepsis, AK I, and intussusception.  During admission she was found to have positive blood cultures for strep viridans, she was continued on antibiotics and currently has 1 dose of levofloxacin left.  Repeat blood cultures during admissions were negative.  Today she denies nausea, vomiting, fever, chills, abdominal pain, or constipation.  She mentions that she has chronic diarrhea which is controlled with Metamucil.    She has a strong history of depression and anxiety, and sees Dr. Liu in the outpatient setting.  She is currently on buspirone, chlorpromazine, Klonopin, Zoloft and Benadryl for insomnia.  She is doing well on these medications, chlorpromazine was added during last hospital admission and she says that it is helping with her anxiety.  Today patient seems distressed as she is worried that her blood work will not get done in time for her insurance.  She left her paperwork at home for her annual physical.  After discussing a plan of action and reassuring the patient that we may use previous blood work as it was done within the last couple of weeks, patient is calm and happy.  She denies suicidal ideation or homicidal ideation.  Patient says that she has been going through a lot of stress in her life lately, as she has recently found out that she is unable to have children.  She has not worked for the past 10 years and says that this has caused her significant stress and anxiety.  She recently got  last year and says that she has a happy life and a lot to live for.    Patient Active Problem List    Diagnosis Date Noted   • Sepsis (HCC)  09/10/2018     Priority: High   • Abnormal EKG 07/12/2018     Priority: High   • Drug-seeking behavior 02/24/2015     Priority: High   • Hypomagnesemia 07/12/2018     Priority: Medium   • Hyperglycemia 05/19/2018     Priority: Medium   • Hypophosphatemia 06/19/2016     Priority: Medium   • Anxiety 12/21/2014     Priority: Medium   • Tobacco abuse disorder 09/15/2011     Priority: Medium   • Hypokalemia 09/09/2018   • Acute renal failure (ARF) ([facility]) 05/19/2018   • Addiction, marijuana ([facility]) 05/19/2018   • Hyponatremia 05/19/2018   • Depression 05/19/2018   • Metabolic acidosis 03/22/2018   • Planned pregnancy 05/30/2017   • History of seizures 04/12/2017   • Cannabis hyperemesis syndrome concurrent with and due to cannabis dependence ([facility]) 02/23/2015       Current Outpatient Prescriptions   Medication Sig Dispense Refill   • sertraline (ZOLOFT) 100 MG Tab Take 1 Tab by mouth every bedtime. 30 Tab 0   • busPIRone (BUSPAR) 30 MG tablet Take 1 Tab by mouth 2 Times a Day. 30 Tab 0   • levoFLOXacin (LEVAQUIN) 750 MG tablet Take 1 Tab by mouth every day for 14 days. 14 Tab 0   • clonazePAM (KLONOPIN) 0.5 MG Tab Take 0.5 mg by mouth 3 times a day.     • diphenhydrAMINE (BENADRYL) 25 MG Tab Take 50 mg by mouth at bedtime as needed for Sleep.     • chlorproMAZINE (THORAZINE) 25 MG Tab Take 1 Tab by mouth 3 times a day. 90 Tab 1     No current facility-administered medications for this visit.        Social History     Social History   • Marital status:      Spouse name: N/A   • Number of children: N/A   • Years of education: N/A     Occupational History   • Not on file.     Social History Main Topics   • Smoking status: Never Smoker   • Smokeless tobacco: Never Used   • Alcohol use No      Comment: hx   • Drug use: Yes     Types: Marijuana, Inhaled      Comment: marijuana   • Sexual activity: Not on file     Other Topics Concern   • Not on file     Social History Narrative    ** Merged History Encounter **       "      Family History   Problem Relation Age of Onset   • Cancer Mother 50        Colon cancer   • Hypertension Mother    • Allergies Father    • Hypertension Father    • Heart Disease Maternal Grandmother        ROS: As per HPI. Additional pertinent symptoms as noted below.    Review of Systems   Constitutional: Negative for chills, fever, malaise/fatigue and weight loss.   HENT: Negative for congestion, sinus pain and sore throat.    Eyes: Negative for blurred vision and double vision.   Respiratory: Negative for cough, hemoptysis, sputum production, shortness of breath, wheezing and stridor.    Cardiovascular: Negative for chest pain, palpitations, orthopnea, claudication, leg swelling and PND.   Gastrointestinal: Negative for abdominal pain, blood in stool, constipation, heartburn, melena, nausea and vomiting.   Genitourinary: Negative for dysuria, flank pain, frequency, hematuria and urgency.   Musculoskeletal: Negative for joint pain and myalgias.   Skin: Negative for itching and rash.   Neurological: Negative for dizziness, tingling, tremors, sensory change, speech change, focal weakness, seizures, loss of consciousness, weakness and headaches.   All other systems reviewed and are negative.        BP (!) 76/60 (BP Location: Left arm, Patient Position: Sitting, BP Cuff Size: Adult) Comment: After 2 bottle of francois.  Pulse 98   Temp 36.3 °C (97.3 °F) (Temporal)   Ht 1.651 m (5' 5\")   Wt 54.6 kg (120 lb 6 oz)   SpO2 95%   BMI 20.03 kg/m²     Physical Exam  Physical Exam   Constitutional: She is oriented to person, place, and time and well-developed, well-nourished, and in no distress.   HENT:   Head: Normocephalic and atraumatic.   Mouth/Throat: Oropharynx is clear and moist.   Eyes: Pupils are equal, round, and reactive to light. Conjunctivae and EOM are normal.   Neck: No thyromegaly present.   Cardiovascular: Normal rate, regular rhythm, normal heart sounds and intact distal pulses.  Exam reveals no " gallop and no friction rub.    No murmur heard.  Pulmonary/Chest: Effort normal and breath sounds normal. No stridor. No respiratory distress. She has no wheezes. She has no rales. She exhibits no tenderness.   Abdominal: Soft. Bowel sounds are normal. She exhibits no distension and no mass. There is no tenderness. There is no rebound and no guarding.   Midline scar from surgery.   Musculoskeletal: She exhibits no edema, tenderness or deformity.   Lymphadenopathy:     She has no cervical adenopathy.   Neurological: She is alert and oriented to person, place, and time.   Skin: Skin is warm and dry.   Psychiatric: Mood and affect normal. She expresses no homicidal and no suicidal ideation. She expresses no suicidal plans and no homicidal plans.       Assessment and Plan    1. Annual physical exam  -Patient is here for annual physical exam  -Patient's chronic psychiatric and gastroenterology issues are being managed well  -Patient is doing well today and has no concerns  -Patient agreed to bring annual physical forms next week to be filled out, physical exam was completed today  -All required lab work was already done, including vitals, CBC, CMP, lipid profile, hemoglobin A1c, abdominal circumference  -Abdominal circumference was measured to be 28 inches today    2. Moderate episode of recurrent major depressive disorder (HCC)  3. Anxiety  -Patient is doing well on current medication regimen, and denies suicidal or homicidal ideation  -She feels a little stressed but feels that her depression and anxiety are well controlled  -Continue buspirone, chlorpromazine, Klonopin, Zoloft  -Continue follow-up with Dr. Liu psychiatry    4.  Healthcare maintenance  -Flu shot -declined  -Last Pap smear -2017, negative  -Tdap -patient was to receive Tdap when she became pregnant, she was recently told that she cannot become pregnant.  Will administer Tdap at next appointment    Signed by: Kian Arreola M.D.

## 2018-10-16 ENCOUNTER — TELEPHONE (OUTPATIENT)
Dept: INTERNAL MEDICINE | Facility: MEDICAL CENTER | Age: 43
End: 2018-10-16

## 2018-10-16 NOTE — TELEPHONE ENCOUNTER
VOICEMAIL  1. Caller Name: Dr. Heena Mckinney                      Call Back Number: [phone]    2. Message: Would like a call back in regards to the patient.      3. Patient approves office to leave a detailed voicemail/MyChart message: N\A

## 2018-10-25 NOTE — ED NOTES
"Pt BIB EMS c/o severe flank pain; pain started approx 1400 yesterday. Pt reports relieving w/ heat on the pain. Pt c/o nausea. Patient given 4 zofran and 300 fentanyl PTA. Patient writhing in pain upon arrival and reporting that she is also having a panic attack. Pt A&O X4.     /73   Pulse (!) 59   Temp 36 °C (96.8 °F)   Resp (!) 22   Ht 1.651 m (5' 5\")   Wt 56.7 kg (125 lb)   SpO2 100%   BMI 20.80 kg/m²     "
"Pt unable to provide urine sample saying \"she's too dehydrated\". Pt given additional fluids. Will try again shortly.  "
Ambulatory to bathroom with steady gait to obtain urine sample.  
Med Rec completed per patient  Allergies reviewed  No ORAL antibiotics in last 30 days    Patient stated she took 2 Zofran this morning to avoid hospital   
Pt ambulatory to bathroom with steady gait. Able to only produce drops of urine.   
Pt medicated per MAR.  
Pt resting with eyes closed. No nonverbal cues of distress or pain. Relaxed. Even and unlabored breathing.  
U/s to bedside.  
94

## 2018-11-05 DIAGNOSIS — F41.9 ANXIETY: Chronic | ICD-10-CM

## 2018-11-05 DIAGNOSIS — F33.1 MODERATE EPISODE OF RECURRENT MAJOR DEPRESSIVE DISORDER (HCC): ICD-10-CM

## 2018-11-05 NOTE — TELEPHONE ENCOUNTER
Last seen: 09/28/18 by Dr. Arreola  Next appt: None     Was the patient seen in the last year in this department? Yes   Does patient have an active prescription for medications requested? No   Received Request Via: Pharmacy

## 2018-11-06 RX ORDER — SERTRALINE HYDROCHLORIDE 100 MG/1
100 TABLET, FILM COATED ORAL
Qty: 90 TAB | Refills: 0 | Status: ON HOLD | OUTPATIENT
Start: 2018-11-06 | End: 2019-03-31

## 2018-11-07 ENCOUNTER — HOSPITAL ENCOUNTER (INPATIENT)
Facility: MEDICAL CENTER | Age: 43
LOS: 2 days | DRG: 103 | End: 2018-11-10
Attending: EMERGENCY MEDICINE | Admitting: INTERNAL MEDICINE
Payer: COMMERCIAL

## 2018-11-07 DIAGNOSIS — E86.0 DEHYDRATION: ICD-10-CM

## 2018-11-07 DIAGNOSIS — R11.15 INTRACTABLE CYCLICAL VOMITING WITH NAUSEA: ICD-10-CM

## 2018-11-07 DIAGNOSIS — A41.9 SEPSIS, DUE TO UNSPECIFIED ORGANISM: ICD-10-CM

## 2018-11-07 PROCEDURE — 94760 N-INVAS EAR/PLS OXIMETRY 1: CPT

## 2018-11-07 PROCEDURE — 99285 EMERGENCY DEPT VISIT HI MDM: CPT

## 2018-11-07 PROCEDURE — 93005 ELECTROCARDIOGRAM TRACING: CPT

## 2018-11-07 PROCEDURE — 93005 ELECTROCARDIOGRAM TRACING: CPT | Performed by: EMERGENCY MEDICINE

## 2018-11-07 RX ORDER — SODIUM CHLORIDE 9 MG/ML
1000 INJECTION, SOLUTION INTRAVENOUS ONCE
Status: COMPLETED | OUTPATIENT
Start: 2018-11-08 | End: 2018-11-08

## 2018-11-07 RX ORDER — MORPHINE SULFATE 4 MG/ML
4 INJECTION, SOLUTION INTRAMUSCULAR; INTRAVENOUS ONCE
Status: COMPLETED | OUTPATIENT
Start: 2018-11-08 | End: 2018-11-08

## 2018-11-07 NOTE — ED NOTES
[facility] [place]  PROGRESS NOTE  Chief Complaint:   Patient presents with:  Rash: back of head since last June and has gotten worse      HPI:   This is a 24year old female coming in for itchy scalp and rash on the back of the scalp for the pa Pt to restroom, steady gait.    Alcohol and Other Disorders Associated Father    • Depression Father    • Other (mva) Maternal Grandmother    • No Known Problems Maternal Grandfather    • Bipolar Disorder Paternal Grandmother    • Schizophrenia Paternal Grandmother    • Melanoma Paternal appearance of psoriasis but also is suspicious for tinea. ASSESSMENT AND PLAN:   Cyndy Gonzalez was seen today for rash. Diagnoses and all orders for this visit:    Tinea capitis    Dermatitis    Other orders  -     Terbinafine HCl 250 MG Oral Tab;  Take 1

## 2018-11-08 ENCOUNTER — APPOINTMENT (OUTPATIENT)
Dept: RADIOLOGY | Facility: MEDICAL CENTER | Age: 43
DRG: 103 | End: 2018-11-08
Attending: INTERNAL MEDICINE
Payer: COMMERCIAL

## 2018-11-08 ENCOUNTER — APPOINTMENT (OUTPATIENT)
Dept: RADIOLOGY | Facility: MEDICAL CENTER | Age: 43
DRG: 103 | End: 2018-11-08
Attending: EMERGENCY MEDICINE
Payer: COMMERCIAL

## 2018-11-08 PROBLEM — E87.1 HYPONATREMIA: Status: RESOLVED | Noted: 2018-05-19 | Resolved: 2018-11-08

## 2018-11-08 PROBLEM — E87.20 METABOLIC ACIDOSIS: Status: RESOLVED | Noted: 2018-03-22 | Resolved: 2018-11-08

## 2018-11-08 PROBLEM — E87.6 HYPOKALEMIA: Status: RESOLVED | Noted: 2018-09-09 | Resolved: 2018-11-08

## 2018-11-08 LAB
25(OH)D3 SERPL-MCNC: 27 NG/ML (ref 30–100)
ALBUMIN SERPL BCP-MCNC: 4.3 G/DL (ref 3.2–4.9)
ALBUMIN SERPL BCP-MCNC: 5.2 G/DL (ref 3.2–4.9)
ALBUMIN/GLOB SERPL: 1 G/DL
ALBUMIN/GLOB SERPL: 1.2 G/DL
ALP SERPL-CCNC: 127 U/L (ref 30–99)
ALP SERPL-CCNC: 98 U/L (ref 30–99)
ALT SERPL-CCNC: 10 U/L (ref 2–50)
ALT SERPL-CCNC: 5 U/L (ref 2–50)
AMPHET UR QL SCN: NEGATIVE
ANION GAP SERPL CALC-SCNC: 16 MMOL/L (ref 0–11.9)
ANION GAP SERPL CALC-SCNC: 24 MMOL/L (ref 0–11.9)
APPEARANCE UR: CLEAR
AST SERPL-CCNC: 13 U/L (ref 12–45)
AST SERPL-CCNC: 14 U/L (ref 12–45)
BACTERIA #/AREA URNS HPF: NEGATIVE /HPF
BARBITURATES UR QL SCN: NEGATIVE
BASOPHILS # BLD AUTO: 0.5 % (ref 0–1.8)
BASOPHILS # BLD: 0.09 K/UL (ref 0–0.12)
BENZODIAZ UR QL SCN: NEGATIVE
BILIRUB SERPL-MCNC: 0.5 MG/DL (ref 0.1–1.5)
BILIRUB SERPL-MCNC: 1.1 MG/DL (ref 0.1–1.5)
BILIRUB UR QL STRIP.AUTO: NEGATIVE
BNP SERPL-MCNC: 11 PG/ML (ref 0–100)
BUN SERPL-MCNC: 17 MG/DL (ref 8–22)
BUN SERPL-MCNC: 17 MG/DL (ref 8–22)
BZE UR QL SCN: NEGATIVE
CALCIUM SERPL-MCNC: 12.4 MG/DL (ref 8.5–10.5)
CALCIUM SERPL-MCNC: 9.8 MG/DL (ref 8.5–10.5)
CANNABINOIDS UR QL SCN: POSITIVE
CHLORIDE SERPL-SCNC: 106 MMOL/L (ref 96–112)
CHLORIDE SERPL-SCNC: 93 MMOL/L (ref 96–112)
CO2 SERPL-SCNC: 14 MMOL/L (ref 20–33)
CO2 SERPL-SCNC: 14 MMOL/L (ref 20–33)
COLOR UR: YELLOW
CREAT SERPL-MCNC: 1.2 MG/DL (ref 0.5–1.4)
CREAT SERPL-MCNC: 2 MG/DL (ref 0.5–1.4)
EKG IMPRESSION: NORMAL
EOSINOPHIL # BLD AUTO: 0 K/UL (ref 0–0.51)
EOSINOPHIL NFR BLD: 0 % (ref 0–6.9)
EPI CELLS #/AREA URNS HPF: NEGATIVE /HPF
ERYTHROCYTE [DISTWIDTH] IN BLOOD BY AUTOMATED COUNT: 49.3 FL (ref 35.9–50)
ERYTHROCYTE [DISTWIDTH] IN BLOOD BY AUTOMATED COUNT: 50 FL (ref 35.9–50)
EST. AVERAGE GLUCOSE BLD GHB EST-MCNC: 117 MG/DL
ETHANOL BLD-MCNC: 0 G/DL
GLOBULIN SER CALC-MCNC: 3.7 G/DL (ref 1.9–3.5)
GLOBULIN SER CALC-MCNC: 5.1 G/DL (ref 1.9–3.5)
GLUCOSE SERPL-MCNC: 148 MG/DL (ref 65–99)
GLUCOSE SERPL-MCNC: 357 MG/DL (ref 65–99)
GLUCOSE UR STRIP.AUTO-MCNC: NEGATIVE MG/DL
HBA1C MFR BLD: 5.7 % (ref 0–5.6)
HCG SERPL QL: NEGATIVE
HCT VFR BLD AUTO: 35.1 % (ref 37–47)
HCT VFR BLD AUTO: 44.8 % (ref 37–47)
HGB BLD-MCNC: 12.5 G/DL (ref 12–16)
HGB BLD-MCNC: 15.3 G/DL (ref 12–16)
HYALINE CASTS #/AREA URNS LPF: NORMAL /LPF
IMM GRANULOCYTES # BLD AUTO: 0.15 K/UL (ref 0–0.11)
IMM GRANULOCYTES NFR BLD AUTO: 0.9 % (ref 0–0.9)
KETONES UR STRIP.AUTO-MCNC: 15 MG/DL
LACTATE BLD-SCNC: 3.7 MMOL/L (ref 0.5–2)
LACTATE BLD-SCNC: 3.9 MMOL/L (ref 0.5–2)
LACTATE BLD-SCNC: 7.3 MMOL/L (ref 0.5–2)
LEUKOCYTE ESTERASE UR QL STRIP.AUTO: NEGATIVE
LIPASE SERPL-CCNC: 15 U/L (ref 11–82)
LYMPHOCYTES # BLD AUTO: 1.08 K/UL (ref 1–4.8)
LYMPHOCYTES NFR BLD: 6.2 % (ref 22–41)
MAGNESIUM SERPL-MCNC: 1.5 MG/DL (ref 1.5–2.5)
MCH RBC QN AUTO: 30.5 PG (ref 27–33)
MCH RBC QN AUTO: 31.5 PG (ref 27–33)
MCHC RBC AUTO-ENTMCNC: 34.2 G/DL (ref 33.6–35)
MCHC RBC AUTO-ENTMCNC: 35.6 G/DL (ref 33.6–35)
MCV RBC AUTO: 88.4 FL (ref 81.4–97.8)
MCV RBC AUTO: 89.2 FL (ref 81.4–97.8)
METHADONE UR QL SCN: NEGATIVE
MICRO URNS: ABNORMAL
MONOCYTES # BLD AUTO: 1.17 K/UL (ref 0–0.85)
MONOCYTES NFR BLD AUTO: 6.7 % (ref 0–13.4)
NEUTROPHILS # BLD AUTO: 15.05 K/UL (ref 2–7.15)
NEUTROPHILS NFR BLD: 85.7 % (ref 44–72)
NITRITE UR QL STRIP.AUTO: NEGATIVE
NRBC # BLD AUTO: 0 K/UL
NRBC BLD-RTO: 0 /100 WBC
OPIATES UR QL SCN: POSITIVE
OXYCODONE UR QL SCN: NEGATIVE
PCP UR QL SCN: NEGATIVE
PH UR STRIP.AUTO: 5 [PH]
PHOSPHATE SERPL-MCNC: 1.6 MG/DL (ref 2.5–4.5)
PLATELET # BLD AUTO: 364 K/UL (ref 164–446)
PLATELET # BLD AUTO: 454 K/UL (ref 164–446)
PMV BLD AUTO: 10 FL (ref 9–12.9)
PMV BLD AUTO: 10.2 FL (ref 9–12.9)
POTASSIUM SERPL-SCNC: 3.3 MMOL/L (ref 3.6–5.5)
POTASSIUM SERPL-SCNC: 4.4 MMOL/L (ref 3.6–5.5)
PROPOXYPH UR QL SCN: NEGATIVE
PROT SERPL-MCNC: 10.3 G/DL (ref 6–8.2)
PROT SERPL-MCNC: 8 G/DL (ref 6–8.2)
PROT UR QL STRIP: NEGATIVE MG/DL
RBC # BLD AUTO: 3.97 M/UL (ref 4.2–5.4)
RBC # BLD AUTO: 5.02 M/UL (ref 4.2–5.4)
RBC # URNS HPF: NORMAL /HPF
RBC UR QL AUTO: ABNORMAL
SODIUM SERPL-SCNC: 131 MMOL/L (ref 135–145)
SODIUM SERPL-SCNC: 136 MMOL/L (ref 135–145)
SP GR UR STRIP.AUTO: 1.01
TSH SERPL DL<=0.005 MIU/L-ACNC: 3.73 UIU/ML (ref 0.38–5.33)
UROBILINOGEN UR STRIP.AUTO-MCNC: 0.2 MG/DL
WBC # BLD AUTO: 12.3 K/UL (ref 4.8–10.8)
WBC # BLD AUTO: 17.5 K/UL (ref 4.8–10.8)
WBC #/AREA URNS HPF: NORMAL /HPF

## 2018-11-08 PROCEDURE — 83036 HEMOGLOBIN GLYCOSYLATED A1C: CPT

## 2018-11-08 PROCEDURE — 87040 BLOOD CULTURE FOR BACTERIA: CPT

## 2018-11-08 PROCEDURE — 81001 URINALYSIS AUTO W/SCOPE: CPT

## 2018-11-08 PROCEDURE — 700105 HCHG RX REV CODE 258: Performed by: STUDENT IN AN ORGANIZED HEALTH CARE EDUCATION/TRAINING PROGRAM

## 2018-11-08 PROCEDURE — 96365 THER/PROPH/DIAG IV INF INIT: CPT

## 2018-11-08 PROCEDURE — 770001 HCHG ROOM/CARE - MED/SURG/GYN PRIV*

## 2018-11-08 PROCEDURE — 84443 ASSAY THYROID STIM HORMONE: CPT

## 2018-11-08 PROCEDURE — 83880 ASSAY OF NATRIURETIC PEPTIDE: CPT

## 2018-11-08 PROCEDURE — 36415 COLL VENOUS BLD VENIPUNCTURE: CPT

## 2018-11-08 PROCEDURE — 83690 ASSAY OF LIPASE: CPT

## 2018-11-08 PROCEDURE — 85025 COMPLETE CBC W/AUTO DIFF WBC: CPT

## 2018-11-08 PROCEDURE — 96366 THER/PROPH/DIAG IV INF ADDON: CPT

## 2018-11-08 PROCEDURE — 700105 HCHG RX REV CODE 258: Performed by: EMERGENCY MEDICINE

## 2018-11-08 PROCEDURE — 80053 COMPREHEN METABOLIC PANEL: CPT | Mod: 91

## 2018-11-08 PROCEDURE — 700111 HCHG RX REV CODE 636 W/ 250 OVERRIDE (IP): Performed by: STUDENT IN AN ORGANIZED HEALTH CARE EDUCATION/TRAINING PROGRAM

## 2018-11-08 PROCEDURE — 700111 HCHG RX REV CODE 636 W/ 250 OVERRIDE (IP): Performed by: EMERGENCY MEDICINE

## 2018-11-08 PROCEDURE — 84100 ASSAY OF PHOSPHORUS: CPT

## 2018-11-08 PROCEDURE — 700111 HCHG RX REV CODE 636 W/ 250 OVERRIDE (IP): Performed by: PHYSICAL MEDICINE & REHABILITATION

## 2018-11-08 PROCEDURE — 99223 1ST HOSP IP/OBS HIGH 75: CPT | Mod: AI,GC | Performed by: INTERNAL MEDICINE

## 2018-11-08 PROCEDURE — 74176 CT ABD & PELVIS W/O CONTRAST: CPT

## 2018-11-08 PROCEDURE — 82306 VITAMIN D 25 HYDROXY: CPT

## 2018-11-08 PROCEDURE — 700101 HCHG RX REV CODE 250: Performed by: PHYSICAL MEDICINE & REHABILITATION

## 2018-11-08 PROCEDURE — 93010 ELECTROCARDIOGRAM REPORT: CPT | Performed by: INTERNAL MEDICINE

## 2018-11-08 PROCEDURE — 80307 DRUG TEST PRSMV CHEM ANLYZR: CPT

## 2018-11-08 PROCEDURE — 700102 HCHG RX REV CODE 250 W/ 637 OVERRIDE(OP): Performed by: PHYSICAL MEDICINE & REHABILITATION

## 2018-11-08 PROCEDURE — 71045 X-RAY EXAM CHEST 1 VIEW: CPT

## 2018-11-08 PROCEDURE — 83735 ASSAY OF MAGNESIUM: CPT

## 2018-11-08 PROCEDURE — A9270 NON-COVERED ITEM OR SERVICE: HCPCS | Performed by: PHYSICAL MEDICINE & REHABILITATION

## 2018-11-08 PROCEDURE — 96367 TX/PROPH/DG ADDL SEQ IV INF: CPT

## 2018-11-08 PROCEDURE — 96375 TX/PRO/DX INJ NEW DRUG ADDON: CPT

## 2018-11-08 PROCEDURE — 93005 ELECTROCARDIOGRAM TRACING: CPT | Performed by: PHYSICAL MEDICINE & REHABILITATION

## 2018-11-08 PROCEDURE — 83605 ASSAY OF LACTIC ACID: CPT | Mod: 91

## 2018-11-08 PROCEDURE — 96376 TX/PRO/DX INJ SAME DRUG ADON: CPT

## 2018-11-08 PROCEDURE — 85027 COMPLETE CBC AUTOMATED: CPT

## 2018-11-08 PROCEDURE — 84703 CHORIONIC GONADOTROPIN ASSAY: CPT

## 2018-11-08 RX ORDER — GUAIFENESIN/DEXTROMETHORPHAN 100-10MG/5
10 SYRUP ORAL EVERY 6 HOURS PRN
Status: DISCONTINUED | OUTPATIENT
Start: 2018-11-08 | End: 2018-11-10 | Stop reason: HOSPADM

## 2018-11-08 RX ORDER — PROMETHAZINE HYDROCHLORIDE 25 MG/1
12.5-25 TABLET ORAL EVERY 4 HOURS PRN
Status: DISCONTINUED | OUTPATIENT
Start: 2018-11-08 | End: 2018-11-10 | Stop reason: HOSPADM

## 2018-11-08 RX ORDER — MORPHINE SULFATE 4 MG/ML
2 INJECTION, SOLUTION INTRAMUSCULAR; INTRAVENOUS EVERY 4 HOURS PRN
Status: DISCONTINUED | OUTPATIENT
Start: 2018-11-08 | End: 2018-11-09

## 2018-11-08 RX ORDER — DIPHENHYDRAMINE HCL 25 MG
50 TABLET ORAL NIGHTLY PRN
Status: DISCONTINUED | OUTPATIENT
Start: 2018-11-08 | End: 2018-11-10 | Stop reason: HOSPADM

## 2018-11-08 RX ORDER — HEPARIN SODIUM 5000 [USP'U]/ML
5000 INJECTION, SOLUTION INTRAVENOUS; SUBCUTANEOUS EVERY 8 HOURS
Status: DISCONTINUED | OUTPATIENT
Start: 2018-11-08 | End: 2018-11-09

## 2018-11-08 RX ORDER — LIDOCAINE 50 MG/G
1 PATCH TOPICAL EVERY 24 HOURS
Status: DISCONTINUED | OUTPATIENT
Start: 2018-11-08 | End: 2018-11-10 | Stop reason: HOSPADM

## 2018-11-08 RX ORDER — SODIUM CHLORIDE 9 MG/ML
1000 INJECTION, SOLUTION INTRAVENOUS
Status: DISCONTINUED | OUTPATIENT
Start: 2018-11-08 | End: 2018-11-10 | Stop reason: HOSPADM

## 2018-11-08 RX ORDER — ONDANSETRON 2 MG/ML
4 INJECTION INTRAMUSCULAR; INTRAVENOUS EVERY 4 HOURS PRN
Status: DISCONTINUED | OUTPATIENT
Start: 2018-11-08 | End: 2018-11-10 | Stop reason: HOSPADM

## 2018-11-08 RX ORDER — POTASSIUM CHLORIDE 7.45 MG/ML
10 INJECTION INTRAVENOUS
Status: DISPENSED | OUTPATIENT
Start: 2018-11-08 | End: 2018-11-08

## 2018-11-08 RX ORDER — HYDROMORPHONE HYDROCHLORIDE 1 MG/ML
0.5 INJECTION, SOLUTION INTRAMUSCULAR; INTRAVENOUS; SUBCUTANEOUS ONCE
Status: COMPLETED | OUTPATIENT
Start: 2018-11-08 | End: 2018-11-08

## 2018-11-08 RX ORDER — SODIUM CHLORIDE 9 MG/ML
INJECTION, SOLUTION INTRAVENOUS CONTINUOUS
Status: DISCONTINUED | OUTPATIENT
Start: 2018-11-08 | End: 2018-11-10

## 2018-11-08 RX ORDER — MAGNESIUM SULFATE HEPTAHYDRATE 40 MG/ML
4 INJECTION, SOLUTION INTRAVENOUS ONCE
Status: COMPLETED | OUTPATIENT
Start: 2018-11-08 | End: 2018-11-08

## 2018-11-08 RX ORDER — ACETAMINOPHEN 325 MG/1
650 TABLET ORAL EVERY 6 HOURS PRN
Status: DISCONTINUED | OUTPATIENT
Start: 2018-11-08 | End: 2018-11-10 | Stop reason: HOSPADM

## 2018-11-08 RX ORDER — SERTRALINE HYDROCHLORIDE 100 MG/1
100 TABLET, FILM COATED ORAL
Status: DISCONTINUED | OUTPATIENT
Start: 2018-11-08 | End: 2018-11-10 | Stop reason: HOSPADM

## 2018-11-08 RX ORDER — PROMETHAZINE HYDROCHLORIDE 12.5 MG/1
12.5-25 SUPPOSITORY RECTAL EVERY 4 HOURS PRN
Status: DISCONTINUED | OUTPATIENT
Start: 2018-11-08 | End: 2018-11-10 | Stop reason: HOSPADM

## 2018-11-08 RX ORDER — CLONAZEPAM 0.5 MG/1
0.5 TABLET ORAL 2 TIMES DAILY PRN
Status: DISCONTINUED | OUTPATIENT
Start: 2018-11-08 | End: 2018-11-09

## 2018-11-08 RX ADMIN — MAGNESIUM SULFATE IN WATER 4 G: 40 INJECTION, SOLUTION INTRAVENOUS at 11:19

## 2018-11-08 RX ADMIN — MORPHINE SULFATE 2 MG: 4 INJECTION INTRAVENOUS at 18:33

## 2018-11-08 RX ADMIN — HYDROMORPHONE HYDROCHLORIDE 0.5 MG: 1 INJECTION, SOLUTION INTRAMUSCULAR; INTRAVENOUS; SUBCUTANEOUS at 00:53

## 2018-11-08 RX ADMIN — POTASSIUM CHLORIDE 10 MEQ: 7.46 INJECTION, SOLUTION INTRAVENOUS at 04:07

## 2018-11-08 RX ADMIN — MORPHINE SULFATE 4 MG: 4 INJECTION INTRAVENOUS at 00:28

## 2018-11-08 RX ADMIN — HYDROMORPHONE HYDROCHLORIDE 0.5 MG: 1 INJECTION, SOLUTION INTRAMUSCULAR; INTRAVENOUS; SUBCUTANEOUS at 02:45

## 2018-11-08 RX ADMIN — CEFTRIAXONE SODIUM 2 G: 2 INJECTION, POWDER, FOR SOLUTION INTRAMUSCULAR; INTRAVENOUS at 01:00

## 2018-11-08 RX ADMIN — POTASSIUM CHLORIDE 10 MEQ: 7.46 INJECTION, SOLUTION INTRAVENOUS at 05:11

## 2018-11-08 RX ADMIN — SODIUM CHLORIDE: 9 INJECTION, SOLUTION INTRAVENOUS at 03:56

## 2018-11-08 RX ADMIN — PROCHLORPERAZINE EDISYLATE 10 MG: 5 INJECTION INTRAMUSCULAR; INTRAVENOUS at 00:28

## 2018-11-08 RX ADMIN — MORPHINE SULFATE 2 MG: 4 INJECTION INTRAVENOUS at 14:12

## 2018-11-08 RX ADMIN — SERTRALINE 100 MG: 100 TABLET, FILM COATED ORAL at 20:15

## 2018-11-08 RX ADMIN — SODIUM CHLORIDE 1000 ML: 9 INJECTION, SOLUTION INTRAVENOUS at 00:28

## 2018-11-08 RX ADMIN — MORPHINE SULFATE 2 MG: 4 INJECTION INTRAVENOUS at 22:45

## 2018-11-08 RX ADMIN — HEPARIN SODIUM 5000 UNITS: 5000 INJECTION, SOLUTION INTRAVENOUS; SUBCUTANEOUS at 06:20

## 2018-11-08 RX ADMIN — ONDANSETRON 4 MG: 2 INJECTION INTRAMUSCULAR; INTRAVENOUS at 14:12

## 2018-11-08 RX ADMIN — DIPHENHYDRAMINE HCL 50 MG: 25 TABLET ORAL at 20:15

## 2018-11-08 RX ADMIN — LIDOCAINE 1 PATCH: 50 PATCH TOPICAL at 11:18

## 2018-11-08 RX ADMIN — SODIUM CHLORIDE: 9 INJECTION, SOLUTION INTRAVENOUS at 19:25

## 2018-11-08 ASSESSMENT — ENCOUNTER SYMPTOMS
SHORTNESS OF BREATH: 0
TINGLING: 1
ABDOMINAL PAIN: 1
DOUBLE VISION: 0
ABDOMINAL PAIN: 0
CHILLS: 1
BLOOD IN STOOL: 1
FALLS: 0
WHEEZING: 0
INSOMNIA: 1
COUGH: 0
NERVOUS/ANXIOUS: 1
HEADACHES: 1
CHILLS: 0
WEIGHT LOSS: 1
PND: 0
DIARRHEA: 1
TINGLING: 0
BLOOD IN STOOL: 0
FEVER: 0
SPUTUM PRODUCTION: 0
DIZZINESS: 0
ORTHOPNEA: 0
HEARTBURN: 0
DIAPHORESIS: 0
BACK PAIN: 1
VOMITING: 1
CONSTIPATION: 0
SHORTNESS OF BREATH: 1
PALPITATIONS: 1
BLURRED VISION: 0
INSOMNIA: 0
STRIDOR: 0
HEMOPTYSIS: 0
FOCAL WEAKNESS: 0
CLAUDICATION: 0
DIARRHEA: 0
LOSS OF CONSCIOUSNESS: 0
PALPITATIONS: 0
WEAKNESS: 1
NAUSEA: 1

## 2018-11-08 ASSESSMENT — COGNITIVE AND FUNCTIONAL STATUS - GENERAL
SUGGESTED CMS G CODE MODIFIER DAILY ACTIVITY: CH
WALKING IN HOSPITAL ROOM: A LITTLE
DAILY ACTIVITIY SCORE: 24
WALKING IN HOSPITAL ROOM: A LITTLE
DAILY ACTIVITIY SCORE: 24
MOBILITY SCORE: 22
SUGGESTED CMS G CODE MODIFIER DAILY ACTIVITY: CH
SUGGESTED CMS G CODE MODIFIER MOBILITY: CJ
CLIMB 3 TO 5 STEPS WITH RAILING: A LITTLE
CLIMB 3 TO 5 STEPS WITH RAILING: A LITTLE

## 2018-11-08 ASSESSMENT — COPD QUESTIONNAIRES
HAVE YOU SMOKED AT LEAST 100 CIGARETTES IN YOUR ENTIRE LIFE: NO/DON'T KNOW
DO YOU EVER COUGH UP ANY MUCUS OR PHLEGM?: NO/ONLY WITH OCCASIONAL COLDS OR INFECTIONS
DURING THE PAST 4 WEEKS HOW MUCH DID YOU FEEL SHORT OF BREATH: NONE/LITTLE OF THE TIME
COPD SCREENING SCORE: 0

## 2018-11-08 ASSESSMENT — PAIN SCALES - GENERAL
PAINLEVEL_OUTOF10: 9
PAINLEVEL_OUTOF10: 9
PAINLEVEL_OUTOF10: 8
PAINLEVEL_OUTOF10: 6
PAINLEVEL_OUTOF10: 9
PAINLEVEL_OUTOF10: 10
PAINLEVEL_OUTOF10: 9

## 2018-11-08 ASSESSMENT — PATIENT HEALTH QUESTIONNAIRE - PHQ9
SUM OF ALL RESPONSES TO PHQ9 QUESTIONS 1 AND 2: 0
2. FEELING DOWN, DEPRESSED, IRRITABLE, OR HOPELESS: NOT AT ALL
1. LITTLE INTEREST OR PLEASURE IN DOING THINGS: NOT AT ALL

## 2018-11-08 ASSESSMENT — LIFESTYLE VARIABLES
SUBSTANCE_ABUSE: 1
EVER_SMOKED: YES
ALCOHOL_USE: NO
EVER_SMOKED: NEVER

## 2018-11-08 NOTE — ASSESSMENT & PLAN NOTE
upon admission. No hx of diabetes. Severely dehydrated. Recheck.   -A1C: 5.7 -prediabetes  -No need for acute care

## 2018-11-08 NOTE — ASSESSMENT & PLAN NOTE
QTc 522; this is new compared to previous EKG; likely d/t electrolyte imbalances.   -EKG rechecked with QTc: 484ms, repeat EKG today: 461ms  -Continue to optimize electrolytes

## 2018-11-08 NOTE — PROGRESS NOTES
Internal Medicine Interval Note  Note Author: Emily Maria M.D.     Name Diana Hernandez 1975   Age/Sex 43 y.o. female   MRN [id number]   Code Status FULL     After 5PM or if no immediate response to page, please call for cross-coverage  Attending/Team: Dr. Roshni Lyon/Melecio See Patient List for primary contact information  Call [phone] to page    1st Call - Day Intern (R1):   Dr. Emily Maria  2nd Call - Day Sr. Resident (R2/R3):   Dr. Jackie Calzada      Reason for interval visit  (Principal Problem)   Nausea & vomiting    Interval Problem Daily Status Update  (24 hours, problem oriented, brief subjective history, new lab/imaging data pertinent to that problem)   Patient continues to be nauseated with dry heaving.  We will continue antiemetics and fluid resuscitation.   She also states she has difficulty breathing and is using O2 however her SOB is likely due to her anxiety.  O2 sats remained adequate. Chest findings were clear, chest x-ray done on admission was also negative.   Her electrolyte abnormalities have improved since beginning IV fluids.  Will CTM and continue fluids while patient is NPO due to nausea/vomiting.  QTC prolongation has also improved on repeat EKG. likely due to electrolyte abnormalities, will continue to optimize electrolytes especially K and Mg.     Review of Systems   Constitutional: Negative for chills and fever.   Respiratory: Positive for shortness of breath. Negative for cough, hemoptysis, sputum production and wheezing.    Cardiovascular: Negative for chest pain, palpitations and leg swelling.   Gastrointestinal: Positive for nausea and vomiting. Negative for abdominal pain, blood in stool, constipation, diarrhea, heartburn and melena.   Genitourinary: Negative for dysuria and hematuria.   Musculoskeletal: Positive for back pain.   Skin: Negative for itching and rash.   Neurological: Positive for headaches. Negative for dizziness and tingling.    Psychiatric/Behavioral: The patient is nervous/anxious. The patient does not have insomnia.      Disposition/Barriers to discharge:   Guarded    Consultants/Specialty  PCP: Isa Rodriguez M.D.    Quality Measures  Quality-Core Measures   Reviewed items::  Labs reviewed, Medications reviewed, EKG reviewed and Radiology images reviewed  Saldana catheter::  No Saldana  DVT prophylaxis pharmacological::  Heparin  DVT prophylaxis - mechanical:  Not indicated at this time, ambulatory  Ulcer Prophylaxis::  Not indicated    Physical Exam     Vitals:    11/08/18 0430 11/08/18 0556 11/08/18 0800 11/08/18 1132   BP:  140/92 138/78 106/66   Pulse: 65 84 68 65   Resp: 16 20 18 18   Temp:  36.5 °C (97.7 °F) 36.4 °C (97.6 °F) 36.8 °C (98.2 °F)   SpO2: 99% 100% 100% 96%   Weight:  52.4 kg (115 lb 8.3 oz)     Height:         Body mass index is 19.22 kg/m². Weight: 52.4 kg (115 lb 8.3 oz)  Oxygen Therapy:  Pulse Oximetry: 96 %, O2 (LPM): 2, FiO2%: 21 %, O2 Delivery: Silicone Nasal Cannula    Physical Exam   Constitutional: She is oriented to person, place, and time.   Thin anxious appearing female   HENT:   Head: Normocephalic and atraumatic.   Eyes: EOM are normal.   Neck: Normal range of motion.   Cardiovascular: Normal rate, regular rhythm, normal heart sounds and intact distal pulses.    Pulmonary/Chest: Breath sounds normal. No respiratory distress. She has no wheezes.   Abdominal: Soft. Bowel sounds are normal. She exhibits no distension. There is no tenderness.   Musculoskeletal: Normal range of motion. She exhibits no edema.   Neurological: She is alert and oriented to person, place, and time.   Skin: Skin is warm and dry.   Psychiatric: Affect and judgment normal.   Nursing note and vitals reviewed.    Assessment/Plan     * Nausea & vomiting- (present on admission)   Assessment & Plan    42 yo F with history of marijuana abuse with cyclical nausea and vomiting, severe anxiety disorder, and alcoholism in remission. She has a  history of congenital superior mesenteric artery syndrome with a Shruti-en-Y with duodenojejunostomy, pyloroplasty, and cholecystectomy. She has had over 20 admissions over the past few years for this same complaint. Pt reports that she became very anxious three days, stopped eating, and lost 5 lbs. She was concerned about her weight loss so she smoked some MJ to bring back her appetite. She then vomited 3-4x on Weds 11/07 afternoon. She took dissolvable Zofran tablets but they did not alleviate the vomiting.  -Continue IVF  -Compazine, Phenergan, Klonopin  -May start Zofran as needed, alternate with Phenergan  -Advance diet as tolerated  -Talk to pt about MJ; she does not believe it is causing her emesis issue  -Replete electrolytes       JILL (acute kidney injury) (HCC)- (present on admission)   Assessment & Plan    Likely d/t dehydration   On admission : BUN/Cr 17/2.00 (baseline is 3/0.70) eGFR 27 (baseline >60)  Improving with IVF  -Continue IVF  -BMP daily       Prolonged QT interval- (present on admission)   Assessment & Plan    QTc 522; this is new compared to previous EKG; likely d/t electrolyte imbalances.   -EKG rechecked with QTc: 484ms   -Continue to optimize electrolytes     Hypomagnesemia- (present on admission)   Assessment & Plan    Repleting.   Monitor      Leukocytosis- (present on admission)   Assessment & Plan    Likely reactive  Patient has no signs of infection  WBCs downtrending  -Will CTM     Addiction, marijuana (HCC)- (present on admission)   Assessment & Plan    Pt continues to smoke MJ despite having cannabis hyperemesis syndrome  -     Anxiety- (present on admission)   Assessment & Plan    Consider psych consult. Pt is on 5 medications for anxiety/insomnia. She has cyclical vomiting issues. She stops eating for days when she gets stressed out. Pt reports she started having (likely psychosomatic) seizures after her father passed.Pt has very poor coping mechanisms. Also a former  alcoholic.     These are the meds pt takes at home. All but the Thorazine have been continued.   -Zoloft 100mg  -Buspirone 30mg  -Klonapin 0.5mg prn   -Benandryl 25mg     Hyperglycemia- (present on admission)   Assessment & Plan     upon admission. No hx of diabetes. Severely dehydrated. Recheck.   -A1C: 5.7 -prediabetes  -No need for acute care

## 2018-11-08 NOTE — PROGRESS NOTES
Pt resting in bed at this time. Even visible chest rise. No signs of distress. Denies nausea. Bed alarm on. Call light in place.  Bed in low and locked position. Hourly rounding in place.

## 2018-11-08 NOTE — SENIOR ADMIT NOTE
Senior Admit Note    Patient: Diana Hernandez.  MRN: [id number].                               Chief complaint: nausea, vomiting    HPI:  In brief, Ms Hernandez is a 43 y F w/ PMHx multiple admissions for cyclic vomiting thought to be secondary to cannabis use, presenting for nausea and vomiting. Patient reports recent anxiety and low appetite, used marijuana to increase appetite then had subsequent non bloody vomiting for past few days. In the ED, labs notable for leukocytosis 17k, anion gap 24, creatinine 2, LA 7.3. She received 2L fluid bolus in ED with decrease in LA to 3.9. CT abdomen/pelvis negative for acute process.     Objective:  Pertinent vitals/physical findings: HR 70,   Gen.: lying in bed, NAD.  HEENT: Normocephalic, nontraumatic.  MMM.  No icterus.   CV: RRR, No m/r/g.  No JVD.  No carotid bruits.  Lungs: CTAB.  No crackles, wheezing.    Abd: soft, NT/ND, no rebound, bowel sounds present.   Extremities: No lower extremity edema. Distal pulses intact.  Neuro: Alert and oriented to person, place, time, situation.    Skin: warm, dry.   Psych: appropriate mood, affect.      Labs:  WBC 17.5, Na 131, K 3.3, bicarb 14, AG 24, , Cr 2, LA 7.3.    Imaging:  CT abd/pelvis shows no evidence of acute abnormalities.    Assessment/plan for main hospital problem:    # Vomiting  # Lactic acidosis  # Anion gap metabolic acidosis  # JILL  # Leukocytosis  # Hyponatremia 2/2 hyperglycemia  # QTc prolongation    Multiple admissions for cyclical vomiting secondary to cannabis use, patient with similar presentation including elevated lactic acid.  Antiemetics with compazine, phenergan to avoid QT prolonging meds.  LA improved to 3.9 after 2L fluid bolus, trend q4h until LA<2.  Continue IV fluid resuscitation, patient appears to be improving.  Leukocytosis likely reactive. No signs of infection, will hold antibiotics at this time.  Advocate marijuana abstinence.        DVT prophylaxis: heparin  Code status: FULL    For complete  details, please refer to H&P by intern, Dr. Rosenthal.     Juancarlos Cotter M.D.

## 2018-11-08 NOTE — ASSESSMENT & PLAN NOTE
Likely multifactorial: from increased upper GI losses from hyperemesis, decreased oral intake and possibly from correction of ketoacidosis which was likely present upon admission.

## 2018-11-08 NOTE — ASSESSMENT & PLAN NOTE
Resolved.  Likely d/t dehydration   On admission : BUN/Cr 17/2.00 (baseline is 3/0.70) eGFR 27 (baseline >60)  Improving.  -Correction of electrolytes as needed  -BMP daily

## 2018-11-08 NOTE — ASSESSMENT & PLAN NOTE
Nausea has greatly improved, she feels as if she is ready to eat.  We will see how she tolerates oral intake.  -Antiemetics as necessary : Compazine, Phenergan, zofran   -Advance diet as tolerated

## 2018-11-08 NOTE — ED PROVIDER NOTES
ED Provider Note    CHIEF COMPLAINT  Chief Complaint   Patient presents with   • N/V       HPI  Iris Tova Hernandez is a 43 y.o. female here for evaluation of vomiting.  The patient has a history of cyclical vomiting syndrome secondary to marijuana use.  This is a known issue for her, and she states that this episode has occurred over the last few days.  She has no fever, and no chills.  She has no chest pain or shortness of breath.  She states that she has diffuse abdominal pain.  It is non radiating.      PAST MEDICAL HISTORY   has a past medical history of Anxiety; Anxiety disorder; Backpain; CLOSTRIDIUM DIFFICILE INFECTION (4/14/2010); CVS disease; Cyclic vomiting syndrome; Dental disorder; Drug-seeking behavior; Dyspepsia (7/12/2009); Nephrolithiasis (6/20/2009); Pain; Snoring; Superior mesenteric artery syndrome (HCC) (7/12/2009); Superior mesenteric artery syndrome (HCC); and Tobacco abuse (6/20/2009).    SOCIAL HISTORY  Social History     Social History Main Topics   • Smoking status: Never Smoker   • Smokeless tobacco: Never Used   • Alcohol use No      Comment: hx   • Drug use: Yes     Types: Marijuana, Inhaled      Comment: marijuana   • Sexual activity: Not on file       SURGICAL HISTORY   has a past surgical history that includes gastroscopy-endo (7/8/2009); lithotripsy; pyloroplasty (7/27/2009); gastroscopy with biopsy (3/9/2010); exploratory laparotomy (7/27/2009); appendectomy (7/27/2009); cholecystectomy (7/27/2009); other; exploratory laparotomy (1/12/2016); bowel resection (1/12/2016); and gastroscopy-endo (4/28/2016).    CURRENT MEDICATIONS  Home Medications    **Home medications have not yet been reviewed for this encounter**         ALLERGIES  Allergies   Allergen Reactions   • Metoclopramide Hives     Told by MD; pt suspects possible hives.   • Bactrim [Sulfamethoxazole W-Trimethoprim] Unspecified     Someone said I had a rash or something.      • Seroquel [Quetiapine] Unspecified      "\"Seizures\"  RXN=unknown       REVIEW OF SYSTEMS  See HPI for further details. Review of systems as above, otherwise all other systems are negative.     PHYSICAL EXAM  VITAL SIGNS: /83   Pulse 66   Resp 19   Ht 1.651 m (5' 5\")   Wt 50 kg (110 lb 3.7 oz)   LMP 10/23/2018   SpO2 100%   BMI 18.34 kg/m²     Constitutional: Ill-appearing, disheveled  HEENT: Normocephalic, atraumatic.  Dry mucous membranes  Neck: Supple, Full range of motion   Chest/Pulmonary:  No respiratory distress.  Equal expansion   Musculoskeletal: No deformity, no edema, neurovascular intact.   Abdomen; soft, diffuse mild tenderness, no guarding, no peritoneal signs.  Neuro: Clear speech, appropriate, cooperative, cranial nerves II-XII grossly intact.  Psych: Anxious, agitated    Results for orders placed or performed during the hospital encounter of 11/07/18   CBC WITH DIFFERENTIAL   Result Value Ref Range    WBC 17.5 (H) 4.8 - 10.8 K/uL    RBC 5.02 4.20 - 5.40 M/uL    Hemoglobin 15.3 12.0 - 16.0 g/dL    Hematocrit 44.8 37.0 - 47.0 %    MCV 89.2 81.4 - 97.8 fL    MCH 30.5 27.0 - 33.0 pg    MCHC 34.2 33.6 - 35.0 g/dL    RDW 50.0 35.9 - 50.0 fL    Platelet Count 454 (H) 164 - 446 K/uL    MPV 10.2 9.0 - 12.9 fL    Neutrophils-Polys 85.70 (H) 44.00 - 72.00 %    Lymphocytes 6.20 (L) 22.00 - 41.00 %    Monocytes 6.70 0.00 - 13.40 %    Eosinophils 0.00 0.00 - 6.90 %    Basophils 0.50 0.00 - 1.80 %    Immature Granulocytes 0.90 0.00 - 0.90 %    Nucleated RBC 0.00 /100 WBC    Neutrophils (Absolute) 15.05 (H) 2.00 - 7.15 K/uL    Lymphs (Absolute) 1.08 1.00 - 4.80 K/uL    Monos (Absolute) 1.17 (H) 0.00 - 0.85 K/uL    Eos (Absolute) 0.00 0.00 - 0.51 K/uL    Baso (Absolute) 0.09 0.00 - 0.12 K/uL    Immature Granulocytes (abs) 0.15 (H) 0.00 - 0.11 K/uL    NRBC (Absolute) 0.00 K/uL   COMP METABOLIC PANEL   Result Value Ref Range    Sodium 131 (L) 135 - 145 mmol/L    Potassium 3.3 (L) 3.6 - 5.5 mmol/L    Chloride 93 (L) 96 - 112 mmol/L    Co2 14 (L) " 20 - 33 mmol/L    Anion Gap 24.0 (H) 0.0 - 11.9    Glucose 357 (H) 65 - 99 mg/dL    Bun 17 8 - 22 mg/dL    Creatinine 2.00 (H) 0.50 - 1.40 mg/dL    Calcium 12.4 (HH) 8.5 - 10.5 mg/dL    AST(SGOT) 14 12 - 45 U/L    ALT(SGPT) 10 2 - 50 U/L    Alkaline Phosphatase 127 (H) 30 - 99 U/L    Total Bilirubin 1.1 0.1 - 1.5 mg/dL    Albumin 5.2 (H) 3.2 - 4.9 g/dL    Total Protein 10.3 (H) 6.0 - 8.2 g/dL    Globulin 5.1 (H) 1.9 - 3.5 g/dL    A-G Ratio 1.0 g/dL   LIPASE   Result Value Ref Range    Lipase 15 11 - 82 U/L   BTYPE NATRIURETIC PEPTIDE   Result Value Ref Range    B Natriuretic Peptide 11 0 - 100 pg/mL   HCG QUAL SERUM   Result Value Ref Range    Beta-Hcg Qualitative Serum Negative Negative   LACTIC ACID   Result Value Ref Range    Lactic Acid 7.3 (HH) 0.5 - 2.0 mmol/L   ESTIMATED GFR   Result Value Ref Range    GFR If  33 (A) >60 mL/min/1.73 m 2    GFR If Non  27 (A) >60 mL/min/1.73 m 2   EKG   Result Value Ref Range    Report       [facility] Emergency Dept.    Test Date:  2018  Pt Name:    ALIYA MURRAY                 Department: ER  MRN:        [id number]                      Room:       [facility]  Gender:     Female                       Technician: [id number]  :        1975                   Requested By:ER TRIAGE PROTOCOL  Order #:    [id number]                    Reading MD:    Measurements  Intervals                                Axis  Rate:       72                           P:          0  RI:         130                          QRS:        85  QRSD:       116                          T:          55  QT:         476  QTc:        522    Interpretive Statements  SINUS RHYTHM  NONSPECIFIC INTRAVENTRICULAR CONDUCTION DELAY  PROBABLE LEFT VENTRICULAR HYPERTROPHY  Compared to ECG 2018 04:17:22  Intraventricular conduction delay now present  T-wave abnormality no longer present       CT-ABDOMEN-PELVIS W/O   Final Result         1. No evidence of acute  abnormality or inflammatory change.  The study is however limited due to nonuse of intravenous contrast.      DX-CHEST-PORTABLE (1 VIEW)   Final Result         1. No acute cardiopulmonary abnormalities are identified.          PROCEDURES     MEDICAL RECORD  I have reviewed patient's medical record and pertinent results are listed above.    COURSE & MEDICAL DECISION MAKING  I have reviewed any medical record information, laboratory studies and radiographic results as noted above.    The pt looks ill, with vomiting.  She has no fever, but appears uncomfortable.       1:55 AM  The pt will be admitted to [facility], who will see the pt, and decide if they need to go to the gold team.      2:02 AM  The pt states she feels better after her iv fluids, and pain medications. She was given fluids for her sepsis, and for her vomiting.   No po challenge secondary to the vomiting.     CRITICAL CARE  The very real possibility of a deterioration of this patient's condition required the highest level of my preparedness for sudden, emergent intervention.  I provided critical care services, which included medication orders, frequent reevaluations of the patient's condition and response to treatment, ordering and reviewing test results, and discussing the case with various consultants.  The critical care time associated with the care of the patient was 35 minutes. Review chart for interventions. This time is exclusive of any other billable procedures.     The pt is not diabetic, and her abnormalities may be only due to the vomiting.  She has no history of dka. She has been given two liters of IV fluids, and rocephin in case she turns out to be septic.      FINAL IMPRESSION  Cyclical vomiting  JILL  Sepsis   Critical care 35 minutes.           Electronically signed by: Curtis Kenney, 11/8/2018 12:12 AM

## 2018-11-08 NOTE — PROGRESS NOTES
Assumed care of PT A&O 4. Pt resting in bed with no signs of labored breathing. On 2L n.c . Tele monitor in place, cardiac rhythm being monitored. Call light within reach, bed in lowest position, upper bed rails up, bed alarm on. Pt was updated on plan of care for the day. Will continue to monitor. Pt's midline is not flushing nor does it have blood return . Received order to place new midline per MD. Awaiting placement.

## 2018-11-08 NOTE — ED TRIAGE NOTES
"BIB EMS for N/V x 2 hours. Pt has a history of chronic nausea. Pt reporting since her cholecystectomy and bowel resection she has been dealing with the nausea. Pt has home Zofran and phenergan and has taken them without relief. Pt is pale and has clammy skin and appears to be in distress. Pt reports diarrhea and generalized belly pain and \"almost passing out multiple times today.\" EKG performed and pt placed on cardiac monitor, BP cuff and pulse ox.   "

## 2018-11-08 NOTE — ED NOTES
Critical lactic acid of 7.3, reported to Dr Kenney at 0049, results read back at 0049 to Dr. Kenney.

## 2018-11-08 NOTE — ASSESSMENT & PLAN NOTE
Resolved  AG 24 upon admission with LA 7.3; LA trended down to 3.9 with adminstration of fluids. Likely d/t vomiting and dehydration  -Continue IVF

## 2018-11-08 NOTE — H&P
..        Internal Medicine Admitting History and Physical    Note Author: Elisa Rosenthal M.D.       Name Diana Hernandez     1975   Age/Sex 43 y.o. female   MRN [id number]   Code Status Full     After 5PM or if no immediate response to page, please call for cross-coverage  Attending/Team: Dr. Lyon/MILLIE Majano See Patient List for primary contact information  Call [phone] to page    1st Call - Day Intern (R1):   Dr. Maria 2nd Call - Day Sr. Resident (R2/R3):   Dr. Calzada       Chief Complaint:   Nausea and vomiting x 2 hours    HPI:  Diana Cutler is a 43 y.o. female with history of marijuana abuse with cyclical nausea and vomiting, severe anxiety disorder, and alcoholism in remission. She has a history of congenital superior mesenteric artery syndrome with a Shruti-en-Y with duodenojejunostomy, pyloroplasty, and cholecystectomy. She has had over 20 admissions over the past few years for this same complaint. Pt reports that she became very anxious three days, stopped eating, and lost 5 lbs. She was concerned about her weight loss so she smoked some MJ to bring back her appetite. She then vomited 3-4x on  afternoon. She took dissolvable Zofran tablets but they did not alleviate the vomiting. Pt states she has been stressed out bc she found out recently that she cannot have kids d/t low quality eggs. Pt also reports she has been having explosive diarrhea for 3-4 days. Pt reports she has abdominal pain, generalized weakness, and a HA. She denies any sick contacts, recent Abx use, or URI.     Pt presented to ED with HR 70, /83, RR 22, and was satting 100% on RA.  LA initially 7.3 but trended down to 3.9 after pt received fluids.     WBC 17.5 h&H 15.3/44.8    Na 131, K 3.3 Cl 93 HCO3 14 AG 24 BUN/Cr 17/2.00 GFR 27 (Baseline BUN/Cr is 3/0.70 and GFR >60)  Lipase 15 BNP 11 TSH 3.73, pregnancy test negative.     EKG revealed new QTc prolongation, 522.     CXR & CT abdomen demonstrated no  acute processes    Pt received C3 two grams IV,  IV dilaudid 1mg, IV morphine 4mg, 1L NS, and IV compazine 10mg and started to feel better.     Review of Systems   Constitutional: Positive for chills, malaise/fatigue and weight loss. Negative for diaphoresis and fever.   HENT: Negative for congestion, hearing loss, nosebleeds and tinnitus.    Eyes: Negative for blurred vision and double vision.   Respiratory: Negative for cough, sputum production, shortness of breath, wheezing and stridor.    Cardiovascular: Positive for palpitations. Negative for chest pain, orthopnea, claudication, leg swelling and PND.   Gastrointestinal: Positive for abdominal pain, blood in stool, diarrhea, nausea and vomiting.        Pt has hemorrhoids and says she often has blood in her stools from those   Genitourinary: Negative for dysuria, frequency and urgency.   Musculoskeletal: Positive for back pain. Negative for falls.   Neurological: Positive for tingling, weakness and headaches. Negative for focal weakness and loss of consciousness.        Legs feel tingly  Pt reports she had a seizure in the past after her dad    Psychiatric/Behavioral: Positive for substance abuse. Negative for suicidal ideas. The patient is nervous/anxious and has insomnia.              Past Medical History (Chronic medical problem, known complications and current treatment)    ..  Past Medical History:   Diagnosis Date   • Anxiety     Followed by [facility]   • Anxiety disorder    • Backpain    • CLOSTRIDIUM DIFFICILE INFECTION 2010   • CVS disease    • Cyclic vomiting syndrome    • Dental disorder    • Drug-seeking behavior    • Dyspepsia 2009   • Nephrolithiasis 2009    kidney stones,post lithotrypsy   • Pain     abd and back   • Snoring    • Superior mesenteric artery syndrome (HCC) 2009   • Superior mesenteric artery syndrome (HCC)    • Tobacco abuse 2009         Past Surgical History:  Past Surgical History:   Procedure Laterality  "Date   • GASTROSCOPY-ENDO  4/28/2016    Procedure: GASTROSCOPY-ENDO;  Surgeon: Blayne Blackburn M.D.;  Location: ENDOSCOPY [facility] [place];  Service:    • EXPLORATORY LAPAROTOMY  1/12/2016    Procedure: EXPLORATORY LAPAROTOMY;  Surgeon: Maciel Quintero M.D.;  Location: SURGERY [facility];  Service:    • BOWEL RESECTION  1/12/2016    Procedure: BOWEL RESECTION;  Surgeon: Maciel Quintero M.D.;  Location: SURGERY [facility];  Service:    • GASTROSCOPY WITH BIOPSY  3/9/2010    Performed by AMANDA MEJIA at ENDOSCOPY [facility] [place]   • PYLOROPLASTY  7/27/2009    Performed by MACIEL QUINTERO at SURGERY [facility]   • EXPLORATORY LAPAROTOMY  7/27/2009    Performed by MACIEL QUINTERO at SURGERY [facility]   • APPENDECTOMY  7/27/2009    Performed by MACIEL QUINTERO at SURGERY [facility]   • CHOLECYSTECTOMY  7/27/2009    Performed by MACIEL QUINTERO at SURGERY [facility]   • GASTROSCOPY-ENDO  7/8/2009    Performed by ROSANNA WRIGHT at SURGERY [facility]   • LITHOTRIPSY     • OTHER      superior mesenteric artery correction        Current Outpatient Medications:  Home Medications    **Home medications have not yet been reviewed for this encounter**       Pt takes Zoloft 100mg  Buspirone 30  Klonapin 0.5  Benadryl 25mg  Thorazine  Zofran  Medication Allergy/Sensitivities:  Allergies   Allergen Reactions   • Metoclopramide Hives     Told by MD; pt suspects possible hives.   • Bactrim [Sulfamethoxazole W-Trimethoprim] Unspecified     Someone said I had a rash or something.      • Seroquel [Quetiapine] Unspecified     \"Seizures\"  RXN=unknown         Family History (mandatory)   Family History   Problem Relation Age of Onset   • Cancer Mother 50        Colon cancer   • Hypertension Mother    • Allergies Father    • Hypertension Father    • Heart Disease Maternal Grandmother        Social History (mandatory)   Former alcoholic in " "remission  Denies smoking cigarettes  Smokes MJ  Does not work  Social History     Social History   • Marital status:      Spouse name: N/A   • Number of children: N/A   • Years of education: N/A     Occupational History   • Not on file.     Social History Main Topics   • Smoking status: Never Smoker   • Smokeless tobacco: Never Used   • Alcohol use No      Comment: hx   • Drug use: Yes     Types: Marijuana, Inhaled      Comment: marijuana   • Sexual activity: Not on file     Other Topics Concern   • Not on file     Social History Narrative    ** Merged History Encounter **          Living situation: lives with    PCP : Isa Rodriguez M.D.    Physical Exam     Vitals:    11/08/18 0330 11/08/18 0400 11/08/18 0417 11/08/18 0430   BP:       Pulse: 73 66 72 65   Resp: 16 16 16 16   SpO2: 99% 99% 99% 99%   Weight:       Height:         Body mass index is 18.34 kg/m².  /83   Pulse 65   Resp 16   Ht 1.651 m (5' 5\")   Wt 50 kg (110 lb 3.7 oz)   LMP 10/23/2018   SpO2 99%   BMI 18.34 kg/m²   O2 therapy: Pulse Oximetry: 99 %, O2 (LPM): 0, FiO2%: 21 %, O2 Delivery: None (Room Air)    Physical Exam   Constitutional: She is oriented to person, place, and time.   Pt looks sick and is in some distress. She is having a hard time talking d/t dry mouth. Teeth chattering.    HENT:   Head: Normocephalic and atraumatic.   Very dry mucous membranes. Bright red tongue.   Negative Chvostek's sign      Eyes: Pupils are equal, round, and reactive to light. Conjunctivae and EOM are normal. Right eye exhibits no discharge. Left eye exhibits no discharge. No scleral icterus.   Neck: No JVD present.   Cardiovascular: Normal rate, regular rhythm, normal heart sounds and intact distal pulses.    No murmur heard.  Pulmonary/Chest: Effort normal and breath sounds normal. No respiratory distress. She has no wheezes. She has no rales.   Abdominal: Soft. Bowel sounds are normal. She exhibits no distension. There is no " tenderness. There is no rebound and no guarding.   Genitourinary:   Genitourinary Comments: Pt deferred. States she has hemorrhoids    Musculoskeletal: She exhibits no edema.   Paraspinal tenderness along upper back bilaterally. No bruising. Muscle spasm noted on right upper back.    Neurological: She is alert and oriented to person, place, and time. No cranial nerve deficit. GCS score is 15.   Skin: Skin is dry. No rash noted. No erythema. No pallor.   Pt's hands are purple in color. Pt states this is normal for her and she has chronic bad circulation.    Psychiatric: Memory and affect normal.   Very anxious          Data Review       Old Records Request:   Completed  Current Records review/summary: Completed    Lab Data Review:  Recent Results (from the past 24 hour(s))   EKG    Collection Time: 18 11:42 PM   Result Value Ref Range    Report       [facility] Emergency Dept.    Test Date:  2018  Pt Name:    ALIYA MURRAY                 Department: ER  MRN:        [id number]                      Room:       [facility]  Gender:     Female                       Technician: [id number]  :        1975                   Requested By:ER TRIAGE PROTOCOL  Order #:    [id number]                    Reading MD:    Measurements  Intervals                                Axis  Rate:       72                           P:          0  VA:         130                          QRS:        85  QRSD:       116                          T:          55  QT:         476  QTc:        522    Interpretive Statements  SINUS RHYTHM  NONSPECIFIC INTRAVENTRICULAR CONDUCTION DELAY  PROBABLE LEFT VENTRICULAR HYPERTROPHY  Compared to ECG 2018 04:17:22  Intraventricular conduction delay now present  T-wave abnormality no longer present     CBC WITH DIFFERENTIAL    Collection Time: 18 12:20 AM   Result Value Ref Range    WBC 17.5 (H) 4.8 - 10.8 K/uL    RBC 5.02 4.20 - 5.40 M/uL    Hemoglobin 15.3 12.0 - 16.0 g/dL     Hematocrit 44.8 37.0 - 47.0 %    MCV 89.2 81.4 - 97.8 fL    MCH 30.5 27.0 - 33.0 pg    MCHC 34.2 33.6 - 35.0 g/dL    RDW 50.0 35.9 - 50.0 fL    Platelet Count 454 (H) 164 - 446 K/uL    MPV 10.2 9.0 - 12.9 fL    Neutrophils-Polys 85.70 (H) 44.00 - 72.00 %    Lymphocytes 6.20 (L) 22.00 - 41.00 %    Monocytes 6.70 0.00 - 13.40 %    Eosinophils 0.00 0.00 - 6.90 %    Basophils 0.50 0.00 - 1.80 %    Immature Granulocytes 0.90 0.00 - 0.90 %    Nucleated RBC 0.00 /100 WBC    Neutrophils (Absolute) 15.05 (H) 2.00 - 7.15 K/uL    Lymphs (Absolute) 1.08 1.00 - 4.80 K/uL    Monos (Absolute) 1.17 (H) 0.00 - 0.85 K/uL    Eos (Absolute) 0.00 0.00 - 0.51 K/uL    Baso (Absolute) 0.09 0.00 - 0.12 K/uL    Immature Granulocytes (abs) 0.15 (H) 0.00 - 0.11 K/uL    NRBC (Absolute) 0.00 K/uL   COMP METABOLIC PANEL    Collection Time: 11/08/18 12:20 AM   Result Value Ref Range    Sodium 131 (L) 135 - 145 mmol/L    Potassium 3.3 (L) 3.6 - 5.5 mmol/L    Chloride 93 (L) 96 - 112 mmol/L    Co2 14 (L) 20 - 33 mmol/L    Anion Gap 24.0 (H) 0.0 - 11.9    Glucose 357 (H) 65 - 99 mg/dL    Bun 17 8 - 22 mg/dL    Creatinine 2.00 (H) 0.50 - 1.40 mg/dL    Calcium 12.4 (HH) 8.5 - 10.5 mg/dL    AST(SGOT) 14 12 - 45 U/L    ALT(SGPT) 10 2 - 50 U/L    Alkaline Phosphatase 127 (H) 30 - 99 U/L    Total Bilirubin 1.1 0.1 - 1.5 mg/dL    Albumin 5.2 (H) 3.2 - 4.9 g/dL    Total Protein 10.3 (H) 6.0 - 8.2 g/dL    Globulin 5.1 (H) 1.9 - 3.5 g/dL    A-G Ratio 1.0 g/dL   LIPASE    Collection Time: 11/08/18 12:20 AM   Result Value Ref Range    Lipase 15 11 - 82 U/L   BTYPE NATRIURETIC PEPTIDE    Collection Time: 11/08/18 12:20 AM   Result Value Ref Range    B Natriuretic Peptide 11 0 - 100 pg/mL   HCG QUAL SERUM    Collection Time: 11/08/18 12:20 AM   Result Value Ref Range    Beta-Hcg Qualitative Serum Negative Negative   LACTIC ACID    Collection Time: 11/08/18 12:20 AM   Result Value Ref Range    Lactic Acid 7.3 (HH) 0.5 - 2.0 mmol/L   ESTIMATED GFR    Collection  Time: 11/08/18 12:20 AM   Result Value Ref Range    GFR If  33 (A) >60 mL/min/1.73 m 2    GFR If Non  27 (A) >60 mL/min/1.73 m 2   MAGNESIUM    Collection Time: 11/08/18 12:20 AM   Result Value Ref Range    Magnesium 1.5 1.5 - 2.5 mg/dL   TSH (Thyroid Stimulating Hormone)    Collection Time: 11/08/18 12:20 AM   Result Value Ref Range    TSH 3.730 0.380 - 5.330 uIU/mL   LACTIC ACID    Collection Time: 11/08/18  2:31 AM   Result Value Ref Range    Lactic Acid 3.9 (H) 0.5 - 2.0 mmol/L       Imaging/Procedures Review:    Independant Imaging Review: Completed  CT-ABDOMEN-PELVIS W/O   Final Result         1. No evidence of acute abnormality or inflammatory change.  The study is however limited due to nonuse of intravenous contrast.      DX-CHEST-PORTABLE (1 VIEW)   Final Result         1. No acute cardiopulmonary abnormalities are identified.               EKG:   EKG Independant Review: Completed  QTc:522, HR: 72, Normal Sinus Rhythm, no ST/T changes. No U waves     Records reviewed and summarized in current documentation :  Yes  [employer] teaching service handout given to patient:  Yes         Assessment/Plan     * Nausea & vomiting- (present on admission)   Assessment & Plan    42 yo F with history of marijuana abuse with cyclical nausea and vomiting, severe anxiety disorder, and alcoholism in remission. She has a history of congenital superior mesenteric artery syndrome with a Shruti-en-Y with duodenojejunostomy, pyloroplasty, and cholecystectomy. She has had over 20 admissions over the past few years for this same complaint. Pt reports that she became very anxious three days, stopped eating, and lost 5 lbs. She was concerned about her weight loss so she smoked some MJ to bring back her appetite. She then vomited 3-4x on Weds 11/07 afternoon. She took dissolvable Zofran tablets but they did not alleviate the vomiting.  -IVF  -Compazine, Phenergan, Klonopin. Avoid Zofran d/t prolonged QTc 522    -Advance diet as tolerated  -Talk to pt about MJ; she does not believe it is causing her emesis issue  -Replete electrolytes       JILL (acute kidney injury) (HCC)- (present on admission)   Assessment & Plan    Likely d/t dehydration   BUN/Cr 17/2.00 (baseline is 3/0.70)  GFR 27 (baseline >60)  -IVF     Prolonged QT interval- (present on admission)   Assessment & Plan    QTc 522; this is new compared to previous EKG; likely d/t electrolyte imbalances.   Avoid Qtc prolonging drugs such as Zofran  Recheck EKG at 12pm and reassess if Zoloft (a QTc prolonging drug) is appropriate to keep on board for pt's night time medications.      Hypomagnesemia- (present on admission)   Assessment & Plan    Repleting.   Monitor      Hyperglycemia- (present on admission)   Assessment & Plan     upon admission. No hx of diabetes. Severely dehydrated. Recheck.   -Checking A1c.      Anxiety- (present on admission)   Assessment & Plan    Consider psych consult. Pt is on 5 medications for anxiety/insomnia. She has cyclical vomiting issues. She stops eating for days when she gets stressed out. Pt reports she started having (likely psychosomatic) seizures after her father passed.Pt has very poor coping mechanisms. Also a former alcoholic.     These are the meds pt takes at home. All but the Thorazine have been continued.   -Zoloft 100**Concern for Zoloft given pt's QTc prolongation; recheck EKG at noon and then determine if med needs to be d/c. If d/c, concern for withdrawal.   -Buspirone 30mg  -Klonapin 0.5mg  -Benandryl 25mg  -Thorazine     Hypokalemia- (present on admission)   Assessment & Plan    Replete      Hyponatremia- (present on admission)   Assessment & Plan    Likely hypovolemic hyponatremia. Monitor response to fluids      Addiction, marijuana (HCC)- (present on admission)   Assessment & Plan    Pt continues to smoke MJ despite having cannabis hyperemesis syndrome  -     Metabolic acidosis- (present on admission)    Assessment & Plan    AG 24 upon admission with LA 7.3; LA trended down to 3.9 with adminstration of fluids. Likely d/t vomiting and dehydration  -Fluids         Anticipated Hospital stay: Observation admit        Quality Measures  Quality-Core Measures  PCP: Isa Rodriguez M.D.

## 2018-11-08 NOTE — PROGRESS NOTES
Bedside report from ER RN. PT transported to telemetry.   AAOx4. Bed alarm is on. Safety precautions in place. Call light and personal belongings within reach. Educated to call for assistance if needed.

## 2018-11-09 PROBLEM — R94.31 PROLONGED QT INTERVAL: Status: RESOLVED | Noted: 2018-07-12 | Resolved: 2018-11-09

## 2018-11-09 PROBLEM — D72.829 LEUKOCYTOSIS: Status: RESOLVED | Noted: 2018-05-19 | Resolved: 2018-11-09

## 2018-11-09 PROBLEM — R73.9 HYPERGLYCEMIA: Status: RESOLVED | Noted: 2018-05-19 | Resolved: 2018-11-09

## 2018-11-09 LAB
ALBUMIN SERPL BCP-MCNC: 3.2 G/DL (ref 3.2–4.9)
ALBUMIN/GLOB SERPL: 1 G/DL
ALP SERPL-CCNC: 70 U/L (ref 30–99)
ALT SERPL-CCNC: 5 U/L (ref 2–50)
ANION GAP SERPL CALC-SCNC: 10 MMOL/L (ref 0–11.9)
ANION GAP SERPL CALC-SCNC: 14 MMOL/L (ref 0–11.9)
ANION GAP SERPL CALC-SCNC: 6 MMOL/L (ref 0–11.9)
ANION GAP SERPL CALC-SCNC: 6 MMOL/L (ref 0–11.9)
AST SERPL-CCNC: 14 U/L (ref 12–45)
BILIRUB SERPL-MCNC: 0.3 MG/DL (ref 0.1–1.5)
BUN SERPL-MCNC: 4 MG/DL (ref 8–22)
BUN SERPL-MCNC: 5 MG/DL (ref 8–22)
BUN SERPL-MCNC: 6 MG/DL (ref 8–22)
BUN SERPL-MCNC: 6 MG/DL (ref 8–22)
CALCIUM SERPL-MCNC: 8.1 MG/DL (ref 8.5–10.5)
CALCIUM SERPL-MCNC: 8.1 MG/DL (ref 8.5–10.5)
CALCIUM SERPL-MCNC: 8.3 MG/DL (ref 8.5–10.5)
CALCIUM SERPL-MCNC: 9.2 MG/DL (ref 8.5–10.5)
CHLORIDE SERPL-SCNC: 106 MMOL/L (ref 96–112)
CHLORIDE SERPL-SCNC: 112 MMOL/L (ref 96–112)
CHLORIDE SERPL-SCNC: 113 MMOL/L (ref 96–112)
CHLORIDE SERPL-SCNC: 113 MMOL/L (ref 96–112)
CHOLEST SERPL-MCNC: 142 MG/DL (ref 100–199)
CO2 SERPL-SCNC: 16 MMOL/L (ref 20–33)
CO2 SERPL-SCNC: 17 MMOL/L (ref 20–33)
CO2 SERPL-SCNC: 21 MMOL/L (ref 20–33)
CO2 SERPL-SCNC: 21 MMOL/L (ref 20–33)
CREAT SERPL-MCNC: 0.67 MG/DL (ref 0.5–1.4)
CREAT SERPL-MCNC: 0.67 MG/DL (ref 0.5–1.4)
CREAT SERPL-MCNC: 0.68 MG/DL (ref 0.5–1.4)
CREAT SERPL-MCNC: 0.68 MG/DL (ref 0.5–1.4)
EKG IMPRESSION: NORMAL
ERYTHROCYTE [DISTWIDTH] IN BLOOD BY AUTOMATED COUNT: 51.9 FL (ref 35.9–50)
GLOBULIN SER CALC-MCNC: 3.1 G/DL (ref 1.9–3.5)
GLUCOSE SERPL-MCNC: 103 MG/DL (ref 65–99)
GLUCOSE SERPL-MCNC: 103 MG/DL (ref 65–99)
GLUCOSE SERPL-MCNC: 83 MG/DL (ref 65–99)
GLUCOSE SERPL-MCNC: 83 MG/DL (ref 65–99)
HCT VFR BLD AUTO: 29.5 % (ref 37–47)
HDLC SERPL-MCNC: 28 MG/DL
HGB BLD-MCNC: 9.7 G/DL (ref 12–16)
LDLC SERPL CALC-MCNC: 91 MG/DL
MAGNESIUM SERPL-MCNC: 2.4 MG/DL (ref 1.5–2.5)
MCH RBC QN AUTO: 30.3 PG (ref 27–33)
MCHC RBC AUTO-ENTMCNC: 33.2 G/DL (ref 33.6–35)
MCV RBC AUTO: 91.3 FL (ref 81.4–97.8)
PHOSPHATE SERPL-MCNC: 2.2 MG/DL (ref 2.5–4.5)
PHOSPHATE SERPL-MCNC: 3 MG/DL (ref 2.5–4.5)
PLATELET # BLD AUTO: 299 K/UL (ref 164–446)
PMV BLD AUTO: 9.8 FL (ref 9–12.9)
POTASSIUM SERPL-SCNC: 2.9 MMOL/L (ref 3.6–5.5)
POTASSIUM SERPL-SCNC: 2.9 MMOL/L (ref 3.6–5.5)
POTASSIUM SERPL-SCNC: 3.2 MMOL/L (ref 3.6–5.5)
POTASSIUM SERPL-SCNC: 3.3 MMOL/L (ref 3.6–5.5)
PROT SERPL-MCNC: 6.3 G/DL (ref 6–8.2)
RBC # BLD AUTO: 3.2 M/UL (ref 4.2–5.4)
SODIUM SERPL-SCNC: 137 MMOL/L (ref 135–145)
SODIUM SERPL-SCNC: 138 MMOL/L (ref 135–145)
SODIUM SERPL-SCNC: 140 MMOL/L (ref 135–145)
SODIUM SERPL-SCNC: 140 MMOL/L (ref 135–145)
TRIGL SERPL-MCNC: 113 MG/DL (ref 0–149)
WBC # BLD AUTO: 8.8 K/UL (ref 4.8–10.8)

## 2018-11-09 PROCEDURE — 84100 ASSAY OF PHOSPHORUS: CPT

## 2018-11-09 PROCEDURE — 770001 HCHG ROOM/CARE - MED/SURG/GYN PRIV*

## 2018-11-09 PROCEDURE — 700102 HCHG RX REV CODE 250 W/ 637 OVERRIDE(OP): Performed by: STUDENT IN AN ORGANIZED HEALTH CARE EDUCATION/TRAINING PROGRAM

## 2018-11-09 PROCEDURE — 700105 HCHG RX REV CODE 258: Performed by: PHYSICAL MEDICINE & REHABILITATION

## 2018-11-09 PROCEDURE — 93010 ELECTROCARDIOGRAM REPORT: CPT | Performed by: INTERNAL MEDICINE

## 2018-11-09 PROCEDURE — 36415 COLL VENOUS BLD VENIPUNCTURE: CPT

## 2018-11-09 PROCEDURE — 700111 HCHG RX REV CODE 636 W/ 250 OVERRIDE (IP): Performed by: STUDENT IN AN ORGANIZED HEALTH CARE EDUCATION/TRAINING PROGRAM

## 2018-11-09 PROCEDURE — 80053 COMPREHEN METABOLIC PANEL: CPT

## 2018-11-09 PROCEDURE — 83735 ASSAY OF MAGNESIUM: CPT

## 2018-11-09 PROCEDURE — 700111 HCHG RX REV CODE 636 W/ 250 OVERRIDE (IP): Performed by: PHYSICAL MEDICINE & REHABILITATION

## 2018-11-09 PROCEDURE — 99233 SBSQ HOSP IP/OBS HIGH 50: CPT | Mod: GC | Performed by: INTERNAL MEDICINE

## 2018-11-09 PROCEDURE — 700101 HCHG RX REV CODE 250: Performed by: PHYSICAL MEDICINE & REHABILITATION

## 2018-11-09 PROCEDURE — 80061 LIPID PANEL: CPT

## 2018-11-09 PROCEDURE — 85027 COMPLETE CBC AUTOMATED: CPT

## 2018-11-09 PROCEDURE — A9270 NON-COVERED ITEM OR SERVICE: HCPCS | Performed by: STUDENT IN AN ORGANIZED HEALTH CARE EDUCATION/TRAINING PROGRAM

## 2018-11-09 PROCEDURE — 700102 HCHG RX REV CODE 250 W/ 637 OVERRIDE(OP): Performed by: PHYSICAL MEDICINE & REHABILITATION

## 2018-11-09 PROCEDURE — A9270 NON-COVERED ITEM OR SERVICE: HCPCS | Performed by: PHYSICAL MEDICINE & REHABILITATION

## 2018-11-09 PROCEDURE — 93005 ELECTROCARDIOGRAM TRACING: CPT | Performed by: INTERNAL MEDICINE

## 2018-11-09 PROCEDURE — 80048 BASIC METABOLIC PNL TOTAL CA: CPT

## 2018-11-09 PROCEDURE — 700105 HCHG RX REV CODE 258: Performed by: STUDENT IN AN ORGANIZED HEALTH CARE EDUCATION/TRAINING PROGRAM

## 2018-11-09 RX ORDER — CALCIUM CARBONATE 500 MG/1
500 TABLET, CHEWABLE ORAL DAILY
Status: DISCONTINUED | OUTPATIENT
Start: 2018-11-09 | End: 2018-11-09

## 2018-11-09 RX ORDER — ACETAMINOPHEN 650 MG/1
325 SUPPOSITORY RECTAL EVERY 4 HOURS PRN
Status: DISCONTINUED | OUTPATIENT
Start: 2018-11-09 | End: 2018-11-10

## 2018-11-09 RX ORDER — BUSPIRONE HYDROCHLORIDE 10 MG/1
30 TABLET ORAL 2 TIMES DAILY
Status: DISCONTINUED | OUTPATIENT
Start: 2018-11-09 | End: 2018-11-10 | Stop reason: HOSPADM

## 2018-11-09 RX ORDER — POTASSIUM CHLORIDE 20 MEQ/1
40 TABLET, EXTENDED RELEASE ORAL DAILY
Status: DISCONTINUED | OUTPATIENT
Start: 2018-11-09 | End: 2018-11-09

## 2018-11-09 RX ORDER — LORAZEPAM 2 MG/ML
1 INJECTION INTRAMUSCULAR EVERY 4 HOURS PRN
Status: DISCONTINUED | OUTPATIENT
Start: 2018-11-09 | End: 2018-11-10

## 2018-11-09 RX ORDER — POTASSIUM CHLORIDE 7.45 MG/ML
10 INJECTION INTRAVENOUS
Status: COMPLETED | OUTPATIENT
Start: 2018-11-09 | End: 2018-11-09

## 2018-11-09 RX ORDER — MORPHINE SULFATE 4 MG/ML
4 INJECTION, SOLUTION INTRAMUSCULAR; INTRAVENOUS EVERY 4 HOURS PRN
Status: DISCONTINUED | OUTPATIENT
Start: 2018-11-09 | End: 2018-11-10

## 2018-11-09 RX ADMIN — PROCHLORPERAZINE EDISYLATE 10 MG: 5 INJECTION INTRAMUSCULAR; INTRAVENOUS at 05:49

## 2018-11-09 RX ADMIN — POTASSIUM PHOSPHATE, MONOBASIC AND POTASSIUM PHOSPHATE, DIBASIC 30 MMOL: 224; 236 INJECTION, SOLUTION INTRAVENOUS at 22:46

## 2018-11-09 RX ADMIN — PROCHLORPERAZINE EDISYLATE 10 MG: 5 INJECTION INTRAMUSCULAR; INTRAVENOUS at 10:53

## 2018-11-09 RX ADMIN — MORPHINE SULFATE 2 MG: 4 INJECTION INTRAVENOUS at 09:01

## 2018-11-09 RX ADMIN — MORPHINE SULFATE 4 MG: 4 INJECTION INTRAVENOUS at 18:57

## 2018-11-09 RX ADMIN — ONDANSETRON 4 MG: 2 INJECTION INTRAMUSCULAR; INTRAVENOUS at 09:02

## 2018-11-09 RX ADMIN — MORPHINE SULFATE 4 MG: 4 INJECTION INTRAVENOUS at 14:57

## 2018-11-09 RX ADMIN — ENOXAPARIN SODIUM 40 MG: 100 INJECTION SUBCUTANEOUS at 14:04

## 2018-11-09 RX ADMIN — SODIUM CHLORIDE: 9 INJECTION, SOLUTION INTRAVENOUS at 02:48

## 2018-11-09 RX ADMIN — LORAZEPAM 1 MG: 2 INJECTION INTRAMUSCULAR; INTRAVENOUS at 14:57

## 2018-11-09 RX ADMIN — ONDANSETRON 4 MG: 2 INJECTION INTRAMUSCULAR; INTRAVENOUS at 05:03

## 2018-11-09 RX ADMIN — POTASSIUM CHLORIDE 10 MEQ: 7.46 INJECTION, SOLUTION INTRAVENOUS at 20:10

## 2018-11-09 RX ADMIN — SODIUM CHLORIDE: 9 INJECTION, SOLUTION INTRAVENOUS at 16:08

## 2018-11-09 RX ADMIN — LIDOCAINE 1 PATCH: 50 PATCH TOPICAL at 04:51

## 2018-11-09 RX ADMIN — POTASSIUM CHLORIDE 10 MEQ: 7.46 INJECTION, SOLUTION INTRAVENOUS at 17:32

## 2018-11-09 RX ADMIN — LORAZEPAM 1 MG: 2 INJECTION INTRAMUSCULAR; INTRAVENOUS at 18:57

## 2018-11-09 RX ADMIN — POTASSIUM CHLORIDE 10 MEQ: 7.46 INJECTION, SOLUTION INTRAVENOUS at 15:01

## 2018-11-09 RX ADMIN — MORPHINE SULFATE 4 MG: 4 INJECTION INTRAVENOUS at 10:53

## 2018-11-09 RX ADMIN — DIPHENHYDRAMINE HCL 50 MG: 25 TABLET ORAL at 19:05

## 2018-11-09 RX ADMIN — SERTRALINE 100 MG: 100 TABLET, FILM COATED ORAL at 20:11

## 2018-11-09 RX ADMIN — POTASSIUM PHOSPHATE, MONOBASIC AND POTASSIUM PHOSPHATE, DIBASIC 30 MMOL: 224; 236 INJECTION, SOLUTION INTRAVENOUS at 05:13

## 2018-11-09 RX ADMIN — ONDANSETRON 4 MG: 2 INJECTION INTRAMUSCULAR; INTRAVENOUS at 14:04

## 2018-11-09 RX ADMIN — POTASSIUM CHLORIDE 10 MEQ: 7.46 INJECTION, SOLUTION INTRAVENOUS at 18:57

## 2018-11-09 RX ADMIN — BUSPIRONE HYDROCHLORIDE 30 MG: 10 TABLET ORAL at 22:37

## 2018-11-09 RX ADMIN — ONDANSETRON 4 MG: 2 INJECTION INTRAMUSCULAR; INTRAVENOUS at 18:57

## 2018-11-09 RX ADMIN — MORPHINE SULFATE 2 MG: 4 INJECTION INTRAVENOUS at 04:56

## 2018-11-09 ASSESSMENT — ENCOUNTER SYMPTOMS
COUGH: 0
PALPITATIONS: 0
FEVER: 0
DIARRHEA: 0
NAUSEA: 1
VOMITING: 1
ABDOMINAL PAIN: 0
SHORTNESS OF BREATH: 0
BACK PAIN: 1
HEARTBURN: 0
CHILLS: 0
MYALGIAS: 0

## 2018-11-09 ASSESSMENT — PAIN SCALES - GENERAL
PAINLEVEL_OUTOF10: 10
PAINLEVEL_OUTOF10: 8
PAINLEVEL_OUTOF10: 7
PAINLEVEL_OUTOF10: 9
PAINLEVEL_OUTOF10: 9
PAINLEVEL_OUTOF10: 8
PAINLEVEL_OUTOF10: 8
PAINLEVEL_OUTOF10: 9
PAINLEVEL_OUTOF10: 9

## 2018-11-09 NOTE — DIETARY
"Nutrition services: Day 1 of admit.  Diana Hernandez is a 43 y.o. female with admitting DX of cyclic vomiting syndrome.  Pt noted with poor PO intake on nutrition admit screen.  Pt appears thin.  RD visited pt at bedside to discuss appetite, PO intake, and wt history at bedside.  Pt requested RD come back at another time - pt is tired and still nauseated.  Pt noted with n/v, and abdominal discomfort - likely contributing to poor PO intake.  RD will continue to monitor for diet to advance past clears and adequate PO intake.    Assessment:  Height: 165.1 cm (5' 5\")  Weight: 54 kg (119 lb 0.8 oz)  Body mass index is 19.81 kg/m². - low end of WNL.  Diet/Intake: Clear liquid x 1.  Per chart, pt consumed 0% x 1, and <25% x 1.    Evaluation:   1. Pt noted with JILL, n/v, hypomagnesemia, marijuana addiction, anxiety, and hyperglycemia.  2. Per H&P, pt presented with n/v and reports recent anxiety and low appetite - used marijuana to increase appetite then had subsequent non bloody vomiting for the past few days.  3. Per MD note, pt reports that she became very anxious for three days, stopped eating, and lost 5 lbs.   4.  Labs: Potassium: 2.9, BUN: 6, A1C: 5.7.  5. Meds: Kdur.  6. Last BM: PTA.  Pt noted with 2 episodes of clear, green emesis early this morning.    Recommendations/Plan:  1. Advance diet past clears as feasible.  2. Encourage intake of meals.  3. Document intake of all meals as % taken in ADL's to provide interdisciplinary communication across all shifts.   4. Monitor weight.  5. Nutrition rep will continue to see patient for ongoing meal and snack preferences.     RD following.             "

## 2018-11-09 NOTE — PROGRESS NOTES
Internal Medicine Interval Note  Note Author: Emily Maria M.D.     Name Diana Hernandez 1975   Age/Sex 43 y.o. female   MRN [id number]   Code Status FULL     After 5PM or if no immediate response to page, please call for cross-coverage  Attending/Team: Dr. Roshni Lyon/Melecio See Patient List for primary contact information  Call [phone] to page    1st Call - Day Intern (R1):   Dr. Emily Maria  2nd Call - Day Sr. Resident (R2/R3):   Dr. Jackie Calzada      Reason for interval visit  (Principal Problem)   Nausea & vomiting    Interval Problem Daily Status Update  (24 hours, problem oriented, brief subjective history, new lab/imaging data pertinent to that problem)   Yesterday, patient seem to have improved during the afternoon.  She was able to eat some food and felt better.  However upon waking up this morning, she was not he nauseated and had vomiting episodes.  Her antiemetics were continued and patient improved throughout the day.    Her potassium dropped from 4.4 yesterday to 2.9 this morning.  She was given potassium phosphate, and recheck BMP showed improvement to 3.2.  She was continued on potassium IV as she is unable to tolerate oral meds at this time.  Will recheck BMP tomorrow morning.   Upon history taking this morning she became teary-eyed and appeared very anxious.  Her oral Klonopin was changed to IV Ativan as she is unable to tolerate oral meds at this time.  I believe a large component of her illness is related to her anxiety which is uncontrolled, although she is on multiple medications.  She would benefit from continued outpatient evaluation and adjustment of medications.     Review of Systems   Constitutional: Negative for chills and fever.   Respiratory: Negative for cough and shortness of breath.    Cardiovascular: Negative for chest pain, palpitations and leg swelling.   Gastrointestinal: Positive for nausea and vomiting. Negative for abdominal pain, diarrhea and  heartburn.   Genitourinary: Negative for dysuria and hematuria.   Musculoskeletal: Positive for back pain. Negative for myalgias.     Disposition/Barriers to discharge:   Guarded    Consultants/Specialty  PCP: Isa Rodriguez M.D.    Quality Measures  Quality-Core Measures   Reviewed items::  Labs reviewed and Medications reviewed  Saldana catheter::  No Saldana  DVT prophylaxis pharmacological::  Enoxaparin (Lovenox)  DVT prophylaxis - mechanical:  SCDs  Ulcer Prophylaxis::  Not indicated    Physical Exam     Vitals:    11/09/18 0459 11/09/18 0600 11/09/18 0801 11/09/18 1113   BP: 118/68  151/90 (!) 98/51   Pulse:   71 66   Resp:   17 17   Temp:   36.9 °C (98.5 °F) 36.8 °C (98.2 °F)   SpO2:   100% 96%   Weight:  54 kg (119 lb 0.8 oz)     Height:         Body mass index is 19.81 kg/m². Weight: 54 kg (119 lb 0.8 oz)  Oxygen Therapy:  Pulse Oximetry: 96 %, O2 (LPM): 0, O2 Delivery: None (Room Air)    Physical Exam   Constitutional: She is oriented to person, place, and time.   Thin anxious appearing female   HENT:   Head: Normocephalic and atraumatic.   Eyes: EOM are normal.   Neck: Normal range of motion.   Cardiovascular: Normal rate, regular rhythm, normal heart sounds and intact distal pulses.    Pulmonary/Chest: Effort normal and breath sounds normal. No respiratory distress. She has no wheezes. She has no rales.   Abdominal: Soft. She exhibits no distension. There is no tenderness. There is no rebound.   Patient becomes more nauseated upon palpation   Musculoskeletal: Normal range of motion. She exhibits no edema.   Neurological: She is alert and oriented to person, place, and time.   Skin: Skin is warm and dry.   Psychiatric: Affect and judgment normal.   Nursing note and vitals reviewed.    Assessment/Plan     * Nausea & vomiting- (present on admission)   Assessment & Plan    Patient continues to have nausea episodes, which seemed to improve throughout the day but return early morning.  We will continue to  provide supportive care by correcting electrolytes, and giving IV fluids.    -Continue IVF  -Replete electrolytes as necessary  -Antiemetics as necessary : Compazine, Phenergan, zofran   -Advance diet as tolerated  -Talk to pt about MJ; she does not believe it is causing her emesis issue         Hypokalemia- (present on admission)   Assessment & Plan    Likely multifactorial: from increased upper GI losses from hyperemesis, decreased oral intake and possibly from correction of ketoacidosis which was likely present upon admission.  -Continue to replete as necessary  -Continue to monitor     JILL (acute kidney injury) (HCC)- (present on admission)   Assessment & Plan    Likely d/t dehydration   On admission : BUN/Cr 17/2.00 (baseline is 3/0.70) eGFR 27 (baseline >60)  Improving.  -Continue IVF  -Correction of electrolytes as needed  -BMP daily       Hypomagnesemia- (present on admission)   Assessment & Plan    Repleting.   Monitor      Addiction, marijuana (HCC)- (present on admission)   Assessment & Plan    Pt continues to smoke MJ despite having cannabis hyperemesis syndrome  -     Anxiety- (present on admission)   Assessment & Plan    Consider psych consult. Pt is on 5 medications for anxiety/insomnia. She has cyclical vomiting issues. She stops eating for days when she gets stressed out. Pt reports she started having (likely psychosomatic) seizures after her father passed.Pt has very poor coping mechanisms. Also a former alcoholic.     -Continue Zoloft 100mg nightly  -Benadryl 25mg nightly for sleep  -Switch Klonopin to IV Ativan as patient is unable to tolerate oral meds.

## 2018-11-09 NOTE — CARE PLAN
Problem: Nutritional:  Goal: Achieve adequate nutritional intake  Advance diet as feasible and atient will consume >50% of meals  Outcome: NOT MET

## 2018-11-09 NOTE — PROGRESS NOTES
Bedside report received, assumed care of patient. Assessment performed. Pt complaining of back pain, heat packs given.  Extremely anxious about getting sleep tonight.  Benadryl ordered. Discussed plan of care with pt.  Bed alarm on and call light within reach, pt educated to call for assistance.

## 2018-11-09 NOTE — PROGRESS NOTES
Bedside report received from ELMO Howell. Pt sleeping. On RA. Potassium phosphate infusing. Bed rails up x2. Bed alarm on. Call light, phone, and bedside table within reach. Will continue to monitor.

## 2018-11-09 NOTE — CARE PLAN
Problem: Safety  Goal: Will remain free from falls    Intervention: Implement fall precautions  Bed alarm on and fall precautions in place.      Problem: Pain Management  Goal: Pain level will decrease to patient's comfort goal    Intervention: Educate and implement non-pharmacologic comfort measures. Examples: relaxation, distration, play therapy, activity therapy, massage, etc.  Heat applied for back pain as needed.

## 2018-11-10 VITALS
RESPIRATION RATE: 14 BRPM | WEIGHT: 119.05 LBS | SYSTOLIC BLOOD PRESSURE: 106 MMHG | DIASTOLIC BLOOD PRESSURE: 58 MMHG | TEMPERATURE: 98.2 F | HEIGHT: 65 IN | BODY MASS INDEX: 19.83 KG/M2 | OXYGEN SATURATION: 97 % | HEART RATE: 70 BPM

## 2018-11-10 PROBLEM — E83.42 HYPOMAGNESEMIA: Status: RESOLVED | Noted: 2018-07-12 | Resolved: 2018-11-10

## 2018-11-10 PROBLEM — N17.9 AKI (ACUTE KIDNEY INJURY) (HCC): Status: RESOLVED | Noted: 2018-09-09 | Resolved: 2018-11-10

## 2018-11-10 LAB
ANION GAP SERPL CALC-SCNC: 8 MMOL/L (ref 0–11.9)
BUN SERPL-MCNC: 3 MG/DL (ref 8–22)
CALCIUM SERPL-MCNC: 8.4 MG/DL (ref 8.5–10.5)
CHLORIDE SERPL-SCNC: 111 MMOL/L (ref 96–112)
CO2 SERPL-SCNC: 22 MMOL/L (ref 20–33)
CREAT SERPL-MCNC: 0.63 MG/DL (ref 0.5–1.4)
ERYTHROCYTE [DISTWIDTH] IN BLOOD BY AUTOMATED COUNT: 52.4 FL (ref 35.9–50)
GLUCOSE SERPL-MCNC: 84 MG/DL (ref 65–99)
HCT VFR BLD AUTO: 30.1 % (ref 37–47)
HGB BLD-MCNC: 10.3 G/DL (ref 12–16)
MAGNESIUM SERPL-MCNC: 2.1 MG/DL (ref 1.5–2.5)
MCH RBC QN AUTO: 31.2 PG (ref 27–33)
MCHC RBC AUTO-ENTMCNC: 34.2 G/DL (ref 33.6–35)
MCV RBC AUTO: 91.2 FL (ref 81.4–97.8)
PHOSPHATE SERPL-MCNC: 4.7 MG/DL (ref 2.5–4.5)
PLATELET # BLD AUTO: 205 K/UL (ref 164–446)
PMV BLD AUTO: 10.5 FL (ref 9–12.9)
POTASSIUM SERPL-SCNC: 3.2 MMOL/L (ref 3.6–5.5)
RBC # BLD AUTO: 3.3 M/UL (ref 4.2–5.4)
SODIUM SERPL-SCNC: 141 MMOL/L (ref 135–145)
WBC # BLD AUTO: 5 K/UL (ref 4.8–10.8)

## 2018-11-10 PROCEDURE — 99239 HOSP IP/OBS DSCHRG MGMT >30: CPT | Mod: GC | Performed by: INTERNAL MEDICINE

## 2018-11-10 PROCEDURE — 84100 ASSAY OF PHOSPHORUS: CPT

## 2018-11-10 PROCEDURE — 83735 ASSAY OF MAGNESIUM: CPT

## 2018-11-10 PROCEDURE — 36415 COLL VENOUS BLD VENIPUNCTURE: CPT

## 2018-11-10 PROCEDURE — 80048 BASIC METABOLIC PNL TOTAL CA: CPT

## 2018-11-10 PROCEDURE — 700111 HCHG RX REV CODE 636 W/ 250 OVERRIDE (IP): Performed by: STUDENT IN AN ORGANIZED HEALTH CARE EDUCATION/TRAINING PROGRAM

## 2018-11-10 PROCEDURE — 85027 COMPLETE CBC AUTOMATED: CPT

## 2018-11-10 PROCEDURE — 700101 HCHG RX REV CODE 250: Performed by: PHYSICAL MEDICINE & REHABILITATION

## 2018-11-10 PROCEDURE — 700102 HCHG RX REV CODE 250 W/ 637 OVERRIDE(OP): Performed by: STUDENT IN AN ORGANIZED HEALTH CARE EDUCATION/TRAINING PROGRAM

## 2018-11-10 PROCEDURE — 700105 HCHG RX REV CODE 258: Performed by: STUDENT IN AN ORGANIZED HEALTH CARE EDUCATION/TRAINING PROGRAM

## 2018-11-10 PROCEDURE — A9270 NON-COVERED ITEM OR SERVICE: HCPCS | Performed by: STUDENT IN AN ORGANIZED HEALTH CARE EDUCATION/TRAINING PROGRAM

## 2018-11-10 RX ORDER — OXYCODONE HYDROCHLORIDE AND ACETAMINOPHEN 5; 325 MG/1; MG/1
1 TABLET ORAL EVERY 4 HOURS PRN
Status: DISCONTINUED | OUTPATIENT
Start: 2018-11-10 | End: 2018-11-10 | Stop reason: HOSPADM

## 2018-11-10 RX ORDER — MORPHINE SULFATE 4 MG/ML
4 INJECTION, SOLUTION INTRAMUSCULAR; INTRAVENOUS EVERY 4 HOURS PRN
Status: DISCONTINUED | OUTPATIENT
Start: 2018-11-10 | End: 2018-11-10

## 2018-11-10 RX ADMIN — LORAZEPAM 1 MG: 2 INJECTION INTRAMUSCULAR; INTRAVENOUS at 05:54

## 2018-11-10 RX ADMIN — MORPHINE SULFATE 4 MG: 4 INJECTION INTRAVENOUS at 11:27

## 2018-11-10 RX ADMIN — LIDOCAINE 1 PATCH: 50 PATCH TOPICAL at 04:49

## 2018-11-10 RX ADMIN — ENOXAPARIN SODIUM 40 MG: 100 INJECTION SUBCUTANEOUS at 04:48

## 2018-11-10 RX ADMIN — SODIUM CHLORIDE: 9 INJECTION, SOLUTION INTRAVENOUS at 06:10

## 2018-11-10 RX ADMIN — MORPHINE SULFATE 4 MG: 4 INJECTION INTRAVENOUS at 05:53

## 2018-11-10 RX ADMIN — ONDANSETRON 4 MG: 2 INJECTION INTRAMUSCULAR; INTRAVENOUS at 01:16

## 2018-11-10 RX ADMIN — MORPHINE SULFATE 4 MG: 4 INJECTION INTRAVENOUS at 01:16

## 2018-11-10 RX ADMIN — ONDANSETRON 4 MG: 2 INJECTION INTRAMUSCULAR; INTRAVENOUS at 05:54

## 2018-11-10 RX ADMIN — OXYCODONE AND ACETAMINOPHEN 1 TABLET: 5; 325 TABLET ORAL at 15:53

## 2018-11-10 RX ADMIN — LORAZEPAM 1 MG: 2 INJECTION INTRAMUSCULAR; INTRAVENOUS at 11:27

## 2018-11-10 RX ADMIN — BUSPIRONE HYDROCHLORIDE 30 MG: 10 TABLET ORAL at 04:49

## 2018-11-10 ASSESSMENT — ENCOUNTER SYMPTOMS
CHILLS: 0
HEARTBURN: 0
COUGH: 0
PALPITATIONS: 0
CONSTIPATION: 0
DIARRHEA: 0
NAUSEA: 0
VOMITING: 0
BACK PAIN: 1
MYALGIAS: 0
FEVER: 0
SHORTNESS OF BREATH: 0
ABDOMINAL PAIN: 0

## 2018-11-10 ASSESSMENT — PAIN SCALES - GENERAL
PAINLEVEL_OUTOF10: 8
PAINLEVEL_OUTOF10: 6
PAINLEVEL_OUTOF10: 8
PAINLEVEL_OUTOF10: 7

## 2018-11-10 NOTE — CARE PLAN
Problem: Communication  Goal: The ability to communicate needs accurately and effectively will improve  Outcome: PROGRESSING AS EXPECTED  Pt is able to voice her feelings/needs at this time.    Problem: Safety  Goal: Will remain free from falls  Outcome: PROGRESSING AS EXPECTED  Pt ambulating, gait steady. Call light and personal belongings within reach.

## 2018-11-10 NOTE — PROGRESS NOTES
Bedside report received from day shift RN, care assumed. Pt assessed, A&Ox4, cooperative, restless. Family at bedside. Pt medicated per MAR, states her pain is still 7/10. Ice pack provided. Pt denies any needs at this time. Bed locked in lowest position, call light and personal belongings within reach.

## 2018-11-10 NOTE — PROGRESS NOTES
"Assumed care of pt at 1630. Pt denies n/t. Pt states nausea. Medicated appropriately per MAR w/positive results. Pt stated back pain of 9/10. Medicated appropriately per MAR w/positive results. Pt had an inflammatory reaction when given IV medications. Zofran, Morphine, & Ativan were given. Pt's arm became red with a few hives. Pt denied pain, but stated she was itchy & that \"this has happened before\". Arm is not swollen nor warm to the touch. Pt medicated appropriately per MAR. Night RN notified. All needs meet at this time. Pt updated on POC. Pt is instructed to call when in need of assistance. Bed in lowest and locked position. Call light within reach. Hourly rounding in place.     "

## 2018-11-10 NOTE — DISCHARGE SUMMARY
Internal Medicine Discharge Summary  Note Author: Emily Maria M.D.     Name Diana Hernandez 1975   Age/Sex 43 y.o. female   MRN [id number]     Admit Date:  2018       Discharge Date:   11/10/2018    Service:   [facility] Internal Medicine white team  Attending Physician(s):   Dr. Roshni Lyon       Senior Resident(s):   Dr. Jackie Calzada  Amador Resident(s):   Dr. Emily Maria  PCP: Isa Rodriguez M.D.    Discharge Diagnoses:                1. Cyclical vomiting syndrome secondary to problem #2  2. Marijuana use  3. Anxiety  4. Leukocytosis, resolved  5. JILL, resolved  6. Electrolyte abnormalities secondary to problem #1, and #4, resolved  7. Prolonged QTC interval, secondary to problem #3 and 4, resolved    Hospital Summary (Brief Narrative):       Patient presented with unremitting nausea and vomiting after marijuana use.  On presentation she had leukocytosis, JILL, and electrolyte abnormalities.  She was given antiemetics, with fluid hydration and correction of her electrolytes including hypokalemia, hypomagnesemia, hypophosphatemia.  She also had uncontrolled anxiety surrounding her nausea and vomiting although she is on several medications.  She improved throughout the hospital stay with correction of her electrolytes as needed, fluid hydration and antiemetics.  On the day of discharge, she felt much better with minimal nausea, no episodes of vomiting and was able to tolerate a regular diet.    Patient /Hospital Summary (Details -- Problem Oriented) :          Hypokalemia   Assessment & Plan    Likely multifactorial: from increased upper GI losses from hyperemesis, decreased oral intake and possibly from correction of ketoacidosis which was likely present upon admission.         * Nausea & vomiting   Assessment & Plan    Nausea has greatly improved, she feels as if she is ready to eat.  We will see how she tolerates oral intake.  -Antiemetics as necessary : Compazine, Phenergan, zofran    -Advance diet as tolerated         Addiction, marijuana (HCC)   Assessment & Plan    Pt continues to smoke MJ despite having cannabis hyperemesis syndrome  -     Anxiety   Assessment & Plan    Consider psych consult. Pt is on 5 medications for anxiety/insomnia. She has cyclical vomiting issues. She stops eating for days when she gets stressed out. Pt reports she started having (likely psychosomatic) seizures after her father passed.Pt has very poor coping mechanisms. Also a former alcoholic.     -Continue Zoloft, buspirone  -Benadryl nightly for sleep  -Patient counseled to follow-up with her PCP regarding her antianxiety meds.  She seems to not be well controlled on her current regimen, and may benefit from an adjustment.     JILL (acute kidney injury) (HCC)-resolved as of 11/10/2018   Assessment & Plan    Resolved.  Likely d/t dehydration   On admission : BUN/Cr 17/2.00 (baseline is 3/0.70) eGFR 27 (baseline >60)  Improving.  -Correction of electrolytes as needed  -BMP daily       Prolonged QT interval-resolved as of 11/9/2018   Assessment & Plan    QTc 522; this is new compared to previous EKG; likely d/t electrolyte imbalances.   -EKG rechecked with QTc: 484ms, repeat EKG today: 461ms  -Continue to optimize electrolytes     Hypomagnesemia-resolved as of 11/10/2018   Assessment & Plan    Repleted.   Monitor      Leukocytosis-resolved as of 11/9/2018   Assessment & Plan    Likely reactive  Patient has no signs of infection  Resolved  -Will CTM     Hypophosphatemia-resolved as of 11/10/2018   Assessment & Plan    Likely due to decreased intake  -Monitor  -Replete as necessary     Hyponatremia-resolved as of 11/8/2018   Assessment & Plan    Resolved  Likely hypovolemic hyponatremia.     Metabolic acidosis-resolved as of 11/8/2018   Assessment & Plan    Resolved  AG 24 upon admission with LA 7.3; LA trended down to 3.9 with adminstration of fluids. Likely d/t vomiting and dehydration  -Continue IVF      Hyperglycemia-resolved as of 2018   Assessment & Plan     upon admission. No hx of diabetes. Severely dehydrated. Recheck.   -A1C: 5.7 -prediabetes  -No need for acute care       Consultants:     None    Procedures:        None    Imaging/ Testin/8/2018 CXR: 1. No acute cardiopulmonary abnormalities are identified.  2018: CT of abdomen and pelvis without contrast: No evidence of acute abnormality or inflammatory change.  The study is however limited due to nonuse of intravenous contrast.    Discharge Medications:         Medication Reconciliation: Completed       Medication List      ASK your doctor about these medications      Instructions   busPIRone 30 MG tablet  Commonly known as:  BUSPAR   Take 1 Tab by mouth 2 Times a Day.  Dose:  30 mg     chlorproMAZINE 25 MG Tabs  Commonly known as:  THORAZINE   Take 1 Tab by mouth 3 times a day.  Dose:  25 mg     clonazePAM 0.5 MG Tabs  Commonly known as:  KLONOPIN   Take 0.5 mg by mouth 3 times a day.  Dose:  0.5 mg     diphenhydrAMINE 25 MG Tabs  Commonly known as:  BENADRYL   Take 50 mg by mouth at bedtime as needed for Sleep.  Dose:  50 mg     sertraline 100 MG Tabs  Commonly known as:  ZOLOFT   Take 1 Tab by mouth every bedtime.  Dose:  100 mg          Disposition:   Home    Diet:   As tolerated    Activity:   As tolerated    Instructions:      The patient was instructed to return to the ER in the event of worsening symptoms. I have counseled the patient on the importance of compliance and the patient has agreed to proceed with all medical recommendations and follow up plan indicated above.   The patient understands that all medications come with benefits and risks. Risks may include permanent injury or death and these risks can be minimized with close reassessment and monitoring.        Primary Care Provider:    Dr. Isa Jordan  Discharge summary faxed to primary care provider:  Completed  Copy of discharge summary given to the patient:  Completed    Follow up appointment details :      Follow-up with primary care within the next 2 weeks.    Pending Studies:        None    Time spent on discharge day patient visit, preparing discharge paperwork and arranging for patient follow up.    Summary of follow up issues:   Patient's anxiety was noted to be uncontrolled despite use of multiple medications.  Consider titration/adjustment of current medications or switching to other medications.     Discharge Time (Minutes) :    45 minutes  Hospital Course Type:  Inpatient Stay >2 midnights      Condition on Discharge medically stable for discharge  ______________________________________________________________________    Interval history/exam for day of discharge:    No acute overnight events.  Patient feels well this morning, she only has minimal nausea.  She denies abdominal pain, vomiting, fever/chills, diarrhea/constipation.  She appeared puffy so fluids were stopped.  Hypokalemia improved with repletion.  The rest of her lab work was unremarkable.     Most Recent Labs:    Lab Results   Component Value Date/Time    WBC 5.0 11/10/2018 08:39 AM    RBC 3.30 (L) 11/10/2018 08:39 AM    HEMOGLOBIN 10.3 (L) 11/10/2018 08:39 AM    HEMATOCRIT 30.1 (L) 11/10/2018 08:39 AM    MCV 91.2 11/10/2018 08:39 AM    MCH 31.2 11/10/2018 08:39 AM    MCHC 34.2 11/10/2018 08:39 AM    MPV 10.5 11/10/2018 08:39 AM    NEUTSPOLYS 85.70 (H) 11/08/2018 12:20 AM    LYMPHOCYTES 6.20 (L) 11/08/2018 12:20 AM    MONOCYTES 6.70 11/08/2018 12:20 AM    EOSINOPHILS 0.00 11/08/2018 12:20 AM    BASOPHILS 0.50 11/08/2018 12:20 AM    HYPOCHROMIA 1+ 04/27/2016 12:49 PM    ANISOCYTOSIS 1+ 09/13/2016 12:29 AM      Lab Results   Component Value Date/Time    SODIUM 141 11/10/2018 05:41 AM    POTASSIUM 3.2 (L) 11/10/2018 05:41 AM    CHLORIDE 111 11/10/2018 05:41 AM    CO2 22 11/10/2018 05:41 AM    GLUCOSE 84 11/10/2018 05:41 AM    BUN 3 (L) 11/10/2018 05:41 AM    CREATININE 0.63 11/10/2018 05:41 AM     CREATININE 0.9 05/18/2009 08:53 AM      Lab Results   Component Value Date/Time    ALTSGPT 5 11/09/2018 03:07 AM    ASTSGOT 14 11/09/2018 03:07 AM    ALKPHOSPHAT 70 11/09/2018 03:07 AM    TBILIRUBIN 0.3 11/09/2018 03:07 AM    LIPASE 15 11/08/2018 12:20 AM    ALBUMIN 3.2 11/09/2018 03:07 AM    GLOBULIN 3.1 11/09/2018 03:07 AM    PREALBUMIN 23.0 05/09/2016 12:00 AM    INR 0.98 04/24/2017 09:39 AM    MACROCYTOSIS 1+ 09/13/2016 12:29 AM     Lab Results   Component Value Date/Time    PROTHROMBTM 13.3 04/24/2017 09:39 AM    INR 0.98 04/24/2017 09:39 AM        Attending attestation:    Patient seen and examined with Emily Maria M.D.  I agree with the examination and assessment/plan as listed in resident note.     I agree with discharge documentation which reflects my direct input.  Overall personal time spent on discharge of this patient is >30 minutes.    Roshni Lyon M.D.  [facility] Internal Medicine

## 2018-11-10 NOTE — DISCHARGE INSTRUCTIONS
Discharge Instructions    Discharged to home by car with relative. Discharged via wheelchair, hospital escort: Yes.  Special equipment needed: Not Applicable    Be sure to schedule a follow-up appointment with your primary care doctor or any specialists as instructed.     Discharge Plan:   Smoking Cessation Offered: Patient Refused  Influenza Vaccine Indication: Patient Refuses    I understand that a diet low in cholesterol, fat, and sodium is recommended for good health. Unless I have been given specific instructions below for another diet, I accept this instruction as my diet prescription.   Other diet: Regular    Special Instructions: None    · Is patient discharged on Warfarin / Coumadin?   No     Depression / Suicide Risk    As you are discharged from this [facility] facility, it is important to learn how to keep safe from harming yourself.    Recognize the warning signs:  · Abrupt changes in personality, positive or negative- including increase in energy   · Giving away possessions  · Change in eating patterns- significant weight changes-  positive or negative  · Change in sleeping patterns- unable to sleep or sleeping all the time   · Unwillingness or inability to communicate  · Depression  · Unusual sadness, discouragement and loneliness  · Talk of wanting to die  · Neglect of personal appearance   · Rebelliousness- reckless behavior  · Withdrawal from people/activities they love  · Confusion- inability to concentrate     If you or a loved one observes any of these behaviors or has concerns about self-harm, here's what you can do:  · Talk about it- your feelings and reasons for harming yourself  · Remove any means that you might use to hurt yourself (examples: pills, rope, extension cords, firearm)  · Get professional help from the community (Mental Health, Substance Abuse, psychological counseling)  · Do not be alone:Call your Safe Contact- someone whom you trust who will be there for you.  · Call your  local CRISIS HOTLINE 879-4000 or 341-223-4711  · Call your local Children's Mobile Crisis Response Team [place] (674) 312-7427 or www.Scoreoid  · Call the toll free National Suicide Prevention Hotlines   · National Suicide Prevention Lifeline 621-946-TFPN (5602)  · National [employer] [place] Line Network 800-SUICIDE (336-0991)

## 2018-11-10 NOTE — PROGRESS NOTES
Internal Medicine Interval Note  Note Author: Emily Maria M.D.     Name Diana Hernandez 1975   Age/Sex 43 y.o. female   MRN [id number]   Code Status FULL     After 5PM or if no immediate response to page, please call for cross-coverage  Attending/Team: Dr. Roshni Lyon/Melecio See Patient List for primary contact information  Call [phone] to page    1st Call - Day Intern (R1):   Dr. Emily Maria  2nd Call - Day Sr. Resident (R2/R3):   Dr. Jackie Calzada      Reason for interval visit  (Principal Problem)   Nausea & vomiting    Interval Problem Daily Status Update  (24 hours, problem oriented, brief subjective history, new lab/imaging data pertinent to that problem)   No acute overnight events.  Patient feels tired this morning she was unable to sleep due to frequent disturbances by hospital staff.  She states she feels much better, with just minimal nausea this morning.  She feels well enough to eat and will try to see if she tolerates food.  Denies vomiting, abdominal pain, fever/chills, diarrhea, constipation, muscle weakness, SOB, cough.  She has eaten little since yesterday attributing her nausea and lack of appetite.  Patient appeared puffy this morning, however no chest findings or leg edema, fluids were discontinued.  Lab work showed hypokalemia which was improved since yesterday, which was repleted.    Review of Systems   Constitutional: Negative for chills and fever.   Respiratory: Negative for cough and shortness of breath.    Cardiovascular: Negative for chest pain, palpitations and leg swelling.   Gastrointestinal: Negative for abdominal pain, constipation, diarrhea, heartburn, nausea and vomiting.   Genitourinary: Negative for dysuria and hematuria.   Musculoskeletal: Positive for back pain. Negative for joint pain and myalgias.     Disposition/Barriers to discharge:   Guarded    Consultants/Specialty  PCP: Isa Rodriguez M.D.    Quality Measures  Quality-Core Measures    Reviewed items::  Labs reviewed and Medications reviewed  Saldana catheter::  No Saldana  DVT prophylaxis pharmacological::  Enoxaparin (Lovenox)  DVT prophylaxis - mechanical:  SCDs  Ulcer Prophylaxis::  Not indicated    Physical Exam     Vitals:    11/09/18 1620 11/09/18 2000 11/10/18 0300 11/10/18 0919   BP: 107/62 109/76 109/81 106/58   Pulse: 77 64 70 70   Resp: 16 16 16 14   Temp: 37.1 °C (98.7 °F) 37 °C (98.6 °F) 36.8 °C (98.3 °F) 36.8 °C (98.2 °F)   SpO2: 98% 98% 90% 97%   Weight:       Height:         Body mass index is 19.81 kg/m².    Oxygen Therapy:  Pulse Oximetry: 97 %, O2 (LPM): 0, O2 Delivery: None (Room Air)    Physical Exam   Constitutional: She is oriented to person, place, and time.   Thin female, resting comfortably in bed   HENT:   Head: Normocephalic and atraumatic.   Eyes: EOM are normal.   Neck: Normal range of motion.   Cardiovascular: Normal rate, regular rhythm, normal heart sounds and intact distal pulses.    Pulmonary/Chest: Effort normal and breath sounds normal. No respiratory distress. She has no wheezes. She has no rales.   Abdominal: Soft. She exhibits no distension. There is no tenderness. There is no rebound and no guarding.   Musculoskeletal: Normal range of motion.   Neurological: She is alert and oriented to person, place, and time.   Skin: Skin is warm and dry.   Psychiatric: Affect and judgment normal.   Vitals reviewed.    Assessment/Plan     * Nausea & vomiting- (present on admission)   Assessment & Plan    Nausea has greatly improved, she feels as if she is ready to eat.  We will see how she tolerates oral intake.  -Antiemetics as necessary : Compazine, Phenergan, zofran   -Advance diet as tolerated         Hypokalemia- (present on admission)   Assessment & Plan    Likely multifactorial: from increased upper GI losses from hyperemesis, decreased oral intake and possibly from correction of ketoacidosis which was likely present upon admission.         Addiction, marijuana  (HCC)- (present on admission)   Assessment & Plan    Pt continues to smoke MJ despite having cannabis hyperemesis syndrome  -     Anxiety- (present on admission)   Assessment & Plan    Consider psych consult. Pt is on 5 medications for anxiety/insomnia. She has cyclical vomiting issues. She stops eating for days when she gets stressed out. Pt reports she started having (likely psychosomatic) seizures after her father passed.Pt has very poor coping mechanisms. Also a former alcoholic.     -Continue Zoloft, buspirone  -Benadryl nightly for sleep  -Patient counseled to follow-up with her PCP regarding her antianxiety meds.  She seems to not be well controlled on her current regimen, and may benefit from an adjustment.

## 2018-11-10 NOTE — PROGRESS NOTES
Pt is A & O x 4, anxious. Refusing shower and ambulation. Reports 8/10 pain at all times. Pt states that she is slightly nauseous but much improved from yesterday. Tolerating clear liquid diet. Saline locked. Sleeping most of the day. Will continue to monitor pain and vomiting.

## 2018-11-10 NOTE — PROGRESS NOTES
Report called to ELMO Darling on Neurosurgery. Pt transferred via wheelchair with her chart, medications, and personal belongings.

## 2018-11-10 NOTE — CARE PLAN
Problem: Bowel/Gastric:  Goal: Normal bowel function is maintained or improved  Outcome: PROGRESSING AS EXPECTED  Pt reports slight nausea, no vomiting. Able to tolerate PO clear liquids.     Problem: Fluid Volume:  Goal: Will maintain balanced intake and output  Outcome: PROGRESSING AS EXPECTED  Pt is taking in adequate fluids, saline locked

## 2018-11-11 NOTE — PROGRESS NOTES
Pt signed discharge instructions, her and  at bedside verbalize understanding. Pt able to eat solid, regular food. No nausea reported. Medicated for pain with Percocet 5 mg. Wheeled out to personal vehicle.

## 2018-11-28 NOTE — ADDENDUM NOTE
Encounter addended by: Suzan Liao R.N. on: 11/28/2018 10:59 AM<BR>    Actions taken: Flowsheet accepted

## 2018-12-27 ENCOUNTER — APPOINTMENT (OUTPATIENT)
Dept: RADIOLOGY | Facility: MEDICAL CENTER | Age: 43
DRG: 897 | End: 2018-12-27
Attending: EMERGENCY MEDICINE
Payer: COMMERCIAL

## 2018-12-27 ENCOUNTER — HOSPITAL ENCOUNTER (INPATIENT)
Facility: MEDICAL CENTER | Age: 43
LOS: 4 days | DRG: 897 | End: 2019-01-01
Attending: EMERGENCY MEDICINE | Admitting: INTERNAL MEDICINE
Payer: COMMERCIAL

## 2018-12-27 DIAGNOSIS — R11.2 INTRACTABLE VOMITING WITH NAUSEA, UNSPECIFIED VOMITING TYPE: ICD-10-CM

## 2018-12-27 DIAGNOSIS — E87.6 HYPOKALEMIA: ICD-10-CM

## 2018-12-27 DIAGNOSIS — E86.0 DEHYDRATION: ICD-10-CM

## 2018-12-27 DIAGNOSIS — E87.29 HIGH ANION GAP METABOLIC ACIDOSIS: ICD-10-CM

## 2018-12-27 DIAGNOSIS — F12.10 CANNABIS ABUSE: ICD-10-CM

## 2018-12-27 LAB
ALBUMIN SERPL BCP-MCNC: 4.8 G/DL (ref 3.2–4.9)
ALBUMIN/GLOB SERPL: 1.1 G/DL
ALP SERPL-CCNC: 112 U/L (ref 30–99)
ALT SERPL-CCNC: 8 U/L (ref 2–50)
AMPHET UR QL SCN: NEGATIVE
ANION GAP SERPL CALC-SCNC: 11 MMOL/L (ref 0–11.9)
ANION GAP SERPL CALC-SCNC: 24 MMOL/L (ref 0–11.9)
APPEARANCE UR: CLEAR
AST SERPL-CCNC: 15 U/L (ref 12–45)
BACTERIA #/AREA URNS HPF: ABNORMAL /HPF
BARBITURATES UR QL SCN: NEGATIVE
BASOPHILS # BLD AUTO: 0.6 % (ref 0–1.8)
BASOPHILS # BLD: 0.08 K/UL (ref 0–0.12)
BENZODIAZ UR QL SCN: POSITIVE
BILIRUB SERPL-MCNC: 1.3 MG/DL (ref 0.1–1.5)
BILIRUB UR QL STRIP.AUTO: NEGATIVE
BUN SERPL-MCNC: 7 MG/DL (ref 8–22)
BUN SERPL-MCNC: 9 MG/DL (ref 8–22)
BZE UR QL SCN: NEGATIVE
CALCIUM SERPL-MCNC: 10.9 MG/DL (ref 8.5–10.5)
CALCIUM SERPL-MCNC: 9 MG/DL (ref 8.5–10.5)
CANNABINOIDS UR QL SCN: POSITIVE
CHLORIDE SERPL-SCNC: 104 MMOL/L (ref 96–112)
CHLORIDE SERPL-SCNC: 96 MMOL/L (ref 96–112)
CO2 SERPL-SCNC: 13 MMOL/L (ref 20–33)
CO2 SERPL-SCNC: 20 MMOL/L (ref 20–33)
COLOR UR: YELLOW
CREAT SERPL-MCNC: 0.8 MG/DL (ref 0.5–1.4)
CREAT SERPL-MCNC: 1.11 MG/DL (ref 0.5–1.4)
EOSINOPHIL # BLD AUTO: 0 K/UL (ref 0–0.51)
EOSINOPHIL NFR BLD: 0 % (ref 0–6.9)
EPI CELLS #/AREA URNS HPF: ABNORMAL /HPF
ERYTHROCYTE [DISTWIDTH] IN BLOOD BY AUTOMATED COUNT: 54.1 FL (ref 35.9–50)
GLOBULIN SER CALC-MCNC: 4.5 G/DL (ref 1.9–3.5)
GLUCOSE SERPL-MCNC: 123 MG/DL (ref 65–99)
GLUCOSE SERPL-MCNC: 226 MG/DL (ref 65–99)
GLUCOSE UR STRIP.AUTO-MCNC: 100 MG/DL
HCG SERPL QL: NEGATIVE
HCT VFR BLD AUTO: 44.3 % (ref 37–47)
HGB BLD-MCNC: 15.2 G/DL (ref 12–16)
HYALINE CASTS #/AREA URNS LPF: ABNORMAL /LPF
IMM GRANULOCYTES # BLD AUTO: 0.11 K/UL (ref 0–0.11)
IMM GRANULOCYTES NFR BLD AUTO: 0.8 % (ref 0–0.9)
KETONES UR STRIP.AUTO-MCNC: 40 MG/DL
LEUKOCYTE ESTERASE UR QL STRIP.AUTO: NEGATIVE
LIPASE SERPL-CCNC: 7 U/L (ref 11–82)
LYMPHOCYTES # BLD AUTO: 0.83 K/UL (ref 1–4.8)
LYMPHOCYTES NFR BLD: 6 % (ref 22–41)
MCH RBC QN AUTO: 30.6 PG (ref 27–33)
MCHC RBC AUTO-ENTMCNC: 34.3 G/DL (ref 33.6–35)
MCV RBC AUTO: 89.1 FL (ref 81.4–97.8)
METHADONE UR QL SCN: NEGATIVE
MICRO URNS: ABNORMAL
MONOCYTES # BLD AUTO: 1.21 K/UL (ref 0–0.85)
MONOCYTES NFR BLD AUTO: 8.8 % (ref 0–13.4)
NEUTROPHILS # BLD AUTO: 11.57 K/UL (ref 2–7.15)
NEUTROPHILS NFR BLD: 83.8 % (ref 44–72)
NITRITE UR QL STRIP.AUTO: POSITIVE
NRBC # BLD AUTO: 0 K/UL
NRBC BLD-RTO: 0 /100 WBC
OPIATES UR QL SCN: NEGATIVE
OXYCODONE UR QL SCN: NEGATIVE
PCP UR QL SCN: NEGATIVE
PH UR STRIP.AUTO: 5 [PH]
PLATELET # BLD AUTO: 426 K/UL (ref 164–446)
PMV BLD AUTO: 9.9 FL (ref 9–12.9)
POTASSIUM SERPL-SCNC: 2.7 MMOL/L (ref 3.6–5.5)
POTASSIUM SERPL-SCNC: 3.4 MMOL/L (ref 3.6–5.5)
PROPOXYPH UR QL SCN: NEGATIVE
PROT SERPL-MCNC: 9.3 G/DL (ref 6–8.2)
PROT UR QL STRIP: NEGATIVE MG/DL
RBC # BLD AUTO: 4.97 M/UL (ref 4.2–5.4)
RBC # URNS HPF: ABNORMAL /HPF
RBC UR QL AUTO: NEGATIVE
SODIUM SERPL-SCNC: 133 MMOL/L (ref 135–145)
SODIUM SERPL-SCNC: 135 MMOL/L (ref 135–145)
SP GR UR STRIP.AUTO: 1.01
UROBILINOGEN UR STRIP.AUTO-MCNC: 0.2 MG/DL
WBC # BLD AUTO: 13.8 K/UL (ref 4.8–10.8)
WBC #/AREA URNS HPF: ABNORMAL /HPF

## 2018-12-27 PROCEDURE — 81001 URINALYSIS AUTO W/SCOPE: CPT

## 2018-12-27 PROCEDURE — 85025 COMPLETE CBC W/AUTO DIFF WBC: CPT

## 2018-12-27 PROCEDURE — 71045 X-RAY EXAM CHEST 1 VIEW: CPT

## 2018-12-27 PROCEDURE — 93005 ELECTROCARDIOGRAM TRACING: CPT | Performed by: EMERGENCY MEDICINE

## 2018-12-27 PROCEDURE — 83690 ASSAY OF LIPASE: CPT

## 2018-12-27 PROCEDURE — 80307 DRUG TEST PRSMV CHEM ANLYZR: CPT

## 2018-12-27 PROCEDURE — 96368 THER/DIAG CONCURRENT INF: CPT

## 2018-12-27 PROCEDURE — 80048 BASIC METABOLIC PNL TOTAL CA: CPT

## 2018-12-27 PROCEDURE — 96365 THER/PROPH/DIAG IV INF INIT: CPT

## 2018-12-27 PROCEDURE — 36415 COLL VENOUS BLD VENIPUNCTURE: CPT

## 2018-12-27 PROCEDURE — 74019 RADEX ABDOMEN 2 VIEWS: CPT

## 2018-12-27 PROCEDURE — 51798 US URINE CAPACITY MEASURE: CPT

## 2018-12-27 PROCEDURE — 84703 CHORIONIC GONADOTROPIN ASSAY: CPT

## 2018-12-27 PROCEDURE — 99285 EMERGENCY DEPT VISIT HI MDM: CPT

## 2018-12-27 PROCEDURE — 87077 CULTURE AEROBIC IDENTIFY: CPT

## 2018-12-27 PROCEDURE — 87086 URINE CULTURE/COLONY COUNT: CPT

## 2018-12-27 PROCEDURE — 700105 HCHG RX REV CODE 258: Performed by: EMERGENCY MEDICINE

## 2018-12-27 PROCEDURE — 87186 SC STD MICRODIL/AGAR DIL: CPT

## 2018-12-27 PROCEDURE — 700102 HCHG RX REV CODE 250 W/ 637 OVERRIDE(OP): Performed by: EMERGENCY MEDICINE

## 2018-12-27 PROCEDURE — 96376 TX/PRO/DX INJ SAME DRUG ADON: CPT

## 2018-12-27 PROCEDURE — 700111 HCHG RX REV CODE 636 W/ 250 OVERRIDE (IP): Performed by: EMERGENCY MEDICINE

## 2018-12-27 PROCEDURE — 96375 TX/PRO/DX INJ NEW DRUG ADDON: CPT

## 2018-12-27 PROCEDURE — 80053 COMPREHEN METABOLIC PANEL: CPT

## 2018-12-27 PROCEDURE — A9270 NON-COVERED ITEM OR SERVICE: HCPCS | Performed by: EMERGENCY MEDICINE

## 2018-12-27 RX ORDER — HALOPERIDOL 5 MG/ML
5 INJECTION INTRAMUSCULAR ONCE
Status: COMPLETED | OUTPATIENT
Start: 2018-12-27 | End: 2018-12-27

## 2018-12-27 RX ORDER — SODIUM CHLORIDE, SODIUM LACTATE, POTASSIUM CHLORIDE, CALCIUM CHLORIDE 600; 310; 30; 20 MG/100ML; MG/100ML; MG/100ML; MG/100ML
1000 INJECTION, SOLUTION INTRAVENOUS ONCE
Status: COMPLETED | OUTPATIENT
Start: 2018-12-27 | End: 2018-12-28

## 2018-12-27 RX ORDER — KETOROLAC TROMETHAMINE 30 MG/ML
15 INJECTION, SOLUTION INTRAMUSCULAR; INTRAVENOUS ONCE
Status: COMPLETED | OUTPATIENT
Start: 2018-12-27 | End: 2018-12-27

## 2018-12-27 RX ORDER — ONDANSETRON 4 MG/1
4 TABLET, ORALLY DISINTEGRATING ORAL EVERY 6 HOURS PRN
Status: ON HOLD | COMMUNITY
End: 2019-03-31

## 2018-12-27 RX ORDER — SODIUM CHLORIDE, SODIUM LACTATE, POTASSIUM CHLORIDE, CALCIUM CHLORIDE 600; 310; 30; 20 MG/100ML; MG/100ML; MG/100ML; MG/100ML
1000 INJECTION, SOLUTION INTRAVENOUS ONCE
Status: COMPLETED | OUTPATIENT
Start: 2018-12-27 | End: 2018-12-27

## 2018-12-27 RX ORDER — POTASSIUM CHLORIDE 7.45 MG/ML
10 INJECTION INTRAVENOUS ONCE
Status: COMPLETED | OUTPATIENT
Start: 2018-12-27 | End: 2018-12-28

## 2018-12-27 RX ORDER — MAGNESIUM SULFATE HEPTAHYDRATE 40 MG/ML
2 INJECTION, SOLUTION INTRAVENOUS ONCE
Status: COMPLETED | OUTPATIENT
Start: 2018-12-27 | End: 2018-12-27

## 2018-12-27 RX ORDER — SODIUM CHLORIDE 9 MG/ML
1000 INJECTION, SOLUTION INTRAVENOUS ONCE
Status: COMPLETED | OUTPATIENT
Start: 2018-12-27 | End: 2018-12-27

## 2018-12-27 RX ORDER — LORAZEPAM 2 MG/ML
1 INJECTION INTRAMUSCULAR ONCE
Status: COMPLETED | OUTPATIENT
Start: 2018-12-27 | End: 2018-12-27

## 2018-12-27 RX ORDER — ONDANSETRON 2 MG/ML
4 INJECTION INTRAMUSCULAR; INTRAVENOUS ONCE
Status: COMPLETED | OUTPATIENT
Start: 2018-12-27 | End: 2018-12-27

## 2018-12-27 RX ADMIN — KETOROLAC TROMETHAMINE 15 MG: 30 INJECTION, SOLUTION INTRAMUSCULAR at 18:00

## 2018-12-27 RX ADMIN — FAMOTIDINE 20 MG: 10 INJECTION INTRAVENOUS at 20:39

## 2018-12-27 RX ADMIN — SODIUM CHLORIDE, POTASSIUM CHLORIDE, SODIUM LACTATE AND CALCIUM CHLORIDE 1000 ML: 600; 310; 30; 20 INJECTION, SOLUTION INTRAVENOUS at 19:00

## 2018-12-27 RX ADMIN — LIDOCAINE HYDROCHLORIDE 30 ML: 20 SOLUTION OROPHARYNGEAL at 20:40

## 2018-12-27 RX ADMIN — FENTANYL CITRATE 50 MCG: 50 INJECTION, SOLUTION INTRAMUSCULAR; INTRAVENOUS at 23:24

## 2018-12-27 RX ADMIN — POTASSIUM CHLORIDE 10 MEQ: 7.46 INJECTION, SOLUTION INTRAVENOUS at 23:11

## 2018-12-27 RX ADMIN — HALOPERIDOL LACTATE 5 MG: 5 INJECTION, SOLUTION INTRAMUSCULAR at 18:30

## 2018-12-27 RX ADMIN — CEFTRIAXONE SODIUM 2 G: 2 INJECTION, POWDER, FOR SOLUTION INTRAMUSCULAR; INTRAVENOUS at 22:22

## 2018-12-27 RX ADMIN — HALOPERIDOL LACTATE 5 MG: 5 INJECTION, SOLUTION INTRAMUSCULAR at 19:57

## 2018-12-27 RX ADMIN — LORAZEPAM 1 MG: 2 INJECTION INTRAMUSCULAR; INTRAVENOUS at 19:00

## 2018-12-27 RX ADMIN — ONDANSETRON HYDROCHLORIDE 4 MG: 2 INJECTION, SOLUTION INTRAMUSCULAR; INTRAVENOUS at 22:45

## 2018-12-27 RX ADMIN — SODIUM CHLORIDE 1000 ML: 9 INJECTION, SOLUTION INTRAVENOUS at 18:00

## 2018-12-27 RX ADMIN — SODIUM CHLORIDE, POTASSIUM CHLORIDE, SODIUM LACTATE AND CALCIUM CHLORIDE 1000 ML: 600; 310; 30; 20 INJECTION, SOLUTION INTRAVENOUS at 23:11

## 2018-12-27 RX ADMIN — MAGNESIUM SULFATE IN WATER 2 G: 40 INJECTION, SOLUTION INTRAVENOUS at 19:00

## 2018-12-27 ASSESSMENT — PAIN SCALES - GENERAL: PAINLEVEL_OUTOF10: 10

## 2018-12-28 ENCOUNTER — APPOINTMENT (OUTPATIENT)
Dept: RADIOLOGY | Facility: MEDICAL CENTER | Age: 43
DRG: 897 | End: 2018-12-28
Attending: INTERNAL MEDICINE
Payer: COMMERCIAL

## 2018-12-28 PROBLEM — D72.829 LEUKOCYTOSIS: Status: ACTIVE | Noted: 2018-12-28

## 2018-12-28 PROBLEM — E87.29 HIGH ANION GAP METABOLIC ACIDOSIS: Status: ACTIVE | Noted: 2018-03-22

## 2018-12-28 PROBLEM — N17.9 ACUTE KIDNEY FAILURE (HCC): Status: ACTIVE | Noted: 2018-12-28

## 2018-12-28 LAB
ANION GAP SERPL CALC-SCNC: 10 MMOL/L (ref 0–11.9)
ANION GAP SERPL CALC-SCNC: 10 MMOL/L (ref 0–11.9)
ANION GAP SERPL CALC-SCNC: 7 MMOL/L (ref 0–11.9)
BUN SERPL-MCNC: 3 MG/DL (ref 8–22)
BUN SERPL-MCNC: 4 MG/DL (ref 8–22)
BUN SERPL-MCNC: 5 MG/DL (ref 8–22)
CALCIUM SERPL-MCNC: 8.2 MG/DL (ref 8.5–10.5)
CALCIUM SERPL-MCNC: 8.4 MG/DL (ref 8.5–10.5)
CALCIUM SERPL-MCNC: 8.9 MG/DL (ref 8.5–10.5)
CHLORIDE SERPL-SCNC: 108 MMOL/L (ref 96–112)
CHLORIDE SERPL-SCNC: 111 MMOL/L (ref 96–112)
CHLORIDE SERPL-SCNC: 113 MMOL/L (ref 96–112)
CO2 SERPL-SCNC: 19 MMOL/L (ref 20–33)
CREAT SERPL-MCNC: 0.65 MG/DL (ref 0.5–1.4)
CREAT SERPL-MCNC: 0.66 MG/DL (ref 0.5–1.4)
CREAT SERPL-MCNC: 0.67 MG/DL (ref 0.5–1.4)
EKG IMPRESSION: NORMAL
ETHANOL BLD-MCNC: 0 G/DL
GLUCOSE SERPL-MCNC: 110 MG/DL (ref 65–99)
GLUCOSE SERPL-MCNC: 117 MG/DL (ref 65–99)
GLUCOSE SERPL-MCNC: 93 MG/DL (ref 65–99)
LACTATE BLD-SCNC: 1.2 MMOL/L (ref 0.5–2)
MAGNESIUM SERPL-MCNC: 1.9 MG/DL (ref 1.5–2.5)
PHOSPHATE SERPL-MCNC: 1.3 MG/DL (ref 2.5–4.5)
POTASSIUM SERPL-SCNC: 3.2 MMOL/L (ref 3.6–5.5)
POTASSIUM SERPL-SCNC: 3.6 MMOL/L (ref 3.6–5.5)
POTASSIUM SERPL-SCNC: 4 MMOL/L (ref 3.6–5.5)
SODIUM SERPL-SCNC: 137 MMOL/L (ref 135–145)
SODIUM SERPL-SCNC: 137 MMOL/L (ref 135–145)
SODIUM SERPL-SCNC: 142 MMOL/L (ref 135–145)

## 2018-12-28 PROCEDURE — 96366 THER/PROPH/DIAG IV INF ADDON: CPT

## 2018-12-28 PROCEDURE — 700111 HCHG RX REV CODE 636 W/ 250 OVERRIDE (IP): Performed by: STUDENT IN AN ORGANIZED HEALTH CARE EDUCATION/TRAINING PROGRAM

## 2018-12-28 PROCEDURE — 36569 INSJ PICC 5 YR+ W/O IMAGING: CPT

## 2018-12-28 PROCEDURE — 96372 THER/PROPH/DIAG INJ SC/IM: CPT

## 2018-12-28 PROCEDURE — 770020 HCHG ROOM/CARE - TELE (206)

## 2018-12-28 PROCEDURE — 84100 ASSAY OF PHOSPHORUS: CPT

## 2018-12-28 PROCEDURE — 80048 BASIC METABOLIC PNL TOTAL CA: CPT

## 2018-12-28 PROCEDURE — 700105 HCHG RX REV CODE 258: Performed by: STUDENT IN AN ORGANIZED HEALTH CARE EDUCATION/TRAINING PROGRAM

## 2018-12-28 PROCEDURE — 700102 HCHG RX REV CODE 250 W/ 637 OVERRIDE(OP): Performed by: STUDENT IN AN ORGANIZED HEALTH CARE EDUCATION/TRAINING PROGRAM

## 2018-12-28 PROCEDURE — 83735 ASSAY OF MAGNESIUM: CPT

## 2018-12-28 PROCEDURE — 700101 HCHG RX REV CODE 250: Performed by: STUDENT IN AN ORGANIZED HEALTH CARE EDUCATION/TRAINING PROGRAM

## 2018-12-28 PROCEDURE — 96375 TX/PRO/DX INJ NEW DRUG ADDON: CPT

## 2018-12-28 PROCEDURE — 80307 DRUG TEST PRSMV CHEM ANLYZR: CPT

## 2018-12-28 PROCEDURE — 700111 HCHG RX REV CODE 636 W/ 250 OVERRIDE (IP): Performed by: INTERNAL MEDICINE

## 2018-12-28 PROCEDURE — 36415 COLL VENOUS BLD VENIPUNCTURE: CPT

## 2018-12-28 PROCEDURE — A9270 NON-COVERED ITEM OR SERVICE: HCPCS | Performed by: STUDENT IN AN ORGANIZED HEALTH CARE EDUCATION/TRAINING PROGRAM

## 2018-12-28 PROCEDURE — 99223 1ST HOSP IP/OBS HIGH 75: CPT | Mod: AI,GC | Performed by: INTERNAL MEDICINE

## 2018-12-28 PROCEDURE — 96376 TX/PRO/DX INJ SAME DRUG ADON: CPT

## 2018-12-28 PROCEDURE — 83605 ASSAY OF LACTIC ACID: CPT

## 2018-12-28 RX ORDER — POTASSIUM CHLORIDE 7.45 MG/ML
10 INJECTION INTRAVENOUS
Status: DISPENSED | OUTPATIENT
Start: 2018-12-28 | End: 2018-12-28

## 2018-12-28 RX ORDER — SUCRALFATE ORAL 1 G/10ML
1 SUSPENSION ORAL EVERY 6 HOURS PRN
Status: DISCONTINUED | OUTPATIENT
Start: 2018-12-28 | End: 2019-01-01 | Stop reason: HOSPADM

## 2018-12-28 RX ORDER — BISACODYL 10 MG
10 SUPPOSITORY, RECTAL RECTAL
Status: DISCONTINUED | OUTPATIENT
Start: 2018-12-28 | End: 2019-01-01 | Stop reason: HOSPADM

## 2018-12-28 RX ORDER — CAPSAICIN 0.025 %
CREAM (GRAM) TOPICAL 2 TIMES DAILY
Status: DISCONTINUED | OUTPATIENT
Start: 2018-12-28 | End: 2019-01-01 | Stop reason: HOSPADM

## 2018-12-28 RX ORDER — HYDRALAZINE HYDROCHLORIDE 20 MG/ML
10 INJECTION INTRAMUSCULAR; INTRAVENOUS EVERY 4 HOURS PRN
Status: DISCONTINUED | OUTPATIENT
Start: 2018-12-28 | End: 2019-01-01 | Stop reason: HOSPADM

## 2018-12-28 RX ORDER — AMOXICILLIN 250 MG
2 CAPSULE ORAL 2 TIMES DAILY
Status: DISCONTINUED | OUTPATIENT
Start: 2018-12-28 | End: 2019-01-01 | Stop reason: HOSPADM

## 2018-12-28 RX ORDER — ACETAMINOPHEN 325 MG/1
650 TABLET ORAL EVERY 6 HOURS PRN
Status: DISCONTINUED | OUTPATIENT
Start: 2018-12-28 | End: 2018-12-28

## 2018-12-28 RX ORDER — THIAMINE MONONITRATE (VIT B1) 100 MG
100 TABLET ORAL DAILY
Status: DISCONTINUED | OUTPATIENT
Start: 2018-12-28 | End: 2019-01-01 | Stop reason: HOSPADM

## 2018-12-28 RX ORDER — ONDANSETRON 2 MG/ML
4 INJECTION INTRAMUSCULAR; INTRAVENOUS EVERY 4 HOURS
Status: DISCONTINUED | OUTPATIENT
Start: 2018-12-28 | End: 2018-12-29

## 2018-12-28 RX ORDER — ONDANSETRON 2 MG/ML
4 INJECTION INTRAMUSCULAR; INTRAVENOUS EVERY 4 HOURS PRN
Status: DISCONTINUED | OUTPATIENT
Start: 2018-12-28 | End: 2018-12-28

## 2018-12-28 RX ORDER — ONDANSETRON 4 MG/1
4 TABLET, ORALLY DISINTEGRATING ORAL EVERY 4 HOURS PRN
Status: DISCONTINUED | OUTPATIENT
Start: 2018-12-28 | End: 2019-01-01 | Stop reason: HOSPADM

## 2018-12-28 RX ORDER — ACETAMINOPHEN 325 MG/1
650 TABLET ORAL EVERY 6 HOURS
Status: DISCONTINUED | OUTPATIENT
Start: 2018-12-28 | End: 2019-01-01 | Stop reason: HOSPADM

## 2018-12-28 RX ORDER — SODIUM CHLORIDE AND POTASSIUM CHLORIDE 300; 900 MG/100ML; MG/100ML
INJECTION, SOLUTION INTRAVENOUS CONTINUOUS
Status: DISCONTINUED | OUTPATIENT
Start: 2018-12-28 | End: 2018-12-28

## 2018-12-28 RX ORDER — DEXTROSE MONOHYDRATE, SODIUM CHLORIDE, AND POTASSIUM CHLORIDE 50; 1.49; 9 G/1000ML; G/1000ML; G/1000ML
INJECTION, SOLUTION INTRAVENOUS CONTINUOUS
Status: DISCONTINUED | OUTPATIENT
Start: 2018-12-28 | End: 2018-12-30

## 2018-12-28 RX ORDER — MORPHINE SULFATE 4 MG/ML
2 INJECTION, SOLUTION INTRAMUSCULAR; INTRAVENOUS EVERY 4 HOURS PRN
Status: DISCONTINUED | OUTPATIENT
Start: 2018-12-28 | End: 2019-01-01 | Stop reason: HOSPADM

## 2018-12-28 RX ORDER — SUCRALFATE ORAL 1 G/10ML
1 SUSPENSION ORAL EVERY 6 HOURS
Status: DISCONTINUED | OUTPATIENT
Start: 2018-12-28 | End: 2018-12-28

## 2018-12-28 RX ORDER — CLONAZEPAM 0.5 MG/1
0.5 TABLET ORAL 2 TIMES DAILY PRN
Status: DISCONTINUED | OUTPATIENT
Start: 2018-12-28 | End: 2018-12-28

## 2018-12-28 RX ORDER — CHLORPROMAZINE HYDROCHLORIDE 25 MG/1
25 TABLET, FILM COATED ORAL 3 TIMES DAILY
Status: DISCONTINUED | OUTPATIENT
Start: 2018-12-28 | End: 2019-01-01 | Stop reason: HOSPADM

## 2018-12-28 RX ORDER — CLONAZEPAM 0.5 MG/1
0.5 TABLET ORAL 3 TIMES DAILY
Status: DISCONTINUED | OUTPATIENT
Start: 2018-12-28 | End: 2019-01-01 | Stop reason: HOSPADM

## 2018-12-28 RX ORDER — PROMETHAZINE HYDROCHLORIDE 25 MG/1
12.5-25 TABLET ORAL EVERY 4 HOURS PRN
Status: DISCONTINUED | OUTPATIENT
Start: 2018-12-28 | End: 2019-01-01 | Stop reason: HOSPADM

## 2018-12-28 RX ORDER — SODIUM CHLORIDE AND POTASSIUM CHLORIDE 150; 900 MG/100ML; MG/100ML
INJECTION, SOLUTION INTRAVENOUS CONTINUOUS
Status: DISCONTINUED | OUTPATIENT
Start: 2018-12-28 | End: 2018-12-28

## 2018-12-28 RX ORDER — GUAIFENESIN/DEXTROMETHORPHAN 100-10MG/5
10 SYRUP ORAL EVERY 6 HOURS PRN
Status: DISCONTINUED | OUTPATIENT
Start: 2018-12-28 | End: 2019-01-01 | Stop reason: HOSPADM

## 2018-12-28 RX ORDER — BUSPIRONE HYDROCHLORIDE 30 MG/1
30 TABLET ORAL 2 TIMES DAILY
Status: DISCONTINUED | OUTPATIENT
Start: 2018-12-28 | End: 2019-01-01 | Stop reason: HOSPADM

## 2018-12-28 RX ORDER — SODIUM CHLORIDE 9 MG/ML
INJECTION, SOLUTION INTRAVENOUS CONTINUOUS
Status: DISCONTINUED | OUTPATIENT
Start: 2018-12-28 | End: 2018-12-28

## 2018-12-28 RX ORDER — PROMETHAZINE HYDROCHLORIDE 12.5 MG/1
12.5-25 SUPPOSITORY RECTAL EVERY 4 HOURS PRN
Status: DISCONTINUED | OUTPATIENT
Start: 2018-12-28 | End: 2019-01-01 | Stop reason: HOSPADM

## 2018-12-28 RX ORDER — POLYETHYLENE GLYCOL 3350 17 G/17G
1 POWDER, FOR SOLUTION ORAL
Status: DISCONTINUED | OUTPATIENT
Start: 2018-12-28 | End: 2019-01-01 | Stop reason: HOSPADM

## 2018-12-28 RX ADMIN — POTASSIUM CHLORIDE 10 MEQ: 7.46 INJECTION, SOLUTION INTRAVENOUS at 03:00

## 2018-12-28 RX ADMIN — MORPHINE SULFATE 2 MG: 4 INJECTION INTRAVENOUS at 18:41

## 2018-12-28 RX ADMIN — ONDANSETRON 4 MG: 2 INJECTION INTRAMUSCULAR; INTRAVENOUS at 22:18

## 2018-12-28 RX ADMIN — POTASSIUM CHLORIDE 10 MEQ: 7.46 INJECTION, SOLUTION INTRAVENOUS at 11:29

## 2018-12-28 RX ADMIN — POTASSIUM CHLORIDE 10 MEQ: 7.46 INJECTION, SOLUTION INTRAVENOUS at 13:07

## 2018-12-28 RX ADMIN — ONDANSETRON 4 MG: 4 TABLET, ORALLY DISINTEGRATING ORAL at 14:35

## 2018-12-28 RX ADMIN — CHLORPROMAZINE HYDROCHLORIDE 25 MG: 25 TABLET, SUGAR COATED ORAL at 17:39

## 2018-12-28 RX ADMIN — MORPHINE SULFATE 2 MG: 4 INJECTION INTRAVENOUS at 14:36

## 2018-12-28 RX ADMIN — ENOXAPARIN SODIUM 40 MG: 100 INJECTION SUBCUTANEOUS at 07:23

## 2018-12-28 RX ADMIN — STANDARDIZED SENNA CONCENTRATE AND DOCUSATE SODIUM 2 TABLET: 8.6; 5 TABLET, FILM COATED ORAL at 17:38

## 2018-12-28 RX ADMIN — ONDANSETRON 4 MG: 2 INJECTION INTRAMUSCULAR; INTRAVENOUS at 04:35

## 2018-12-28 RX ADMIN — POTASSIUM CHLORIDE 10 MEQ: 7.46 INJECTION, SOLUTION INTRAVENOUS at 02:42

## 2018-12-28 RX ADMIN — ONDANSETRON 4 MG: 2 INJECTION INTRAMUSCULAR; INTRAVENOUS at 08:34

## 2018-12-28 RX ADMIN — ACETAMINOPHEN 650 MG: 325 TABLET, FILM COATED ORAL at 17:37

## 2018-12-28 RX ADMIN — SODIUM CHLORIDE: 9 INJECTION, SOLUTION INTRAVENOUS at 04:22

## 2018-12-28 RX ADMIN — CAPSAICIN: 0.25 CREAM TOPICAL at 22:18

## 2018-12-28 RX ADMIN — MORPHINE SULFATE 2 MG: 4 INJECTION INTRAVENOUS at 22:48

## 2018-12-28 RX ADMIN — MORPHINE SULFATE 2 MG: 4 INJECTION INTRAVENOUS at 08:34

## 2018-12-28 RX ADMIN — POTASSIUM CHLORIDE 10 MEQ: 7.46 INJECTION, SOLUTION INTRAVENOUS at 05:35

## 2018-12-28 RX ADMIN — ONDANSETRON 4 MG: 2 INJECTION INTRAMUSCULAR; INTRAVENOUS at 17:40

## 2018-12-28 RX ADMIN — POTASSIUM CHLORIDE 10 MEQ: 7.46 INJECTION, SOLUTION INTRAVENOUS at 14:41

## 2018-12-28 RX ADMIN — BUSPIRONE HYDROCHLORIDE 30 MG: 30 TABLET ORAL at 17:38

## 2018-12-28 RX ADMIN — POTASSIUM CHLORIDE, DEXTROSE MONOHYDRATE AND SODIUM CHLORIDE: 150; 5; 900 INJECTION, SOLUTION INTRAVENOUS at 13:09

## 2018-12-28 RX ADMIN — PROCHLORPERAZINE EDISYLATE 10 MG: 5 INJECTION INTRAMUSCULAR; INTRAVENOUS at 02:42

## 2018-12-28 RX ADMIN — CLONAZEPAM 0.5 MG: 0.5 TABLET ORAL at 13:13

## 2018-12-28 RX ADMIN — CLONAZEPAM 0.5 MG: 0.5 TABLET ORAL at 17:37

## 2018-12-28 RX ADMIN — SERTRALINE 100 MG: 50 TABLET, FILM COATED ORAL at 22:18

## 2018-12-28 RX ADMIN — SUCRALFATE 1 G: 1 SUSPENSION ORAL at 02:41

## 2018-12-28 ASSESSMENT — ENCOUNTER SYMPTOMS
CHILLS: 0
NECK PAIN: 0
EYE PAIN: 0
TREMORS: 0
CLAUDICATION: 0
FEVER: 0
INSOMNIA: 0
FOCAL WEAKNESS: 0
DIARRHEA: 0
HEARTBURN: 1
DIZZINESS: 1
BLOOD IN STOOL: 0
VOMITING: 1
WEAKNESS: 0
BACK PAIN: 0
ORTHOPNEA: 0
NERVOUS/ANXIOUS: 1
NAUSEA: 1
SINUS PAIN: 0
FLANK PAIN: 0
DIAPHORESIS: 0
CONSTIPATION: 0
DEPRESSION: 1
HEMOPTYSIS: 0
EYE DISCHARGE: 0
DOUBLE VISION: 0
WEIGHT LOSS: 0
WEIGHT LOSS: 1
SPEECH CHANGE: 0
SEIZURES: 0
FALLS: 0
ABDOMINAL PAIN: 1
SHORTNESS OF BREATH: 0
BLURRED VISION: 0
CHILLS: 1
HEARTBURN: 0
PALPITATIONS: 0
PND: 0
DEPRESSION: 0
COUGH: 0
STRIDOR: 0
MEMORY LOSS: 0
HALLUCINATIONS: 0
DIZZINESS: 0
SPUTUM PRODUCTION: 0
TINGLING: 0
EYE REDNESS: 0
WEAKNESS: 1
HEADACHES: 1
WHEEZING: 0
SENSORY CHANGE: 0
LOSS OF CONSCIOUSNESS: 0
SORE THROAT: 0
PHOTOPHOBIA: 0
BACK PAIN: 1
MYALGIAS: 0

## 2018-12-28 ASSESSMENT — COGNITIVE AND FUNCTIONAL STATUS - GENERAL
MOBILITY SCORE: 24
SUGGESTED CMS G CODE MODIFIER MOBILITY: CH
SUGGESTED CMS G CODE MODIFIER DAILY ACTIVITY: CH
DAILY ACTIVITIY SCORE: 24

## 2018-12-28 ASSESSMENT — COPD QUESTIONNAIRES
COPD SCREENING SCORE: 0
HAVE YOU SMOKED AT LEAST 100 CIGARETTES IN YOUR ENTIRE LIFE: NO/DON'T KNOW
DO YOU EVER COUGH UP ANY MUCUS OR PHLEGM?: NO/ONLY WITH OCCASIONAL COLDS OR INFECTIONS
DURING THE PAST 4 WEEKS HOW MUCH DID YOU FEEL SHORT OF BREATH: NONE/LITTLE OF THE TIME
IN THE PAST 12 MONTHS DO YOU DO LESS THAN YOU USED TO BECAUSE OF YOUR BREATHING PROBLEMS: DISAGREE/UNSURE

## 2018-12-28 ASSESSMENT — PAIN SCALES - GENERAL
PAINLEVEL_OUTOF10: 6
PAINLEVEL_OUTOF10: 8
PAINLEVEL_OUTOF10: 8

## 2018-12-28 ASSESSMENT — LIFESTYLE VARIABLES
EVER_SMOKED: NEVER
ALCOHOL_USE: NO
SUBSTANCE_ABUSE: 0

## 2018-12-28 NOTE — ASSESSMENT & PLAN NOTE
Resolved   Secondary to [facility].   -Education given on the effect of marijuana use on her cyclical vomiting  -Capsacin cream

## 2018-12-28 NOTE — ED NOTES
Pt transported off floor by tele RN while in another room with another patient; verbal report called to receiving RN.

## 2018-12-28 NOTE — SENIOR ADMIT NOTE
"[facility] SENIOR ADMIT NOTE:    Patient ID:   Name:             Diana Hernandez   YOB: 1975  Age:                 43 y.o.  female   MRN:               [id number]                                                          Chief Complaint:       Nausea, vomiting, abdominal pain, anxiety following Marijuana use - temporary relief with hot showers    History of Present Illness:    Mr. Hernandez is a 43 y.o. female with a PMHx of cyclical vomiting syndrome, narcotic dependence, Marijuana use, Anxiety and depression, panic attacks,     Was woken up from sleep ~ 9am on 12/27/18 with nausea, vomiting. Vomiting x 10/day, non-bilious, non-bloody. Dry heaving in ER. Abdominal pain started ~ 9.30 am, gradual onset, sharp, stabbing, crampy, 9/10, \"sea sick\" feeling, constant, worsened after she started hyperventilating due to panic attack.  Intermittent ease of pain down to 7/10 with peptobismal. Pain eased temporarily by peptobismal and by leaning forward. Aggravated by food and vomiting. Pain started in her epigastrium, radiated to back and groin. Denies fever, but has chills +. Frontal headache intermittent 8/10. Hiccups. Chronic postural dizziness on dizizness. Numerous hot showers today - symptoms better temporarily. 5 bowel movements on 12/26 - doesn't sound like diarrhea. Anorexia ++, poor appetite. Weight loss of 5 lbs over past two weeks. Reduced urin output, dark yellow, questionable dysuria, no visible hematuria.     Last similar episode was a month ago. Was in-patient with similar symptoms 11/7 - 11/18, 9/9-9/15, 7/12-7/13. About 8 hospital admissions to this facility since January 2018, and numerous ER attendances with the same problem.     ROS: As in HPI.     LABS: Positive for electrolyte abnormalities, HAGMA.   Initial ER labs - WBC 13.8, Hb 15.2, plts 426, Na 133, K 3.4, bicarb 13 and AG elevated at 24, glucose 226, BUN/Creat 9/1.11, eGFR>54, calcium 10.9. . ALT 8, AST 15, " T.bili 1.3. UDS positive for nitrites and moderate bacteria, WBC 2-5, glucose and ketones. UDS positive for BZDPs, Cannabinoids. b-HCG negative.     Repeat BMP after 2-3 lts IVF and Mag --> Na 135, K 2.7, BUN/Creat 7/0.8, eGFR>60, bicarb 20 and AG 11.    IMAGING : AXR - no e/o SBO. CXR - nil    Active Ambulatory Problems     Diagnosis Date Noted   • Tobacco abuse disorder 09/15/2011   • Nausea & vomiting 09/16/2011   • Anxiety 12/21/2014   • Cannabis hyperemesis syndrome concurrent with and due to cannabis dependence ([facility]) 02/23/2015   • Drug-seeking behavior 02/24/2015   • History of seizures 04/12/2017   • Planned pregnancy 05/30/2017   • Acute renal failure (ARF) ([facility]) 05/19/2018   • Addiction, marijuana ([facility]) 05/19/2018   • Depression 05/19/2018   • Hypokalemia 09/09/2018   • Sepsis ([facility]) 09/10/2018     Resolved Ambulatory Problems     Diagnosis Date Noted   • Nephrolithiasis 06/20/2009   • Tobacco abuse 06/20/2009   • 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   • Dyspepsia 04/14/2010   • CLOSTRIDIUM DIFFICILE INFECTION 04/14/2010   • Neutropenia 04/14/2010   • Nausea and vomiting in adult 09/15/2011   • Hypokalemia 09/15/2011   • Dehydration 09/15/2011   • Vomiting 08/01/2012   • Cyclical vomiting syndrome 08/01/2012   • Pyelonephritis 12/09/2012   • Renal insufficiency 12/09/2012   • Total bilirubin, elevated 12/09/2012   • Chronic back pain 12/10/2012   • Back pain 02/24/2013   • ARF (acute renal failure) ([facility]) 12/20/2014   • Ketosis ([facility]) 02/24/2015   • Prerenal azotemia 02/24/2015   • Leukocytosis 02/24/2015   • Melena 02/26/2015   • Acute renal failure ([facility]) 03/18/2015   • Hypokalemia 03/19/2015   • Hyponatremia 03/19/2015   • Lactic acidosis 10/05/2015   • Intussusception of jejunum ([facility]) 01/12/2016   • Increased nausea and vomiting 01/12/2016   • AP (abdominal pain) 01/19/2016   • Hypovolemic shock ([facility]) 04/27/2016   • GI bleed 04/27/2016   • Hypokalemia 05/14/2016   • Seizure ([facility]) 05/14/2016    • Hypophosphatemia 06/19/2016   • SMAS (superior mesenteric artery syndrome) ([facility]) 06/20/2016   • Normocytic anemia 06/21/2016   • Anxiety 01/08/2017   • Hypokalemia due to loss of potassium 08/29/2017   • Dehydration 02/05/2018   • Hypokalemia 02/05/2018   • JILL (acute kidney injury) ([facility]) 02/05/2018   • Metabolic acidosis 03/22/2018   • Dehydration 05/19/2018   • Leukocytosis 05/19/2018   • Lactic acidosis 05/19/2018   • Hyperglycemia 05/19/2018   • Hyponatremia 05/19/2018   • Anxiety 05/19/2018   • Hypomagnesemia 07/12/2018   • Prolonged QT interval 07/12/2018   • Intractable nausea and vomiting 07/12/2018   • JILL (acute kidney injury) ([facility]) 09/09/2018   • Nausea and vomiting 09/10/2018   • Encephalopathy 09/10/2018   • Bacteremia 09/13/2018   • Insomnia 09/13/2018     Past Medical History:   Diagnosis Date   • Anxiety    • Anxiety disorder    • Backpain    • CLOSTRIDIUM DIFFICILE INFECTION 4/14/2010   • CVS disease    • Cyclic vomiting syndrome    • Dental disorder    • Drug-seeking behavior    • Dyspepsia 7/12/2009   • Nephrolithiasis 6/20/2009   • Pain    • Snoring    • 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 Not employed.  EtOH - quits 11.5 years ago, was a heavy drinker before  Tobacco - quit 10-15 years ago, smoked for ~ 15 years, 0.5 ppd  Illicits - Marijuana  "  Marijuana - started ~ since 18 years of age, has been smoking ~ 2gms/day for past 2 years - I.e x 5-6/day.     MEDICATIONS:  Thorazine 25 mg TID  Klonopin 0.5 mg TID  Buspar 30 mg BID  Sertraline 100 mg as needed  Zofran PRN    FAMILY HISTORY :  Mother - obesity, anxiety, depression  Father - no info available    PHYSICAL EXAM  Vitals:   Weight/BMI: Body mass index is 20.43 kg/m².  Blood pressure 117/77, pulse 88, temperature 36.6 °C (97.9 °F), temperature source Temporal, resp. rate 18, height 1.626 m (5' 4\"), weight 54 kg (119 lb), last menstrual period 12/01/2018, SpO2 100 %, not currently breastfeeding.  Vitals:    12/27/18 1604 12/27/18 1628 12/27/18 1903 12/27/18 2319   BP: 106/78  117/78 117/77   Pulse: 89  67 88   Resp: 20  16 18   Temp: 36.6 °C (97.9 °F)      TempSrc: Temporal      SpO2: 94%  98% 100%   Weight:  54 kg (119 lb)     Height: 1.626 m (5' 4\")        Oxygen Therapy:  Pulse Oximetry: 100 %    Physical Exam   Constitutional: She is oriented to person, place, and time. She appears distressed.   HENT:   Head: Normocephalic and atraumatic.   Mouth/Throat: Oropharynx is clear and moist. No oropharyngeal exudate.   Eyes: Conjunctivae are normal. Right eye exhibits no discharge. Left eye exhibits no discharge. No scleral icterus.   Neck: Normal range of motion. Neck supple. No thyromegaly present.   Cardiovascular: Normal rate, regular rhythm and normal heart sounds.    No murmur heard.  Pulmonary/Chest: Effort normal and breath sounds normal. No respiratory distress. She has no wheezes. She has no rales.   Abdominal: Soft. Bowel sounds are normal. She exhibits no distension. There is tenderness. There is no rebound and no guarding.   Mild epigastric tenderness   Musculoskeletal: Normal range of motion. She exhibits no edema, tenderness or deformity.   Lymphadenopathy:     She has no cervical adenopathy.   Neurological: She is alert and oriented to person, place, and time. She has normal reflexes. GCS " score is 15.   No focal deficits   Skin: Skin is warm and dry. No rash noted. She is not diaphoretic. No erythema.   Psychiatric: Memory and affect normal.       IMPRESSION  This 43 year old anorexic lady with PMHx of cyclical vomiting syndrome, Marijuana use, anxiety, numerous hospitalizations and ER attendances with the same, s/p laparotomies and Shruti en Y for SMA syndrome (2009) and possible intussusception (2016), narcotic use presented with nausea, vomiting, abdominal pain, after excess Marijuana use a day ago, temporary relief after hot showers. Symptoms similar to prior admissions. She was given fentanyl, haldol, IVF in ER with no relief of symptoms. She is being admitted for electrolyte abnormality correction, rehydration with IVF, IV anti-emetics.       ASSESSMENT:  # Cannabinoid hyperemesis syndrome  # Cyclical Vomiting syndrome  # Hypokalemia  # Hyponatremia  # High anion gap metabolic acidosis, resolved with IVF  # Hyperglycemia  # Leucocytosis (could be due to dehydration as mild hypercalcemia improved with IVF)  # Mild hypercalcemia (improved with IVF)  # Abnormal UA  # Marijuana abuse  # h/o EtOH abuse, now sober  # Anxiety and panic attacks  # Hepatomegaly on CT scan in Sept 2018 (no stones)   # h/o nephrolithiasis - CT Renal colic protocol July 2018 - no stones      PLAN:  # Admit tele due to electrolyte abnormalities  # IV potassium 40 + 50 PO K-lyte as tolerated  # PO clear liquids - encourage oral hydration  # Check magnesium  # IVF @ 125 ml/hr  # Check BMP am  # GI cocktail and sucralfate  # Try avoid opiates  # Scheduled tylenol  # Urine for culture & sensitivity  # Not for antibiotics at present  # Scheduled tylenol  #  consult      Please refer to Intern Dr. Cherry's H&P for complete admission details.

## 2018-12-28 NOTE — ED TRIAGE NOTES
Pt to triage .  Chief Complaint   Patient presents with   • Flank Pain     bilateral flank pain hst of kidney stones    • N/V   • Difficulty Urinating

## 2018-12-28 NOTE — ED PROVIDER NOTES
ED PROVIDER NOTE     Scribed for Robi Russ M.D. by Mook Nuno. 12/27/2018, 5:53 PM.    CHIEF COMPLAINT  Chief Complaint   Patient presents with   • Flank Pain     bilateral flank pain hst of kidney stones    • N/V   • Difficulty Urinating       HPI    Primary care provider: Isa Rodriguez M.D.  Means of arrival: EMS  History obtained from: Patient  History limited by: Patient's clinical distress    Diana Hernandez is a 43 y.o. female who presents with back pain, nausea and vomiting, and anxiety in extreme distress. She endorses a history of kidney stones.  Admitted last month for similar symptoms thought to be related to cannabinoid hyperemesis.  She has continued to use cannabis.    HPI limited secondary to patient's clinical distress    REVIEW OF SYSTEMS  Genitourinary: Positive for flank pain.   Musculoskeletal: Positive for back pain    ROS limited secondary to patients clinical distress    PAST MEDICAL HISTORY   has a past medical history of Anxiety; Anxiety disorder; Backpain; CLOSTRIDIUM DIFFICILE INFECTION (4/14/2010); Cyclic vomiting syndrome; Dental disorder; Drug-seeking behavior; Dyspepsia (7/12/2009); Nephrolithiasis (6/20/2009); Pain; Snoring; Superior mesenteric artery syndrome (HCC) (7/12/2009); and Tobacco abuse (6/20/2009).    PAST FAMILY HISTORY  Family History   Problem Relation Age of Onset   • Cancer Mother 50        Colon cancer   • Hypertension Mother    • Obesity Mother    • Anxiety disorder Mother    • Depression Mother    • Allergies Father    • Hypertension Father    • Heart Disease Maternal Grandmother        SOCIAL HISTORY  Social History     Social History Main Topics   • Smoking status: Never Smoker   • Smokeless tobacco: Never Used   • Alcohol use No      Comment: hx   • Drug use: Yes     Types: Marijuana, Inhaled      Comment: marijuana   • Sexual activity: None noted       SURGICAL HISTORY   has a past surgical history that includes gastroscopy-endo (7/8/2009);  lithotripsy; pyloroplasty (7/27/2009); gastroscopy with biopsy (3/9/2010); exploratory laparotomy (7/27/2009); appendectomy (7/27/2009); cholecystectomy (7/27/2009); other; exploratory laparotomy (1/12/2016); bowel resection (1/12/2016); and gastroscopy-endo (4/28/2016).    CURRENT MEDICATIONS  Home Medications     Reviewed by Sp Ortiz M.D. (Resident) on 12/28/18 at [facility]  Med List Status: Complete   Medication Last Dose Status   acetaminophen (TYLENOL) tablet 650 mg  Active   bisacodyl (DULCOLAX) suppository 10 mg  Active   busPIRone (BUSPAR) 30 MG tablet 12/26/2018 Active   busPIRone (BUSPAR) tablet 30 mg  Active   chlorproMAZINE (THORAZINE) 25 MG Tab 12/26/2018 Active   chlorproMAZINE (THORAZINE) tablet 25 mg  Active   clonazePAM (KLONOPIN) 0.5 MG Tab 12/26/2018 Active   clonazePAM (KLONOPIN) tablet 0.5 mg  Active   dextrose 5 % and 0.9 % NaCl with KCl 20 mEq infusion  Active   enoxaparin (LOVENOX) inj 40 mg  Active   guaiFENesin dextromethorphan (ROBITUSSIN DM) 100-10 MG/5ML syrup 10 mL  Active   hydrALAZINE (APRESOLINE) injection 10 mg  Active   hyoscyamine-maalox plus-lidocaine viscous (GI COCKTAIL) oral susp 15 mL  Active   magnesium hydroxide (MILK OF MAGNESIA) suspension 30 mL  Active   morphine (pf) 4 mg/ml injection 2 mg  Active   ondansetron (ZOFRAN ODT) 4 MG TABLET DISPERSIBLE 12/27/2018 Active   ondansetron (ZOFRAN ODT) dispertab 4 mg  Active   ondansetron (ZOFRAN) syringe/vial injection 4 mg  Active   polyethylene glycol/lytes (MIRALAX) PACKET 1 Packet  Active   potassium chloride (KCL) ivpb 10 mEq  Active   prochlorperazine (COMPAZINE) injection 5-10 mg  Active   promethazine (PHENERGAN) suppository 12.5-25 mg  Active   promethazine (PHENERGAN) tablet 12.5-25 mg  Active   senna-docusate (PERICOLACE or SENOKOT S) 8.6-50 MG per tablet 2 Tab  Active   sertraline (ZOLOFT) 100 MG Tab 12/26/2018 Active   sertraline (ZOLOFT) tablet 100 mg  Active   sucralfate (CARAFATE) 1 GM/10ML suspension 1 g  Active  "  thiamine tablet 100 mg  Active                ALLERGIES  Allergies   Allergen Reactions   • Metoclopramide Hives     Told by MD; pt suspects possible hives.   • Bactrim [Sulfamethoxazole W-Trimethoprim] Unspecified     Someone said I had a rash or something.      • Seroquel [Quetiapine] Unspecified     \"Seizures\"  RXN=unknown       PHYSICAL EXAM  VITAL SIGNS: /78   Pulse 89   Temp 36.6 °C (97.9 °F) (Temporal)   Resp 20   Ht 1.626 m (5' 4\")   Wt 54 kg (119 lb)   LMP 12/01/2018   SpO2 94%   BMI 20.43 kg/m²    Pulse ox interpretation: On room air, I interpret this pulse ox as normal.  Constitutional: Moaning and screaming on the stretcher, difficult to examine.  HEENT: Normocephalic, atraumatic. Posterior pharynx clear, mucous membranes dry  Eyes: PERRLA 3-2, slightly pale conjunctivae.  Neck: Supple, no step-offs.  Chest/Pulmonary: No obvious wheeze but moaning during examination.  Cardiovascular: Regular rate and rhythm, 2+ radial pulses.  Abdomen: Epigastric tenderness, Soft, no rebound, guarding, or masses.  Back: No midline step-offs.  Musculoskeletal: No deformity, no edema.  Neuro: Clear speech, no focal asymmetry..  Psych: Extremely distressed, moaning and screaming and rocking back and forth.  Skin: No rashes, warm and dry.    DIAGNOSTIC STUDIES / PROCEDURES    LABS & EKG  Results for orders placed or performed during the hospital encounter of 12/27/18   CBC WITH DIFFERENTIAL   Result Value Ref Range    WBC 13.8 (H) 4.8 - 10.8 K/uL    RBC 4.97 4.20 - 5.40 M/uL    Hemoglobin 15.2 12.0 - 16.0 g/dL    Hematocrit 44.3 37.0 - 47.0 %    MCV 89.1 81.4 - 97.8 fL    MCH 30.6 27.0 - 33.0 pg    MCHC 34.3 33.6 - 35.0 g/dL    RDW 54.1 (H) 35.9 - 50.0 fL    Platelet Count 426 164 - 446 K/uL    MPV 9.9 9.0 - 12.9 fL    Neutrophils-Polys 83.80 (H) 44.00 - 72.00 %    Lymphocytes 6.00 (L) 22.00 - 41.00 %    Monocytes 8.80 0.00 - 13.40 %    Eosinophils 0.00 0.00 - 6.90 %    Basophils 0.60 0.00 - 1.80 %    " Immature Granulocytes 0.80 0.00 - 0.90 %    Nucleated RBC 0.00 /100 WBC    Neutrophils (Absolute) 11.57 (H) 2.00 - 7.15 K/uL    Lymphs (Absolute) 0.83 (L) 1.00 - 4.80 K/uL    Monos (Absolute) 1.21 (H) 0.00 - 0.85 K/uL    Eos (Absolute) 0.00 0.00 - 0.51 K/uL    Baso (Absolute) 0.08 0.00 - 0.12 K/uL    Immature Granulocytes (abs) 0.11 0.00 - 0.11 K/uL    NRBC (Absolute) 0.00 K/uL   COMP METABOLIC PANEL   Result Value Ref Range    Sodium 133 (L) 135 - 145 mmol/L    Potassium 3.4 (L) 3.6 - 5.5 mmol/L    Chloride 96 96 - 112 mmol/L    Co2 13 (L) 20 - 33 mmol/L    Anion Gap 24.0 (H) 0.0 - 11.9    Glucose 226 (H) 65 - 99 mg/dL    Bun 9 8 - 22 mg/dL    Creatinine 1.11 0.50 - 1.40 mg/dL    Calcium 10.9 (H) 8.5 - 10.5 mg/dL    AST(SGOT) 15 12 - 45 U/L    ALT(SGPT) 8 2 - 50 U/L    Alkaline Phosphatase 112 (H) 30 - 99 U/L    Total Bilirubin 1.3 0.1 - 1.5 mg/dL    Albumin 4.8 3.2 - 4.9 g/dL    Total Protein 9.3 (H) 6.0 - 8.2 g/dL    Globulin 4.5 (H) 1.9 - 3.5 g/dL    A-G Ratio 1.1 g/dL   LIPASE   Result Value Ref Range    Lipase 7 (L) 11 - 82 U/L   HCG QUAL SERUM   Result Value Ref Range    Beta-Hcg Qualitative Serum Negative Negative   URINALYSIS,CULTURE IF INDICATED   Result Value Ref Range    Color Yellow     Character Clear     Specific Gravity 1.015 <1.035    Ph 5.0 5.0 - 8.0    Glucose 100 (A) Negative mg/dL    Ketones 40 (A) Negative mg/dL    Protein Negative Negative mg/dL    Bilirubin Negative Negative    Urobilinogen, Urine 0.2 Negative    Nitrite Positive (A) Negative    Leukocyte Esterase Negative Negative    Occult Blood Negative Negative    Micro Urine Req Microscopic    ESTIMATED GFR   Result Value Ref Range    GFR If African American >60 >60 mL/min/1.73 m 2    GFR If Non African American 54 (A) >60 mL/min/1.73 m 2   URINE DRUG SCREEN   Result Value Ref Range    Amphetamines Urine Negative Negative    Barbiturates Negative Negative    Benzodiazepines Positive (A) Negative    Cocaine Metabolite Negative Negative     Methadone Negative Negative    Opiates Negative Negative    Oxycodone Negative Negative    Phencyclidine -Pcp Negative Negative    Propoxyphene Negative Negative    Cannabinoid Metab Positive (A) Negative   URINE MICROSCOPIC (W/UA)   Result Value Ref Range    WBC 2-5 /hpf    RBC 0-2 /hpf    Bacteria Moderate (A) None /hpf    Epithelial Cells Few /hpf    Hyaline Cast 3-5 (A) /lpf   BMP   Result Value Ref Range    Sodium 135 135 - 145 mmol/L    Potassium 2.7 (LL) 3.6 - 5.5 mmol/L    Chloride 104 96 - 112 mmol/L    Co2 20 20 - 33 mmol/L    Glucose 123 (H) 65 - 99 mg/dL    Bun 7 (L) 8 - 22 mg/dL    Creatinine 0.80 0.50 - 1.40 mg/dL    Calcium 9.0 8.5 - 10.5 mg/dL    Anion Gap 11.0 0.0 - 11.9   ESTIMATED GFR   Result Value Ref Range    GFR If African American >60 >60 mL/min/1.73 m 2    GFR If Non African American >60 >60 mL/min/1.73 m 2   BASIC METABOLIC PANEL   Result Value Ref Range    Sodium 137 135 - 145 mmol/L    Potassium 3.2 (L) 3.6 - 5.5 mmol/L    Chloride 108 96 - 112 mmol/L    Co2 19 (L) 20 - 33 mmol/L    Glucose 93 65 - 99 mg/dL    Bun 5 (L) 8 - 22 mg/dL    Creatinine 0.67 0.50 - 1.40 mg/dL    Calcium 8.4 (L) 8.5 - 10.5 mg/dL    Anion Gap 10.0 0.0 - 11.9   ESTIMATED GFR   Result Value Ref Range    GFR If African American >60 >60 mL/min/1.73 m 2    GFR If Non African American >60 >60 mL/min/1.73 m 2   EKG   Result Value Ref Range    Report       [facility] Emergency Dept.    Test Date:  2018  Pt Name:    ALIYA MURRAY                 Department: ER  MRN:        [id number]                      Room:        60  Gender:     Female                       Technician: [id number]  :        [id number]                   Requested By:ROBI BOGGS  Order #:    [id number]                    Reading MD: Robi Boggs MD    Measurements  Intervals                                Axis  Rate:       83                           P:          74  LA:         200                          QRS:         71  QRSD:       102                          T:          -76  QT:         384  QTc:        452    Interpretive Statements  SINUS RHYTHM  BORDERLINE AV CONDUCTION DELAY  RSR' IN V1 OR V2, PROBABLY NORMAL VARIANT  BORDERLINE REPOLARIZATION ABNORMALITY  Compared to ECG 11/09/2018 08:15:01  RSR' in V1 or V2 now present  No STEMI  Electronically Signed On 12- 12:11:16 PST by Robi Russ MD           RADIOLOGY  [id number] [id number]   Final Result         No specific finding to suggest small bowel obstruction.      DX-CHEST-PORTABLE (1 VIEW)   Final Result         1. No acute cardiopulmonary abnormalities are identified.          PROCEDURES    Venous Access Procedure Note    Indication: Need blood for laboratory evaluation, emergent need for intravenous access, nursing staff unable to obtain access and MD skill needed    Procedure: The patient was placed in the appropriate position and the skin over the puncture site was prepped with chlorhexidine and draped in a sterile fashion. Intravenous access was obtained in the left antecubital vein and the site was secured appropriately. 20g long PIV catheter placed.     The patient tolerated the procedure well.    Complications: None      Venous Access Procedure Note    Indication: emergent need for intravenous access, nursing staff unable to obtain access and MD skill needed    Procedure: The patient was placed in the appropriate position and the skin over the puncture site was prepped with chlorhexidine and draped in a sterile fashion. Intravenous access was obtained in the right antecubital vein under US guidance, 18g long PIV placed, and the site was secured appropriately.    The patient tolerated the procedure well.    Complications: None            COURSE & MEDICAL DECISION MAKING    This is a 43 y.o. female who presents with intractable nausea vomiting and generalized abdominal pain present for 1-2 days.  Many prior episodes similar to this, previously diagnosed  with cannabinoid hyperemesis syndrome.    Differential Diagnosis includes but is not limited to: Cyclic vomiting, Appendicitis, Pancreatitis, Dehydration, Anxiety, obstruction    ED Course:  5:51 PM Patient presents to the ED with Flank pain. Patient will be treated with Toradol 15 mg, 1 L of IV fluids, Haldol 5 mg. Ordered for Estimated GFR, CBC with differential, CMP, Lipase, HCG Qual Serum, UA culture if indicated to evaluate her symptoms.     5:55 PM - I performed a bedside ultrasound IV placement at this time, see procedure note.     6:37 PM - Patient will be treated with lactated ringer bolus, Ativan 1 mg, and Magnesium Sulfate IVPB premix 2 g.  She is obviously dehydrated and I am concerned that she could have a surgical process present hence need for continued IV fluids.    Labs are concerning for a significant anion gap acidosis, white blood cell count is elevated.    8:32 PM -Patient was reevaluated at bedside who reports feeling slightly better but admits to smoking marijuana two days ago, despite being informed she may have cannabinoid hyperemesis syndrome.  Abdomen is soft on recheck but she is still complaining of nausea and total body pain.  Her mood is better, but she still appears anxious and sad, she denies suicidal thoughts or attempts but does report severe anxiety and stress to the fact that she found out she may be infertile recently.    On records review the patient has had at least 5 CT scans in the last 18 months at this facility alone.  I am concerned that she has had extreme amount of radiation for her age, workup thus far is consistent with cannabinoid hyperemesis.  Since she has had recent imaging that showed no acute surgical process so we will proceed with symptomatic control reassess labs after IV fluids, and observe the patient closely. QTC stable will continue haldol for symptom control.     Unfortunately repeat chemistry shows worsening hypokalemia but her anion gap acidosis is  improving.    10:54 PM - I consulted with admitting [facility] internal medicine team and they will kindly admit the patient for further workup and treatment.  Reassessed the patient and her vital signs remained stable, her abdomen is soft I doubt a surgical process is present.  Abdominal x-rays show no air-fluid levels suggesting severe obstruction, however she has had intractable nausea and vomiting.  She does appear better after IV fluids thus I feel she is having a positive response to parenteral rehydration.  Supplemental potassium by vein ordered.  I had to place a second peripheral IV under ultrasound guidance due to nursing staff unable to obtaining IV access. She's received NSAID, haldol x2, ativan, IV fluids, and still symptomatic with occasional trace vomitus. Will give 1 dose of fentanyl and zofran. Hemodynamically stable for admission in guarded condition.     Upon my evaluation, this patient had a high probability of imminent or life-threatening deterioration due to severe dehydration with high anion gap metabolic acidosis.     I personally provided 35 minutes of total critical care time outside of time spent on separately billable/documented procedures. This required my direct attention, intervention, and management which included the following:  -review of laboratory data  -review of radiology studies  -discussion with consultant: admitting medical team  -discussions with patient and   -monitoring for potential decompensation  -frequent bedside reassessments  -aggressive IV fluid rehydration    Medications   senna-docusate (PERICOLACE or SENOKOT S) 8.6-50 MG per tablet 2 Tab (0 Tabs Oral Held 12/28/18 0600)     And   polyethylene glycol/lytes (MIRALAX) PACKET 1 Packet (not administered)     And   magnesium hydroxide (MILK OF MAGNESIA) suspension 30 mL (not administered)     And   bisacodyl (DULCOLAX) suppository 10 mg (not administered)   enoxaparin (LOVENOX) inj 40 mg (40 mg Subcutaneous Given  12/28/18 0723)   hydrALAZINE (APRESOLINE) injection 10 mg (not administered)   ondansetron (ZOFRAN ODT) dispertab 4 mg (not administered)   promethazine (PHENERGAN) tablet 12.5-25 mg (not administered)   promethazine (PHENERGAN) suppository 12.5-25 mg (not administered)   prochlorperazine (COMPAZINE) injection 5-10 mg (10 mg Intravenous Given 12/28/18 0242)   guaiFENesin dextromethorphan (ROBITUSSIN DM) 100-10 MG/5ML syrup 10 mL (not administered)   acetaminophen (TYLENOL) tablet 650 mg (0 mg Oral Held 12/28/18 0600)   potassium chloride (KCL) ivpb 10 mEq (0 mEq Intravenous Stopped 12/28/18 0635)   hyoscyamine-maalox plus-lidocaine viscous (GI COCKTAIL) oral susp 15 mL (not administered)   busPIRone (BUSPAR) tablet 30 mg (0 mg Oral Held 12/28/18 0600)   chlorproMAZINE (THORAZINE) tablet 25 mg (0 mg Oral Held 12/28/18 0600)   sertraline (ZOLOFT) tablet 100 mg (not administered)   sucralfate (CARAFATE) 1 GM/10ML suspension 1 g (not administered)   thiamine tablet 100 mg (0 mg Oral Held 12/28/18 0715)   morphine (pf) 4 mg/ml injection 2 mg (2 mg Intravenous Given 12/28/18 0834)   ondansetron (ZOFRAN) syringe/vial injection 4 mg (not administered)   dextrose 5 % and 0.9 % NaCl with KCl 20 mEq infusion (not administered)   potassium chloride (KCL) ivpb 10 mEq (0 mEq Intravenous Stopped 12/28/18 1229)   clonazePAM (KLONOPIN) tablet 0.5 mg (not administered)   NS infusion 1,000 mL (0 mL Intravenous Stopped 12/27/18 1846)   ketorolac (TORADOL) injection 15 mg (15 mg Intravenous Given 12/27/18 1800)   haloperidol lactate (HALDOL) injection 5 mg (5 mg Intravenous Given 12/27/18 1830)   magnesium sulfate IVPB premix 2 g (0 g Intravenous Stopped 12/27/18 1900)   LORazepam (ATIVAN) injection 1 mg (1 mg Intravenous Given 12/27/18 1900)   lactated ringers infusion (BOLUS) (0 mL Intravenous Stopped 12/27/18 1952)   haloperidol lactate (HALDOL) injection 5 mg (5 mg Intravenous Given 12/27/18 1957)   hyoscyamine-maalox plus-lidocaine  viscous (GI COCKTAIL) oral susp 30 mL (30 mL Oral Given 12/27/18 2040)   famotidine (PEPCID) injection 20 mg (20 mg Intravenous Given 12/27/18 2039)   cefTRIAXone (ROCEPHIN) 2 g in  mL IVPB (0 g Intravenous Stopped 12/27/18 2252)   ondansetron (ZOFRAN) syringe/vial injection 4 mg (4 mg Intravenous Given 12/27/18 2245)   potassium chloride (KCL) ivpb 10 mEq (0 mEq Intravenous Stopped 12/28/18 0011)   lactated ringers infusion (BOLUS) (0 mL Intravenous Stopped 12/28/18 0242)   fentaNYL (SUBLIMAZE) injection 50 mcg (50 mcg Intravenous Given 12/27/18 2324)     FINAL IMPRESSION  1. Intractable vomiting with nausea, unspecified vomiting type    2. Dehydration    3. High anion gap metabolic acidosis    4. Cannabis abuse    5. Hypokalemia      -ADMIT-     Pertinent Labs & Imaging studies reviewed and verified by myself, as well as nursing notes and the patient's past medical, family, and social histories (See chart for details).    Results, exam findings, clinical impression, presumed diagnosis, treatment options, and plan for admission were discussed with the patient, and they verbalized understanding, agreed with, and appreciated the plan of care.    IMook (Randibe), am scribing for, and in the presence of, Robi Russ M.D..    Electronically signed by: Mook Nuno (Gaye), 12/27/2018    Robi HUGHES M.D. personally performed the services described in this documentation, as scribed by Mook Nuno in my presence, and it is both accurate and complete. C.    Portions of this record were made with voice recognition software.  Despite my review, spelling/grammar/context errors may still remain.  Interpretation of this chart should be taken in this context.    The note accurately reflects work and decisions made by me.  Robi Russ  12/28/2018  12:06 PM

## 2018-12-28 NOTE — ASSESSMENT & PLAN NOTE
Gap 24 on admission  (low bicarb).  Closed to 10 with IVF (LR & NS).   - RESOLVED  -Continue to monitor and correct electrolytes as needed

## 2018-12-28 NOTE — ED NOTES
PT medicated per MAR but unable to take PO meds due to N/V; pt requesting admitting physician paged to request IV pain medication for abd pain; admitting MD to be paged.

## 2018-12-28 NOTE — ED NOTES
Pt up to restroom, with a steady independent gait. Returned to bed without issue. Urine collected and sent. Pt provided with warm blankets and requesting water. Denies further needs or concerns at this time.

## 2018-12-28 NOTE — PROGRESS NOTES
Transported patient to room via bed on zoll from ER.  Assisted patient to bed.   Pt care assumed, tele box placed on patinet. VSS, pt assessment complete. Pt lying in bed moaning and groaning. aaox4, no signs of distress noted at this time. POC discussed with pt and verbalizes no questions. Pt c/o of ABD and back  pain 8/10, PRN pain med Morphine administered. C/O Nausea medicated per EMAR Pt denies any additional needs at this time. Bed in lowest position, bed alarm on, pt educated on fall risk and verbalized understanding, call light within reach, will continue to monitor.

## 2018-12-28 NOTE — ED NOTES
Pt IV noted to have infiltrated. ERP at bedside for US placement of new line. Medications transferred to new site and continued.

## 2018-12-28 NOTE — PROGRESS NOTES
Vascular Access Team    Date of Insertion: 12/28/18  Arm Circumference: n/a  Line Length: 10  Line Size: 20  Vein Occupancy %: 42  Reason for Midline: access  Labs: WBC 13.8, , BUN 5, Cr 0.67, GFR >60, INR not collected     Orders confirmed, vessel patency confirmed with ultrasound. Risks and benefits of procedure explained to patient and education regarding line associated bloodstream infections provided. Questions answered.     PowerGlide Midline placed in RUE per MD order with ultrasound guidance. 20g, 10 cm line placed in basilic vein after 1 attempt(s).  Catheter inserted with good blood return. Secured with 0cm external from insertion site.  Flushed without resistance with 10 mL 0.9% normal saline. Midline secured with Biopatch and Tegaderm.     Midline placement is confirmed by nurse using ultrasound and ability to flush and draw blood. Midline is appropriate for use at this time.  No X-ray is needed for placement confirmation. Pt tolerated procedure well.  Patient condition relayed to unit RN or ordering physician via this post procedure note in the EMR.    Ultrasound images uploaded to PACS and viewable in the EMR - yes  Ultrasound imaged printed and placed in paper chart - no      BARD PowerGlide Midline ref # [id number], Lot # [id number]

## 2018-12-28 NOTE — ED NOTES
Pharmacy to verify/approve medications and pt to be medicated per MAR; VSS at this time, but pt reports continued discomfort/N/V. Admitting MD aware.

## 2018-12-28 NOTE — ASSESSMENT & PLAN NOTE
133 on admission.  Corrected to 135 with IVF (LR & NS).  -RESOLVED  -Continue to monitor and correct electrolytes as needed

## 2018-12-28 NOTE — ASSESSMENT & PLAN NOTE
Marijuana consuming about 2 g daily, multiple admissions for [facility]   -Patient advised cessation of marijuana use

## 2018-12-28 NOTE — ED NOTES
Pt requesting to use restroom. Pt taken off cardiac monitor. Pt able to ambulate unassisted with steady gait to restroom. Pt told to pull cord in restroom if they need assistance. Pt able to ambulate from restroom to back to room unassisted.

## 2018-12-28 NOTE — H&P
Internal Medicine Admitting History and Physical    Note Author: Pj Cherry M.D.       Name Diana Hernandez 1975   Age/Sex 43 y.o. female   MRN [id number]   Code Status FULL     After 5PM or if no immediate response to page, please call for cross-coverage  Attending/Team: Dr. Hameed/Marvin  See Patient List for primary contact information  Call [phone] to page    1st Call - Day Intern (R1):   Dr. Ortiz 2nd Call - Day Sr. Resident (R2/R3):   Dr. Tucker       Chief Complaint:   Intractable nausea and vomiting   Abdominal pain    HPI:  Ms. Cutler is a 43-year-old female with past medical history of anxiety, dyspepsia, nephrolithiasis, drug-seeking behavior and cyclical vomiting syndrome, was brought in by ambulance for intractable nausea and vomiting with abdominal pain.  Patient reports that she was woken up from sleep at 9 AM on  with severe nausea and she vomited large amounts of yellowish gray nonbloody, nonbilious vomitus; a total of 10 times throughout the day.  About 30 minutes later, she had onset of crampy/sharp/stabbing abdominal pain 9/10, which radiates to bilateral back and flanks and also to the inguinal areas bilaterally.  The pain is alleviated by Pepto-Bismol and leaning forward to 7/10.  It was aggravated by eating and vomiting.  She reports chills, hiccups, frontal headaches (8/10, intermittent), postural dizziness, anorexia.  Head nausea and vomiting not relieved by hot showers.  She reports her  has similar symptoms but his symptoms resolved over 2 days with Pepto-Bismol.  She denies other sick contacts.  She denies exposure to culprit foods like seafood, eating outside, stale cooked rice.  She denies recent travel.     In the ED, /78, HR 89, RR 20 saturating to 94% on room air, she was afebrile.   CBC was significant for hypokalemia of 3.4 low BUN of 7, hyperglycemia of 123.  Her CBC was normal except for neutrophilia 11.57, and lymphopenia.  UA  was significant for glucosuria 100, ketones 40 without proteinuria.  It was nitrite positive, esterase negative.  Urine micro showed moderate bacteria, hyaline casts 3-5.  She received a dose of ceftriaxone in the ED.  Alkaline phosphatase was increased at 112 total protein was also increased at 9.3, lipase was low at 7, beta hCG was negative.  Urine drug screen was positive for benzos and cannabinoids.  Abdominal x-ray was within normal limits, chest x-ray was within normal limits.  In the ED she received 10 mEq of potassium chloride, Toradol 50 mg for pain 1 L NS bolus, 1 mg Ativan, 2 g magnesium IVP.     Review of Systems   Constitutional: Positive for chills and weight loss. Negative for diaphoresis, fever and malaise/fatigue.   HENT: Negative for ear discharge, ear pain, hearing loss, nosebleeds, sinus pain, sore throat and tinnitus.    Eyes: Negative for blurred vision, double vision, photophobia, pain, discharge and redness.   Respiratory: Negative for cough, hemoptysis, sputum production, shortness of breath, wheezing and stridor.    Cardiovascular: Negative for chest pain, palpitations, orthopnea, claudication, leg swelling and PND.   Gastrointestinal: Positive for abdominal pain, heartburn, nausea and vomiting. Negative for blood in stool, constipation, diarrhea and melena.   Genitourinary: Negative for dysuria, frequency and urgency.   Musculoskeletal: Positive for back pain. Negative for falls, joint pain, myalgias and neck pain.   Neurological: Positive for dizziness, weakness and headaches. Negative for tingling, tremors, sensory change, speech change, focal weakness, seizures and loss of consciousness.   Psychiatric/Behavioral: Positive for depression.             Past Medical History (Chronic medical problem, known complications and current treatment)    Past Medical History:   Diagnosis Date   • Anxiety     Followed by [facility]   • Anxiety disorder    • Backpain    • CLOSTRIDIUM DIFFICILE INFECTION  4/14/2010   • Cyclic vomiting syndrome    • Dental disorder    • Drug-seeking behavior    • Dyspepsia 7/12/2009   • Nephrolithiasis 6/20/2009    kidney stones,post lithotrypsy   • Pain     abd and back   • Snoring    • Superior mesenteric artery syndrome (HCC) 7/12/2009   • Tobacco abuse 6/20/2009         Past Surgical History:  Past Surgical History:   Procedure Laterality Date   • GASTROSCOPY-ENDO  4/28/2016    Procedure: GASTROSCOPY-ENDO;  Surgeon: Blayne Blackburn M.D.;  Location: ENDOSCOPY [facility];  Service:    • EXPLORATORY LAPAROTOMY  1/12/2016    Procedure: EXPLORATORY LAPAROTOMY;  Surgeon: Maciel Quintero M.D.;  Location: SURGERY [facility];  Service:    • BOWEL RESECTION  1/12/2016    Procedure: BOWEL RESECTION;  Surgeon: Maciel Quintero M.D.;  Location: SURGERY [facility];  Service:    • GASTROSCOPY WITH BIOPSY  3/9/2010    Performed by AMANDA MEJIA at ENDOSCOPY [facility]   • PYLOROPLASTY  7/27/2009    Performed by MACIEL QUINTERO at SURGERY [facility]   • EXPLORATORY LAPAROTOMY  7/27/2009    Performed by MACIEL QUINTERO at SURGERY [facility]   • APPENDECTOMY  7/27/2009    Performed by MACIEL QUINTERO at SURGERY [facility]   • CHOLECYSTECTOMY  7/27/2009    Performed by MACIEL QUINTERO at SURGERY [facility]   • GASTROSCOPY-ENDO  7/8/2009    Performed by ROSANNA WRIGHT at SURGERY [facility]   • LITHOTRIPSY     • OTHER      superior mesenteric artery correction        Current Outpatient Medications:  Home Medications     Reviewed by Nara Lopes, Pharmacy Intern (Pharmacy Intern) on 12/27/18 at 2311  Med List Status: Complete   Medication Last Dose Status   busPIRone (BUSPAR) 30 MG tablet 12/26/2018 Active   chlorproMAZINE (THORAZINE) 25 MG Tab 12/26/2018 Active   clonazePAM (KLONOPIN) 0.5 MG Tab 12/26/2018 Active   ondansetron (ZOFRAN ODT) 4 MG TABLET DISPERSIBLE 12/27/2018 Active   sertraline  "(ZOLOFT) 100 MG Tab 12/26/2018 Active                Medication Allergy/Sensitivities:  Allergies   Allergen Reactions   • Metoclopramide Hives     Told by MD; pt suspects possible hives.   • Bactrim [Sulfamethoxazole W-Trimethoprim] Unspecified     Someone said I had a rash or something.      • Seroquel [Quetiapine] Unspecified     \"Seizures\"  RXN=unknown         Family History (mandatory)   Family History   Problem Relation Age of Onset   • Cancer Mother 50        Colon cancer   • Hypertension Mother    • Allergies Father    • Hypertension Father    • Heart Disease Maternal Grandmother        Social History (mandatory)   Social History     Social History   • Marital status:      Spouse name: N/A   • Number of children: N/A   • Years of education: N/A     Occupational History   • Not on file.     Social History Main Topics   • Smoking status: Never Smoker   • Smokeless tobacco: Never Used   • Alcohol use No      Comment: hx   • Drug use: Yes     Types: Marijuana, Inhaled      Comment: marijuana   • Sexual activity: Not on file     Other Topics Concern   • Not on file     Social History Narrative    ** Merged History Encounter **          Living situation: Lives in [place] with her   PCP : Isa Rodriguez M.D.    Physical Exam     Vitals:    12/27/18 1604 12/27/18 1628 12/27/18 1903 12/27/18 2319   BP: 106/78  117/78 117/77   Pulse: 89  67 88   Resp: 20  16 18   Temp: 36.6 °C (97.9 °F)      TempSrc: Temporal      SpO2: 94%  98% 100%   Weight:  54 kg (119 lb)     Height: 1.626 m (5' 4\")        Body mass index is 20.43 kg/m².  /77   Pulse 88   Temp 36.6 °C (97.9 °F) (Temporal)   Resp 18   Ht 1.626 m (5' 4\")   Wt 54 kg (119 lb)   LMP 12/01/2018   SpO2 100%   BMI 20.43 kg/m²   O2 therapy: Pulse Oximetry: 100 %    Physical Exam   Constitutional: She is oriented to person, place, and time. No distress.   Thin appearing. NAD   HENT:   Head: Normocephalic and atraumatic.   Right Ear: " External ear normal.   Left Ear: External ear normal.   Nose: Nose normal.   Mouth/Throat: No oropharyngeal exudate.   Dry mucous membranes.   MP score 2   Eyes: Pupils are equal, round, and reactive to light. Conjunctivae and EOM are normal. Right eye exhibits no discharge. Left eye exhibits no discharge. No scleral icterus.   Neck: Normal range of motion. Neck supple. No JVD present. No tracheal deviation present. No thyromegaly present.   Cardiovascular: Normal rate, regular rhythm, normal heart sounds and intact distal pulses.    Pulmonary/Chest: Effort normal and breath sounds normal. No stridor. No respiratory distress. She has no wheezes. She has no rales. She exhibits no tenderness.   Abdominal: Soft. Bowel sounds are normal. She exhibits no distension and no mass. There is tenderness. There is guarding. There is no rebound.   Musculoskeletal: Normal range of motion. She exhibits no edema, tenderness or deformity.   Lymphadenopathy:     She has no cervical adenopathy.   Neurological: She is alert and oriented to person, place, and time. She has normal reflexes. She displays normal reflexes. No cranial nerve deficit. She exhibits normal muscle tone. Coordination normal. GCS score is 15.   Skin: Skin is warm and dry. No rash noted. She is not diaphoretic. No erythema. No pallor.   Psychiatric: Mood, memory and judgment normal.   Anxious affect   Nursing note and vitals reviewed.        Data Review       Old Records Request:   Completed  Current Records review/summary: Completed    Lab Data Review:  Recent Results (from the past 24 hour(s))   CBC WITH DIFFERENTIAL    Collection Time: 12/27/18  5:19 PM   Result Value Ref Range    WBC 13.8 (H) 4.8 - 10.8 K/uL    RBC 4.97 4.20 - 5.40 M/uL    Hemoglobin 15.2 12.0 - 16.0 g/dL    Hematocrit 44.3 37.0 - 47.0 %    MCV 89.1 81.4 - 97.8 fL    MCH 30.6 27.0 - 33.0 pg    MCHC 34.3 33.6 - 35.0 g/dL    RDW 54.1 (H) 35.9 - 50.0 fL    Platelet Count 426 164 - 446 K/uL    MPV  9.9 9.0 - 12.9 fL    Neutrophils-Polys 83.80 (H) 44.00 - 72.00 %    Lymphocytes 6.00 (L) 22.00 - 41.00 %    Monocytes 8.80 0.00 - 13.40 %    Eosinophils 0.00 0.00 - 6.90 %    Basophils 0.60 0.00 - 1.80 %    Immature Granulocytes 0.80 0.00 - 0.90 %    Nucleated RBC 0.00 /100 WBC    Neutrophils (Absolute) 11.57 (H) 2.00 - 7.15 K/uL    Lymphs (Absolute) 0.83 (L) 1.00 - 4.80 K/uL    Monos (Absolute) 1.21 (H) 0.00 - 0.85 K/uL    Eos (Absolute) 0.00 0.00 - 0.51 K/uL    Baso (Absolute) 0.08 0.00 - 0.12 K/uL    Immature Granulocytes (abs) 0.11 0.00 - 0.11 K/uL    NRBC (Absolute) 0.00 K/uL   COMP METABOLIC PANEL    Collection Time: 12/27/18  5:19 PM   Result Value Ref Range    Sodium 133 (L) 135 - 145 mmol/L    Potassium 3.4 (L) 3.6 - 5.5 mmol/L    Chloride 96 96 - 112 mmol/L    Co2 13 (L) 20 - 33 mmol/L    Anion Gap 24.0 (H) 0.0 - 11.9    Glucose 226 (H) 65 - 99 mg/dL    Bun 9 8 - 22 mg/dL    Creatinine 1.11 0.50 - 1.40 mg/dL    Calcium 10.9 (H) 8.5 - 10.5 mg/dL    AST(SGOT) 15 12 - 45 U/L    ALT(SGPT) 8 2 - 50 U/L    Alkaline Phosphatase 112 (H) 30 - 99 U/L    Total Bilirubin 1.3 0.1 - 1.5 mg/dL    Albumin 4.8 3.2 - 4.9 g/dL    Total Protein 9.3 (H) 6.0 - 8.2 g/dL    Globulin 4.5 (H) 1.9 - 3.5 g/dL    A-G Ratio 1.1 g/dL   LIPASE    Collection Time: 12/27/18  5:19 PM   Result Value Ref Range    Lipase 7 (L) 11 - 82 U/L   HCG QUAL SERUM    Collection Time: 12/27/18  5:19 PM   Result Value Ref Range    Beta-Hcg Qualitative Serum Negative Negative   ESTIMATED GFR    Collection Time: 12/27/18  5:19 PM   Result Value Ref Range    GFR If African American >60 >60 mL/min/1.73 m 2    GFR If Non African American 54 (A) >60 mL/min/1.73 m 2   EKG    Collection Time: 12/27/18  7:43 PM   Result Value Ref Range    Report       [facility]    Test Date:  2018-12-27  Pt Name:    ALIYA MURRAY                 Department: ER  MRN:        [id number]                      Room:       TRAUMA - EXAM 1  Gender:      Female                       Technician: [id number]  :        [id number]                   Requested By:JUAN BOGGS  Order #:    [id number]                    Reading MD:    Measurements  Intervals                                Axis  Rate:       83                           P:          74  MS:         200                          QRS:        71  QRSD:       102                          T:          -76  QT:         384  QTc:        452    Interpretive Statements  SINUS RHYTHM  BORDERLINE AV CONDUCTION DELAY  RSR' IN V1 OR V2, PROBABLY NORMAL VARIANT  BORDERLINE REPOLARIZATION ABNORMALITY  Compared to ECG 2018 08:15:01  RSR' in V1 or V2 now present     URINALYSIS,CULTURE IF INDICATED    Collection Time: 18  8:10 PM   Result Value Ref Range    Color Yellow     Character Clear     Specific Gravity 1.015 <1.035    Ph 5.0 5.0 - 8.0    Glucose 100 (A) Negative mg/dL    Ketones 40 (A) Negative mg/dL    Protein Negative Negative mg/dL    Bilirubin Negative Negative    Urobilinogen, Urine 0.2 Negative    Nitrite Positive (A) Negative    Leukocyte Esterase Negative Negative    Occult Blood Negative Negative    Micro Urine Req Microscopic    URINE DRUG SCREEN    Collection Time: 18  8:10 PM   Result Value Ref Range    Amphetamines Urine Negative Negative    Barbiturates Negative Negative    Benzodiazepines Positive (A) Negative    Cocaine Metabolite Negative Negative    Methadone Negative Negative    Opiates Negative Negative    Oxycodone Negative Negative    Phencyclidine -Pcp Negative Negative    Propoxyphene Negative Negative    Cannabinoid Metab Positive (A) Negative   URINE MICROSCOPIC (W/UA)    Collection Time: 18  8:10 PM   Result Value Ref Range    WBC 2-5 /hpf    RBC 0-2 /hpf    Bacteria Moderate (A) None /hpf    Epithelial Cells Few /hpf    Hyaline Cast 3-5 (A) /lpf   BMP    Collection Time: 18  9:45 PM   Result Value Ref Range    Sodium 135 135 - 145 mmol/L    Potassium 2.7 (LL) 3.6  - 5.5 mmol/L    Chloride 104 96 - 112 mmol/L    Co2 20 20 - 33 mmol/L    Glucose 123 (H) 65 - 99 mg/dL    Bun 7 (L) 8 - 22 mg/dL    Creatinine 0.80 0.50 - 1.40 mg/dL    Calcium 9.0 8.5 - 10.5 mg/dL    Anion Gap 11.0 0.0 - 11.9   ESTIMATED GFR    Collection Time: 12/27/18  9:45 PM   Result Value Ref Range    GFR If African American >60 >60 mL/min/1.73 m 2    GFR If Non African American >60 >60 mL/min/1.73 m 2       Imaging/Procedures Review:    Independant Imaging Review: Completed  [id number]-[id number] VIEWS   Final Result         No specific finding to suggest small bowel obstruction.      DX-CHEST-PORTABLE (1 VIEW)   Final Result         1. No acute cardiopulmonary abnormalities are identified.               EKG:   EKG Independant Review: Completed  QTc:452, HR: 83, Normal Sinus Rhythm, no ST/T changes    Records reviewed and summarized in current documentation :  Yes  [employer] teaching service handout given to patient:  No         Assessment/Plan     * Cannabis hyperemesis syndrome concurrent with and due to cannabis dependence (HCC)- (present on admission)   Assessment & Plan    With abdominal pain, radiating to bilateral back, in this patient with multiple ER visits for complaints.  No obvious drug-seeking behavior observed today.  Marijuana consuming about 2 g daily    -Admit to telemetry  -Aspiration/fall/seizure precautions  -NS at 125 mL/hr  -Monitor and replete electrolytes: Target K >4, and Mag >2  -GI cocktail/Carafate for abdominal pain  -Continue to monitor     Hypokalemia- (present on admission)   Assessment & Plan    K on admit 3.4 --> 2.7.  Received 10 mEq IVP KCl in ED.     -Continue 10 mEq IVP KCl every 4 hours 4 doses  -K-lyte 50 mg   -Continue to monitor       Dehydration- (present on admission)   Assessment & Plan    Intractable nausea and vomiting.  This patient with cyclic vomiting syndrome likely secondary to cannabis hyper emesis syndrome.  Received boluses of NS and LR in the ED.     -Continue IV NS  125 mL/h  -Continue to monitor replenish electrolytes as necessary     High anion gap metabolic acidosis- (present on admission)   Assessment & Plan    Gap 24 on admission  (low bicarb).  Closed to 11 with IVF (LR & NS).     -Continue to monitor and correct electrolytes as needed     Anxiety- (present on admission)   Assessment & Plan    Stable.    -Continue buspirone, chlorpromazine, Zoloft     Nausea & vomiting- (present on admission)   Assessment & Plan    Secondary to [facility].     -Education given on the effect of marijuana use on her cyclical vomiting  -IV NS at 125 mL/h  -Zofran PRN  -Continue to monitor QTc:452     Hyponatremia- (present on admission)   Assessment & Plan    133 on admission.  Corrected to 135 with IVF (LR & NS).    -Continue to monitor and correct electrolytes as needed     Depression- (present on admission)   Assessment & Plan    Stable. Euthymic affect on admission.    -Continue Zoloft     Hyperglycemia- (present on admission)   Assessment & Plan    Glc 123 on admission --> 226.  Patient has diagnosis of diabetes on chart but she denies diabetes.     -A1c pending  -Continue to monitor          Anticipated Hospital stay:  >2 midnights        Quality Measures  Quality-Core Measures   Reviewed items::  Labs reviewed, EKG reviewed, Medications reviewed and Radiology images reviewed  Saldana catheter::  No Saldana  DVT prophylaxis pharmacological::  Enoxaparin (Lovenox)    PCP: Isa Rodriguez M.D.

## 2018-12-29 PROBLEM — E83.39 HYPOPHOSPHATEMIA: Status: ACTIVE | Noted: 2018-12-29

## 2018-12-29 LAB
ALBUMIN SERPL BCP-MCNC: 3.8 G/DL (ref 3.2–4.9)
ALBUMIN SERPL BCP-MCNC: 4 G/DL (ref 3.2–4.9)
ALBUMIN/GLOB SERPL: 1.2 G/DL
ALBUMIN/GLOB SERPL: 1.2 G/DL
ALP SERPL-CCNC: 73 U/L (ref 30–99)
ALP SERPL-CCNC: 80 U/L (ref 30–99)
ALT SERPL-CCNC: 10 U/L (ref 2–50)
ALT SERPL-CCNC: 17 U/L (ref 2–50)
ANION GAP SERPL CALC-SCNC: 10 MMOL/L (ref 0–11.9)
ANION GAP SERPL CALC-SCNC: 8 MMOL/L (ref 0–11.9)
AST SERPL-CCNC: 23 U/L (ref 12–45)
AST SERPL-CCNC: 30 U/L (ref 12–45)
BASOPHILS # BLD AUTO: 0.5 % (ref 0–1.8)
BASOPHILS # BLD: 0.04 K/UL (ref 0–0.12)
BILIRUB SERPL-MCNC: 0.4 MG/DL (ref 0.1–1.5)
BILIRUB SERPL-MCNC: 0.5 MG/DL (ref 0.1–1.5)
BUN SERPL-MCNC: 3 MG/DL (ref 8–22)
BUN SERPL-MCNC: <3 MG/DL (ref 8–22)
CALCIUM SERPL-MCNC: 8.8 MG/DL (ref 8.5–10.5)
CALCIUM SERPL-MCNC: 9 MG/DL (ref 8.5–10.5)
CHLORIDE SERPL-SCNC: 110 MMOL/L (ref 96–112)
CHLORIDE SERPL-SCNC: 114 MMOL/L (ref 96–112)
CO2 SERPL-SCNC: 21 MMOL/L (ref 20–33)
CO2 SERPL-SCNC: 21 MMOL/L (ref 20–33)
CREAT SERPL-MCNC: 0.57 MG/DL (ref 0.5–1.4)
CREAT SERPL-MCNC: 0.64 MG/DL (ref 0.5–1.4)
EKG IMPRESSION: NORMAL
EOSINOPHIL # BLD AUTO: 0 K/UL (ref 0–0.51)
EOSINOPHIL NFR BLD: 0 % (ref 0–6.9)
ERYTHROCYTE [DISTWIDTH] IN BLOOD BY AUTOMATED COUNT: 60.9 FL (ref 35.9–50)
GLOBULIN SER CALC-MCNC: 3.2 G/DL (ref 1.9–3.5)
GLOBULIN SER CALC-MCNC: 3.4 G/DL (ref 1.9–3.5)
GLUCOSE SERPL-MCNC: 87 MG/DL (ref 65–99)
GLUCOSE SERPL-MCNC: 96 MG/DL (ref 65–99)
HCT VFR BLD AUTO: 37.2 % (ref 37–47)
HGB BLD-MCNC: 12.3 G/DL (ref 12–16)
IMM GRANULOCYTES # BLD AUTO: 0.05 K/UL (ref 0–0.11)
IMM GRANULOCYTES NFR BLD AUTO: 0.6 % (ref 0–0.9)
LYMPHOCYTES # BLD AUTO: 1.47 K/UL (ref 1–4.8)
LYMPHOCYTES NFR BLD: 17 % (ref 22–41)
MAGNESIUM SERPL-MCNC: 2.1 MG/DL (ref 1.5–2.5)
MAGNESIUM SERPL-MCNC: 2.1 MG/DL (ref 1.5–2.5)
MCH RBC QN AUTO: 30.7 PG (ref 27–33)
MCHC RBC AUTO-ENTMCNC: 33.1 G/DL (ref 33.6–35)
MCV RBC AUTO: 92.8 FL (ref 81.4–97.8)
MONOCYTES # BLD AUTO: 0.76 K/UL (ref 0–0.85)
MONOCYTES NFR BLD AUTO: 8.8 % (ref 0–13.4)
NEUTROPHILS # BLD AUTO: 6.32 K/UL (ref 2–7.15)
NEUTROPHILS NFR BLD: 73.1 % (ref 44–72)
NRBC # BLD AUTO: 0 K/UL
NRBC BLD-RTO: 0 /100 WBC
PHOSPHATE SERPL-MCNC: 1.7 MG/DL (ref 2.5–4.5)
PHOSPHATE SERPL-MCNC: 2.9 MG/DL (ref 2.5–4.5)
PLATELET # BLD AUTO: 273 K/UL (ref 164–446)
PMV BLD AUTO: 10.1 FL (ref 9–12.9)
POTASSIUM SERPL-SCNC: 3.4 MMOL/L (ref 3.6–5.5)
POTASSIUM SERPL-SCNC: 3.6 MMOL/L (ref 3.6–5.5)
PROT SERPL-MCNC: 7 G/DL (ref 6–8.2)
PROT SERPL-MCNC: 7.4 G/DL (ref 6–8.2)
RBC # BLD AUTO: 4.01 M/UL (ref 4.2–5.4)
SODIUM SERPL-SCNC: 141 MMOL/L (ref 135–145)
SODIUM SERPL-SCNC: 143 MMOL/L (ref 135–145)
TROPONIN I SERPL-MCNC: <0.01 NG/ML (ref 0–0.04)
TROPONIN I SERPL-MCNC: <0.01 NG/ML (ref 0–0.04)
WBC # BLD AUTO: 8.6 K/UL (ref 4.8–10.8)

## 2018-12-29 PROCEDURE — 700111 HCHG RX REV CODE 636 W/ 250 OVERRIDE (IP): Performed by: STUDENT IN AN ORGANIZED HEALTH CARE EDUCATION/TRAINING PROGRAM

## 2018-12-29 PROCEDURE — 700102 HCHG RX REV CODE 250 W/ 637 OVERRIDE(OP): Performed by: INTERNAL MEDICINE

## 2018-12-29 PROCEDURE — 770020 HCHG ROOM/CARE - TELE (206)

## 2018-12-29 PROCEDURE — 700102 HCHG RX REV CODE 250 W/ 637 OVERRIDE(OP): Performed by: STUDENT IN AN ORGANIZED HEALTH CARE EDUCATION/TRAINING PROGRAM

## 2018-12-29 PROCEDURE — 93005 ELECTROCARDIOGRAM TRACING: CPT | Performed by: STUDENT IN AN ORGANIZED HEALTH CARE EDUCATION/TRAINING PROGRAM

## 2018-12-29 PROCEDURE — 700102 HCHG RX REV CODE 250 W/ 637 OVERRIDE(OP): Performed by: GENERAL PRACTICE

## 2018-12-29 PROCEDURE — 99232 SBSQ HOSP IP/OBS MODERATE 35: CPT | Mod: GC | Performed by: INTERNAL MEDICINE

## 2018-12-29 PROCEDURE — 80053 COMPREHEN METABOLIC PANEL: CPT | Mod: 91

## 2018-12-29 PROCEDURE — 36415 COLL VENOUS BLD VENIPUNCTURE: CPT

## 2018-12-29 PROCEDURE — A9270 NON-COVERED ITEM OR SERVICE: HCPCS | Performed by: GENERAL PRACTICE

## 2018-12-29 PROCEDURE — 700105 HCHG RX REV CODE 258: Performed by: STUDENT IN AN ORGANIZED HEALTH CARE EDUCATION/TRAINING PROGRAM

## 2018-12-29 PROCEDURE — A9270 NON-COVERED ITEM OR SERVICE: HCPCS | Performed by: STUDENT IN AN ORGANIZED HEALTH CARE EDUCATION/TRAINING PROGRAM

## 2018-12-29 PROCEDURE — 83735 ASSAY OF MAGNESIUM: CPT | Mod: 91

## 2018-12-29 PROCEDURE — A9270 NON-COVERED ITEM OR SERVICE: HCPCS | Performed by: INTERNAL MEDICINE

## 2018-12-29 PROCEDURE — 84100 ASSAY OF PHOSPHORUS: CPT | Mod: 91

## 2018-12-29 PROCEDURE — 700101 HCHG RX REV CODE 250: Performed by: STUDENT IN AN ORGANIZED HEALTH CARE EDUCATION/TRAINING PROGRAM

## 2018-12-29 PROCEDURE — 84484 ASSAY OF TROPONIN QUANT: CPT

## 2018-12-29 PROCEDURE — 85025 COMPLETE CBC W/AUTO DIFF WBC: CPT

## 2018-12-29 PROCEDURE — 93010 ELECTROCARDIOGRAM REPORT: CPT | Performed by: INTERNAL MEDICINE

## 2018-12-29 PROCEDURE — 700111 HCHG RX REV CODE 636 W/ 250 OVERRIDE (IP): Performed by: INTERNAL MEDICINE

## 2018-12-29 RX ORDER — DEXTROSE MONOHYDRATE 25 G/50ML
25 INJECTION, SOLUTION INTRAVENOUS
Status: DISCONTINUED | OUTPATIENT
Start: 2018-12-29 | End: 2019-01-01 | Stop reason: HOSPADM

## 2018-12-29 RX ORDER — POTASSIUM CHLORIDE 20 MEQ/1
40 TABLET, EXTENDED RELEASE ORAL ONCE
Status: ACTIVE | OUTPATIENT
Start: 2018-12-30 | End: 2018-12-31

## 2018-12-29 RX ORDER — TRAZODONE HYDROCHLORIDE 50 MG/1
50 TABLET ORAL
Status: DISCONTINUED | OUTPATIENT
Start: 2018-12-29 | End: 2019-01-01 | Stop reason: HOSPADM

## 2018-12-29 RX ORDER — ONDANSETRON 2 MG/ML
4 INJECTION INTRAMUSCULAR; INTRAVENOUS EVERY 4 HOURS PRN
Status: DISCONTINUED | OUTPATIENT
Start: 2018-12-29 | End: 2019-01-01 | Stop reason: HOSPADM

## 2018-12-29 RX ADMIN — ACETAMINOPHEN 650 MG: 325 TABLET, FILM COATED ORAL at 03:47

## 2018-12-29 RX ADMIN — POTASSIUM CHLORIDE, DEXTROSE MONOHYDRATE AND SODIUM CHLORIDE: 150; 5; 900 INJECTION, SOLUTION INTRAVENOUS at 19:04

## 2018-12-29 RX ADMIN — TRAZODONE HYDROCHLORIDE 50 MG: 50 TABLET ORAL at 23:03

## 2018-12-29 RX ADMIN — MORPHINE SULFATE 2 MG: 4 INJECTION INTRAVENOUS at 21:48

## 2018-12-29 RX ADMIN — Medication 100 MG: at 03:38

## 2018-12-29 RX ADMIN — BUSPIRONE HYDROCHLORIDE 30 MG: 30 TABLET ORAL at 03:36

## 2018-12-29 RX ADMIN — CHLORPROMAZINE HYDROCHLORIDE 25 MG: 25 TABLET, SUGAR COATED ORAL at 03:36

## 2018-12-29 RX ADMIN — ONDANSETRON 4 MG: 2 INJECTION INTRAMUSCULAR; INTRAVENOUS at 20:38

## 2018-12-29 RX ADMIN — CAPSAICIN: 0.25 CREAM TOPICAL at 03:38

## 2018-12-29 RX ADMIN — ACETAMINOPHEN 650 MG: 325 TABLET, FILM COATED ORAL at 23:03

## 2018-12-29 RX ADMIN — BUSPIRONE HYDROCHLORIDE 30 MG: 30 TABLET ORAL at 17:44

## 2018-12-29 RX ADMIN — CLONAZEPAM 0.5 MG: 0.5 TABLET ORAL at 03:36

## 2018-12-29 RX ADMIN — MAGNESIUM GLUCONATE 500 MG ORAL TABLET 400 MG: 500 TABLET ORAL at 12:15

## 2018-12-29 RX ADMIN — STANDARDIZED SENNA CONCENTRATE AND DOCUSATE SODIUM 2 TABLET: 8.6; 5 TABLET, FILM COATED ORAL at 03:36

## 2018-12-29 RX ADMIN — MORPHINE SULFATE 2 MG: 4 INJECTION INTRAVENOUS at 03:35

## 2018-12-29 RX ADMIN — LIDOCAINE HYDROCHLORIDE 15 ML: 20 SOLUTION OROPHARYNGEAL at 09:25

## 2018-12-29 RX ADMIN — CLONAZEPAM 0.5 MG: 0.5 TABLET ORAL at 12:16

## 2018-12-29 RX ADMIN — PROCHLORPERAZINE EDISYLATE 10 MG: 5 INJECTION INTRAMUSCULAR; INTRAVENOUS at 09:01

## 2018-12-29 RX ADMIN — CHLORPROMAZINE HYDROCHLORIDE 25 MG: 25 TABLET, SUGAR COATED ORAL at 12:16

## 2018-12-29 RX ADMIN — MORPHINE SULFATE 2 MG: 4 INJECTION INTRAVENOUS at 12:21

## 2018-12-29 RX ADMIN — ACETAMINOPHEN 650 MG: 325 TABLET, FILM COATED ORAL at 17:43

## 2018-12-29 RX ADMIN — ONDANSETRON 4 MG: 2 INJECTION INTRAMUSCULAR; INTRAVENOUS at 16:15

## 2018-12-29 RX ADMIN — MORPHINE SULFATE 2 MG: 4 INJECTION INTRAVENOUS at 02:00

## 2018-12-29 RX ADMIN — PROCHLORPERAZINE EDISYLATE 10 MG: 5 INJECTION INTRAMUSCULAR; INTRAVENOUS at 02:00

## 2018-12-29 RX ADMIN — MORPHINE SULFATE 2 MG: 4 INJECTION INTRAVENOUS at 08:15

## 2018-12-29 RX ADMIN — ENOXAPARIN SODIUM 40 MG: 100 INJECTION SUBCUTANEOUS at 03:37

## 2018-12-29 RX ADMIN — CAPSAICIN: 0.25 CREAM TOPICAL at 17:44

## 2018-12-29 RX ADMIN — POTASSIUM PHOSPHATE, MONOBASIC AND POTASSIUM PHOSPHATE, DIBASIC 30 MMOL: 224; 236 INJECTION, SOLUTION INTRAVENOUS at 14:02

## 2018-12-29 RX ADMIN — CHLORPROMAZINE HYDROCHLORIDE 25 MG: 25 TABLET, SUGAR COATED ORAL at 17:43

## 2018-12-29 RX ADMIN — POTASSIUM CHLORIDE, DEXTROSE MONOHYDRATE AND SODIUM CHLORIDE: 150; 5; 900 INJECTION, SOLUTION INTRAVENOUS at 09:01

## 2018-12-29 RX ADMIN — ACETAMINOPHEN 650 MG: 325 TABLET, FILM COATED ORAL at 12:16

## 2018-12-29 RX ADMIN — CLONAZEPAM 0.5 MG: 0.5 TABLET ORAL at 18:00

## 2018-12-29 RX ADMIN — SERTRALINE 100 MG: 50 TABLET, FILM COATED ORAL at 20:41

## 2018-12-29 ASSESSMENT — ENCOUNTER SYMPTOMS
BACK PAIN: 0
TREMORS: 0
HEMOPTYSIS: 0
CLAUDICATION: 0
NECK PAIN: 0
BLOOD IN STOOL: 0
NERVOUS/ANXIOUS: 1
PND: 0
CHILLS: 0
PALPITATIONS: 0
FALLS: 0
DIZZINESS: 0
DIAPHORESIS: 0
DOUBLE VISION: 0
EYE DISCHARGE: 0
HEADACHES: 1
ORTHOPNEA: 0
DEPRESSION: 0
DIARRHEA: 0
INSOMNIA: 0
COUGH: 0
FEVER: 0
FOCAL WEAKNESS: 0
HALLUCINATIONS: 0
PHOTOPHOBIA: 0
STRIDOR: 0
SORE THROAT: 0
SINUS PAIN: 0
SENSORY CHANGE: 0
SPUTUM PRODUCTION: 0
BLURRED VISION: 0
SEIZURES: 0
LOSS OF CONSCIOUSNESS: 0
WHEEZING: 0
EYE REDNESS: 0
ABDOMINAL PAIN: 1
TINGLING: 0
MYALGIAS: 0
CONSTIPATION: 0
WEIGHT LOSS: 0
SHORTNESS OF BREATH: 0
VOMITING: 1
MEMORY LOSS: 0
SPEECH CHANGE: 0
NAUSEA: 1
EYE PAIN: 0
FLANK PAIN: 0
HEARTBURN: 0
WEAKNESS: 0

## 2018-12-29 ASSESSMENT — PAIN SCALES - GENERAL
PAINLEVEL_OUTOF10: 6
PAINLEVEL_OUTOF10: 8
PAINLEVEL_OUTOF10: 8
PAINLEVEL_OUTOF10: 6

## 2018-12-29 ASSESSMENT — LIFESTYLE VARIABLES: SUBSTANCE_ABUSE: 0

## 2018-12-29 NOTE — PROGRESS NOTES
Patient ambulated in maldonado w/o distress. Back to bed. No changes noted in status. Needs addressed. Call bell and personal items in reach.

## 2018-12-29 NOTE — DISCHARGE PLANNING
Care Transition Team Assessment    No needs currently identified for discharge.    Information Source  Orientation : Oriented x 4  Information Given By: Patient  Who is responsible for making decisions for patient? : Patient    Readmission Evaluation  Is this a readmission?: No    Elopement Risk  Legal Hold: No  Ambulatory or Self Mobile in Wheelchair: Yes  Disoriented: No  Psychiatric Symptoms: None  History of Wandering: No  Elopement this Admit: No  Vocalizing Wanting to Leave: No  Displays Behaviors, Body Language Wanting to Leave: No-Not at Risk for Elopement  Elopement Risk: Not at Risk for Elopement    Interdisciplinary Discharge Planning  Patient or legal guardian wants to designate a caregiver (see row info): No    Discharge Preparedness  What is your plan after discharge?: Home with help  What are your discharge supports?: Spouse  Prior Functional Level: Ambulatory, Independent with Activities of Daily Living, Independent with Medication Management  Difficulity with ADLs: None  Difficulity with IADLs: None    Functional Assesment  Prior Functional Level: Ambulatory, Independent with Activities of Daily Living, Independent with Medication Management    Finances  Financial Barriers to Discharge: No  Prescription Coverage: Yes    Vision / Hearing Impairment  Vision Impairment : No  Hearing Impairment : No    Values / Beliefs / Concerns  Values / Beliefs Concerns : No    Advance Directive  Advance Directive?: None    Domestic Abuse  Have you ever been the victim of abuse or violence?: No  Physical Abuse or Sexual Abuse: No  Verbal Abuse or Emotional Abuse: No  Possible Abuse Reported to:: Not Applicable    Psychological Assessment  History of Substance Abuse: Marijuana  History of Psychiatric Problems: No  Non-compliant with Treatment: No  Newly Diagnosed Illness: No    Discharge Risks or Barriers  Discharge risks or barriers?: No  Patient risk factors: Substance abuse    Anticipated Discharge  Information  Anticipated discharge disposition: Home

## 2018-12-29 NOTE — PROGRESS NOTES
Report recvd at bedside. Pt laying in bed watching tv. POC discussed. Call bell within reach. Will continue to monitor.

## 2018-12-29 NOTE — CARE PLAN
Problem: Pain Management  Goal: Pain level will decrease to patient's comfort goal  Slow progression toward goal

## 2018-12-29 NOTE — PROGRESS NOTES
Pt complaining of right upper arm tightness and swelling. Assessed right upper arm where midline is place and noted arm is swollen, hard to the touch and the skin is taunt. Stopped IV fluids, appears that midline infiltrated. [employer] MD Ortiz notified and will come by the assess.  Attempted to contact IR for further instructions, but no answer. Will attempt to call again. Pt still has peripheral iv in place. Will continue to monitor.

## 2018-12-29 NOTE — CARE PLAN
Problem: Communication  Goal: The ability to communicate needs accurately and effectively will improve  Outcome: PROGRESSING AS EXPECTED  Encourage pt to voice concerns and feelings.     Problem: Safety  Goal: Will remain free from falls  Outcome: PROGRESSING AS EXPECTED  Hourly rounding, bed low and locked, call bell within reach

## 2018-12-29 NOTE — PROGRESS NOTES
Internal Medicine Interval Note  Note Author: Sp Ortiz M.D.     Name Diana Hernandez 1975   Age/Sex 43 y.o. female   MRN [id number]   Code Status Full Code     After 5PM or if no immediate response to page, please call for cross-coverage  Attending/Team: Dr Hameed/ Vinh See Patient List for primary contact information  Call [phone] to page    1st Call - Day Intern (R1):   Dr Sp Ortiz 2nd Call - Day Sr. Resident (R2/R3):   Dr Marquita Logan         Reason for interval visit  (Principal Problem)     Intractable Vomiting    Interval Problem Daily Status Update  (24 hours, problem oriented, brief subjective history, new lab/imaging data pertinent to that problem)     - no acute events overnight. VSS, 122/78, afebrile.  - Midline infiltrated, instructed nursing to stop using and contact IR  - Continues to complain of intractable nausea without vomiting.  Denies chest pain, abdominal pain, SOB, Headaches dysuria or changes in vision  - Capsacin cream applied to umbilicus  - Restarted home dose Klonapin  - Continues supportive therapy  - Enoxaparin 40 mg        Review of Systems   Constitutional: Negative for chills, diaphoresis, fever, malaise/fatigue and weight loss.   HENT: Negative for congestion, ear discharge, ear pain, hearing loss, nosebleeds, sinus pain, sore throat and tinnitus.    Eyes: Negative for blurred vision, double vision, photophobia, pain, discharge and redness.   Respiratory: Negative for cough, hemoptysis, sputum production, shortness of breath, wheezing and stridor.    Cardiovascular: Negative for chest pain, palpitations, orthopnea, claudication, leg swelling and PND.   Gastrointestinal: Positive for abdominal pain, nausea and vomiting. Negative for blood in stool, constipation, diarrhea, heartburn and melena.   Genitourinary: Negative for dysuria, flank pain, frequency, hematuria and urgency.   Musculoskeletal: Negative for back pain, falls, joint pain, myalgias and  neck pain.   Skin: Negative for itching and rash.   Neurological: Positive for headaches. Negative for dizziness, tingling, tremors, sensory change, speech change, focal weakness, seizures, loss of consciousness and weakness.   Psychiatric/Behavioral: Negative for depression, hallucinations, memory loss, substance abuse and suicidal ideas. The patient is nervous/anxious. The patient does not have insomnia.        Disposition/Barriers to discharge:   Pain seeking behavior  Continued Marijuana use      Consultants/Specialty  None    PCP: Isa Rodriguez M.D.      Quality Measures  Quality-Core Measures   Reviewed items::  EKG reviewed, Radiology images reviewed, Labs reviewed and Medications reviewed  Saldana catheter::  No Saldana  DVT prophylaxis pharmacological::  Enoxaparin (Lovenox)  Ulcer Prophylaxis::  Yes          Physical Exam       Vitals:    12/28/18 1300 12/28/18 1330 12/28/18 1428 12/28/18 1611   BP:   150/85 119/78   Pulse: 66  76 70   Resp: 18 18 18   Temp:   37.1 °C (98.7 °F) 36.8 °C (98.3 °F)   TempSrc:   Temporal Temporal   SpO2: 93% 100% 100% 99%   Weight:       Height:         Body mass index is 20.43 kg/m².    Oxygen Therapy:  Pulse Oximetry: 99 %, O2 (LPM): 0, O2 Delivery: None (Room Air)    Physical Exam   Constitutional: She is oriented to person, place, and time and well-developed, well-nourished, and in no distress. No distress.   HENT:   Head: Normocephalic and atraumatic.   Right Ear: External ear normal.   Left Ear: External ear normal.   Nose: Nose normal.   Mouth/Throat: Oropharynx is clear and moist. No oropharyngeal exudate.   Eyes: Pupils are equal, round, and reactive to light. Conjunctivae and EOM are normal. Right eye exhibits no discharge. Left eye exhibits no discharge. No scleral icterus.   Neck: Normal range of motion. No JVD present. No thyromegaly present.   Cardiovascular: Normal rate and intact distal pulses.  Exam reveals no gallop and no friction rub.    No murmur  heard.  Pulmonary/Chest: Effort normal and breath sounds normal. No stridor. No respiratory distress. She has no wheezes. She has no rales. She exhibits no tenderness.   Abdominal: Soft. Bowel sounds are normal. She exhibits no distension and no mass. There is tenderness. There is no rebound and no guarding.   Midline laparotomy scar   Musculoskeletal: Normal range of motion. She exhibits no edema, tenderness or deformity.   Lymphadenopathy:     She has no cervical adenopathy.   Neurological: She is alert and oriented to person, place, and time. She has normal reflexes. She displays normal reflexes. No cranial nerve deficit. She exhibits normal muscle tone. Coordination normal. GCS score is 15.   Skin: Skin is warm and dry. No rash noted. She is not diaphoretic. No erythema. No pallor.   Psychiatric:   Affect anxious, judgement poor, memory intact, Mood depressed             Assessment/Plan     * Cannabis hyperemesis syndrome concurrent with and due to cannabis dependence (HCC)- (present on admission)   Assessment & Plan    With abdominal pain, radiating to bilateral back, in this patient with multiple ER visits for complaints.  No obvious drug-seeking behavior observed today.  Marijuana consuming about 2 g daily    -Admit to telemetry  -Aspiration/fall/seizure precautions  -NS at 125 mL/hr  -Monitor and replete electrolytes: Target K >4, and Mag >2  -GI cocktail/Carafate for abdominal pain  - Start Capsacin cream periumbilicus  -Continue to monitor     Hypokalemia- (present on admission)   Assessment & Plan    K on admit 3.4 --> 2.7.  Received 10 mEq IVP KCl in ED.     -Continue 10 mEq IVP KCl every 4 hours 4 doses  -K-lyte 50 mg   -Continue to monitor       Dehydration- (present on admission)   Assessment & Plan    Intractable nausea and vomiting.  This patient with cyclic vomiting syndrome likely secondary to cannabis hyper emesis syndrome.  Received boluses of NS and LR in the ED.     -Continue IV   mL/h  -Continue to monitor replenish electrolytes as necessary     High anion gap metabolic acidosis- (present on admission)   Assessment & Plan    Gap 24 on admission  (low bicarb).  Closed to 11 with IVF (LR & NS).     -Continue to monitor and correct electrolytes as needed     Anxiety- (present on admission)   Assessment & Plan    Stable.    -Continue buspirone, chlorpromazine, Zoloft, Restarted home medication Klonapin     Intractable vomiting with nausea- (present on admission)   Assessment & Plan    Secondary to [facility].     -Education given on the effect of marijuana use on her cyclical vomiting  -IV NS at 125 mL/h  -Zofran PRN  -Continue to monitor QTc:452     Hyponatremia- (present on admission)   Assessment & Plan    133 on admission.  Corrected to 135 with IVF (LR & NS).    -Continue to monitor and correct electrolytes as needed     Depression- (present on admission)   Assessment & Plan    Stable. Euthymic affect on admission.    -Continue Zoloft     Hyperglycemia- (present on admission)   Assessment & Plan    Glc 123 on admission --> 226.  Patient has diagnosis of diabetes on chart but she denies diabetes.     -A1c pending  -Continue to monitor

## 2018-12-29 NOTE — PROGRESS NOTES
Internal Medicine Interval Note  Note Author: Sp Ortiz M.D.     Name Diana Hernandez 1975   Age/Sex 43 y.o. female   MRN [id number]   Code Status Full Code     After 5PM or if no immediate response to page, please call for cross-coverage  Attending/Team: Dr Hameed/ Vinh See Patient List for primary contact information  Call [phone] to page    1st Call - Day Intern (R1):   Dr Sp Ortiz 2nd Call - Day Sr. Resident (R2/R3):   Dr Marquita Logan         Reason for interval visit  (Principal Problem)     Intractable Vomiting    Interval Problem Daily Status Update  (24 hours, problem oriented, brief subjective history, new lab/imaging data pertinent to that problem)     - No acute events overnight, VSS, afebrile, no vomiting since patient has come to the floor  - Continue to correct electrolyte abnormalities, daily EKGs for Arrythmias  - Report of right arm midline infiltration, IV midline was inspected and there is no apparent infiltration.  Arm is not tender and there is no swelling or edema.  Will contact IR midline team inspect Line and if not will remove line tomorrow.  - Continues to complain of intractable nausea without vomiting.  Denies chest pain, abdominal pain, SOB, Headaches dysuria or changes in vision  - Capsacin cream applied to umbilicus, patient notes improvement of symptoms with cream  - Glucose 96 continue mIVF D5 NS  - Continue supportive therapy        Review of Systems   Constitutional: Negative for chills, diaphoresis, fever, malaise/fatigue and weight loss.   HENT: Negative for congestion, ear discharge, ear pain, hearing loss, nosebleeds, sinus pain, sore throat and tinnitus.    Eyes: Negative for blurred vision, double vision, photophobia, pain, discharge and redness.   Respiratory: Negative for cough, hemoptysis, sputum production, shortness of breath, wheezing and stridor.    Cardiovascular: Negative for chest pain, palpitations, orthopnea, claudication, leg  swelling and PND.   Gastrointestinal: Positive for abdominal pain, nausea and vomiting. Negative for blood in stool, constipation, diarrhea, heartburn and melena.   Genitourinary: Negative for dysuria, flank pain, frequency, hematuria and urgency.   Musculoskeletal: Negative for back pain, falls, joint pain, myalgias and neck pain.   Skin: Negative for itching and rash.   Neurological: Positive for headaches. Negative for dizziness, tingling, tremors, sensory change, speech change, focal weakness, seizures, loss of consciousness and weakness.   Psychiatric/Behavioral: Negative for depression, hallucinations, memory loss, substance abuse and suicidal ideas. The patient is nervous/anxious. The patient does not have insomnia.        Disposition/Barriers to discharge:   Pain seeking behavior  Continued Marijuana use      Consultants/Specialty  None    PCP: Isa Rodriguez M.D.      Quality Measures  Quality-Core Measures   Reviewed items::  EKG reviewed, Radiology images reviewed, Labs reviewed and Medications reviewed  Saldana catheter::  No Saldana  DVT prophylaxis pharmacological::  Enoxaparin (Lovenox)  Ulcer Prophylaxis::  Yes          Physical Exam       Vitals:    12/28/18 2340 12/29/18 0305 12/29/18 0800 12/29/18 1300   BP: 100/62 103/62 103/58 (!) 96/62   Pulse: 68 78 98 68   Resp: 16 18 18 17   Temp: 36.2 °C (97.2 °F) 36.4 °C (97.6 °F) 36.9 °C (98.4 °F) 37.2 °C (98.9 °F)   TempSrc:   Temporal Temporal   SpO2: 97% 96% 96% 96%   Weight:       Height:         Body mass index is 22.33 kg/m². Weight: 59 kg (130 lb 1.1 oz)  Oxygen Therapy:  Pulse Oximetry: 96 %, O2 (LPM): 0, O2 Delivery: None (Room Air)    Physical Exam   Constitutional: She is oriented to person, place, and time and well-developed, well-nourished, and in no distress. No distress.   HENT:   Head: Normocephalic and atraumatic.   Right Ear: External ear normal.   Left Ear: External ear normal.   Nose: Nose normal.   Mouth/Throat: Oropharynx is clear and  moist. No oropharyngeal exudate.   Eyes: Pupils are equal, round, and reactive to light. Conjunctivae and EOM are normal. Right eye exhibits no discharge. Left eye exhibits no discharge. No scleral icterus.   Neck: Normal range of motion. No JVD present. No thyromegaly present.   Cardiovascular: Normal rate and intact distal pulses.  Exam reveals no gallop and no friction rub.    No murmur heard.  Pulmonary/Chest: Effort normal and breath sounds normal. No stridor. No respiratory distress. She has no wheezes. She has no rales. She exhibits no tenderness.   Abdominal: Soft. Bowel sounds are normal. She exhibits no distension and no mass. There is tenderness. There is no rebound and no guarding.   Midline laparotomy scar   Musculoskeletal: Normal range of motion. She exhibits no edema, tenderness or deformity.   Lymphadenopathy:     She has no cervical adenopathy.   Neurological: She is alert and oriented to person, place, and time. She has normal reflexes. No cranial nerve deficit. She exhibits normal muscle tone. Coordination normal. GCS score is 15.   Skin: Skin is warm and dry. No rash noted. She is not diaphoretic. No erythema. No pallor.   Psychiatric:   Affect anxious, judgement poor, memory intact, Mood depressed             Assessment/Plan     * Cannabis hyperemesis syndrome concurrent with and due to cannabis dependence (HCC)- (present on admission)   Assessment & Plan    With abdominal pain, radiating to bilateral back, in this patient with multiple ER visits for complaints.  No obvious drug-seeking behavior observed today.  Marijuana consuming about 2 g daily    -Admit to telemetry  -Aspiration/fall/seizure precautions  -D5 NS at 125 mL/hr  -Monitor and replete electrolytes: Target K >4, and Mag >2  -GI cocktail/Carafate for abdominal pain  - Capsacin cream periumbilicus  -Continue to monitor  - CLD     Hypophosphatemia- (present on admission)   Assessment & Plan    Phos1.3   Replete with 30 mEQ of  Kphos  Repeat CMP, Phos and Mg at 1800, replete lytes PRN  Daily EKG will continue to monitor for arrythmias     Hypokalemia- (present on admission)   Assessment & Plan    K on admit 3.4 --> 2.7-->.  Received 10 mEq IVP KCl in ED.     KPhos given  -Continue to monitor       Depression- (present on admission)   Assessment & Plan    Stable. Affect anxious and flat on admission.    -Continue Zoloft     Dehydration- (present on admission)   Assessment & Plan    Intractable nausea and vomiting.  This patient with cyclic vomiting syndrome likely secondary to cannabis hyper emesis syndrome. No vomiting since coming to floor.  Received boluses of NS and LR in the ED.     -Continue IV D5  mL/h  -Continue to monitor replenish electrolytes as necessary     Anxiety- (present on admission)   Assessment & Plan    Stable.    -Continue buspirone, chlorpromazine, Zoloft, home medication Klonapin     Intractable vomiting with nausea- (present on admission)   Assessment & Plan    Secondary to [facility].     -Education given on the effect of marijuana use on her cyclical vomiting  - Capsacin cream  - IV D5 NS at 125 mL/h  -Zofran PRN  -Continue to monitor QTc:  EKG completed 456     Hyponatremia- (present on admission)   Assessment & Plan    133 on admission.  Corrected to 135 with IVF (LR & NS).  -RESOLVED  -Continue to monitor and correct electrolytes as needed     Hyperglycemia- (present on admission)   Assessment & Plan    Glc 123 on admission --> 226.  Patient has diagnosis of diabetes on chart but she denies diabetes.     -A1c pending  -Continue to monitor      High anion gap metabolic acidosis- (present on admission)   Assessment & Plan    Gap 24 on admission  (low bicarb).  Closed to 10 with IVF (LR & NS).   - RESOLVED  -Continue to monitor and correct electrolytes as needed

## 2018-12-29 NOTE — PROGRESS NOTES
Pt updated on POC, pt verbalizes understanding, no questions. No changes noted in status.Pt resting quietly  in bed, denies any additional needs, call light within reach, will continue to monitor.

## 2018-12-30 PROBLEM — R33.8 ACUTE URINARY RETENTION: Status: ACTIVE | Noted: 2018-12-30

## 2018-12-30 PROBLEM — R62.7 FAILURE TO THRIVE IN ADULT: Status: ACTIVE | Noted: 2018-12-30

## 2018-12-30 LAB
ALBUMIN SERPL BCP-MCNC: 3.8 G/DL (ref 3.2–4.9)
ALBUMIN/GLOB SERPL: 1.2 G/DL
ALP SERPL-CCNC: 78 U/L (ref 30–99)
ALT SERPL-CCNC: 27 U/L (ref 2–50)
ANION GAP SERPL CALC-SCNC: 11 MMOL/L (ref 0–11.9)
AST SERPL-CCNC: 37 U/L (ref 12–45)
BASOPHILS # BLD AUTO: 0.6 % (ref 0–1.8)
BASOPHILS # BLD: 0.04 K/UL (ref 0–0.12)
BILIRUB SERPL-MCNC: 0.6 MG/DL (ref 0.1–1.5)
BUN SERPL-MCNC: <3 MG/DL (ref 8–22)
CALCIUM SERPL-MCNC: 8.7 MG/DL (ref 8.5–10.5)
CHLORIDE SERPL-SCNC: 107 MMOL/L (ref 96–112)
CO2 SERPL-SCNC: 19 MMOL/L (ref 20–33)
CREAT SERPL-MCNC: 0.55 MG/DL (ref 0.5–1.4)
EKG IMPRESSION: NORMAL
EOSINOPHIL # BLD AUTO: 0 K/UL (ref 0–0.51)
EOSINOPHIL NFR BLD: 0 % (ref 0–6.9)
ERYTHROCYTE [DISTWIDTH] IN BLOOD BY AUTOMATED COUNT: 60.3 FL (ref 35.9–50)
GLOBULIN SER CALC-MCNC: 3.2 G/DL (ref 1.9–3.5)
GLUCOSE SERPL-MCNC: 99 MG/DL (ref 65–99)
HCT VFR BLD AUTO: 30.6 % (ref 37–47)
HGB BLD-MCNC: 9.9 G/DL (ref 12–16)
IMM GRANULOCYTES # BLD AUTO: 0.05 K/UL (ref 0–0.11)
IMM GRANULOCYTES NFR BLD AUTO: 0.8 % (ref 0–0.9)
LYMPHOCYTES # BLD AUTO: 1.03 K/UL (ref 1–4.8)
LYMPHOCYTES NFR BLD: 16.3 % (ref 22–41)
MAGNESIUM SERPL-MCNC: 1.9 MG/DL (ref 1.5–2.5)
MCH RBC QN AUTO: 30 PG (ref 27–33)
MCHC RBC AUTO-ENTMCNC: 32.4 G/DL (ref 33.6–35)
MCV RBC AUTO: 92.7 FL (ref 81.4–97.8)
MONOCYTES # BLD AUTO: 0.53 K/UL (ref 0–0.85)
MONOCYTES NFR BLD AUTO: 8.4 % (ref 0–13.4)
NEUTROPHILS # BLD AUTO: 4.66 K/UL (ref 2–7.15)
NEUTROPHILS NFR BLD: 73.9 % (ref 44–72)
NRBC # BLD AUTO: 0 K/UL
NRBC BLD-RTO: 0 /100 WBC
PHOSPHATE SERPL-MCNC: 2.3 MG/DL (ref 2.5–4.5)
PHOSPHATE SERPL-MCNC: 2.5 MG/DL (ref 2.5–4.5)
PLATELET # BLD AUTO: 214 K/UL (ref 164–446)
PMV BLD AUTO: 9.9 FL (ref 9–12.9)
POTASSIUM SERPL-SCNC: 4.1 MMOL/L (ref 3.6–5.5)
PROT SERPL-MCNC: 7 G/DL (ref 6–8.2)
RBC # BLD AUTO: 3.3 M/UL (ref 4.2–5.4)
SODIUM SERPL-SCNC: 137 MMOL/L (ref 135–145)
TSH SERPL DL<=0.005 MIU/L-ACNC: 1.14 UIU/ML (ref 0.38–5.33)
WBC # BLD AUTO: 6.3 K/UL (ref 4.8–10.8)

## 2018-12-30 PROCEDURE — 83735 ASSAY OF MAGNESIUM: CPT

## 2018-12-30 PROCEDURE — 770020 HCHG ROOM/CARE - TELE (206)

## 2018-12-30 PROCEDURE — 700101 HCHG RX REV CODE 250: Performed by: STUDENT IN AN ORGANIZED HEALTH CARE EDUCATION/TRAINING PROGRAM

## 2018-12-30 PROCEDURE — A9270 NON-COVERED ITEM OR SERVICE: HCPCS | Performed by: STUDENT IN AN ORGANIZED HEALTH CARE EDUCATION/TRAINING PROGRAM

## 2018-12-30 PROCEDURE — 700111 HCHG RX REV CODE 636 W/ 250 OVERRIDE (IP): Performed by: INTERNAL MEDICINE

## 2018-12-30 PROCEDURE — 84100 ASSAY OF PHOSPHORUS: CPT

## 2018-12-30 PROCEDURE — 700105 HCHG RX REV CODE 258: Performed by: STUDENT IN AN ORGANIZED HEALTH CARE EDUCATION/TRAINING PROGRAM

## 2018-12-30 PROCEDURE — 99232 SBSQ HOSP IP/OBS MODERATE 35: CPT | Mod: GC | Performed by: INTERNAL MEDICINE

## 2018-12-30 PROCEDURE — 93010 ELECTROCARDIOGRAM REPORT: CPT | Performed by: INTERNAL MEDICINE

## 2018-12-30 PROCEDURE — 700102 HCHG RX REV CODE 250 W/ 637 OVERRIDE(OP): Performed by: STUDENT IN AN ORGANIZED HEALTH CARE EDUCATION/TRAINING PROGRAM

## 2018-12-30 PROCEDURE — 700111 HCHG RX REV CODE 636 W/ 250 OVERRIDE (IP): Performed by: STUDENT IN AN ORGANIZED HEALTH CARE EDUCATION/TRAINING PROGRAM

## 2018-12-30 PROCEDURE — 36415 COLL VENOUS BLD VENIPUNCTURE: CPT

## 2018-12-30 PROCEDURE — 84443 ASSAY THYROID STIM HORMONE: CPT

## 2018-12-30 PROCEDURE — 85025 COMPLETE CBC W/AUTO DIFF WBC: CPT

## 2018-12-30 PROCEDURE — 80053 COMPREHEN METABOLIC PANEL: CPT

## 2018-12-30 PROCEDURE — 51798 US URINE CAPACITY MEASURE: CPT

## 2018-12-30 RX ADMIN — ACETAMINOPHEN 650 MG: 325 TABLET, FILM COATED ORAL at 13:34

## 2018-12-30 RX ADMIN — MORPHINE SULFATE 2 MG: 4 INJECTION INTRAVENOUS at 18:13

## 2018-12-30 RX ADMIN — ACETAMINOPHEN 650 MG: 325 TABLET, FILM COATED ORAL at 18:13

## 2018-12-30 RX ADMIN — CLONAZEPAM 0.5 MG: 0.5 TABLET ORAL at 18:13

## 2018-12-30 RX ADMIN — PROCHLORPERAZINE EDISYLATE 10 MG: 5 INJECTION INTRAMUSCULAR; INTRAVENOUS at 06:00

## 2018-12-30 RX ADMIN — MORPHINE SULFATE 2 MG: 4 INJECTION INTRAVENOUS at 10:07

## 2018-12-30 RX ADMIN — MORPHINE SULFATE 2 MG: 4 INJECTION INTRAVENOUS at 14:00

## 2018-12-30 RX ADMIN — CAPSAICIN: 0.25 CREAM TOPICAL at 18:13

## 2018-12-30 RX ADMIN — POTASSIUM CHLORIDE, DEXTROSE MONOHYDRATE AND SODIUM CHLORIDE: 150; 5; 900 INJECTION, SOLUTION INTRAVENOUS at 06:02

## 2018-12-30 RX ADMIN — PROMETHAZINE HYDROCHLORIDE 25 MG: 25 TABLET ORAL at 14:01

## 2018-12-30 RX ADMIN — BUSPIRONE HYDROCHLORIDE 30 MG: 30 TABLET ORAL at 18:13

## 2018-12-30 RX ADMIN — MORPHINE SULFATE 2 MG: 4 INJECTION INTRAVENOUS at 01:52

## 2018-12-30 RX ADMIN — SODIUM CHLORIDE: 234 INJECTION INTRAMUSCULAR; INTRAVENOUS; SUBCUTANEOUS at 18:15

## 2018-12-30 RX ADMIN — PROMETHAZINE HYDROCHLORIDE 25 MG: 25 TABLET ORAL at 22:27

## 2018-12-30 RX ADMIN — CLONAZEPAM 0.5 MG: 0.5 TABLET ORAL at 13:34

## 2018-12-30 RX ADMIN — ONDANSETRON 4 MG: 2 INJECTION INTRAMUSCULAR; INTRAVENOUS at 00:22

## 2018-12-30 RX ADMIN — SERTRALINE 100 MG: 50 TABLET, FILM COATED ORAL at 22:27

## 2018-12-30 RX ADMIN — MORPHINE SULFATE 2 MG: 4 INJECTION INTRAVENOUS at 05:59

## 2018-12-30 RX ADMIN — ACETAMINOPHEN 650 MG: 325 TABLET, FILM COATED ORAL at 23:57

## 2018-12-30 RX ADMIN — CHLORPROMAZINE HYDROCHLORIDE 25 MG: 25 TABLET, SUGAR COATED ORAL at 13:34

## 2018-12-30 RX ADMIN — CHLORPROMAZINE HYDROCHLORIDE 25 MG: 25 TABLET, SUGAR COATED ORAL at 18:12

## 2018-12-30 RX ADMIN — CAPSAICIN: 0.25 CREAM TOPICAL at 06:03

## 2018-12-30 RX ADMIN — MORPHINE SULFATE 2 MG: 4 INJECTION INTRAVENOUS at 22:25

## 2018-12-30 ASSESSMENT — ENCOUNTER SYMPTOMS
FEVER: 0
HEADACHES: 1
SHORTNESS OF BREATH: 0
WEIGHT LOSS: 0
EYE PAIN: 0
PND: 0
NERVOUS/ANXIOUS: 1
SEIZURES: 0
ORTHOPNEA: 0
EYE REDNESS: 0
STRIDOR: 0
BLURRED VISION: 0
COUGH: 0
WEAKNESS: 0
DOUBLE VISION: 0
PALPITATIONS: 0
NAUSEA: 1
SINUS PAIN: 0
MEMORY LOSS: 0
CHILLS: 0
CLAUDICATION: 0
WHEEZING: 0
SORE THROAT: 0
TINGLING: 0
TREMORS: 0
FALLS: 0
VOMITING: 1
DEPRESSION: 0
HALLUCINATIONS: 0
DIAPHORESIS: 0
SPUTUM PRODUCTION: 0
BACK PAIN: 0
EYE DISCHARGE: 0
MYALGIAS: 0
HEARTBURN: 0
FLANK PAIN: 0
ABDOMINAL PAIN: 1
HEMOPTYSIS: 0
DIZZINESS: 0
LOSS OF CONSCIOUSNESS: 0
SENSORY CHANGE: 0
CONSTIPATION: 0
FOCAL WEAKNESS: 0
DIARRHEA: 0
INSOMNIA: 0
SPEECH CHANGE: 0
NECK PAIN: 0
PHOTOPHOBIA: 0
BLOOD IN STOOL: 0

## 2018-12-30 ASSESSMENT — PAIN SCALES - GENERAL
PAINLEVEL_OUTOF10: 9
PAINLEVEL_OUTOF10: 8
PAINLEVEL_OUTOF10: 6
PAINLEVEL_OUTOF10: 10
PAINLEVEL_OUTOF10: 8
PAINLEVEL_OUTOF10: 8

## 2018-12-30 ASSESSMENT — LIFESTYLE VARIABLES: SUBSTANCE_ABUSE: 0

## 2018-12-30 NOTE — CARE PLAN
Problem: Communication  Goal: The ability to communicate needs accurately and effectively will improve  Outcome: PROGRESSING AS EXPECTED  Encourage pt to voice concerns and feelings    Problem: Safety  Goal: Will remain free from falls  Outcome: PROGRESSING AS EXPECTED  Hourly rounding, bed low and locked, nonskid socks on, call bell within reach

## 2018-12-30 NOTE — PROGRESS NOTES
C/O pain and nausea, medicated for nausea per Emar. Too early to medicate for pain. No other needs requested. Call bell and personal items in reach.

## 2018-12-30 NOTE — CARE PLAN
Problem: Pain Management  Goal: Pain level will decrease to patient's comfort goal  Listened to patients discussion of pain. Discussed with patient pain goal and management through medications. Supported and educated patient by addressing specific questions regarding diagnosis, risks, benefits of pain management treatment.

## 2018-12-30 NOTE — PROGRESS NOTES
MD notified of swelling to RUE from Mid-kathy, no fluids infusing at this time. MD stated to leave Mid-line and he will have PICC Nurse assess patency.

## 2018-12-30 NOTE — PROGRESS NOTES
Assumed care of patient, received bedside report from NOC RN, Pt A&Ox4, nauseous, and c/o of not being able to urinate, bladder scan ordered, Vitals stable, no SOB or noted.  Pt updated on plan of care, Call light within reach, personal belongings within reach.  Bed is locked and in lowest position. Tele monitor on. Will continue to monitor. Hourly rounding in place.

## 2018-12-30 NOTE — CARE PLAN
Problem: Communication  Goal: The ability to communicate needs accurately and effectively will improve  Listened to patients discussion of concerns related to disease process and treatment plan. Discussed with patient concerns, goals and management. Discussed importance of patient reporting new signs and symptoms related to disease process. Patient verbalized understanding and understands importance of communicating needs.  Supported and educated patient by addressing specific questions regarding diagnosis, risks, benefits of pain management treatment.

## 2018-12-30 NOTE — PROGRESS NOTES
Internal Medicine Interval Note  Note Author: Sp Ortiz M.D.     Name Diana Hernandez 1975   Age/Sex 43 y.o. female   MRN [id number]   Code Status Full Code     After 5PM or if no immediate response to page, please call for cross-coverage  Attending/Team: Dr Hameed/ Vinh See Patient List for primary contact information  Call [phone] to page    1st Call - Day Intern (R1):   Dr Sp Ortiz 2nd Call - Day Sr. Resident (R2/R3):   Dr Marquita Logan         Reason for interval visit  (Principal Problem)     Intractable Vomiting    Interval Problem Daily Status Update  (24 hours, problem oriented, brief subjective history, new lab/imaging data pertinent to that problem)       - Overnight the patient received Trazodone for sleep and was unable to unrinate, bladder scan showed 650 ml, straight cath once. DCd trazodone. Allergy to tamsulosin.  - Patient VSS, but patient no improving as expected.  - Continue to correct electrolyte abnormalities, daily EKGs for Arrythmias, QTc 431  - Patient is not eating and may require cortrac and tube feeds.    - D10 NS , Q6H accuchecks, started for hypoglycemia, advancing diet to Full liquid and adding supplements, prealbumin pending  - Right Midline no functional, plan to have IR team remove.  - Continues to complain of intractable nausea without vomiting.  Denies chest pain, abdominal pain, SOB, Headaches dysuria or changes in vision  - Capsacin cream applied to umbilicus, patient notes improvement of symptoms with cream  - Glucose 87, mIVF D10 NS, advancing diet  - Continue supportive therapy        Review of Systems   Constitutional: Negative for chills, diaphoresis, fever, malaise/fatigue and weight loss.   HENT: Negative for congestion, ear discharge, ear pain, hearing loss, nosebleeds, sinus pain, sore throat and tinnitus.    Eyes: Negative for blurred vision, double vision, photophobia, pain, discharge and redness.   Respiratory: Negative for cough,  hemoptysis, sputum production, shortness of breath, wheezing and stridor.    Cardiovascular: Negative for chest pain, palpitations, orthopnea, claudication, leg swelling and PND.   Gastrointestinal: Positive for abdominal pain, nausea and vomiting. Negative for blood in stool, constipation, diarrhea, heartburn and melena.   Genitourinary: Negative for dysuria, flank pain, frequency, hematuria and urgency.   Musculoskeletal: Negative for back pain, falls, joint pain, myalgias and neck pain.   Skin: Negative for itching and rash.   Neurological: Positive for headaches. Negative for dizziness, tingling, tremors, sensory change, speech change, focal weakness, seizures, loss of consciousness and weakness.   Psychiatric/Behavioral: Negative for depression, hallucinations, memory loss, substance abuse and suicidal ideas. The patient is nervous/anxious. The patient does not have insomnia.        Disposition/Barriers to discharge:   Pain seeking behavior  Continued Marijuana use  Failure to thrive and electrolyte abnormalities      Consultants/Specialty  None    PCP: Isa Rodriguez M.D.      Quality Measures  Quality-Core Measures   Reviewed items::  EKG reviewed, Radiology images reviewed, Labs reviewed and Medications reviewed  Saldana catheter::  No Saldana  DVT prophylaxis pharmacological::  Enoxaparin (Lovenox)  Ulcer Prophylaxis::  Yes          Physical Exam       Vitals:    12/30/18 0050 12/30/18 0330 12/30/18 0800 12/30/18 1200   BP: 137/67 116/74 127/84 120/77   Pulse: 65 (!) 58 82 66   Resp: 18 16 16 16   Temp: 36.7 °C (98 °F) 36.3 °C (97.3 °F) 36.8 °C (98.2 °F) 36.3 °C (97.4 °F)   TempSrc:   Temporal Temporal   SpO2: 98% 97% 98% 95%   Weight:       Height:         Body mass index is 22.33 kg/m².    Oxygen Therapy:  Pulse Oximetry: 95 %, O2 (LPM): 0, O2 Delivery: None (Room Air)    Physical Exam   Constitutional: She is oriented to person, place, and time and well-developed, well-nourished, and in no distress. No  distress.   HENT:   Head: Normocephalic and atraumatic.   Right Ear: External ear normal.   Left Ear: External ear normal.   Nose: Nose normal.   Mouth/Throat: Oropharynx is clear and moist. No oropharyngeal exudate.   Eyes: Pupils are equal, round, and reactive to light. Conjunctivae and EOM are normal. Right eye exhibits no discharge. Left eye exhibits no discharge. No scleral icterus.   Neck: Normal range of motion. No JVD present. No thyromegaly present.   Cardiovascular: Normal rate and intact distal pulses.  Exam reveals no gallop and no friction rub.    No murmur heard.  Pulmonary/Chest: Effort normal and breath sounds normal. No stridor. No respiratory distress. She has no wheezes. She has no rales. She exhibits no tenderness.   Abdominal: Soft. Bowel sounds are normal. She exhibits no distension and no mass. There is tenderness. There is no rebound and no guarding.   Midline laparotomy scar   Musculoskeletal: Normal range of motion. She exhibits no edema, tenderness or deformity.   Lymphadenopathy:     She has no cervical adenopathy.   Neurological: She is alert and oriented to person, place, and time. She has normal reflexes. No cranial nerve deficit. She exhibits normal muscle tone. Coordination normal. GCS score is 15.   Skin: Skin is warm and dry. No rash noted. She is not diaphoretic. No erythema. No pallor.   Psychiatric:   Affect anxious, judgement poor, memory intact, Mood depressed             Assessment/Plan     * Cannabis hyperemesis syndrome concurrent with and due to cannabis dependence (HCC)- (present on admission)   Assessment & Plan    With abdominal pain, radiating to bilateral back, in this patient with multiple ER visits for complaints.  No obvious drug-seeking behavior observed today.  Marijuana consuming about 2 g daily    -Admit to telemetry  -Aspiration/fall/seizure precautions  -D10 NS at 125 mL/hr, Q6 Accuchecks, hypoglycemic protocol  -Monitor and replete electrolytes: Target K >4,  and Mag >2  -GI cocktail/Carafate for abdominal pain  - Capsacin cream periumbilicus  -Continue to monitor  - Full Liquid diet     Hypophosphatemia- (present on admission)   Assessment & Plan    Phos wnl  Repeat CMP, Phos and Mg, replete lytes PRN  Daily EKG will continue to monitor for arrythmias     Hypokalemia- (present on admission)   Assessment & Plan    K on admit 3.4 --> 2.7-->.  Received 10 mEq IVP KCl in ED.     KPhos given  -Continue to monitor       Depression- (present on admission)   Assessment & Plan    Stable. Affect anxious and flat on admission.    -Continue Zoloft     Dehydration- (present on admission)   Assessment & Plan    Intractable nausea and vomiting.  This patient with cyclic vomiting syndrome likely secondary to cannabis hyper emesis syndrome. No vomiting since coming to floor.  Received boluses of NS and LR in the ED.     -Continue IV D10  mL/h  -Continue to monitor replenish electrolytes as necessary     Anxiety- (present on admission)   Assessment & Plan    Stable.    -Continue buspirone, chlorpromazine, Zoloft, home medication Klonapin     Intractable vomiting with nausea- (present on admission)   Assessment & Plan    Secondary to [facility].     -Education given on the effect of marijuana use on her cyclical vomiting  - Capsacin cream  - IV D10 NS at 125 mL/h  -Zofran PRN  -Continue to monitor QTc:  EKG completed 431     Failure to thrive in adult   Assessment & Plan    Patient with BMI 22, failing to eat  Risk of Refeeding syndrome.  Replete lytes PRN     Acute urinary retention   Assessment & Plan    Bladder scan 650 ml, straight cath one time  Discontinue trazodone     Hyponatremia- (present on admission)   Assessment & Plan    133 on admission.  Corrected to 135 with IVF (LR & NS).  -RESOLVED  -Continue to monitor and correct electrolytes as needed     Hyperglycemia- (present on admission)   Assessment & Plan    Glc 123 on admission --> 226.  Patient has diagnosis of diabetes on  chart but she denies diabetes.     -A1c pending  -Continue to monitor      High anion gap metabolic acidosis- (present on admission)   Assessment & Plan    Gap 24 on admission  (low bicarb).  Closed to 10 with IVF (LR & NS).   - RESOLVED  -Continue to monitor and correct electrolytes as needed

## 2018-12-31 LAB
ALBUMIN SERPL BCP-MCNC: 2.7 G/DL (ref 3.2–4.9)
ALBUMIN SERPL BCP-MCNC: 4 G/DL (ref 3.2–4.9)
ALBUMIN/GLOB SERPL: 1.2 G/DL
ALBUMIN/GLOB SERPL: 1.3 G/DL
ALP SERPL-CCNC: 57 U/L (ref 30–99)
ALP SERPL-CCNC: 73 U/L (ref 30–99)
ALT SERPL-CCNC: 14 U/L (ref 2–50)
ALT SERPL-CCNC: 18 U/L (ref 2–50)
ANION GAP SERPL CALC-SCNC: 18 MMOL/L (ref 0–11.9)
ANION GAP SERPL CALC-SCNC: 6 MMOL/L (ref 0–11.9)
AST SERPL-CCNC: 14 U/L (ref 12–45)
AST SERPL-CCNC: 37 U/L (ref 12–45)
BACTERIA UR CULT: ABNORMAL
BACTERIA UR CULT: ABNORMAL
BASOPHILS # BLD AUTO: 1 % (ref 0–1.8)
BASOPHILS # BLD: 0.06 K/UL (ref 0–0.12)
BILIRUB SERPL-MCNC: 0.3 MG/DL (ref 0.1–1.5)
BILIRUB SERPL-MCNC: 0.5 MG/DL (ref 0.1–1.5)
BUN SERPL-MCNC: <3 MG/DL (ref 8–22)
BUN SERPL-MCNC: <3 MG/DL (ref 8–22)
CALCIUM SERPL-MCNC: 8.1 MG/DL (ref 8.5–10.5)
CALCIUM SERPL-MCNC: 9 MG/DL (ref 8.5–10.5)
CHLORIDE SERPL-SCNC: 112 MMOL/L (ref 96–112)
CHLORIDE SERPL-SCNC: 116 MMOL/L (ref 96–112)
CO2 SERPL-SCNC: 12 MMOL/L (ref 20–33)
CO2 SERPL-SCNC: 24 MMOL/L (ref 20–33)
CREAT SERPL-MCNC: 0.51 MG/DL (ref 0.5–1.4)
CREAT SERPL-MCNC: 0.65 MG/DL (ref 0.5–1.4)
EOSINOPHIL # BLD AUTO: 0.08 K/UL (ref 0–0.51)
EOSINOPHIL NFR BLD: 1.4 % (ref 0–6.9)
ERYTHROCYTE [DISTWIDTH] IN BLOOD BY AUTOMATED COUNT: 62.8 FL (ref 35.9–50)
GLOBULIN SER CALC-MCNC: 2.3 G/DL (ref 1.9–3.5)
GLOBULIN SER CALC-MCNC: 3.2 G/DL (ref 1.9–3.5)
GLUCOSE SERPL-MCNC: 65 MG/DL (ref 65–99)
GLUCOSE SERPL-MCNC: 94 MG/DL (ref 65–99)
HCT VFR BLD AUTO: 29.4 % (ref 37–47)
HEMOCCULT SP1 STL QL: NEGATIVE
HGB BLD-MCNC: 9.3 G/DL (ref 12–16)
IMM GRANULOCYTES # BLD AUTO: 0.02 K/UL (ref 0–0.11)
IMM GRANULOCYTES NFR BLD AUTO: 0.3 % (ref 0–0.9)
LYMPHOCYTES # BLD AUTO: 2.4 K/UL (ref 1–4.8)
LYMPHOCYTES NFR BLD: 41.7 % (ref 22–41)
MAGNESIUM SERPL-MCNC: 1.7 MG/DL (ref 1.5–2.5)
MCH RBC QN AUTO: 30.4 PG (ref 27–33)
MCHC RBC AUTO-ENTMCNC: 31.6 G/DL (ref 33.6–35)
MCV RBC AUTO: 96.1 FL (ref 81.4–97.8)
MONOCYTES # BLD AUTO: 0.58 K/UL (ref 0–0.85)
MONOCYTES NFR BLD AUTO: 10.1 % (ref 0–13.4)
NEUTROPHILS # BLD AUTO: 2.62 K/UL (ref 2–7.15)
NEUTROPHILS NFR BLD: 45.5 % (ref 44–72)
NRBC # BLD AUTO: 0 K/UL
NRBC BLD-RTO: 0 /100 WBC
PHOSPHATE SERPL-MCNC: 3.2 MG/DL (ref 2.5–4.5)
PLATELET # BLD AUTO: 251 K/UL (ref 164–446)
PMV BLD AUTO: 10 FL (ref 9–12.9)
POTASSIUM SERPL-SCNC: 3.4 MMOL/L (ref 3.6–5.5)
POTASSIUM SERPL-SCNC: 4.3 MMOL/L (ref 3.6–5.5)
PREALB SERPL-MCNC: 12 MG/DL (ref 18–38)
PROT SERPL-MCNC: 5 G/DL (ref 6–8.2)
PROT SERPL-MCNC: 7.2 G/DL (ref 6–8.2)
RBC # BLD AUTO: 3.06 M/UL (ref 4.2–5.4)
SIGNIFICANT IND 70042: ABNORMAL
SITE SITE: ABNORMAL
SODIUM SERPL-SCNC: 142 MMOL/L (ref 135–145)
SODIUM SERPL-SCNC: 146 MMOL/L (ref 135–145)
SOURCE SOURCE: ABNORMAL
WBC # BLD AUTO: 5.8 K/UL (ref 4.8–10.8)

## 2018-12-31 PROCEDURE — 700102 HCHG RX REV CODE 250 W/ 637 OVERRIDE(OP): Performed by: INTERNAL MEDICINE

## 2018-12-31 PROCEDURE — 85025 COMPLETE CBC W/AUTO DIFF WBC: CPT

## 2018-12-31 PROCEDURE — A9270 NON-COVERED ITEM OR SERVICE: HCPCS | Performed by: STUDENT IN AN ORGANIZED HEALTH CARE EDUCATION/TRAINING PROGRAM

## 2018-12-31 PROCEDURE — 80053 COMPREHEN METABOLIC PANEL: CPT

## 2018-12-31 PROCEDURE — 84100 ASSAY OF PHOSPHORUS: CPT

## 2018-12-31 PROCEDURE — 700111 HCHG RX REV CODE 636 W/ 250 OVERRIDE (IP): Performed by: INTERNAL MEDICINE

## 2018-12-31 PROCEDURE — 700111 HCHG RX REV CODE 636 W/ 250 OVERRIDE (IP): Performed by: STUDENT IN AN ORGANIZED HEALTH CARE EDUCATION/TRAINING PROGRAM

## 2018-12-31 PROCEDURE — 82270 OCCULT BLOOD FECES: CPT

## 2018-12-31 PROCEDURE — 700102 HCHG RX REV CODE 250 W/ 637 OVERRIDE(OP): Performed by: STUDENT IN AN ORGANIZED HEALTH CARE EDUCATION/TRAINING PROGRAM

## 2018-12-31 PROCEDURE — 83735 ASSAY OF MAGNESIUM: CPT

## 2018-12-31 PROCEDURE — 770020 HCHG ROOM/CARE - TELE (206)

## 2018-12-31 PROCEDURE — 87338 HPYLORI STOOL AG IA: CPT

## 2018-12-31 PROCEDURE — A9270 NON-COVERED ITEM OR SERVICE: HCPCS | Performed by: INTERNAL MEDICINE

## 2018-12-31 PROCEDURE — 36415 COLL VENOUS BLD VENIPUNCTURE: CPT

## 2018-12-31 PROCEDURE — 84134 ASSAY OF PREALBUMIN: CPT

## 2018-12-31 PROCEDURE — 99232 SBSQ HOSP IP/OBS MODERATE 35: CPT | Performed by: INTERNAL MEDICINE

## 2018-12-31 PROCEDURE — 700105 HCHG RX REV CODE 258: Performed by: STUDENT IN AN ORGANIZED HEALTH CARE EDUCATION/TRAINING PROGRAM

## 2018-12-31 PROCEDURE — 700101 HCHG RX REV CODE 250: Performed by: STUDENT IN AN ORGANIZED HEALTH CARE EDUCATION/TRAINING PROGRAM

## 2018-12-31 RX ORDER — MIRTAZAPINE 15 MG/1
15 TABLET, FILM COATED ORAL ONCE
Status: DISCONTINUED | OUTPATIENT
Start: 2018-12-31 | End: 2019-01-01 | Stop reason: HOSPADM

## 2018-12-31 RX ORDER — POTASSIUM CHLORIDE 20 MEQ/1
40 TABLET, EXTENDED RELEASE ORAL ONCE
Status: COMPLETED | OUTPATIENT
Start: 2018-12-31 | End: 2018-12-31

## 2018-12-31 RX ORDER — DIPHENHYDRAMINE HCL 25 MG
50 TABLET ORAL ONCE
Status: COMPLETED | OUTPATIENT
Start: 2018-12-31 | End: 2018-12-31

## 2018-12-31 RX ORDER — MIRTAZAPINE 15 MG/1
15 TABLET, FILM COATED ORAL ONCE
Status: COMPLETED | OUTPATIENT
Start: 2018-12-31 | End: 2018-12-31

## 2018-12-31 RX ADMIN — MORPHINE SULFATE 2 MG: 4 INJECTION INTRAVENOUS at 22:57

## 2018-12-31 RX ADMIN — BUSPIRONE HYDROCHLORIDE 30 MG: 30 TABLET ORAL at 06:36

## 2018-12-31 RX ADMIN — SODIUM CHLORIDE: 234 INJECTION INTRAMUSCULAR; INTRAVENOUS; SUBCUTANEOUS at 01:36

## 2018-12-31 RX ADMIN — MORPHINE SULFATE 2 MG: 4 INJECTION INTRAVENOUS at 03:51

## 2018-12-31 RX ADMIN — MAGNESIUM GLUCONATE 500 MG ORAL TABLET 400 MG: 500 TABLET ORAL at 06:34

## 2018-12-31 RX ADMIN — ENOXAPARIN SODIUM 40 MG: 100 INJECTION SUBCUTANEOUS at 06:34

## 2018-12-31 RX ADMIN — SERTRALINE 100 MG: 50 TABLET, FILM COATED ORAL at 20:25

## 2018-12-31 RX ADMIN — POTASSIUM CHLORIDE 40 MEQ: 1500 TABLET, EXTENDED RELEASE ORAL at 14:47

## 2018-12-31 RX ADMIN — CAPSAICIN: 0.25 CREAM TOPICAL at 17:46

## 2018-12-31 RX ADMIN — DIPHENHYDRAMINE HCL 50 MG: 25 TABLET ORAL at 23:39

## 2018-12-31 RX ADMIN — CHLORPROMAZINE HYDROCHLORIDE 25 MG: 25 TABLET, SUGAR COATED ORAL at 14:52

## 2018-12-31 RX ADMIN — CLONAZEPAM 0.5 MG: 0.5 TABLET ORAL at 06:35

## 2018-12-31 RX ADMIN — CLONAZEPAM 0.5 MG: 0.5 TABLET ORAL at 14:47

## 2018-12-31 RX ADMIN — CLONAZEPAM 0.5 MG: 0.5 TABLET ORAL at 17:46

## 2018-12-31 RX ADMIN — CHLORPROMAZINE HYDROCHLORIDE 25 MG: 25 TABLET, SUGAR COATED ORAL at 06:36

## 2018-12-31 RX ADMIN — BUSPIRONE HYDROCHLORIDE 30 MG: 30 TABLET ORAL at 17:46

## 2018-12-31 RX ADMIN — ACETAMINOPHEN 650 MG: 325 TABLET, FILM COATED ORAL at 06:34

## 2018-12-31 RX ADMIN — MORPHINE SULFATE 2 MG: 4 INJECTION INTRAVENOUS at 14:47

## 2018-12-31 RX ADMIN — CHLORPROMAZINE HYDROCHLORIDE 25 MG: 25 TABLET, SUGAR COATED ORAL at 17:46

## 2018-12-31 RX ADMIN — MIRTAZAPINE 15 MG: 15 TABLET, FILM COATED ORAL at 20:25

## 2018-12-31 RX ADMIN — ACETAMINOPHEN 650 MG: 325 TABLET, FILM COATED ORAL at 17:46

## 2018-12-31 RX ADMIN — MORPHINE SULFATE 2 MG: 4 INJECTION INTRAVENOUS at 18:53

## 2018-12-31 RX ADMIN — CAPSAICIN: 0.25 CREAM TOPICAL at 06:36

## 2018-12-31 RX ADMIN — Medication 100 MG: at 06:34

## 2018-12-31 RX ADMIN — MORPHINE SULFATE 2 MG: 4 INJECTION INTRAVENOUS at 10:02

## 2018-12-31 RX ADMIN — ACETAMINOPHEN 650 MG: 325 TABLET, FILM COATED ORAL at 14:47

## 2018-12-31 ASSESSMENT — ENCOUNTER SYMPTOMS
COUGH: 0
DOUBLE VISION: 0
FEVER: 0
DIARRHEA: 0
VOMITING: 1
BRUISES/BLEEDS EASILY: 0
DIZZINESS: 0
NECK PAIN: 0
NAUSEA: 1
HEADACHES: 0
CHILLS: 0
BLURRED VISION: 0
FLANK PAIN: 0
ABDOMINAL PAIN: 1
PALPITATIONS: 0
HALLUCINATIONS: 0
HEMOPTYSIS: 0
MYALGIAS: 0
CONSTIPATION: 0
SHORTNESS OF BREATH: 0
HEARTBURN: 0
INSOMNIA: 0

## 2018-12-31 ASSESSMENT — PAIN SCALES - GENERAL
PAINLEVEL_OUTOF10: 4
PAINLEVEL_OUTOF10: 6
PAINLEVEL_OUTOF10: 8
PAINLEVEL_OUTOF10: 3
PAINLEVEL_OUTOF10: 4
PAINLEVEL_OUTOF10: 8

## 2018-12-31 NOTE — DIETARY
"Nutrition services: Day 3 of admit.  Diana Hrenandez is a 43 y.o. female with admitting DX of cannabinoid hyperemesis syndrome, cyclical vomiting, hypokalemia.  Consult received for poor PO intake pta per nutrition admit screen.     Assessment:  Height: 162.6 cm (5' 4\")  Weight: 55.9 kg (123 lb 3.8 oz)  Body mass index is 21.15 kg/m².  Diet/Intake: Full Liquid - advanced to Regular; PO intake has been >50%    Evaluation:   1. Poor PO intake to be expected 2' to admit dx  2. PO intake already improving, and diet advanced for lunch today to regular  3. Supplement order is already in place, although pt has yet to receive any - will add Boost Plus BID    Recommendations/Plan:  1. Boost Plus BID for increased calorie and protein intake   2. Encourage intake of meals and supplements  3. Document intake of all meals and supplements as % taken in ADL's to provide interdisciplinary communication across all shifts.   4. Monitor weight.  5. Nutrition rep will continue to see patient for ongoing meal and snack preferences.   6. RD to monitor for continued improvement in PO intake               "

## 2018-12-31 NOTE — CARE PLAN
Problem: Pain Management  Goal: Pain level will decrease to patient's comfort goal  Listened to patients discussion of pain. Discussed with patient pain goal and management through medications. Supported and educated patient by addressing specific questions regarding diagnosis, risks, benefits of pain management treatment.       Problem: Urinary Elimination:  Goal: Ability to reestablish a normal urinary elimination pattern will improve  Pt having difficulty urinating, but able to urinate. Will continue to monitor.

## 2018-12-31 NOTE — CARE PLAN
Problem: Safety  Goal: Will remain free from falls  Outcome: PROGRESSING AS EXPECTED  Patient educated to use call light for assistance. Fall precautions in place. Staff will assist with mobilization. Hourly rounding in place.    Problem: Pain Management  Goal: Pain level will decrease to patient's comfort goal  Outcome: PROGRESSING AS EXPECTED  Assessed client for pain using an EBP RN Assessment tool; 0 to 10 pain scale. Medicated client per MAR orders and PRN instruction. Provided patient with a discussion related to non-pharmacological methods for pain management.

## 2018-12-31 NOTE — PROGRESS NOTES
Internal Medicine Interval Note  Note Author: Emily Maria M.D.     Name Diana Hernandez 1975   Age/Sex 43 y.o. female   MRN [id number]   Code Status FULL     After 5PM or if no immediate response to page, please call for cross-coverage  Attending/Team: Dr. Rob Willson/Vinh Team See Patient List for primary contact information  Call [phone] to page    1st Call - Day Intern (R1):   Dr. Emily Maria  2nd Call - Day Sr. Resident (R2/R3):   Dr. Berlin Tucker      Reason for interval visit  (Principal Problem)   Cannabis hyperemesis syndrome concurrent with and due to cannabis dependence (HCC)    Interval Problem Daily Status Update  (24 hours, problem oriented, brief subjective history, new lab/imaging data pertinent to that problem)   No acute overnight events.  Patient is feeling better today, with minimal nausea and no episodes of vomiting. She requested for advancement in diet. CMP showed decreased potassium which was repleted.  · CHS with nausea and vomiting: Improving, patient able to tolerate advance diet  · Hypokalemia: 3.4 this a.m.  · Acute urinary retention: Improved, patient still has some hesitancy, no other symptoms of UTI.  Febrile one episode yesterday, Afebrile since.  Urinalysis was positive for nitrates and WBCs, however acute urinary retention likely due to trazodone use which is since been stopped, with improvement in urinary retention.  · Anxiety/depression: Stable on buspirone, chlorpromazine, Zoloft, Klonopin    Review of Systems   Constitutional: Negative for chills and fever.   HENT: Negative for hearing loss and tinnitus.    Eyes: Negative for blurred vision and double vision.   Respiratory: Negative for cough, hemoptysis and shortness of breath.    Cardiovascular: Negative for chest pain, palpitations and leg swelling.   Gastrointestinal: Positive for abdominal pain, nausea and vomiting. Negative for constipation, diarrhea and heartburn.   Genitourinary: Negative  for dysuria, flank pain, frequency, hematuria and urgency.   Musculoskeletal: Negative for myalgias and neck pain.   Skin: Negative for itching and rash.   Neurological: Negative for dizziness and headaches.   Endo/Heme/Allergies: Negative for environmental allergies. Does not bruise/bleed easily.   Psychiatric/Behavioral: Negative for hallucinations. The patient does not have insomnia.      Disposition/Barriers to discharge:   Pending medical clearance     Consultants/Specialty  PCP: Isa Rodriguez M.D.    Quality Measures  Quality-Core Measures   Reviewed items::  Labs reviewed, Medications reviewed and EKG reviewed  Saldana catheter::  No Saldana  DVT prophylaxis pharmacological::  Enoxaparin (Lovenox)  DVT prophylaxis - mechanical:  Not indicated at this time, ambulatory    Physical Exam     Vitals:    12/31/18 0000 12/31/18 0400 12/31/18 0800 12/31/18 1200   BP: 125/78 (!) 99/63 (!) 97/55 108/69   Pulse: 61 64 68 66   Resp: 16 16 16 20   Temp: 37.8 °C (100.1 °F) 37.1 °C (98.8 °F) 37.2 °C (98.9 °F) 36.9 °C (98.5 °F)   TempSrc: Temporal Temporal Temporal Temporal   SpO2: 96% 96% 93% 96%   Weight:       Height:         Body mass index is 21.15 kg/m². Weight: 55.9 kg (123 lb 3.8 oz)  Oxygen Therapy:  Pulse Oximetry: 96 %, O2 (LPM): 0, O2 Delivery: None (Room Air)    Physical Exam   Constitutional: She is oriented to person, place, and time.   Thin anxious-appearing female    HENT:   Head: Normocephalic and atraumatic.   Eyes: EOM are normal.   Neck: Normal range of motion.   Cardiovascular: Normal rate, regular rhythm, normal heart sounds and intact distal pulses.  Exam reveals no gallop and no friction rub.    No murmur heard.  Pulmonary/Chest: Effort normal and breath sounds normal. No respiratory distress. She has no wheezes. She has no rales.   Abdominal: Soft. Bowel sounds are normal. She exhibits no distension. There is no tenderness. There is no rebound and no guarding.   Musculoskeletal: Normal range of  motion. She exhibits no edema.   Lymphadenopathy:     She has no cervical adenopathy.   Neurological: She is alert and oriented to person, place, and time.   Skin: Skin is warm and dry.   Psychiatric: Affect and judgment normal.   Nursing note and vitals reviewed.    Assessment/Plan     * Cannabis hyperemesis syndrome concurrent with and due to cannabis dependence (HCC)- (present on admission)   Assessment & Plan    Marijuana consuming about 2 g daily, multiple admissions for [facility]   -Monitor and replete electrolytes: Target K >4, and Mag >2  -GI cocktail/Carafate for abdominal pain  -Capsacin cream periumbilicus  -Advance diet as tolerated      Intractable vomiting with nausea- (present on admission)   Assessment & Plan    [facility] to [facility].   -Education given on the effect of marijuana use on her cyclical vomiting  -Capsacin cream  -Zofran PRN  -Continue to monitor QTc: 431     Failure to thrive in adult   Assessment & Plan    Patient with BMI 22, failing to eat  Risk of Refeeding syndrome.  Replete lytes PRN     Acute urinary retention   Assessment & Plan    Likely secondary to trazodone use  Resolving, still with hesitancy, no other overt symptoms of UTI     Hypokalemia- (present on admission)   Assessment & Plan    Improving, replete as necessary        Anxiety- (present on admission)   Assessment & Plan    Stable.  QTc: 431ms  -Continue buspirone, chlorpromazine, Zoloft, home medication Klonapin     Hypophosphatemia- (present on admission)   Assessment & Plan    Phos wnl, replete as necessary      Leukocytosis- (present on admission)   Assessment & Plan    Resolved      Depression- (present on admission)   Assessment & Plan    Stable. Affect anxious and flat on admission.    -Continue Zoloft     Hyponatremia- (present on admission)   Assessment & Plan    133 on admission.  Corrected to 135 with IVF (LR & NS).  -RESOLVED  -Continue to monitor and correct electrolytes as needed     Hyperglycemia- (present on  admission)   Assessment & Plan    A1C: 5.7 % (11/2018) - pre-DM   May not be accurate due to patient's anemia        Dehydration- (present on admission)   Assessment & Plan    Secondary to vomiting and reduced oral intake   Resolved      High anion gap metabolic acidosis- (present on admission)   Assessment & Plan    Gap 24 on admission  (low bicarb).  Closed to 10 with IVF (LR & NS).   - RESOLVED  -Continue to monitor and correct electrolytes as needed

## 2018-12-31 NOTE — PROGRESS NOTES
Received bedside report from ELMO Muhammad, pt care assumed, VSS, pt assessment complete. Pt AAOx4, c/o 8/10 pain at this time. No signs of acute distress noted at this time. POC discussed with pt and verbalizes no questions. Pt denies any additional needs at this time. Bed in lowest position, bed alarm off, pt is up to self ad francisco, pt educated on fall risk and verbalized understanding, call light within reach, hourly rounding initiated.

## 2018-12-31 NOTE — PROGRESS NOTES
Assumed care of patient, received bedside report from NOC RN, Pt sleeping comfortably, even chest rise and fall, Vitals stable, no SOB or distress noted.  Pt updated on plan of care, Call light within reach, personal belongings within reach.  Bed is locked and in lowest position. Tele monitor on. Will continue to monitor. Hourly rounding in place.

## 2019-01-01 ENCOUNTER — PATIENT OUTREACH (OUTPATIENT)
Dept: HEALTH INFORMATION MANAGEMENT | Facility: OTHER | Age: 44
End: 2019-01-01

## 2019-01-01 VITALS
HEIGHT: 64 IN | RESPIRATION RATE: 16 BRPM | TEMPERATURE: 98 F | BODY MASS INDEX: 22.47 KG/M2 | HEART RATE: 83 BPM | SYSTOLIC BLOOD PRESSURE: 121 MMHG | WEIGHT: 131.61 LBS | DIASTOLIC BLOOD PRESSURE: 84 MMHG | OXYGEN SATURATION: 97 %

## 2019-01-01 PROBLEM — E86.0 DEHYDRATION: Status: RESOLVED | Noted: 2018-05-19 | Resolved: 2019-01-01

## 2019-01-01 PROBLEM — D64.9 NORMOCHROMIC NORMOCYTIC ANEMIA: Status: ACTIVE | Noted: 2019-01-01

## 2019-01-01 PROBLEM — E87.29 HIGH ANION GAP METABOLIC ACIDOSIS: Status: RESOLVED | Noted: 2018-03-22 | Resolved: 2019-01-01

## 2019-01-01 PROBLEM — E87.1 HYPONATREMIA: Status: RESOLVED | Noted: 2018-05-19 | Resolved: 2019-01-01

## 2019-01-01 PROBLEM — R73.9 HYPERGLYCEMIA: Status: RESOLVED | Noted: 2018-05-19 | Resolved: 2019-01-01

## 2019-01-01 PROBLEM — E87.6 HYPOKALEMIA: Status: RESOLVED | Noted: 2018-09-09 | Resolved: 2019-01-01

## 2019-01-01 PROBLEM — E83.39 HYPOPHOSPHATEMIA: Status: RESOLVED | Noted: 2018-12-29 | Resolved: 2019-01-01

## 2019-01-01 PROBLEM — R33.8 ACUTE URINARY RETENTION: Status: RESOLVED | Noted: 2018-12-30 | Resolved: 2019-01-01

## 2019-01-01 PROBLEM — D72.829 LEUKOCYTOSIS: Status: RESOLVED | Noted: 2018-12-28 | Resolved: 2019-01-01

## 2019-01-01 LAB
ALBUMIN SERPL BCP-MCNC: 3 G/DL (ref 3.2–4.9)
ALBUMIN/GLOB SERPL: 1.2 G/DL
ALP SERPL-CCNC: 71 U/L (ref 30–99)
ALT SERPL-CCNC: 13 U/L (ref 2–50)
ANION GAP SERPL CALC-SCNC: 7 MMOL/L (ref 0–11.9)
AST SERPL-CCNC: 10 U/L (ref 12–45)
BASOPHILS # BLD AUTO: 1.1 % (ref 0–1.8)
BASOPHILS # BLD: 0.05 K/UL (ref 0–0.12)
BILIRUB SERPL-MCNC: 0.5 MG/DL (ref 0.1–1.5)
BUN SERPL-MCNC: 5 MG/DL (ref 8–22)
CALCIUM SERPL-MCNC: 9 MG/DL (ref 8.5–10.5)
CHLORIDE SERPL-SCNC: 105 MMOL/L (ref 96–112)
CO2 SERPL-SCNC: 29 MMOL/L (ref 20–33)
CREAT SERPL-MCNC: 0.62 MG/DL (ref 0.5–1.4)
EOSINOPHIL # BLD AUTO: 0.24 K/UL (ref 0–0.51)
EOSINOPHIL NFR BLD: 5.1 % (ref 0–6.9)
ERYTHROCYTE [DISTWIDTH] IN BLOOD BY AUTOMATED COUNT: 60.4 FL (ref 35.9–50)
FERRITIN SERPL-MCNC: 36.2 NG/ML (ref 10–291)
FOLATE SERPL-MCNC: 3 NG/ML
GLOBULIN SER CALC-MCNC: 2.6 G/DL (ref 1.9–3.5)
GLUCOSE SERPL-MCNC: 79 MG/DL (ref 65–99)
H PYLORI AG STL QL IA: NOT DETECTED
HCT VFR BLD AUTO: 35.7 % (ref 37–47)
HGB BLD-MCNC: 11.9 G/DL (ref 12–16)
HGB RETIC QN AUTO: 31.2 PG/CELL (ref 29–35)
IMM GRANULOCYTES # BLD AUTO: 0.02 K/UL (ref 0–0.11)
IMM GRANULOCYTES NFR BLD AUTO: 0.4 % (ref 0–0.9)
IMM RETICS NFR: 23.9 % (ref 9.3–17.4)
IRON SATN MFR SERPL: 5 % (ref 15–55)
IRON SERPL-MCNC: 17 UG/DL (ref 40–170)
LYMPHOCYTES # BLD AUTO: 1.76 K/UL (ref 1–4.8)
LYMPHOCYTES NFR BLD: 37.3 % (ref 22–41)
MCH RBC QN AUTO: 30.3 PG (ref 27–33)
MCHC RBC AUTO-ENTMCNC: 32.4 G/DL (ref 33.6–35)
MCV RBC AUTO: 93.3 FL (ref 81.4–97.8)
MONOCYTES # BLD AUTO: 0.54 K/UL (ref 0–0.85)
MONOCYTES NFR BLD AUTO: 11.4 % (ref 0–13.4)
NEUTROPHILS # BLD AUTO: 2.11 K/UL (ref 2–7.15)
NEUTROPHILS NFR BLD: 44.7 % (ref 44–72)
NRBC # BLD AUTO: 0 K/UL
NRBC BLD-RTO: 0 /100 WBC
PLATELET # BLD AUTO: 318 K/UL (ref 164–446)
PMV BLD AUTO: 9.8 FL (ref 9–12.9)
POTASSIUM SERPL-SCNC: 3.9 MMOL/L (ref 3.6–5.5)
PROT SERPL-MCNC: 5.6 G/DL (ref 6–8.2)
RBC # BLD AUTO: 3.9 M/UL (ref 4.2–5.4)
RETICS # AUTO: 0.07 M/UL (ref 0.04–0.06)
RETICS/RBC NFR: 1.8 % (ref 0.8–2.1)
SODIUM SERPL-SCNC: 141 MMOL/L (ref 135–145)
TIBC SERPL-MCNC: 374 UG/DL (ref 250–450)
VIT B12 SERPL-MCNC: 382 PG/ML (ref 211–911)
WBC # BLD AUTO: 4.7 K/UL (ref 4.8–10.8)

## 2019-01-01 PROCEDURE — 83540 ASSAY OF IRON: CPT

## 2019-01-01 PROCEDURE — 82728 ASSAY OF FERRITIN: CPT

## 2019-01-01 PROCEDURE — 85046 RETICYTE/HGB CONCENTRATE: CPT

## 2019-01-01 PROCEDURE — 99239 HOSP IP/OBS DSCHRG MGMT >30: CPT | Performed by: INTERNAL MEDICINE

## 2019-01-01 PROCEDURE — 83550 IRON BINDING TEST: CPT

## 2019-01-01 PROCEDURE — 700111 HCHG RX REV CODE 636 W/ 250 OVERRIDE (IP): Performed by: STUDENT IN AN ORGANIZED HEALTH CARE EDUCATION/TRAINING PROGRAM

## 2019-01-01 PROCEDURE — 700111 HCHG RX REV CODE 636 W/ 250 OVERRIDE (IP): Performed by: INTERNAL MEDICINE

## 2019-01-01 PROCEDURE — 36415 COLL VENOUS BLD VENIPUNCTURE: CPT

## 2019-01-01 PROCEDURE — 82607 VITAMIN B-12: CPT

## 2019-01-01 PROCEDURE — 700102 HCHG RX REV CODE 250 W/ 637 OVERRIDE(OP): Performed by: STUDENT IN AN ORGANIZED HEALTH CARE EDUCATION/TRAINING PROGRAM

## 2019-01-01 PROCEDURE — A9270 NON-COVERED ITEM OR SERVICE: HCPCS | Performed by: STUDENT IN AN ORGANIZED HEALTH CARE EDUCATION/TRAINING PROGRAM

## 2019-01-01 PROCEDURE — A9270 NON-COVERED ITEM OR SERVICE: HCPCS | Performed by: INTERNAL MEDICINE

## 2019-01-01 PROCEDURE — 700102 HCHG RX REV CODE 250 W/ 637 OVERRIDE(OP): Performed by: INTERNAL MEDICINE

## 2019-01-01 PROCEDURE — 82746 ASSAY OF FOLIC ACID SERUM: CPT

## 2019-01-01 PROCEDURE — 85025 COMPLETE CBC W/AUTO DIFF WBC: CPT

## 2019-01-01 PROCEDURE — 80053 COMPREHEN METABOLIC PANEL: CPT

## 2019-01-01 RX ORDER — FERROUS SULFATE 325(65) MG
325 TABLET ORAL
Qty: 30 TAB | Refills: 0 | Status: ON HOLD | OUTPATIENT
Start: 2019-01-01 | End: 2019-03-31

## 2019-01-01 RX ORDER — FERROUS SULFATE 325(65) MG
325 TABLET ORAL
Qty: 30 TAB | Refills: 0 | Status: SHIPPED | OUTPATIENT
Start: 2019-01-01 | End: 2019-01-01

## 2019-01-01 RX ADMIN — CAPSAICIN: 0.25 CREAM TOPICAL at 05:42

## 2019-01-01 RX ADMIN — CHLORPROMAZINE HYDROCHLORIDE 25 MG: 25 TABLET, SUGAR COATED ORAL at 05:42

## 2019-01-01 RX ADMIN — CLONAZEPAM 0.5 MG: 0.5 TABLET ORAL at 05:42

## 2019-01-01 RX ADMIN — MAGNESIUM GLUCONATE 500 MG ORAL TABLET 400 MG: 500 TABLET ORAL at 05:42

## 2019-01-01 RX ADMIN — BUSPIRONE HYDROCHLORIDE 30 MG: 30 TABLET ORAL at 05:42

## 2019-01-01 RX ADMIN — MORPHINE SULFATE 2 MG: 4 INJECTION INTRAVENOUS at 10:03

## 2019-01-01 RX ADMIN — Medication 100 MG: at 05:42

## 2019-01-01 RX ADMIN — MORPHINE SULFATE 2 MG: 4 INJECTION INTRAVENOUS at 05:58

## 2019-01-01 RX ADMIN — ENOXAPARIN SODIUM 40 MG: 100 INJECTION SUBCUTANEOUS at 05:42

## 2019-01-01 ASSESSMENT — ENCOUNTER SYMPTOMS
VOMITING: 0
BLURRED VISION: 0
DIZZINESS: 0
BLOOD IN STOOL: 0
BRUISES/BLEEDS EASILY: 0
DEPRESSION: 0
HEARTBURN: 0
HEADACHES: 0
MYALGIAS: 0
ABDOMINAL PAIN: 0
DOUBLE VISION: 0
CONSTIPATION: 0
NECK PAIN: 0
CHILLS: 0
FLANK PAIN: 0
SHORTNESS OF BREATH: 0
DIARRHEA: 0
HALLUCINATIONS: 0
COUGH: 0
FEVER: 0
PALPITATIONS: 0
NAUSEA: 0

## 2019-01-01 ASSESSMENT — PAIN SCALES - GENERAL
PAINLEVEL_OUTOF10: 8
PAINLEVEL_OUTOF10: 1

## 2019-01-01 NOTE — CARE PLAN
Problem: Safety  Goal: Will remain free from falls    Intervention: Assess risk factors for falls  Risk assessment completed.  Findings discussed with patient.  Verbalized understanding.

## 2019-01-01 NOTE — PROGRESS NOTES
Patient awake, alert, and oriented.  Discharge instructions reviewed and copy provided.  Right 18g INT discontinued without difficulty.  Site benign.

## 2019-01-01 NOTE — DISCHARGE INSTRUCTIONS
Discharge Instructions    Discharged to home by car with relative. Discharged via wheelchair, hospital escort: Yes.  Special equipment needed: Not Applicable    Be sure to schedule a follow-up appointment with your primary care doctor or any specialists as instructed.     Discharge Plan:   Influenza Vaccine Indication: Patient Refuses    I understand that a diet low in cholesterol, fat, and sodium is recommended for good health. Unless I have been given specific instructions below for another diet, I accept this instruction as my diet prescription.   Other diet: Regular    Special Instructions: None    · Is patient discharged on Warfarin / Coumadin?   No     Depression / Suicide Risk    As you are discharged from this [facility] facility, it is important to learn how to keep safe from harming yourself.    Recognize the warning signs:  · Abrupt changes in personality, positive or negative- including increase in energy   · Giving away possessions  · Change in eating patterns- significant weight changes-  positive or negative  · Change in sleeping patterns- unable to sleep or sleeping all the time   · Unwillingness or inability to communicate  · Depression  · Unusual sadness, discouragement and loneliness  · Talk of wanting to die  · Neglect of personal appearance   · Rebelliousness- reckless behavior  · Withdrawal from people/activities they love  · Confusion- inability to concentrate     If you or a loved one observes any of these behaviors or has concerns about self-harm, here's what you can do:  · Talk about it- your feelings and reasons for harming yourself  · Remove any means that you might use to hurt yourself (examples: pills, rope, extension cords, firearm)  · Get professional help from the community (Mental Health, Substance Abuse, psychological counseling)  · Do not be alone:Call your Safe Contact- someone whom you trust who will be there for you.  · Call your local CRISIS HOTLINE 215-1061 or  855-030-1149  · Call your local Children's Mobile Crisis Response Team [place] (135) 665-6283 or www.[employer]  · Call the toll free National Suicide Prevention Hotlines   · National Suicide Prevention Lifeline 975-464-GKXI (0652)  · National TRData Line Network 800-SUICIDE (574-3363)

## 2019-01-01 NOTE — ASSESSMENT & PLAN NOTE
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 Patient has not taken any supplementation and has a diet lacking in fruits/vegetables. She also states she has a history of hemorrhoids which bleed minimally a couple times a week. She has seen GI in the remote past. Denies excessive menstrual bleeding. Hemocult negative. Previous small bowel biopsy negative for celiac disease.  Ferritin: 36.2, B12: 382  -Patient will need to follow up with GI as outpatient for re-evaluation of frequent hemorrhoidal bleeds contributing to anemia   -Iron studies and reticulocyte count consistent with JALEN - start oral iron therapy as outpatient

## 2019-01-01 NOTE — PROGRESS NOTES
Bedside shift report received from ELMO Horne.  Patient resting quietly with eyes closed.  Easily aroused.  No c/o voiced.  No acute distress noted.

## 2019-01-01 NOTE — DISCHARGE SUMMARY
Internal Medicine Discharge Summary  Note Author: Emily Maria M.D.     Name Diana Hernandez 1975   Age/Sex 43 y.o. female   MRN [id number]     Admit Date:  2018       Discharge Date:  2019    Service:   [facility] Internal Medicine Gray Team  Attending Physician(s):   Dr. Rob Willson        Senior Resident(s):   Dr. Berlin Tucker  [place] Resident(s):   Dr. Emily Maria   PCP: Isa Rodriguez M.D.    Primary Diagnosis:   Cannabis hyperemesis syndrome concurrent with and due to cannabis dependence (HCC)    Secondary Diagnoses:                Principal Problem:    Cannabis hyperemesis syndrome concurrent with and due to cannabis dependence (HCC) POA: Yes  Active Problems:    Anxiety (Chronic) POA: Yes    Normochromic normocytic anemia POA: Unknown    Depression POA: Yes  Resolved Problems:    Intractable vomiting with nausea POA: Yes    High anion gap metabolic acidosis POA: Yes    Dehydration POA: Yes    Hyperglycemia POA: Yes    Hyponatremia POA: Yes    Hypokalemia POA: Yes    Leukocytosis POA: Yes    Hypophosphatemia POA: Yes    Acute urinary retention POA: Unknown    Hospital Summary (Brief Narrative):       43-year-old female with past medical history of cyclical vomiting syndrome, marijuana use, anxiety/depression presented to the ED with intractable nausea, vomiting and abdominal pain.  She had leukocytosis and electrolyte abnormalities which were corrected with fluids and replacement.  She was given antiemetics and pain control with continuation of her home antianxiety medications.  She improved throughout the hospital stay and was able to advance her diet from clear liquids to regular full diet with no further nausea or vomiting.  Due to patient's normocytic, normochromic anemia workup was pursued which was consistent with iron deficiency anemia, likely due to history of poor intake compounded by previous abdominal surgeries with resections and history of chronic blood loss due  to hemorrhoids.  She was advised to follow-up with her primary care physician as well as to follow-up with GI as outpatient.  Oral iron therapy was started at discharge.    Patient /Hospital Summary (Details -- Problem Oriented) :          * Cannabis hyperemesis syndrome concurrent with and due to cannabis dependence (HCC)   Assessment & Plan    Marijuana consuming about 2 g daily, multiple admissions for [facility]   -Patient advised cessation of marijuana use      Intractable vomiting with nausea-resolved as of 1/1/2019   Assessment & Plan    Resolved   Secondary to [facility].   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 Patient has not taken any supplementation and has a diet lacking in fruits/vegetables. She also states she has a history of hemorrhoids which bleed minimally a couple times a week. She has seen GI in the remote past. Denies excessive menstrual bleeding. Hemocult negative. Previous small bowel biopsy negative for celiac disease.  Ferritin: 36.2, B12: 382  -Patient will need to follow up with GI as outpatient for re-evaluation of frequent hemorrhoidal bleeds contributing to anemia   -Iron studies and reticulocyte count consistent with JALEN - start oral iron therapy as outpatient        Anxiety   Assessment & Plan    Stable.  QTc: 431ms  -Continue buspirone, chlorpromazine, Zoloft, home medication Klonapin     Depression   Assessment & Plan    Stable. Affect anxious and flat on admission.    -Continue Zoloft     Acute urinary retention-resolved as of 1/1/2019   Assessment & Plan    Likely secondary to trazodone use  Resolved      Hypophosphatemia-resolved as of 1/1/2019   Assessment & Plan    Phos wnl, replete as necessary      Leukocytosis-resolved as of 1/1/2019   Assessment & Plan    Resolved       Hypokalemia-resolved as of 1/1/2019   Assessment & Plan    repleted       Hyponatremia-resolved as of 1/1/2019   Assessment & Plan    133 on admission.  Corrected to 135 with IVF (LR & NS).  -RESOLVED  -Continue to monitor and correct electrolytes as needed     Hyperglycemia-resolved as of 1/1/2019   Assessment & Plan    A1C: 5.7 % (11/2018) - pre-DM   May not be accurate due to patient's anemia        Dehydration-resolved as of 1/1/2019   Assessment & Plan    Secondary to vomiting and reduced oral intake   Resolved      High anion gap metabolic acidosis-resolved as of 1/1/2019   Assessment & Plan    Gap 24 on admission  (low bicarb).  Closed to 10 with IVF (LR & NS).   - RESOLVED  -Continue to monitor and correct electrolytes as needed       Consultants:     None     Procedures:        None    Imaging/ Testing:      [id number] [id number]   Final Result         No specific finding to suggest small bowel obstruction.      DX-CHEST-PORTABLE (1 VIEW)   Final Result         1. No acute cardiopulmonary abnormalities are identified.      IR-MIDLINE CATHETER INSERTION >5 YRS    (Results Pending)     Discharge Medications:         Medication Reconciliation: Completed       Medication List      START taking these medications      Instructions   ferrous sulfate 325 (65 Fe) MG tablet   Take 1 Tab by mouth every 48 hours.  Dose:  325 mg        CONTINUE taking these medications      Instructions   busPIRone 30 MG tablet  Commonly known as:  BUSPAR   Take 1 Tab by mouth 2 Times a Day.  Dose:  30 mg     chlorproMAZINE 25 MG Tabs  Commonly known as:  THORAZINE   Take 1 Tab by mouth 3 times a day.  Dose:  25 mg     clonazePAM 0.5 MG Tabs  Commonly known as:  KLONOPIN   Take 0.5 mg by mouth 3 times a day.  Dose:  0.5 mg     ondansetron 4 MG Tbdp  Commonly known as:  ZOFRAN ODT   Take 4 mg by mouth every 6 hours as needed for Nausea.  Dose:  4 mg     sertraline 100 MG Tabs  Commonly known as:  ZOLOFT   Take 1 Tab by mouth every  bedtime.  Dose:  100 mg          Disposition:   Home     Diet:   As tolerated     Activity:   As tolerated     Instructions:      The patient was instructed to return to the ER in the event of worsening symptoms. I have counseled the patient on the importance of compliance and the patient has agreed to proceed with all medical recommendations and follow up plan indicated above.   The patient understands that all medications come with benefits and risks. Risks may include permanent injury or death and these risks can be minimized with close reassessment and monitoring.        Primary Care Provider:    Dr. Isa Rodriguez  Discharge summary faxed to primary care provider:  Completed  Copy of discharge summary given to the patient: Completed    Follow up appointment details :      Follow up with PCP in next 2 weeks - scheduling called, discussed with patient     Pending Studies:        None     Time spent on discharge day patient visit, preparing discharge paperwork and arranging for patient follow up.    Summary of follow up issues:   Anemia: Patient needs follow-up with GI as outpatient to evaluate for frequently bleeding hemorrhoids contributing to anemia. She has a history of abdominal surgery with some bowel resection and anemia is likely related to nutritional deficiency.  Patient was also started on oral iron therapy, will need recheck of iron studies in 6-8 weeks.     Discharge Time (Minutes) :    45 minutes   Hospital Course Type:  Inpatient Stay >2 midnights    Condition on Discharge: Stable   ______________________________________________________________________    Interval history/exam for day of discharge:    Overnight, patient had difficulty sleeping. She was given two doses of mirtazepine 15mg to which she did not respond. Patient requested benadryl which has helped her at home, however due to concerns of acute urinary retention from trazadone, caution giving the benadryl. Patient understood the risks and  decided to take the benadryl to which she slept well. This morning, patient is urinating normally and ambulating well. No nausea or vomiting since yesterday morning and she has been tolerating a regular diet. No significant findings on physical exam. Electrolyte derangements were resolved. Hemoccult was negative, iron studies on the lower side of normal, normal ferritin and B12.     Most Recent Labs:    Lab Results   Component Value Date/Time    WBC 4.7 (L) 01/01/2019 07:55 AM    RBC 3.90 (L) 01/01/2019 07:55 AM    HEMOGLOBIN 11.9 (L) 01/01/2019 07:55 AM    HEMATOCRIT 35.7 (L) 01/01/2019 07:55 AM    MCV 93.3 01/01/2019 07:55 AM    MCH 30.3 01/01/2019 07:55 AM    MCHC 32.4 (L) 01/01/2019 07:55 AM    MPV 9.8 01/01/2019 07:55 AM    NEUTSPOLYS 44.70 01/01/2019 07:55 AM    LYMPHOCYTES 37.30 01/01/2019 07:55 AM    MONOCYTES 11.40 01/01/2019 07:55 AM    EOSINOPHILS 5.10 01/01/2019 07:55 AM    BASOPHILS 1.10 01/01/2019 07:55 AM    HYPOCHROMIA 1+ 04/27/2016 12:49 PM    ANISOCYTOSIS 1+ 09/13/2016 12:29 AM      Lab Results   Component Value Date/Time    SODIUM 141 01/01/2019 06:30 AM    POTASSIUM 3.9 01/01/2019 06:30 AM    CHLORIDE 105 01/01/2019 06:30 AM    CO2 29 01/01/2019 06:30 AM    GLUCOSE 79 01/01/2019 06:30 AM    BUN 5 (L) 01/01/2019 06:30 AM    CREATININE 0.62 01/01/2019 06:30 AM    CREATININE 0.9 05/18/2009 08:53 AM      Lab Results   Component Value Date/Time    ALTSGPT 13 01/01/2019 06:30 AM    ASTSGOT 10 (L) 01/01/2019 06:30 AM    ALKPHOSPHAT 71 01/01/2019 06:30 AM    TBILIRUBIN 0.5 01/01/2019 06:30 AM    LIPASE 7 (L) 12/27/2018 05:19 PM    ALBUMIN 3.0 (L) 01/01/2019 06:30 AM    GLOBULIN 2.6 01/01/2019 06:30 AM    PREALBUMIN 12.0 (L) 12/31/2018 03:06 AM    INR 0.98 04/24/2017 09:39 AM    MACROCYTOSIS 1+ 09/13/2016 12:29 AM     Lab Results   Component Value Date/Time    PROTHROMBTM 13.3 04/24/2017 09:39 AM    INR 0.98 04/24/2017 09:39 AM

## 2019-01-01 NOTE — PROGRESS NOTES
Internal Medicine Interval Note  Note Author: Emily Maria M.D.     Name Diana Hernandez 1975   Age/Sex 43 y.o. female   MRN [id number]   Code Status FULL     After 5PM or if no immediate response to page, please call for cross-coverage  Attending/Team: Dr. Rob Willson/Vinh Team See Patient List for primary contact information  Call [phone] to page    1st Call - Day Intern (R1):   Dr. Emily Maria  2nd Call - Day Sr. Resident (R2/R3):   Dr. Berlin Tucker      Reason for interval visit  (Principal Problem)   Cannabis hyperemesis syndrome concurrent with and due to cannabis dependence (HCC)    Interval Problem Daily Status Update  (24 hours, problem oriented, brief subjective history, new lab/imaging data pertinent to that problem)   No acute overnight events. Patient feels well this morning, tolerating her diet.     · Intractable nausea/vomiting secondary to CHS: patient has been tolerated regular diet - no nausea/vomiting or abdominal pain. Benign abdominal exam. Electrolytes corrected.   · Anxiety/Depression: stable on home meds  · Acute urinary retention: patient has been urinating normally. Denies dysuria, frequency, urgency, fever/chills   · Normocytic normochromic anemia: hemoglobin stable. Iron studies and reticulocyte count consistent with iron deficiency anemia. Patient denies symptoms of GI bleed but does have a history of bowel surgery with resections, hemorrhoids which bleed several times a week. No excessive gyne bleeding.     Review of Systems   Constitutional: Negative for chills and fever.   HENT: Negative for hearing loss and tinnitus.    Eyes: Negative for blurred vision and double vision.   Respiratory: Negative for cough and shortness of breath.    Cardiovascular: Negative for chest pain, palpitations and leg swelling.   Gastrointestinal: Negative for abdominal pain, blood in stool, constipation, diarrhea, heartburn, melena, nausea and vomiting.   Genitourinary:  Negative for dysuria, flank pain, frequency, hematuria and urgency.   Musculoskeletal: Negative for myalgias and neck pain.   Skin: Negative for itching and rash.   Neurological: Negative for dizziness and headaches.   Endo/Heme/Allergies: Negative for environmental allergies. Does not bruise/bleed easily.   Psychiatric/Behavioral: Negative for depression and hallucinations.     Disposition/Barriers to discharge:   None     Consultants/Specialty  PCP: Isa Rodriguez M.D.    Quality Measures  Quality-Core Measures   Reviewed items::  Labs reviewed and Medications reviewed  Saldana catheter::  No Saldana  DVT prophylaxis pharmacological::  Enoxaparin (Lovenox)  DVT prophylaxis - mechanical:  Not indicated at this time, ambulatory  Ulcer Prophylaxis::  Not indicated    Physical Exam     Vitals:    12/31/18 2000 12/31/18 2348 01/01/19 0400 01/01/19 0800   BP: 129/91 141/94 112/72 121/84   Pulse: 66 65 68 83   Resp: 16 16 14 16   Temp: 37.4 °C (99.4 °F) 36.7 °C (98.1 °F) 37.3 °C (99.1 °F) 36.7 °C (98 °F)   TempSrc: Temporal Temporal Temporal Temporal   SpO2: 98% 93% 96% 97%   Weight: 59.7 kg (131 lb 9.8 oz)      Height:         Body mass index is 22.59 kg/m². Weight: 59.7 kg (131 lb 9.8 oz)  Oxygen Therapy:  Pulse Oximetry: 97 %, O2 (LPM): 0, O2 Delivery: None (Room Air)    Physical Exam   Constitutional: She is oriented to person, place, and time.   Thin female    HENT:   Head: Normocephalic and atraumatic.   Eyes: Pupils are equal, round, and reactive to light. EOM are normal.   Neck: Normal range of motion.   Cardiovascular: Normal rate, regular rhythm, normal heart sounds and intact distal pulses.    Pulmonary/Chest: Effort normal and breath sounds normal. No respiratory distress. She has no wheezes. She has no rales.   Abdominal: Soft. Bowel sounds are normal. She exhibits no distension. There is no tenderness. There is no rebound and no guarding.   Musculoskeletal: Normal range of motion. She exhibits no edema.    Lymphadenopathy:     She has no cervical adenopathy.   Neurological: She is alert and oriented to person, place, and time.   Skin: Skin is warm and dry.   Psychiatric: Affect and judgment normal.   Nursing note and vitals reviewed.    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 Patient has not taken any supplementation and has a diet lacking in fruits/vegetables. She also states she has a history of hemorrhoids which bleed minimally a couple times a week. She has seen GI in the remote past. Denies excessive menstrual bleeding. Hemocult negative. Previous small bowel biopsy negative for celiac disease.  Ferritin: 36.2, B12: 382  -Patient will need to follow up with GI as outpatient for re-evaluation of frequent hemorrhoidal bleeds contributing to anemia   -Iron studies and reticulocyte count consistent with JALEN - start oral iron therapy as outpatient        Anxiety- (present on admission)   Assessment & Plan    Stable.  QTc: 431ms  -Continue buspirone, chlorpromazine, Zoloft, home medication Klonapin     Depression- (present on admission)   Assessment & Plan    Stable. Affect anxious and flat on admission.    -Continue Zoloft

## 2019-01-01 NOTE — CARE PLAN
Problem: Communication  Goal: The ability to communicate needs accurately and effectively will improve    Intervention: Reorient patient to environment as needed  Discussed the importance of maintaining a clean and clutter free environment and utilizing the call light for assistance to prevent injury.  Verbalized understanding.

## 2019-01-01 NOTE — PROGRESS NOTES
CROSS-COVERAGE NOTE    Patient was complaining of insomnia at 7:30 PM. Tried Mirtazipine 15mg twice. Patient stated that it was not helping and she still could not sleep. Patient stated that she takes Benadryl 50mg at home. As per chart review the patient had an episode of urinary retention that required straight-cath the night before hat was secondary to Trazodone use. She, however, was insistent that Benadryl helps her fall asleep and she has never had urinary retention with benadryl before. Offered multiple other alternatives, such as melatonin and giving her dose of Klonopin earlier to see if it would help her sleep. The patient declined and stated would rather have benadryl since she takes it every night. She stated that she has never had urinary retention with benadryl in the past. We discussed the risks and benefits of taking Benadryl and how it could cause her to retain urine again which may require straight cath/kowalski. The patient verbalized her understanding.     Plan:  - Patient understands risks/benefits of benadryl given recent urinary retention  - 50mg Benadryl once  - Low threshold to suspect urinary retention  - Continuous pulse ox to monitor for respiratory depression given that patient is on opiates and benzos and is now getting benadryl

## 2019-01-01 NOTE — PROGRESS NOTES
REC'D BEDSIDE REPORT FROM WILFRID BORREGO*. PT AOX4*. PT DENIES PAIN, SHORTNESS OF BREATH. PLAN OF CARE AND FALL PRECAUTIONS REVIEWED WITH PT. PT DID VERBALIZE UNDERSTANDING, BED ALARM ENGAGED, CALL EPPERSON LEFT IN REACH

## 2019-01-31 LAB — EKG IMPRESSION: NORMAL

## 2019-03-30 ENCOUNTER — APPOINTMENT (OUTPATIENT)
Dept: RADIOLOGY | Facility: MEDICAL CENTER | Age: 44
End: 2019-03-30
Attending: EMERGENCY MEDICINE
Payer: COMMERCIAL

## 2019-03-30 ENCOUNTER — HOSPITAL ENCOUNTER (OUTPATIENT)
Facility: MEDICAL CENTER | Age: 44
End: 2019-03-31
Attending: EMERGENCY MEDICINE | Admitting: HOSPITALIST
Payer: COMMERCIAL

## 2019-03-30 DIAGNOSIS — R11.10 HYPEREMESIS: ICD-10-CM

## 2019-03-30 DIAGNOSIS — F12.20 ADDICTION, MARIJUANA (HCC): ICD-10-CM

## 2019-03-30 DIAGNOSIS — Z72.0 TOBACCO ABUSE DISORDER: ICD-10-CM

## 2019-03-30 DIAGNOSIS — E86.0 DEHYDRATION: ICD-10-CM

## 2019-03-30 DIAGNOSIS — R11.15 NON-INTRACTABLE CYCLICAL VOMITING WITH NAUSEA: ICD-10-CM

## 2019-03-30 DIAGNOSIS — F33.1 MODERATE EPISODE OF RECURRENT MAJOR DEPRESSIVE DISORDER (HCC): ICD-10-CM

## 2019-03-30 DIAGNOSIS — E87.20 METABOLIC ACIDOSIS: ICD-10-CM

## 2019-03-30 DIAGNOSIS — E87.6 HYPOKALEMIA: ICD-10-CM

## 2019-03-30 LAB
ALBUMIN SERPL BCP-MCNC: 5 G/DL (ref 3.2–4.9)
ALBUMIN/GLOB SERPL: 1.2 G/DL
ALP SERPL-CCNC: 89 U/L (ref 30–99)
ALT SERPL-CCNC: 10 U/L (ref 2–50)
ANION GAP SERPL CALC-SCNC: 21 MMOL/L (ref 0–11.9)
APPEARANCE UR: CLEAR
AST SERPL-CCNC: 15 U/L (ref 12–45)
BASOPHILS # BLD AUTO: 1 % (ref 0–1.8)
BASOPHILS # BLD: 0.16 K/UL (ref 0–0.12)
BILIRUB SERPL-MCNC: 1.2 MG/DL (ref 0.1–1.5)
BILIRUB UR QL STRIP.AUTO: NEGATIVE
BUN SERPL-MCNC: 15 MG/DL (ref 8–22)
CALCIUM SERPL-MCNC: 10.4 MG/DL (ref 8.5–10.5)
CHLORIDE SERPL-SCNC: 96 MMOL/L (ref 96–112)
CO2 SERPL-SCNC: 17 MMOL/L (ref 20–33)
COLOR UR: YELLOW
CREAT SERPL-MCNC: 1.14 MG/DL (ref 0.5–1.4)
EOSINOPHIL # BLD AUTO: 0.01 K/UL (ref 0–0.51)
EOSINOPHIL NFR BLD: 0.1 % (ref 0–6.9)
ERYTHROCYTE [DISTWIDTH] IN BLOOD BY AUTOMATED COUNT: 57.5 FL (ref 35.9–50)
GLOBULIN SER CALC-MCNC: 4.1 G/DL (ref 1.9–3.5)
GLUCOSE SERPL-MCNC: 247 MG/DL (ref 65–99)
GLUCOSE UR STRIP.AUTO-MCNC: 100 MG/DL
HCG SERPL QL: NEGATIVE
HCT VFR BLD AUTO: 44.3 % (ref 37–47)
HGB BLD-MCNC: 14.5 G/DL (ref 12–16)
IMM GRANULOCYTES # BLD AUTO: 0.13 K/UL (ref 0–0.11)
IMM GRANULOCYTES NFR BLD AUTO: 0.8 % (ref 0–0.9)
KETONES UR STRIP.AUTO-MCNC: ABNORMAL MG/DL
LEUKOCYTE ESTERASE UR QL STRIP.AUTO: NEGATIVE
LIPASE SERPL-CCNC: 9 U/L (ref 11–82)
LYMPHOCYTES # BLD AUTO: 1.8 K/UL (ref 1–4.8)
LYMPHOCYTES NFR BLD: 11.2 % (ref 22–41)
MCH RBC QN AUTO: 30.3 PG (ref 27–33)
MCHC RBC AUTO-ENTMCNC: 32.7 G/DL (ref 33.6–35)
MCV RBC AUTO: 92.7 FL (ref 81.4–97.8)
MICRO URNS: ABNORMAL
MONOCYTES # BLD AUTO: 1.3 K/UL (ref 0–0.85)
MONOCYTES NFR BLD AUTO: 8.1 % (ref 0–13.4)
NEUTROPHILS # BLD AUTO: 12.69 K/UL (ref 2–7.15)
NEUTROPHILS NFR BLD: 78.8 % (ref 44–72)
NITRITE UR QL STRIP.AUTO: NEGATIVE
NRBC # BLD AUTO: 0 K/UL
NRBC BLD-RTO: 0 /100 WBC
PH UR STRIP.AUTO: 5 [PH]
PLATELET # BLD AUTO: 520 K/UL (ref 164–446)
PMV BLD AUTO: 10.1 FL (ref 9–12.9)
POTASSIUM SERPL-SCNC: 3.3 MMOL/L (ref 3.6–5.5)
PROT SERPL-MCNC: 9.1 G/DL (ref 6–8.2)
PROT UR QL STRIP: NEGATIVE MG/DL
RBC # BLD AUTO: 4.78 M/UL (ref 4.2–5.4)
RBC UR QL AUTO: NEGATIVE
SODIUM SERPL-SCNC: 134 MMOL/L (ref 135–145)
SP GR UR STRIP.AUTO: >1.045
UROBILINOGEN UR STRIP.AUTO-MCNC: 0.2 MG/DL
WBC # BLD AUTO: 16.1 K/UL (ref 4.8–10.8)

## 2019-03-30 PROCEDURE — 36415 COLL VENOUS BLD VENIPUNCTURE: CPT

## 2019-03-30 PROCEDURE — 700105 HCHG RX REV CODE 258: Performed by: EMERGENCY MEDICINE

## 2019-03-30 PROCEDURE — 96366 THER/PROPH/DIAG IV INF ADDON: CPT

## 2019-03-30 PROCEDURE — 700117 HCHG RX CONTRAST REV CODE 255: Performed by: EMERGENCY MEDICINE

## 2019-03-30 PROCEDURE — 83690 ASSAY OF LIPASE: CPT

## 2019-03-30 PROCEDURE — 74177 CT ABD & PELVIS W/CONTRAST: CPT

## 2019-03-30 PROCEDURE — 700102 HCHG RX REV CODE 250 W/ 637 OVERRIDE(OP): Performed by: EMERGENCY MEDICINE

## 2019-03-30 PROCEDURE — 84703 CHORIONIC GONADOTROPIN ASSAY: CPT

## 2019-03-30 PROCEDURE — 99285 EMERGENCY DEPT VISIT HI MDM: CPT

## 2019-03-30 PROCEDURE — 96376 TX/PRO/DX INJ SAME DRUG ADON: CPT

## 2019-03-30 PROCEDURE — 85025 COMPLETE CBC W/AUTO DIFF WBC: CPT

## 2019-03-30 PROCEDURE — 80053 COMPREHEN METABOLIC PANEL: CPT

## 2019-03-30 PROCEDURE — 96365 THER/PROPH/DIAG IV INF INIT: CPT

## 2019-03-30 PROCEDURE — 81003 URINALYSIS AUTO W/O SCOPE: CPT

## 2019-03-30 PROCEDURE — 96375 TX/PRO/DX INJ NEW DRUG ADDON: CPT

## 2019-03-30 PROCEDURE — A9270 NON-COVERED ITEM OR SERVICE: HCPCS | Performed by: EMERGENCY MEDICINE

## 2019-03-30 PROCEDURE — 700111 HCHG RX REV CODE 636 W/ 250 OVERRIDE (IP): Performed by: EMERGENCY MEDICINE

## 2019-03-30 RX ORDER — ONDANSETRON 2 MG/ML
4 INJECTION INTRAMUSCULAR; INTRAVENOUS ONCE
Status: COMPLETED | OUTPATIENT
Start: 2019-03-30 | End: 2019-03-30

## 2019-03-30 RX ORDER — SODIUM CHLORIDE 9 MG/ML
1000 INJECTION, SOLUTION INTRAVENOUS ONCE
Status: COMPLETED | OUTPATIENT
Start: 2019-03-30 | End: 2019-03-30

## 2019-03-30 RX ORDER — LORAZEPAM 2 MG/ML
1 INJECTION INTRAMUSCULAR ONCE
Status: COMPLETED | OUTPATIENT
Start: 2019-03-30 | End: 2019-03-30

## 2019-03-30 RX ORDER — HALOPERIDOL 5 MG/ML
1 INJECTION INTRAMUSCULAR ONCE
Status: COMPLETED | OUTPATIENT
Start: 2019-03-30 | End: 2019-03-30

## 2019-03-30 RX ORDER — MAGNESIUM SULFATE HEPTAHYDRATE 40 MG/ML
2 INJECTION, SOLUTION INTRAVENOUS ONCE
Status: COMPLETED | OUTPATIENT
Start: 2019-03-30 | End: 2019-03-30

## 2019-03-30 RX ORDER — HYDROMORPHONE HYDROCHLORIDE 1 MG/ML
0.5 INJECTION, SOLUTION INTRAMUSCULAR; INTRAVENOUS; SUBCUTANEOUS ONCE
Status: COMPLETED | OUTPATIENT
Start: 2019-03-30 | End: 2019-03-30

## 2019-03-30 RX ORDER — POTASSIUM CHLORIDE 20 MEQ/1
40 TABLET, EXTENDED RELEASE ORAL ONCE
Status: COMPLETED | OUTPATIENT
Start: 2019-03-30 | End: 2019-03-30

## 2019-03-30 RX ORDER — ONDANSETRON 2 MG/ML
4 INJECTION INTRAMUSCULAR; INTRAVENOUS ONCE
Status: DISCONTINUED | OUTPATIENT
Start: 2019-03-30 | End: 2019-03-30

## 2019-03-30 RX ADMIN — HALOPERIDOL LACTATE 1 MG: 5 INJECTION, SOLUTION INTRAMUSCULAR at 23:15

## 2019-03-30 RX ADMIN — LORAZEPAM 1 MG: 2 INJECTION INTRAMUSCULAR; INTRAVENOUS at 21:12

## 2019-03-30 RX ADMIN — SODIUM CHLORIDE 1000 ML: 9 INJECTION, SOLUTION INTRAVENOUS at 20:15

## 2019-03-30 RX ADMIN — FAMOTIDINE 20 MG: 10 INJECTION INTRAVENOUS at 20:15

## 2019-03-30 RX ADMIN — SODIUM CHLORIDE 1000 ML: 9 INJECTION, SOLUTION INTRAVENOUS at 21:17

## 2019-03-30 RX ADMIN — HYDROMORPHONE HYDROCHLORIDE 0.5 MG: 1 INJECTION, SOLUTION INTRAMUSCULAR; INTRAVENOUS; SUBCUTANEOUS at 20:16

## 2019-03-30 RX ADMIN — HYDROMORPHONE HYDROCHLORIDE 0.5 MG: 1 INJECTION, SOLUTION INTRAMUSCULAR; INTRAVENOUS; SUBCUTANEOUS at 21:12

## 2019-03-30 RX ADMIN — POTASSIUM CHLORIDE 40 MEQ: 1500 TABLET, EXTENDED RELEASE ORAL at 22:33

## 2019-03-30 RX ADMIN — IOHEXOL 100 ML: 350 INJECTION, SOLUTION INTRAVENOUS at 20:44

## 2019-03-30 RX ADMIN — MAGNESIUM SULFATE IN WATER 2 G: 40 INJECTION, SOLUTION INTRAVENOUS at 21:17

## 2019-03-30 RX ADMIN — ONDANSETRON 4 MG: 2 INJECTION INTRAMUSCULAR; INTRAVENOUS at 20:16

## 2019-03-31 ENCOUNTER — PATIENT OUTREACH (OUTPATIENT)
Dept: HEALTH INFORMATION MANAGEMENT | Facility: OTHER | Age: 44
End: 2019-03-31

## 2019-03-31 VITALS
RESPIRATION RATE: 16 BRPM | DIASTOLIC BLOOD PRESSURE: 51 MMHG | BODY MASS INDEX: 19.65 KG/M2 | OXYGEN SATURATION: 94 % | TEMPERATURE: 99.3 F | HEIGHT: 64 IN | WEIGHT: 115.08 LBS | SYSTOLIC BLOOD PRESSURE: 96 MMHG | HEART RATE: 77 BPM

## 2019-03-31 PROBLEM — E87.20 ACIDOSIS: Status: ACTIVE | Noted: 2019-03-31

## 2019-03-31 PROBLEM — E87.1 HYPONATREMIA: Status: RESOLVED | Noted: 2018-05-19 | Resolved: 2019-03-31

## 2019-03-31 PROBLEM — E87.20 ACIDOSIS: Status: RESOLVED | Noted: 2019-03-31 | Resolved: 2019-03-31

## 2019-03-31 LAB
ALBUMIN SERPL BCP-MCNC: 3.4 G/DL (ref 3.2–4.9)
ALBUMIN/GLOB SERPL: 1.2 G/DL
ALP SERPL-CCNC: 57 U/L (ref 30–99)
ALT SERPL-CCNC: 7 U/L (ref 2–50)
ANION GAP SERPL CALC-SCNC: 17 MMOL/L (ref 0–11.9)
ANION GAP SERPL CALC-SCNC: 5 MMOL/L (ref 0–11.9)
AST SERPL-CCNC: 11 U/L (ref 12–45)
B-OH-BUTYR SERPL-MCNC: 0.35 MMOL/L (ref 0.02–0.27)
BASOPHILS # BLD AUTO: 0.4 % (ref 0–1.8)
BASOPHILS # BLD: 0.05 K/UL (ref 0–0.12)
BILIRUB SERPL-MCNC: 0.7 MG/DL (ref 0.1–1.5)
BUN SERPL-MCNC: 14 MG/DL (ref 8–22)
BUN SERPL-MCNC: 8 MG/DL (ref 8–22)
CALCIUM SERPL-MCNC: 8.3 MG/DL (ref 8.5–10.5)
CALCIUM SERPL-MCNC: 9.1 MG/DL (ref 8.5–10.5)
CHLORIDE SERPL-SCNC: 106 MMOL/L (ref 96–112)
CHLORIDE SERPL-SCNC: 111 MMOL/L (ref 96–112)
CO2 SERPL-SCNC: 14 MMOL/L (ref 20–33)
CO2 SERPL-SCNC: 24 MMOL/L (ref 20–33)
CREAT SERPL-MCNC: 0.64 MG/DL (ref 0.5–1.4)
CREAT SERPL-MCNC: 0.83 MG/DL (ref 0.5–1.4)
EOSINOPHIL # BLD AUTO: 0.01 K/UL (ref 0–0.51)
EOSINOPHIL NFR BLD: 0.1 % (ref 0–6.9)
ERYTHROCYTE [DISTWIDTH] IN BLOOD BY AUTOMATED COUNT: 59 FL (ref 35.9–50)
GLOBULIN SER CALC-MCNC: 2.9 G/DL (ref 1.9–3.5)
GLUCOSE SERPL-MCNC: 153 MG/DL (ref 65–99)
GLUCOSE SERPL-MCNC: 81 MG/DL (ref 65–99)
HCT VFR BLD AUTO: 31.3 % (ref 37–47)
HGB BLD-MCNC: 10.3 G/DL (ref 12–16)
IMM GRANULOCYTES # BLD AUTO: 0.04 K/UL (ref 0–0.11)
IMM GRANULOCYTES NFR BLD AUTO: 0.3 % (ref 0–0.9)
LACTATE BLD-SCNC: 1.3 MMOL/L (ref 0.5–2)
LYMPHOCYTES # BLD AUTO: 2.27 K/UL (ref 1–4.8)
LYMPHOCYTES NFR BLD: 18.8 % (ref 22–41)
MCH RBC QN AUTO: 31.2 PG (ref 27–33)
MCHC RBC AUTO-ENTMCNC: 32.9 G/DL (ref 33.6–35)
MCV RBC AUTO: 94.8 FL (ref 81.4–97.8)
MONOCYTES # BLD AUTO: 1.37 K/UL (ref 0–0.85)
MONOCYTES NFR BLD AUTO: 11.3 % (ref 0–13.4)
NEUTROPHILS # BLD AUTO: 8.36 K/UL (ref 2–7.15)
NEUTROPHILS NFR BLD: 69.1 % (ref 44–72)
NRBC # BLD AUTO: 0 K/UL
NRBC BLD-RTO: 0 /100 WBC
PLATELET # BLD AUTO: 311 K/UL (ref 164–446)
PMV BLD AUTO: 9.5 FL (ref 9–12.9)
POTASSIUM SERPL-SCNC: 3.8 MMOL/L (ref 3.6–5.5)
POTASSIUM SERPL-SCNC: 4.1 MMOL/L (ref 3.6–5.5)
PROCALCITONIN SERPL-MCNC: 0.39 NG/ML
PROT SERPL-MCNC: 6.3 G/DL (ref 6–8.2)
RBC # BLD AUTO: 3.3 M/UL (ref 4.2–5.4)
SODIUM SERPL-SCNC: 137 MMOL/L (ref 135–145)
SODIUM SERPL-SCNC: 140 MMOL/L (ref 135–145)
WBC # BLD AUTO: 12.1 K/UL (ref 4.8–10.8)

## 2019-03-31 PROCEDURE — 36415 COLL VENOUS BLD VENIPUNCTURE: CPT

## 2019-03-31 PROCEDURE — A9270 NON-COVERED ITEM OR SERVICE: HCPCS | Performed by: HOSPITALIST

## 2019-03-31 PROCEDURE — 96375 TX/PRO/DX INJ NEW DRUG ADDON: CPT

## 2019-03-31 PROCEDURE — 96376 TX/PRO/DX INJ SAME DRUG ADON: CPT

## 2019-03-31 PROCEDURE — A9270 NON-COVERED ITEM OR SERVICE: HCPCS | Performed by: EMERGENCY MEDICINE

## 2019-03-31 PROCEDURE — 84145 PROCALCITONIN (PCT): CPT

## 2019-03-31 PROCEDURE — 700111 HCHG RX REV CODE 636 W/ 250 OVERRIDE (IP): Performed by: HOSPITALIST

## 2019-03-31 PROCEDURE — 302151 K-PAD 3X20: Performed by: HOSPITALIST

## 2019-03-31 PROCEDURE — 700102 HCHG RX REV CODE 250 W/ 637 OVERRIDE(OP)

## 2019-03-31 PROCEDURE — 80053 COMPREHEN METABOLIC PANEL: CPT

## 2019-03-31 PROCEDURE — 700111 HCHG RX REV CODE 636 W/ 250 OVERRIDE (IP): Performed by: EMERGENCY MEDICINE

## 2019-03-31 PROCEDURE — 700105 HCHG RX REV CODE 258: Performed by: HOSPITALIST

## 2019-03-31 PROCEDURE — 700105 HCHG RX REV CODE 258: Performed by: EMERGENCY MEDICINE

## 2019-03-31 PROCEDURE — 82010 KETONE BODYS QUAN: CPT

## 2019-03-31 PROCEDURE — 700102 HCHG RX REV CODE 250 W/ 637 OVERRIDE(OP): Performed by: EMERGENCY MEDICINE

## 2019-03-31 PROCEDURE — 83605 ASSAY OF LACTIC ACID: CPT

## 2019-03-31 PROCEDURE — 80048 BASIC METABOLIC PNL TOTAL CA: CPT

## 2019-03-31 PROCEDURE — 700102 HCHG RX REV CODE 250 W/ 637 OVERRIDE(OP): Performed by: HOSPITALIST

## 2019-03-31 PROCEDURE — 85025 COMPLETE CBC W/AUTO DIFF WBC: CPT

## 2019-03-31 PROCEDURE — 99235 HOSP IP/OBS SAME DATE MOD 70: CPT | Performed by: HOSPITALIST

## 2019-03-31 PROCEDURE — G0378 HOSPITAL OBSERVATION PER HR: HCPCS

## 2019-03-31 RX ORDER — OXYCODONE HYDROCHLORIDE 5 MG/1
5 TABLET ORAL
Status: DISCONTINUED | OUTPATIENT
Start: 2019-03-31 | End: 2019-03-31 | Stop reason: HOSPADM

## 2019-03-31 RX ORDER — BISACODYL 10 MG
10 SUPPOSITORY, RECTAL RECTAL
Status: DISCONTINUED | OUTPATIENT
Start: 2019-03-31 | End: 2019-03-31 | Stop reason: HOSPADM

## 2019-03-31 RX ORDER — SODIUM CHLORIDE 9 MG/ML
INJECTION, SOLUTION INTRAVENOUS CONTINUOUS
Status: DISCONTINUED | OUTPATIENT
Start: 2019-03-31 | End: 2019-03-31 | Stop reason: HOSPADM

## 2019-03-31 RX ORDER — MORPHINE SULFATE 4 MG/ML
4 INJECTION, SOLUTION INTRAMUSCULAR; INTRAVENOUS
Status: DISCONTINUED | OUTPATIENT
Start: 2019-03-31 | End: 2019-03-31 | Stop reason: HOSPADM

## 2019-03-31 RX ORDER — ONDANSETRON 4 MG/1
4 TABLET, ORALLY DISINTEGRATING ORAL ONCE
Status: COMPLETED | OUTPATIENT
Start: 2019-03-31 | End: 2019-03-31

## 2019-03-31 RX ORDER — PROMETHAZINE HYDROCHLORIDE 25 MG/1
12.5-25 SUPPOSITORY RECTAL EVERY 4 HOURS PRN
Status: DISCONTINUED | OUTPATIENT
Start: 2019-03-31 | End: 2019-03-31 | Stop reason: HOSPADM

## 2019-03-31 RX ORDER — CAPSAICIN 0.025 %
CREAM (GRAM) TOPICAL ONCE
Status: COMPLETED | OUTPATIENT
Start: 2019-03-31 | End: 2019-03-31

## 2019-03-31 RX ORDER — HALOPERIDOL 5 MG/ML
5 INJECTION INTRAMUSCULAR ONCE
Status: COMPLETED | OUTPATIENT
Start: 2019-03-31 | End: 2019-03-31

## 2019-03-31 RX ORDER — ONDANSETRON 2 MG/ML
4 INJECTION INTRAMUSCULAR; INTRAVENOUS EVERY 4 HOURS PRN
Status: DISCONTINUED | OUTPATIENT
Start: 2019-03-31 | End: 2019-03-31 | Stop reason: HOSPADM

## 2019-03-31 RX ORDER — OXYCODONE HYDROCHLORIDE 10 MG/1
10 TABLET ORAL
Status: DISCONTINUED | OUTPATIENT
Start: 2019-03-31 | End: 2019-03-31 | Stop reason: HOSPADM

## 2019-03-31 RX ORDER — LAMOTRIGINE 25 MG/1
50 TABLET ORAL DAILY
Status: DISCONTINUED | OUTPATIENT
Start: 2019-03-31 | End: 2019-03-31 | Stop reason: HOSPADM

## 2019-03-31 RX ORDER — SODIUM CHLORIDE, SODIUM LACTATE, POTASSIUM CHLORIDE, CALCIUM CHLORIDE 600; 310; 30; 20 MG/100ML; MG/100ML; MG/100ML; MG/100ML
1000 INJECTION, SOLUTION INTRAVENOUS ONCE
Status: COMPLETED | OUTPATIENT
Start: 2019-03-31 | End: 2019-03-31

## 2019-03-31 RX ORDER — ONDANSETRON 4 MG/1
4 TABLET, ORALLY DISINTEGRATING ORAL EVERY 4 HOURS PRN
Status: DISCONTINUED | OUTPATIENT
Start: 2019-03-31 | End: 2019-03-31 | Stop reason: HOSPADM

## 2019-03-31 RX ORDER — HYDROMORPHONE HYDROCHLORIDE 1 MG/ML
1 INJECTION, SOLUTION INTRAMUSCULAR; INTRAVENOUS; SUBCUTANEOUS ONCE
Status: COMPLETED | OUTPATIENT
Start: 2019-03-31 | End: 2019-03-31

## 2019-03-31 RX ORDER — FERROUS SULFATE 325(65) MG
325 TABLET ORAL
Status: DISCONTINUED | OUTPATIENT
Start: 2019-04-02 | End: 2019-03-31 | Stop reason: HOSPADM

## 2019-03-31 RX ORDER — SERTRALINE HYDROCHLORIDE 100 MG/1
100 TABLET, FILM COATED ORAL
Status: DISCONTINUED | OUTPATIENT
Start: 2019-03-31 | End: 2019-03-31

## 2019-03-31 RX ORDER — AMOXICILLIN 250 MG
2 CAPSULE ORAL 2 TIMES DAILY
Status: DISCONTINUED | OUTPATIENT
Start: 2019-03-31 | End: 2019-03-31 | Stop reason: HOSPADM

## 2019-03-31 RX ORDER — ESCITALOPRAM OXALATE 5 MG/1
5 TABLET ORAL DAILY
Status: SHIPPED | COMMUNITY
End: 2022-06-13

## 2019-03-31 RX ORDER — ONDANSETRON 4 MG/1
4 TABLET, ORALLY DISINTEGRATING ORAL EVERY 8 HOURS PRN
Qty: 10 TAB | Refills: 0 | Status: SHIPPED | OUTPATIENT
Start: 2019-03-31

## 2019-03-31 RX ORDER — CHLORPROMAZINE HYDROCHLORIDE 25 MG/1
25 TABLET, FILM COATED ORAL 3 TIMES DAILY
Status: DISCONTINUED | OUTPATIENT
Start: 2019-03-31 | End: 2019-03-31

## 2019-03-31 RX ORDER — PROMETHAZINE HYDROCHLORIDE 25 MG/1
12.5-25 TABLET ORAL EVERY 4 HOURS PRN
Status: DISCONTINUED | OUTPATIENT
Start: 2019-03-31 | End: 2019-03-31 | Stop reason: HOSPADM

## 2019-03-31 RX ORDER — CLONAZEPAM 1 MG/1
1 TABLET ORAL 3 TIMES DAILY
Status: SHIPPED | COMMUNITY
End: 2022-06-13

## 2019-03-31 RX ORDER — CLONAZEPAM 0.5 MG/1
0.5 TABLET ORAL 3 TIMES DAILY
Status: DISCONTINUED | OUTPATIENT
Start: 2019-03-31 | End: 2019-03-31 | Stop reason: HOSPADM

## 2019-03-31 RX ORDER — LAMOTRIGINE 25 MG/1
50 TABLET ORAL DAILY
Status: SHIPPED | COMMUNITY
End: 2022-06-13

## 2019-03-31 RX ORDER — BUSPIRONE HYDROCHLORIDE 30 MG/1
30 TABLET ORAL 2 TIMES DAILY
Status: DISCONTINUED | OUTPATIENT
Start: 2019-03-31 | End: 2019-03-31 | Stop reason: HOSPADM

## 2019-03-31 RX ORDER — HEPARIN SODIUM 5000 [USP'U]/ML
5000 INJECTION, SOLUTION INTRAVENOUS; SUBCUTANEOUS EVERY 8 HOURS
Status: DISCONTINUED | OUTPATIENT
Start: 2019-03-31 | End: 2019-03-31 | Stop reason: HOSPADM

## 2019-03-31 RX ORDER — POLYETHYLENE GLYCOL 3350 17 G/17G
1 POWDER, FOR SOLUTION ORAL
Status: DISCONTINUED | OUTPATIENT
Start: 2019-03-31 | End: 2019-03-31 | Stop reason: HOSPADM

## 2019-03-31 RX ORDER — ESCITALOPRAM OXALATE 10 MG/1
5 TABLET ORAL DAILY
Status: DISCONTINUED | OUTPATIENT
Start: 2019-03-31 | End: 2019-03-31 | Stop reason: HOSPADM

## 2019-03-31 RX ADMIN — ONDANSETRON 4 MG: 4 TABLET, ORALLY DISINTEGRATING ORAL at 00:54

## 2019-03-31 RX ADMIN — ESCITALOPRAM OXALATE 5 MG: 10 TABLET ORAL at 14:09

## 2019-03-31 RX ADMIN — SODIUM CHLORIDE: 9 INJECTION, SOLUTION INTRAVENOUS at 09:37

## 2019-03-31 RX ADMIN — OXYCODONE HYDROCHLORIDE 5 MG: 5 TABLET ORAL at 11:01

## 2019-03-31 RX ADMIN — SODIUM CHLORIDE, POTASSIUM CHLORIDE, SODIUM LACTATE AND CALCIUM CHLORIDE 1000 ML: 600; 310; 30; 20 INJECTION, SOLUTION INTRAVENOUS at 07:35

## 2019-03-31 RX ADMIN — CLONAZEPAM 0.5 MG: 0.5 TABLET ORAL at 07:10

## 2019-03-31 RX ADMIN — ONDANSETRON 4 MG: 4 TABLET, ORALLY DISINTEGRATING ORAL at 11:24

## 2019-03-31 RX ADMIN — OXYCODONE HYDROCHLORIDE 5 MG: 5 TABLET ORAL at 14:11

## 2019-03-31 RX ADMIN — LAMOTRIGINE 50 MG: 25 TABLET ORAL at 14:11

## 2019-03-31 RX ADMIN — HALOPERIDOL LACTATE 5 MG: 5 INJECTION, SOLUTION INTRAMUSCULAR at 05:38

## 2019-03-31 RX ADMIN — CLONAZEPAM 0.5 MG: 0.5 TABLET ORAL at 11:01

## 2019-03-31 RX ADMIN — BUSPIRONE HYDROCHLORIDE 30 MG: 30 TABLET ORAL at 07:10

## 2019-03-31 RX ADMIN — SODIUM CHLORIDE: 9 INJECTION, SOLUTION INTRAVENOUS at 04:33

## 2019-03-31 RX ADMIN — LIDOCAINE HYDROCHLORIDE 30 ML: 20 SOLUTION OROPHARYNGEAL at 01:17

## 2019-03-31 RX ADMIN — MORPHINE SULFATE 4 MG: 4 INJECTION INTRAVENOUS at 12:18

## 2019-03-31 RX ADMIN — CAPSAICIN 1 APPLICATION: 0.25 CREAM TOPICAL at 02:36

## 2019-03-31 RX ADMIN — HYDROMORPHONE HYDROCHLORIDE 1 MG: 1 INJECTION, SOLUTION INTRAMUSCULAR; INTRAVENOUS; SUBCUTANEOUS at 04:28

## 2019-03-31 ASSESSMENT — ENCOUNTER SYMPTOMS
BRUISES/BLEEDS EASILY: 0
FLANK PAIN: 0
HEMOPTYSIS: 0
SPEECH CHANGE: 0
NAUSEA: 1
DOUBLE VISION: 0
EYE DISCHARGE: 0
FEVER: 0
BLURRED VISION: 0
MYALGIAS: 0
DIZZINESS: 0
HEARTBURN: 0
DEPRESSION: 0
ABDOMINAL PAIN: 1
SHORTNESS OF BREATH: 0
VOMITING: 1
PALPITATIONS: 0
SENSORY CHANGE: 0
WEAKNESS: 1
FOCAL WEAKNESS: 0
CHILLS: 0
HALLUCINATIONS: 0
COUGH: 0

## 2019-03-31 ASSESSMENT — PATIENT HEALTH QUESTIONNAIRE - PHQ9
2. FEELING DOWN, DEPRESSED, IRRITABLE, OR HOPELESS: NOT AT ALL
SUM OF ALL RESPONSES TO PHQ9 QUESTIONS 1 AND 2: 0
1. LITTLE INTEREST OR PLEASURE IN DOING THINGS: NOT AT ALL

## 2019-03-31 ASSESSMENT — LIFESTYLE VARIABLES
SUBSTANCE_ABUSE: 0
EVER_SMOKED: NEVER

## 2019-03-31 NOTE — ED PROVIDER NOTES
ED Provider Note    Scribed for Robert Xavier M.D. by Fidelia Delarosa. 3/30/2019  7:40 PM    Means of arrival: Ambulance  History obtained from: Patient  History limited by: None    CHIEF COMPLAINT  Chief Complaint   Patient presents with   • Vomiting   • Nausea     HPI  Iris Tova Hernandez is a 43 y.o. Female brought in by ambulance presenting with persistent nausea and vomiting since 7:00 AM after waking up this morning. Patient has had these symptoms in the past secondary to her anxiety and depression. She also endorses 7-8/10 mid sharp abdominal pain and hematochezia. Patient has had sick contact. Admits to marijuana use but otherwise denies alcohol use for the past 12 years. Denies hemoptysis, vaginal discharge, vaginal bleeding, or dysuria. Denies any abdominal surgeries.      REVIEW OF SYSTEMS  See HPI for further details.   Pertinent positives include: nausea, vomiting, abdominal pain, hematochezia, sick contact  Pertinent negative include: hemoptysis, vaginal discharge, vaginal bleeding, dysuria  10 + review of systems otherwise negative     PAST MEDICAL HISTORY   has a past medical history of Anxiety; Anxiety disorder; Backpain; CLOSTRIDIUM DIFFICILE INFECTION (4/14/2010); Cyclic vomiting syndrome; Dental disorder; Drug-seeking behavior; Dyspepsia (7/12/2009); Nephrolithiasis (6/20/2009); Pain; Snoring; Superior mesenteric artery syndrome (HCC) (7/12/2009); and Tobacco abuse (6/20/2009).    SOCIAL HISTORY  Social History     Social History Main Topics   • Smoking status: Former Smoker     Types: Cigarettes   • Smokeless tobacco: Never Used      Comment: 7.5 py. Quit >15 yrs.    • Alcohol use No      Comment: Quit about 12 yrs ago.    • Drug use: Yes     Types: Marijuana, Inhaled      Comment: 2g daily for past 2 years. Has used marijuana since age 18       SURGICAL HISTORY   has a past surgical history that includes gastroscopy-endo (7/8/2009); lithotripsy; pyloroplasty (7/27/2009); gastroscopy with  "biopsy (3/9/2010); exploratory laparotomy (7/27/2009); appendectomy (7/27/2009); cholecystectomy (7/27/2009); other; exploratory laparotomy (1/12/2016); bowel resection (1/12/2016); and gastroscopy-endo (4/28/2016).    CURRENT MEDICATIONS  No current facility-administered medications for this encounter.     Current Outpatient Prescriptions:   •  ferrous sulfate 325 (65 Fe) MG tablet, Take 1 Tab by mouth every 48 hours., Disp: 30 Tab, Rfl: 0  •  ondansetron (ZOFRAN ODT) 4 MG TABLET DISPERSIBLE, Take 4 mg by mouth every 6 hours as needed for Nausea., Disp: , Rfl:   •  sertraline (ZOLOFT) 100 MG Tab, Take 1 Tab by mouth every bedtime., Disp: 90 Tab, Rfl: 0  •  busPIRone (BUSPAR) 30 MG tablet, Take 1 Tab by mouth 2 Times a Day., Disp: 30 Tab, Rfl: 0  •  clonazePAM (KLONOPIN) 0.5 MG Tab, Take 0.5 mg by mouth 3 times a day., Disp: , Rfl:   •  chlorproMAZINE (THORAZINE) 25 MG Tab, Take 1 Tab by mouth 3 times a day., Disp: 90 Tab, Rfl: 1    ALLERGIES  Allergies   Allergen Reactions   • Metoclopramide Hives     Told by MD; pt suspects possible hives.   • Bactrim [Sulfamethoxazole W-Trimethoprim] Unspecified     Someone said I had a rash or something.      • Seroquel [Quetiapine] Unspecified     \"Seizures\"  RXN=unknown       PHYSICAL EXAM  VITAL SIGNS: /74   Pulse 88   Temp 36.1 °C (97 °F) (Temporal)   Resp 16   Ht 1.626 m (5' 4\")   Wt 59.4 kg (131 lb)   SpO2 96%   BMI 22.49 kg/m²      SpO2: I interpret this pulse oximetry as normal  Constitutional: Well developed, Well nourished, Moderate distress, Non-toxic appearance.   HENT: Normocephalic, Atraumatic, Bilateral external ears normal, Oropharynx moist, No oral exudates.   Eyes: PERRLA, EOMI, Conjunctiva normal, No discharge.   Neck: No tenderness, Supple, No stridor.   Lymphatic: No lymphadenopathy noted.   Cardiovascular: Normal heart rate, Normal rhythm.   Thorax & Lungs: Clear to auscultation bilaterally, No respiratory distress, No wheezing, No crackles. "   Abdomen: Soft, No tenderness, No masses, No pulsatile masses.   Skin: Warm, Dry, No erythema, No rash.   Extremities:, No edema No cyanosis.   Musculoskeletal: Right sided CVA tenderness. No major deformities noted.  Intact distal pulses  Neurologic: Awake, alert. Moves all extremities spontaneously.  Psychiatric: Affect normal, Judgment normal, Mood normal.       DIAGNOSTIC STUDIES / PROCEDURES    LABORATORY  Results for orders placed or performed during the hospital encounter of 03/30/19   CBC WITH DIFFERENTIAL   Result Value Ref Range    WBC 16.1 (H) 4.8 - 10.8 K/uL    RBC 4.78 4.20 - 5.40 M/uL    Hemoglobin 14.5 12.0 - 16.0 g/dL    Hematocrit 44.3 37.0 - 47.0 %    MCV 92.7 81.4 - 97.8 fL    MCH 30.3 27.0 - 33.0 pg    MCHC 32.7 (L) 33.6 - 35.0 g/dL    RDW 57.5 (H) 35.9 - 50.0 fL    Platelet Count 520 (H) 164 - 446 K/uL    MPV 10.1 9.0 - 12.9 fL    Neutrophils-Polys 78.80 (H) 44.00 - 72.00 %    Lymphocytes 11.20 (L) 22.00 - 41.00 %    Monocytes 8.10 0.00 - 13.40 %    Eosinophils 0.10 0.00 - 6.90 %    Basophils 1.00 0.00 - 1.80 %    Immature Granulocytes 0.80 0.00 - 0.90 %    Nucleated RBC 0.00 /100 WBC    Neutrophils (Absolute) 12.69 (H) 2.00 - 7.15 K/uL    Lymphs (Absolute) 1.80 1.00 - 4.80 K/uL    Monos (Absolute) 1.30 (H) 0.00 - 0.85 K/uL    Eos (Absolute) 0.01 0.00 - 0.51 K/uL    Baso (Absolute) 0.16 (H) 0.00 - 0.12 K/uL    Immature Granulocytes (abs) 0.13 (H) 0.00 - 0.11 K/uL    NRBC (Absolute) 0.00 K/uL   COMP METABOLIC PANEL   Result Value Ref Range    Sodium 134 (L) 135 - 145 mmol/L    Potassium 3.3 (L) 3.6 - 5.5 mmol/L    Chloride 96 96 - 112 mmol/L    Co2 17 (L) 20 - 33 mmol/L    Anion Gap 21.0 (H) 0.0 - 11.9    Glucose 247 (H) 65 - 99 mg/dL    Bun 15 8 - 22 mg/dL    Creatinine 1.14 0.50 - 1.40 mg/dL    Calcium 10.4 8.5 - 10.5 mg/dL    AST(SGOT) 15 12 - 45 U/L    ALT(SGPT) 10 2 - 50 U/L    Alkaline Phosphatase 89 30 - 99 U/L    Total Bilirubin 1.2 0.1 - 1.5 mg/dL    Albumin 5.0 (H) 3.2 - 4.9 g/dL     Total Protein 9.1 (H) 6.0 - 8.2 g/dL    Globulin 4.1 (H) 1.9 - 3.5 g/dL    A-G Ratio 1.2 g/dL   LIPASE   Result Value Ref Range    Lipase 9 (L) 11 - 82 U/L   HCG QUAL SERUM   Result Value Ref Range    Beta-Hcg Qualitative Serum Negative Negative   URINALYSIS,CULTURE IF INDICATED   Result Value Ref Range    Color Yellow     Character Clear     Specific Gravity >1.045 <1.035    Ph 5.0 5.0 - 8.0    Glucose 100 (A) Negative mg/dL    Ketones Trace (A) Negative mg/dL    Protein Negative Negative mg/dL    Bilirubin Negative Negative    Urobilinogen, Urine 0.2 Negative    Nitrite Negative Negative    Leukocyte Esterase Negative Negative    Occult Blood Negative Negative    Micro Urine Req see below    ESTIMATED GFR   Result Value Ref Range    GFR If African American >60 >60 mL/min/1.73 m 2    GFR If Non African American 52 (A) >60 mL/min/1.73 m 2     All labs reviewed by me.      RADIOLOGY  CT-ABDOMEN-PELVIS WITH   Final Result      1.  Postoperative changes of the RIGHT lower quadrant with multiple bowel anastomoses present.   2.  No evidence of bowel obstruction or perforation.   3.  Appendix is not visualized.   4.  No focal mesenteric inflammatory process.   5.  Stable extrahepatic biliary dilation, likely postoperative.        The radiologist's interpretations of all radiological studies have been reviewed by me.            CHART REVIEW  Pertinent medical chart information was reviewed and reveals: Previous admission for cyclic vomiting    COURSE & MEDICAL DECISION MAKING  Pertinent Labs & Imaging studies reviewed. (See chart for details)    Differential diagnoses include but not limited to: Hyperemesis, cyclic vomiting, cannabinoid associated hyperemesis, obstruction, pink otitis, cholecystitis, UTI, pyelonephritis    7:40 PM - Patient seen and examined at bedside. Discussed plan of care, including urine to rule out UTI or kidney infection, CT and treating with pain meds and nausea medication. Patient agrees to the  "plan of care. The patient will be resuscitated with 1L NS IV and medicated with GI cocktail 30 mL, Pepcid 20 mg, Dilaudid 0.5 mg, and Zofran 4 mg. Ordered for CT-abdomen, estimated GFR, CBC with differential, CMP, lipase, HCG qual serum, and urinalysis to evaluate her symptoms.     12:04 AM - Recheck. Patient is feeling much better, tolerated PO, and would like to be discharged home. She was counseled on marijuana cessation. Strict ED return precautions and care instructions discussed. She verbalized her understanding and agrees to the discharge instructions.     Vitals:    03/30/19 1728 03/30/19 1736   BP: 118/74    Pulse: 88    Resp: 16    Temp: 36.1 °C (97 °F)    TempSrc: Temporal    SpO2: 96%    Weight:  59.4 kg (131 lb)   Height:  1.626 m (5' 4\")       Medications   NS infusion 1,000 mL (not administered)   hyoscyamine-maalox plus-lidocaine viscous (GI COCKTAIL) oral susp 30 mL (not administered)   famotidine (PEPCID) injection 20 mg (not administered)   HYDROmorphone pf (DILAUDID) injection 0.5 mg (not administered)   ondansetron (ZOFRAN) syringe/vial injection 4 mg (not administered)       HYDRATION: Based on the patient's presentation of Acute Vomiting the patient was given IV fluids. IV Hydration was used because oral hydration was not adequate alone. Upon recheck following hydration, the patient was Improved.      43-year-old female with history of cyclic vomiting presenting for vomiting and epigastric pain.  States pain is severe, unable to tolerate p.o.  Vital signs are reassuring, no evidence of sepsis, exam without evidence of acute surgical abdomen, however does have right-sided CVA tenderness.  Labs are unremarkable with the exception of metabolic acidosis, no evidence of liver dysfunction, pancreatitis, biliary dysfunction.  CT negative for any acute process.  Patient was resuscitated with IV fluids, given multiple antiemetics, eventually much improved after Ativan and Haldol.  Patient tolerating " p.o. now and wanting to go home.  Repeat BMP ordered, significant delay to the lab draw, then sample hemolyzed, repeat drawn and is pending at this time.  Feel safe for discharge and outpatient management if improved bicarb.  Strongly advised to quit using cannabinoid products for at least the next 3 months.  Patient or guardian given strict returns precautions and care instructions.  Advised PCP follow-up in 1-2 days.  Patient or guardian expresses understanding and agrees to plan.    Patient signed out to my colleague Dr. Russ pending repeat lab work, patient to be discharged if improved bicarb.    DISPOSITION  The patient will return for new or worsening symptoms and is stable at the time of discharge.    The patient is referred to a primary physician for blood pressure management, diabetic screening, and for all other preventative health concerns.      DISPOSITION:  Patient will be discharged home in stable condition.    FOLLOW UP:  No follow-up provider specified.    OUTPATIENT MEDICATIONS:  New Prescriptions    No medications on file         FINAL IMPRESSION  Visit Diagnoses     ICD-10-CM   1. Non-intractable cyclical vomiting with nausea G43.A0   2. Hyperemesis R11.10        Fidelia HUGHES (Gaye), am scribing for, and in the presence of, Robert Xavier M.D..    Electronically signed by: Fidelia Delarosa (Gaye), 3/30/2019    Robert HUGHES M.D. personally performed the services described in this documentation, as scribed by Fidelia Delarosa in my presence, and it is both accurate and complete. C    The note accurately reflects work and decisions made by me.  Robert Xavier  3/31/2019  2:15 AM

## 2019-03-31 NOTE — DISCHARGE INSTRUCTIONS
Discharge Instructions    Discharged to home by car with friend. Discharged via walking, hospital escort: Refused.  Special equipment needed: Not Applicable    Be sure to schedule a follow-up appointment with your primary care doctor or any specialists as instructed.     Discharge Plan:   Diet Plan: Discussed  Activity Level: Discussed  Confirmed Follow up Appointment: Appointment Scheduled  Confirmed Symptoms Management: Discussed  Medication Reconciliation Updated: Yes  Influenza Vaccine Indication: Patient Refuses    I understand that a diet low in cholesterol, fat, and sodium is recommended for good health. Unless I have been given specific instructions below for another diet, I accept this instruction as my diet prescription.   Other diet:     Special Instructions: None    · Is patient discharged on Warfarin / Coumadin?   No     Depression / Suicide Risk    As you are discharged from this [facility], it is important to learn how to keep safe from harming yourself.    Recognize the warning signs:  · Abrupt changes in personality, positive or negative- including increase in energy   · Giving away possessions  · Change in eating patterns- significant weight changes-  positive or negative  · Change in sleeping patterns- unable to sleep or sleeping all the time   · Unwillingness or inability to communicate  · Depression  · Unusual sadness, discouragement and loneliness  · Talk of wanting to die  · Neglect of personal appearance   · Rebelliousness- reckless behavior  · Withdrawal from people/activities they love  · Confusion- inability to concentrate     If you or a loved one observes any of these behaviors or has concerns about self-harm, here's what you can do:  · Talk about it- your feelings and reasons for harming yourself  · Remove any means that you might use to hurt yourself (examples: pills, rope, extension cords, firearm)  · Get professional help from the community (Mental Health, Substance  Abuse, psychological counseling)  · Do not be alone:Call your Safe Contact- someone whom you trust who will be there for you.  · Call your local CRISIS HOTLINE 125-4117 or 014-506-8857  · Call your local Children's Mobile Crisis Response Team [place] (200) 338-6608 or www.[employer]  · Call the toll free National Suicide Prevention Hotlines   · National Suicide Prevention Lifeline 861-934-MATP (7991)  · [employer] Hope Line Network 800-SUICIDE (513-6792)    Nausea and Vomiting, Adult  Nausea is the feeling that you have an upset stomach or have to vomit. As nausea gets worse, it can lead to vomiting. Vomiting occurs when stomach contents are thrown up and out of the mouth. Vomiting can make you feel weak and cause you to become dehydrated. Dehydration can make you tired and thirsty, cause you to have a dry mouth, and decrease how often you urinate. Older adults and people with other diseases or a weak immune system are at higher risk for dehydration. It is important to treat your nausea and vomiting as told by your health care provider.  Follow these instructions at home:  Follow instructions from your health care provider about how to care for yourself at home.  Eating and drinking  Follow these recommendations as told by your health care provider:  · Take an oral rehydration solution (ORS). This is a drink that is sold at pharmacies and retail stores.  · Drink clear fluids in small amounts as you are able. Clear fluids include water, ice chips, diluted fruit juice, and low-calorie sports drinks.  · Eat bland, easy-to-digest foods in small amounts as you are able. These foods include bananas, applesauce, rice, lean meats, toast, and crackers.  · Avoid fluids that contain a lot of sugar or caffeine, such as energy drinks, sports drinks, and soda.  · Avoid alcohol.  · Avoid spicy or fatty foods.  General instructions  · Drink enough fluid to keep your urine clear or pale yellow.  · Wash your hands often. If  soap and water are not available, use hand .  · Make sure that all people in your household wash their hands well and often.  · Take over-the-counter and prescription medicines only as told by your health care provider.  · Rest at home while you recover.  · Watch your condition for any changes.  · Breathe slowly and deeply when you feel nauseated.  · Keep all follow-up visits as told by your health care provider. This is important.  Contact a health care provider if:  · You have a fever.  · You cannot keep fluids down.  · Your symptoms get worse.  · You have new symptoms.  · Your nausea does not go away after two days.  · You feel light-headed or dizzy.  · You have a headache.  · You have muscle cramps.  Get help right away if:  · You have pain in your chest, neck, arm, or jaw.  · You feel extremely weak or you faint.  · You have persistent vomiting.  · You see blood in your vomit.  · Your vomit looks like black coffee grounds.  · You have bloody or black stools or stools that look like tar.  · You have a severe headache, a stiff neck, or both.  · You have a rash.  · You have severe pain, cramping, or bloating in your abdomen.  · You have trouble breathing or you are breathing very quickly.  · Your heart is beating very quickly.  · Your skin feels cold and clammy.  · You feel confused.  · You have pain when you urinate.  · You have signs of dehydration, such as:  ¨ Dark urine, very little urine, or no urine.  ¨ Cracked lips.  ¨ Dry mouth.  ¨ Sunken eyes.  ¨ Sleepiness.  ¨ Weakness.  These symptoms may represent a serious problem that is an emergency. Do not wait to see if the symptoms will go away. Get medical help right away. Call your local emergency services (911 in the [place] Do not drive yourself to the hospital.   This information is not intended to replace advice given to you by your health care provider. Make sure you discuss any questions you have with your health care provider.  Document Released:  12/18/2006 Document Revised: 05/22/2017 Document Reviewed: 08/23/2016  Elsevier Interactive Patient Education © 2017 [employer]

## 2019-03-31 NOTE — ED NOTES
Assist RN: Patient complaining of continued abdominal pain and nausea. Discussed with ERP Dr. Xavier. Capsaicin applied per orders.

## 2019-03-31 NOTE — ED TRIAGE NOTES
Pt bib ems. Pt hx of cyclic vomiting. Pt c/o n/v and abd pain. Pt also c/o anxiety. Unsure if took medications.

## 2019-03-31 NOTE — PROGRESS NOTES
Received report from evening RN.  Pt is resting in room with eyes closed, visualized even rise and fall of chest.  No distress noted at this time. Call light within reach.

## 2019-03-31 NOTE — ASSESSMENT & PLAN NOTE
Recurrence of symptoms   Cont with IVF, anti emetics   Pain control  Capsacin creme   Heating pad

## 2019-03-31 NOTE — PROGRESS NOTES
Pt given regular meal tray.  Tolerating at this time.  Will continue to monitor. Call light within reach.

## 2019-03-31 NOTE — PROGRESS NOTES
IV D/C'd.  Discharge instructions provided to pt.  Pt states understanding.  Pt states all questions have been answered.  Copy of discharge provided to pt.  Signed copy in chart.  Prescriptions provided to pt, copy in chart. Pt states that all personal belongings are in possession.

## 2019-03-31 NOTE — ED PROVIDER NOTES
ED PROVIDER NOTE    Scribed for Robi Russ M.D. by Bernardino Overton. 3/31/2019, 3:21 AM.    This is an addendum to the note on Diana Hernandez. For further details, see the previously signed ED Provider Note written by Dr. Xavier (ERP).      I assumed care of the patient from Dr. Xavier.  Briefly, this is a 43-year-old female who presents with intractable nausea and vomiting in the setting of cannabis abuse.  Many prior episodes.  She has been here many hours, delays in obtaining labs.  Initial concern for hypokalemia and dehydration and metabolic acidosis, CT scan with no acute disease process.  She has been rehydrated and given potassium, pending repeat chemistry.    Worsening acidosis on repeat chemistry.  While her anion gap is not elevated and her CO2 is downtrending.    I reassessed the patient at the bedside, she is having recurrent nausea and pain.  I will treat her with haloperidol and since she has been here for more than 9 hours plan admission to the hospital.  Patient and partner agree with and appreciate the plan of care.  Mild generalized abdominal pain without guarding, vital signs are stable.    3:22 AM Consult with Dr. Hendrix (Hospitalist) who kindly agrees to admit the patient for further workup and treatment.  She is hemodynamically stable for admission to the hospital in guarded condition.    Results for orders placed or performed during the hospital encounter of 03/30/19   CBC WITH DIFFERENTIAL   Result Value Ref Range    WBC 16.1 (H) 4.8 - 10.8 K/uL    RBC 4.78 4.20 - 5.40 M/uL    Hemoglobin 14.5 12.0 - 16.0 g/dL    Hematocrit 44.3 37.0 - 47.0 %    MCV 92.7 81.4 - 97.8 fL    MCH 30.3 27.0 - 33.0 pg    MCHC 32.7 (L) 33.6 - 35.0 g/dL    RDW 57.5 (H) 35.9 - 50.0 fL    Platelet Count 520 (H) 164 - 446 K/uL    MPV 10.1 9.0 - 12.9 fL    Neutrophils-Polys 78.80 (H) 44.00 - 72.00 %    Lymphocytes 11.20 (L) 22.00 - 41.00 %    Monocytes 8.10 0.00 - 13.40 %    Eosinophils 0.10 0.00 -  6.90 %    Basophils 1.00 0.00 - 1.80 %    Immature Granulocytes 0.80 0.00 - 0.90 %    Nucleated RBC 0.00 /100 WBC    Neutrophils (Absolute) 12.69 (H) 2.00 - 7.15 K/uL    Lymphs (Absolute) 1.80 1.00 - 4.80 K/uL    Monos (Absolute) 1.30 (H) 0.00 - 0.85 K/uL    Eos (Absolute) 0.01 0.00 - 0.51 K/uL    Baso (Absolute) 0.16 (H) 0.00 - 0.12 K/uL    Immature Granulocytes (abs) 0.13 (H) 0.00 - 0.11 K/uL    NRBC (Absolute) 0.00 K/uL   COMP METABOLIC PANEL   Result Value Ref Range    Sodium 134 (L) 135 - 145 mmol/L    Potassium 3.3 (L) 3.6 - 5.5 mmol/L    Chloride 96 96 - 112 mmol/L    Co2 17 (L) 20 - 33 mmol/L    Anion Gap 21.0 (H) 0.0 - 11.9    Glucose 247 (H) 65 - 99 mg/dL    Bun 15 8 - 22 mg/dL    Creatinine 1.14 0.50 - 1.40 mg/dL    Calcium 10.4 8.5 - 10.5 mg/dL    AST(SGOT) 15 12 - 45 U/L    ALT(SGPT) 10 2 - 50 U/L    Alkaline Phosphatase 89 30 - 99 U/L    Total Bilirubin 1.2 0.1 - 1.5 mg/dL    Albumin 5.0 (H) 3.2 - 4.9 g/dL    Total Protein 9.1 (H) 6.0 - 8.2 g/dL    Globulin 4.1 (H) 1.9 - 3.5 g/dL    A-G Ratio 1.2 g/dL   LIPASE   Result Value Ref Range    Lipase 9 (L) 11 - 82 U/L   HCG QUAL SERUM   Result Value Ref Range    Beta-Hcg Qualitative Serum Negative Negative   URINALYSIS,CULTURE IF INDICATED   Result Value Ref Range    Color Yellow     Character Clear     Specific Gravity >1.045 <1.035    Ph 5.0 5.0 - 8.0    Glucose 100 (A) Negative mg/dL    Ketones Trace (A) Negative mg/dL    Protein Negative Negative mg/dL    Bilirubin Negative Negative    Urobilinogen, Urine 0.2 Negative    Nitrite Negative Negative    Leukocyte Esterase Negative Negative    Occult Blood Negative Negative    Micro Urine Req see below    ESTIMATED GFR   Result Value Ref Range    GFR If African American >60 >60 mL/min/1.73 m 2    GFR If Non African American 52 (A) >60 mL/min/1.73 m 2   Basic Metabolic Panel   Result Value Ref Range    Sodium 137 135 - 145 mmol/L    Potassium 4.1 3.6 - 5.5 mmol/L    Chloride 106 96 - 112 mmol/L    Co2 14 (L)  20 - 33 mmol/L    Glucose 153 (H) 65 - 99 mg/dL    Bun 14 8 - 22 mg/dL    Creatinine 0.83 0.50 - 1.40 mg/dL    Calcium 9.1 8.5 - 10.5 mg/dL    Anion Gap 17.0 (H) 0.0 - 11.9   ESTIMATED GFR   Result Value Ref Range    GFR If African American >60 >60 mL/min/1.73 m 2    GFR If Non African American >60 >60 mL/min/1.73 m 2   LACTIC ACID   Result Value Ref Range    Lactic Acid 1.3 0.5 - 2.0 mmol/L   BETA-HYDROXYBUTYRIC ACID   Result Value Ref Range    beta-Hydroxybutyric Acid 0.35 (H) 0.02 - 0.27 mmol/L   PROCALCITONIN   Result Value Ref Range    Procalcitonin 0.39 (H) <0.25 ng/mL   CBC with Differential   Result Value Ref Range    WBC 12.1 (H) 4.8 - 10.8 K/uL    RBC 3.30 (L) 4.20 - 5.40 M/uL    Hemoglobin 10.3 (L) 12.0 - 16.0 g/dL    Hematocrit 31.3 (L) 37.0 - 47.0 %    MCV 94.8 81.4 - 97.8 fL    MCH 31.2 27.0 - 33.0 pg    MCHC 32.9 (L) 33.6 - 35.0 g/dL    RDW 59.0 (H) 35.9 - 50.0 fL    Platelet Count 311 164 - 446 K/uL    MPV 9.5 9.0 - 12.9 fL    Neutrophils-Polys 69.10 44.00 - 72.00 %    Lymphocytes 18.80 (L) 22.00 - 41.00 %    Monocytes 11.30 0.00 - 13.40 %    Eosinophils 0.10 0.00 - 6.90 %    Basophils 0.40 0.00 - 1.80 %    Immature Granulocytes 0.30 0.00 - 0.90 %    Nucleated RBC 0.00 /100 WBC    Neutrophils (Absolute) 8.36 (H) 2.00 - 7.15 K/uL    Lymphs (Absolute) 2.27 1.00 - 4.80 K/uL    Monos (Absolute) 1.37 (H) 0.00 - 0.85 K/uL    Eos (Absolute) 0.01 0.00 - 0.51 K/uL    Baso (Absolute) 0.05 0.00 - 0.12 K/uL    Immature Granulocytes (abs) 0.04 0.00 - 0.11 K/uL    NRBC (Absolute) 0.00 K/uL   Comp Metabolic Panel (CMP)   Result Value Ref Range    Sodium 140 135 - 145 mmol/L    Potassium 3.8 3.6 - 5.5 mmol/L    Chloride 111 96 - 112 mmol/L    Co2 24 20 - 33 mmol/L    Anion Gap 5.0 0.0 - 11.9    Glucose 81 65 - 99 mg/dL    Bun 8 8 - 22 mg/dL    Creatinine 0.64 0.50 - 1.40 mg/dL    Calcium 8.3 (L) 8.5 - 10.5 mg/dL    AST(SGOT) 11 (L) 12 - 45 U/L    ALT(SGPT) 7 2 - 50 U/L    Alkaline Phosphatase 57 30 - 99 U/L    Total  Bilirubin 0.7 0.1 - 1.5 mg/dL    Albumin 3.4 3.2 - 4.9 g/dL    Total Protein 6.3 6.0 - 8.2 g/dL    Globulin 2.9 1.9 - 3.5 g/dL    A-G Ratio 1.2 g/dL   ESTIMATED GFR   Result Value Ref Range    GFR If African American >60 >60 mL/min/1.73 m 2    GFR If Non African American >60 >60 mL/min/1.73 m 2     CT-ABDOMEN-PELVIS WITH   Final Result      1.  Postoperative changes of the RIGHT lower quadrant with multiple bowel anastomoses present.   2.  No evidence of bowel obstruction or perforation.   3.  Appendix is not visualized.   4.  No focal mesenteric inflammatory process.   5.  Stable extrahepatic biliary dilation, likely postoperative.        MEDICATIONS GIVEN IN THE E.D.  Medications   NS infusion 1,000 mL (0 mL Intravenous Stopped 3/30/19 2329)   hyoscyamine-maalox plus-lidocaine viscous (GI COCKTAIL) oral susp 30 mL (30 mL Oral Given 3/31/19 0117)   famotidine (PEPCID) injection 20 mg (20 mg Intravenous Given 3/30/19 2015)   HYDROmorphone pf (DILAUDID) injection 0.5 mg (0.5 mg Intravenous Given 3/30/19 2016)   ondansetron (ZOFRAN) syringe/vial injection 4 mg (4 mg Intravenous Given 3/30/19 2016)   NS infusion 1,000 mL (0 mL Intravenous Stopped 3/30/19 2117)   iohexol (OMNIPAQUE) 350 mg/mL (100 mL Intravenous Given 3/30/19 2044)   LORazepam (ATIVAN) injection 1 mg (1 mg Intravenous Given 3/30/19 2112)   HYDROmorphone pf (DILAUDID) injection 0.5 mg (0.5 mg Intravenous Given 3/30/19 2112)   potassium chloride SA (Kdur) tablet 40 mEq (40 mEq Oral Given 3/30/19 2233)   magnesium sulfate IVPB premix 2 g (0 g Intravenous Stopped 3/30/19 2317)   haloperidol lactate (HALDOL) injection 1 mg (1 mg Intravenous Given 3/30/19 2315)   ondansetron (ZOFRAN ODT) dispertab 4 mg (4 mg Oral Given 3/31/19 0054)   HYDROmorphone pf (DILAUDID) injection 1 mg (1 mg Intravenous Given 3/31/19 5754)   capsaicin (ZOSTRIX) 0.025 % cream (1 Application Topical Given 3/31/19 0273)   haloperidol lactate (HALDOL) injection 5 mg (5 mg Intravenous  Given 3/31/19 7138)   lactated ringers infusion (BOLUS) (0 mL Intravenous Stopped 3/31/19 0910)         DISPOSITION:  Patient will be admitted to Dr. Hendrix (Hospitalist) in guarded condition.    FINAL IMPRESSION  1. Non-intractable cyclical vomiting with nausea    2. Hyperemesis    3. Dehydration    4. Metabolic acidosis    5. Addiction, marijuana (HCC)    6. Tobacco abuse disorder    7. Moderate episode of recurrent major depressive disorder (HCC)    8. Hypokalemia          -ADMIT-     I, Bernardino Overton (Scribe), am scribing for, and in the presence of, Robi Russ M.D..    Electronically signed by: Bernardino Overton (Scribe), 3/31/2019    IRobi M.D. personally performed the services described in this documentation, as scribed by Bernardino Ovetron in my presence, and it is both accurate and complete.    The note accurately reflects work and decisions made by me.  Robi Russ  3/31/2019  5:53 PM

## 2019-03-31 NOTE — PROGRESS NOTES
Pt denies any abdominal pain, N/V at this time.  Pt given clear liquid diet, tolerating at this time.  Will continue to closely monitor.  Call light within reach.

## 2019-03-31 NOTE — H&P
Hospital Medicine History & Physical Note    Date of Service  3/31/2019    Primary Care Physician  Isa Rodriguez M.D.    Consultants  none    Code Status  full    Chief Complaint  N/v abd pain     History of Presenting Illness  43 y.o. female who presented 3/30/2019 with past medical history of anxiety and cyclic vomiting syndrome due to chronic marijuana use who presents with nausea and vomiting.  This patient woke up yesterday morning 7 AM with nausea and vomiting multiple episodes.  She is at 7 out of 10 abdominal pain.  She is also had significant amount of anxiety and depression.  She does use marijuana significantly.  She denies any alcohol use.  No fever no chills no sweats.  Alleviation of her symptoms with some capsacian cream in the emergency department.  She will be admitted to the hospital for further management.    Review of Systems  Review of Systems   Constitutional: Negative for chills and fever.   HENT: Negative for congestion, hearing loss and tinnitus.    Eyes: Negative for blurred vision, double vision and discharge.   Respiratory: Negative for cough, hemoptysis and shortness of breath.    Cardiovascular: Negative for chest pain, palpitations and leg swelling.   Gastrointestinal: Positive for abdominal pain, nausea and vomiting. Negative for heartburn.   Genitourinary: Negative for dysuria and flank pain.   Musculoskeletal: Negative for joint pain and myalgias.   Skin: Negative for rash.   Neurological: Positive for weakness. Negative for dizziness, sensory change, speech change and focal weakness.   Endo/Heme/Allergies: Negative for environmental allergies. Does not bruise/bleed easily.   Psychiatric/Behavioral: Negative for depression, hallucinations and substance abuse.       Past Medical History   has a past medical history of Anxiety; Anxiety disorder; Backpain; CLOSTRIDIUM DIFFICILE INFECTION (4/14/2010); Cyclic vomiting syndrome; Dental disorder; Drug-seeking behavior; Dyspepsia  "(7/12/2009); Nephrolithiasis (6/20/2009); Pain; Snoring; Superior mesenteric artery syndrome (HCC) (7/12/2009); and Tobacco abuse (6/20/2009).    Surgical History   has a past surgical history that includes gastroscopy-endo (7/8/2009); lithotripsy; pyloroplasty (7/27/2009); gastroscopy with biopsy (3/9/2010); exploratory laparotomy (7/27/2009); appendectomy (7/27/2009); cholecystectomy (7/27/2009); other; exploratory laparotomy (1/12/2016); bowel resection (1/12/2016); and gastroscopy-endo (4/28/2016).     Family History  family history includes Allergies in her father; Anxiety disorder in her mother; Cancer (age of onset: 50) in her mother; Depression in her mother; Heart Disease in her maternal grandmother; Hypertension in her father and mother; Obesity in her mother.     Social History   reports that she has quit smoking. Her smoking use included Cigarettes. She has never used smokeless tobacco. She reports that she uses drugs, including Marijuana and Inhaled. She reports that she does not drink alcohol.    Allergies  Allergies   Allergen Reactions   • Metoclopramide Hives     Told by MD; pt suspects possible hives.   • Bactrim [Sulfamethoxazole W-Trimethoprim] Unspecified     Someone said I had a rash or something.      • Seroquel [Quetiapine] Unspecified     \"Seizures\"  RXN=unknown       Medications  Prior to Admission Medications   Prescriptions Last Dose Informant Patient Reported? Taking?   busPIRone (BUSPAR) 30 MG tablet  Patient No No   Sig: Take 1 Tab by mouth 2 Times a Day.   chlorproMAZINE (THORAZINE) 25 MG Tab  Patient No No   Sig: Take 1 Tab by mouth 3 times a day.   clonazePAM (KLONOPIN) 0.5 MG Tab  Patient Yes No   Sig: Take 0.5 mg by mouth 3 times a day.   ferrous sulfate 325 (65 Fe) MG tablet   No No   Sig: Take 1 Tab by mouth every 48 hours.   ondansetron (ZOFRAN ODT) 4 MG TABLET DISPERSIBLE  Patient Yes No   Sig: Take 4 mg by mouth every 6 hours as needed for Nausea.   sertraline (ZOLOFT) 100 MG " Tab  Patient No No   Sig: Take 1 Tab by mouth every bedtime.      Facility-Administered Medications: None       Physical Exam  Temp:  [36.1 °C (97 °F)] 36.1 °C (97 °F)  Pulse:  [62-88] 73  Resp:  [16-18] 16  BP: (102-118)/(64-74) 102/68  SpO2:  [96 %-100 %] 98 %    Physical Exam   Constitutional: She is oriented to person, place, and time. She appears well-developed and well-nourished. She appears distressed.   HENT:   Head: Normocephalic and atraumatic.   Dry oral mucosa   Eyes: Pupils are equal, round, and reactive to light. Conjunctivae and EOM are normal.   Neck: Normal range of motion. Neck supple. No JVD present.   Cardiovascular: Regular rhythm, normal heart sounds and intact distal pulses.    No murmur heard.  Tachycardia    Pulmonary/Chest: Effort normal and breath sounds normal. No respiratory distress. She has no wheezes.   Abdominal: Soft. Bowel sounds are normal. She exhibits no distension. There is tenderness.   Musculoskeletal: Normal range of motion. She exhibits no edema.   Neurological: She is alert and oriented to person, place, and time. She exhibits normal muscle tone.   tremulous   Skin: Skin is warm and dry.   Psychiatric:   anxious   Nursing note and vitals reviewed.      Laboratory:  Recent Labs      03/30/19 1842   WBC  16.1*   RBC  4.78   HEMOGLOBIN  14.5   HEMATOCRIT  44.3   MCV  92.7   MCH  30.3   MCHC  32.7*   RDW  57.5*   PLATELETCT  520*   MPV  10.1     Recent Labs      03/30/19 1842 03/31/19   0209   SODIUM  134*  137   POTASSIUM  3.3*  4.1   CHLORIDE  96  106   CO2  17*  14*   GLUCOSE  247*  153*   BUN  15  14   CREATININE  1.14  0.83   CALCIUM  10.4  9.1     Recent Labs      03/30/19 1842 03/31/19   0209   ALTSGPT  10   --    ASTSGOT  15   --    ALKPHOSPHAT  89   --    TBILIRUBIN  1.2   --    LIPASE  9*   --    GLUCOSE  247*  153*                 No results for input(s): TROPONINI in the last 72 hours.    Urinalysis:    Recent Labs      03/30/19   2251   SPECGRAVITY  >1.045    GLUCOSEUR  100*   KETONES  Trace*   NITRITE  Negative   LEUKESTERAS  Negative        Imaging:  CT-ABDOMEN-PELVIS WITH   Final Result      1.  Postoperative changes of the RIGHT lower quadrant with multiple bowel anastomoses present.   2.  No evidence of bowel obstruction or perforation.   3.  Appendix is not visualized.   4.  No focal mesenteric inflammatory process.   5.  Stable extrahepatic biliary dilation, likely postoperative.            Assessment/Plan:  I anticipate this patient is appropriate for observation status at this time.    * Cannabis hyperemesis syndrome concurrent with and due to cannabis dependence (HCC)- (present on admission)   Assessment & Plan    Recurrence of symptoms   Cont with IVF, anti emetics   Pain control  Capsacin creme   Heating pad        Addiction, marijuana (HCC)- (present on admission)   Assessment & Plan    Encouraged cessation      Anxiety- (present on admission)   Assessment & Plan    Resume home zoloft and buspar      Acidosis   Assessment & Plan    Check lactic and beta hydroxy   IVF and monitor BMP      Leukocytosis- (present on admission)   Assessment & Plan    Reactive from n/v   Cont with IVF and monitor      Hypokalemia- (present on admission)   Assessment & Plan    Improved, cont ot montior      Depression- (present on admission)   Assessment & Plan    Resume home zoloft     Hyponatremia- (present on admission)   Assessment & Plan    IVF and montior      Tobacco abuse disorder- (present on admission)   Assessment & Plan    Encourage cessation of smoking         VTE prophylaxis: heparin

## 2019-03-31 NOTE — PROGRESS NOTES
Pt into unit from ED via gurney transported by transport aide, ambulatory w/ steady gait. Reports flank/back pain, denies nausea at this time. Patient oriented to unit, room and BR location. Weight and VS taken.  Plan of care discussed w/ patient, verbalizes understanding. Medicated per MAR. Safety and comfort measures provided. Fall precautions in place. Patient questions answered. Encouraged to verbalize feelings and needs. Call light within reach. Will continue to assess and monitor.

## 2019-04-01 NOTE — DISCHARGE SUMMARY
Discharge Summary    CHIEF COMPLAINT ON ADMISSION  Chief Complaint   Patient presents with   • Vomiting   • Nausea     Reason for Admission  Nausea/vomiting, anxiety    Admission Date  3/30/2019    CODE STATUS  Full code    HPI & HOSPITAL COURSE  Ms. Hernandez is a 43-year-old female who presented to the emergency department on 3/30/2019 with complaints of nausea, vomiting, abdominal pain, hematochezia, and anxiety.  Initial lab workup was significant for leukocytosis with a WBC of 16.1, platelet count of 520, mildly low sodium and potassium levels, anion gap metabolic acidosis, hyperglycemia, and acute kidney injury.  Her lipase level was normal at 9, urinalysis negative for infection, and normal lactic acid level.  A CT of her abdomen and pelvis was done and showed postoperative changes but was negative for bowel obstruction or perforation.  The appendix was not well visualized on that study.  She did endorse routine and prolonged use of cannabis.  Her symptoms were therefore attributed to cannabis hyperemesis syndrome.  She was treated with antiemetics, IV fluids, and analgesia.  On the day of discharge her vital signs and lab work have stabilized and her symptoms have resolved.  She is tolerating a regular diet at this time and is therefore medically clear for discharge today.     Therefore, she is discharged in good and stable condition to home with close outpatient follow-up.    Discharge Date  3/31/2019    FOLLOW UP ITEMS POST DISCHARGE  PCP within 1-2 weeks.    DISCHARGE DIAGNOSES  Principal Problem:    Cannabis hyperemesis syndrome concurrent with and due to cannabis dependence (HCC) POA: Yes  Active Problems:    Anxiety (Chronic) POA: Yes    Addiction, marijuana (HCC) POA: Yes    Tobacco abuse disorder POA: Yes    Depression POA: Yes    Hypokalemia POA: Yes    Leukocytosis POA: Yes  Resolved Problems:    Hyponatremia POA: Yes    Acidosis POA: Yes    FOLLOW UP  Future Appointments  Date Time Provider  "Department Center   4/26/2019 10:15 AM Louis Jaimes M.D. [facility] None     Isa Rodriguez M.D.  [address]  [facility] [address]  [facility] [address]  [phone]    Schedule an appointment as soon as possible for a visit in 2 days    MEDICATIONS ON DISCHARGE     Medication List      START taking these medications      Instructions   ondansetron 4 MG Tbdp  Commonly known as:  ZOFRAN ODT   Take 1 Tab by mouth every 8 hours as needed for Nausea.  Dose:  4 mg        CONTINUE taking these medications      Instructions   busPIRone 30 MG tablet  Commonly known as:  BUSPAR   Take 1 Tab by mouth 2 Times a Day.  Dose:  30 mg     clonazePAM 1 MG Tabs  Commonly known as:  KLONOPIN   Take 1 mg by mouth 3 times a day.  Dose:  1 mg     lamoTRIgine 25 MG Tabs  Commonly known as:  LAMICTAL   Take 50 mg by mouth every day.  Dose:  50 mg     LEXAPRO 5 MG tablet  Generic drug:  escitalopram   Take 5 mg by mouth every day.  Dose:  5 mg     REXULTI 2 MG Tabs  Generic drug:  Brexpiprazole   Take 2 mg by mouth every bedtime.  Dose:  2 mg          Allergies  Allergies   Allergen Reactions   • Metoclopramide Hives     Told by MD; pt suspects possible hives.   • Bactrim [Sulfamethoxazole W-Trimethoprim] Unspecified     Someone said I had a rash or something.      • Seroquel [Quetiapine] Unspecified     \"Seizures\"  RXN=unknown     DIET  Regular (pt refused GI soft but tolerated a regular diet prior to discharge).    ACTIVITY  As tolerated.  Exercise encouraged.  Weight bearing as tolerated    CONSULTATIONS  None    PROCEDURES  None    LABORATORY  Lab Results   Component Value Date    SODIUM 140 03/31/2019    POTASSIUM 3.8 03/31/2019    CHLORIDE 111 03/31/2019    CO2 24 03/31/2019    GLUCOSE 81 03/31/2019    BUN 8 03/31/2019    CREATININE 0.64 03/31/2019    CREATININE 0.9 05/18/2009      Lab Results   Component Value Date    WBC 12.1 (H) 03/31/2019    HEMOGLOBIN 10.3 (L) 03/31/2019    HEMATOCRIT 31.3 (L) 03/31/2019    PLATELETCT 311 03/31/2019 "      Total time of the discharge process exceeds 32 minutes.

## 2019-04-12 ENCOUNTER — TELEPHONE (OUTPATIENT)
Dept: INTERNAL MEDICINE | Facility: MEDICAL CENTER | Age: 44
End: 2019-04-12

## 2019-04-12 NOTE — TELEPHONE ENCOUNTER
VOICEMAIL  1. Caller Name: Odalys RN Case Manager with [facility]   Call Back Number: [phone] ext [phone]    2. Message: Patient has been admitted to the hospital two times in the past 3 months.  They have been trying to engage her for case management.  If they cant reach the patient they reach out to the PCP and ask for their help in reaching out to the patient when they are seen and letting the patient know that she has that benefit.  Have patient call her with this information.     3. Patient approves office to leave a detailed voicemail/MyChart message: N\A

## 2019-04-26 ENCOUNTER — APPOINTMENT (OUTPATIENT)
Dept: INTERNAL MEDICINE | Facility: MEDICAL CENTER | Age: 44
End: 2019-04-26
Payer: COMMERCIAL
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
## 2020-08-14 NOTE — ED NOTES
Break RN: Pt returned from CT, ERP at BS.   Bactrim Counseling:  I discussed with the patient the risks of sulfa antibiotics including but not limited to GI upset, allergic reaction, drug rash, diarrhea, dizziness, photosensitivity, and yeast infections.  Rarely, more serious reactions can occur including but not limited to aplastic anemia, agranulocytosis, methemoglobinemia, blood dyscrasias, liver or kidney failure, lung infiltrates or desquamative/blistering drug rashes.

## 2020-09-17 ENCOUNTER — HOSPITAL ENCOUNTER (OUTPATIENT)
Dept: LAB | Facility: MEDICAL CENTER | Age: 45
End: 2020-09-17
Attending: NURSE PRACTITIONER
Payer: COMMERCIAL

## 2020-09-17 LAB
25(OH)D3 SERPL-MCNC: 20 NG/ML (ref 30–100)
ALBUMIN SERPL BCP-MCNC: 4 G/DL (ref 3.2–4.9)
ALBUMIN/GLOB SERPL: 1.1 G/DL
ALP SERPL-CCNC: 158 U/L (ref 30–99)
ALT SERPL-CCNC: 8 U/L (ref 2–50)
ANION GAP SERPL CALC-SCNC: 15 MMOL/L (ref 7–16)
AST SERPL-CCNC: 10 U/L (ref 12–45)
B-HCG SERPL-ACNC: <1 MIU/ML (ref 0–5)
BASOPHILS # BLD AUTO: 1.6 % (ref 0–1.8)
BASOPHILS # BLD: 0.15 K/UL (ref 0–0.12)
BILIRUB SERPL-MCNC: 0.5 MG/DL (ref 0.1–1.5)
BUN SERPL-MCNC: 7 MG/DL (ref 8–22)
CALCIUM SERPL-MCNC: 9.2 MG/DL (ref 8.5–10.5)
CHLORIDE SERPL-SCNC: 98 MMOL/L (ref 96–112)
CHOLEST SERPL-MCNC: 192 MG/DL (ref 100–199)
CO2 SERPL-SCNC: 20 MMOL/L (ref 20–33)
CORTIS SERPL-MCNC: 5.2 UG/DL (ref 0–23)
CREAT SERPL-MCNC: 0.95 MG/DL (ref 0.5–1.4)
EOSINOPHIL # BLD AUTO: 0.15 K/UL (ref 0–0.51)
EOSINOPHIL NFR BLD: 1.6 % (ref 0–6.9)
ERYTHROCYTE [DISTWIDTH] IN BLOOD BY AUTOMATED COUNT: 50 FL (ref 35.9–50)
FASTING STATUS PATIENT QL REPORTED: NORMAL
FOLATE SERPL-MCNC: 9.8 NG/ML
FSH SERPL-ACNC: 23.2 MIU/ML
GLOBULIN SER CALC-MCNC: 3.8 G/DL (ref 1.9–3.5)
GLUCOSE SERPL-MCNC: 85 MG/DL (ref 65–99)
HCT VFR BLD AUTO: 42 % (ref 37–47)
HDLC SERPL-MCNC: 36 MG/DL
HGB BLD-MCNC: 13.4 G/DL (ref 12–16)
IMM GRANULOCYTES # BLD AUTO: 0.04 K/UL (ref 0–0.11)
IMM GRANULOCYTES NFR BLD AUTO: 0.4 % (ref 0–0.9)
LDLC SERPL CALC-MCNC: 125 MG/DL
LH SERPL-ACNC: 46.9 IU/L
LYMPHOCYTES # BLD AUTO: 2 K/UL (ref 1–4.8)
LYMPHOCYTES NFR BLD: 20.7 % (ref 22–41)
MCH RBC QN AUTO: 27 PG (ref 27–33)
MCHC RBC AUTO-ENTMCNC: 31.9 G/DL (ref 33.6–35)
MCV RBC AUTO: 84.5 FL (ref 81.4–97.8)
MONOCYTES # BLD AUTO: 0.76 K/UL (ref 0–0.85)
MONOCYTES NFR BLD AUTO: 7.9 % (ref 0–13.4)
NEUTROPHILS # BLD AUTO: 6.56 K/UL (ref 2–7.15)
NEUTROPHILS NFR BLD: 67.8 % (ref 44–72)
NRBC # BLD AUTO: 0 K/UL
NRBC BLD-RTO: 0 /100 WBC
PLATELET # BLD AUTO: 437 K/UL (ref 164–446)
PMV BLD AUTO: 10 FL (ref 9–12.9)
POTASSIUM SERPL-SCNC: 3.5 MMOL/L (ref 3.6–5.5)
PROLACTIN SERPL-MCNC: 16.5 NG/ML (ref 2.8–26)
PROT SERPL-MCNC: 7.8 G/DL (ref 6–8.2)
RBC # BLD AUTO: 4.97 M/UL (ref 4.2–5.4)
SODIUM SERPL-SCNC: 133 MMOL/L (ref 135–145)
T3FREE SERPL-MCNC: 2.65 PG/ML (ref 2–4.4)
T4 FREE SERPL-MCNC: 1.05 NG/DL (ref 0.93–1.7)
THYROPEROXIDASE AB SERPL-ACNC: 15 IU/ML (ref 0–9)
TRIGL SERPL-MCNC: 157 MG/DL (ref 0–149)
TSH SERPL DL<=0.005 MIU/L-ACNC: 2.83 UIU/ML (ref 0.38–5.33)
VIT B12 SERPL-MCNC: 2503 PG/ML (ref 211–911)
WBC # BLD AUTO: 9.7 K/UL (ref 4.8–10.8)

## 2020-09-17 PROCEDURE — 84402 ASSAY OF FREE TESTOSTERONE: CPT

## 2020-09-17 PROCEDURE — 84146 ASSAY OF PROLACTIN: CPT

## 2020-09-17 PROCEDURE — 83002 ASSAY OF GONADOTROPIN (LH): CPT

## 2020-09-17 PROCEDURE — 82746 ASSAY OF FOLIC ACID SERUM: CPT

## 2020-09-17 PROCEDURE — 84144 ASSAY OF PROGESTERONE: CPT

## 2020-09-17 PROCEDURE — 82671 ASSAY OF ESTROGENS: CPT

## 2020-09-17 PROCEDURE — 84702 CHORIONIC GONADOTROPIN TEST: CPT

## 2020-09-17 PROCEDURE — 36415 COLL VENOUS BLD VENIPUNCTURE: CPT

## 2020-09-17 PROCEDURE — 82607 VITAMIN B-12: CPT

## 2020-09-17 PROCEDURE — 83001 ASSAY OF GONADOTROPIN (FSH): CPT

## 2020-09-17 PROCEDURE — 84481 FREE ASSAY (FT-3): CPT

## 2020-09-17 PROCEDURE — 84403 ASSAY OF TOTAL TESTOSTERONE: CPT

## 2020-09-17 PROCEDURE — 80061 LIPID PANEL: CPT

## 2020-09-17 PROCEDURE — 86376 MICROSOMAL ANTIBODY EACH: CPT

## 2020-09-17 PROCEDURE — 82306 VITAMIN D 25 HYDROXY: CPT

## 2020-09-17 PROCEDURE — 84443 ASSAY THYROID STIM HORMONE: CPT

## 2020-09-17 PROCEDURE — 85025 COMPLETE CBC W/AUTO DIFF WBC: CPT

## 2020-09-17 PROCEDURE — 84439 ASSAY OF FREE THYROXINE: CPT

## 2020-09-17 PROCEDURE — 83036 HEMOGLOBIN GLYCOSYLATED A1C: CPT

## 2020-09-17 PROCEDURE — 82533 TOTAL CORTISOL: CPT

## 2020-09-17 PROCEDURE — 80053 COMPREHEN METABOLIC PANEL: CPT

## 2020-09-17 PROCEDURE — 86800 THYROGLOBULIN ANTIBODY: CPT

## 2020-09-17 PROCEDURE — 84270 ASSAY OF SEX HORMONE GLOBUL: CPT

## 2020-09-18 LAB
EST. AVERAGE GLUCOSE BLD GHB EST-MCNC: 117 MG/DL
HBA1C MFR BLD: 5.7 % (ref 0–5.6)
PROGEST SERPL-MCNC: 0.06 NG/ML

## 2020-09-19 LAB — THYROGLOB AB SERPL-ACNC: <0.9 IU/ML (ref 0–4)

## 2020-09-21 LAB
ESTRADIOL SERPL HS-MCNC: 177 PG/ML
ESTROGEN SERPL CALC-MCNC: 248.2 PG/ML
ESTRONE SERPL-MCNC: 71.2 PG/ML

## 2020-09-23 LAB
SHBG SERPL-SCNC: 47 NMOL/L (ref 30–135)
TESTOST FREE SERPL-MCNC: 3.6 PG/ML (ref 1.1–5.8)
TESTOST SERPL-MCNC: 27 NG/DL (ref 9–55)
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
## 2021-05-20 PROCEDURE — RXMED WILLOW AMBULATORY MEDICATION CHARGE: Performed by: NURSE PRACTITIONER

## 2021-05-24 ENCOUNTER — PHARMACY VISIT (OUTPATIENT)
Dept: PHARMACY | Facility: MEDICAL CENTER | Age: 46
End: 2021-05-24
Payer: COMMERCIAL

## 2021-06-14 ENCOUNTER — PHARMACY VISIT (OUTPATIENT)
Dept: PHARMACY | Facility: MEDICAL CENTER | Age: 46
End: 2021-06-14
Payer: COMMERCIAL

## 2021-06-30 PROCEDURE — RXMED WILLOW AMBULATORY MEDICATION CHARGE: Performed by: NURSE PRACTITIONER

## 2021-07-19 ENCOUNTER — PHARMACY VISIT (OUTPATIENT)
Dept: PHARMACY | Facility: MEDICAL CENTER | Age: 46
End: 2021-07-19
Payer: COMMERCIAL

## 2021-11-02 NOTE — PROGRESS NOTES
6/4/18 1:20 pm:  CM post discharge outreach call.  VM is full and not accepting messages.  CM unable to complete post discharge outreach call.   Office Visit Note  [facility]  [phone]    UROLOGIC DIAGNOSES:   History of uric acid and calcium kidney stones  Elevated PSA    CURRENT INTERVENTIONS:   Potassium citrate    HISTORY:   Magdaleno returns to clinic today for follow-up on kidney stones as well as elevated PSA.  He has been taking potassium citrate again. He has been taking it 3 times daily. He had a CT scan repeated last week.  Stone in the lower pole of the right kidney appears unchanged. He has no symptoms or complaints at this time. He does have some concerns about upcoming travel in February.    His PSA today is significantly elevated at 9.3. He has no urinary symptoms or complaints at this time.      PAST MEDICAL HISTORY:   Past Medical History:   Diagnosis Date     Calculus of kidney     kidney stones     Hypertension        PAST SURGICAL HISTORY:   Past Surgical History:   Procedure Laterality Date     COLONOSCOPY       COMBINED CYSTOSCOPY, RETROGRADES, URETEROSCOPY, LASER HOLMIUM LITHOTRIPSY URETER(S), INSERT STENT Left 7/28/2016    Procedure: COMBINED CYSTOSCOPY, RETROGRADES, URETEROSCOPY, LASER HOLMIUM LITHOTRIPSY URETER(S), INSERT STENT;  Surgeon: Arnel Andrade MD;  Location:  OR     COMBINED CYSTOSCOPY, RETROGRADES, URETEROSCOPY, LASER HOLMIUM LITHOTRIPSY URETER(S), INSERT STENT Bilateral 9/9/2019    Procedure: Cystoscopy, bilateral retrograde pyelograms, interpretation of fluoroscopic images, bilateral ureteroscopy with holmium laser lithotripsy and stone basketing, placement of 5 x 26 double-J ureteral stents bilaterally;  Surgeon: Arnel Andrade MD;  Location:  OR     CYSTOSCOPY       GENITOURINARY SURGERY  1980's    shock wave lithotripsy       FAMILY HISTORY: History reviewed. No pertinent family history.    SOCIAL HISTORY:   Social History     Socioeconomic History     Marital status:      Spouse name: None     Number of children: None     Years of education: None     Highest education  level: None   Occupational History     None   Tobacco Use     Smoking status: Never Smoker     Smokeless tobacco: Never Used   Substance and Sexual Activity     Alcohol use: Yes     Comment: 8-10 drinks per  week     Drug use: No     Sexual activity: None   Other Topics Concern     Parent/sibling w/ CABG, MI or angioplasty before 65F 55M? Not Asked   Social History Narrative     None     Social Determinants of Health     Financial Resource Strain:      Difficulty of Paying Living Expenses:    Food Insecurity:      Worried About Running Out of Food in the Last Year:      Ran Out of Food in the Last Year:    Transportation Needs:      Lack of Transportation (Medical):      Lack of Transportation (Non-Medical):    Physical Activity:      Days of Exercise per Week:      Minutes of Exercise per Session:    Stress:      Feeling of Stress :    Social Connections:      Frequency of Communication with Friends and Family:      Frequency of Social Gatherings with Friends and Family:      Attends Hoahaoism Services:      Active Member of Clubs or Organizations:      Attends Club or Organization Meetings:      Marital Status:    Intimate Partner Violence:      Fear of Current or Ex-Partner:      Emotionally Abused:      Physically Abused:      Sexually Abused:        Review Of Systems:  Skin: No rash, pruritis, or skin pigmentation  Eyes: No changes in vision  Ears/Nose/Throat: No changes in hearing, no nosebleeds  Respiratory: No shortness of breath, dyspnea on exertion, cough, or hemoptysis  Cardiovascular: No chest pain or palpitations  Gastrointestinal: No diarrhea or constipation. No abdominal pain. No hematochezia  Genitourinary: see HPI  Musculoskeletal: No pain or swelling of joints, normal range of motion  Neurologic: No weakness or tremors  Psychiatric: No recent changes in memory or mood  Hematologic/Lymphatic/Immunologic: No easy bruising or enlarged lymph nodes  Endocrine: No weight gain or loss      PHYSICAL  "EXAM:    BP (!) 140/80   Pulse 84   Ht 1.803 m (5' 11\")   Wt 102.1 kg (225 lb)   SpO2 97%   BMI 31.38 kg/m      Constitutional: Well developed. Conversant and in no acute distress  Eyes: Anicteric sclera, conjunctiva clear, normal extraocular movements  ENT: Normocephalic and atraumatic,   Skin: Warm and dry. No rashes or lesions  Cardiac: No peripheral edema  Back/Flank: Not done  CNS/PNS: Normal musculature and movements, moves all extremities normally  Respiratory: Normal non-labored breathing  Abdomen: Soft nontender and nondistended  Peripheral Vascular: No peripheral edema  Mental Status/Psych: Alert and Oriented x 3. Normal mood and affect    Penis: Normal  Scrotal skin: Normal, no lesions  Testicles: Normal to palpation bilaterally  Epididymis: Normal to palpation bilaterally  Lymphatic: Normal inguinal lymph nodes    Digital Rectal Exam: The prostate is only slightly enlarged, benign and symmetric to palpation    Cystoscopy: Not done    Imaging: None    Urinalysis: UA RESULTS:  Recent Labs   Lab Test 11/02/21  1353 05/28/16  1424   COLOR Yellow Light Yellow   APPEARANCE Clear Clear   URINEGLC Negative Negative   URINEBILI Negative Negative   URINEKETONE Negative Negative   SG 1.025 1.003   UBLD Negative Moderate*   URINEPH 7.0 6.5   PROTEIN Negative Negative   UROBILINOGEN 0.2  --    NITRITE Negative Negative   LEUKEST Negative Negative   RBCU  --  26*   WBCU  --  <1       PSA: 9.3    Post Void Residual: 19mL    Other labs: None today      IMPRESSION:  Right kidney stone, elevated PSA    PLAN:  We discussed both the issue of his kidney stone and his elevated PSA.    For his kidney stones, he has been taking the potassium citrate 3 times daily and the pH of his urine is 7.0 today. Despite this the stones on the right side appear unchanged. We discussed options of observation versus ureteroscopy versus shockwave lithotripsy. He has had both ureteroscopy and shockwave lithotripsy in the past and he would " prefer to have shockwave lithotripsy for these stones if possible. He is quite bothered by ureteral stents. We discussed both options. I counseled him that he would need to have a KUB to see if the stones are radiopaque first before considering shockwave lithotripsy. He would like to do this.    We discussed his elevated PSA. His PSA is elevated significantly, I do not see any obvious reasons for false elevations such as recent ejaculation, recent bike ride, or infection or prostatitis. I recommended an evaluation for a potential prostate cancer. I recommended we begin his evaluation with an MRI of the prostate. He wishes to proceed.    He also asked about prevention methods for kidney stones. These were discussed today. He would also like a referral to the kidney stone prevention clinic at the [facility]. At this time I recommended he continue with the potassium citrate, but reduce dose to 20meq twice daily.    I will contact him when the KUB results are available and I will also contact him when the MRI results are available so that we can discuss both results.    Total time spent today in review of outside records and test results, discussion with the patient, and documentation: 30 minutes      Arnel Andrade M.D.

## 2022-06-13 ENCOUNTER — PRE-ADMISSION TESTING (OUTPATIENT)
Dept: ADMISSIONS | Facility: MEDICAL CENTER | Age: 47
End: 2022-06-13
Attending: STUDENT IN AN ORGANIZED HEALTH CARE EDUCATION/TRAINING PROGRAM
Payer: COMMERCIAL

## 2022-06-13 DIAGNOSIS — Z01.812 PRE-OPERATIVE LABORATORY EXAMINATION: ICD-10-CM

## 2022-06-13 RX ORDER — ACETAMINOPHEN 325 MG/1
650 TABLET ORAL PRN
COMMUNITY

## 2022-06-13 RX ORDER — DULAGLUTIDE 4.5 MG/.5ML
INJECTION, SOLUTION SUBCUTANEOUS
Status: ON HOLD | COMMUNITY
End: 2022-06-16

## 2022-06-13 RX ORDER — DEXTROAMPHETAMINE SACCHARATE, AMPHETAMINE ASPARTATE, DEXTROAMPHETAMINE SULFATE AND AMPHETAMINE SULFATE 5; 5; 5; 5 MG/1; MG/1; MG/1; MG/1
20 TABLET ORAL 2 TIMES DAILY
COMMUNITY
End: 2022-10-04

## 2022-06-13 ASSESSMENT — FIBROSIS 4 INDEX: FIB4 SCORE: 0.36

## 2022-06-16 ENCOUNTER — ANESTHESIA (OUTPATIENT)
Dept: SURGERY | Facility: MEDICAL CENTER | Age: 47
End: 2022-06-16
Payer: COMMERCIAL

## 2022-06-16 ENCOUNTER — ANESTHESIA EVENT (OUTPATIENT)
Dept: SURGERY | Facility: MEDICAL CENTER | Age: 47
End: 2022-06-16
Payer: COMMERCIAL

## 2022-06-16 ENCOUNTER — HOSPITAL ENCOUNTER (OUTPATIENT)
Facility: MEDICAL CENTER | Age: 47
End: 2022-06-16
Attending: STUDENT IN AN ORGANIZED HEALTH CARE EDUCATION/TRAINING PROGRAM | Admitting: STUDENT IN AN ORGANIZED HEALTH CARE EDUCATION/TRAINING PROGRAM
Payer: COMMERCIAL

## 2022-06-16 VITALS
SYSTOLIC BLOOD PRESSURE: 139 MMHG | DIASTOLIC BLOOD PRESSURE: 80 MMHG | HEART RATE: 71 BPM | TEMPERATURE: 97.6 F | RESPIRATION RATE: 18 BRPM | WEIGHT: 120.15 LBS | BODY MASS INDEX: 20.02 KG/M2 | HEIGHT: 65 IN | OXYGEN SATURATION: 100 %

## 2022-06-16 PROBLEM — E11.9 DM (DIABETES MELLITUS) (HCC): Status: ACTIVE | Noted: 2022-06-16

## 2022-06-16 LAB
ANION GAP SERPL CALC-SCNC: 14 MMOL/L (ref 7–16)
B-HCG SERPL-ACNC: 8.9 MIU/ML (ref 0–5)
BUN SERPL-MCNC: 11 MG/DL (ref 8–22)
CALCIUM SERPL-MCNC: 9.8 MG/DL (ref 8.5–10.5)
CHLORIDE SERPL-SCNC: 104 MMOL/L (ref 96–112)
CO2 SERPL-SCNC: 19 MMOL/L (ref 20–33)
CREAT SERPL-MCNC: 0.75 MG/DL (ref 0.5–1.4)
GFR SERPLBLD CREATININE-BSD FMLA CKD-EPI: 100 ML/MIN/1.73 M 2
GLUCOSE SERPL-MCNC: 79 MG/DL (ref 65–99)
HCG SERPL QL: POSITIVE
POTASSIUM SERPL-SCNC: 3.4 MMOL/L (ref 3.6–5.5)
SODIUM SERPL-SCNC: 137 MMOL/L (ref 135–145)

## 2022-06-16 PROCEDURE — 00812 ANES LWR INTST SCR COLSC: CPT | Performed by: ANESTHESIOLOGY

## 2022-06-16 PROCEDURE — 160025 RECOVERY II MINUTES (STATS): Performed by: STUDENT IN AN ORGANIZED HEALTH CARE EDUCATION/TRAINING PROGRAM

## 2022-06-16 PROCEDURE — 84702 CHORIONIC GONADOTROPIN TEST: CPT

## 2022-06-16 PROCEDURE — 160002 HCHG RECOVERY MINUTES (STAT): Performed by: STUDENT IN AN ORGANIZED HEALTH CARE EDUCATION/TRAINING PROGRAM

## 2022-06-16 PROCEDURE — 80048 BASIC METABOLIC PNL TOTAL CA: CPT

## 2022-06-16 PROCEDURE — 84703 CHORIONIC GONADOTROPIN ASSAY: CPT

## 2022-06-16 PROCEDURE — 36415 COLL VENOUS BLD VENIPUNCTURE: CPT

## 2022-06-16 PROCEDURE — 160009 HCHG ANES TIME/MIN: Performed by: STUDENT IN AN ORGANIZED HEALTH CARE EDUCATION/TRAINING PROGRAM

## 2022-06-16 PROCEDURE — 160046 HCHG PACU - 1ST 60 MINS PHASE II: Performed by: STUDENT IN AN ORGANIZED HEALTH CARE EDUCATION/TRAINING PROGRAM

## 2022-06-16 PROCEDURE — 700105 HCHG RX REV CODE 258: Performed by: STUDENT IN AN ORGANIZED HEALTH CARE EDUCATION/TRAINING PROGRAM

## 2022-06-16 PROCEDURE — 700111 HCHG RX REV CODE 636 W/ 250 OVERRIDE (IP): Performed by: ANESTHESIOLOGY

## 2022-06-16 PROCEDURE — 160048 HCHG OR STATISTICAL LEVEL 1-5: Performed by: STUDENT IN AN ORGANIZED HEALTH CARE EDUCATION/TRAINING PROGRAM

## 2022-06-16 PROCEDURE — 700101 HCHG RX REV CODE 250: Performed by: ANESTHESIOLOGY

## 2022-06-16 PROCEDURE — 160202 HCHG ENDO MINUTES - 1ST 30 MINS LEVEL 3: Performed by: STUDENT IN AN ORGANIZED HEALTH CARE EDUCATION/TRAINING PROGRAM

## 2022-06-16 PROCEDURE — 160035 HCHG PACU - 1ST 60 MINS PHASE I: Performed by: STUDENT IN AN ORGANIZED HEALTH CARE EDUCATION/TRAINING PROGRAM

## 2022-06-16 RX ORDER — DOCUSATE SODIUM 100 MG/1
100 CAPSULE, LIQUID FILLED ORAL DAILY
COMMUNITY
End: 2022-10-04

## 2022-06-16 RX ORDER — SODIUM CHLORIDE, SODIUM LACTATE, POTASSIUM CHLORIDE, CALCIUM CHLORIDE 600; 310; 30; 20 MG/100ML; MG/100ML; MG/100ML; MG/100ML
INJECTION, SOLUTION INTRAVENOUS CONTINUOUS
Status: DISCONTINUED | OUTPATIENT
Start: 2022-06-16 | End: 2022-06-16 | Stop reason: HOSPADM

## 2022-06-16 RX ORDER — FAMOTIDINE 20 MG/1
20 TABLET, FILM COATED ORAL 2 TIMES DAILY
COMMUNITY
End: 2022-10-04

## 2022-06-16 RX ORDER — HALOPERIDOL 5 MG/ML
1 INJECTION INTRAMUSCULAR
Status: DISCONTINUED | OUTPATIENT
Start: 2022-06-16 | End: 2022-06-16 | Stop reason: HOSPADM

## 2022-06-16 RX ORDER — ALBUTEROL SULFATE 2.5 MG/3ML
2.5 SOLUTION RESPIRATORY (INHALATION)
Status: DISCONTINUED | OUTPATIENT
Start: 2022-06-16 | End: 2022-06-16 | Stop reason: HOSPADM

## 2022-06-16 RX ORDER — SODIUM CHLORIDE, SODIUM LACTATE, POTASSIUM CHLORIDE, CALCIUM CHLORIDE 600; 310; 30; 20 MG/100ML; MG/100ML; MG/100ML; MG/100ML
INJECTION, SOLUTION INTRAVENOUS CONTINUOUS
Status: ACTIVE | OUTPATIENT
Start: 2022-06-16 | End: 2022-06-16

## 2022-06-16 RX ORDER — MIDAZOLAM HYDROCHLORIDE 1 MG/ML
1 INJECTION INTRAMUSCULAR; INTRAVENOUS
Status: DISCONTINUED | OUTPATIENT
Start: 2022-06-16 | End: 2022-06-16 | Stop reason: HOSPADM

## 2022-06-16 RX ORDER — ONDANSETRON 2 MG/ML
4 INJECTION INTRAMUSCULAR; INTRAVENOUS
Status: DISCONTINUED | OUTPATIENT
Start: 2022-06-16 | End: 2022-06-16 | Stop reason: HOSPADM

## 2022-06-16 RX ORDER — M-VIT,TX,IRON,MINS/CALC/FOLIC 27MG-0.4MG
1 TABLET ORAL DAILY
COMMUNITY

## 2022-06-16 RX ORDER — HYDRALAZINE HYDROCHLORIDE 20 MG/ML
5 INJECTION INTRAMUSCULAR; INTRAVENOUS
Status: DISCONTINUED | OUTPATIENT
Start: 2022-06-16 | End: 2022-06-16 | Stop reason: HOSPADM

## 2022-06-16 RX ORDER — DIPHENHYDRAMINE HYDROCHLORIDE 50 MG/ML
12.5 INJECTION INTRAMUSCULAR; INTRAVENOUS
Status: DISCONTINUED | OUTPATIENT
Start: 2022-06-16 | End: 2022-06-16 | Stop reason: HOSPADM

## 2022-06-16 RX ORDER — ZOLPIDEM TARTRATE 5 MG/1
10 TABLET ORAL NIGHTLY PRN
Status: ON HOLD | COMMUNITY
End: 2022-06-16

## 2022-06-16 RX ORDER — METOPROLOL TARTRATE 1 MG/ML
INJECTION, SOLUTION INTRAVENOUS PRN
Status: DISCONTINUED | OUTPATIENT
Start: 2022-06-16 | End: 2022-06-16 | Stop reason: SURG

## 2022-06-16 RX ORDER — MIDAZOLAM HYDROCHLORIDE 1 MG/ML
INJECTION INTRAMUSCULAR; INTRAVENOUS PRN
Status: DISCONTINUED | OUTPATIENT
Start: 2022-06-16 | End: 2022-06-16 | Stop reason: SURG

## 2022-06-16 RX ADMIN — PROPOFOL 100 MCG/KG/MIN: 10 INJECTION, EMULSION INTRAVENOUS at 12:24

## 2022-06-16 RX ADMIN — MIDAZOLAM HYDROCHLORIDE 2 MG: 1 INJECTION, SOLUTION INTRAMUSCULAR; INTRAVENOUS at 10:38

## 2022-06-16 RX ADMIN — PROPOFOL 100 MG: 10 INJECTION, EMULSION INTRAVENOUS at 12:23

## 2022-06-16 RX ADMIN — PROPOFOL 100 MG: 10 INJECTION, EMULSION INTRAVENOUS at 12:15

## 2022-06-16 RX ADMIN — PROPOFOL 80 MG: 10 INJECTION, EMULSION INTRAVENOUS at 12:29

## 2022-06-16 RX ADMIN — METOPROLOL TARTRATE 5 MG: 5 INJECTION, SOLUTION INTRAVENOUS at 12:23

## 2022-06-16 RX ADMIN — METOPROLOL TARTRATE 5 MG: 5 INJECTION, SOLUTION INTRAVENOUS at 12:15

## 2022-06-16 RX ADMIN — SODIUM CHLORIDE, POTASSIUM CHLORIDE, SODIUM LACTATE AND CALCIUM CHLORIDE: 600; 310; 30; 20 INJECTION, SOLUTION INTRAVENOUS at 12:11

## 2022-06-16 ASSESSMENT — FIBROSIS 4 INDEX: FIB4 SCORE: 0.36

## 2022-06-16 ASSESSMENT — PAIN SCALES - GENERAL: PAIN_LEVEL: 0

## 2022-06-16 ASSESSMENT — PAIN DESCRIPTION - PAIN TYPE
TYPE: SURGICAL PAIN
TYPE: SURGICAL PAIN

## 2022-06-16 NOTE — ANESTHESIA PREPROCEDURE EVALUATION
Case: [id number] Date/Time: 06/16/22 1015    Procedure: AVERAGE RISK SCREENING COLONOSCOPY (N/A Anus)    Anesthesia type: MAC    Pre-op diagnosis: FAMILY HISTORY OF COLON CANCER    Location: [facility] ROOM 26 / SURGERY SAME DAY [facility]    Surgeons: Navdeep Luciano M.D.          Relevant Problems   NEURO   (positive) History of seizures         (positive) JILL (acute kidney injury) (HCC)   (positive) Acute renal failure (ARF) (HCC)       Physical Exam    Airway   Mallampati: II  TM distance: >3 FB  Neck ROM: full       Cardiovascular - normal exam  Rhythm: regular  Rate: normal  (-) murmur     Dental    Pulmonary - normal exam  Breath sounds clear to auscultation     Abdominal    Neurological - normal exam                 Anesthesia Plan    ASA 2       Plan - MAC         (Pt has h/o false weakly positive bHCG results. She states she has infertility and has not had sex in over a year and is definitely not pregnant.)      Induction: intravenous      Pertinent diagnostic labs and testing reviewed    Informed Consent:    Anesthetic plan and risks discussed with patient.

## 2022-06-16 NOTE — ANESTHESIA POSTPROCEDURE EVALUATION
Patient: Diana Snyder    Procedure Summary     Date: 06/16/22 Room / Location: [facility] ROOM 26 / SURGERY SAME DAY [facility]    Anesthesia Start: 1211 Anesthesia Stop: 1238    Procedure: AVERAGE RISK SCREENING COLONOSCOPY (N/A Anus) Diagnosis: (Hemorrhoids)    Surgeons: Navdeep Luciano M.D. Responsible Provider: Donald Mayes M.D.    Anesthesia Type: MAC ASA Status: 2          Final Anesthesia Type: MAC  Last vitals  BP   Blood Pressure: 104/85    Temp   36.4 °C (97.6 °F)    Pulse   98   Resp   18    SpO2   100 %      Anesthesia Post Evaluation    Patient location during evaluation: PACU  Patient participation: complete - patient participated  Level of consciousness: awake  Pain score: 0    Airway patency: patent  Anesthetic complications: no  Cardiovascular status: hemodynamically stable  Respiratory status: acceptable and face mask  Hydration status: euvolemic    PONV: none          No complications documented.     Nurse Pain Score: 7 (NPRS)

## 2022-06-16 NOTE — OR NURSING
1200 pt pregnancy test came back positive, multiple test performed with same results. Notified Dr. Luciano and Dr. Farrell of these results. Per pt this happens to her often of false positive results, pt states she is not pregnant and has not has sex in over a year. Per pt she wishes to proceed with procedure even with these results. Per MD's ok to take pt back to procedure with these findings.   
1236- pt arrival from OR; report received from MD and RN. Pt attached to monitor, VSS with pt on 4L O2 via mask.     1245- hand off to ELMO Preston.  
1255 Patient tolerating po fluids.   1256 Dr logan at the bedside talking to patient.   1326 Criteria met to discharge patient home.   1333 discharge instructions given to patient and family. Both verbalize understanding of the orders. Copy of instructions given to patients mom  1334 Patient escorted via w/c to responsible adult with all personal belongings.   
yes

## 2022-06-16 NOTE — ANESTHESIA TIME REPORT
Anesthesia Start and Stop Event Times     Date Time Event    6/16/2022 1051 Ready for Procedure     1211 Anesthesia Start     1238 Anesthesia Stop        Responsible Staff  06/16/22    Name Role Begin End    August W JUAN Mayes. Anesth 1211 1238        Overtime Reason:  no overtime (within assigned shift)    Comments:

## 2022-06-16 NOTE — DISCHARGE INSTRUCTIONS
ENDOSCOPY HOME CARE INSTRUCTIONS    COLONOSCOPY   1. If you received a barium enema, take a mild laxative such as dulcolax, Katiuska's M.O., or Milk of Magnesia to clean out the barium.  2. Drink plenty of fluids. Eat a diet high in fiber (whole-grain breads, fresh fruit and vegetables).  3. You may notice a few drops of blood with your first bowel movement. If you develop any large amount of bleeding, black stools, a fever, or abdominal pain, call your doctor right away.  4. Don't drive or drink alcohol for 24 hours. The medication you received will make you too drowsy.  5. No heavy lifting, no Aspirin products or Aspirin for 5 days       ACTIVITY: Rest and take it easy for the first 24 hours.  A responsible adult is recommended to remain with you during that time.  It is normal to feel sleepy.  We encourage you to not do anything that requires balance, judgment or coordination.    MILD FLU-LIKE SYMPTOMS ARE NORMAL. YOU MAY EXPERIENCE GENERALIZED MUSCLE ACHES, THROAT IRRITATION, HEADACHE AND/OR SOME NAUSEA.    FOR 24 HOURS DO NOT:  Drive, operate machinery or run household appliances.  Drink beer or alcoholic beverages.   Make important decisions or sign legal documents.      DIET: To avoid nausea, slowly advance diet as tolerated, avoiding spicy or greasy foods for the first day.  Add more substantial food to your diet according to your physician's instructions.  Babies can be fed formula or breast milk as soon as they are hungry.  INCREASE FLUIDS AND FIBER TO AVOID CONSTIPATION.    FOLLOW-UP APPOINTMENT:  A follow-up appointment should be arranged with your doctor; call to schedule.    You should CALL YOUR PHYSICIAN if you develop:  Fever greater than 101 degrees F.  Pain not relieved by medication, or persistent nausea or vomiting.  Excessive bleeding (blood soaking through dressing) or unexpected drainage from the wound.  Extreme redness or swelling around the incision site, drainage of pus or foul smelling  drainage.  Inability to urinate or empty your bladder within 8 hours.  Problems with breathing or chest pain.    You should call 911 if you develop problems with breathing or chest pain.  If you are unable to contact your doctor or surgical center, you should go to the nearest emergency room or urgent care center.  Physician's telephone #:     Dr. Luciano at [phone]    If any questions arise, call your doctor.  If your doctor is not available, please feel free to call the Surgical Center at [phone].     A registered nurse may call you a few days after your surgery to see how you are doing after your procedure.    MEDICATIONS: Resume taking daily medication.  Take prescribed pain medication with food.  If no medication is prescribed, you may take non-aspirin pain medication if needed.  PAIN MEDICATION CAN BE VERY CONSTIPATING.  Take a stool softener or laxative such as senokot, pericolace, or milk of magnesia if needed.    Prescription given for NONE.  Last pain medication given at NONE.    If your physician has prescribed pain medication that includes Acetaminophen (Tylenol), do not take additional Acetaminophen (Tylenol) while taking the prescribed medication.    Depression / Suicide Risk    As you are discharged from this [facility] facility, it is important to learn how to keep safe from harming yourself.    Recognize the warning signs:  Abrupt changes in personality, positive or negative- including increase in energy   Giving away possessions  Change in eating patterns- significant weight changes-  positive or negative  Change in sleeping patterns- unable to sleep or sleeping all the time   Unwillingness or inability to communicate  Depression  Unusual sadness, discouragement and loneliness  Talk of wanting to die  Neglect of personal appearance   Rebelliousness- reckless behavior  Withdrawal from people/activities they love  Confusion- inability to concentrate     If you or a loved one observes  any of these behaviors or has concerns about self-harm, here's what you can do:  Talk about it- your feelings and reasons for harming yourself  Remove any means that you might use to hurt yourself (examples: pills, rope, extension cords, firearm)  Get professional help from the community (Mental Health, Substance Abuse, psychological counseling)  Do not be alone:Call your Safe Contact- someone whom you trust who will be there for you.  Call your local CRISIS HOTLINE 489-6306 or [phone]  Call your local Children's Mobile Crisis Response Team [place] (295) 784-6388 or www.Epiclist  Call the toll free National Suicide Prevention Hotlines   National Suicide Prevention Lifeline 304-874-LJKM (0027)  National Hope Line Network 800-SUICIDE (411-6718)

## 2022-06-16 NOTE — PROCEDURES
Patient Name: Diana Snyder    Colonoscopy Procedure Note  Date of Procedure: 6/16/2022  Attending Physician: Navdeep Luciano M.D.        Indications: High risk family history colorectal cancer screening  Previous colonoscopy: None  Sedation: MAC    Procedure Details:    Colonoscopy  Pre-Anesthesia Assessment:  Prior to the procedure, a History and Physical was performed, and patient medications and allergies were reviewed. The patient’s tolerance of previous anesthesia was also reviewed. The risks and benefits of the procedure and the sedation options and risks were discussed with the patient including but not limited to infection, bleeding, aspiration, perforation, adverse medication reaction, missed diagnosis, and missed lesions. The patient verbalized understanding. All questions were answered, and informed consent was obtained.     Prior Anticoagulants: Patient has taken none  ASA Grade Assessment: 3    After I obtained informed consent from the patient, the patient was placed in the left lateral position. Appropriate time-out protocol was followed: the correct patient, the correct procedure, and the correct equipment in the room were confirmed. Throughout the procedure, the patient’s blood pressure, pulse, and oxygen saturations were monitored continuously. Digital rectal exam was performed. The Olympus variable stiffness colonoscope was introduced through the anus and passed under direct vision. The scope was advanced to the cecum, identified by the appendiceal orifice and ileocecal valve. The colonscopy was performed without difficulty. The patient tolerated the procedure well. The quality of the bowel preparation was good.  Findings and interventions were performed and documented below. The endoscope was then removed and the procedure was terminated. There were no immediate postoperative complications.        Findings and Impressions:  Normal TI and colon. Grade 3 internal hemorrhoids and large external  hemorrhoids.      Recommendations: repeat in 5 years due to FHx  Patient interested in surgical options for hemorrhoids. Will refer on [facility] gastro.        This note was generated using voice recognition software which has a small chance of producing errors of grammar and possibly content. I have made every reasonable attempt to find and correct any obvious errors, but expect that some may not be found prior to finalization of this note    Navdeep Luciano MD, [facility]  [facility]

## 2022-06-16 NOTE — H&P
Physician H&P    Patient ID:  Diana Snyder  [id number]  47 y.o. female  1975    History:  Primary Diagnosis: High risk family history of colon cancer screening    HPI:  She presents from clinic to have an age-appropriate colorectal cancer screening because her mother was diagnosed age younger than 60.  She has a multiple abdominal surgeries including partial colectomy, cholecystectomy and a Shruti-en-Y    Past Medical History:  has a past medical history of Anxiety, Anxiety disorder, Backpain, Bowel habit changes, Cannabinoid hyperemesis syndrome, CLOSTRIDIUM DIFFICILE INFECTION (04/14/2010), Cyclic vomiting syndrome, Dental disorder, Diabetes (HCC), Drug-seeking behavior, Dyspepsia (07/12/2009), Heart burn, Nephrolithiasis (06/20/2009), Pain, Snoring, Superior mesenteric artery syndrome (HCC) (07/12/2009), Tobacco abuse (06/20/2009), and Urinary incontinence.  Past Surgical History:  has a past surgical history that includes gastroscopy-endo (7/8/2009); lithotripsy; pyloroplasty (7/27/2009); gastroscopy with biopsy (3/9/2010); exploratory laparotomy (7/27/2009); appendectomy (7/27/2009); cholecystectomy (7/27/2009); other; exploratory laparotomy (1/12/2016); bowel resection (1/12/2016); and gastroscopy-endo (4/28/2016).  Past Social History:  reports that she quit smoking about 12 years ago. Her smoking use included cigarettes. She has a 12.00 pack-year smoking history. She has never used smokeless tobacco. She reports current drug use. Drugs: Marijuana and Inhaled. She reports that she does not drink alcohol.  Past Family History:   Family History   Problem Relation Age of Onset   • Cancer Mother 50        Colon cancer   • Hypertension Mother    • Obesity Mother    • Anxiety disorder Mother    • Depression Mother    • Allergies Father    • Hypertension Father    • Heart Disease Maternal Grandmother      Allergies: Metoclopramide, Bactrim [sulfamethoxazole w-trimethoprim], and Seroquel [quetiapine]    Current  "Medications:  Prior to Admission medications    Medication Sig Start Date End Date Taking? Authorizing Provider   Dulaglutide (TRULICITY) 4.5 MG/0.5ML Solution Pen-injector Inject  under the skin every 7 days.    Physician Outpatient   DIAZEPAM PO Take  by mouth as needed.    Physician Outpatient   DULOXETINE HCL PO Take  by mouth every day.    Physician Outpatient   metformin (GLUCOPHAGE) 1000 MG tablet Take 1,000 mg by mouth 2 times a day.    Physician Outpatient   amphetamine-dextroamphetamine (ADDERALL) 20 MG Tab Take 20 mg by mouth 2 times a day.    Physician Outpatient   acetaminophen (TYLENOL) 325 MG Tab Take 650 mg by mouth as needed.    Physician Outpatient   diphenhydrAMINE HCl (BENADRYL ALLERGY PO) Take  by mouth.    Physician Outpatient   VITAMIN D PO Take  by mouth every day.    Physician Outpatient   ondansetron (ZOFRAN ODT) 4 MG TABLET DISPERSIBLE Take 1 Tab by mouth every 8 hours as needed for Nausea. 3/31/19   Robert Xavier M.D.       Review of Systems:  ROS  Ht 1.651 m (5' 5\")   Wt 56.1 kg (123 lb 10.9 oz)   A complete 12 point review of systems was performed.    Constitutional: Denies fevers, chills, night sweats; Denies weight changes  Eyes: Denies eye pain, denies eye discharge  Ears/Nose/Throat/Mouth: Denies nasal congestion or sore throat   Cardiovascular: Denies chest pain or palpitations.  Respiratory: Denies shortness of breath, cough, and wheezing.  Gastrointestinal/Hepatic: see HPI  Genitourinary: Denies dysuria, increased frequency, hematuria  Musculoskeletal/Rheum: Denies joint pain and swelling, denies edema  Skin: Denies rash, denies wounds  Neurological: Denies headache, confusion, memory loss or focal weakness/parasthesias  Psychiatric: denies mood disorder, denies hallucinations   Endocrine: Denies thyroid problems; denies polydipsia  Heme/Oncology/Lymph Nodes: Denies enlarged lymph nodes, denies bruising or known bleeding disorder      Physical Examination:  Physical " Exam  HENMT:  Normocephalic, Atraumatic, Oropharynx moist mucous membranes, No oral exudates, Nose normal.    Abdomen: Bowel sounds normal, Soft, No tenderness, No guarding, No rebound,  Skin: Warm, Dry, No erythema, No rash, no induration.    Impression:  CRC screen hi risk FH    Plan:  colonoscopy    Pre Procedure Assessment:  <EXAMSHORT2>      Pre Procedure update:   No changes.    Navdeep Luciano M.D.  6/16/2022

## 2022-09-29 ENCOUNTER — HOSPITAL ENCOUNTER (OUTPATIENT)
Dept: LAB | Facility: MEDICAL CENTER | Age: 47
End: 2022-09-29
Attending: SPECIALIST
Payer: COMMERCIAL

## 2022-09-29 LAB
ALBUMIN SERPL BCP-MCNC: 4.2 G/DL (ref 3.2–4.9)
ALBUMIN/GLOB SERPL: 1.1 G/DL
ALP SERPL-CCNC: 82 U/L (ref 30–99)
ALT SERPL-CCNC: <5 U/L (ref 2–50)
ANION GAP SERPL CALC-SCNC: 18 MMOL/L (ref 7–16)
APPEARANCE UR: CLEAR
AST SERPL-CCNC: 27 U/L (ref 12–45)
BACTERIA #/AREA URNS HPF: NEGATIVE /HPF
BASOPHILS # BLD AUTO: 1.4 % (ref 0–1.8)
BASOPHILS # BLD: 0.08 K/UL (ref 0–0.12)
BILIRUB SERPL-MCNC: 0.4 MG/DL (ref 0.1–1.5)
BILIRUB UR QL STRIP.AUTO: NEGATIVE
BUN SERPL-MCNC: 12 MG/DL (ref 8–22)
CALCIUM SERPL-MCNC: 10 MG/DL (ref 8.5–10.5)
CAOX CRY #/AREA URNS HPF: ABNORMAL /HPF
CHLORIDE SERPL-SCNC: 99 MMOL/L (ref 96–112)
CHOLEST SERPL-MCNC: 195 MG/DL (ref 100–199)
CO2 SERPL-SCNC: 18 MMOL/L (ref 20–33)
COLOR UR: ABNORMAL
CREAT SERPL-MCNC: 0.88 MG/DL (ref 0.5–1.4)
EOSINOPHIL # BLD AUTO: 0.09 K/UL (ref 0–0.51)
EOSINOPHIL NFR BLD: 1.6 % (ref 0–6.9)
EPI CELLS #/AREA URNS HPF: NEGATIVE /HPF
ERYTHROCYTE [DISTWIDTH] IN BLOOD BY AUTOMATED COUNT: 58.8 FL (ref 35.9–50)
FSH SERPL-ACNC: 146 MIU/ML
GFR SERPLBLD CREATININE-BSD FMLA CKD-EPI: 82 ML/MIN/1.73 M 2
GLOBULIN SER CALC-MCNC: 4 G/DL (ref 1.9–3.5)
GLUCOSE SERPL-MCNC: 74 MG/DL (ref 65–99)
GLUCOSE UR STRIP.AUTO-MCNC: NEGATIVE MG/DL
HCT VFR BLD AUTO: 42.5 % (ref 37–47)
HDLC SERPL-MCNC: 60 MG/DL
HGB BLD-MCNC: 14 G/DL (ref 12–16)
HYALINE CASTS #/AREA URNS LPF: ABNORMAL /LPF
IMM GRANULOCYTES # BLD AUTO: 0.03 K/UL (ref 0–0.11)
IMM GRANULOCYTES NFR BLD AUTO: 0.5 % (ref 0–0.9)
KETONES UR STRIP.AUTO-MCNC: ABNORMAL MG/DL
LDLC SERPL CALC-MCNC: 106 MG/DL
LEUKOCYTE ESTERASE UR QL STRIP.AUTO: NEGATIVE
LH SERPL-ACNC: 90.6 IU/L
LYMPHOCYTES # BLD AUTO: 1.94 K/UL (ref 1–4.8)
LYMPHOCYTES NFR BLD: 34.7 % (ref 22–41)
MCH RBC QN AUTO: 31.7 PG (ref 27–33)
MCHC RBC AUTO-ENTMCNC: 32.9 G/DL (ref 33.6–35)
MCV RBC AUTO: 96.2 FL (ref 81.4–97.8)
MICRO URNS: ABNORMAL
MONOCYTES # BLD AUTO: 0.71 K/UL (ref 0–0.85)
MONOCYTES NFR BLD AUTO: 12.7 % (ref 0–13.4)
NEUTROPHILS # BLD AUTO: 2.74 K/UL (ref 2–7.15)
NEUTROPHILS NFR BLD: 49.1 % (ref 44–72)
NITRITE UR QL STRIP.AUTO: NEGATIVE
NRBC # BLD AUTO: 0 K/UL
NRBC BLD-RTO: 0 /100 WBC
PH UR STRIP.AUTO: 5.5 [PH] (ref 5–8)
PLATELET # BLD AUTO: 340 K/UL (ref 164–446)
PMV BLD AUTO: 11.2 FL (ref 9–12.9)
POTASSIUM SERPL-SCNC: 4.4 MMOL/L (ref 3.6–5.5)
PROLACTIN SERPL-MCNC: 7.25 NG/ML (ref 2.8–26)
PROT SERPL-MCNC: 8.2 G/DL (ref 6–8.2)
PROT UR QL STRIP: 30 MG/DL
RBC # BLD AUTO: 4.42 M/UL (ref 4.2–5.4)
RBC # URNS HPF: ABNORMAL /HPF
RBC UR QL AUTO: NEGATIVE
SODIUM SERPL-SCNC: 135 MMOL/L (ref 135–145)
SP GR UR STRIP.AUTO: 1.03
T3FREE SERPL-MCNC: 3.22 PG/ML (ref 2–4.4)
T4 FREE SERPL-MCNC: 1.09 NG/DL (ref 0.93–1.7)
THYROPEROXIDASE AB SERPL-ACNC: 10 IU/ML (ref 0–9)
TRIGL SERPL-MCNC: 147 MG/DL (ref 0–149)
TSH SERPL DL<=0.005 MIU/L-ACNC: 1.64 UIU/ML (ref 0.38–5.33)
TSH SERPL DL<=0.005 MIU/L-ACNC: 1.67 UIU/ML (ref 0.38–5.33)
UROBILINOGEN UR STRIP.AUTO-MCNC: 0.2 MG/DL
VIT B12 SERPL-MCNC: 1105 PG/ML (ref 211–911)
WBC # BLD AUTO: 5.6 K/UL (ref 4.8–10.8)
WBC #/AREA URNS HPF: ABNORMAL /HPF

## 2022-09-29 PROCEDURE — 84481 FREE ASSAY (FT-3): CPT

## 2022-09-29 PROCEDURE — 82607 VITAMIN B-12: CPT

## 2022-09-29 PROCEDURE — 83001 ASSAY OF GONADOTROPIN (FSH): CPT

## 2022-09-29 PROCEDURE — 83002 ASSAY OF GONADOTROPIN (LH): CPT

## 2022-09-29 PROCEDURE — 82746 ASSAY OF FOLIC ACID SERUM: CPT

## 2022-09-29 PROCEDURE — 86376 MICROSOMAL ANTIBODY EACH: CPT

## 2022-09-29 PROCEDURE — 80061 LIPID PANEL: CPT

## 2022-09-29 PROCEDURE — 84439 ASSAY OF FREE THYROXINE: CPT

## 2022-09-29 PROCEDURE — 84146 ASSAY OF PROLACTIN: CPT

## 2022-09-29 PROCEDURE — 85025 COMPLETE CBC W/AUTO DIFF WBC: CPT

## 2022-09-29 PROCEDURE — 36415 COLL VENOUS BLD VENIPUNCTURE: CPT

## 2022-09-29 PROCEDURE — 80053 COMPREHEN METABOLIC PANEL: CPT

## 2022-09-29 PROCEDURE — 86800 THYROGLOBULIN ANTIBODY: CPT

## 2022-09-29 PROCEDURE — 87086 URINE CULTURE/COLONY COUNT: CPT

## 2022-09-29 PROCEDURE — 81001 URINALYSIS AUTO W/SCOPE: CPT

## 2022-09-29 PROCEDURE — 84443 ASSAY THYROID STIM HORMONE: CPT | Mod: 91

## 2022-09-29 PROCEDURE — 84443 ASSAY THYROID STIM HORMONE: CPT

## 2022-09-30 LAB — FOLATE SERPL-MCNC: 34.2 NG/ML

## 2022-10-01 LAB
BACTERIA UR CULT: NORMAL
SIGNIFICANT IND 70042: NORMAL
SITE SITE: NORMAL
SOURCE SOURCE: NORMAL

## 2022-10-04 ENCOUNTER — PRE-ADMISSION TESTING (OUTPATIENT)
Dept: ADMISSIONS | Facility: MEDICAL CENTER | Age: 47
End: 2022-10-04
Attending: SURGERY
Payer: COMMERCIAL

## 2022-10-04 DIAGNOSIS — Z01.810 PRE-OPERATIVE CARDIOVASCULAR EXAMINATION: ICD-10-CM

## 2022-10-04 DIAGNOSIS — Z01.812 PRE-OPERATIVE LABORATORY EXAMINATION: ICD-10-CM

## 2022-10-04 LAB
ANION GAP SERPL CALC-SCNC: 16 MMOL/L (ref 7–16)
BUN SERPL-MCNC: 10 MG/DL (ref 8–22)
CALCIUM SERPL-MCNC: 10.3 MG/DL (ref 8.5–10.5)
CHLORIDE SERPL-SCNC: 104 MMOL/L (ref 96–112)
CO2 SERPL-SCNC: 21 MMOL/L (ref 20–33)
CREAT SERPL-MCNC: 0.94 MG/DL (ref 0.5–1.4)
EKG IMPRESSION: NORMAL
ERYTHROCYTE [DISTWIDTH] IN BLOOD BY AUTOMATED COUNT: 57.1 FL (ref 35.9–50)
EST. AVERAGE GLUCOSE BLD GHB EST-MCNC: 97 MG/DL
GFR SERPLBLD CREATININE-BSD FMLA CKD-EPI: 76 ML/MIN/1.73 M 2
GLUCOSE SERPL-MCNC: 71 MG/DL (ref 65–99)
HBA1C MFR BLD: 5 % (ref 4–5.6)
HCT VFR BLD AUTO: 44.1 % (ref 37–47)
HGB BLD-MCNC: 14.5 G/DL (ref 12–16)
MCH RBC QN AUTO: 31.9 PG (ref 27–33)
MCHC RBC AUTO-ENTMCNC: 32.9 G/DL (ref 33.6–35)
MCV RBC AUTO: 96.9 FL (ref 81.4–97.8)
PLATELET # BLD AUTO: 354 K/UL (ref 164–446)
PMV BLD AUTO: 10.2 FL (ref 9–12.9)
POTASSIUM SERPL-SCNC: 3.5 MMOL/L (ref 3.6–5.5)
RBC # BLD AUTO: 4.55 M/UL (ref 4.2–5.4)
SODIUM SERPL-SCNC: 141 MMOL/L (ref 135–145)
WBC # BLD AUTO: 6.4 K/UL (ref 4.8–10.8)

## 2022-10-04 PROCEDURE — 93005 ELECTROCARDIOGRAM TRACING: CPT

## 2022-10-04 PROCEDURE — 85027 COMPLETE CBC AUTOMATED: CPT

## 2022-10-04 PROCEDURE — 93010 ELECTROCARDIOGRAM REPORT: CPT | Performed by: INTERNAL MEDICINE

## 2022-10-04 PROCEDURE — 36415 COLL VENOUS BLD VENIPUNCTURE: CPT

## 2022-10-04 PROCEDURE — 80048 BASIC METABOLIC PNL TOTAL CA: CPT

## 2022-10-04 PROCEDURE — 83036 HEMOGLOBIN GLYCOSYLATED A1C: CPT

## 2022-10-04 RX ORDER — ZOLPIDEM TARTRATE 10 MG/1
10 TABLET ORAL NIGHTLY PRN
COMMUNITY

## 2022-10-04 RX ORDER — DEXTROAMPHETAMINE SULFATE, DEXTROAMPHETAMINE SACCHARATE, AMPHETAMINE SULFATE AND AMPHETAMINE ASPARTATE 5; 5; 5; 5 MG/1; MG/1; MG/1; MG/1
CAPSULE, EXTENDED RELEASE ORAL
COMMUNITY
Start: 2022-09-06 | End: 2023-10-17

## 2022-10-04 ASSESSMENT — FIBROSIS 4 INDEX: FIB4 SCORE: 1.68

## 2022-10-05 LAB — THYROGLOB AB SERPL-ACNC: <0.9 IU/ML (ref 0–4)

## 2022-10-11 ENCOUNTER — OFFICE VISIT (OUTPATIENT)
Dept: URGENT CARE | Facility: PHYSICIAN GROUP | Age: 47
End: 2022-10-11
Payer: COMMERCIAL

## 2022-10-11 ENCOUNTER — APPOINTMENT (OUTPATIENT)
Dept: RADIOLOGY | Facility: IMAGING CENTER | Age: 47
End: 2022-10-11
Attending: PHYSICIAN ASSISTANT
Payer: COMMERCIAL

## 2022-10-11 VITALS
TEMPERATURE: 98.4 F | RESPIRATION RATE: 20 BRPM | WEIGHT: 112 LBS | OXYGEN SATURATION: 99 % | HEART RATE: 119 BPM | DIASTOLIC BLOOD PRESSURE: 62 MMHG | BODY MASS INDEX: 19.12 KG/M2 | SYSTOLIC BLOOD PRESSURE: 118 MMHG | HEIGHT: 64 IN

## 2022-10-11 DIAGNOSIS — S92.511A CLOSED DISPLACED FRACTURE OF PROXIMAL PHALANX OF LESSER TOE OF RIGHT FOOT, INITIAL ENCOUNTER: ICD-10-CM

## 2022-10-11 DIAGNOSIS — M79.671 RIGHT FOOT PAIN: ICD-10-CM

## 2022-10-11 PROCEDURE — 73630 X-RAY EXAM OF FOOT: CPT | Mod: TC,RT | Performed by: PHYSICIAN ASSISTANT

## 2022-10-11 PROCEDURE — 99204 OFFICE O/P NEW MOD 45 MIN: CPT | Performed by: PHYSICIAN ASSISTANT

## 2022-10-11 RX ORDER — PANTOPRAZOLE SODIUM 40 MG/1
TABLET, DELAYED RELEASE ORAL
COMMUNITY
Start: 2022-09-16 | End: 2023-10-17

## 2022-10-11 RX ORDER — METFORMIN HYDROCHLORIDE 500 MG/1
1000 TABLET, EXTENDED RELEASE ORAL 2 TIMES DAILY
COMMUNITY
Start: 2022-09-26 | End: 2023-10-17

## 2022-10-11 RX ORDER — KETOROLAC TROMETHAMINE 30 MG/ML
30 INJECTION, SOLUTION INTRAMUSCULAR; INTRAVENOUS ONCE
Status: COMPLETED | OUTPATIENT
Start: 2022-10-11 | End: 2022-10-11

## 2022-10-11 RX ADMIN — KETOROLAC TROMETHAMINE 30 MG: 30 INJECTION, SOLUTION INTRAMUSCULAR; INTRAVENOUS at 16:13

## 2022-10-11 ASSESSMENT — FIBROSIS 4 INDEX: FIB4 SCORE: 1.62

## 2022-10-11 ASSESSMENT — ENCOUNTER SYMPTOMS
CHILLS: 0
FEVER: 0
FALLS: 1
MYALGIAS: 1

## 2022-10-11 NOTE — PROGRESS NOTES
Subjective:   Diana Snyder is a 47 y.o. female who presents for Other (tripped and hurt toes,swollen,right foot,x1 day)        Patient presents for evaluation of right foot pain and swelling that began last night after she tripped and fell.  Patient states that she is having mild left ankle pain and bilateral wrist pain as well but the symptoms are quite mild and seem to be improving.  She endorses swelling and bruising.  She denies numbness and tingling.  She is having a difficult time bearing weight.  She took Tylenol for symptoms with minimal symptomatic relief.  She has been ambulating with crutches.      Review of Systems   Constitutional:  Negative for chills and fever.   Musculoskeletal:  Positive for falls, joint pain and myalgias.     PMH:  has a past medical history of Anxiety, Anxiety disorder, Backpain, Bowel habit changes, Cannabinoid hyperemesis syndrome, CLOSTRIDIUM DIFFICILE INFECTION (04/14/2010), Cyclic vomiting syndrome, Dental disorder, Diabetes ([facility]), Drug-seeking behavior, Dyspepsia (07/12/2009), Heart burn, Nephrolithiasis (06/20/2009), Pain, Seizure ([facility]), Snoring, Superior mesenteric artery syndrome ([facility]) (07/12/2009), Tobacco abuse (06/20/2009), and Urinary incontinence.  MEDS:   Current Outpatient Medications:     pantoprazole (PROTONIX) 40 MG Tablet Delayed Response, pantoprazole 40 mg tablet,delayed release, Disp: , Rfl:     ADDERALL XR, 20MG, 20 MG per XR capsule, TAKE 1 CAPSULE BY MOUTH EVERY MORNING 09/03/22, Disp: , Rfl:     zolpidem (AMBIEN) 10 MG Tab, Take 10 mg by mouth at bedtime as needed for Sleep., Disp: , Rfl:     Non Formulary Request, LATUDA 60 MG DAILY, Disp: , Rfl:     Non Formulary Request, every day. Melatonin 10 mg, Disp: , Rfl:     Non Formulary Request, Gas X 125 mg, Disp: , Rfl:     therapeutic multivitamin-minerals (THERAGRAN-M) Tab, Take 1 Tablet by mouth every day., Disp: , Rfl:     Non Formulary Request, Immodium A/D, Disp: , Rfl:     DIAZEPAM PO, Take 20  "mg by mouth in the morning, at noon, and at bedtime., Disp: , Rfl:     metformin (GLUCOPHAGE) 1000 MG tablet, Take 1,000 mg by mouth 2 times a day., Disp: , Rfl:     acetaminophen (TYLENOL) 325 MG Tab, Take 650 mg by mouth as needed., Disp: , Rfl:     diphenhydrAMINE HCl (BENADRYL ALLERGY PO), Take  by mouth., Disp: , Rfl:     ondansetron (ZOFRAN ODT) 4 MG TABLET DISPERSIBLE, Take 1 Tab by mouth every 8 hours as needed for Nausea., Disp: 10 Tab, Rfl: 0    metFORMIN ER (GLUCOPHAGE XR) 500 MG TABLET SR 24 HR, Take 1,000 mg by mouth 2 times a day. (Patient not taking: Reported on 10/11/2022), Disp: , Rfl:   ALLERGIES:   Allergies   Allergen Reactions    Reglan [Metoclopramide] Hives     Told by MD; pt suspects possible hives.    Bactrim [Sulfamethoxazole W-Trimethoprim] Unspecified     Someone said I had a rash or something.       Seroquel [Quetiapine] Unspecified     \"Seizures\"  RXN=unknown     SURGHX:   Past Surgical History:   Procedure Laterality Date    [place] COLONOSCOPY,DIAGNOSTIC N/A 6/16/2022    Procedure: AVERAGE RISK SCREENING COLONOSCOPY;  Surgeon: Navdeep Luciano M.D.;  Location: SURGERY SAME DAY [facility];  Service: Gastroenterology    GASTROSCOPY-ENDO  4/28/2016    Procedure: GASTROSCOPY-ENDO;  Surgeon: Blayne Blackburn M.D.;  Location: ENDOSCOPY [facility];  Service:     EXPLORATORY LAPAROTOMY  1/12/2016    Procedure: EXPLORATORY LAPAROTOMY;  Surgeon: Macile Quintero M.D.;  Location: SURGERY [facility];  Service:     BOWEL RESECTION  1/12/2016    Procedure: BOWEL RESECTION;  Surgeon: Maciel Quintero M.D.;  Location: SURGERY [facility];  Service:     GASTROSCOPY WITH BIOPSY  3/9/2010    Performed by AMANDA MEJIA at ENDOSCOPY [facility]    PYLOROPLASTY  7/27/2009    Performed by MACIEL QUINTERO at SURGERY [facility]    EXPLORATORY LAPAROTOMY  7/27/2009    Performed by MACIEL QUINTERO at SURGERY [facility]    APPENDECTOMY  7/27/2009    " "Performed by MACIEL QUINTERO at SURGERY [facility]    CHOLECYSTECTOMY  7/27/2009    Performed by MACIEL QUINTERO at SURGERY [facility]    GASTROSCOPY-ENDO  7/8/2009    Performed by ROSANNA WRIGHT at SURGERY [facility]    LITHOTRIPSY      OTHER      superior mesenteric artery correction      SOCHX:  reports that she has never smoked. She has never used smokeless tobacco. She reports current drug use. Drugs: Marijuana and Inhaled. She reports that she does not drink alcohol.  FH: Family history was reviewed, no pertinent findings to report   Objective:   /62 (BP Location: Right arm, Patient Position: Sitting, BP Cuff Size: Adult)   Pulse (!) 119   Temp 36.9 °C (98.4 °F) (Temporal)   Resp 20   Ht 1.626 m (5' 4\")   Wt 50.8 kg (112 lb)   LMP 12/01/2018   SpO2 99%   BMI 19.22 kg/m²   Physical Exam  Vitals reviewed.   Constitutional:       General: She is not in acute distress.     Appearance: Normal appearance. She is well-developed. She is not toxic-appearing.   HENT:      Head: Normocephalic and atraumatic.      Right Ear: External ear normal.      Left Ear: External ear normal.      Nose: Nose normal.   Cardiovascular:      Rate and Rhythm: Regular rhythm. Tachycardia present.   Pulmonary:      Effort: Pulmonary effort is normal. No respiratory distress.      Breath sounds: No stridor.   Feet:      Comments: Right foot: Moderate edema of the dorsal lateral foot with moderate ecchymosis of the fourth and fifth digits.  Patient is tender to palpation over distal fourth and fifth metatarsals, MTPs and digits.  Distal sensation is intact.  Cap refill is brisk.  Dorsalis pedis and posterior tibial pulses 2+.    Left ankle: No erythema, edema, ecchymosis.  Nontender to palpation over medial and lateral malleoli, ATFL, CFL, PT FL, deltoid ligament.    Right and left wrists: No erythema, edema, ecchymosis.  Mildly tender to palpation over ulnar aspect of wrist bilaterally.  " No palpable step-offs or deformities.  No snuffbox tenderness bilaterally.  Sensation grossly intact and even bilaterally.  Radial pulses 2+.  Skin:     General: Skin is dry.   Neurological:      Comments: Alert and oriented.    Psychiatric:         Speech: Speech normal.         Behavior: Behavior normal.           Imaging:     FINDINGS:  Bone mineralization is age-appropriate. There is a fracture of the base of the fifth proximal phalanx. There is mild medial displacement and angulation. No evidence of intra-articular extension. No additional fracture or malalignment is   identified. Bone mineralization is age-appropriate.     IMPRESSION:     1.  Mildly displaced and attenuated fracture at the base of the fifth proximal phalanx. No evidence of intra-articular extension.  Assessment/Plan:   1. Right foot pain  - DX-FOOT-COMPLETE 3+ RIGHT; Future  - ketorolac (TORADOL) injection 30 mg    Other orders  - pantoprazole (PROTONIX) 40 MG Tablet Delayed Response; pantoprazole 40 mg tablet,delayed release  - metFORMIN ER (GLUCOPHAGE XR) 500 MG TABLET SR 24 HR; Take 1,000 mg by mouth 2 times a day. (Patient not taking: Reported on 10/11/2022)    Patient has a fracture of the proximal phalanx of the fifth toe.  While it is not intra-articular it is moderately displaced and angulated.  We will refer patient to orthopedics for further evaluation and management.  She was fitted with a short cam boot and made nonweightbearing.  Elevate, ice, ibuprofen as needed.  Strict return precautions.    Differential diagnosis, natural history, supportive care, and indications for immediate follow-up discussed.

## 2022-10-28 ENCOUNTER — HOSPITAL ENCOUNTER (OUTPATIENT)
Facility: MEDICAL CENTER | Age: 47
End: 2022-10-28
Attending: SURGERY | Admitting: SURGERY
Payer: COMMERCIAL

## 2022-10-28 ENCOUNTER — ANESTHESIA EVENT (OUTPATIENT)
Dept: SURGERY | Facility: MEDICAL CENTER | Age: 47
End: 2022-10-28
Payer: COMMERCIAL

## 2022-10-28 ENCOUNTER — ANESTHESIA (OUTPATIENT)
Dept: SURGERY | Facility: MEDICAL CENTER | Age: 47
End: 2022-10-28
Payer: COMMERCIAL

## 2022-10-28 VITALS
RESPIRATION RATE: 14 BRPM | WEIGHT: 128.09 LBS | OXYGEN SATURATION: 97 % | DIASTOLIC BLOOD PRESSURE: 72 MMHG | HEART RATE: 96 BPM | HEIGHT: 65 IN | TEMPERATURE: 97 F | SYSTOLIC BLOOD PRESSURE: 114 MMHG | BODY MASS INDEX: 21.34 KG/M2

## 2022-10-28 DIAGNOSIS — G89.18 POSTOPERATIVE PAIN: ICD-10-CM

## 2022-10-28 PROBLEM — K64.9 HEMORRHOIDS: Chronic | Status: ACTIVE | Noted: 2022-10-28

## 2022-10-28 LAB
GLUCOSE BLD STRIP.AUTO-MCNC: 84 MG/DL (ref 65–99)
HCG UR QL: NEGATIVE

## 2022-10-28 PROCEDURE — 700102 HCHG RX REV CODE 250 W/ 637 OVERRIDE(OP): Performed by: ANESTHESIOLOGY

## 2022-10-28 PROCEDURE — 81025 URINE PREGNANCY TEST: CPT

## 2022-10-28 PROCEDURE — 700105 HCHG RX REV CODE 258: Performed by: ANESTHESIOLOGY

## 2022-10-28 PROCEDURE — 82962 GLUCOSE BLOOD TEST: CPT

## 2022-10-28 PROCEDURE — 700101 HCHG RX REV CODE 250: Performed by: ANESTHESIOLOGY

## 2022-10-28 PROCEDURE — C9290 INJ, BUPIVACAINE LIPOSOME: HCPCS | Performed by: SURGERY

## 2022-10-28 PROCEDURE — 700111 HCHG RX REV CODE 636 W/ 250 OVERRIDE (IP): Performed by: ANESTHESIOLOGY

## 2022-10-28 PROCEDURE — 160027 HCHG SURGERY MINUTES - 1ST 30 MINS LEVEL 2: Performed by: SURGERY

## 2022-10-28 PROCEDURE — 700105 HCHG RX REV CODE 258: Performed by: SURGERY

## 2022-10-28 PROCEDURE — 160035 HCHG PACU - 1ST 60 MINS PHASE I: Performed by: SURGERY

## 2022-10-28 PROCEDURE — 160036 HCHG PACU - EA ADDL 30 MINS PHASE I: Performed by: SURGERY

## 2022-10-28 PROCEDURE — 88304 TISSUE EXAM BY PATHOLOGIST: CPT | Mod: 59

## 2022-10-28 PROCEDURE — 00902 ANES ANORECTAL PX: CPT | Performed by: ANESTHESIOLOGY

## 2022-10-28 PROCEDURE — 160009 HCHG ANES TIME/MIN: Performed by: SURGERY

## 2022-10-28 PROCEDURE — 160025 RECOVERY II MINUTES (STATS): Performed by: SURGERY

## 2022-10-28 PROCEDURE — 700101 HCHG RX REV CODE 250: Performed by: SURGERY

## 2022-10-28 PROCEDURE — 160048 HCHG OR STATISTICAL LEVEL 1-5: Performed by: SURGERY

## 2022-10-28 PROCEDURE — A9270 NON-COVERED ITEM OR SERVICE: HCPCS | Performed by: ANESTHESIOLOGY

## 2022-10-28 PROCEDURE — 160046 HCHG PACU - 1ST 60 MINS PHASE II: Performed by: SURGERY

## 2022-10-28 PROCEDURE — 160038 HCHG SURGERY MINUTES - EA ADDL 1 MIN LEVEL 2: Performed by: SURGERY

## 2022-10-28 PROCEDURE — 160002 HCHG RECOVERY MINUTES (STAT): Performed by: SURGERY

## 2022-10-28 RX ORDER — KETOROLAC TROMETHAMINE 30 MG/ML
INJECTION, SOLUTION INTRAMUSCULAR; INTRAVENOUS PRN
Status: DISCONTINUED | OUTPATIENT
Start: 2022-10-28 | End: 2022-10-28 | Stop reason: SURG

## 2022-10-28 RX ORDER — SODIUM CHLORIDE, SODIUM LACTATE, POTASSIUM CHLORIDE, CALCIUM CHLORIDE 600; 310; 30; 20 MG/100ML; MG/100ML; MG/100ML; MG/100ML
INJECTION, SOLUTION INTRAVENOUS CONTINUOUS
Status: ACTIVE | OUTPATIENT
Start: 2022-10-28 | End: 2022-10-28

## 2022-10-28 RX ORDER — DIPHENHYDRAMINE HYDROCHLORIDE 50 MG/ML
12.5 INJECTION INTRAMUSCULAR; INTRAVENOUS
Status: DISCONTINUED | OUTPATIENT
Start: 2022-10-28 | End: 2022-10-28 | Stop reason: HOSPADM

## 2022-10-28 RX ORDER — OXYCODONE HCL 5 MG/5 ML
10 SOLUTION, ORAL ORAL
Status: COMPLETED | OUTPATIENT
Start: 2022-10-28 | End: 2022-10-28

## 2022-10-28 RX ORDER — HALOPERIDOL 5 MG/ML
1 INJECTION INTRAMUSCULAR
Status: DISCONTINUED | OUTPATIENT
Start: 2022-10-28 | End: 2022-10-28 | Stop reason: HOSPADM

## 2022-10-28 RX ORDER — HYDROMORPHONE HYDROCHLORIDE 1 MG/ML
0.4 INJECTION, SOLUTION INTRAMUSCULAR; INTRAVENOUS; SUBCUTANEOUS
Status: DISCONTINUED | OUTPATIENT
Start: 2022-10-28 | End: 2022-10-28 | Stop reason: HOSPADM

## 2022-10-28 RX ORDER — ONDANSETRON 2 MG/ML
INJECTION INTRAMUSCULAR; INTRAVENOUS PRN
Status: DISCONTINUED | OUTPATIENT
Start: 2022-10-28 | End: 2022-10-28 | Stop reason: SURG

## 2022-10-28 RX ORDER — OXYCODONE HYDROCHLORIDE AND ACETAMINOPHEN 5; 325 MG/1; MG/1
2 TABLET ORAL EVERY 6 HOURS PRN
Qty: 30 TABLET | Refills: 0 | Status: SHIPPED | OUTPATIENT
Start: 2022-10-28 | End: 2022-11-02

## 2022-10-28 RX ORDER — HYDROMORPHONE HYDROCHLORIDE 1 MG/ML
0.2 INJECTION, SOLUTION INTRAMUSCULAR; INTRAVENOUS; SUBCUTANEOUS
Status: DISCONTINUED | OUTPATIENT
Start: 2022-10-28 | End: 2022-10-28 | Stop reason: HOSPADM

## 2022-10-28 RX ORDER — SODIUM CHLORIDE, SODIUM LACTATE, POTASSIUM CHLORIDE, CALCIUM CHLORIDE 600; 310; 30; 20 MG/100ML; MG/100ML; MG/100ML; MG/100ML
INJECTION, SOLUTION INTRAVENOUS CONTINUOUS
Status: DISCONTINUED | OUTPATIENT
Start: 2022-10-28 | End: 2022-10-28 | Stop reason: HOSPADM

## 2022-10-28 RX ORDER — OXYCODONE HCL 5 MG/5 ML
5 SOLUTION, ORAL ORAL
Status: COMPLETED | OUTPATIENT
Start: 2022-10-28 | End: 2022-10-28

## 2022-10-28 RX ORDER — HYDROMORPHONE HYDROCHLORIDE 1 MG/ML
0.1 INJECTION, SOLUTION INTRAMUSCULAR; INTRAVENOUS; SUBCUTANEOUS
Status: DISCONTINUED | OUTPATIENT
Start: 2022-10-28 | End: 2022-10-28 | Stop reason: HOSPADM

## 2022-10-28 RX ORDER — MEPERIDINE HYDROCHLORIDE 25 MG/ML
6.25 INJECTION INTRAMUSCULAR; INTRAVENOUS; SUBCUTANEOUS
Status: DISCONTINUED | OUTPATIENT
Start: 2022-10-28 | End: 2022-10-28 | Stop reason: HOSPADM

## 2022-10-28 RX ORDER — ONDANSETRON 2 MG/ML
4 INJECTION INTRAMUSCULAR; INTRAVENOUS
Status: DISCONTINUED | OUTPATIENT
Start: 2022-10-28 | End: 2022-10-28 | Stop reason: HOSPADM

## 2022-10-28 RX ADMIN — FENTANYL CITRATE 50 MCG: 50 INJECTION, SOLUTION INTRAMUSCULAR; INTRAVENOUS at 17:26

## 2022-10-28 RX ADMIN — FENTANYL CITRATE 50 MCG: 50 INJECTION, SOLUTION INTRAMUSCULAR; INTRAVENOUS at 17:29

## 2022-10-28 RX ADMIN — Medication 100 MG: at 16:49

## 2022-10-28 RX ADMIN — OXYCODONE HYDROCHLORIDE 10 MG: 5 SOLUTION ORAL at 17:25

## 2022-10-28 RX ADMIN — HYDROMORPHONE HYDROCHLORIDE 0.2 MG: 1 INJECTION, SOLUTION INTRAMUSCULAR; INTRAVENOUS; SUBCUTANEOUS at 17:33

## 2022-10-28 RX ADMIN — SUGAMMADEX 200 MG: 100 INJECTION, SOLUTION INTRAVENOUS at 17:13

## 2022-10-28 RX ADMIN — SODIUM CHLORIDE, POTASSIUM CHLORIDE, SODIUM LACTATE AND CALCIUM CHLORIDE: 600; 310; 30; 20 INJECTION, SOLUTION INTRAVENOUS at 13:41

## 2022-10-28 RX ADMIN — ONDANSETRON 4 MG: 2 INJECTION INTRAMUSCULAR; INTRAVENOUS at 17:13

## 2022-10-28 RX ADMIN — SODIUM CHLORIDE, POTASSIUM CHLORIDE, SODIUM LACTATE AND CALCIUM CHLORIDE: 600; 310; 30; 20 INJECTION, SOLUTION INTRAVENOUS at 17:36

## 2022-10-28 RX ADMIN — MIDAZOLAM 2 MG: 1 INJECTION INTRAMUSCULAR; INTRAVENOUS at 16:09

## 2022-10-28 RX ADMIN — FENTANYL CITRATE 100 MCG: 50 INJECTION, SOLUTION INTRAMUSCULAR; INTRAVENOUS at 16:50

## 2022-10-28 RX ADMIN — HYDROMORPHONE HYDROCHLORIDE 0.2 MG: 1 INJECTION, SOLUTION INTRAMUSCULAR; INTRAVENOUS; SUBCUTANEOUS at 17:39

## 2022-10-28 RX ADMIN — PROPOFOL 200 MG: 10 INJECTION, EMULSION INTRAVENOUS at 16:49

## 2022-10-28 RX ADMIN — ROCURONIUM BROMIDE 30 MG: 10 INJECTION, SOLUTION INTRAVENOUS at 16:49

## 2022-10-28 RX ADMIN — KETOROLAC TROMETHAMINE 30 MG: 30 INJECTION, SOLUTION INTRAMUSCULAR at 17:13

## 2022-10-28 ASSESSMENT — FIBROSIS 4 INDEX: FIB4 SCORE: 1.62

## 2022-10-28 ASSESSMENT — PAIN DESCRIPTION - PAIN TYPE
TYPE: SURGICAL PAIN

## 2022-10-28 NOTE — H&P
Surgery General History & Physical Note    Date  10/28/2022    Primary Care Physician  KAY Rand.    CC  Presents for elective hemorrhoidectomy    HPI  This is a 47 y.o. female who presented with symptomatic hemorrhoids.  Since her clinic visit she denies any new medications, new physicians or emergency room visits.    Past Medical History:   Diagnosis Date    Anxiety     Followed by [facility]    Anxiety disorder     depression    Backpain     Bowel habit changes     diarrhea/constipation    Cannabinoid hyperemesis syndrome     CLOSTRIDIUM DIFFICILE INFECTION 2010    Cyclic vomiting syndrome     Dental disorder     21 crowns placed 2 weeks ago    Diabetes (HCC)     Drug-seeking behavior     Dyspepsia 2009    Heart burn     Nephrolithiasis 2009    kidney stones,post lithotrypsy    Pain     abd and back    Seizure ([facility])     x 1 at the time her dad     Snoring     Superior mesenteric artery syndrome ([facility]) 2009    Tobacco abuse 2009    Urinary incontinence        Past Surgical History:   Procedure Laterality Date    CA COLONOSCOPY,DIAGNOSTIC N/A 2022    Procedure: AVERAGE RISK SCREENING COLONOSCOPY;  Surgeon: Navdeep Luciano M.D.;  Location: SURGERY SAME DAY [facility];  Service: Gastroenterology    GASTROSCOPY-ENDO  2016    Procedure: GASTROSCOPY-ENDO;  Surgeon: Blayne Blackburn M.D.;  Location: ENDOSCOPY [facility];  Service:     EXPLORATORY LAPAROTOMY  2016    Procedure: EXPLORATORY LAPAROTOMY;  Surgeon: Maciel Quintero M.D.;  Location: SURGERY [facility];  Service:     BOWEL RESECTION  2016    Procedure: BOWEL RESECTION;  Surgeon: Maciel Quintero M.D.;  Location: SURGERY [facility];  Service:     GASTROSCOPY WITH BIOPSY  3/9/2010    Performed by AMANDA MEJIA at ENDOSCOPY [facility]    PYLOROPLASTY  2009    Performed by MACIEL QUINTERO at SURGERY [facility]    EXPLORATORY LAPAROTOMY   7/27/2009    Performed by MACIEL QUINTERO at SURGERY [facility]    APPENDECTOMY  7/27/2009    Performed by MACIEL QUINTERO at SURGERY [facility]    CHOLECYSTECTOMY  7/27/2009    Performed by MACIEL QUINTERO at SURGERY [facility]    GASTROSCOPY-ENDO  7/8/2009    Performed by ROSANNA WRIGHT at SURGERY [facility]S    LITHOTRIPSY      OTHER      superior mesenteric artery correction        Current Facility-Administered Medications   Medication Dose Route Frequency Provider Last Rate Last Admin    bupivacaine liposome (Exparel) injection 20 mL  20 mL Injection Once Glenn Nuno M.D.        lidocaine (XYLOCAINE) 1 % injection 0.5 mL  0.5 mL Intradermal Once PRN Glenn Nuno M.D.        lactated ringers infusion   Intravenous Continuous Glenn Nuno M.D. 10 mL/hr at 10/28/22 1341 New Bag at 10/28/22 1341       Social History     Socioeconomic History    Marital status:      Spouse name: Not on file    Number of children: Not on file    Years of education: Not on file    Highest education level: Not on file   Occupational History    Not on file   Tobacco Use    Smoking status: Never    Smokeless tobacco: Never    Tobacco comments:     7.5 py. Quit >15 yrs.    Vaping Use    Vaping Use: Never used   Substance and Sexual Activity    Alcohol use: No     Comment: Quit about 12 yrs ago.     Drug use: Yes     Types: Marijuana, Inhaled     Comment: 2g daily for past 2 years. Has used marijuana since age 18 Pt states last used 9/10/22    Sexual activity: Not on file   Other Topics Concern    Not on file   Social History Narrative    ** Merged History Encounter **          Social Determinants of Health     Financial Resource Strain: Not on file   Food Insecurity: Not on file   Transportation Needs: Not on file   Physical Activity: Not on file   Stress: Not on file   Social Connections: Not on file   Intimate Partner Violence: Not on file   Housing Stability:  Not on file       Family History   Problem Relation Age of Onset    Cancer Mother 50        Colon cancer    Hypertension Mother     Obesity Mother     Anxiety disorder Mother     Depression Mother     Allergies Father     Hypertension Father     Heart Disease Maternal Grandmother        Allergies  Reglan [metoclopramide], Bactrim [sulfamethoxazole w-trimethoprim], and Seroquel [quetiapine]    Review of Systems  Negative    Physical Exam  Vitals and nursing note reviewed.   HENT:      Head: Normocephalic.      Nose: Nose normal.      Mouth/Throat:      Mouth: Mucous membranes are moist.   Eyes:      Extraocular Movements: Extraocular movements intact.   Cardiovascular:      Rate and Rhythm: Normal rate and regular rhythm.      Pulses: Normal pulses.   Pulmonary:      Effort: Pulmonary effort is normal.      Breath sounds: Normal breath sounds.   Abdominal:      General: Abdomen is flat.      Palpations: Abdomen is soft.   Musculoskeletal:         General: Normal range of motion.      Cervical back: Normal range of motion.   Skin:     General: Skin is warm and dry.   Neurological:      Mental Status: She is alert and oriented to person, place, and time.   Psychiatric:         Mood and Affect: Mood normal.         Behavior: Behavior normal.         Thought Content: Thought content normal.         Judgment: Judgment normal.       Vital Signs  Blood Pressure: 120/74   Temperature: 36.2 °C (97.1 °F)   Pulse: 81   Respiration: 19   Pulse Oximetry: 98 %       Labs:                    Radiology:  No orders to display         Assessment/Plan:  47-year-old female with symptomatic hemorrhoids desires definitive surgical diagnosis and treatment  Procedure(s):  EXCISIONAL HEMORRHOIDECTOMY    Risks, benefits, and alternatives were discussed with consenting person(s). Consenting person(s) were given an opportunity to ask questions and discuss other options. Risks including but not limited to failed or incomplete planned procedure,  ineffective therapy, perforation, infection, bleeding, missed lesion(s), missed diagnosis, cardiac and/or pulmonary event, aspiration, stroke, possible need for surgery, hospitalization possibly prolonged, discomfort, unsuccessful and/or incomplete procedure, possible need for repeat procedures and/or additional testings, damage to adjacent organs and/or vascular structures, medication reaction, disability, death, and other adverse events possibly life-threatening. Discussion was undertaken with Layman's terms. Consenting persons stated understanding and acceptance of these risks, and wished to proceed. Consent was given in clear state of mind.   Glenn Nuno MD PhD  [facility]  Colon and Rectal Surgeon  [phone]

## 2022-10-28 NOTE — ANESTHESIA PROCEDURE NOTES
Airway    Date/Time: 10/28/2022 4:50 PM  Performed by: Antony Sousa M.D.  Authorized by: Antony Sousa M.D.     Location:  OR  Urgency:  Elective  Indications for Airway Management:  Anesthesia      Spontaneous Ventilation: absent    Sedation Level:  Deep  Preoxygenated: Yes    Patient Position:  Sniffing  Final Airway Type:  Endotracheal airway  Final Endotracheal Airway:  ETT  Cuffed: Yes    Technique Used for Successful ETT Placement:  Direct laryngoscopy    Insertion Site:  Oral  Blade Type:  [place]  Laryngoscope Blade/Videolaryngoscope Blade Size:  3  ETT Size (mm):  7.0  Measured from:  Teeth  ETT to Teeth (cm):  22  Placement Verified by: auscultation and capnometry    Cormack-Lehane Classification:  Grade I - full view of glottis  Number of Attempts at Approach:  1

## 2022-10-28 NOTE — ANESTHESIA PREPROCEDURE EVALUATION
Case: [id number] Date/Time: 10/28/22 1445    Procedure: EXCISIONAL HEMORRHOIDECTOMY    Pre-op diagnosis: HEMORRHOIDS    Location: [facility] OR 11 / SURGERY [facility]    Surgeons: Glenn Nuno M.D.          Relevant Problems   NEURO   (positive) History of seizures         (positive) JILL (acute kidney injury) (HCC)   (positive) Acute renal failure (ARF) (HCC)       Physical Exam    Airway   Mallampati: II  TM distance: >3 FB  Neck ROM: full       Cardiovascular - normal exam  Rhythm: regular  Rate: normal  (-) murmur     Dental - normal exam           Pulmonary - normal exam  Breath sounds clear to auscultation     Abdominal    Neurological - normal exam                 Anesthesia Plan    ASA 3   ASA physical status 3 criteria: alcohol and/or substance dependence or abuse    Plan - general       Airway plan will be ETT          Induction: intravenous    Postoperative Plan: Postoperative administration of opioids is intended.    Pertinent diagnostic labs and testing reviewed    Informed Consent:    Anesthetic plan and risks discussed with patient.    Use of blood products discussed with: patient whom consented to blood products.

## 2022-10-29 NOTE — OR NURSING
Pt arrived in Phase 2, dressed, resting in recliner chair, VSS, discharge instructions and RX reviewed wit pt, she verbalized understanding of instructions, levon care bottle and fluff gauze given to pt, denies pain, nausea or SOB, waiting for  to arrive for d/c to home.

## 2022-10-29 NOTE — ANESTHESIA TIME REPORT
Anesthesia Start and Stop Event Times     Date Time Event    10/28/2022 1607 Ready for Procedure     1609 Anesthesia Start     1740 Anesthesia Stop        Responsible Staff  10/28/22    Name Role Begin End    Antony Sousa M.D. Anesth 1609 1740        Overtime Reason:  no overtime (within assigned shift)    Comments:

## 2022-10-29 NOTE — ANESTHESIA POSTPROCEDURE EVALUATION
Patient: Diana Snyder    Procedure Summary     Date: 10/28/22 Room / Location: [place] [id number] / SURGERY [facility]    Anesthesia Start: 1609 Anesthesia Stop: 1728    Procedure: EXCISIONAL HEMORRHOIDECTOMY (Anus) Diagnosis: (INTERNAL AND EXTERNAL HEMORRHOIDS)    Surgeons: Glenn Nuno M.D. Responsible Provider: Antony Sousa M.D.    Anesthesia Type: general ASA Status: 3          Final Anesthesia Type: general  Last vitals  BP   Blood Pressure: 112/72    Temp   36.2 °C (97.2 °F)    Pulse   85   Resp   17    SpO2   100 %      Anesthesia Post Evaluation    Patient location during evaluation: PACU  Patient participation: complete - patient participated  Level of consciousness: awake and alert    Airway patency: patent  Anesthetic complications: no  Cardiovascular status: hemodynamically stable  Respiratory status: acceptable  Hydration status: euvolemic    PONV: none          There were no known notable events for this encounter.     Nurse Pain Score: 8 (NPRS)

## 2022-10-29 NOTE — OR NURSING
Patient A+Ox4. States pain now tolerable. Given iv and po meds. Fluff gauze with boy shorts in place clean and dry. Patient states she is very hungry.   Given boxed lunch and patient was able to tolerate all contents.  Plan for patient to discharge home.   updated with patient plan

## 2022-10-29 NOTE — OP REPORT
DATE OF OPERATION: 10/28/2022    SURGEON:  Glenn Nuno MD PhD    PREOPERATIVE DIAGNOSIS:    Rectal bleeding   rectal pain   Internal and external hemorrhoids hemorrhoids  POSTOPERATIVE DIAGNOSIS:  Internal/external bleeding hemorrhoids  SURGERY: exam under anesthesia with excisional hemorrhoidectomy ×3  OPERATIVE FINDINGS:  grade 3/4 internal hemorrhoids with stigmata of recent bleed  INDICATIONS FOR PROCEDURE:  This 47 y.o. female with a history of symptomatic hemorrhoids refractory to medical management.  He possible risks and complications of treatment were described in detail to the patient including infection, bleeding, trouble urinating and very rarely narrowing in the anal canal.  The patient expresses understanding of the risks and benefits of the proposed procedure and wished to proceed.    PROCEDURE IN BRIEF:    The patient was brought to the operating room placed under general anesthesia with bilateral SCDs in place. The patient was then moved into the prone jackknife position.  a timeout was completed verifying the correct patient and procedure site positioning and availability of equipment prior to the start of surgery.  The buttocks were taped apart and the perineum was prepped and draped in the usual standard sterile fashion.  Half percent Marcaine with epinephrine was injected as a perianal nerve block. The anus was carefully dilated until 3 fingers could be introduced.  An anoscope was introduced and the 3 hemorrhoidal pedicles were identified. Each pedicle was excised and turned in the following fashion.  An elliptical incision extending from the perianal skin to the anorectal ring including both internal and external hemorrhoids and excising a minimum amount of anoderm.  The dilated venous mass was then dissected using Metzenbaum scissors from the underlying sphincter muscle. LigaSure device was then used to divide thevascular pedicle.  Hemostasis was achieved.  All hemostasis the skin and  mucosal incisions were closed with a running lock suture of 2-0 Vicryl on the mucosal aspect and converted to subcuticular once the skin was encountered. The anal canal was then injected with local anesthetic.  A gauze pad was tucked between the gluteal folds. The patient tolerated the procedure well and was extubated and taken to the postanesthesia care unit in stable condition.       ____________________________________     JUAN JONES MD

## 2022-10-29 NOTE — OR NURSING
Patient to bathroom and dressed to go home.   went home to feed the dogs.  Patient report given to Rosy BORREGO in stage 2.  Iv dc/d.  Dressing to rectum replaced after patient went restroom.  VSS. Denies pain.  Ready for discharge when  comes back.

## 2022-10-29 NOTE — DISCHARGE INSTRUCTIONS
If any questions arise, call your provider.  If your provider is not available, please feel free to call the Surgical Center at [phone].    MEDICATIONS: Resume taking daily medication.  Take prescribed pain medication with food.  If no medication is prescribed, you may take non-aspirin pain medication if needed.  PAIN MEDICATION CAN BE VERY CONSTIPATING.  Take a stool softener or laxative such as senokot, pericolace, or milk of magnesia if needed.    Last pain medication given at     5:39 pm DilaudidWhat to Expect Post Anesthesia    Rest and take it easy for the first 24 hours.  A responsible adult is recommended to remain with you during that time.  It is normal to feel sleepy.  We encourage you to not do anything that requires balance, judgment or coordination.    FOR 24 HOURS DO NOT:  Drive, operate machinery or run household appliances.  Drink beer or alcoholic beverages.  Make important decisions or sign legal documents.    To avoid nausea, slowly advance diet as tolerated, avoiding spicy or greasy foods for the first day.  Add more substantial food to your diet according to your provider's instructions.  Babies can be fed formula or breast milk as soon as they are hungry.  INCREASE FLUIDS AND FIBER TO AVOID CONSTIPATION.    MILD FLU-LIKE SYMPTOMS ARE NORMAL.  YOU MAY EXPERIENCE GENERALIZED MUSCLE ACHES, THROAT IRRITATION, HEADACHE AND/OR SOME NAUSEA.    Diet  Hemorrhoidectomy Discharge Instructions    Incision and Dressing Care:    Carefully remove bandage on the day after surgery. You may shower. Good hygiene is essential for proper healing.  Wearing soft gauze pads or sanitary napkins in your underwear helps to control fluid drainage, discharge of mucous, and bleeding. Change pads and underwear frequently.    Dietary:    There are no specific dietary restrictions associated with this surgery. Avoid foods which make you constipated. Be sure to include wheat bran, fresh fruits and plenty of vegetables  in your diet. Drink plenty of water.    Bowel Function:    Avoid straining, especially a few days after surgery, when you may experience swelling that feels like an incompletely passed stool or a hemorrhoid. Milk of magnesia should be taken every day starting day of surgery until your first bowel movement. Additionally, we advise that you use an over-the-counter stool softener, such as Colace. Please call our office if no bowel movement within 72 hours.    Activity:    Moderate exercise is healthy. You may walk as much as you like. You are permitted to go up and down stairs slowly while using a handrail. Resume normal activities after first postoperative clinic visit. Avoid heavy lifting or strenuous activity until that time.    You may drive when you are comfortable enough to react and move quickly in an emergency (usually 1-2 weeks after surgery). Long trips over 25 miles are not advised. Do not drive if you are taking prescription pain medication.    Return to work when you feel you are able o work with the restrictions as noted above.    Activity    No Strenuous Activity    No strenuous activity for 1 week.  You may tire easily; rest as needed during the day.      Resume your normal diet as tolerated.  A diet low in cholesterol, fat, and sodium is recommended for good health.

## 2022-10-31 LAB — PATHOLOGY CONSULT NOTE: NORMAL

## 2023-02-06 NOTE — ED NOTES
Patient Education    BRIGHT FUTURES HANDOUT- PARENT  4 MONTH VISIT  Here are some suggestions from [employer] experts that may be of value to your family.     HOW YOUR FAMILY IS DOING  Learn if your home or drinking water has lead and take steps to get rid of it. Lead is toxic for everyone.  Take time for yourself and with your partner. Spend time with family and friends.  Choose a mature, trained, and responsible  or caregiver.  You can talk with us about your  choices.    FEEDING YOUR BABY    For babies at 4 months of age, breast milk or iron-fortified formula remains the best food. Solid foods are discouraged until about 6 months of age.    Avoid feeding your baby too much by following the baby s signs of fullness, such as  Leaning back  Turning away  If Breastfeeding  Providing only breast milk for your baby for about the first 6 months after birth provides ideal nutrition. It supports the best possible growth and development.  Be proud of yourself if you are still breastfeeding. Continue as long as you and your baby want.  Know that babies this age go through growth spurts. They may want to breastfeed more often and that is normal.  If you pump, be sure to store your milk properly so it stays safe for your baby. We can give you more information.  Give your baby vitamin D drops (400 IU a day).  Tell us if you are taking any medications, supplements, or herbal preparations.  If Formula Feeding  Make sure to prepare, heat, and store the formula safely.  Feed on demand. Expect him to eat about 30 to 32 oz daily.  Hold your baby so you can look at each other when you feed him.  Always hold the bottle. Never prop it.  Don t give your baby a bottle while he is in a crib.    YOUR CHANGING BABY    Create routines for feeding, nap time, and bedtime.    Calm your baby with soothing and gentle touches when she is fussy.    Make time for quiet play.    Hold your baby and talk with her.    Read to  US RN at bedside   your baby often.    Encourage active play.    Offer floor gyms and colorful toys to hold.    Put your baby on her tummy for playtime. Don t leave her alone during tummy time or allow her to sleep on her tummy.    Don t have a TV on in the background or use a TV or other digital media to calm your baby.    HEALTHY TEETH    Go to your own dentist twice yearly. It is important to keep your teeth healthy so you don t pass bacteria that cause cavities on to your baby.    Don t share spoons with your baby or use your mouth to clean the baby s pacifier.    Use a cold teething ring if your baby s gums are sore from teething.    Don t put your baby in a crib with a bottle.    Clean your baby s gums and teeth (as soon as you see the first tooth) 2 times per day with a soft cloth or soft toothbrush and a small smear of fluoride toothpaste (no more than a grain of rice).    SAFETY  Use a rear-facing-only car safety seat in the back seat of all vehicles.  Never put your baby in the front seat of a vehicle that has a passenger airbag.  Your baby s safety depends on you. Always wear your lap and shoulder seat belt. Never drive after drinking alcohol or using drugs. Never text or use a cell phone while driving.  Always put your baby to sleep on her back in her own crib, not in your bed.  Your baby should sleep in your room until she is at least 6 months of age.  Make sure your baby s crib or sleep surface meets the most recent safety guidelines.  Don t put soft objects and loose bedding such as blankets, pillows, bumper pads, and toys in the crib.    Drop-side cribs should not be used.    Lower the crib mattress.    If you choose to use a mesh playpen, get one made after February 28, 2013.    Prevent tap water burns. Set the water heater so the temperature at the faucet is at or below 120 F /49 C.    Prevent scalds or burns. Don t drink hot drinks when holding your baby.    Keep a hand on your baby on any surface from which she  might fall and get hurt, such as a changing table, couch, or bed.    Never leave your baby alone in bathwater, even in a bath seat or ring.    Keep small objects, small toys, and latex balloons away from your baby.    Don t use a baby walker.    WHAT TO EXPECT AT YOUR BABY S 6 MONTH VISIT  We will talk about  Caring for your baby, your family, and yourself  Teaching and playing with your baby  Brushing your baby s teeth  Introducing solid food    Keeping your baby safe at home, outside, and in the car        Helpful Resources:  Information About Car Safety Seats: www.safercar.gov/parents  Toll-free Auto Safety Hotline: 843.967.2129  Consistent with Bright Futures: Guidelines for Health Supervision of Infants, Children, and Adolescents, 4th Edition  For more information, go to https://brightfutures.aap.org.           Patient Education    BRIGHT [employer] HANDOUT- PARENT  4 MONTH VISIT  Here are some suggestions from [employer] experts that may be of value to your family.     HOW YOUR FAMILY IS DOING  Learn if your home or drinking water has lead and take steps to get rid of it. Lead is toxic for everyone.  Take time for yourself and with your partner. Spend time with family and friends.  Choose a mature, trained, and responsible  or caregiver.  You can talk with us about your  choices.    FEEDING YOUR BABY    For babies at 4 months of age, breast milk or iron-fortified formula remains the best food. Solid foods are discouraged until about 6 months of age.    Avoid feeding your baby too much by following the baby s signs of fullness, such as  Leaning back  Turning away  If Breastfeeding  Providing only breast milk for your baby for about the first 6 months after birth provides ideal nutrition. It supports the best possible growth and development.  Be proud of yourself if you are still breastfeeding. Continue as long as you and your baby want.  Know that babies this age go through growth spurts.  They may want to breastfeed more often and that is normal.  If you pump, be sure to store your milk properly so it stays safe for your baby. We can give you more information.  Give your baby vitamin D drops (400 IU a day).  Tell us if you are taking any medications, supplements, or herbal preparations.  If Formula Feeding  Make sure to prepare, heat, and store the formula safely.  Feed on demand. Expect him to eat about 30 to 32 oz daily.  Hold your baby so you can look at each other when you feed him.  Always hold the bottle. Never prop it.  Don t give your baby a bottle while he is in a crib.    YOUR CHANGING BABY    Create routines for feeding, nap time, and bedtime.    Calm your baby with soothing and gentle touches when she is fussy.    Make time for quiet play.    Hold your baby and talk with her.    Read to your baby often.    Encourage active play.    Offer floor gyms and colorful toys to hold.    Put your baby on her tummy for playtime. Don t leave her alone during tummy time or allow her to sleep on her tummy.    Don t have a TV on in the background or use a TV or other digital media to calm your baby.    HEALTHY TEETH    Go to your own dentist twice yearly. It is important to keep your teeth healthy so you don t pass bacteria that cause cavities on to your baby.    Don t share spoons with your baby or use your mouth to clean the baby s pacifier.    Use a cold teething ring if your baby s gums are sore from teething.    Don t put your baby in a crib with a bottle.    Clean your baby s gums and teeth (as soon as you see the first tooth) 2 times per day with a soft cloth or soft toothbrush and a small smear of fluoride toothpaste (no more than a grain of rice).    SAFETY  Use a rear-facing-only car safety seat in the back seat of all vehicles.  Never put your baby in the front seat of a vehicle that has a passenger airbag.  Your baby s safety depends on you. Always wear your lap and shoulder seat belt. Never  drive after drinking alcohol or using drugs. Never text or use a cell phone while driving.  Always put your baby to sleep on her back in her own crib, not in your bed.  Your baby should sleep in your room until she is at least 6 months of age.  Make sure your baby s crib or sleep surface meets the most recent safety guidelines.  Don t put soft objects and loose bedding such as blankets, pillows, bumper pads, and toys in the crib.    Drop-side cribs should not be used.    Lower the crib mattress.    If you choose to use a mesh playpen, get one made after February 28, 2013.    Prevent tap water burns. Set the water heater so the temperature at the faucet is at or below 120 F /49 C.    Prevent scalds or burns. Don t drink hot drinks when holding your baby.    Keep a hand on your baby on any surface from which she might fall and get hurt, such as a changing table, couch, or bed.    Never leave your baby alone in bathwater, even in a bath seat or ring.    Keep small objects, small toys, and latex balloons away from your baby.    Don t use a baby walker.    WHAT TO EXPECT AT YOUR BABY S 6 MONTH VISIT  We will talk about  Caring for your baby, your family, and yourself  Teaching and playing with your baby  Brushing your baby s teeth  Introducing solid food    Keeping your baby safe at home, outside, and in the car        Helpful Resources:  Information About Car Safety Seats: www.safercar.gov/parents  Toll-free Auto Safety Hotline: 495.111.9287  Consistent with Bright Futures: Guidelines for Health Supervision of Infants, Children, and Adolescents, 4th Edition  For more information, go to https://brightfutures.aap.org.

## 2023-05-25 NOTE — CARE PLAN
Problem: Safety  Goal: Will remain free from falls    Intervention: Assess risk factors for falls  Bed alarm activated in  order to reach care plan goals.         Scheduling called for pt for rheumatology, writer informed caller that they called dermatology. Caller will try contacting rheumatology again.    Shani Damon RN on 5/25/2023 at 2:28 PM

## 2023-06-13 NOTE — ED NOTES
Pt back to bed   [facility] Pregnancy And Lactation Text: There is insufficient data to evaluate drug-associated risk for major birth defects, miscarriage, or other adverse maternal or fetal outcomes.  There is a pregnancy registry that monitors pregnancy outcomes in pregnant persons exposed to the medication during pregnancy.  It is unknown if this medication is excreted in breast milk.  Do not breastfeed during treatment and for about 4 weeks after the last dose.

## 2023-06-22 NOTE — DISCHARGE INSTRUCTIONS
Discharge Instructions    Discharged to home by car with relative. Discharged via wheelchair, hospital escort: Yes.  Special equipment needed: Not Applicable    Be sure to schedule a follow-up appointment with your primary care doctor or any specialists as instructed.     Discharge Plan:   Diet Plan: Discussed  Activity Level: Discussed  Confirmed Follow up Appointment: Patient to Call and Schedule Appointment  Confirmed Symptoms Management: Discussed  Medication Reconciliation Updated: Yes  Influenza Vaccine Indication: Patient Refuses    I understand that a diet low in cholesterol, fat, and sodium is recommended for good health. Unless I have been given specific instructions below for another diet, I accept this instruction as my diet prescription.   Other diet: Full liquid -- Advance as tolerated.    Special Instructions: Follow up with PCP in 1 week.    · Is patient discharged on Warfarin / Coumadin?   No     · Is patient Post Blood Transfusion?  No    Depression / Suicide Risk    As you are discharged from this [facility] facility, it is important to learn how to keep safe from harming yourself.    Recognize the warning signs:  · Abrupt changes in personality, positive or negative- including increase in energy   · Giving away possessions  · Change in eating patterns- significant weight changes-  positive or negative  · Change in sleeping patterns- unable to sleep or sleeping all the time   · Unwillingness or inability to communicate  · Depression  · Unusual sadness, discouragement and loneliness  · Talk of wanting to die  · Neglect of personal appearance   · Rebelliousness- reckless behavior  · Withdrawal from people/activities they love  · Confusion- inability to concentrate     If you or a loved one observes any of these behaviors or has concerns about self-harm, here's what you can do:  · Talk about it- your feelings and reasons for harming yourself  · Remove any means that you might use to hurt yourself  "(examples: pills, rope, extension cords, firearm)  · Get professional help from the community (Mental Health, Substance Abuse, psychological counseling)  · Do not be alone:Call your Safe Contact- someone whom you trust who will be there for you.  · Call your local CRISIS HOTLINE 006-0808 or 008-337-8547  · Call your local Children's Mobile Crisis Response Team [place] (991) 762-4966 or www.[employer]  · Call the toll free National Suicide Prevention Hotlines   · National Suicide Prevention Lifeline 147-836-DPKR (3811)  National Hope Line Network 800-SUICIDE (698-7917)    Cannabis Use Disorder  Cannabis use disorder is a mental disorder. It is not one-time or occasional use of cannabis, more commonly known as marijuana. Cannabis use disorder is the continued, nonmedical use of cannabis that interferes with normal life activities or causes health problems. People with cannabis use disorder get a feeling of extreme pleasure and relaxation from cannabis use. This \"high\" is very rewarding and causes people to use over and over.   The mind-altering ingredient in cannabis is know as THC. THC can also interfere with motor coordination, memory, judgment, and accurate sense of space and time. These effects can last for a few days after using cannabis. Regular heavy cannabis use can cause long-lasting problems with thinking and learning. In young people, these problems may be permanent. Cannabis sometimes causes severe anxiety, paranoia, or visual hallucinations. Man-made (synthetic) cannabis-like drugs, such as \"spice\" and \"K2,\" cause the same effects as THC but are much stronger. Cannabis-like drugs can cause dangerously high blood pressure and heart rate.   Cannabis use disorder usually starts in the teenage years. It can trigger the development of schizophrenia. It is somewhat more common in men than women. People who have family members with the disorder or existing mental health issues such as depression and " [Encouraged to maintain food diary] : Encouraged to maintain food diary [Encouraged to increase physical activity] : Encouraged to increase physical activity posttraumatic stress disorder are more likely to develop cannabis use disorder. People with cannabis use disorder are at higher risk for use of other drugs of abuse.   SIGNS AND SYMPTOMS  Signs and symptoms of cannabis use disorder include:   · Use of cannabis in larger amounts or over a longer period than intended.    · Unsuccessful attempts to cut down or control cannabis use.    · A lot of time spent obtaining, using, or recovering from the effects of cannabis.    · A strong desire or urge to use cannabis (cravings).    · Continued use of cannabis in spite of problems at work, school, or home because of use.    · Continued use of cannabis in spite of relationship problems because of use.  · Giving up or cutting down on important life activities because of cannabis use.  · Use of cannabis over and over even in situations when it is physically hazardous, such as when driving a car.    · Continued use of cannabis in spite of a physical problem that is likely related to use. Physical problems can include:  · Chronic cough.  · Bronchitis.  · Emphysema.  · Throat and lung cancer.  · Continued use of cannabis in spite of a mental problem that is likely related to use. Mental problems can include:  · Psychosis.  · Anxiety.  · Difficulty sleeping.  · Need to use more and more cannabis to get the same effect, or lessened effect over time with use of the same amount (tolerance).  · Having withdrawal symptoms when cannabis use is stopped, or using cannabis to reduce or avoid withdrawal symptoms. Withdrawal symptoms include:  · Irritability or anger.  · Anxiety or restlessness.  · Difficulty sleeping.  · Loss of appetite or weight.  · Aches and pains.  · Shakiness.  · Sweating.  · Chills.  DIAGNOSIS  Cannabis use disorder is diagnosed by your health care provider. You may be asked questions about your cannabis use and how it affects your life. A physical exam may be done. A drug screen may be done. You may be referred to a  [Encouraged to use exercise tracking device] : Encouraged to use exercise tracking device mental health professional. The diagnosis of cannabis use disorder requires at least two symptoms within 12 months. The type of cannabis use disorder you have depends on the number of symptoms you have. The type may be:  · Mild. Two or three signs and symptoms.    · Moderate. Four or five signs and symptoms.    · Severe. Six or more signs and symptoms.    TREATMENT  Treatment is usually provided by mental health professionals with training in substance use disorders. The following options are available:  · Counseling or talk therapy. Talk therapy addresses the reasons you use cannabis. It also addresses ways to keep you from using again. The goals of talk therapy include:  · Identifying and avoiding triggers for use.  · Learning how to handle cravings.  · Replacing use with healthy activities.  · Support groups. Support groups provide emotional support, advice, and guidance.  · Medicine. Medicine is used to treat mental health issues that trigger cannabis use or that result from it.  HOME CARE INSTRUCTIONS  · Take medicines only as directed by your health care provider.  · Check with your health care provider before starting any new medicines.  · Keep all follow-up visits as directed by your health care provider.  SEEK MEDICAL CARE IF:  · You are not able to take your medicines as directed.  · Your symptoms get worse.  SEEK IMMEDIATE MEDICAL CARE IF:  You have serious thoughts about hurting yourself or others.  FOR MORE INFORMATION  · National [place] on Drug Abuse: www.drugabuse.gov  · Substance Abuse and Mental Health Services Administration: www.samhsa.gov     This information is not intended to replace advice given to you by your health care provider. Make sure you discuss any questions you have with your health care provider.     Document Released: 12/15/2001 Document Revised: 01/08/2016 Document Reviewed: 12/31/2014  [employer] Patient Education ©2016 [employer]    Marijuana Abuse  Your exam  shows you have used marijuana or pot. There are many health problems related to marijuana abuse. These include:  · Bronchitis.  · Chronic cough.  · Emphysema.  · Lung and upper airway cancer.  Abusers also experience impairment in:  · Memory.  · Judgment.  · Ability to learn.  · Coordination.  Students who smoke marijuana:  · Get lower grades.  · Are less likely to graduate than those who do not.  Adults who abuse marijuana:  · Have problems at work.  · May even lose their jobs due to:  · Poor work performance.  · Absenteeism.  Attention, memory, and learning skills have been shown to be diminished for up to 6 months after stopping regular use, and there is evidence that the effects can be cumulative over a lifetime.   Heavier use of marijuana also puts a strain on relationships with friends and loved ones and can lead to moodiness and loss of confidence. Acute intoxication can lead to:  · Increased anxiety.  · A panic episode.  It also increases the risk for having an automobile accident. This is especially true if the pot is combined with alcohol or other intoxicants. Treatment for acute intoxication is rarely needed. However, medicine to reduce anxiety may be helpful in some people.  Millions of people are considered to be dependent on marijuana. It is long-term regular use that leads to addiction and all of its complex problems. Information on the problem of addiction and the health problems of long-term abuse is posted at the National [place] for Drug Abuse website, www.drugabuse.gov. Consult with your doctor or counselor if you want further information and support in handling this common problem.  Document Released: 01/25/2006 Document Revised: 03/11/2013 Document Reviewed: 11/11/2008  [employer]® Patient Information ©2014 [employer].

## 2023-06-23 ENCOUNTER — HOSPITAL ENCOUNTER (OUTPATIENT)
Dept: LAB | Facility: MEDICAL CENTER | Age: 48
End: 2023-06-23
Attending: NURSE PRACTITIONER
Payer: COMMERCIAL

## 2023-06-23 LAB
25(OH)D3 SERPL-MCNC: 58 NG/ML (ref 30–100)
ALBUMIN SERPL BCP-MCNC: 4.5 G/DL (ref 3.2–4.9)
ALBUMIN/GLOB SERPL: 1.3 G/DL
ALP SERPL-CCNC: 110 U/L (ref 30–99)
ALT SERPL-CCNC: 18 U/L (ref 2–50)
ANION GAP SERPL CALC-SCNC: 15 MMOL/L (ref 7–16)
APPEARANCE UR: CLEAR
AST SERPL-CCNC: 17 U/L (ref 12–45)
BACTERIA #/AREA URNS HPF: ABNORMAL /HPF
BASOPHILS # BLD AUTO: 1.7 % (ref 0–1.8)
BASOPHILS # BLD: 0.08 K/UL (ref 0–0.12)
BILIRUB SERPL-MCNC: 0.2 MG/DL (ref 0.1–1.5)
BILIRUB UR QL STRIP.AUTO: NEGATIVE
BUN SERPL-MCNC: 18 MG/DL (ref 8–22)
CALCIUM ALBUM COR SERPL-MCNC: 9.4 MG/DL (ref 8.5–10.5)
CALCIUM SERPL-MCNC: 9.8 MG/DL (ref 8.5–10.5)
CHLORIDE SERPL-SCNC: 103 MMOL/L (ref 96–112)
CHOLEST SERPL-MCNC: 192 MG/DL (ref 100–199)
CO2 SERPL-SCNC: 20 MMOL/L (ref 20–33)
COLOR UR: YELLOW
CREAT SERPL-MCNC: 0.88 MG/DL (ref 0.5–1.4)
EOSINOPHIL # BLD AUTO: 0.14 K/UL (ref 0–0.51)
EOSINOPHIL NFR BLD: 2.9 % (ref 0–6.9)
EPI CELLS #/AREA URNS HPF: ABNORMAL /HPF
ERYTHROCYTE [DISTWIDTH] IN BLOOD BY AUTOMATED COUNT: 56 FL (ref 35.9–50)
EST. AVERAGE GLUCOSE BLD GHB EST-MCNC: 97 MG/DL
FOLATE SERPL-MCNC: 13.8 NG/ML
GFR SERPLBLD CREATININE-BSD FMLA CKD-EPI: 82 ML/MIN/1.73 M 2
GLOBULIN SER CALC-MCNC: 3.5 G/DL (ref 1.9–3.5)
GLUCOSE SERPL-MCNC: 87 MG/DL (ref 65–99)
GLUCOSE UR STRIP.AUTO-MCNC: NEGATIVE MG/DL
HBA1C MFR BLD: 5 % (ref 4–5.6)
HCT VFR BLD AUTO: 43.1 % (ref 37–47)
HDLC SERPL-MCNC: 57 MG/DL
HGB BLD-MCNC: 14.1 G/DL (ref 12–16)
HYALINE CASTS #/AREA URNS LPF: ABNORMAL /LPF
IMM GRANULOCYTES # BLD AUTO: 0.01 K/UL (ref 0–0.11)
IMM GRANULOCYTES NFR BLD AUTO: 0.2 % (ref 0–0.9)
KETONES UR STRIP.AUTO-MCNC: ABNORMAL MG/DL
LDLC SERPL CALC-MCNC: 110 MG/DL
LEUKOCYTE ESTERASE UR QL STRIP.AUTO: ABNORMAL
LYMPHOCYTES # BLD AUTO: 1.38 K/UL (ref 1–4.8)
LYMPHOCYTES NFR BLD: 29 % (ref 22–41)
MCH RBC QN AUTO: 30.9 PG (ref 27–33)
MCHC RBC AUTO-ENTMCNC: 32.7 G/DL (ref 32.2–35.5)
MCV RBC AUTO: 94.5 FL (ref 81.4–97.8)
MICRO URNS: ABNORMAL
MONOCYTES # BLD AUTO: 0.57 K/UL (ref 0–0.85)
MONOCYTES NFR BLD AUTO: 12 % (ref 0–13.4)
NEUTROPHILS # BLD AUTO: 2.58 K/UL (ref 1.82–7.42)
NEUTROPHILS NFR BLD: 54.2 % (ref 44–72)
NITRITE UR QL STRIP.AUTO: POSITIVE
NRBC # BLD AUTO: 0 K/UL
NRBC BLD-RTO: 0 /100 WBC (ref 0–0.2)
PH UR STRIP.AUTO: 5.5 [PH] (ref 5–8)
PLATELET # BLD AUTO: 361 K/UL (ref 164–446)
PMV BLD AUTO: 10.1 FL (ref 9–12.9)
POTASSIUM SERPL-SCNC: 4.4 MMOL/L (ref 3.6–5.5)
PROT SERPL-MCNC: 8 G/DL (ref 6–8.2)
PROT UR QL STRIP: NEGATIVE MG/DL
RBC # BLD AUTO: 4.56 M/UL (ref 4.2–5.4)
RBC # URNS HPF: ABNORMAL /HPF
RBC UR QL AUTO: NEGATIVE
SODIUM SERPL-SCNC: 138 MMOL/L (ref 135–145)
SP GR UR STRIP.AUTO: 1.02
T3FREE SERPL-MCNC: 2.61 PG/ML (ref 2–4.4)
T4 FREE SERPL-MCNC: 1.29 NG/DL (ref 0.93–1.7)
TRIGL SERPL-MCNC: 126 MG/DL (ref 0–149)
TSH SERPL DL<=0.005 MIU/L-ACNC: 3.03 UIU/ML (ref 0.38–5.33)
UROBILINOGEN UR STRIP.AUTO-MCNC: 0.2 MG/DL
VIT B12 SERPL-MCNC: 845 PG/ML (ref 211–911)
WBC # BLD AUTO: 4.8 K/UL (ref 4.8–10.8)
WBC #/AREA URNS HPF: ABNORMAL /HPF

## 2023-06-23 PROCEDURE — 36415 COLL VENOUS BLD VENIPUNCTURE: CPT

## 2023-06-23 PROCEDURE — 84481 FREE ASSAY (FT-3): CPT

## 2023-06-23 PROCEDURE — 82746 ASSAY OF FOLIC ACID SERUM: CPT

## 2023-06-23 PROCEDURE — 84443 ASSAY THYROID STIM HORMONE: CPT

## 2023-06-23 PROCEDURE — 80061 LIPID PANEL: CPT

## 2023-06-23 PROCEDURE — 84439 ASSAY OF FREE THYROXINE: CPT

## 2023-06-23 PROCEDURE — 80053 COMPREHEN METABOLIC PANEL: CPT

## 2023-06-23 PROCEDURE — 83036 HEMOGLOBIN GLYCOSYLATED A1C: CPT

## 2023-06-23 PROCEDURE — 82607 VITAMIN B-12: CPT

## 2023-06-23 PROCEDURE — 85025 COMPLETE CBC W/AUTO DIFF WBC: CPT

## 2023-06-23 PROCEDURE — 81001 URINALYSIS AUTO W/SCOPE: CPT

## 2023-06-23 PROCEDURE — 82306 VITAMIN D 25 HYDROXY: CPT

## 2023-07-12 ENCOUNTER — HOSPITAL ENCOUNTER (OUTPATIENT)
Dept: LAB | Facility: MEDICAL CENTER | Age: 48
End: 2023-07-12
Attending: NURSE PRACTITIONER
Payer: COMMERCIAL

## 2023-07-12 LAB
APPEARANCE UR: CLEAR
BILIRUB UR QL STRIP.AUTO: NEGATIVE
COLOR UR: YELLOW
GLUCOSE UR STRIP.AUTO-MCNC: NEGATIVE MG/DL
KETONES UR STRIP.AUTO-MCNC: NEGATIVE MG/DL
LEUKOCYTE ESTERASE UR QL STRIP.AUTO: NEGATIVE
MICRO URNS: NORMAL
NITRITE UR QL STRIP.AUTO: NEGATIVE
PH UR STRIP.AUTO: 6 [PH] (ref 5–8)
PROT UR QL STRIP: NEGATIVE MG/DL
RBC UR QL AUTO: NEGATIVE
SP GR UR STRIP.AUTO: 1.01
UROBILINOGEN UR STRIP.AUTO-MCNC: 0.2 MG/DL

## 2023-07-12 PROCEDURE — 81003 URINALYSIS AUTO W/O SCOPE: CPT

## 2023-07-25 NOTE — CARE PLAN
Problem: Safety  Goal: Will remain free from injury  Outcome: PROGRESSING AS EXPECTED    Goal: Will remain free from falls  Outcome: PROGRESSING AS EXPECTED      Problem: Pain Management  Goal: Pain level will decrease to patient's comfort goal  Outcome: PROGRESSING AS EXPECTED      Problem: Bowel/Gastric:  Goal: Normal bowel function is maintained or improved  Outcome: PROGRESSING AS EXPECTED    Goal: Will not experience complications related to bowel motility  Outcome: PROGRESSING AS EXPECTED         Strong peripheral pulses

## 2023-09-25 NOTE — ED NOTES
Cardiac Clearance        Physician or Practice Requesting: Dr Cunningham [place]: Same  Contact Phone Number: [phone]  Fax Number: [phone]  Date of Surgery/Procedure: TBD  Type of Surgery or Procedure: Breast Reduction Surgery  Type of Anesthesia: NA  Type of Clearance Requested: Cardiac Clearance  Medication to Hold: NA  Days to Hold: NA IR at the bedside for midline placement.

## 2023-10-17 ENCOUNTER — OFFICE VISIT (OUTPATIENT)
Dept: URGENT CARE | Facility: PHYSICIAN GROUP | Age: 48
End: 2023-10-17
Payer: COMMERCIAL

## 2023-10-17 ENCOUNTER — APPOINTMENT (OUTPATIENT)
Dept: RADIOLOGY | Facility: IMAGING CENTER | Age: 48
End: 2023-10-17
Attending: PHYSICIAN ASSISTANT
Payer: COMMERCIAL

## 2023-10-17 VITALS
HEIGHT: 65 IN | BODY MASS INDEX: 24.66 KG/M2 | RESPIRATION RATE: 16 BRPM | OXYGEN SATURATION: 96 % | SYSTOLIC BLOOD PRESSURE: 124 MMHG | TEMPERATURE: 98.9 F | DIASTOLIC BLOOD PRESSURE: 68 MMHG | WEIGHT: 148 LBS | HEART RATE: 87 BPM

## 2023-10-17 DIAGNOSIS — M25.571 ACUTE RIGHT ANKLE PAIN: ICD-10-CM

## 2023-10-17 DIAGNOSIS — M25.562 ACUTE PAIN OF LEFT KNEE: ICD-10-CM

## 2023-10-17 DIAGNOSIS — S92.251A CLOSED DISPLACED FRACTURE OF NAVICULAR BONE OF RIGHT FOOT, INITIAL ENCOUNTER: ICD-10-CM

## 2023-10-17 DIAGNOSIS — M79.671 FOOT PAIN, RIGHT: ICD-10-CM

## 2023-10-17 DIAGNOSIS — M25.561 ACUTE PAIN OF RIGHT KNEE: ICD-10-CM

## 2023-10-17 PROCEDURE — 99214 OFFICE O/P EST MOD 30 MIN: CPT | Performed by: PHYSICIAN ASSISTANT

## 2023-10-17 PROCEDURE — 73564 X-RAY EXAM KNEE 4 OR MORE: CPT | Mod: TC,LT | Performed by: RADIOLOGY

## 2023-10-17 PROCEDURE — 73630 X-RAY EXAM OF FOOT: CPT | Mod: TC,RT | Performed by: RADIOLOGY

## 2023-10-17 PROCEDURE — 3078F DIAST BP <80 MM HG: CPT | Performed by: PHYSICIAN ASSISTANT

## 2023-10-17 PROCEDURE — 3074F SYST BP LT 130 MM HG: CPT | Performed by: PHYSICIAN ASSISTANT

## 2023-10-17 PROCEDURE — 73610 X-RAY EXAM OF ANKLE: CPT | Mod: TC,RT | Performed by: RADIOLOGY

## 2023-10-17 RX ORDER — DEXTROAMPHETAMINE SACCHARATE, AMPHETAMINE ASPARTATE MONOHYDRATE, DEXTROAMPHETAMINE SULFATE AND AMPHETAMINE SULFATE 2.5; 2.5; 2.5; 2.5 MG/1; MG/1; MG/1; MG/1
20 CAPSULE, EXTENDED RELEASE ORAL
COMMUNITY
End: 2023-10-17

## 2023-10-17 RX ORDER — CHLORHEXIDINE GLUCONATE 213 G/1000ML
SOLUTION TOPICAL
COMMUNITY
End: 2023-10-17

## 2023-10-17 RX ORDER — DIAZEPAM 10 MG/1
1 TABLET ORAL 2 TIMES DAILY PRN
COMMUNITY

## 2023-10-17 RX ORDER — FERROUS SULFATE 325(65) MG
TABLET ORAL
COMMUNITY

## 2023-10-17 RX ORDER — DEXTROAMPHETAMINE SACCHARATE, AMPHETAMINE ASPARTATE MONOHYDRATE, DEXTROAMPHETAMINE SULFATE AND AMPHETAMINE SULFATE 5; 5; 5; 5 MG/1; MG/1; MG/1; MG/1
1 CAPSULE, EXTENDED RELEASE ORAL EVERY MORNING
COMMUNITY
End: 2023-10-17

## 2023-10-17 RX ORDER — METFORMIN HYDROCHLORIDE 500 MG/1
TABLET, EXTENDED RELEASE ORAL
COMMUNITY
End: 2023-10-17

## 2023-10-17 RX ORDER — NAPROXEN 500 MG/1
TABLET ORAL
COMMUNITY

## 2023-10-17 RX ORDER — DULOXETIN HYDROCHLORIDE 30 MG/1
CAPSULE, DELAYED RELEASE ORAL
COMMUNITY
End: 2023-10-17

## 2023-10-17 RX ORDER — CARIPRAZINE 1.5 MG/1
CAPSULE, GELATIN COATED ORAL
COMMUNITY
Start: 2023-10-16

## 2023-10-17 RX ORDER — BUPROPION HYDROCHLORIDE 150 MG/1
TABLET, EXTENDED RELEASE ORAL
COMMUNITY

## 2023-10-17 RX ORDER — METHOCARBAMOL 500 MG/1
500 TABLET, FILM COATED ORAL 2 TIMES DAILY
COMMUNITY
Start: 2023-10-13

## 2023-10-17 RX ORDER — METFORMIN HYDROCHLORIDE EXTENDED-RELEASE TABLETS 1000 MG/1
TABLET, FILM COATED, EXTENDED RELEASE ORAL
COMMUNITY

## 2023-10-17 RX ORDER — DEXTROAMPHETAMINE SACCHARATE, AMPHETAMINE ASPARTATE, DEXTROAMPHETAMINE SULFATE AND AMPHETAMINE SULFATE 3.75; 3.75; 3.75; 3.75 MG/1; MG/1; MG/1; MG/1
15 TABLET ORAL DAILY
COMMUNITY
End: 2023-10-17

## 2023-10-17 RX ORDER — LURASIDONE HYDROCHLORIDE 60 MG/1
TABLET, FILM COATED ORAL
COMMUNITY

## 2023-10-17 RX ORDER — DOCUSATE SODIUM 100 MG
100 CAPSULE ORAL
COMMUNITY
Start: 2023-09-18

## 2023-10-17 RX ORDER — BUPROPION HYDROCHLORIDE 200 MG/1
1 TABLET, EXTENDED RELEASE ORAL 2 TIMES DAILY
COMMUNITY

## 2023-10-17 RX ORDER — PHENTERMINE HYDROCHLORIDE 37.5 MG/1
37.5 TABLET ORAL
COMMUNITY
Start: 2023-09-20

## 2023-10-17 RX ORDER — PHENTERMINE HYDROCHLORIDE 37.5 MG/1
1 TABLET ORAL
COMMUNITY
Start: 2023-07-12

## 2023-10-17 RX ORDER — BUPROPION HYDROCHLORIDE 300 MG/1
TABLET ORAL
COMMUNITY

## 2023-10-17 RX ORDER — LURASIDONE HYDROCHLORIDE 120 MG/1
TABLET, FILM COATED ORAL
COMMUNITY

## 2023-10-17 RX ORDER — LURASIDONE HYDROCHLORIDE 40 MG/1
TABLET, FILM COATED ORAL
COMMUNITY

## 2023-10-17 RX ORDER — MEDROXYPROGESTERONE ACETATE 10 MG/1
TABLET ORAL
COMMUNITY
End: 2023-10-17

## 2023-10-17 RX ORDER — POTASSIUM CHLORIDE 20 MEQ/1
TABLET, EXTENDED RELEASE ORAL
COMMUNITY

## 2023-10-17 RX ORDER — METHOCARBAMOL 500 MG/1
1 TABLET, FILM COATED ORAL 2 TIMES DAILY
COMMUNITY
Start: 2023-10-12

## 2023-10-17 RX ORDER — CYANOCOBALAMIN 1000 UG/ML
INJECTION, SOLUTION INTRAMUSCULAR; SUBCUTANEOUS
COMMUNITY
Start: 2023-06-11 | End: 2023-10-17

## 2023-10-17 RX ORDER — DULOXETIN HYDROCHLORIDE 60 MG/1
CAPSULE, DELAYED RELEASE ORAL
COMMUNITY
End: 2023-10-17

## 2023-10-17 RX ORDER — DIAZEPAM 10 MG/1
TABLET ORAL
COMMUNITY
Start: 2023-10-02

## 2023-10-17 RX ORDER — PSEUDOEPHEDRINE HCL 30 MG
1 TABLET ORAL
COMMUNITY
Start: 2023-06-20 | End: 2023-10-17

## 2023-10-17 RX ORDER — BACLOFEN 10 MG/1
10 TABLET ORAL
COMMUNITY
End: 2023-10-17

## 2023-10-17 RX ORDER — CEFDINIR 300 MG/1
CAPSULE ORAL
COMMUNITY
End: 2023-10-17

## 2023-10-17 RX ORDER — METFORMIN HYDROCHLORIDE 500 MG/1
TABLET, FILM COATED, EXTENDED RELEASE ORAL
COMMUNITY
End: 2023-10-17

## 2023-10-17 RX ORDER — METRONIDAZOLE 500 MG/1
TABLET ORAL
COMMUNITY
End: 2023-10-17

## 2023-10-17 RX ORDER — LISDEXAMFETAMINE DIMESYLATE CAPSULES 70 MG/1
CAPSULE ORAL
COMMUNITY
End: 2023-10-17

## 2023-10-17 RX ORDER — LURASIDONE HYDROCHLORIDE 120 MG/1
TABLET, FILM COATED ORAL
COMMUNITY
Start: 2023-10-09

## 2023-10-17 RX ORDER — ESTRADIOL AND LEVONORGESTREL .045; .015 MG/D; MG/D
PATCH TRANSDERMAL
COMMUNITY

## 2023-10-17 RX ORDER — CLINDAMYCIN PHOSPHATE 20 MG/G
CREAM VAGINAL
COMMUNITY
End: 2023-10-17

## 2023-10-17 RX ORDER — ESTRADIOL AND LEVONORGESTREL .045; .015 MG/D; MG/D
PATCH TRANSDERMAL
COMMUNITY
Start: 2023-10-02

## 2023-10-17 RX ORDER — OXYCODONE HYDROCHLORIDE AND ACETAMINOPHEN 5; 325 MG/1; MG/1
TABLET ORAL
COMMUNITY
End: 2023-10-17

## 2023-10-17 RX ORDER — MAGNESIUM 64 MG (MAGNESIUM CHLORIDE) TABLET,DELAYED RELEASE
COMMUNITY
End: 2023-10-17

## 2023-10-17 RX ORDER — LURASIDONE HYDROCHLORIDE 20 MG/1
60 TABLET, FILM COATED ORAL
COMMUNITY

## 2023-10-17 RX ORDER — BUPROPION HYDROCHLORIDE 200 MG/1
200 TABLET, EXTENDED RELEASE ORAL 2 TIMES DAILY
COMMUNITY
Start: 2023-10-09

## 2023-10-17 RX ORDER — ANASTROZOLE 1 MG/1
TABLET ORAL
COMMUNITY
End: 2023-10-17

## 2023-10-17 RX ORDER — NITROFURANTOIN 25; 75 MG/1; MG/1
CAPSULE ORAL
COMMUNITY
End: 2023-10-17

## 2023-10-17 RX ORDER — POTASSIUM CHLORIDE 750 MG/1
40 TABLET, FILM COATED, EXTENDED RELEASE ORAL
COMMUNITY

## 2023-10-17 RX ORDER — ZOLPIDEM TARTRATE 10 MG/1
1 TABLET ORAL
COMMUNITY

## 2023-10-17 RX ORDER — SEMAGLUTIDE 0.25 MG/.5ML
INJECTION, SOLUTION SUBCUTANEOUS
COMMUNITY
Start: 2023-07-06

## 2023-10-17 RX ORDER — TRAMADOL HYDROCHLORIDE 50 MG/1
TABLET ORAL
COMMUNITY
End: 2023-10-17

## 2023-10-17 RX ORDER — ONDANSETRON 8 MG/1
TABLET, ORALLY DISINTEGRATING ORAL
COMMUNITY
End: 2023-10-17

## 2023-10-17 RX ORDER — METFORMIN HYDROCHLORIDE 1000 MG/1
TABLET, FILM COATED, EXTENDED RELEASE ORAL
COMMUNITY

## 2023-10-17 RX ORDER — TOPIRAMATE 50 MG/1
CAPSULE, EXTENDED RELEASE ORAL
COMMUNITY
End: 2023-10-17

## 2023-10-17 RX ORDER — KETOROLAC TROMETHAMINE 30 MG/ML
30 INJECTION, SOLUTION INTRAMUSCULAR; INTRAVENOUS ONCE
Status: COMPLETED | OUTPATIENT
Start: 2023-10-17 | End: 2023-10-17

## 2023-10-17 RX ORDER — GABAPENTIN 100 MG/1
1 CAPSULE ORAL
COMMUNITY
End: 2023-10-17

## 2023-10-17 RX ORDER — METRONIDAZOLE 7.5 MG/G
GEL VAGINAL
COMMUNITY
End: 2023-10-17

## 2023-10-17 RX ORDER — DULAGLUTIDE 4.5 MG/.5ML
INJECTION, SOLUTION SUBCUTANEOUS
COMMUNITY
End: 2023-10-17

## 2023-10-17 RX ADMIN — KETOROLAC TROMETHAMINE 30 MG: 30 INJECTION, SOLUTION INTRAMUSCULAR; INTRAVENOUS at 18:57

## 2023-10-17 ASSESSMENT — FIBROSIS 4 INDEX: FIB4 SCORE: 0.51

## 2023-10-17 NOTE — PROGRESS NOTES
Subjective:   Diana Snyder is a 48 y.o. female who presents for Ankle Injury (R ankle injury, significant swelling, pt accidentally tripped going down stairs, onset yesterday)       Fell down the stairs at home, missed bottom three staris. Landed on right lateral malleolus and left knee 24 hours ago.  Nauseated last pm due to pain. Slept on back.  Reports generally soft tissue swelling to the right ankle and foot.  Has been using crutches.  Reports tenderness to the left patella.  No numbness or tingling.  Decreased range of motion noted.  Painful weightbearing.      Medications:  acetaminophen Tabs  ADDERALL XR (20MG) Cp24  BENADRYL ALLERGY PO  DIAZEPAM PO  gabapentin Caps  metformin  metFORMIN ER Tb24  Non Formulary Request  ondansetron Tbdp  pantoprazole Tbec  therapeutic multivitamin-minerals Tabs  zolpidem Tabs    Allergies:             Reglan [metoclopramide], Bactrim [sulfamethoxazole w-trimethoprim], and Seroquel [quetiapine]    Surgical History:         Past Surgical History:   Procedure Laterality Date    HEMORRHOIDECTOMY N/A 10/28/2022    Procedure: EXCISIONAL HEMORRHOIDECTOMY;  Surgeon: Glenn Nuno M.D.;  Location: SURGERY [facility];  Service: Gastroenterology    AK COLONOSCOPY,DIAGNOSTIC N/A 6/16/2022    Procedure: AVERAGE RISK SCREENING COLONOSCOPY;  Surgeon: Navdeep Luciano M.D.;  Location: SURGERY SAME DAY [facility];  Service: Gastroenterology    GASTROSCOPY-ENDO  4/28/2016    Procedure: GASTROSCOPY-ENDO;  Surgeon: Blayne Blackburn M.D.;  Location: ENDOSCOPY [facility];  Service:     EXPLORATORY LAPAROTOMY  1/12/2016    Procedure: EXPLORATORY LAPAROTOMY;  Surgeon: Benji Earl M.D.;  Location: SURGERY [facility];  Service:     BOWEL RESECTION  1/12/2016    Procedure: BOWEL RESECTION;  Surgeon: Benji Earl M.D.;  Location: SURGERY [facility];  Service:     GASTROSCOPY WITH BIOPSY  3/9/2010    Performed by AMANDA MEJIA at ENDOSCOPY [facility]  "ORS    PYLOROPLASTY  7/27/2009    Performed by MACIEL QUINTERO at SURGERY [facility] ORS    EXPLORATORY LAPAROTOMY  7/27/2009    Performed by MACIEL QUINTERO at SURGERY [facility] ORS    APPENDECTOMY  7/27/2009    Performed by MACIEL QUINTERO at SURGERY [facility] ORS    CHOLECYSTECTOMY  7/27/2009    Performed by MACIEL QUINTERO at SURGERY [facility] ORS    GASTROSCOPY-ENDO  7/8/2009    Performed by ROSANNA WRIGHT at SURGERY [facility] ORS    LITHOTRIPSY      OTHER      superior mesenteric artery correction        Past Social Hx:  Diana Snyder  reports that she has never smoked. She has never used smokeless tobacco. She reports current drug use. Drugs: Marijuana and Inhaled. She reports that she does not drink alcohol.     Past Family Hx:   Diana Snyder family history includes Allergies in her father; Anxiety disorder in her mother; Cancer (age of onset: 50) in her mother; Depression in her mother; Heart Disease in her maternal grandmother; Hypertension in her father and mother; Obesity in her mother.       Problem list, medications, and allergies reviewed by myself today in Epic.     Objective:     /68 (BP Location: Left arm, Patient Position: Sitting, BP Cuff Size: Adult)   Pulse 87   Temp 37.2 °C (98.9 °F) (Temporal)   Resp 16   Ht 1.638 m (5' 4.5\")   Wt 67.1 kg (148 lb)   LMP 12/01/2018   SpO2 96%   BMI 25.01 kg/m²     Physical Exam  Vitals reviewed.   Constitutional:       General: She is not in acute distress.     Appearance: She is well-developed. She is not ill-appearing, toxic-appearing or diaphoretic.   HENT:      Head: Normocephalic.   Eyes:      Pupils: Pupils are equal, round, and reactive to light.   Cardiovascular:      Rate and Rhythm: Normal rate.   Pulmonary:      Effort: Pulmonary effort is normal.   Musculoskeletal:        Feet:    Feet:      Comments: Soft tissue swelling, ecchymosis noted to the dorsum of the right foot and " lateral malleolus.  Bony tenderness noted over the navicular region extending into the metatarsals and medial and lateral malleolus.  Tenderness over the ATFL.  Distal neurovascular intact.  Pain and decreased range of motion with dorsiflexion plantarflexion.  Skin:     General: Skin is warm and dry.      Findings: No erythema or rash.   Neurological:      Mental Status: She is alert and oriented to person, place, and time.   Psychiatric:         Behavior: Behavior is cooperative.         RADIOLOGY RESULTS   DX-ANKLE 3+ VIEWS RIGHT    Result Date: 10/17/2023  10/17/2023 5:23 PM HISTORY/REASON FOR EXAM:  Right ankle pain after fall yesterday TECHNIQUE/EXAM DESCRIPTION AND NUMBER OF VIEWS:  3 views of the RIGHT ankle. COMPARISON: None FINDINGS: There is no focal soft tissue swelling. There is no displaced fracture or dislocation. The alignment of the ankle is within normal limits. There is a linear bony density projecting over the distal talus on the oblique view near the navicular may represent an acute injury.     1.  No acute displaced right tibia or fibular fracture. 2.  Possible navicular ordistal talus fracture.    DX-FOOT-COMPLETE 3+ RIGHT    Result Date: 10/17/2023  10/17/2023 5:23 PM HISTORY/REASON FOR EXAM:  Right lateral foot pain after fall TECHNIQUE/EXAM DESCRIPTION AND NUMBER OF VIEWS: 3 views of the RIGHT foot. COMPARISON:  None FINDINGS: There is dorsal soft tissue swelling of the forefoot and midfoot. There is an oblique fracture of the dorsal navicular. The alignment is maintained.     1.  There is an oblique mildly displaced fracture of the dorsal navicular.    DX-KNEE COMPLETE 4+ LEFT    Result Date: 10/17/2023  10/17/2023 5:23 PM HISTORY/REASON FOR EXAM:  Left patellar knee pain after fall yesterday. TECHNIQUE/EXAM DESCRIPTION AND NUMBER OF VIEWS:  4 views of the LEFT knee. COMPARISON: None FINDINGS: There is no significant joint effusion. There is no evidence of displaced  fracture or  dislocation. There is no significant degenerative change.     1.  Unremarkable left knee series.           Assessment/Plan:     Diagnosis and Associated Orders:     1. Acute right ankle pain  - DX-ANKLE 3+ VIEWS RIGHT; Future  - Referral to Orthopedics    2. Acute pain of right knee  - ketorolac (Toradol) injection 30 mg    3. Foot pain, right  - DX-FOOT-COMPLETE 3+ RIGHT; Future  - Referral to Orthopedics    4. Acute pain of left knee  - DX-KNEE COMPLETE 4+ LEFT; Future    5. Closed displaced fracture of navicular bone of right foot, initial encounter  - Referral to Orthopedics  - ketorolac (Toradol) injection 30 mg    Other orders  - buPROPion (WELLBUTRIN SR) 200 MG SR tablet; Take 1 Tablet by mouth 2 times a day.  - buPROPion (WELLBUTRIN SR) 200 MG SR tablet; Take 200 mg by mouth 2 times a day.  - buPROPion (WELLBUTRIN XL) 300 MG XL tablet; TAKE 1 TABLET BY MOUTH EVERY MORNING AS DIRECTED  - buPROPion SR (WELLBUTRIN-SR) 150 MG TABLET SR 12 HR sustained-release tablet; bupropion HCl  mg tablet,12 hr sustained-release   TAKE 1 TABLET BY MOUTH TWICE DAILY  - VRAYLAR 1.5 MG capsule  - cariprazine (VRAYLAR) 1.5 MG capsule; TAKE 1 CAPSULE BY MOUTH EVERY MORNING AS DIRECTED  - diazepam (VALIUM) 10 MG tablet; Take 1 Tablet by mouth 2 times a day as needed.  - diazepam (VALIUM) 10 MG tablet; TAKE 1 TABLET BY MOUTH TWICE DAILY FOR ANXIETY  - STOOL SOFTENER 100 MG Cap; Take 100 mg by mouth every day.  - estradiol-levonorgestrel (CLIMARA PRO) 0.045-0.015 MG/DAY patch; APPLY 1 PATCH TOPICALLY TO THE SKIN WEEKLY  - CLIMARA PRO 0.045-0.015 MG/DAY patch; APPLY 1 PATCH TOPICALLY TO THE SKIN WEEKLY  - ferrous sulfate 325 (65 Fe) MG tablet; FeroSul 325 mg (65 mg iron) tablet   TAKE 1 TABLET BY MOUTH EVERY OTHER DAY  - Lurasidone HCl (LATUDA) 60 MG Tab; TAKE 1 TABLET BY MOUTH EVERY EVENING WITH MEALS  - Lurasidone HCl 120 MG Tab; TAKE 1 TABLET BY MOUTH EVERY EVENING WITH MEALS  - Lurasidone HCl 120 MG Tab; TAKE 1 TABLET BY  MOUTH EVERY EVENING WITH MEALS  - lurasidone (LATUDA) 40 MG Tab; Latuda 40 mg tablet  - lurasidone (LATUDA) 20 MG Tab; Take 60 mg by mouth.  - metformin (GLUCOPHAGE) 1000 MG tablet; Take 1 Tablet by mouth 2 times a day.  - metformin ER modified (GLUMETZA) 1000 MG TABLET SR 24 HR; metformin ER 1,000 mg 24 hr tablet,extended release   TAKE 2 TABLETS BY MOUTH EVERY DAY AT BEDTIME  - metFORMIN ER 1000 MG TABLET SR 24 HR; metformin ER 1,000 mg tablet,extended release 24hr   Take 1 tablet twice a day by oral route.  - methocarbamol (ROBAXIN) 500 MG Tab; Take 1 Tablet by mouth 2 times a day.  - methocarbamol (ROBAXIN) 500 MG Tab; Take 500 mg by mouth 2 times a day.  - naproxen (NAPROSYN) 500 MG Tab  - phentermine (ADIPEX-P) 37.5 MG tablet; Take 1 Tablet by mouth every day.  - phentermine (ADIPEX-P) 37.5 MG tablet; Take 37.5 mg by mouth every day.  - potassium chloride ER (KLOR-CON) 10 MEQ tablet; Take 40 mEq by mouth.  - potassium chloride SA (KDUR) 20 MEQ Tab CR; potassium chloride ER 20 mEq tablet,extended release(part/cryst)  - Semaglutide (WEGOVY) 0.25 MG/0.5ML Solution Auto-injector Pen-injector; Start: 0.25 mg SC qwk x4wk, then 0.5 mg SC qwk x4wk, then 1 mg SC qwk x4wk, then 1.7 mg SC qwk x4wk, then 2.4 mg SC qwk (Patient not taking: Reported on 10/17/2023)  - zolpidem (AMBIEN) 10 MG Tab; Take 1 Tablet by mouth at bedtime as needed.        Comments/MDM:  Radiographic evidence of mildly displaced fracture of the dorsal navicular bone.  Unclear if this is an avulsion injury.  No other bony abnormalities noted.  Patient is to remain nonweightbearing.  Placed in a tall boot with crutches.  Discussed importance of nonweightbearing status.  30 mg IM Toradol administered in clinic.  Elevate, ice 20 minutes every 1-2 hours and follow-up with orthopedics, referral placed.    Total patient care time exceeded 32 minutes, and greater than 50% was spent in discussion and counseling as above in A&P, reviewing radiographic studies,  discussing referral, reviewing imaging studies with patient    I personally reviewed prior external notes and test results pertinent to today's visit. Supportive care, natural history, differential diagnoses, and indications for immediate follow-up discussed. Return to clinic or go to ED if symptoms worsen or persist.  Red flag symptoms discussed.  Patient/Parent/Guardian voices understanding. Follow-up with your primary care provider in 3-5 days.  All side effects of medication discussed including allergic response, GI upset, tendon injury, rash, sedation etc    Please note that this dictation was created using voice recognition software. I have made a reasonable attempt to correct obvious errors, but I expect that there are errors of grammar and possibly content that I did not discover before finalizing the note.    This note was electronically signed by Marina Potter PA-C

## 2023-10-20 NOTE — PROGRESS NOTES
2 RN skin check performed on patient.  Skin is intact / without redness.  Interdisciplinary team rounded on patient.  Patient downgraded to medical.  Anxiety discussed and POC reviewed.   Yes

## 2023-11-17 ENCOUNTER — HOSPITAL ENCOUNTER (OUTPATIENT)
Dept: RADIOLOGY | Facility: MEDICAL CENTER | Age: 48
End: 2023-11-17
Attending: ORTHOPAEDIC SURGERY
Payer: COMMERCIAL

## 2023-11-17 DIAGNOSIS — S92.251A CLOSED AVULSION FRACTURE OF NAVICULAR BONE OF RIGHT FOOT, INITIAL ENCOUNTER: ICD-10-CM

## 2023-11-17 PROCEDURE — 73700 CT LOWER EXTREMITY W/O DYE: CPT | Mod: RT

## 2024-03-19 ENCOUNTER — HOSPITAL ENCOUNTER (OUTPATIENT)
Dept: LAB | Facility: MEDICAL CENTER | Age: 49
End: 2024-03-19
Attending: NURSE PRACTITIONER
Payer: COMMERCIAL

## 2024-03-19 LAB
25(OH)D3 SERPL-MCNC: 50 NG/ML (ref 30–100)
ALBUMIN SERPL BCP-MCNC: 4.2 G/DL (ref 3.2–4.9)
ALBUMIN/GLOB SERPL: 1.2 G/DL
ALP SERPL-CCNC: 110 U/L (ref 30–99)
ALT SERPL-CCNC: 19 U/L (ref 2–50)
ANION GAP SERPL CALC-SCNC: 11 MMOL/L (ref 7–16)
AST SERPL-CCNC: 20 U/L (ref 12–45)
BASOPHILS # BLD AUTO: 1.4 % (ref 0–1.8)
BASOPHILS # BLD: 0.07 K/UL (ref 0–0.12)
BILIRUB SERPL-MCNC: 0.5 MG/DL (ref 0.1–1.5)
BUN SERPL-MCNC: 11 MG/DL (ref 8–22)
CALCIUM ALBUM COR SERPL-MCNC: 9.1 MG/DL (ref 8.5–10.5)
CALCIUM SERPL-MCNC: 9.3 MG/DL (ref 8.5–10.5)
CHLORIDE SERPL-SCNC: 104 MMOL/L (ref 96–112)
CHOLEST SERPL-MCNC: 179 MG/DL (ref 100–199)
CO2 SERPL-SCNC: 24 MMOL/L (ref 20–33)
CREAT SERPL-MCNC: 0.87 MG/DL (ref 0.5–1.4)
EOSINOPHIL # BLD AUTO: 0.2 K/UL (ref 0–0.51)
EOSINOPHIL NFR BLD: 3.9 % (ref 0–6.9)
ERYTHROCYTE [DISTWIDTH] IN BLOOD BY AUTOMATED COUNT: 43.8 FL (ref 35.9–50)
EST. AVERAGE GLUCOSE BLD GHB EST-MCNC: 108 MG/DL
FASTING STATUS PATIENT QL REPORTED: NORMAL
FOLATE SERPL-MCNC: 21.2 NG/ML
GFR SERPLBLD CREATININE-BSD FMLA CKD-EPI: 83 ML/MIN/1.73 M 2
GLOBULIN SER CALC-MCNC: 3.4 G/DL (ref 1.9–3.5)
GLUCOSE SERPL-MCNC: 85 MG/DL (ref 65–99)
HBA1C MFR BLD: 5.4 % (ref 4–5.6)
HCT VFR BLD AUTO: 42.1 % (ref 37–47)
HDLC SERPL-MCNC: 51 MG/DL
HGB BLD-MCNC: 14.3 G/DL (ref 12–16)
IMM GRANULOCYTES # BLD AUTO: 0.04 K/UL (ref 0–0.11)
IMM GRANULOCYTES NFR BLD AUTO: 0.8 % (ref 0–0.9)
LDLC SERPL CALC-MCNC: 108 MG/DL
LYMPHOCYTES # BLD AUTO: 1.59 K/UL (ref 1–4.8)
LYMPHOCYTES NFR BLD: 31.3 % (ref 22–41)
MCH RBC QN AUTO: 33.3 PG (ref 27–33)
MCHC RBC AUTO-ENTMCNC: 34 G/DL (ref 32.2–35.5)
MCV RBC AUTO: 98.1 FL (ref 81.4–97.8)
MONOCYTES # BLD AUTO: 0.61 K/UL (ref 0–0.85)
MONOCYTES NFR BLD AUTO: 12 % (ref 0–13.4)
NEUTROPHILS # BLD AUTO: 2.57 K/UL (ref 1.82–7.42)
NEUTROPHILS NFR BLD: 50.6 % (ref 44–72)
NRBC # BLD AUTO: 0 K/UL
NRBC BLD-RTO: 0 /100 WBC (ref 0–0.2)
PLATELET # BLD AUTO: 346 K/UL (ref 164–446)
PMV BLD AUTO: 9.6 FL (ref 9–12.9)
POTASSIUM SERPL-SCNC: 4.1 MMOL/L (ref 3.6–5.5)
PROT SERPL-MCNC: 7.6 G/DL (ref 6–8.2)
RBC # BLD AUTO: 4.29 M/UL (ref 4.2–5.4)
SODIUM SERPL-SCNC: 139 MMOL/L (ref 135–145)
T3FREE SERPL-MCNC: 2.79 PG/ML (ref 2–4.4)
T4 FREE SERPL-MCNC: 1.31 NG/DL (ref 0.93–1.7)
THYROPEROXIDASE AB SERPL-ACNC: 16 IU/ML (ref 0–9)
TRIGL SERPL-MCNC: 98 MG/DL (ref 0–149)
TSH SERPL DL<=0.005 MIU/L-ACNC: 2.41 UIU/ML (ref 0.38–5.33)
VIT B12 SERPL-MCNC: 584 PG/ML (ref 211–911)
WBC # BLD AUTO: 5.1 K/UL (ref 4.8–10.8)

## 2024-03-19 PROCEDURE — 36415 COLL VENOUS BLD VENIPUNCTURE: CPT

## 2024-03-19 PROCEDURE — 84439 ASSAY OF FREE THYROXINE: CPT

## 2024-03-19 PROCEDURE — 84443 ASSAY THYROID STIM HORMONE: CPT

## 2024-03-19 PROCEDURE — 82607 VITAMIN B-12: CPT

## 2024-03-19 PROCEDURE — 85025 COMPLETE CBC W/AUTO DIFF WBC: CPT

## 2024-03-19 PROCEDURE — 86376 MICROSOMAL ANTIBODY EACH: CPT

## 2024-03-19 PROCEDURE — 82746 ASSAY OF FOLIC ACID SERUM: CPT

## 2024-03-19 PROCEDURE — 80053 COMPREHEN METABOLIC PANEL: CPT

## 2024-03-19 PROCEDURE — 82306 VITAMIN D 25 HYDROXY: CPT

## 2024-03-19 PROCEDURE — 84481 FREE ASSAY (FT-3): CPT

## 2024-03-19 PROCEDURE — 80061 LIPID PANEL: CPT

## 2024-03-19 PROCEDURE — 86800 THYROGLOBULIN ANTIBODY: CPT

## 2024-03-19 PROCEDURE — 83036 HEMOGLOBIN GLYCOSYLATED A1C: CPT

## 2024-03-21 LAB — THYROGLOB AB SERPL-ACNC: <0.9 IU/ML (ref 0–4)

## 2024-08-20 NOTE — ED NOTES
Lab called to notify that the pt's BMP draw had hemolyzed.      called for redraw BMP. ERP notified.    This patient has shock. The type of shock is distributive due to sepsis. The patient has the following evidence of shock: persistent hypotension. The patient was admitted to an intensive care unit and required norepinephrine which has since been weaned. See vertebral osteo for infectious treatment plan

## 2025-05-01 NOTE — ASSESSMENT & PLAN NOTE
IVF NS, follow bmp   The patient's goals for the shift include      The clinical goals for the shift include Pt will ambulate this morning before discharge home later today    Over the shift, the patient made progress and reports little pain, more discomfort. Ambulated in room and went off the floor in w/c with mom. Tolerated regular diet, hemavac removed her Ortho resident. PIVs removed. Ready for discharge home with mom

## (undated) DEVICE — SPONGE GAUZESTER 4 X 4 4PLY - (128PK/CA)

## (undated) DEVICE — GLOVE BIOGEL ECLIPSE PF LATEX SIZE 8.0  (50PR/BX)

## (undated) DEVICE — LACTATED RINGERS INJ 1000 ML - (14EA/CA 60CA/PF)

## (undated) DEVICE — MASK WITH FACE SHIELD (25/BX 4BX/CA)

## (undated) DEVICE — ELECTRODE DUAL RETURN W/ CORD - (50/PK)

## (undated) DEVICE — GLOVE BIOGEL SZ 8 SURGICAL PF LTX - (50PR/BX 4BX/CA)

## (undated) DEVICE — DRAPE LAPAROTOMY T SHEET - (12EA/CA)

## (undated) DEVICE — CANNULA O2 COMFORT SOFT EAR ADULT 7 FT TUBING (50/CA)

## (undated) DEVICE — SET LEADWIRE 5 LEAD BEDSIDE DISPOSABLE ECG (1SET OF 5/EA)

## (undated) DEVICE — GAUZE FLUFF STERILE 2-PLY 36 X 36 (100EA/CA)

## (undated) DEVICE — SUCTION INSTRUMENT YANKAUER BULBOUS TIP W/O VENT (50EA/CA)

## (undated) DEVICE — TUBING CLEARLINK DUO-VENT - C-FLO (48EA/CA)

## (undated) DEVICE — COVER LIGHT HANDLE ALC PLUS DISP (18EA/BX)

## (undated) DEVICE — GOWN WARMING STANDARD FLEX - (30/CA)

## (undated) DEVICE — KIT CUSTOM PROCEDURE SINGLE FOR ENDO  (15/CA)

## (undated) DEVICE — GLOVE BIOGEL PI INDICATOR SZ 8.0 SURGICAL PF LF -(50/BX 4BX/CA)

## (undated) DEVICE — BOVIE NEEDLE TIP 3CM COLORADO

## (undated) DEVICE — TUBE CONNECTING SUCTION - CLEAR PLASTIC STERILE 72 IN (50EA/CA)

## (undated) DEVICE — TRAY SRGPRP PVP IOD WT PRP - (20/CA)

## (undated) DEVICE — SET EXTENSION WITH 2 PORTS (48EA/CA) ***PART #2C8610 IS A SUBSTITUTE*****

## (undated) DEVICE — TOWEL STOP TIMEOUT SAFETY FLAG (40EA/CA)

## (undated) DEVICE — SUTURE GENERAL

## (undated) DEVICE — FILM CASSETTE ENDO

## (undated) DEVICE — CANISTER SUCTION RIGID RED 1500CC (40EA/CA)

## (undated) DEVICE — PACK MINOR BASIN - (2EA/CA)

## (undated) DEVICE — BRIEF STRETCH MATERNITY M/L - FITS 20-60IN (5EA/BG 20BG/CA)

## (undated) DEVICE — GLOVE SZ 6 BIOGEL PI MICRO - PF LF (50PR/BX 4BX/CA)

## (undated) DEVICE — SENSOR OXIMETER ADULT SPO2 RD SET (20EA/BX)

## (undated) DEVICE — SODIUM CHL IRRIGATION 0.9% 1000ML (12EA/CA)

## (undated) DEVICE — MANIFOLD NEPTUNE 1 PORT (20/PK)

## (undated) DEVICE — SUTURE 3-0 CHROMIC GUT SH 27 (36PK/BX)"

## (undated) DEVICE — SLEEVE VASO CALF MED - (10PR/CA)

## (undated) DEVICE — CONTAINER, SPECIMEN, STERILE

## (undated) DEVICE — CANISTER SUCTION 3000ML MECHANICAL FILTER AUTO SHUTOFF MEDI-VAC NONSTERILE LF DISP  (40EA/CA)

## (undated) DEVICE — ELECTRODE 850 FOAM ADHESIVE - HYDROGEL RADIOTRNSPRNT (50/PK)

## (undated) DEVICE — SUTURE 0 SILK TIES (36PK/BX)